# Patient Record
Sex: MALE | Race: WHITE | NOT HISPANIC OR LATINO | URBAN - METROPOLITAN AREA
[De-identification: names, ages, dates, MRNs, and addresses within clinical notes are randomized per-mention and may not be internally consistent; named-entity substitution may affect disease eponyms.]

---

## 2017-01-01 ENCOUNTER — EMERGENCY (EMERGENCY)
Facility: HOSPITAL | Age: 77
LOS: 1 days | Discharge: HOME | End: 2017-01-01
Attending: EMERGENCY MEDICINE | Admitting: EMERGENCY MEDICINE
Payer: MEDICARE

## 2017-01-01 VITALS
HEART RATE: 61 BPM | TEMPERATURE: 98 F | DIASTOLIC BLOOD PRESSURE: 76 MMHG | SYSTOLIC BLOOD PRESSURE: 149 MMHG | RESPIRATION RATE: 16 BRPM

## 2017-01-01 DIAGNOSIS — I10 ESSENTIAL (PRIMARY) HYPERTENSION: ICD-10-CM

## 2017-01-01 DIAGNOSIS — Z79.82 LONG TERM (CURRENT) USE OF ASPIRIN: ICD-10-CM

## 2017-01-01 DIAGNOSIS — R10.33 PERIUMBILICAL PAIN: ICD-10-CM

## 2017-01-01 DIAGNOSIS — Z87.891 PERSONAL HISTORY OF NICOTINE DEPENDENCE: ICD-10-CM

## 2017-01-01 DIAGNOSIS — E78.00 PURE HYPERCHOLESTEROLEMIA, UNSPECIFIED: ICD-10-CM

## 2017-01-01 DIAGNOSIS — Z98.890 OTHER SPECIFIED POSTPROCEDURAL STATES: ICD-10-CM

## 2017-01-01 LAB
ALBUMIN SERPL ELPH-MCNC: 3.9 G/DL — SIGNIFICANT CHANGE UP (ref 3.3–5)
ALP SERPL-CCNC: 60 U/L — SIGNIFICANT CHANGE UP (ref 40–120)
ALT FLD-CCNC: 70 U/L RC — HIGH (ref 10–45)
ANION GAP SERPL CALC-SCNC: 13 MMOL/L — SIGNIFICANT CHANGE UP (ref 5–17)
APPEARANCE UR: CLEAR — SIGNIFICANT CHANGE UP
APTT BLD: 31.2 SEC — SIGNIFICANT CHANGE UP (ref 27.5–37.4)
AST SERPL-CCNC: 92 U/L — HIGH (ref 10–40)
BASOPHILS # BLD AUTO: 0 K/UL — SIGNIFICANT CHANGE UP (ref 0–0.2)
BASOPHILS NFR BLD AUTO: 0.5 % — SIGNIFICANT CHANGE UP (ref 0–2)
BILIRUB SERPL-MCNC: 0.6 MG/DL — SIGNIFICANT CHANGE UP (ref 0.2–1.2)
BILIRUB UR-MCNC: NEGATIVE — SIGNIFICANT CHANGE UP
BUN SERPL-MCNC: 22 MG/DL — SIGNIFICANT CHANGE UP (ref 7–23)
CALCIUM SERPL-MCNC: 8.9 MG/DL — SIGNIFICANT CHANGE UP (ref 8.4–10.5)
CHLORIDE SERPL-SCNC: 105 MMOL/L — SIGNIFICANT CHANGE UP (ref 96–108)
CO2 SERPL-SCNC: 23 MMOL/L — SIGNIFICANT CHANGE UP (ref 22–31)
COLOR SPEC: YELLOW — SIGNIFICANT CHANGE UP
COMMENT - URINE: SIGNIFICANT CHANGE UP
CREAT SERPL-MCNC: 1.41 MG/DL — HIGH (ref 0.5–1.3)
DIFF PNL FLD: ABNORMAL
EOSINOPHIL # BLD AUTO: 0 K/UL — SIGNIFICANT CHANGE UP (ref 0–0.5)
EOSINOPHIL NFR BLD AUTO: 0.5 % — SIGNIFICANT CHANGE UP (ref 0–6)
EPI CELLS # UR: SIGNIFICANT CHANGE UP /HPF
GAS PNL BLDV: SIGNIFICANT CHANGE UP
GAS PNL BLDV: SIGNIFICANT CHANGE UP
GLUCOSE SERPL-MCNC: 117 MG/DL — HIGH (ref 70–99)
GLUCOSE UR QL: NEGATIVE — SIGNIFICANT CHANGE UP
HCT VFR BLD CALC: 43.3 % — SIGNIFICANT CHANGE UP (ref 39–50)
HGB BLD-MCNC: 15 G/DL — SIGNIFICANT CHANGE UP (ref 13–17)
INR BLD: 1.08 RATIO — SIGNIFICANT CHANGE UP (ref 0.88–1.16)
KETONES UR-MCNC: NEGATIVE — SIGNIFICANT CHANGE UP
LEUKOCYTE ESTERASE UR-ACNC: NEGATIVE — SIGNIFICANT CHANGE UP
LYMPHOCYTES # BLD AUTO: 1.5 K/UL — SIGNIFICANT CHANGE UP (ref 1–3.3)
LYMPHOCYTES # BLD AUTO: 23.4 % — SIGNIFICANT CHANGE UP (ref 13–44)
MCHC RBC-ENTMCNC: 33.2 PG — SIGNIFICANT CHANGE UP (ref 27–34)
MCHC RBC-ENTMCNC: 34.6 GM/DL — SIGNIFICANT CHANGE UP (ref 32–36)
MCV RBC AUTO: 96 FL — SIGNIFICANT CHANGE UP (ref 80–100)
MONOCYTES # BLD AUTO: 1.1 K/UL — HIGH (ref 0–0.9)
MONOCYTES NFR BLD AUTO: 16.6 % — HIGH (ref 2–14)
NEUTROPHILS # BLD AUTO: 3.8 K/UL — SIGNIFICANT CHANGE UP (ref 1.8–7.4)
NEUTROPHILS NFR BLD AUTO: 58.9 % — SIGNIFICANT CHANGE UP (ref 43–77)
NITRITE UR-MCNC: NEGATIVE — SIGNIFICANT CHANGE UP
PH UR: 6 — SIGNIFICANT CHANGE UP (ref 4.8–8)
PLATELET # BLD AUTO: 118 K/UL — LOW (ref 150–400)
POTASSIUM SERPL-MCNC: 4.4 MMOL/L — SIGNIFICANT CHANGE UP (ref 3.5–5.3)
POTASSIUM SERPL-SCNC: 4.4 MMOL/L — SIGNIFICANT CHANGE UP (ref 3.5–5.3)
PROT SERPL-MCNC: 6.7 G/DL — SIGNIFICANT CHANGE UP (ref 6–8.3)
PROT UR-MCNC: NEGATIVE — SIGNIFICANT CHANGE UP
PROTHROM AB SERPL-ACNC: 11.7 SEC — SIGNIFICANT CHANGE UP (ref 10–13.1)
RBC # BLD: 4.52 M/UL — SIGNIFICANT CHANGE UP (ref 4.2–5.8)
RBC # FLD: 11.2 % — SIGNIFICANT CHANGE UP (ref 10.3–14.5)
RBC CASTS # UR COMP ASSIST: SIGNIFICANT CHANGE UP /HPF (ref 0–2)
SODIUM SERPL-SCNC: 141 MMOL/L — SIGNIFICANT CHANGE UP (ref 135–145)
SP GR SPEC: 1.02 — SIGNIFICANT CHANGE UP (ref 1.01–1.02)
UROBILINOGEN FLD QL: NEGATIVE — SIGNIFICANT CHANGE UP
WBC # BLD: 6.4 K/UL — SIGNIFICANT CHANGE UP (ref 3.8–10.5)
WBC # FLD AUTO: 6.4 K/UL — SIGNIFICANT CHANGE UP (ref 3.8–10.5)
WBC UR QL: SIGNIFICANT CHANGE UP /HPF (ref 0–5)

## 2017-01-01 PROCEDURE — 99285 EMERGENCY DEPT VISIT HI MDM: CPT

## 2017-01-01 PROCEDURE — 74177 CT ABD & PELVIS W/CONTRAST: CPT | Mod: 26

## 2017-01-01 RX ORDER — SODIUM CHLORIDE 9 MG/ML
500 INJECTION INTRAMUSCULAR; INTRAVENOUS; SUBCUTANEOUS ONCE
Qty: 0 | Refills: 0 | Status: COMPLETED | OUTPATIENT
Start: 2017-01-01 | End: 2017-01-01

## 2017-01-01 RX ADMIN — SODIUM CHLORIDE 500 MILLILITER(S): 9 INJECTION INTRAMUSCULAR; INTRAVENOUS; SUBCUTANEOUS at 22:00

## 2017-01-01 RX ADMIN — SODIUM CHLORIDE 500 MILLILITER(S): 9 INJECTION INTRAMUSCULAR; INTRAVENOUS; SUBCUTANEOUS at 23:12

## 2017-01-01 NOTE — ED PROVIDER NOTE - PROGRESS NOTE DETAILS
Pt reassesed, remains well appearing, in NAD, abd soft nt/nd; repeat lactate negative; very low concern for mesenteric ischemia.  Given well appearing, will dc, to f/u with his pmd as outpatient.  -Brennan

## 2017-01-01 NOTE — ED ADULT NURSE NOTE - PMH
Aortic Stenosis    Atrial fibrillation    Enlarged Prostate    GERD (Gastroesophageal Reflux Disease)    HTN (hypertension)    Hypercholesterolemia

## 2017-01-01 NOTE — ED PROVIDER NOTE - MEDICAL DECISION MAKING DETAILS
76M, h/o afib, AS, HTN, GERD, AVR 2012, s/p RP mass removal (benign lipoma), s/p cholecystectomy, p/w abdominal pain; no fever vomiting or diarrhea; on exam mildly tender in periumbilical region; given surgical history, plan to obtain CT of the abdomen to r/o appendicitis, obstruction or mass.

## 2017-01-01 NOTE — ED ADULT NURSE NOTE - NS ED NURSE DC INFO COMPLEXITY
Patient asked questions/Simple: Patient demonstrates quick and easy understanding/Verbalized Understanding

## 2017-01-01 NOTE — ED PROVIDER NOTE - OBJECTIVE STATEMENT
76yM Albanian-speaking (translation provided by daughter) with multiple prior abdominal surgeries (resection of benign abdominal tumor, cholecystectomy, hernia repair), GERD, a fib, aortic stenosis presents with abdominal pain since this morning. Periumbilical. Initially improved after eating then pain returned. Denies fever, urinary symptoms, BRBPR, melena, nausea/vomiting, diarrhea.  Former smoker. Denies similar pain in the past.  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 Periumbilical. Initially improved after eating then pain returned. Denies fever, urinary symptoms, BRBPR, melena, nausea/vomiting, diarrhea.  Former smoker. Denies similar pain in the past.  PMD Ashley

## 2017-01-01 NOTE — ED ADULT NURSE NOTE - PSH
Aortic valve disease  AVR 7/2012  Retroperitoneal mass  s/p surgery- benign  S/P cardiac catheterization    S/P TURP  2005

## 2017-01-01 NOTE — ED ADULT NURSE NOTE - OBJECTIVE STATEMENT
Pt c/o diffuse abdominal pain that began this morning, resolved after eating breakfast, Pt c/o diffuse abdominal pain that began this morning, resolved after eating breakfast, returned about 2 hours ago, constant, unable to describe quality.  Pt ambulatory, does not appear to be in active distress, abd distended (pt states baseline), soft/nt, skin wdi, denies f/c, nvd, constipation, cp, sob, urinary symptoms, sick contacts.  PMH aortic valve replacement 2011 and multiple abdominal surgeries for "abdominal tumor" 2012.  Declines pain meds at this time.

## 2017-01-02 VITALS
HEART RATE: 57 BPM | RESPIRATION RATE: 16 BRPM | DIASTOLIC BLOOD PRESSURE: 72 MMHG | TEMPERATURE: 98 F | SYSTOLIC BLOOD PRESSURE: 141 MMHG | OXYGEN SATURATION: 97 %

## 2017-01-02 LAB — GAS PNL BLDV: SIGNIFICANT CHANGE UP

## 2017-01-02 PROCEDURE — 85014 HEMATOCRIT: CPT

## 2017-01-02 PROCEDURE — 85610 PROTHROMBIN TIME: CPT

## 2017-01-02 PROCEDURE — 80053 COMPREHEN METABOLIC PANEL: CPT

## 2017-01-02 PROCEDURE — 85730 THROMBOPLASTIN TIME PARTIAL: CPT

## 2017-01-02 PROCEDURE — 82803 BLOOD GASES ANY COMBINATION: CPT

## 2017-01-02 PROCEDURE — 82330 ASSAY OF CALCIUM: CPT

## 2017-01-02 PROCEDURE — 82947 ASSAY GLUCOSE BLOOD QUANT: CPT

## 2017-01-02 PROCEDURE — 84132 ASSAY OF SERUM POTASSIUM: CPT

## 2017-01-02 PROCEDURE — 96374 THER/PROPH/DIAG INJ IV PUSH: CPT | Mod: XU

## 2017-01-02 PROCEDURE — 83690 ASSAY OF LIPASE: CPT

## 2017-01-02 PROCEDURE — 82435 ASSAY OF BLOOD CHLORIDE: CPT

## 2017-01-02 PROCEDURE — 83605 ASSAY OF LACTIC ACID: CPT

## 2017-01-02 PROCEDURE — 99284 EMERGENCY DEPT VISIT MOD MDM: CPT | Mod: 25

## 2017-01-02 PROCEDURE — 85027 COMPLETE CBC AUTOMATED: CPT

## 2017-01-02 PROCEDURE — 81001 URINALYSIS AUTO W/SCOPE: CPT

## 2017-01-02 PROCEDURE — 74177 CT ABD & PELVIS W/CONTRAST: CPT

## 2017-01-02 PROCEDURE — 84295 ASSAY OF SERUM SODIUM: CPT

## 2017-01-02 RX ORDER — FAMOTIDINE 10 MG/ML
20 INJECTION INTRAVENOUS ONCE
Qty: 0 | Refills: 0 | Status: COMPLETED | OUTPATIENT
Start: 2017-01-02 | End: 2017-01-02

## 2017-01-02 RX ADMIN — FAMOTIDINE 20 MILLIGRAM(S): 10 INJECTION INTRAVENOUS at 01:11

## 2017-01-02 RX ADMIN — Medication 30 MILLILITER(S): at 01:11

## 2017-01-02 NOTE — ED ADULT NURSE REASSESSMENT NOTE - NS ED NURSE REASSESS COMMENT FT1
Given PO contrast to drink for CT scan, also given urine cup for UA
pts daughter translating at pts request(not wanting to use translation service)

## 2017-03-30 ENCOUNTER — APPOINTMENT (OUTPATIENT)
Dept: OPHTHALMOLOGY | Facility: CLINIC | Age: 77
End: 2017-03-30

## 2017-04-10 ENCOUNTER — LABORATORY RESULT (OUTPATIENT)
Age: 77
End: 2017-04-10

## 2017-04-10 ENCOUNTER — APPOINTMENT (OUTPATIENT)
Dept: INTERNAL MEDICINE | Facility: CLINIC | Age: 77
End: 2017-04-10

## 2017-04-10 VITALS
BODY MASS INDEX: 33.35 KG/M2 | RESPIRATION RATE: 18 BRPM | HEART RATE: 78 BPM | SYSTOLIC BLOOD PRESSURE: 116 MMHG | WEIGHT: 219.36 LBS | DIASTOLIC BLOOD PRESSURE: 70 MMHG

## 2017-04-10 VITALS — TEMPERATURE: 97.3 F

## 2017-04-10 LAB
BILIRUB UR QL STRIP: NEGATIVE
GLUCOSE UR-MCNC: NEGATIVE
HCG UR QL: 0.2 EU/DL
HGB UR QL STRIP.AUTO: NORMAL
KETONES UR-MCNC: NEGATIVE
LEUKOCYTE ESTERASE UR QL STRIP: NEGATIVE
NITRITE UR QL STRIP: NEGATIVE
PH UR STRIP: 6
PROT UR STRIP-MCNC: NEGATIVE
SP GR UR STRIP: 1.02

## 2017-04-10 RX ORDER — CYANOCOBALAMIN 1000 UG/ML
1000 INJECTION INTRAMUSCULAR; SUBCUTANEOUS
Qty: 0 | Refills: 0 | Status: COMPLETED | OUTPATIENT
Start: 2017-04-10

## 2017-04-10 RX ADMIN — CYANOCOBALAMIN 0 MCG/ML: 1000 INJECTION INTRAMUSCULAR; SUBCUTANEOUS at 00:00

## 2017-04-12 LAB
25(OH)D3 SERPL-MCNC: 38.4 NG/ML
ALBUMIN SERPL ELPH-MCNC: 4.2 G/DL
ALP BLD-CCNC: 46 U/L
ALT SERPL-CCNC: 22 U/L
ANION GAP SERPL CALC-SCNC: 18 MMOL/L
AST SERPL-CCNC: 35 U/L
BASOPHILS # BLD AUTO: 0 K/UL
BASOPHILS NFR BLD AUTO: 0 %
BILIRUB SERPL-MCNC: 0.6 MG/DL
BUN SERPL-MCNC: 18 MG/DL
CALCIUM SERPL-MCNC: 9.4 MG/DL
CHLORIDE SERPL-SCNC: 102 MMOL/L
CHOLEST SERPL-MCNC: 116 MG/DL
CHOLEST/HDLC SERPL: 2.3 RATIO
CO2 SERPL-SCNC: 23 MMOL/L
CREAT SERPL-MCNC: 1.2 MG/DL
EOSINOPHIL # BLD AUTO: 0.04 K/UL
EOSINOPHIL NFR BLD AUTO: 0.9 %
GLUCOSE SERPL-MCNC: 102 MG/DL
HCT VFR BLD CALC: 45.7 %
HDLC SERPL-MCNC: 50 MG/DL
HGB BLD-MCNC: 14.8 G/DL
LDLC SERPL CALC-MCNC: 54 MG/DL
LYMPHOCYTES # BLD AUTO: 1.09 K/UL
LYMPHOCYTES NFR BLD AUTO: 23.9 %
MAN DIFF?: NORMAL
MCHC RBC-ENTMCNC: 31.5 PG
MCHC RBC-ENTMCNC: 32.4 GM/DL
MCV RBC AUTO: 97.2 FL
MONOCYTES # BLD AUTO: 0.93 K/UL
MONOCYTES NFR BLD AUTO: 20.4 %
NEUTROPHILS # BLD AUTO: 2.29 K/UL
NEUTROPHILS NFR BLD AUTO: 50.4 %
PLATELET # BLD AUTO: 112 K/UL
POTASSIUM SERPL-SCNC: 4.7 MMOL/L
PROT SERPL-MCNC: 6.9 G/DL
RBC # BLD: 4.7 M/UL
RBC # FLD: 12.8 %
SODIUM SERPL-SCNC: 143 MMOL/L
TRIGL SERPL-MCNC: 58 MG/DL
VIT B12 SERPL-MCNC: 400 PG/ML
WBC # FLD AUTO: 4.55 K/UL

## 2017-04-24 ENCOUNTER — LABORATORY RESULT (OUTPATIENT)
Age: 77
End: 2017-04-24

## 2017-04-24 ENCOUNTER — OUTPATIENT (OUTPATIENT)
Dept: OUTPATIENT SERVICES | Facility: HOSPITAL | Age: 77
LOS: 1 days | End: 2017-04-24
Payer: MEDICARE

## 2017-04-24 ENCOUNTER — APPOINTMENT (OUTPATIENT)
Dept: UROLOGY | Facility: CLINIC | Age: 77
End: 2017-04-24

## 2017-04-24 PROCEDURE — 76775 US EXAM ABDO BACK WALL LIM: CPT

## 2017-04-24 PROCEDURE — 88112 CYTOPATH CELL ENHANCE TECH: CPT | Mod: 26

## 2017-05-02 DIAGNOSIS — R31.29 OTHER MICROSCOPIC HEMATURIA: ICD-10-CM

## 2017-05-02 DIAGNOSIS — N40.1 BENIGN PROSTATIC HYPERPLASIA WITH LOWER URINARY TRACT SYMPTOMS: ICD-10-CM

## 2017-05-02 DIAGNOSIS — N20.0 CALCULUS OF KIDNEY: ICD-10-CM

## 2017-05-02 DIAGNOSIS — M54.5 LOW BACK PAIN: ICD-10-CM

## 2017-05-02 LAB
ALBUMIN SERPL ELPH-MCNC: 4.3 G/DL
ALP BLD-CCNC: 41 U/L
ALT SERPL-CCNC: 20 U/L
ANION GAP SERPL CALC-SCNC: 11 MMOL/L
APPEARANCE: CLEAR
AST SERPL-CCNC: 21 U/L
BACTERIA UR CULT: NORMAL
BACTERIA: NEGATIVE
BASOPHILS # BLD AUTO: 0 K/UL
BASOPHILS NFR BLD AUTO: 0 %
BILIRUB SERPL-MCNC: 0.7 MG/DL
BILIRUBIN URINE: NEGATIVE
BLOOD URINE: NEGATIVE
BUN SERPL-MCNC: 21 MG/DL
CALCIUM SERPL-MCNC: 9.6 MG/DL
CHLORIDE SERPL-SCNC: 104 MMOL/L
CO2 SERPL-SCNC: 28 MMOL/L
COLOR: YELLOW
CORE LAB FLUID CYTOLOGY: NORMAL
CREAT SERPL-MCNC: 1.23 MG/DL
EOSINOPHIL # BLD AUTO: 0 K/UL
EOSINOPHIL NFR BLD AUTO: 0 %
GLUCOSE QUALITATIVE U: NORMAL MG/DL
GLUCOSE SERPL-MCNC: 90 MG/DL
HCT VFR BLD CALC: 46.1 %
HGB BLD-MCNC: 15 G/DL
KETONES URINE: NEGATIVE
LEUKOCYTE ESTERASE URINE: NEGATIVE
LYMPHOCYTES # BLD AUTO: 0.9 K/UL
LYMPHOCYTES NFR BLD AUTO: 18.2 %
MAN DIFF?: NORMAL
MCHC RBC-ENTMCNC: 30.7 PG
MCHC RBC-ENTMCNC: 32.5 GM/DL
MCV RBC AUTO: 94.3 FL
MICROSCOPIC-UA: NORMAL
MONOCYTES # BLD AUTO: 0.95 K/UL
MONOCYTES NFR BLD AUTO: 19.1 %
NEUTROPHILS # BLD AUTO: 2.65 K/UL
NEUTROPHILS NFR BLD AUTO: 53.6 %
NITRITE URINE: NEGATIVE
PH URINE: 7
PLATELET # BLD AUTO: 121 K/UL
POTASSIUM SERPL-SCNC: 5 MMOL/L
PROT SERPL-MCNC: 7.2 G/DL
PROTEIN URINE: NEGATIVE MG/DL
PSA SERPL-MCNC: 0.69 NG/ML
RBC # BLD: 4.89 M/UL
RBC # FLD: 12.6 %
RED BLOOD CELLS URINE: 0 /HPF
SODIUM SERPL-SCNC: 143 MMOL/L
SPECIFIC GRAVITY URINE: 1.01
SQUAMOUS EPITHELIAL CELLS: 0 /HPF
TESTOST SERPL-MCNC: 659.3 NG/DL
UROBILINOGEN URINE: NORMAL MG/DL
WBC # FLD AUTO: 4.95 K/UL
WHITE BLOOD CELLS URINE: 0 /HPF

## 2017-05-16 ENCOUNTER — APPOINTMENT (OUTPATIENT)
Dept: INTERNAL MEDICINE | Facility: CLINIC | Age: 77
End: 2017-05-16

## 2017-05-16 VITALS
DIASTOLIC BLOOD PRESSURE: 74 MMHG | RESPIRATION RATE: 16 BRPM | HEART RATE: 72 BPM | SYSTOLIC BLOOD PRESSURE: 126 MMHG | TEMPERATURE: 98 F

## 2017-05-16 VITALS — WEIGHT: 219.36 LBS | BODY MASS INDEX: 33.35 KG/M2

## 2017-09-08 ENCOUNTER — LABORATORY RESULT (OUTPATIENT)
Age: 77
End: 2017-09-08

## 2017-09-08 ENCOUNTER — RX RENEWAL (OUTPATIENT)
Age: 77
End: 2017-09-08

## 2017-09-08 ENCOUNTER — APPOINTMENT (OUTPATIENT)
Dept: INTERNAL MEDICINE | Facility: CLINIC | Age: 77
End: 2017-09-08
Payer: MEDICARE

## 2017-09-08 VITALS
BODY MASS INDEX: 33.15 KG/M2 | RESPIRATION RATE: 16 BRPM | SYSTOLIC BLOOD PRESSURE: 126 MMHG | WEIGHT: 218 LBS | TEMPERATURE: 96.2 F | HEART RATE: 70 BPM | DIASTOLIC BLOOD PRESSURE: 74 MMHG

## 2017-09-08 LAB
BILIRUB UR QL STRIP: NEGATIVE
GLUCOSE UR-MCNC: NEGATIVE
HCG UR QL: 0.2 EU/DL
HGB UR QL STRIP.AUTO: NORMAL
KETONES UR-MCNC: NEGATIVE
LEUKOCYTE ESTERASE UR QL STRIP: NEGATIVE
NITRITE UR QL STRIP: NEGATIVE
PH UR STRIP: 6
PROT UR STRIP-MCNC: NEGATIVE
SP GR UR STRIP: 1.01

## 2017-09-08 PROCEDURE — 36415 COLL VENOUS BLD VENIPUNCTURE: CPT

## 2017-09-08 PROCEDURE — 81003 URINALYSIS AUTO W/O SCOPE: CPT | Mod: QW

## 2017-09-08 PROCEDURE — G0008: CPT

## 2017-09-08 PROCEDURE — 90662 IIV NO PRSV INCREASED AG IM: CPT

## 2017-09-08 PROCEDURE — 99215 OFFICE O/P EST HI 40 MIN: CPT | Mod: 25

## 2017-09-09 LAB
25(OH)D3 SERPL-MCNC: 40.5 NG/ML
ALBUMIN SERPL ELPH-MCNC: 4.4 G/DL
ALP BLD-CCNC: 41 U/L
ALT SERPL-CCNC: 20 U/L
ANION GAP SERPL CALC-SCNC: 17 MMOL/L
AST SERPL-CCNC: 21 U/L
BASOPHILS # BLD AUTO: 0 K/UL
BASOPHILS NFR BLD AUTO: 0 %
BILIRUB SERPL-MCNC: 0.5 MG/DL
BUN SERPL-MCNC: 17 MG/DL
CALCIUM SERPL-MCNC: 9.7 MG/DL
CHLORIDE SERPL-SCNC: 102 MMOL/L
CHOLEST SERPL-MCNC: 133 MG/DL
CHOLEST/HDLC SERPL: 2.9 RATIO
CO2 SERPL-SCNC: 24 MMOL/L
CREAT SERPL-MCNC: 1.25 MG/DL
EOSINOPHIL # BLD AUTO: 0.02 K/UL
EOSINOPHIL NFR BLD AUTO: 0.4 %
GLUCOSE SERPL-MCNC: 104 MG/DL
HCT VFR BLD CALC: 47.8 %
HDLC SERPL-MCNC: 46 MG/DL
HGB BLD-MCNC: 15.5 G/DL
IMM GRANULOCYTES NFR BLD AUTO: 0.2 %
LDLC SERPL CALC-MCNC: 72 MG/DL
LYMPHOCYTES # BLD AUTO: 1.07 K/UL
LYMPHOCYTES NFR BLD AUTO: 23.4 %
MAN DIFF?: NORMAL
MCHC RBC-ENTMCNC: 31.6 PG
MCHC RBC-ENTMCNC: 32.4 GM/DL
MCV RBC AUTO: 97.6 FL
MONOCYTES # BLD AUTO: 1.15 K/UL
MONOCYTES NFR BLD AUTO: 25.1 %
NEUTROPHILS # BLD AUTO: 2.33 K/UL
NEUTROPHILS NFR BLD AUTO: 50.9 %
PLATELET # BLD AUTO: 111 K/UL
POTASSIUM SERPL-SCNC: 5.1 MMOL/L
PROT SERPL-MCNC: 7.6 G/DL
RBC # BLD: 4.9 M/UL
RBC # FLD: 13.2 %
SODIUM SERPL-SCNC: 143 MMOL/L
TRIGL SERPL-MCNC: 77 MG/DL
VIT B12 SERPL-MCNC: 280 PG/ML
WBC # FLD AUTO: 4.58 K/UL

## 2017-09-11 ENCOUNTER — APPOINTMENT (OUTPATIENT)
Dept: INTERNAL MEDICINE | Facility: CLINIC | Age: 77
End: 2017-09-11
Payer: MEDICARE

## 2017-09-11 PROCEDURE — 96372 THER/PROPH/DIAG INJ SC/IM: CPT

## 2017-09-11 RX ORDER — CYANOCOBALAMIN 1000 UG/ML
1000 INJECTION INTRAMUSCULAR; SUBCUTANEOUS
Qty: 0 | Refills: 0 | Status: COMPLETED | OUTPATIENT
Start: 2017-09-11

## 2017-09-11 RX ADMIN — CYANOCOBALAMIN 0 MCG/ML: 1000 INJECTION INTRAMUSCULAR; SUBCUTANEOUS at 00:00

## 2017-09-25 ENCOUNTER — APPOINTMENT (OUTPATIENT)
Dept: INTERNAL MEDICINE | Facility: CLINIC | Age: 77
End: 2017-09-25
Payer: MEDICARE

## 2017-09-25 ENCOUNTER — NON-APPOINTMENT (OUTPATIENT)
Age: 77
End: 2017-09-25

## 2017-09-25 ENCOUNTER — LABORATORY RESULT (OUTPATIENT)
Age: 77
End: 2017-09-25

## 2017-09-25 VITALS
WEIGHT: 217 LBS | BODY MASS INDEX: 32.99 KG/M2 | TEMPERATURE: 97.1 F | HEART RATE: 72 BPM | RESPIRATION RATE: 16 BRPM | DIASTOLIC BLOOD PRESSURE: 72 MMHG | SYSTOLIC BLOOD PRESSURE: 128 MMHG

## 2017-09-25 LAB
BILIRUB UR QL STRIP: NEGATIVE
GLUCOSE UR-MCNC: NEGATIVE
HCG UR QL: 0.2 EU/DL
HGB UR QL STRIP.AUTO: NORMAL
KETONES UR-MCNC: NEGATIVE
LEUKOCYTE ESTERASE UR QL STRIP: NEGATIVE
NITRITE UR QL STRIP: NEGATIVE
PH UR STRIP: 5.5
PROT UR STRIP-MCNC: NEGATIVE
SP GR UR STRIP: 1.01

## 2017-09-25 PROCEDURE — 93000 ELECTROCARDIOGRAM COMPLETE: CPT

## 2017-09-25 PROCEDURE — 99215 OFFICE O/P EST HI 40 MIN: CPT | Mod: 25

## 2017-09-25 PROCEDURE — 96372 THER/PROPH/DIAG INJ SC/IM: CPT

## 2017-09-25 PROCEDURE — 36415 COLL VENOUS BLD VENIPUNCTURE: CPT

## 2017-09-25 PROCEDURE — 81003 URINALYSIS AUTO W/O SCOPE: CPT | Mod: QW

## 2017-09-25 RX ORDER — CYANOCOBALAMIN 1000 UG/ML
1000 INJECTION INTRAMUSCULAR; SUBCUTANEOUS
Qty: 0 | Refills: 0 | Status: COMPLETED | OUTPATIENT
Start: 2017-09-25

## 2017-09-25 RX ADMIN — CYANOCOBALAMIN 0 MCG/ML: 1000 INJECTION INTRAMUSCULAR; SUBCUTANEOUS at 00:00

## 2017-09-25 RX ADMIN — CYANOCOBALAMIN 1 MCG/ML: 1000 INJECTION INTRAMUSCULAR; SUBCUTANEOUS at 00:00

## 2017-09-26 LAB
ALBUMIN SERPL ELPH-MCNC: 4.4 G/DL
ALP BLD-CCNC: 39 U/L
ALT SERPL-CCNC: 23 U/L
ANION GAP SERPL CALC-SCNC: 15 MMOL/L
APTT BLD: 32.3 SEC
AST SERPL-CCNC: 26 U/L
BASOPHILS # BLD AUTO: 0.09 K/UL
BASOPHILS NFR BLD AUTO: 1.8 %
BILIRUB SERPL-MCNC: 0.6 MG/DL
BUN SERPL-MCNC: 15 MG/DL
CALCIUM SERPL-MCNC: 9.4 MG/DL
CHLORIDE SERPL-SCNC: 102 MMOL/L
CO2 SERPL-SCNC: 25 MMOL/L
CREAT SERPL-MCNC: 1.34 MG/DL
EOSINOPHIL # BLD AUTO: 0 K/UL
EOSINOPHIL NFR BLD AUTO: 0 %
GLUCOSE SERPL-MCNC: 78 MG/DL
HCT VFR BLD CALC: 45.6 %
HGB BLD-MCNC: 14.4 G/DL
INR PPP: 1.03 RATIO
LYMPHOCYTES # BLD AUTO: 1.45 K/UL
LYMPHOCYTES NFR BLD AUTO: 30.4 %
MAN DIFF?: NORMAL
MCHC RBC-ENTMCNC: 31.6 GM/DL
MCHC RBC-ENTMCNC: 31.6 PG
MCV RBC AUTO: 100.2 FL
MONOCYTES # BLD AUTO: 0.86 K/UL
MONOCYTES NFR BLD AUTO: 17.9 %
NEUTROPHILS # BLD AUTO: 2.17 K/UL
NEUTROPHILS NFR BLD AUTO: 45.4 %
PLATELET # BLD AUTO: 107 K/UL
POTASSIUM SERPL-SCNC: 4 MMOL/L
PROT SERPL-MCNC: 7.1 G/DL
PT BLD: 11.7 SEC
RBC # BLD: 4.55 M/UL
RBC # FLD: 13.8 %
SODIUM SERPL-SCNC: 142 MMOL/L
WBC # FLD AUTO: 4.78 K/UL

## 2017-10-20 ENCOUNTER — APPOINTMENT (OUTPATIENT)
Dept: INTERNAL MEDICINE | Facility: CLINIC | Age: 77
End: 2017-10-20
Payer: MEDICARE

## 2017-10-20 PROCEDURE — 96372 THER/PROPH/DIAG INJ SC/IM: CPT

## 2017-10-20 RX ORDER — CYANOCOBALAMIN 1000 UG/ML
1000 INJECTION INTRAMUSCULAR; SUBCUTANEOUS
Qty: 0 | Refills: 0 | Status: COMPLETED | OUTPATIENT
Start: 2017-10-20

## 2017-10-20 RX ADMIN — CYANOCOBALAMIN 0 MCG/ML: 1000 INJECTION INTRAMUSCULAR; SUBCUTANEOUS at 00:00

## 2017-10-23 ENCOUNTER — APPOINTMENT (OUTPATIENT)
Dept: UROLOGY | Facility: CLINIC | Age: 77
End: 2017-10-23
Payer: MEDICARE

## 2017-10-23 PROCEDURE — 99214 OFFICE O/P EST MOD 30 MIN: CPT

## 2017-10-25 LAB
ANION GAP SERPL CALC-SCNC: 13 MMOL/L
APPEARANCE: CLEAR
BACTERIA UR CULT: NORMAL
BACTERIA: NEGATIVE
BILIRUBIN URINE: NEGATIVE
BLOOD URINE: ABNORMAL
BUN SERPL-MCNC: 15 MG/DL
CALCIUM SERPL-MCNC: 9.3 MG/DL
CHLORIDE SERPL-SCNC: 104 MMOL/L
CO2 SERPL-SCNC: 28 MMOL/L
COLOR: YELLOW
CORE LAB FLUID CYTOLOGY: NORMAL
CREAT SERPL-MCNC: 1.11 MG/DL
GLUCOSE QUALITATIVE U: NEGATIVE MG/DL
GLUCOSE SERPL-MCNC: 88 MG/DL
KETONES URINE: NEGATIVE
LEUKOCYTE ESTERASE URINE: NEGATIVE
MICROSCOPIC-UA: NORMAL
NITRITE URINE: NEGATIVE
PH URINE: 7
POTASSIUM SERPL-SCNC: 4.5 MMOL/L
PROTEIN URINE: NEGATIVE MG/DL
RED BLOOD CELLS URINE: 0 /HPF
SODIUM SERPL-SCNC: 145 MMOL/L
SPECIFIC GRAVITY URINE: 1.01
SQUAMOUS EPITHELIAL CELLS: 0 /HPF
UROBILINOGEN URINE: NEGATIVE MG/DL
WHITE BLOOD CELLS URINE: 0 /HPF

## 2017-11-02 ENCOUNTER — APPOINTMENT (OUTPATIENT)
Dept: INTERNAL MEDICINE | Facility: CLINIC | Age: 77
End: 2017-11-02
Payer: MEDICARE

## 2017-11-02 VITALS
HEART RATE: 63 BPM | BODY MASS INDEX: 32.99 KG/M2 | SYSTOLIC BLOOD PRESSURE: 130 MMHG | RESPIRATION RATE: 18 BRPM | DIASTOLIC BLOOD PRESSURE: 72 MMHG | WEIGHT: 217 LBS | TEMPERATURE: 98 F

## 2017-11-02 LAB
BILIRUB UR QL STRIP: NEGATIVE
GLUCOSE UR-MCNC: NEGATIVE
HCG UR QL: 0.2 EU/DL
HGB UR QL STRIP.AUTO: NORMAL
KETONES UR-MCNC: NEGATIVE
LEUKOCYTE ESTERASE UR QL STRIP: NEGATIVE
NITRITE UR QL STRIP: NEGATIVE
PH UR STRIP: 7
PROT UR STRIP-MCNC: NEGATIVE
SP GR UR STRIP: 1.01

## 2017-11-02 PROCEDURE — 99214 OFFICE O/P EST MOD 30 MIN: CPT | Mod: 25

## 2017-11-02 PROCEDURE — 81002 URINALYSIS NONAUTO W/O SCOPE: CPT

## 2017-11-02 PROCEDURE — 96372 THER/PROPH/DIAG INJ SC/IM: CPT

## 2017-11-02 PROCEDURE — 36415 COLL VENOUS BLD VENIPUNCTURE: CPT

## 2017-11-02 RX ORDER — CYANOCOBALAMIN 1000 UG/ML
1000 INJECTION INTRAMUSCULAR; SUBCUTANEOUS
Qty: 0 | Refills: 0 | Status: COMPLETED | OUTPATIENT
Start: 2017-11-02

## 2017-11-02 RX ADMIN — CYANOCOBALAMIN 0 MCG/ML: 1000 INJECTION INTRAMUSCULAR; SUBCUTANEOUS at 00:00

## 2017-11-03 LAB
ALBUMIN SERPL ELPH-MCNC: 4.4 G/DL
ALP BLD-CCNC: 47 U/L
ALT SERPL-CCNC: 25 U/L
ANION GAP SERPL CALC-SCNC: 13 MMOL/L
AST SERPL-CCNC: 23 U/L
BILIRUB SERPL-MCNC: 0.5 MG/DL
BUN SERPL-MCNC: 16 MG/DL
CALCIUM SERPL-MCNC: 9 MG/DL
CHLORIDE SERPL-SCNC: 102 MMOL/L
CO2 SERPL-SCNC: 28 MMOL/L
CREAT SERPL-MCNC: 1.22 MG/DL
GLUCOSE SERPL-MCNC: 70 MG/DL
POTASSIUM SERPL-SCNC: 4 MMOL/L
PROT SERPL-MCNC: 7.2 G/DL
SODIUM SERPL-SCNC: 143 MMOL/L

## 2017-12-06 ENCOUNTER — APPOINTMENT (OUTPATIENT)
Dept: INTERNAL MEDICINE | Facility: CLINIC | Age: 77
End: 2017-12-06
Payer: MEDICARE

## 2017-12-06 VITALS
DIASTOLIC BLOOD PRESSURE: 73 MMHG | SYSTOLIC BLOOD PRESSURE: 128 MMHG | TEMPERATURE: 96.5 F | RESPIRATION RATE: 16 BRPM | HEART RATE: 74 BPM

## 2017-12-06 VITALS — WEIGHT: 222 LBS | BODY MASS INDEX: 33.76 KG/M2

## 2017-12-06 PROCEDURE — 99214 OFFICE O/P EST MOD 30 MIN: CPT

## 2017-12-06 RX ORDER — CEFUROXIME AXETIL 500 MG/1
500 TABLET ORAL
Qty: 20 | Refills: 0 | Status: DISCONTINUED | COMMUNITY
Start: 2017-05-16 | End: 2017-12-06

## 2017-12-06 RX ORDER — DEXTROMETHORPHAN POLISTIREX 30 MG/5ML
30 SUSPENSION, EXTENDED RELEASE ORAL
Qty: 100 | Refills: 1 | Status: DISCONTINUED | COMMUNITY
Start: 2017-05-16 | End: 2017-12-06

## 2017-12-06 RX ORDER — DEXTROMETHORPHAN POLISTIREX 30 MG/5ML
30 SUSPENSION, EXTENDED RELEASE ORAL
Qty: 200 | Refills: 1 | Status: DISCONTINUED | COMMUNITY
Start: 2017-11-02 | End: 2017-12-06

## 2017-12-06 RX ORDER — SULFAMETHOXAZOLE AND TRIMETHOPRIM 800; 160 MG/1; MG/1
800-160 TABLET ORAL TWICE DAILY
Qty: 16 | Refills: 0 | Status: DISCONTINUED | COMMUNITY
Start: 2017-11-02 | End: 2017-12-06

## 2017-12-09 ENCOUNTER — MED ADMIN CHARGE (OUTPATIENT)
Age: 77
End: 2017-12-09

## 2017-12-11 ENCOUNTER — APPOINTMENT (OUTPATIENT)
Dept: INTERNAL MEDICINE | Facility: CLINIC | Age: 77
End: 2017-12-11
Payer: MEDICARE

## 2017-12-11 VITALS
SYSTOLIC BLOOD PRESSURE: 132 MMHG | RESPIRATION RATE: 16 BRPM | TEMPERATURE: 98.3 F | HEART RATE: 68 BPM | DIASTOLIC BLOOD PRESSURE: 75 MMHG

## 2017-12-11 VITALS — BODY MASS INDEX: 33.15 KG/M2 | WEIGHT: 218 LBS

## 2017-12-11 PROCEDURE — 36415 COLL VENOUS BLD VENIPUNCTURE: CPT

## 2017-12-11 PROCEDURE — 99215 OFFICE O/P EST HI 40 MIN: CPT | Mod: 25

## 2017-12-13 ENCOUNTER — RX RENEWAL (OUTPATIENT)
Age: 77
End: 2017-12-13

## 2017-12-13 LAB
25(OH)D3 SERPL-MCNC: 24.6 NG/ML
ALBUMIN SERPL ELPH-MCNC: 4.1 G/DL
ALP BLD-CCNC: 43 U/L
ALT SERPL-CCNC: 24 U/L
ANION GAP SERPL CALC-SCNC: 16 MMOL/L
AST SERPL-CCNC: 20 U/L
BILIRUB SERPL-MCNC: 0.2 MG/DL
BUN SERPL-MCNC: 21 MG/DL
CALCIUM SERPL-MCNC: 9 MG/DL
CHLORIDE SERPL-SCNC: 106 MMOL/L
CHOLEST SERPL-MCNC: 149 MG/DL
CHOLEST/HDLC SERPL: 3.2 RATIO
CO2 SERPL-SCNC: 23 MMOL/L
CREAT SERPL-MCNC: 1.33 MG/DL
GLUCOSE SERPL-MCNC: 95 MG/DL
HDLC SERPL-MCNC: 47 MG/DL
LDLC SERPL CALC-MCNC: 85 MG/DL
POTASSIUM SERPL-SCNC: 4.1 MMOL/L
PROT SERPL-MCNC: 7.2 G/DL
SODIUM SERPL-SCNC: 145 MMOL/L
TRIGL SERPL-MCNC: 86 MG/DL

## 2017-12-13 RX ORDER — AZITHROMYCIN 250 MG/1
250 TABLET, FILM COATED ORAL
Qty: 6 | Refills: 0 | Status: DISCONTINUED | COMMUNITY
Start: 2017-12-06 | End: 2017-12-13

## 2017-12-21 ENCOUNTER — RX RENEWAL (OUTPATIENT)
Age: 77
End: 2017-12-21

## 2018-01-11 ENCOUNTER — APPOINTMENT (OUTPATIENT)
Dept: UROLOGY | Facility: CLINIC | Age: 78
End: 2018-01-11
Payer: MEDICARE

## 2018-01-11 PROCEDURE — 99214 OFFICE O/P EST MOD 30 MIN: CPT

## 2018-01-12 LAB
APPEARANCE: CLEAR
BACTERIA UR CULT: NORMAL
BACTERIA: NEGATIVE
BILIRUBIN URINE: NEGATIVE
BLOOD URINE: ABNORMAL
COLOR: YELLOW
CORE LAB FLUID CYTOLOGY: NORMAL
GLUCOSE QUALITATIVE U: NEGATIVE MG/DL
KETONES URINE: NEGATIVE
LEUKOCYTE ESTERASE URINE: NEGATIVE
MICROSCOPIC-UA: NORMAL
NITRITE URINE: NEGATIVE
PH URINE: 6.5
PROTEIN URINE: NEGATIVE MG/DL
RED BLOOD CELLS URINE: 0 /HPF
SPECIFIC GRAVITY URINE: 1
SQUAMOUS EPITHELIAL CELLS: 0 /HPF
UROBILINOGEN URINE: NEGATIVE MG/DL
WHITE BLOOD CELLS URINE: 0 /HPF

## 2018-01-16 ENCOUNTER — FORM ENCOUNTER (OUTPATIENT)
Age: 78
End: 2018-01-16

## 2018-01-17 ENCOUNTER — OUTPATIENT (OUTPATIENT)
Dept: OUTPATIENT SERVICES | Facility: HOSPITAL | Age: 78
LOS: 1 days | End: 2018-01-17
Payer: MEDICARE

## 2018-01-17 ENCOUNTER — APPOINTMENT (OUTPATIENT)
Dept: CT IMAGING | Facility: IMAGING CENTER | Age: 78
End: 2018-01-17
Payer: MEDICARE

## 2018-01-17 ENCOUNTER — APPOINTMENT (OUTPATIENT)
Dept: UROLOGY | Facility: CLINIC | Age: 78
End: 2018-01-17
Payer: MEDICARE

## 2018-01-17 DIAGNOSIS — D49.0 NEOPLASM OF UNSPECIFIED BEHAVIOR OF DIGESTIVE SYSTEM: ICD-10-CM

## 2018-01-17 DIAGNOSIS — N20.0 CALCULUS OF KIDNEY: ICD-10-CM

## 2018-01-17 PROCEDURE — 74178 CT ABD&PLV WO CNTR FLWD CNTR: CPT | Mod: 26

## 2018-01-17 PROCEDURE — 99214 OFFICE O/P EST MOD 30 MIN: CPT

## 2018-01-17 PROCEDURE — 82565 ASSAY OF CREATININE: CPT

## 2018-01-17 PROCEDURE — 74178 CT ABD&PLV WO CNTR FLWD CNTR: CPT

## 2018-01-20 LAB
APPEARANCE: CLEAR
BACTERIA UR CULT: NORMAL
BACTERIA: NEGATIVE
BILIRUBIN URINE: NEGATIVE
BLOOD URINE: ABNORMAL
COLOR: YELLOW
CORE LAB FLUID CYTOLOGY: NORMAL
GLUCOSE QUALITATIVE U: NEGATIVE MG/DL
KETONES URINE: NEGATIVE
LEUKOCYTE ESTERASE URINE: NEGATIVE
MICROSCOPIC-UA: NORMAL
NITRITE URINE: NEGATIVE
PH URINE: 6.5
PROTEIN URINE: NEGATIVE MG/DL
RED BLOOD CELLS URINE: 1 /HPF
SPECIFIC GRAVITY URINE: 1.01
SQUAMOUS EPITHELIAL CELLS: 0 /HPF
UROBILINOGEN URINE: NEGATIVE MG/DL
WHITE BLOOD CELLS URINE: 0 /HPF

## 2018-03-21 ENCOUNTER — LABORATORY RESULT (OUTPATIENT)
Age: 78
End: 2018-03-21

## 2018-03-21 ENCOUNTER — APPOINTMENT (OUTPATIENT)
Dept: INTERNAL MEDICINE | Facility: CLINIC | Age: 78
End: 2018-03-21
Payer: MEDICARE

## 2018-03-21 VITALS
SYSTOLIC BLOOD PRESSURE: 130 MMHG | HEART RATE: 70 BPM | RESPIRATION RATE: 14 BRPM | DIASTOLIC BLOOD PRESSURE: 74 MMHG | TEMPERATURE: 98.3 F

## 2018-03-21 VITALS — WEIGHT: 221 LBS | BODY MASS INDEX: 33.6 KG/M2

## 2018-03-21 DIAGNOSIS — Z87.898 PERSONAL HISTORY OF OTHER SPECIFIED CONDITIONS: ICD-10-CM

## 2018-03-21 DIAGNOSIS — Z87.440 PERSONAL HISTORY OF URINARY (TRACT) INFECTIONS: ICD-10-CM

## 2018-03-21 DIAGNOSIS — Z87.448 PERSONAL HISTORY OF OTHER DISEASES OF URINARY SYSTEM: ICD-10-CM

## 2018-03-21 DIAGNOSIS — Z86.39 PERSONAL HISTORY OF OTHER ENDOCRINE, NUTRITIONAL AND METABOLIC DISEASE: ICD-10-CM

## 2018-03-21 DIAGNOSIS — J20.9 ACUTE BRONCHITIS, UNSPECIFIED: ICD-10-CM

## 2018-03-21 DIAGNOSIS — N28.1 CYST OF KIDNEY, ACQUIRED: ICD-10-CM

## 2018-03-21 DIAGNOSIS — H25.13 AGE-RELATED NUCLEAR CATARACT, BILATERAL: ICD-10-CM

## 2018-03-21 DIAGNOSIS — R31.29 OTHER MICROSCOPIC HEMATURIA: ICD-10-CM

## 2018-03-21 DIAGNOSIS — Z87.442 PERSONAL HISTORY OF URINARY CALCULI: ICD-10-CM

## 2018-03-21 DIAGNOSIS — Z87.2 PERSONAL HISTORY OF DISEASES OF THE SKIN AND SUBCUTANEOUS TISSUE: ICD-10-CM

## 2018-03-21 DIAGNOSIS — N52.9 MALE ERECTILE DYSFUNCTION, UNSPECIFIED: ICD-10-CM

## 2018-03-21 PROCEDURE — 36415 COLL VENOUS BLD VENIPUNCTURE: CPT

## 2018-03-21 PROCEDURE — 81002 URINALYSIS NONAUTO W/O SCOPE: CPT

## 2018-03-21 PROCEDURE — 96372 THER/PROPH/DIAG INJ SC/IM: CPT

## 2018-03-21 PROCEDURE — G0439: CPT

## 2018-03-21 PROCEDURE — 99215 OFFICE O/P EST HI 40 MIN: CPT | Mod: 25

## 2018-03-21 RX ORDER — CYANOCOBALAMIN 1000 UG/ML
1000 INJECTION INTRAMUSCULAR; SUBCUTANEOUS
Qty: 0 | Refills: 0 | Status: COMPLETED | OUTPATIENT
Start: 2018-03-21

## 2018-03-21 RX ADMIN — CYANOCOBALAMIN 0 MCG/ML: 1000 INJECTION INTRAMUSCULAR; SUBCUTANEOUS at 00:00

## 2018-03-22 LAB
25(OH)D3 SERPL-MCNC: 58.2 NG/ML
ALBUMIN SERPL ELPH-MCNC: 4 G/DL
ALP BLD-CCNC: 40 U/L
ALT SERPL-CCNC: 23 U/L
ANION GAP SERPL CALC-SCNC: 17 MMOL/L
AST SERPL-CCNC: 33 U/L
BASOPHILS # BLD AUTO: 0 K/UL
BASOPHILS NFR BLD AUTO: 0 %
BILIRUB SERPL-MCNC: 0.5 MG/DL
BUN SERPL-MCNC: 14 MG/DL
CALCIUM SERPL-MCNC: 9.3 MG/DL
CHLORIDE SERPL-SCNC: 104 MMOL/L
CHOLEST SERPL-MCNC: 117 MG/DL
CHOLEST/HDLC SERPL: 2.7 RATIO
CO2 SERPL-SCNC: 23 MMOL/L
CREAT SERPL-MCNC: 1.28 MG/DL
EOSINOPHIL # BLD AUTO: 0.01 K/UL
EOSINOPHIL NFR BLD AUTO: 0.2 %
GLUCOSE SERPL-MCNC: 97 MG/DL
HCT VFR BLD CALC: 44.7 %
HDLC SERPL-MCNC: 44 MG/DL
HGB BLD-MCNC: 15 G/DL
IMM GRANULOCYTES NFR BLD AUTO: 0.4 %
LDLC SERPL CALC-MCNC: 59 MG/DL
LYMPHOCYTES # BLD AUTO: 1.57 K/UL
LYMPHOCYTES NFR BLD AUTO: 32.2 %
MAN DIFF?: NORMAL
MCHC RBC-ENTMCNC: 31.9 PG
MCHC RBC-ENTMCNC: 33.6 GM/DL
MCV RBC AUTO: 95.1 FL
MONOCYTES # BLD AUTO: 1.23 K/UL
MONOCYTES NFR BLD AUTO: 25.3 %
NEUTROPHILS # BLD AUTO: 2.04 K/UL
NEUTROPHILS NFR BLD AUTO: 41.9 %
PLATELET # BLD AUTO: 97 K/UL
POTASSIUM SERPL-SCNC: 4.2 MMOL/L
PROT SERPL-MCNC: 6.9 G/DL
RBC # BLD: 4.7 M/UL
RBC # FLD: 12.8 %
SODIUM SERPL-SCNC: 144 MMOL/L
TRIGL SERPL-MCNC: 69 MG/DL
VIT B12 SERPL-MCNC: 338 PG/ML
WBC # FLD AUTO: 4.87 K/UL

## 2018-04-19 ENCOUNTER — APPOINTMENT (OUTPATIENT)
Dept: OTOLARYNGOLOGY | Facility: CLINIC | Age: 78
End: 2018-04-19
Payer: MEDICARE

## 2018-04-19 VITALS
BODY MASS INDEX: 33.49 KG/M2 | HEIGHT: 68 IN | HEART RATE: 59 BPM | SYSTOLIC BLOOD PRESSURE: 120 MMHG | DIASTOLIC BLOOD PRESSURE: 66 MMHG | WEIGHT: 221 LBS

## 2018-04-19 PROCEDURE — 99214 OFFICE O/P EST MOD 30 MIN: CPT | Mod: 25

## 2018-04-19 PROCEDURE — 92567 TYMPANOMETRY: CPT

## 2018-04-19 PROCEDURE — G0268 REMOVAL OF IMPACTED WAX MD: CPT

## 2018-04-19 PROCEDURE — 92557 COMPREHENSIVE HEARING TEST: CPT

## 2018-05-07 ENCOUNTER — APPOINTMENT (OUTPATIENT)
Dept: INTERNAL MEDICINE | Facility: CLINIC | Age: 78
End: 2018-05-07
Payer: MEDICARE

## 2018-05-07 VITALS
DIASTOLIC BLOOD PRESSURE: 74 MMHG | HEART RATE: 6 BPM | TEMPERATURE: 96.5 F | WEIGHT: 220 LBS | SYSTOLIC BLOOD PRESSURE: 132 MMHG | RESPIRATION RATE: 16 BRPM | BODY MASS INDEX: 33.45 KG/M2

## 2018-05-07 DIAGNOSIS — H92.02 OTALGIA, LEFT EAR: ICD-10-CM

## 2018-05-07 DIAGNOSIS — M54.5 LOW BACK PAIN: ICD-10-CM

## 2018-05-07 DIAGNOSIS — H61.23 IMPACTED CERUMEN, BILATERAL: ICD-10-CM

## 2018-05-07 DIAGNOSIS — M25.562 PAIN IN LEFT KNEE: ICD-10-CM

## 2018-05-07 DIAGNOSIS — Z87.448 PERSONAL HISTORY OF OTHER DISEASES OF URINARY SYSTEM: ICD-10-CM

## 2018-05-07 PROCEDURE — 99213 OFFICE O/P EST LOW 20 MIN: CPT

## 2018-05-22 ENCOUNTER — APPOINTMENT (OUTPATIENT)
Dept: INTERNAL MEDICINE | Facility: CLINIC | Age: 78
End: 2018-05-22
Payer: MEDICARE

## 2018-05-22 VITALS — BODY MASS INDEX: 32.69 KG/M2 | WEIGHT: 215 LBS

## 2018-05-22 VITALS
DIASTOLIC BLOOD PRESSURE: 69 MMHG | WEIGHT: 212 LBS | HEART RATE: 55 BPM | SYSTOLIC BLOOD PRESSURE: 119 MMHG | TEMPERATURE: 97.7 F | BODY MASS INDEX: 32.23 KG/M2 | RESPIRATION RATE: 18 BRPM

## 2018-05-22 PROCEDURE — 99214 OFFICE O/P EST MOD 30 MIN: CPT | Mod: 25

## 2018-05-22 PROCEDURE — 96372 THER/PROPH/DIAG INJ SC/IM: CPT

## 2018-05-22 RX ORDER — CYANOCOBALAMIN 1000 UG/ML
1000 INJECTION INTRAMUSCULAR; SUBCUTANEOUS
Qty: 0 | Refills: 0 | Status: COMPLETED | OUTPATIENT
Start: 2018-05-22

## 2018-05-22 RX ADMIN — CYANOCOBALAMIN 0 MCG/ML: 1000 INJECTION INTRAMUSCULAR; SUBCUTANEOUS at 00:00

## 2018-05-22 NOTE — ASSESSMENT
[FreeTextEntry1] : Diagnoses #1 low back pain most likely muscular spasm secondary to arthritis of lumbar spine. Patient to be on meloxicam 15 mg p.o. once a day for 7-10 days and baclofen 20 mg p.o. nightly. Orthopedic instructions given to the patient as well. If not better after 10 days patient to have lumbar spine x-ray and possibly physical therapy. Side effects of medications explained to patient who understood\par #2 history of low B12. 1000 mcg  was given IM\par Plan .patient advised to  return after one month

## 2018-05-22 NOTE — HISTORY OF PRESENT ILLNESS
[___ Weeks ago] : [unfilled] weeks ago [Episodic] : episodic [Moderate] : moderate [Activity] : with activity [Stable] : stable [de-identified] : low back [FreeTextEntry2] : none [FreeTextEntry4] : getting up [FreeTextEntry8] : Patient comes because of  pain which is sharp and happens  mostly when he gets up from sitting. The pain does not radiate to the right lower extremity. No rash or injuries to the area

## 2018-05-22 NOTE — PHYSICAL EXAM
[No Acute Distress] : no acute distress [Well Nourished] : well nourished [Well Developed] : well developed [Well-Appearing] : well-appearing [Normal Sclera/Conjunctiva] : normal sclera/conjunctiva [PERRL] : pupils equal round and reactive to light [EOMI] : extraocular movements intact [Normal Outer Ear/Nose] : the outer ears and nose were normal in appearance [Normal Oropharynx] : the oropharynx was normal [No JVD] : no jugular venous distention [Supple] : supple [No Lymphadenopathy] : no lymphadenopathy [Thyroid Normal, No Nodules] : the thyroid was normal and there were no nodules present [No Respiratory Distress] : no respiratory distress  [Clear to Auscultation] : lungs were clear to auscultation bilaterally [No Accessory Muscle Use] : no accessory muscle use [Normal Percussion] : the chest was normal to percussion [Normal Rate] : normal rate  [Regular Rhythm] : with a regular rhythm [Normal S1, S2] : normal S1 and S2 [No Murmur] : no murmur heard [No Carotid Bruits] : no carotid bruits [No Abdominal Bruit] : a ~M bruit was not heard ~T in the abdomen [No Varicosities] : no varicosities [Pedal Pulses Present] : the pedal pulses are present [No Edema] : there was no peripheral edema [No Extremity Clubbing/Cyanosis] : no extremity clubbing/cyanosis [No Palpable Aorta] : no palpable aorta [Normal Appearance] : normal in appearance [No Nipple Discharge] : no nipple discharge [No Axillary Lymphadenopathy] : no axillary lymphadenopathy [Soft] : abdomen soft [Non Tender] : non-tender [Non-distended] : non-distended [No Masses] : no abdominal mass palpated [No HSM] : no HSM [Normal Bowel Sounds] : normal bowel sounds [Normal Supraclavicular Nodes] : no supraclavicular lymphadenopathy [Normal Axillary Nodes] : no axillary lymphadenopathy [Normal Posterior Cervical Nodes] : no posterior cervical lymphadenopathy [Normal Femoral Nodes] : no femoral lymphadenopathy [Normal Anterior Cervical Nodes] : no anterior cervical lymphadenopathy [Normal Inguinal Nodes] : no inguinal lymphadenopathy [No CVA Tenderness] : no CVA  tenderness [Kyphosis] : no kyphosis [Scoliosis] : no scoliosis [No Joint Swelling] : no joint swelling [Grossly Normal Strength/Tone] : grossly normal strength/tone [No Rash] : no rash [Normal Gait] : normal gait [Coordination Grossly Intact] : coordination grossly intact [No Focal Deficits] : no focal deficits [Deep Tendon Reflexes (DTR)] : deep tendon reflexes were 2+ and symmetric [Speech Grossly Normal] : speech grossly normal [Memory Grossly Normal] : memory grossly normal [Normal Affect] : the affect was normal [Alert and Oriented x3] : oriented to person, place, and time [Normal Insight/Judgement] : insight and judgment were intact [de-identified] : tender spot in the r upper gluteal area. 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

## 2018-05-23 ENCOUNTER — APPOINTMENT (OUTPATIENT)
Dept: UROLOGY | Facility: CLINIC | Age: 78
End: 2018-05-23

## 2018-05-29 ENCOUNTER — APPOINTMENT (OUTPATIENT)
Dept: UROLOGY | Facility: CLINIC | Age: 78
End: 2018-05-29
Payer: MEDICARE

## 2018-05-29 LAB
APPEARANCE: CLEAR
BACTERIA: NEGATIVE
BILIRUBIN URINE: NEGATIVE
BLOOD URINE: ABNORMAL
COLOR: YELLOW
GLUCOSE QUALITATIVE U: NEGATIVE MG/DL
KETONES URINE: NEGATIVE
LEUKOCYTE ESTERASE URINE: NEGATIVE
MICROSCOPIC-UA: NORMAL
NITRITE URINE: NEGATIVE
PH URINE: 6.5
PROTEIN URINE: NEGATIVE MG/DL
PSA SERPL-MCNC: 0.71 NG/ML
RED BLOOD CELLS URINE: 0 /HPF
SPECIFIC GRAVITY URINE: 1
SQUAMOUS EPITHELIAL CELLS: 0 /HPF
TESTOST SERPL-MCNC: 531.7 NG/DL
UROBILINOGEN URINE: NEGATIVE MG/DL
WHITE BLOOD CELLS URINE: 0 /HPF

## 2018-05-29 PROCEDURE — 99214 OFFICE O/P EST MOD 30 MIN: CPT

## 2018-05-30 LAB — CORE LAB FLUID CYTOLOGY: NORMAL

## 2018-05-31 LAB — BACTERIA UR CULT: NORMAL

## 2018-06-07 ENCOUNTER — APPOINTMENT (OUTPATIENT)
Dept: INTERNAL MEDICINE | Facility: CLINIC | Age: 78
End: 2018-06-07
Payer: MEDICARE

## 2018-06-07 PROCEDURE — 36415 COLL VENOUS BLD VENIPUNCTURE: CPT

## 2018-06-08 LAB
25(OH)D3 SERPL-MCNC: 62.2 NG/ML
ALBUMIN SERPL ELPH-MCNC: 4.3 G/DL
ALP BLD-CCNC: 45 U/L
ALT SERPL-CCNC: 20 U/L
ANION GAP SERPL CALC-SCNC: 13 MMOL/L
AST SERPL-CCNC: 19 U/L
BILIRUB SERPL-MCNC: 0.8 MG/DL
BUN SERPL-MCNC: 19 MG/DL
CALCIUM SERPL-MCNC: 9.4 MG/DL
CHLORIDE SERPL-SCNC: 103 MMOL/L
CHOLEST SERPL-MCNC: 121 MG/DL
CHOLEST/HDLC SERPL: 2.2 RATIO
CO2 SERPL-SCNC: 25 MMOL/L
CREAT SERPL-MCNC: 1.26 MG/DL
GLUCOSE SERPL-MCNC: 93 MG/DL
HDLC SERPL-MCNC: 54 MG/DL
LDLC SERPL CALC-MCNC: 57 MG/DL
POTASSIUM SERPL-SCNC: 4.3 MMOL/L
PROT SERPL-MCNC: 7 G/DL
SODIUM SERPL-SCNC: 141 MMOL/L
TRIGL SERPL-MCNC: 50 MG/DL
VIT B12 SERPL-MCNC: 445 PG/ML

## 2018-10-11 ENCOUNTER — LABORATORY RESULT (OUTPATIENT)
Age: 78
End: 2018-10-11

## 2018-10-11 ENCOUNTER — MED ADMIN CHARGE (OUTPATIENT)
Age: 78
End: 2018-10-11

## 2018-10-11 ENCOUNTER — APPOINTMENT (OUTPATIENT)
Dept: INTERNAL MEDICINE | Facility: CLINIC | Age: 78
End: 2018-10-11
Payer: MEDICARE

## 2018-10-11 VITALS
BODY MASS INDEX: 33.15 KG/M2 | HEART RATE: 66 BPM | DIASTOLIC BLOOD PRESSURE: 74 MMHG | RESPIRATION RATE: 16 BRPM | WEIGHT: 218 LBS | TEMPERATURE: 97.3 F | SYSTOLIC BLOOD PRESSURE: 132 MMHG

## 2018-10-11 PROBLEM — H90.3 BILATERAL SENSORINEURAL HEARING LOSS: Status: RESOLVED | Noted: 2018-04-19 | Resolved: 2018-10-11

## 2018-10-11 PROBLEM — Z87.2 HISTORY OF CONTACT DERMATITIS: Status: RESOLVED | Noted: 2018-05-07 | Resolved: 2018-10-11

## 2018-10-11 LAB
BILIRUB UR QL STRIP: NEGATIVE
CLARITY UR: CLEAR
COLLECTION METHOD: NORMAL
GLUCOSE UR-MCNC: NEGATIVE
HCG UR QL: 0.2 EU/DL
HGB UR QL STRIP.AUTO: NORMAL
KETONES UR-MCNC: NEGATIVE
LEUKOCYTE ESTERASE UR QL STRIP: NEGATIVE
NITRITE UR QL STRIP: NEGATIVE
PH UR STRIP: 5.5
PROT UR STRIP-MCNC: NEGATIVE
SP GR UR STRIP: 1.01

## 2018-10-11 PROCEDURE — 99215 OFFICE O/P EST HI 40 MIN: CPT | Mod: 25

## 2018-10-11 PROCEDURE — 90662 IIV NO PRSV INCREASED AG IM: CPT

## 2018-10-11 PROCEDURE — 36415 COLL VENOUS BLD VENIPUNCTURE: CPT

## 2018-10-11 PROCEDURE — G0008: CPT

## 2018-10-11 PROCEDURE — 96372 THER/PROPH/DIAG INJ SC/IM: CPT

## 2018-10-11 RX ORDER — CYANOCOBALAMIN 1000 UG/ML
1000 INJECTION INTRAMUSCULAR; SUBCUTANEOUS
Qty: 0 | Refills: 0 | Status: COMPLETED | OUTPATIENT
Start: 2018-10-11

## 2018-10-11 RX ADMIN — CYANOCOBALAMIN 0 MCG/ML: 1000 INJECTION INTRAMUSCULAR; SUBCUTANEOUS at 00:00

## 2018-10-11 NOTE — HISTORY OF PRESENT ILLNESS
[FreeTextEntry1] : Patient comes for followup of his chronic medical problems like hypertension, hyperlipidemia, low vitamin D. and low B12. Patient has again symptoms of GE reflux. Not in any anterior chest pain, shortness of breath, paroxysmal nocturnal dyspnea or orthopnea

## 2018-10-11 NOTE — ASSESSMENT
[FreeTextEntry1] : #1 GE reflux. Patient to start esomeprazole 40 mg p.o. once a day and have gastroenterology consult for upper endoscopy.\par #2 hypertension, stable. Continue same treatment and low-salt diet\par #3 hyperlipidemia. Continue with same medication and low fat diet\par #4 rule out liver toxicity due to chronic medication use\par #5 history of low B12. 1000 MCG was given IM in his left arm\par #6 hypovitaminosis D., being supplemented\par #7 status post aortic valve replacement, stable. Patient is followed by the cardiologist\par #8 benign prostatic hypertrophy, not symptomatic.\par Plan. Patient advised to return after 3 month

## 2018-10-12 LAB
25(OH)D3 SERPL-MCNC: 52.6 NG/ML
ALBUMIN SERPL ELPH-MCNC: 4.1 G/DL
ALP BLD-CCNC: 42 U/L
ALT SERPL-CCNC: 24 U/L
ANION GAP SERPL CALC-SCNC: 12 MMOL/L
AST SERPL-CCNC: 22 U/L
BASOPHILS # BLD AUTO: 0 K/UL
BASOPHILS NFR BLD AUTO: 0 %
BILIRUB SERPL-MCNC: 0.6 MG/DL
BUN SERPL-MCNC: 16 MG/DL
CALCIUM SERPL-MCNC: 9.1 MG/DL
CHLORIDE SERPL-SCNC: 108 MMOL/L
CHOLEST SERPL-MCNC: 114 MG/DL
CHOLEST/HDLC SERPL: 2.5 RATIO
CO2 SERPL-SCNC: 23 MMOL/L
CREAT SERPL-MCNC: 1.11 MG/DL
EOSINOPHIL # BLD AUTO: 0.05 K/UL
EOSINOPHIL NFR BLD AUTO: 1 %
GLUCOSE SERPL-MCNC: 90 MG/DL
HCT VFR BLD CALC: 44.6 %
HDLC SERPL-MCNC: 46 MG/DL
HGB BLD-MCNC: 14.9 G/DL
LDLC SERPL CALC-MCNC: 56 MG/DL
LYMPHOCYTES # BLD AUTO: 1.6 K/UL
LYMPHOCYTES NFR BLD AUTO: 35 %
MAN DIFF?: NORMAL
MCHC RBC-ENTMCNC: 32.2 PG
MCHC RBC-ENTMCNC: 33.4 GM/DL
MCV RBC AUTO: 96.3 FL
MONOCYTES # BLD AUTO: 1 K/UL
MONOCYTES NFR BLD AUTO: 22 %
NEUTROPHILS # BLD AUTO: 1.92 K/UL
NEUTROPHILS NFR BLD AUTO: 42 %
PLATELET # BLD AUTO: 83 K/UL
POTASSIUM SERPL-SCNC: 4.4 MMOL/L
PROT SERPL-MCNC: 6.5 G/DL
RBC # BLD: 4.63 M/UL
RBC # FLD: 13.2 %
SODIUM SERPL-SCNC: 143 MMOL/L
TRIGL SERPL-MCNC: 59 MG/DL
VIT B12 SERPL-MCNC: 352 PG/ML
WBC # FLD AUTO: 4.56 K/UL

## 2018-10-18 ENCOUNTER — APPOINTMENT (OUTPATIENT)
Dept: OTOLARYNGOLOGY | Facility: CLINIC | Age: 78
End: 2018-10-18

## 2018-10-18 DIAGNOSIS — Z87.2 PERSONAL HISTORY OF DISEASES OF THE SKIN AND SUBCUTANEOUS TISSUE: ICD-10-CM

## 2018-10-18 DIAGNOSIS — H90.3 SENSORINEURAL HEARING LOSS, BILATERAL: ICD-10-CM

## 2018-11-01 ENCOUNTER — EMERGENCY (EMERGENCY)
Facility: HOSPITAL | Age: 78
LOS: 1 days | Discharge: HOME | End: 2018-11-01
Attending: EMERGENCY MEDICINE
Payer: MEDICARE

## 2018-11-01 VITALS
WEIGHT: 216.93 LBS | HEART RATE: 60 BPM | SYSTOLIC BLOOD PRESSURE: 155 MMHG | DIASTOLIC BLOOD PRESSURE: 85 MMHG | HEIGHT: 68 IN | OXYGEN SATURATION: 100 %

## 2018-11-01 LAB
ALBUMIN SERPL ELPH-MCNC: 4.1 G/DL — SIGNIFICANT CHANGE UP (ref 3.3–5)
ALP SERPL-CCNC: 43 U/L — SIGNIFICANT CHANGE UP (ref 40–120)
ALT FLD-CCNC: 26 U/L — SIGNIFICANT CHANGE UP (ref 10–45)
ANION GAP SERPL CALC-SCNC: 11 MMOL/L — SIGNIFICANT CHANGE UP (ref 5–17)
APPEARANCE UR: CLEAR — SIGNIFICANT CHANGE UP
AST SERPL-CCNC: 23 U/L — SIGNIFICANT CHANGE UP (ref 10–40)
BACTERIA # UR AUTO: NEGATIVE — SIGNIFICANT CHANGE UP
BASOPHILS # BLD AUTO: 0 K/UL — SIGNIFICANT CHANGE UP (ref 0–0.2)
BASOPHILS NFR BLD AUTO: 0.2 % — SIGNIFICANT CHANGE UP (ref 0–2)
BILIRUB SERPL-MCNC: 0.6 MG/DL — SIGNIFICANT CHANGE UP (ref 0.2–1.2)
BILIRUB UR-MCNC: NEGATIVE — SIGNIFICANT CHANGE UP
BUN SERPL-MCNC: 16 MG/DL — SIGNIFICANT CHANGE UP (ref 7–23)
CALCIUM SERPL-MCNC: 10.1 MG/DL — SIGNIFICANT CHANGE UP (ref 8.4–10.5)
CHLORIDE SERPL-SCNC: 99 MMOL/L — SIGNIFICANT CHANGE UP (ref 96–108)
CO2 SERPL-SCNC: 28 MMOL/L — SIGNIFICANT CHANGE UP (ref 22–31)
COLOR SPEC: COLORLESS — SIGNIFICANT CHANGE UP
CREAT SERPL-MCNC: 1.22 MG/DL — SIGNIFICANT CHANGE UP (ref 0.5–1.3)
DIFF PNL FLD: ABNORMAL
EOSINOPHIL # BLD AUTO: 0.1 K/UL — SIGNIFICANT CHANGE UP (ref 0–0.5)
EOSINOPHIL NFR BLD AUTO: 1.1 % — SIGNIFICANT CHANGE UP (ref 0–6)
EPI CELLS # UR: 0 /HPF — SIGNIFICANT CHANGE UP
GAS PNL BLDV: SIGNIFICANT CHANGE UP
GLUCOSE SERPL-MCNC: 89 MG/DL — SIGNIFICANT CHANGE UP (ref 70–99)
GLUCOSE UR QL: NEGATIVE — SIGNIFICANT CHANGE UP
HCT VFR BLD CALC: 45 % — SIGNIFICANT CHANGE UP (ref 39–50)
HGB BLD-MCNC: 15.3 G/DL — SIGNIFICANT CHANGE UP (ref 13–17)
HYALINE CASTS # UR AUTO: 0 /LPF — SIGNIFICANT CHANGE UP (ref 0–2)
KETONES UR-MCNC: NEGATIVE — SIGNIFICANT CHANGE UP
LEUKOCYTE ESTERASE UR-ACNC: NEGATIVE — SIGNIFICANT CHANGE UP
LYMPHOCYTES # BLD AUTO: 1.5 K/UL — SIGNIFICANT CHANGE UP (ref 1–3.3)
LYMPHOCYTES # BLD AUTO: 29.8 % — SIGNIFICANT CHANGE UP (ref 13–44)
MCHC RBC-ENTMCNC: 31.7 PG — SIGNIFICANT CHANGE UP (ref 27–34)
MCHC RBC-ENTMCNC: 33.9 GM/DL — SIGNIFICANT CHANGE UP (ref 32–36)
MCV RBC AUTO: 93.4 FL — SIGNIFICANT CHANGE UP (ref 80–100)
MONOCYTES # BLD AUTO: 1 K/UL — HIGH (ref 0–0.9)
MONOCYTES NFR BLD AUTO: 20.5 % — HIGH (ref 2–14)
NEUTROPHILS # BLD AUTO: 2.5 K/UL — SIGNIFICANT CHANGE UP (ref 1.8–7.4)
NEUTROPHILS NFR BLD AUTO: 48.4 % — SIGNIFICANT CHANGE UP (ref 43–77)
NITRITE UR-MCNC: NEGATIVE — SIGNIFICANT CHANGE UP
PH UR: 6.5 — SIGNIFICANT CHANGE UP (ref 5–8)
PLATELET # BLD AUTO: 99 K/UL — LOW (ref 150–400)
POTASSIUM SERPL-MCNC: 4 MMOL/L — SIGNIFICANT CHANGE UP (ref 3.5–5.3)
POTASSIUM SERPL-SCNC: 4 MMOL/L — SIGNIFICANT CHANGE UP (ref 3.5–5.3)
PROT SERPL-MCNC: 7.1 G/DL — SIGNIFICANT CHANGE UP (ref 6–8.3)
PROT UR-MCNC: NEGATIVE — SIGNIFICANT CHANGE UP
RBC # BLD: 4.82 M/UL — SIGNIFICANT CHANGE UP (ref 4.2–5.8)
RBC # FLD: 11.9 % — SIGNIFICANT CHANGE UP (ref 10.3–14.5)
RBC CASTS # UR COMP ASSIST: 0 /HPF — SIGNIFICANT CHANGE UP (ref 0–4)
SODIUM SERPL-SCNC: 138 MMOL/L — SIGNIFICANT CHANGE UP (ref 135–145)
SP GR SPEC: 1.01 — LOW (ref 1.01–1.02)
TROPONIN T, HIGH SENSITIVITY RESULT: 15 NG/L — SIGNIFICANT CHANGE UP (ref 0–51)
UROBILINOGEN FLD QL: NEGATIVE — SIGNIFICANT CHANGE UP
WBC # BLD: 5.1 K/UL — SIGNIFICANT CHANGE UP (ref 3.8–10.5)
WBC # FLD AUTO: 5.1 K/UL — SIGNIFICANT CHANGE UP (ref 3.8–10.5)
WBC UR QL: 0 /HPF — SIGNIFICANT CHANGE UP (ref 0–5)

## 2018-11-01 PROCEDURE — 71046 X-RAY EXAM CHEST 2 VIEWS: CPT | Mod: 26

## 2018-11-01 PROCEDURE — 70450 CT HEAD/BRAIN W/O DYE: CPT | Mod: 26

## 2018-11-01 PROCEDURE — 99285 EMERGENCY DEPT VISIT HI MDM: CPT

## 2018-11-01 PROCEDURE — 99223 1ST HOSP IP/OBS HIGH 75: CPT

## 2018-11-01 PROCEDURE — 99218: CPT

## 2018-11-01 PROCEDURE — 93010 ELECTROCARDIOGRAM REPORT: CPT

## 2018-11-01 RX ORDER — FINASTERIDE 5 MG/1
5 TABLET, FILM COATED ORAL DAILY
Qty: 0 | Refills: 0 | Status: DISCONTINUED | OUTPATIENT
Start: 2018-11-01 | End: 2018-11-05

## 2018-11-01 RX ORDER — ASPIRIN/CALCIUM CARB/MAGNESIUM 324 MG
81 TABLET ORAL DAILY
Qty: 0 | Refills: 0 | Status: DISCONTINUED | OUTPATIENT
Start: 2018-11-01 | End: 2018-11-05

## 2018-11-01 RX ORDER — ATORVASTATIN CALCIUM 80 MG/1
20 TABLET, FILM COATED ORAL AT BEDTIME
Qty: 0 | Refills: 0 | Status: DISCONTINUED | OUTPATIENT
Start: 2018-11-01 | End: 2018-11-05

## 2018-11-01 RX ORDER — SODIUM CHLORIDE 9 MG/ML
500 INJECTION INTRAMUSCULAR; INTRAVENOUS; SUBCUTANEOUS ONCE
Qty: 0 | Refills: 0 | Status: COMPLETED | OUTPATIENT
Start: 2018-11-01 | End: 2018-11-01

## 2018-11-01 RX ORDER — METOPROLOL TARTRATE 50 MG
50 TABLET ORAL
Qty: 0 | Refills: 0 | Status: DISCONTINUED | OUTPATIENT
Start: 2018-11-01 | End: 2018-11-05

## 2018-11-01 RX ADMIN — SODIUM CHLORIDE 500 MILLILITER(S): 9 INJECTION INTRAMUSCULAR; INTRAVENOUS; SUBCUTANEOUS at 14:36

## 2018-11-01 RX ADMIN — SODIUM CHLORIDE 500 MILLILITER(S): 9 INJECTION INTRAMUSCULAR; INTRAVENOUS; SUBCUTANEOUS at 13:46

## 2018-11-01 RX ADMIN — FINASTERIDE 5 MILLIGRAM(S): 5 TABLET, FILM COATED ORAL at 23:20

## 2018-11-01 RX ADMIN — ATORVASTATIN CALCIUM 20 MILLIGRAM(S): 80 TABLET, FILM COATED ORAL at 23:20

## 2018-11-01 NOTE — ED PROVIDER NOTE - MEDICAL DECISION MAKING DETAILS
yadira - pt with near syncope co 1 week of bl cp ho aortic stenosis and valve replacement -- acs r/o will need echo, tele, also r/o metabolic and infectious etiology

## 2018-11-01 NOTE — ED ADULT NURSE NOTE - NSIMPLEMENTINTERV_GEN_ALL_ED
Implemented All Fall Risk Interventions:  [place] to call system. Call bell, personal items and telephone within reach. Instruct patient to call for assistance. Room bathroom lighting operational. Non-slip footwear when patient is off stretcher. Physically safe environment: no spills, clutter or unnecessary equipment. Stretcher in lowest position, wheels locked, appropriate side rails in place. Provide visual cue, wrist band, yellow gown, etc. Monitor gait and stability. Monitor for mental status changes and reorient to person, place, and time. Review medications for side effects contributing to fall risk. Reinforce activity limits and safety measures with patient and family.

## 2018-11-01 NOTE — ED PROVIDER NOTE - PROGRESS NOTE DETAILS
pt w bl chest pain x week - that in setting of near syncope this am and ho aortic stenosis will recommend cdu for echo -- , pt 1st trop indeterminate may need nuc stress

## 2018-11-01 NOTE — ED CDU PROVIDER INITIAL DAY NOTE - MEDICAL DECISION MAKING DETAILS
yadira - pt with near syncope nonexertional bl chest pain ho aortic stenosis needs echo possible nuc stress

## 2018-11-01 NOTE — CONSULT NOTE ADULT - SUBJECTIVE AND OBJECTIVE BOX
Neurology Consult    Reason for consult: BLE weakness    HPI:  77 yo male with a pmh of aortic stenosis (s/p valve replacement ), HTN, HLD, GERD p/w BLE weakness. Started  0501-3084. As per patient, was feeling completely well when he suddenly felt weak in BLE. Has never had a prior event, reports no weakness in BUE or sensory changes anywhere else in his body. Over the course of the next few hours, patient BLE weakness improved. Also reports back pain over the past few months, denies shooting pain down legs, numbness, tingling, change in bowel/bladder habits.     REVIEW OF SYSTEMS:  As above    MEDICATIONS  aspirin enteric coated 81 milliGRAM(s) Oral daily  atorvastatin 20 milliGRAM(s) Oral at bedtime  finasteride 5 milliGRAM(s) Oral daily  metoprolol tartrate 50 milliGRAM(s) Oral two times a day      PMH: Atrial fibrillation  HTN (hypertension)  Enlarged Prostate  Hypercholesterolemia  GERD (Gastroesophageal Reflux Disease)  Aortic Stenosis       PSH: Aortic valve disease  S/P cardiac catheterization  Retroperitoneal mass  S/P TURP      FAMILY HISTORY:  No pertinent family history in first degree relatives    No history of dementia, strokes, or seizures     SOCIAL HISTORY:  No history of tobacco or alcohol use     Allergies    No Known Allergies    Intolerances    Vital Signs Last 24 Hrs  T(C): 36.6 (2018 17:35), Max: 36.6 (2018 17:35)  T(F): 97.8 (2018 17:35), Max: 97.8 (2018 17:35)  HR: 58 (2018 17:35) (55 - 69)  BP: 157/82 (2018 17:35) (155/55 - 157/82)  BP(mean): --  RR: 20 (2018 17:35) (18 - 20)  SpO2: 98% (2018 17:35) (95% - 100%)    Neurological Examination:    Mental Status: Patient is alert, awake, oriented X3. Patient is fluent, no dysarthria, no aphasia. Follows commands well and able to answer questions appropriately. Mood and affect normal.    Cranial Nerves: PERRL, EOMI, visual field intact, V1-V3 intact, no gross facial asymmetry, tongue/uvula midline    Motor Exam: No drift  Right upper extremity: 5/5  Left upper extremity: 5/5  Right lower extremity: 5/5  Left lower extremity: 5/5    Normal bulk/tone    Sensory: Intact to light touch bilaterally. No pain on straight leg raise. Pain on straightening out of bed.     Coordination: Finger to nose and HTS intact bilaterally     Gait: Normal      Reflexes: Bilateral 2+ Biceps, Brachial, Patellar, Ankle  Plantar: equivocal    GENERAL: No acute distress  HEENT:  Normocephalic, atraumatic  EXTREMITIES: No edema, clubbing, cyanosis  MUSCULOSKELETAL: Normal range of motion  SKIN: No rashes    LABS:  CBC Full  -  ( 2018 13:59 )  WBC Count : 5.1 K/uL  Hemoglobin : 15.3 g/dL  Hematocrit : 45.0 %  Platelet Count - Automated : 99 K/uL  Mean Cell Volume : 93.4 fl  Mean Cell Hemoglobin : 31.7 pg  Mean Cell Hemoglobin Concentration : 33.9 gm/dL  Auto Neutrophil # : 2.5 K/uL  Auto Lymphocyte # : 1.5 K/uL  Auto Monocyte # : 1.0 K/uL  Auto Eosinophil # : 0.1 K/uL  Auto Basophil # : 0.0 K/uL  Auto Neutrophil % : 48.4 %  Auto Lymphocyte % : 29.8 %  Auto Monocyte % : 20.5 %  Auto Eosinophil % : 1.1 %  Auto Basophil % : 0.2 %    Urinalysis Basic - ( 2018 15:16 )    Color: Colorless / Appearance: Clear / S.007 / pH: x  Gluc: x / Ketone: Negative  / Bili: Negative / Urobili: Negative   Blood: x / Protein: Negative / Nitrite: Negative   Leuk Esterase: Negative / RBC: 0 /hpf / WBC 0 /hpf   Sq Epi: x / Non Sq Epi: 0 /hpf / Bacteria: Negative          138  |  99  |  16  ----------------------------<  89  4.0   |  28  |  1.22    Ca    10.1      2018 13:59    TPro  7.1  /  Alb  4.1  /  TBili  0.6  /  DBili  x   /  AST  23  /  ALT  26  /  AlkPhos  43      LIVER FUNCTIONS - ( 2018 13:59 )  Alb: 4.1 g/dL / Pro: 7.1 g/dL / ALK PHOS: 43 U/L / ALT: 26 U/L / AST: 23 U/L / GGT: x           Hemoglobin A1C:           RADIOLOGY:  CT head:  MRI:

## 2018-11-01 NOTE — ED ADULT NURSE NOTE - CHPI ED NUR SYMPTOMS NEG
no back pain/no fever/no diaphoresis/no shortness of breath/no chills/no syncope/no vomiting/no nausea/no chest pain/no congestion

## 2018-11-01 NOTE — ED CDU PROVIDER INITIAL DAY NOTE - DETAILS
frequent reeval, vitals per routine, tele, CE x 2 with EKG, cardiac testing based on rogerio mora  d/w attending

## 2018-11-01 NOTE — CONSULT NOTE ADULT - ATTENDING COMMENTS
Patient seen and examined. He is full strength, and at baseline now. He describes about 30 minutes of leg weakness. not enough to cause fall. No sensory or cognitive disturbances. MRI T/L spine unremarkable. Impression: cannot rule out TIA, will get CTA of head and neck, specifically to assess for basilar or SHYLA issues. If normal, can be discharged home, follow up with Dr PATRICIA Rosales.

## 2018-11-01 NOTE — ED ADULT NURSE NOTE - ED STAT RN HANDOFF DETAILS 2
Patient   received  alert  and  oriented x3.  He  denies  chest pain  or  dizziness.  He  is c/o  weakness

## 2018-11-01 NOTE — ED PROVIDER NOTE - PHYSICAL EXAMINATION
yadira aaoxe nad ncat perrl eomi s1s2 pos murmur abd soft nt, ctabl stregth 5/5 uele bl cn2-12intact , nml gait no c/c/e

## 2018-11-01 NOTE — ED CDU PROVIDER INITIAL DAY NOTE - PROGRESS NOTE DETAILS
CDU NOTE YVONNE Gunderson: spoke to pt's PCP Dr. Elvis rehman and neuro to eval pt. CDU PROGRESS NOTE YVONNE CORRAL: Received pt from YVONNE Gunderson at 1900 sign-out. Pt resting in stretcher in NAD. Case/plan reviewed, informed neuro consult saw pt and recommended MRI T & L spine for evaluation of back/leg pain. Dr. Waters saw pt also, agrees with neuros recs for MRI, orders placed. VSS. Pt ate dinner. Ambulatory around unit with steady gait. neuroexam normal, no deficits. Pt without complaints now, states he gets side pain, back pain, and leg pain only with ambulating. Will continue to monitor overnight. nocturinist cardio consulted, will see pt tonight. will continue monitoring. CDU PROGRESS NOTE YVONNE CORRAL: Received pt from YVONNE Gunderson at 1900 sign-out. Pt resting in stretcher in NAD. Case/plan reviewed, informed neuro consult saw pt and recommended MRI T & L spine for evaluation of back/leg pain. Dr. Waters saw pt also, agrees with neuros recs for MRI, orders placed. VSS. Pt ate dinner. Ambulatory around unit with steady gait. CV: S1S2 RRR, Lungs: CTA b/l. neuro exam normal, no deficits. Pt without complaints now, states he gets side pain, back pain, and leg pain only with ambulating. Will continue to monitor overnight. nocturinist cardio consulted, will see pt tonight. will continue monitoring.

## 2018-11-01 NOTE — ED PROVIDER NOTE - OBJECTIVE STATEMENT
79 yo male with a pmh of aortic stenosis (s/p valve replacement 2015), HTN, HLD, GERD seen for a period of weakness/inability to ambulate this morning at approx 11:00 am. he notes he went to go from sitting to standing and was unable to get up or walk, stating his legs felt weak. He notes that he rested for a period of time and was able to get up and ambulate but that he still felt weak. He also notes a 2 weeks period of  He denies fevers, chills, dysuria, diarrhea, blood thinner use, melena, cough, congestion, abdominal pain. 77 yo male with a pmh of aortic stenosis (s/p valve replacement 2015), HTN, HLD, GERD seen for a period of weakness/inability to ambulate this morning at approx 11:00 am. he notes he went to go from sitting to standing and was unable to get up or walk, stating his legs felt weak. He notes that he rested for a period of time and was able to get up and ambulate but that he still felt weak. He also notes a 2 weeks period of chest pain of the bilateral superior ribs.   He denies fevers, chills, dysuria, diarrhea, blood thinner use, melena, cough, congestion, abdominal pain.

## 2018-11-01 NOTE — ED CDU PROVIDER INITIAL DAY NOTE - OBJECTIVE STATEMENT
77 yo male with a pmh of aortic stenosis (s/p valve replacement 2015), HTN, HLD, GERD seen for a period of weakness/inability to ambulate this morning at approx 11:00 am. he notes he went to go from sitting to standing and was unable to get up or walk, stating his legs felt weak. He notes that he rested for a period of time and was able to get up and ambulate but that he still felt weak. He also notes a 2 weeks period of side pain just below rib cage b/l which he does improve with pain medication prescribed by his PCP.   denies fevers, chills, dysuria, diarrhea, blood thinner use, melena, cough, congestion, abdominal pain, sob/dyspnea, syncope,

## 2018-11-02 VITALS
OXYGEN SATURATION: 98 % | RESPIRATION RATE: 16 BRPM | SYSTOLIC BLOOD PRESSURE: 135 MMHG | HEART RATE: 59 BPM | DIASTOLIC BLOOD PRESSURE: 87 MMHG

## 2018-11-02 LAB
CHOLEST SERPL-MCNC: 108 MG/DL — SIGNIFICANT CHANGE UP (ref 10–199)
CULTURE RESULTS: NO GROWTH — SIGNIFICANT CHANGE UP
HBA1C BLD-MCNC: 5.8 % — HIGH (ref 4–5.6)
HDLC SERPL-MCNC: 41 MG/DL — SIGNIFICANT CHANGE UP
LIPID PNL WITH DIRECT LDL SERPL: 57 MG/DL — SIGNIFICANT CHANGE UP
SPECIMEN SOURCE: SIGNIFICANT CHANGE UP
TOTAL CHOLESTEROL/HDL RATIO MEASUREMENT: 2.6 RATIO — LOW (ref 3.4–9.6)
TRIGL SERPL-MCNC: 50 MG/DL — SIGNIFICANT CHANGE UP (ref 10–149)

## 2018-11-02 PROCEDURE — 71046 X-RAY EXAM CHEST 2 VIEWS: CPT

## 2018-11-02 PROCEDURE — 82803 BLOOD GASES ANY COMBINATION: CPT

## 2018-11-02 PROCEDURE — 96360 HYDRATION IV INFUSION INIT: CPT | Mod: XU

## 2018-11-02 PROCEDURE — 84295 ASSAY OF SERUM SODIUM: CPT

## 2018-11-02 PROCEDURE — 70498 CT ANGIOGRAPHY NECK: CPT | Mod: 26

## 2018-11-02 PROCEDURE — 72146 MRI CHEST SPINE W/O DYE: CPT

## 2018-11-02 PROCEDURE — 72148 MRI LUMBAR SPINE W/O DYE: CPT

## 2018-11-02 PROCEDURE — 80053 COMPREHEN METABOLIC PANEL: CPT

## 2018-11-02 PROCEDURE — 93005 ELECTROCARDIOGRAM TRACING: CPT

## 2018-11-02 PROCEDURE — 82947 ASSAY GLUCOSE BLOOD QUANT: CPT

## 2018-11-02 PROCEDURE — 84132 ASSAY OF SERUM POTASSIUM: CPT

## 2018-11-02 PROCEDURE — 83605 ASSAY OF LACTIC ACID: CPT

## 2018-11-02 PROCEDURE — 82435 ASSAY OF BLOOD CHLORIDE: CPT

## 2018-11-02 PROCEDURE — 84484 ASSAY OF TROPONIN QUANT: CPT

## 2018-11-02 PROCEDURE — 87086 URINE CULTURE/COLONY COUNT: CPT

## 2018-11-02 PROCEDURE — 93306 TTE W/DOPPLER COMPLETE: CPT | Mod: 26

## 2018-11-02 PROCEDURE — 99217: CPT

## 2018-11-02 PROCEDURE — 70498 CT ANGIOGRAPHY NECK: CPT

## 2018-11-02 PROCEDURE — 72146 MRI CHEST SPINE W/O DYE: CPT | Mod: 26

## 2018-11-02 PROCEDURE — 72148 MRI LUMBAR SPINE W/O DYE: CPT | Mod: 26

## 2018-11-02 PROCEDURE — 99284 EMERGENCY DEPT VISIT MOD MDM: CPT | Mod: 25

## 2018-11-02 PROCEDURE — 70450 CT HEAD/BRAIN W/O DYE: CPT

## 2018-11-02 PROCEDURE — 81001 URINALYSIS AUTO W/SCOPE: CPT

## 2018-11-02 PROCEDURE — 82330 ASSAY OF CALCIUM: CPT

## 2018-11-02 PROCEDURE — 85014 HEMATOCRIT: CPT

## 2018-11-02 PROCEDURE — G0378: CPT

## 2018-11-02 PROCEDURE — 83036 HEMOGLOBIN GLYCOSYLATED A1C: CPT

## 2018-11-02 PROCEDURE — 80061 LIPID PANEL: CPT

## 2018-11-02 PROCEDURE — 70496 CT ANGIOGRAPHY HEAD: CPT

## 2018-11-02 PROCEDURE — 85027 COMPLETE CBC AUTOMATED: CPT

## 2018-11-02 PROCEDURE — 70496 CT ANGIOGRAPHY HEAD: CPT | Mod: 26

## 2018-11-02 PROCEDURE — 82962 GLUCOSE BLOOD TEST: CPT

## 2018-11-02 PROCEDURE — 93306 TTE W/DOPPLER COMPLETE: CPT

## 2018-11-02 RX ADMIN — Medication 50 MILLIGRAM(S): at 18:07

## 2018-11-02 RX ADMIN — Medication 81 MILLIGRAM(S): at 12:38

## 2018-11-02 RX ADMIN — Medication 50 MILLIGRAM(S): at 09:05

## 2018-11-02 RX ADMIN — Medication 2 MILLIGRAM(S): at 06:35

## 2018-11-02 RX ADMIN — FINASTERIDE 5 MILLIGRAM(S): 5 TABLET, FILM COATED ORAL at 12:38

## 2018-11-02 NOTE — ED ADULT NURSE REASSESSMENT NOTE - NS ED NURSE REASSESS COMMENT FT1
Amb to BR Denies any discomfort.
D/c Amb Accomp by family Denies any discomfort Resp even and nonlab Denies dizziness or lightheadedness To f/u with PMD
Received  awake alert and oriented x3 Resp even and nonlab Denies discomfort Pend MRI. Moving all four extremities Denies numbness or weakness to extremities
To echo
patient walked to the bathroom with primary RN stable gait.
received report from Elmo. patient found resting in bed comfortable in no acute distress.   wife at the bedside is okay to translate for her  who mostly speaks Chinese.   Patient understands he must give urine and not to get out of bed without assistance.
returned from MRI CM on Spouse at bedside
Pt received from KIM Pérez. Pt oriented to CDU & plan of care was discussed. Pt denies any weakness or difficulty ambulating. Pt states he never had any dizziness or presyncopal event. Pt endorses b/l rib pain, back pain and b/l lower extremity pain. Pt states when he is still pt doesn't feel the pain but when ambulating pt experiences the pain with the back pain being worse. Pt states back pain and rib pain began ~2months ago and the inability to move his legs began today which prompted him to come to the ER. Pt denies any symptoms at the moment. Safety & comfort measures maintained. Call bell in reach. Will continue to monitor.

## 2018-11-02 NOTE — ED CDU PROVIDER SUBSEQUENT DAY NOTE - ATTENDING CONTRIBUTION TO CARE
79 yo male with a pmh of aortic stenosis (s/p valve replacement 2015), HTN, HLD, GERD seen for a period of weakness/inability to ambulate this morning at approx 11:00 am. he notes he went to go from sitting to standing and was unable to get up or walk, stating his legs felt weak. He notes that he rested for a period of time and was able to get up and ambulate but that he still felt weak. He also notes a 2 weeks period of side pain just below rib cage b/l which he does improve with pain medication prescribed by his PCP.   denies fevers, chills, dysuria, diarrhea, blood thinner use, melena, cough, congestion, abdominal pain, sob/dyspnea, syncope,

## 2018-11-02 NOTE — ED CDU PROVIDER SUBSEQUENT DAY NOTE - HISTORY
No interval changes since initial CDU provider note. Pt feels well without complaint as he denies back, leg, rib pain unless he moves. pt went for MRI but unable to complete it 2/2 claustrophobia. NAD, VSS. occasional PVCs events on tele, in sinus peace. Neuro exam normal, no deficits. pt willing to try MRI in AM with premedication. nocturnist cardio c/s pending. will continue monitoring. Jo Landis

## 2018-11-02 NOTE — ED CDU PROVIDER DISPOSITION NOTE - ATTENDING CONTRIBUTION TO CARE
In ED, plt 99, trop 16, repeat trop 15, UA with trace blood, otherwise labs unremarkable, CXR negative. pt sent to CDU for near syncope work up.   While in CDU pt had neuro consult, recommended CTH which was negative, recommended MRI T & L spine which was negative for acute pathology. Neurology evaluated w/ attending and recommended CTA H/N and to eval for stroke which noted mild stenosis of internal carotids but no other acute pathology. Nocturnist cardio consulted for eval of possible atypical CP and agreed w/ echo which showed mild mitral regurg, mild aortic stenosis, mild pulm pressures, mild diastolic dysfunction, mild dilated LA, and hyperdynamic lv systolic function. Results reviewed with cardiologist Dr. Schroeder who advised patient could be discharged. Neuro cleared patient for discharge after results. Case discussed with ED attending who cleared patient for discharge home. Patient stable at discharge, given all follow up info including neuro, cards and PMD f/u, discussed all results with patient, and patient was discharged home. In ED, plt 99, trop 16, repeat trop 15, UA with trace blood, otherwise labs unremarkable, CXR negative. pt sent to CDU for near syncope work up.   While in CDU pt had neuro consult, recommended CTH which was negative, recommended MRI T & L spine which was negative for acute pathology. Neurology evaluated w/ attending and recommended CTA H/N and to eval for stroke which noted mild stenosis of internal carotids but no other acute pathology. Nocturnist cardio consulted for eval of possible atypical CP and agreed w/ echo which showed mild mitral regurg, mild aortic stenosis, mild pulm pressures, mild diastolic dysfunction, mild dilated LA, and hyperdynamic lv systolic function. Results reviewed with cardiologist Dr. Schroeder who advised patient could be discharged. Neuro cleared patient for discharge after results. Case discussed with ED attending who cleared patient for discharge home. Patient stable at discharge, given all follow up info including neuro, cards and PMD f/u, discussed all results with patient, and patient was discharged home.    I reviewed all lab and imaging results from this ED visit, and discussed ALL results with the patient, including all abnormal results and incidental findings. I recommended appropriate follow up for all incidental findings. The patient expressed understanding of all results discussed and follow up instructions given.  The patient was discharged from the ED in stable condition. All results of today's workup were discussed with the patient and all questions/concerns were addressed. All discharge instructions were thoroughly discussed with the patient, as well as important warning signs and new/ worsening symptoms which should necessitate patient's immediate return to the ED. The patient is agreeable with discharge and expresses full understanding of all instructions given.

## 2018-11-02 NOTE — ED CDU PROVIDER DISPOSITION NOTE - CLINICAL COURSE
77 yo male with a pmh of aortic stenosis (s/p valve replacement 2015), HTN, HLD, GERD c/o inability to ambulate this morning at approx 11:00 am. he notes he went to go from sitting to standing and was unable to get up or walk, stating his legs felt weak and painful. He notes that he rested for a period of time and was able to get up and ambulate afterwards but that he still felt weak. He also notes a 2 weeks of side pain around ribs b/l and LBP. pains are worse with movement, states he has no pain at rest. pains improve with pain medication prescribed by his PCP but notes the pains were worse today.  In ED, plt 99, trop 16, repeat trop 15, UA with trace blood, otherwise labs unremarkable, CXR negative. pt sent to CDU for near syncope work up.   While in CDU pt had neuro consult, recommended CTH which was negative, recommended MRI T & L spine which was ______________. nocturnist cardio consulted for eval of possible atypical CP. TTE ____________. 79 yo male with a pmh of aortic stenosis (s/p valve replacement 2015), HTN, HLD, GERD c/o inability to ambulate this morning at approx 11:00 am. he notes he went to go from sitting to standing and was unable to get up or walk, stating his legs felt weak and painful. He notes that he rested for a period of time and was able to get up and ambulate afterwards but that he still felt weak. He also notes a 2 weeks of side pain around ribs b/l and LBP. pains are worse with movement, states he has no pain at rest. pains improve with pain medication prescribed by his PCP but notes the pains were worse today.  In ED, plt 99, trop 16, repeat trop 15, UA with trace blood, otherwise labs unremarkable, CXR negative. pt sent to CDU for near syncope work up.   While in CDU pt had neuro consult, recommended CTH which was negative, recommended MRI T & L spine which was negative for acute pathology. Neurology evaluated w/ attending and recommended CTA H/N and to eval for stroke which noted mild stenosis of internal carotids but no other acute pathology. Nocturnist cardio consulted for eval of possible atypical CP and agreed w/ echo which showed mild mitral regurg, mild aortic stenosis, mild pulm pressures, mild diastolic dysfunction, mild dilated LA, and hyperdynamic lv systolic function. Results reviewed with cardiologist Dr. Schroeder who advised patient could be discharged. Neuro cleared patient for discharge after results. Case discussed with ED attending who cleared patient for discharge home. Patient stable at discharge, given all follow up info including neuro, cards and PMD f/u, discussed all results with patient, and patient was discharged home.

## 2018-11-02 NOTE — CONSULT NOTE ADULT - SUBJECTIVE AND OBJECTIVE BOX
Initial Cardiology Attending Consult    CHIEF COMPLAINT: sudden onset bl weakness and back pain    HISTORY OF PRESENT ILLNESS:  ARABELLA WEISS is a 78y Male patient PMH aortic stenosis (s/p valve replacement 2015), HTN, HLD, GERD, back pain p/w BLE weakness. Suddenly yesterday he felt his bl legs become weak and he sat down and came to te ed. The BL LE weakness improved, he denies cp, SOB, LE edema, syncope or presyncope palpitaitons, + bl side pain    Allergies    No Known Allergies    Intolerances    	    PAST MEDICAL & SURGICAL HISTORY:  Atrial fibrillation  HTN (hypertension)  Enlarged Prostate  Hypercholesterolemia  GERD (Gastroesophageal Reflux Disease)  Aortic Stenosis  Aortic valve disease: AVR 7/2012  S/P cardiac catheterization  Retroperitoneal mass: s/p surgery- benign  S/P TURP: 2005      FAMILY HISTORY:  No pertinent family history in first degree relatives      SOCIAL HISTORY:    [ ] Non-smoker  [ ] Smoker  [ ] Alcohol      REVIEW OF SYSTEMS:  CONSTITUTIONAL: No fever, weight loss, or fatigue  EYES: No eye pain, visual disturbances, or discharge  ENMT:  No difficulty hearing, tinnitus, vertigo; No sinus or throat pain  NECK: No pain or stiffness  RESPIRATORY: No cough, wheezing, chills or hemoptysis; No Shortness of Breath  CARDIOVASCULAR: No chest pain, palpitations, passing out, dizziness, or leg swelling  GASTROINTESTINAL: No abdominal or epigastric pain. No nausea, vomiting, or hematemesis; No diarrhea or constipation. No melena or hematochezia.  GENITOURINARY: No dysuria, frequency, hematuria, or incontinence  NEUROLOGICAL: No headaches, memory loss, loss of strength, numbness, or tremors  SKIN: No itching, burning, rashes, or lesions   LYMPH Nodes: No enlarged glands  ENDOCRINE: No heat or cold intolerance; No hair loss  MUSCULOSKELETAL: No joint pain or swelling; No muscle, + back pain, + le weaknessn  PSYCHIATRIC: No depression, anxiety, mood swings, or difficulty sleeping  HEME/LYMPH: No easy bruising, or bleeding gums  ALLERY AND IMMUNOLOGIC: No hives or eczema	    [ ] All others negative	  [ ] Unable to obtain    PHYSICAL EXAM:  I&O's Summary      Appearance: Normal	  HEENT:   Normal oral mucosa, PERRL, EOMI	  Lymphatic: No lymphadenopathy  Cardiovascular: Normal S1 S2, No JVD, No murmurs, No edema  Respiratory: Lungs clear to auscultation	  Psychiatry: A & O x 3, Mood & affect appropriate  Gastrointestinal:  Soft, Non-tender, + BS	  Skin: No rashes, No ecchymoses, No cyanosis	  Neurologic: Non-focal  Extremities: Normal range of motion, No clubbing, cyanosis or edema  Vascular: Peripheral pulses palpable 2+ bilaterally    MEDICATIONS:  Home Medications:  aspirin 81 mg oral tablet: 1 tab(s) orally once a day (01 Jan 2017 21:01)  finasteride 5 mg oral tablet: 1 tab(s) orally once a day (01 Jan 2017 21:01)  Lipitor 20 mg oral tablet: 1 tab(s) orally once a day (01 Jan 2017 21:01)  Lopressor 50 mg oral tablet:  orally once a day (01 Jan 2017 21:01)  pantoprazole 20 mg oral delayed release tablet: 1 tab(s) orally once a day (01 Jan 2017 21:01)    MEDICATIONS  (STANDING):  aspirin enteric coated 81 milliGRAM(s) Oral daily  atorvastatin 20 milliGRAM(s) Oral at bedtime  finasteride 5 milliGRAM(s) Oral daily  LORazepam     Tablet 2 milliGRAM(s) Oral once  metoprolol tartrate 50 milliGRAM(s) Oral two times a day    MEDICATIONS  (PRN):      LABS:	 	    CBC Full  -  ( 01 Nov 2018 13:59 )  WBC Count : 5.1 K/uL  Hemoglobin : 15.3 g/dL  Hematocrit : 45.0 %  Platelet Count - Automated : 99 K/uL  Mean Cell Volume : 93.4 fl  Mean Cell Hemoglobin : 31.7 pg  Mean Cell Hemoglobin Concentration : 33.9 gm/dL  Auto Neutrophil # : 2.5 K/uL  Auto Lymphocyte # : 1.5 K/uL  Auto Monocyte # : 1.0 K/uL  Auto Eosinophil # : 0.1 K/uL  Auto Basophil # : 0.0 K/uL  Auto Neutrophil % : 48.4 %  Auto Lymphocyte % : 29.8 %  Auto Monocyte % : 20.5 %  Auto Eosinophil % : 1.1 %  Auto Basophil % : 0.2 %    11-01    138  |  99  |  16  ----------------------------<  89  4.0   |  28  |  1.22    Ca    10.1      01 Nov 2018 13:59    TPro  7.1  /  Alb  4.1  /  TBili  0.6  /  DBili  x   /  AST  23  /  ALT  26  /  AlkPhos  43  11-01      proBNP:   Lipid Profile:   HgA1c:   TSH:   TROPONIN:neg x2        TELEMETRY: 	SR 60s with PVCs    ECG:  	SR  RADIOLOGY:  OTHER: 	    CARDIAC TESTING/STUDIES:    [ ] Echocardiogram:  [ ]  Catheterization:  [ ] Stress Test:  	  	  ASSESSMENT/PLAN: 	    RAABELLA WEISS is a 78y Male patient PMH aortic stenosis (s/p valve replacement 2015), HTN, HLD, GERD, back pain p/w BLE weakness. Suddenly yesterday he felt his bl legs become weak and he sat down and came to te ed. The BL LE weakness improved, he denies cp, SOB, LE edema, syncope or presyncope palpitaitons, + bl side pain  His story is not suggestive of syncope, presyncope or cardiac syncope  Ok to monitor on tele for 24-48 hrs  ECHO  -neurology consult appreciated      Neel Ross MD, PhD  Cardiology Attending  [facility]  [phone]    (Cardiology Nocturnist cell number available 7 pm - 7 am every night; available daytime week days for follow-up only; daytime weekends covered by general cardiology consult service)

## 2018-11-02 NOTE — ED CDU PROVIDER SUBSEQUENT DAY NOTE - PROGRESS NOTE DETAILS
CDU NOTE YVONNE CORRAL: Pt resting comfortably, feeling well without complaint. NAD, VSS. No events on telemetry. Neuro exam unchanged, no deficits. will continue monitoring. CDU NOTE YVONNE CORRAL: nocturnist cardio saw pt, unlikely symptoms are cardiac in nature, but recommend continuing with plan for TTE. will continue monitoring. Patient at off unit MRI testing. Will re-evaluate upon return. - Kt Mao PA-C Patient returned from MRI. Tolerated test w/o difficulty. Patient seen at bedside in NAD.  VSS.  Patient resting comfortably, endorsing mild low back pain but denies wanting analgesia at this time. Will follow MR results. TTE to be done.  - Kt Mao PA-C Neuro evaluated patient w/ attending at bedside. MR results non-actionable however recommending CTA H/N to r/o stroke and if negative no further neurological intervention needed. MD aware. - Kt Mao PA-C Patient returned from MRI. Tolerated test w/o difficulty. Patient seen at bedside in NAD.  VSS. No events on tele. Patient resting comfortably, endorsing mild low back pain but denies wanting analgesia at this time. Will follow MR results. TTE to be done.  - Kt Mao PA-C Neuro evaluated patient w/ attending at bedside. MR results non-actionable however recommending CTA H/N to r/o stroke and if negative no further neurological intervention needed. MD aware of this update. Patient seen at bedside at this time in NAD.  VSS.  Patient resting comfortably without complaints. No events on tele. He is agreeable with plan for CTA h/n. TTE Results pending. Will continue to monitor. -Kt Mao PA-C Patient seen at bedside in NAD.  VSS.  Patient resting comfortably without complaints. No events on tele. Pt appears well and denies complaints. No cp, sob, dizziness, LE weakness. Has been ambulating around unit w/o difficulty. CTA h/n negative for acute pathology. TTE results pending. Awaiting attending eval. Will dispo accordingly. - Kt Mao PA-C TTE resulted. Read and discussed the results with cardiology nocturnist Dr. Schroeder over the phone who stated patient could be discharged with routine cardio f/u in next 1-2 weeks. CTAs non-actionable. Patient was cleared by ED attending for discharge. - tK Mao PA-C TTE resulted. Read and discussed the results including findings c/w mild mitral regurg, mild aortic stenosis, mild dilated RA, hyperdynamic LV systolic function, stage 1 diastolic dysfunction, and mild pulm pressure (read full report details) with cardiology nocturnist Dr. Schroeder over the phone who stated patient could be discharged with routine cardio f/u in next 1-2 weeks. CTAs non-actionable. Patient was cleared by ED attending for discharge. Made patient aware of all lab and imaging results, made aware of elevated hgb a1c and need for PMD f/u and need four outpatient neuro and cardiology follow up as instructed. Patient and family acknowledged understanding of all results and follow up info. Strict return precautions discussed. Stable at d/c. ED attending aware. - Kt Mao PA-C Patient seen at bedside in NAD.  VSS.  Patient resting comfortably without complaints. No events on tele. Pt appears well and denies complaints. No cp, sob, dizziness, LE weakness. Has been ambulating around unit w/o difficulty. CTA h/n negative for acute pathology, noted mild internal carotid stenosis. TTE results pending. Awaiting attending eval. Will dispo accordingly. - Kt Mao PA-C

## 2018-11-02 NOTE — ED CDU PROVIDER DISPOSITION NOTE - PLAN OF CARE
1. Rest, ice. Stay hydrated.  Take Motrin 600mg every 8 hrs with food for pain. May also take Tylenol 650mg every 6-8 hrs as needed for pain.  2. Follow up with your PMD within 48-72 hrs.    3. Any worsening pain, swelling, weakness, numbness return to ED. 1. Rest, ice. Stay hydrated.  Take Motrin 600mg every 8 hrs with food for pain. May also take Tylenol 650mg every 6-8 hrs as needed for pain.  2. Follow up with your PMD within 1-2 days. Additionally neurology is recommending you follow up with neurologist Dr. Raúl Rosales [phone] within the next 1 week for further outpatient evaluation/management of your symptoms.   3. Any worsening pain, swelling, weakness, numbness return to ED. 1. Rest, ice. Stay hydrated.  Take Motrin 600mg every 8 hrs with food for pain. May also take Tylenol 650mg every 6-8 hrs as needed for pain.  2. Follow up with your PMD within 1-2 days. Additionally neurology is recommending you follow up with neurologist Dr. Raúl Rosales [phone] within the next 1 week for further outpatient evaluation/management of your symptoms. Additionally please follow up with our Cardiology clinic ([phone]) and see Dr. Schroeder or Dr. Ross within the next 1-2 weeks.  3. Continue all your current home medications as previously prescribed.   4. If you develop any new/worsening pain, swelling, weakness, numbness/tingling, bowel/bladder incontinence, dizziness or any other concerning symptoms please return to ED immediately.

## 2018-11-19 ENCOUNTER — APPOINTMENT (OUTPATIENT)
Dept: UROLOGY | Facility: CLINIC | Age: 78
End: 2018-11-19
Payer: MEDICARE

## 2018-11-19 ENCOUNTER — APPOINTMENT (OUTPATIENT)
Dept: INTERNAL MEDICINE | Facility: CLINIC | Age: 78
End: 2018-11-19

## 2018-11-19 ENCOUNTER — OUTPATIENT (OUTPATIENT)
Dept: OUTPATIENT SERVICES | Facility: HOSPITAL | Age: 78
LOS: 1 days | End: 2018-11-19
Payer: MEDICARE

## 2018-11-19 DIAGNOSIS — R35.0 FREQUENCY OF MICTURITION: ICD-10-CM

## 2018-11-19 PROCEDURE — 76775 US EXAM ABDO BACK WALL LIM: CPT

## 2018-11-19 PROCEDURE — 76775 US EXAM ABDO BACK WALL LIM: CPT | Mod: 26

## 2018-11-19 PROCEDURE — 99214 OFFICE O/P EST MOD 30 MIN: CPT | Mod: 25

## 2018-11-19 RX ORDER — HYDROCORTISONE 1 %
12 CREAM (GRAM) TOPICAL TWICE DAILY
Qty: 1 | Refills: 4 | Status: COMPLETED | COMMUNITY
Start: 2018-03-21 | End: 2018-11-19

## 2018-11-19 RX ORDER — MELOXICAM 15 MG/1
15 TABLET ORAL
Qty: 30 | Refills: 1 | Status: COMPLETED | COMMUNITY
Start: 2018-05-22 | End: 2018-11-19

## 2018-11-19 RX ORDER — ESOMEPRAZOLE MAGNESIUM 40 MG/1
40 CAPSULE, DELAYED RELEASE ORAL
Qty: 90 | Refills: 3 | Status: COMPLETED | COMMUNITY
Start: 2018-10-11 | End: 2018-11-19

## 2018-11-20 ENCOUNTER — APPOINTMENT (OUTPATIENT)
Dept: INTERNAL MEDICINE | Facility: CLINIC | Age: 78
End: 2018-11-20
Payer: MEDICARE

## 2018-11-20 VITALS
HEART RATE: 70 BPM | RESPIRATION RATE: 16 BRPM | DIASTOLIC BLOOD PRESSURE: 76 MMHG | TEMPERATURE: 98.5 F | OXYGEN SATURATION: 98 % | SYSTOLIC BLOOD PRESSURE: 130 MMHG

## 2018-11-20 PROCEDURE — 99214 OFFICE O/P EST MOD 30 MIN: CPT | Mod: 25

## 2018-11-20 PROCEDURE — 36415 COLL VENOUS BLD VENIPUNCTURE: CPT

## 2018-11-20 NOTE — HISTORY OF PRESENT ILLNESS
[FreeTextEntry1] : Patient presents for f/u of hospital discharge. [de-identified] : Patient presents for f/u of hospital discharge. Patient went to ED as he suddenly felt legs were heavy and patient dropped. Patient denies any preceding palpitations, chest tightness, shortness of breath, dizziness, lightheadedness. Patient denies any back pain, slurring of words or numbness. Patient states at ED he had CT angio done as well as MRI of spine. All results were negative. Patient also followed up with neurologist and thinks he had Brain MRI. Patient also has seen cardiologist since discharge.

## 2018-11-20 NOTE — ASSESSMENT
[FreeTextEntry1] : 1) Drop attack: Patient had drop attack in hospital, had normal CT angio and echo showed no gross abnormalities. Patient also followed with neurologist. Will await results of MRI to r/o TIA. Will check Vitamin B12 levels. Patient had no arrhythmias in the hospital and followed up with cardiologist. Syncopal episode in the differential\par 2) HTN: stable, continue current management\par 3) Hyperlipidemia: Check lipid panel\par 4) Aortic stenosis: Echocardiogram done in the hospital showed mild aortic stenosis. Patient had valvular repair. Patient denied any preceding symptoms before episode\par 5) Hypovitaminosis D: Check Vitamin D levels\par 6) Low Vitamin B12: Check Vitamin B12

## 2018-11-20 NOTE — PHYSICAL EXAM
[No Acute Distress] : no acute distress [Well Nourished] : well nourished [Well Developed] : well developed [Well-Appearing] : well-appearing [Normal Sclera/Conjunctiva] : normal sclera/conjunctiva [PERRL] : pupils equal round and reactive to light [EOMI] : extraocular movements intact [Normal Outer Ear/Nose] : the outer ears and nose were normal in appearance [Normal Oropharynx] : the oropharynx was normal [Normal TMs] : both tympanic membranes were normal [Normal Nasal Mucosa] : the nasal mucosa was normal [No JVD] : no jugular venous distention [Supple] : supple [No Lymphadenopathy] : no lymphadenopathy [Thyroid Normal, No Nodules] : the thyroid was normal and there were no nodules present [No Respiratory Distress] : no respiratory distress  [Clear to Auscultation] : lungs were clear to auscultation bilaterally [No Accessory Muscle Use] : no accessory muscle use [Normal Rate] : normal rate  [Regular Rhythm] : with a regular rhythm [Normal S1, S2] : normal S1 and S2 [No Murmur] : no murmur heard [No Carotid Bruits] : no carotid bruits [No Abdominal Bruit] : a ~M bruit was not heard ~T in the abdomen [No Varicosities] : no varicosities [Pedal Pulses Present] : the pedal pulses are present [No Edema] : there was no peripheral edema [No Extremity Clubbing/Cyanosis] : no extremity clubbing/cyanosis [No Palpable Aorta] : no palpable aorta [Soft] : abdomen soft [Non Tender] : non-tender [Non-distended] : non-distended [No Masses] : no abdominal mass palpated [No HSM] : no HSM [Normal Bowel Sounds] : normal bowel sounds [Normal Posterior Cervical Nodes] : no posterior cervical lymphadenopathy [Normal Anterior Cervical Nodes] : no anterior cervical lymphadenopathy [No CVA Tenderness] : no CVA  tenderness [No Spinal Tenderness] : no spinal tenderness [No Joint Swelling] : no joint swelling [Grossly Normal Strength/Tone] : grossly normal strength/tone [No Rash] : no rash [No Skin Lesions] : no skin lesions [Normal Gait] : normal gait [Coordination Grossly Intact] : coordination grossly intact [No Focal Deficits] : no focal deficits [de-identified] : Rombergs test  negative

## 2018-11-25 LAB
APPEARANCE: CLEAR
BACTERIA UR CULT: NORMAL
BACTERIA: NEGATIVE
BILIRUBIN URINE: NEGATIVE
BLOOD URINE: ABNORMAL
COLOR: YELLOW
CORE LAB FLUID CYTOLOGY: NORMAL
GLUCOSE QUALITATIVE U: NEGATIVE MG/DL
HYALINE CASTS: 0 /LPF
KETONES URINE: NEGATIVE
LEUKOCYTE ESTERASE URINE: NEGATIVE
MICROSCOPIC-UA: NORMAL
NITRITE URINE: NEGATIVE
PH URINE: 6.5
PROTEIN URINE: NEGATIVE MG/DL
RED BLOOD CELLS URINE: 0 /HPF
SPECIFIC GRAVITY URINE: 1.01
SQUAMOUS EPITHELIAL CELLS: 0 /HPF
UROBILINOGEN URINE: NEGATIVE MG/DL
WHITE BLOOD CELLS URINE: 0 /HPF

## 2018-11-26 ENCOUNTER — MESSAGE (OUTPATIENT)
Age: 78
End: 2018-11-26

## 2018-11-26 LAB
25(OH)D3 SERPL-MCNC: 39.8 NG/ML
ALBUMIN SERPL ELPH-MCNC: 4.5 G/DL
ALP BLD-CCNC: 46 U/L
ALT SERPL-CCNC: 23 U/L
ANION GAP SERPL CALC-SCNC: 15 MMOL/L
AST SERPL-CCNC: 20 U/L
BASOPHILS # BLD AUTO: 0 K/UL
BASOPHILS NFR BLD AUTO: 0 %
BILIRUB SERPL-MCNC: 0.4 MG/DL
BUN SERPL-MCNC: 15 MG/DL
CALCIUM SERPL-MCNC: 9.6 MG/DL
CHLORIDE SERPL-SCNC: 106 MMOL/L
CHOLEST SERPL-MCNC: 123 MG/DL
CHOLEST/HDLC SERPL: 2.6 RATIO
CO2 SERPL-SCNC: 25 MMOL/L
CREAT SERPL-MCNC: 1.14 MG/DL
EOSINOPHIL # BLD AUTO: 0.02 K/UL
EOSINOPHIL NFR BLD AUTO: 0.4 %
GLUCOSE SERPL-MCNC: 100 MG/DL
HBA1C MFR BLD HPLC: 5.8 %
HCT VFR BLD CALC: 47.9 %
HDLC SERPL-MCNC: 48 MG/DL
HGB BLD-MCNC: 15.3 G/DL
IMM GRANULOCYTES NFR BLD AUTO: 0.4 %
LDLC SERPL CALC-MCNC: 52 MG/DL
LYMPHOCYTES # BLD AUTO: 1.69 K/UL
LYMPHOCYTES NFR BLD AUTO: 31.2 %
MAN DIFF?: NORMAL
MCHC RBC-ENTMCNC: 31.2 PG
MCHC RBC-ENTMCNC: 31.9 GM/DL
MCV RBC AUTO: 97.8 FL
MONOCYTES # BLD AUTO: 1.28 K/UL
MONOCYTES NFR BLD AUTO: 23.7 %
NEUTROPHILS # BLD AUTO: 2.4 K/UL
NEUTROPHILS NFR BLD AUTO: 44.3 %
PLATELET # BLD AUTO: 106 K/UL
POTASSIUM SERPL-SCNC: 4.7 MMOL/L
PROT SERPL-MCNC: 7.3 G/DL
RBC # BLD: 4.9 M/UL
RBC # FLD: 12.8 %
SODIUM SERPL-SCNC: 146 MMOL/L
TRIGL SERPL-MCNC: 115 MG/DL
TSH SERPL-ACNC: 1.5 UIU/ML
VIT B12 SERPL-MCNC: 453 PG/ML
WBC # FLD AUTO: 5.41 K/UL

## 2018-12-03 DIAGNOSIS — N40.1 BENIGN PROSTATIC HYPERPLASIA WITH LOWER URINARY TRACT SYMPTOMS: ICD-10-CM

## 2018-12-03 DIAGNOSIS — N20.0 CALCULUS OF KIDNEY: ICD-10-CM

## 2018-12-03 DIAGNOSIS — D49.0 NEOPLASM OF UNSPECIFIED BEHAVIOR OF DIGESTIVE SYSTEM: ICD-10-CM

## 2018-12-05 ENCOUNTER — RX RENEWAL (OUTPATIENT)
Age: 78
End: 2018-12-05

## 2019-01-15 ENCOUNTER — APPOINTMENT (OUTPATIENT)
Dept: INTERNAL MEDICINE | Facility: CLINIC | Age: 79
End: 2019-01-15
Payer: MEDICARE

## 2019-01-15 VITALS
TEMPERATURE: 97.5 F | RESPIRATION RATE: 16 BRPM | SYSTOLIC BLOOD PRESSURE: 132 MMHG | BODY MASS INDEX: 33.3 KG/M2 | WEIGHT: 219 LBS | DIASTOLIC BLOOD PRESSURE: 74 MMHG | HEART RATE: 74 BPM

## 2019-01-15 PROCEDURE — 99214 OFFICE O/P EST MOD 30 MIN: CPT

## 2019-01-15 NOTE — PHYSICAL EXAM
[Sclera] : the sclera and conjunctiva were normal [PERRL With Normal Accommodation] : pupils were equal in size, round, and reactive to light [Extraocular Movements] : extraocular movements were intact [Outer Ear] : the ears and nose were normal in appearance [Neck Appearance] : the appearance of the neck was normal [Neck Cervical Mass (___cm)] : no neck mass was observed [Jugular Venous Distention Increased] : there was no jugular-venous distention [Thyroid Diffuse Enlargement] : the thyroid was not enlarged [Thyroid Nodule] : there were no palpable thyroid nodules [Respiration, Rhythm And Depth] : normal respiratory rhythm and effort [Chest Palpation] : palpation of the chest revealed no abnormalities [Lungs Percussion] : the lungs were normal to percussion [Apical Impulse] : the apical impulse was normal [Heart Rate And Rhythm] : heart rate was normal and rhythm regular [Heart Sounds] : normal S1 and S2 [Heart Sounds Gallop] : no gallops [Murmurs] : no murmurs [Heart Sounds Pericardial Friction Rub] : no pericardial rub [Bowel Sounds] : normal bowel sounds [Abdomen Soft] : soft [Abdomen Tenderness] : non-tender [Abdomen Mass (___ Cm)] : no abdominal mass palpated [Patient Refused] : rectal exam was refused by the patient [Cervical Lymph Nodes Enlarged Posterior Bilaterally] : posterior cervical [Cervical Lymph Nodes Enlarged Anterior Bilaterally] : anterior cervical [Supraclavicular Lymph Nodes Enlarged Bilaterally] : supraclavicular [Axillary Lymph Nodes Enlarged Bilaterally] : axillary [Femoral Lymph Nodes Enlarged Bilaterally] : femoral [Inguinal Lymph Nodes Enlarged Bilaterally] : inguinal [No CVA Tenderness] : no ~M costovertebral angle tenderness [No Spinal Tenderness] : no spinal tenderness [Abnormal Walk] : normal gait [Nail Clubbing] : no clubbing  or cyanosis of the fingernails [Musculoskeletal - Swelling] : no joint swelling seen [Motor Tone] : muscle strength and tone were normal [Skin Color & Pigmentation] : normal skin color and pigmentation [Skin Turgor] : normal skin turgor [] : no rash [Skin Lesions] : no skin lesions [Deep Tendon Reflexes (DTR)] : deep tendon reflexes were 2+ and symmetric [Sensation] : the sensory exam was normal to light touch and pinprick [Motor Exam] : the motor exam was normal [No Focal Deficits] : no focal deficits [Oriented To Time, Place, And Person] : oriented to person, place, and time [Impaired Insight] : insight and judgment were intact [Affect] : the affect was normal [Mood] : the mood was normal [No Acute Distress] : no acute distress [Well Nourished] : well nourished [Well Developed] : well developed [Well-Appearing] : well-appearing [Normal Sclera/Conjunctiva] : normal sclera/conjunctiva [PERRL] : pupils equal round and reactive to light [EOMI] : extraocular movements intact [Normal Outer Ear/Nose] : the outer ears and nose were normal in appearance [Normal TMs] : both tympanic membranes were normal [Normal Nasal Mucosa] : the nasal mucosa was normal [No JVD] : no jugular venous distention [Supple] : supple [No Lymphadenopathy] : no lymphadenopathy [Thyroid Normal, No Nodules] : the thyroid was normal and there were no nodules present [No Respiratory Distress] : no respiratory distress  [Clear to Auscultation] : lungs were clear to auscultation bilaterally [No Accessory Muscle Use] : no accessory muscle use [Normal Percussion] : the chest was normal to percussion [Normal Rate] : normal rate  [Regular Rhythm] : with a regular rhythm [Normal S1, S2] : normal S1 and S2 [No Murmur] : no murmur heard [No Carotid Bruits] : no carotid bruits [No Abdominal Bruit] : a ~M bruit was not heard ~T in the abdomen [No Varicosities] : no varicosities [Pedal Pulses Present] : the pedal pulses are present [No Edema] : there was no peripheral edema [No Extremity Clubbing/Cyanosis] : no extremity clubbing/cyanosis [No Palpable Aorta] : no palpable aorta [Soft] : abdomen soft [Non Tender] : non-tender [Non-distended] : non-distended [No Masses] : no abdominal mass palpated [No HSM] : no HSM [Normal Bowel Sounds] : normal bowel sounds [Normal Posterior Cervical Nodes] : no posterior cervical lymphadenopathy [Normal Anterior Cervical Nodes] : no anterior cervical lymphadenopathy [No Joint Swelling] : no joint swelling [No Rash] : no rash [Normal Gait] : normal gait [Coordination Grossly Intact] : coordination grossly intact [Speech Grossly Normal] : speech grossly normal [Normal Affect] : the affect was normal [Alert and Oriented x3] : oriented to person, place, and time [Normal Insight/Judgement] : insight and judgment were intact [FreeTextEntry1] : minimal wheezing bilateral

## 2019-01-15 NOTE — REVIEW OF SYSTEMS
[Sore Throat] : sore throat [Negative] : Heme/Lymph [Nasal Discharge] : nasal discharge [see HPI] : see HPI

## 2019-01-15 NOTE — HISTORY OF PRESENT ILLNESS
[FreeTextEntry1] : Patient comes because of a sore throat, congested nose, cough and yellow sputum for one week. Patient  has no chest pain, shortness of breath, fever, wheezing, paroxysmal nocturnal dyspnea ,orthopnea. Sometimes  he has .also  symptoms of reflux causing throat discomfort. the patient takes no medication for this

## 2019-01-15 NOTE — ASSESSMENT
[FreeTextEntry1] : Diagnoses #1 acute bronchitis and upper respiratory infection. Patient to be on Z-Carlos and mucinex one po q12h prn congestion\par #2 Gastro-esophageal reflux. The patient to start pantoprazole 40 mg p.o. once a day for 2 weeks and then one p.o. once a day p.r.n. Diet and sleeping instructions given to the patient\par #3 hypertension, stable.Same regimen and Idiet\par Plan .patient to return after 10 days if not better

## 2019-01-24 ENCOUNTER — APPOINTMENT (OUTPATIENT)
Dept: INTERNAL MEDICINE | Facility: CLINIC | Age: 79
End: 2019-01-24
Payer: MEDICARE

## 2019-01-24 ENCOUNTER — LABORATORY RESULT (OUTPATIENT)
Age: 79
End: 2019-01-24

## 2019-01-24 VITALS
DIASTOLIC BLOOD PRESSURE: 72 MMHG | TEMPERATURE: 98.4 F | HEART RATE: 72 BPM | SYSTOLIC BLOOD PRESSURE: 130 MMHG | WEIGHT: 217 LBS | RESPIRATION RATE: 14 BRPM | BODY MASS INDEX: 32.99 KG/M2

## 2019-01-24 PROCEDURE — G0439: CPT | Mod: 25

## 2019-01-24 PROCEDURE — 36415 COLL VENOUS BLD VENIPUNCTURE: CPT

## 2019-01-24 PROCEDURE — 99215 OFFICE O/P EST HI 40 MIN: CPT | Mod: 25

## 2019-01-24 PROCEDURE — 96372 THER/PROPH/DIAG INJ SC/IM: CPT

## 2019-01-24 RX ORDER — CYANOCOBALAMIN 1000 UG/ML
1000 INJECTION INTRAMUSCULAR; SUBCUTANEOUS
Qty: 0 | Refills: 0 | Status: COMPLETED | OUTPATIENT
Start: 2019-01-24

## 2019-01-24 RX ADMIN — CYANOCOBALAMIN 0 MCG/ML: 1000 INJECTION INTRAMUSCULAR; SUBCUTANEOUS at 00:00

## 2019-01-24 NOTE — ASSESSMENT
[FreeTextEntry1] : Diagnosis #1 history of aortic stenosis insufficiency, status post valve replacement. Patient is stable and she is followed by the cardiologist.\par #2 hypertension, stable. Continue with same medication and low salt diet\par #3 hyperlipidemia. Continue with same medication and low-fat diet\par #4 rule out liver  toxicity due to chronic medication use\par #5 hypovitaminosis D., being supplemented\par #6 GE reflux, stable. Patient advised to take pantoprazole 40 mg p.o. twice a day if he becomes more symptomatic. Diet instructions given to the patient as well\par #7 history of low B12. 1000 mcg  was given IM and level sent to the lab\par #8 low back pain to lower extremities. Patient was seen by the neurologist and had EMG and MRI of the spine. Due to  GE reflux patient to take only Tylenol 500 mg p.o. every 6 hours p.r.n. pain and  followup with  the neurologist.\par Plan . Patient advised to return after 3 months

## 2019-01-24 NOTE — HEALTH RISK ASSESSMENT
[Very Good] : ~his/her~ current health as very good [Patient reported colonoscopy was normal] : Patient reported colonoscopy was normal [None] : None [With Family] : lives with family [Retired] : retired [High School] : high school [] :  [Fully functional (bathing, dressing, toileting, transferring, walking, feeding)] : Fully functional (bathing, dressing, toileting, transferring, walking, feeding) [Fully functional (using the telephone, shopping, preparing meals, housekeeping, doing laundry, using] : Fully functional and needs no help or supervision to perform IADLs (using the telephone, shopping, preparing meals, housekeeping, doing laundry, using transportation, managing medications and managing finances) [Smoke Detector] : smoke detector [Carbon Monoxide Detector] : carbon monoxide detector [Safety elements used in home] : safety elements used in home [Seat Belt] :  uses seat belt [Sunscreen] : uses sunscreen [Discussed at today's visit] : Advance Directives Discussed at today's visit [] : No [Change in mental status noted] : No change in mental status noted [Sexually Active] : not sexually active [Reports changes in hearing] : Reports no changes in hearing [Reports changes in vision] : Reports no changes in vision [Reports changes in dental health] : Reports no changes in dental health [Travel to Developing Areas] : does not  travel to developing areas [TB Exposure] : is not being exposed to tuberculosis [Caregiver Concerns] : does not have caregiver concerns

## 2019-01-24 NOTE — HISTORY OF PRESENT ILLNESS
[FreeTextEntry1] : Patient comes for his Medicare annual wellness visit and followup of his chronic medical problems. Patient feels well without any chest pain, shortness of breath, paroxysmal nocturnal dyspnea or orthopnea. Only he feels some pain from his low back to the lower extremities for several months. Physical therapy did not help much. His gastrointestinal reflux symptoms remain stable

## 2019-01-24 NOTE — PHYSICAL EXAM
[No Acute Distress] : no acute distress [Well Nourished] : well nourished [Well Developed] : well developed [Well-Appearing] : well-appearing [Normal Sclera/Conjunctiva] : normal sclera/conjunctiva [PERRL] : pupils equal round and reactive to light [EOMI] : extraocular movements intact [Normal Outer Ear/Nose] : the outer ears and nose were normal in appearance [Normal Oropharynx] : the oropharynx was normal [Normal TMs] : both tympanic membranes were normal [Normal Nasal Mucosa] : the nasal mucosa was normal [No JVD] : no jugular venous distention [Supple] : supple [No Lymphadenopathy] : no lymphadenopathy [Thyroid Normal, No Nodules] : the thyroid was normal and there were no nodules present [No Respiratory Distress] : no respiratory distress  [Clear to Auscultation] : lungs were clear to auscultation bilaterally [No Accessory Muscle Use] : no accessory muscle use [Normal Percussion] : the chest was normal to percussion [Normal Rate] : normal rate  [Regular Rhythm] : with a regular rhythm [Normal S1, S2] : normal S1 and S2 [No Murmur] : no murmur heard [No Carotid Bruits] : no carotid bruits [No Abdominal Bruit] : a ~M bruit was not heard ~T in the abdomen [No Varicosities] : no varicosities [Pedal Pulses Present] : the pedal pulses are present [No Edema] : there was no peripheral edema [No Extremity Clubbing/Cyanosis] : no extremity clubbing/cyanosis [No Palpable Aorta] : no palpable aorta [Soft] : abdomen soft [Non Tender] : non-tender [Non-distended] : non-distended [No Masses] : no abdominal mass palpated [No HSM] : no HSM [Normal Bowel Sounds] : normal bowel sounds [No Hernias] : no hernias [Declined Rectal Exam] : declined rectal exam [Normal Supraclavicular Nodes] : no supraclavicular lymphadenopathy [Normal Axillary Nodes] : no axillary lymphadenopathy [Normal Posterior Cervical Nodes] : no posterior cervical lymphadenopathy [Normal Femoral Nodes] : no femoral lymphadenopathy [Normal Anterior Cervical Nodes] : no anterior cervical lymphadenopathy [Normal Inguinal Nodes] : no inguinal lymphadenopathy [No CVA Tenderness] : no CVA  tenderness [No Spinal Tenderness] : no spinal tenderness [Kyphosis] : no kyphosis [Scoliosis] : no scoliosis [No Joint Swelling] : no joint swelling [Grossly Normal Strength/Tone] : grossly normal strength/tone [No Rash] : no rash [No Skin Lesions] : no skin lesions [Normal Gait] : normal gait [Coordination Grossly Intact] : coordination grossly intact [No Focal Deficits] : no focal deficits [Deep Tendon Reflexes (DTR)] : deep tendon reflexes were 2+ and symmetric [Speech Grossly Normal] : speech grossly normal [Memory Grossly Normal] : memory grossly normal [Normal Affect] : the affect was normal [Alert and Oriented x3] : oriented to person, place, and time [Normal Mood] : the mood was normal [Normal Insight/Judgement] : insight and judgment were intact [de-identified] : refused

## 2019-01-25 LAB
25(OH)D3 SERPL-MCNC: 42.3 NG/ML
ALBUMIN SERPL ELPH-MCNC: 4.4 G/DL
ALP BLD-CCNC: 49 U/L
ALT SERPL-CCNC: 18 U/L
ANION GAP SERPL CALC-SCNC: 12 MMOL/L
APPEARANCE: CLEAR
AST SERPL-CCNC: 19 U/L
BACTERIA: NEGATIVE
BASOPHILS # BLD AUTO: 0 K/UL
BASOPHILS NFR BLD AUTO: 0 %
BILIRUB SERPL-MCNC: 0.4 MG/DL
BILIRUBIN URINE: NEGATIVE
BLOOD URINE: ABNORMAL
BUN SERPL-MCNC: 16 MG/DL
CALCIUM SERPL-MCNC: 9.5 MG/DL
CHLORIDE SERPL-SCNC: 105 MMOL/L
CHOLEST SERPL-MCNC: 140 MG/DL
CHOLEST/HDLC SERPL: 2.9 RATIO
CO2 SERPL-SCNC: 27 MMOL/L
COLOR: YELLOW
CREAT SERPL-MCNC: 1.2 MG/DL
EOSINOPHIL # BLD AUTO: 0.06 K/UL
EOSINOPHIL NFR BLD AUTO: 1 %
GLUCOSE QUALITATIVE U: NEGATIVE MG/DL
GLUCOSE SERPL-MCNC: 95 MG/DL
HCT VFR BLD CALC: 47.8 %
HDLC SERPL-MCNC: 48 MG/DL
HGB BLD-MCNC: 15.5 G/DL
KETONES URINE: NEGATIVE
LDLC SERPL CALC-MCNC: 73 MG/DL
LEUKOCYTE ESTERASE URINE: NEGATIVE
LYMPHOCYTES # BLD AUTO: 1.77 K/UL
LYMPHOCYTES NFR BLD AUTO: 30 %
MAN DIFF?: NORMAL
MCHC RBC-ENTMCNC: 31 PG
MCHC RBC-ENTMCNC: 32.4 GM/DL
MCV RBC AUTO: 95.6 FL
MICROSCOPIC-UA: NORMAL
MONOCYTES # BLD AUTO: 1.54 K/UL
MONOCYTES NFR BLD AUTO: 26 %
NEUTROPHILS # BLD AUTO: 2.54 K/UL
NEUTROPHILS NFR BLD AUTO: 43 %
NITRITE URINE: NEGATIVE
PH URINE: 7
PLATELET # BLD AUTO: 116 K/UL
POTASSIUM SERPL-SCNC: 3.9 MMOL/L
PROT SERPL-MCNC: 7.1 G/DL
PROTEIN URINE: NEGATIVE MG/DL
PSA SERPL-MCNC: 0.94 NG/ML
RBC # BLD: 5 M/UL
RBC # FLD: 13.3 %
RED BLOOD CELLS URINE: 2 /HPF
SODIUM SERPL-SCNC: 144 MMOL/L
SPECIFIC GRAVITY URINE: 1.01
SQUAMOUS EPITHELIAL CELLS: 0 /HPF
TRIGL SERPL-MCNC: 93 MG/DL
UROBILINOGEN URINE: NEGATIVE MG/DL
VIT B12 SERPL-MCNC: 365 PG/ML
WBC # FLD AUTO: 5.91 K/UL
WHITE BLOOD CELLS URINE: 0 /HPF

## 2019-03-19 ENCOUNTER — APPOINTMENT (OUTPATIENT)
Dept: INTERNAL MEDICINE | Facility: CLINIC | Age: 79
End: 2019-03-19
Payer: MEDICARE

## 2019-03-19 VITALS
SYSTOLIC BLOOD PRESSURE: 123 MMHG | WEIGHT: 218.24 LBS | DIASTOLIC BLOOD PRESSURE: 79 MMHG | HEIGHT: 68.19 IN | OXYGEN SATURATION: 98 % | HEART RATE: 58 BPM | TEMPERATURE: 97.4 F | BODY MASS INDEX: 33.07 KG/M2

## 2019-03-19 PROCEDURE — 99214 OFFICE O/P EST MOD 30 MIN: CPT

## 2019-03-19 NOTE — ASSESSMENT
[FreeTextEntry1] : 1) Back pain the patient had an MRI in November that showed mild disc herniation spinal stenosis. Likely an element of muscle spasm. Discussed stretching exercises use of a heating pad and NSAIDs as needed. Neurologic exam unremarkable. Will place orthopedic surgery\par 2) GERD: Patient has been following with gastroenterologist, advised to take PPI twice a day reviewed lifestyle modification with patient\par 3) mitral regurgitation: Stable patient follows with a cardiologist patient denies any symptoms clinically compensated\par 4) Hypertension: Stable continue current management advised low-salt diet\par 5) BPH: Patient denies any worsening symptoms follows with urologist.

## 2019-03-19 NOTE — HISTORY OF PRESENT ILLNESS
[de-identified] : Patient has had back pain for the past 3 months. Pain is in the left lower back and radiates down the leg. Pain radiates down the anterior leg. Pain is worse in the morning when patient gets up from bed. Patient states that walking improves pain. Patient denies any weakness numbness gait instability or urinary retention or incontinence. Patient denies any fevers or chills. Patient states he still has burning sensation 3 hours after going to bed. Patient will make a followup appointment with gastroenterologist. Patient denies any melena weight loss or abdominal pain. [FreeTextEntry1] : patient presents for concern of back pain and reflux

## 2019-03-19 NOTE — PHYSICAL EXAM
[No Acute Distress] : no acute distress [Well Nourished] : well nourished [Well Developed] : well developed [Well-Appearing] : well-appearing [Normal Sclera/Conjunctiva] : normal sclera/conjunctiva [EOMI] : extraocular movements intact [PERRL] : pupils equal round and reactive to light [Normal Oropharynx] : the oropharynx was normal [Normal Outer Ear/Nose] : the outer ears and nose were normal in appearance [Normal TMs] : both tympanic membranes were normal [Normal Nasal Mucosa] : the nasal mucosa was normal [Supple] : supple [No Lymphadenopathy] : no lymphadenopathy [No JVD] : no jugular venous distention [Thyroid Normal, No Nodules] : the thyroid was normal and there were no nodules present [No Respiratory Distress] : no respiratory distress  [No Accessory Muscle Use] : no accessory muscle use [Clear to Auscultation] : lungs were clear to auscultation bilaterally [Normal Rate] : normal rate  [Normal Percussion] : the chest was normal to percussion [Regular Rhythm] : with a regular rhythm [Normal S1, S2] : normal S1 and S2 [No Carotid Bruits] : no carotid bruits [No Murmur] : no murmur heard [No Abdominal Bruit] : a ~M bruit was not heard ~T in the abdomen [Pedal Pulses Present] : the pedal pulses are present [No Varicosities] : no varicosities [No Extremity Clubbing/Cyanosis] : no extremity clubbing/cyanosis [No Edema] : there was no peripheral edema [Non Tender] : non-tender [No Palpable Aorta] : no palpable aorta [Soft] : abdomen soft [Non-distended] : non-distended [No Masses] : no abdominal mass palpated [No HSM] : no HSM [Normal Bowel Sounds] : normal bowel sounds [Normal Axillary Nodes] : no axillary lymphadenopathy [Normal Supraclavicular Nodes] : no supraclavicular lymphadenopathy [Normal Inguinal Nodes] : no inguinal lymphadenopathy [Normal Anterior Cervical Nodes] : no anterior cervical lymphadenopathy [Normal Posterior Cervical Nodes] : no posterior cervical lymphadenopathy [Left Paraspinal ___ (level)] : ~Ulevel [unfilled] left paraspinal [No Visual Abnormalities] : no visible abnormalities [Muscle Spasms, Left] : left-sided muscle spasms [Restricted] : was restricted [Pain] : was painful [Intact] : all reflexes within normal limits bilaterally [No Joint Swelling] : no joint swelling [Grossly Normal Strength/Tone] : grossly normal strength/tone [No Skin Lesions] : no skin lesions [No Rash] : no rash [Normal Gait] : normal gait [Coordination Grossly Intact] : coordination grossly intact [No Focal Deficits] : no focal deficits [Normal Affect] : the affect was normal [Normal Insight/Judgement] : insight and judgment were intact [de-identified] : Left straight leg raise test positive

## 2019-03-19 NOTE — REVIEW OF SYSTEMS
[Heartburn] : heartburn [Back Pain] : back pain [Negative] : Heme/Lymph [Nausea] : no nausea [Abdominal Pain] : no abdominal pain [Constipation] : no constipation [Vomiting] : no vomiting [Diarrhea] : no diarrhea [Melena] : no melena

## 2019-04-03 ENCOUNTER — APPOINTMENT (OUTPATIENT)
Dept: ORTHOPEDIC SURGERY | Facility: CLINIC | Age: 79
End: 2019-04-03
Payer: MEDICARE

## 2019-04-03 VITALS
DIASTOLIC BLOOD PRESSURE: 68 MMHG | HEIGHT: 68 IN | BODY MASS INDEX: 33.34 KG/M2 | SYSTOLIC BLOOD PRESSURE: 150 MMHG | HEART RATE: 54 BPM | WEIGHT: 220 LBS

## 2019-04-03 PROCEDURE — 99203 OFFICE O/P NEW LOW 30 MIN: CPT

## 2019-04-03 NOTE — HISTORY OF PRESENT ILLNESS
[de-identified] : Mr. ARABELLA WEISS  is a 79 year old male who presents with a chronic one year history of back and left leg pain. His pain was so bad he went to the ER in November and was admitted.  At this time, his back feels better but he continues to have left leg pain.  Normal bowel and bladder control.   Denies any recent fevers, chills, sweats, weight loss, or infection.\par \par

## 2019-04-03 NOTE — DISCUSSION/SUMMARY
[de-identified] : We discussed further treatment options. He will begin with a course of physical therapy. Follow up afterwards or sooner with any changes or worsening of the symptoms.

## 2019-04-03 NOTE — PHYSICAL EXAM
[de-identified] : Examination of the lumbar spine reveals no midline tenderness palpation, step-offs, or skin lesions. Decreased range of motion with respect to flexion, extension, lateral bending, and rotation. No tenderness to palpation of the sciatic notch. No tenderness palpation of the bilateral greater trochanters. No pain with passive internal/external rotation of the hips. No instability of bilateral lower extremities.  Negative ANIYAH. Negative straight leg raise bilaterally. No bowstring. Negative femoral stretch. 5 out of 5 iliopsoas, hip abductors, hips adductors, quadriceps, hamstrings, gastrocsoleus, tibialis anterior, extensor hallucis longus, peroneals. Grossly intact sensation to light touch bilateral lower extremities. 1+ patellar and Achilles reflexes. Downgoing Babinski. No clonus. Intact proprioception. Palpable pulses. No skin lesion and no edema on the right and left lower extremities. [de-identified] : Review of his lumbar spine MRI reveals moderate to severe stenosis L3-4

## 2019-05-01 ENCOUNTER — APPOINTMENT (OUTPATIENT)
Dept: INTERNAL MEDICINE | Facility: CLINIC | Age: 79
End: 2019-05-01
Payer: MEDICARE

## 2019-05-01 ENCOUNTER — LABORATORY RESULT (OUTPATIENT)
Age: 79
End: 2019-05-01

## 2019-05-01 VITALS
TEMPERATURE: 97.7 F | SYSTOLIC BLOOD PRESSURE: 127 MMHG | HEIGHT: 68.9 IN | HEART RATE: 57 BPM | WEIGHT: 220.46 LBS | OXYGEN SATURATION: 97 % | DIASTOLIC BLOOD PRESSURE: 72 MMHG | BODY MASS INDEX: 32.65 KG/M2

## 2019-05-01 VITALS — RESPIRATION RATE: 14 BRPM

## 2019-05-01 PROCEDURE — 36415 COLL VENOUS BLD VENIPUNCTURE: CPT

## 2019-05-01 PROCEDURE — 99214 OFFICE O/P EST MOD 30 MIN: CPT | Mod: 25

## 2019-05-01 PROCEDURE — G0009: CPT

## 2019-05-01 PROCEDURE — 90732 PPSV23 VACC 2 YRS+ SUBQ/IM: CPT

## 2019-05-01 PROCEDURE — 81003 URINALYSIS AUTO W/O SCOPE: CPT | Mod: QW

## 2019-05-01 RX ORDER — MOXIFLOXACIN HYDROCHLORIDE 5 MG/ML
0.5 SOLUTION OPHTHALMIC
Qty: 3 | Refills: 0 | Status: DISCONTINUED | COMMUNITY
Start: 2017-10-17 | End: 2019-05-01

## 2019-05-01 RX ORDER — NEPAFENAC 3 MG/ML
0.3 SUSPENSION/ DROPS OPHTHALMIC
Qty: 1 | Refills: 0 | Status: DISCONTINUED | COMMUNITY
Start: 2017-10-17 | End: 2019-05-01

## 2019-05-01 RX ORDER — PANTOPRAZOLE 40 MG/1
40 TABLET, DELAYED RELEASE ORAL
Qty: 90 | Refills: 3 | Status: DISCONTINUED | COMMUNITY
Start: 2019-01-15 | End: 2019-05-01

## 2019-05-01 RX ORDER — BENZONATATE 200 MG/1
200 CAPSULE ORAL 3 TIMES DAILY
Qty: 30 | Refills: 1 | Status: DISCONTINUED | COMMUNITY
Start: 2019-01-15 | End: 2019-05-01

## 2019-05-01 RX ORDER — DIFLUPREDNATE 0.5 MG/ML
0.05 EMULSION OPHTHALMIC
Qty: 5 | Refills: 0 | Status: DISCONTINUED | COMMUNITY
Start: 2017-09-29 | End: 2019-05-01

## 2019-05-01 RX ORDER — AZITHROMYCIN 250 MG/1
250 TABLET, FILM COATED ORAL
Qty: 1 | Refills: 0 | Status: DISCONTINUED | COMMUNITY
Start: 2019-01-15 | End: 2019-05-01

## 2019-05-01 NOTE — PHYSICAL EXAM
[No Acute Distress] : no acute distress [Well Nourished] : well nourished [Well Developed] : well developed [Well-Appearing] : well-appearing [Normal Sclera/Conjunctiva] : normal sclera/conjunctiva [PERRL] : pupils equal round and reactive to light [EOMI] : extraocular movements intact [Normal Outer Ear/Nose] : the outer ears and nose were normal in appearance [Normal Oropharynx] : the oropharynx was normal [Normal TMs] : both tympanic membranes were normal [Normal Nasal Mucosa] : the nasal mucosa was normal [No JVD] : no jugular venous distention [Supple] : supple [No Lymphadenopathy] : no lymphadenopathy [Thyroid Normal, No Nodules] : the thyroid was normal and there were no nodules present [No Respiratory Distress] : no respiratory distress  [No Accessory Muscle Use] : no accessory muscle use [Clear to Auscultation] : lungs were clear to auscultation bilaterally [Normal Percussion] : the chest was normal to percussion [Normal Rate] : normal rate  [Regular Rhythm] : with a regular rhythm [No Murmur] : no murmur heard [Normal S1, S2] : normal S1 and S2 [No Carotid Bruits] : no carotid bruits [No Abdominal Bruit] : a ~M bruit was not heard ~T in the abdomen [No Varicosities] : no varicosities [Pedal Pulses Present] : the pedal pulses are present [No Edema] : there was no peripheral edema [No Palpable Aorta] : no palpable aorta [No Extremity Clubbing/Cyanosis] : no extremity clubbing/cyanosis [Soft] : abdomen soft [Non-distended] : non-distended [Non Tender] : non-tender [No Masses] : no abdominal mass palpated [No HSM] : no HSM [Normal Bowel Sounds] : normal bowel sounds [Normal Supraclavicular Nodes] : no supraclavicular lymphadenopathy [Normal Posterior Cervical Nodes] : no posterior cervical lymphadenopathy [Normal Axillary Nodes] : no axillary lymphadenopathy [Normal Anterior Cervical Nodes] : no anterior cervical lymphadenopathy [Normal Femoral Nodes] : no femoral lymphadenopathy [No CVA Tenderness] : no CVA  tenderness [Normal Inguinal Nodes] : no inguinal lymphadenopathy [Kyphosis] : no kyphosis [Scoliosis] : no scoliosis [No Spinal Tenderness] : no spinal tenderness [No Joint Swelling] : no joint swelling [Grossly Normal Strength/Tone] : grossly normal strength/tone [No Rash] : no rash [No Skin Lesions] : no skin lesions [Normal Gait] : normal gait [Coordination Grossly Intact] : coordination grossly intact [Deep Tendon Reflexes (DTR)] : deep tendon reflexes were 2+ and symmetric [No Focal Deficits] : no focal deficits [Speech Grossly Normal] : speech grossly normal [Alert and Oriented x3] : oriented to person, place, and time [Normal Affect] : the affect was normal [Normal Insight/Judgement] : insight and judgment were intact [Normal Mood] : the mood was normal

## 2019-05-01 NOTE — ASSESSMENT
[FreeTextEntry1] :  #1 hypertension, stable. Continue same treatment and low salt  diet.\par #2 GE reflux, stable. To be on esomeprazole 40 mg p.o. once a day  p.r.n.\par #3 hyperlipidemia. Continue same treatment and low fat diet.\par #4 monitor liver toxicity due to chronic medication use\par #5 Hx of low B12. Level sent to the lab\par #6 hypovitaminosis D., being supplemented\par #7 lumbar spine radiculopathy. Patient to be on meloxicam 15 mg p.o. once a day p.r.n. To protect her stomach to take esomeprazole 40 mg p.o. before food p.r.n.\par #8 psoriasis of skin. Continue with current clobetasole b.i.d. p.r.n.\par Plan. Patient is advised to return after 3 months

## 2019-05-01 NOTE — HISTORY OF PRESENT ILLNESS
[FreeTextEntry1] : Patient comes for followup of his chronic medical problems. Patient feels well without any chest pain, shortness of breath, paroxysmal nocturnal dyspnea orthopnea. Still  with low back and left lower extremity pain mostly after he lifts weights.

## 2019-05-02 LAB
25(OH)D3 SERPL-MCNC: 42.2 NG/ML
ALBUMIN SERPL ELPH-MCNC: 4.2 G/DL
ALP BLD-CCNC: 42 U/L
ALT SERPL-CCNC: 17 U/L
ANION GAP SERPL CALC-SCNC: 11 MMOL/L
AST SERPL-CCNC: 19 U/L
BASOPHILS # BLD AUTO: 0.01 K/UL
BASOPHILS NFR BLD AUTO: 0.2 %
BILIRUB SERPL-MCNC: 0.4 MG/DL
BUN SERPL-MCNC: 19 MG/DL
CALCIUM SERPL-MCNC: 9.4 MG/DL
CHLORIDE SERPL-SCNC: 106 MMOL/L
CHOLEST SERPL-MCNC: 129 MG/DL
CHOLEST/HDLC SERPL: 2.9 RATIO
CO2 SERPL-SCNC: 25 MMOL/L
CREAT SERPL-MCNC: 1.18 MG/DL
EOSINOPHIL # BLD AUTO: 0.02 K/UL
EOSINOPHIL NFR BLD AUTO: 0.3 %
GLUCOSE SERPL-MCNC: 112 MG/DL
HCT VFR BLD CALC: 48.1 %
HDLC SERPL-MCNC: 45 MG/DL
HGB BLD-MCNC: 15.6 G/DL
IMM GRANULOCYTES NFR BLD AUTO: 0.7 %
LDLC SERPL CALC-MCNC: 70 MG/DL
LYMPHOCYTES # BLD AUTO: 1.88 K/UL
LYMPHOCYTES NFR BLD AUTO: 31.7 %
MAN DIFF?: NORMAL
MCHC RBC-ENTMCNC: 31.5 PG
MCHC RBC-ENTMCNC: 32.4 GM/DL
MCV RBC AUTO: 97 FL
MONOCYTES # BLD AUTO: 1.3 K/UL
MONOCYTES NFR BLD AUTO: 21.9 %
NEUTROPHILS # BLD AUTO: 2.68 K/UL
NEUTROPHILS NFR BLD AUTO: 45.2 %
PLATELET # BLD AUTO: 99 K/UL
POTASSIUM SERPL-SCNC: 4.4 MMOL/L
PROT SERPL-MCNC: 7 G/DL
RBC # BLD: 4.96 M/UL
RBC # FLD: 12.7 %
SODIUM SERPL-SCNC: 142 MMOL/L
TRIGL SERPL-MCNC: 72 MG/DL
VIT B12 SERPL-MCNC: 367 PG/ML
WBC # FLD AUTO: 5.93 K/UL

## 2019-05-22 ENCOUNTER — APPOINTMENT (OUTPATIENT)
Dept: UROLOGY | Facility: CLINIC | Age: 79
End: 2019-05-22
Payer: MEDICARE

## 2019-05-22 PROCEDURE — 99214 OFFICE O/P EST MOD 30 MIN: CPT

## 2019-05-22 NOTE — ASSESSMENT
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 The patient took tamsulosin in the past, but stopped due to light headiness. His urination remained satisfactory after discontinuing the medication. The patient's last PSA 04/24/17 was 0.69. The patient was in the ER 01/01/17 for abdominal pain. No etiology for the pain was identified and has since resolved. CT of the abdomen and pelvis in the ER found bilateral renal cysts and a 5 mm non obstructing right renal calculus. \par 1/17/18: CT scan was reviewed and discussed  in office which found no hydronephrosis or recurrent tumors . There are 4 cysts on the  left kidney and 2 cysts on the right.  There is a Stone in the right kidney in the upper portion. He reported earlier he was experiencing some left flank pain. At night he wakes up once to urinate. He remained on finasteride 5 mg once daily.\par 5/29/18: The patient returned and reported he wakes up once during the night to urinate. Currently taking Finasteride. Complained of intermittent left lumbar pain that remained the same since last visit. Daughter noted he does not drink enough water. \par 11/19/18: The patient returned today for a renal US. Renal US findings showed again there is a 5.5 mm echogenic focus with distal shadowing in the upper pole of the right kidney with bilateral simple non vascular cysts. There is a new septated cyst in the mid outer pole of the left kidney. Both kidneys appear normal in size and echogenicity. No hydronephrosis, or solid masses visualized. PSA from 3/29/18 was 0.71 ng/mL. Notes he was recently hospitalized for LE weakness and pain. Will be evaluated by a cardiologist for possible loop recorder placement. \par \par 5/22/19: Male patient presents today for a follow up. Blood work was completed 5/1/19 as per Dr. Ramirez. PSA was measured in January 2019  0.94 and creatinine as of May 2019 was 1.18 mg/dL. regarding urination he denies dysuria, gross hematuria, urgency or incontinence. He will wake up 2x a night to urinate. He has a Hx of high blood pressure and hyperlipidemia that are both well controlled on medication. \par \par Digital rectal exam found no suspicious rectal masses. Tight anal tone. The prostate is non tender with normal texture, discrete borders and no nodules. It is a 25 gram transurethral resection size. \par \par The patient produced a urine sample which will be sent for urinalysis, urine cytology, and urine culture.\par \par The patient will return in 1 year for a follow up.

## 2019-05-22 NOTE — REVIEW OF SYSTEMS
[Easy Bleeding] : a tendency for easy bleeding [Easy Bruising] : a tendency for easy bruising [Feeling Poorly] : not feeling poorly [Chest Pain] : no chest pain [Constipation] : no constipation [Confused] : no confusion [Convulsions] : no convulsions [Seizures] : no seizures

## 2019-05-22 NOTE — HISTORY OF PRESENT ILLNESS
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 The patient took tamsulosin in the past, but stopped due to light headiness. His urination remained satisfactory after discontinuing the medication. The patient's last PSA 04/24/17 was 0.69. The patient was in the ER 01/01/17 for abdominal pain. No etiology for the pain was identified and has since resolved. CT of the abdomen and pelvis in the ER found bilateral renal cysts and a 5 mm non obstructing right renal calculus. \par 1/17/18: CT scan was reviewed and discussed  in office which found no hydronephrosis or recurrent tumors . There are 4 cysts on the  left kidney and 2 cysts on the right.  There is a Stone in the right kidney in the upper portion. He reported earlier he was experiencing some left flank pain. At night he wakes up once to urinate. He remained on finasteride 5 mg once daily.\par 5/29/18: The patient returned and reported he wakes up once during the night to urinate. Currently taking Finasteride. Complained of intermittent left lumbar pain that remained the same since last visit. Daughter noted he does not drink enough water. \par 11/19/18: The patient returned today for a renal US. Renal US findings showed again there is a 5.5 mm echogenic focus with distal shadowing in the upper pole of the right kidney with bilateral simple non vascular cysts. There is a new septated cyst in the mid outer pole of the left kidney. Both kidneys appear normal in size and echogenicity. No hydronephrosis, or solid masses visualized. PSA from 3/29/18 was 0.71 ng/mL. Notes he was recently hospitalized for LE weakness and pain. Will be evaluated by a cardiologist for possible loop recorder placement. \par \par 5/22/19: Male patient presents today for a follow up. Blood work was completed 5/1/19 as per Dr. Ramirez. PSA was measured in January 2019  0.94 and creatinine as of May 2019 was 1.18 mg/dL. regarding urination he denies dysuria, gross hematuria, urgency or incontinence. He will wake up 2x a night to urinate. He has a Hx of high blood pressure and hyperlipidemia that are both well controlled on medication.

## 2019-05-22 NOTE — PHYSICAL EXAM
[General Appearance - Well Developed] : well developed [General Appearance - Well Nourished] : well nourished [Normal Appearance] : normal appearance [Well Groomed] : well groomed [General Appearance - In No Acute Distress] : no acute distress [Abdomen Soft] : soft [Abdomen Tenderness] : non-tender [Costovertebral Angle Tenderness] : no ~M costovertebral angle tenderness [Skin Color & Pigmentation] : normal skin color and pigmentation [Heart Rate And Rhythm] : Heart rate and rhythm were normal [Edema] : no peripheral edema [] : no respiratory distress [Respiration, Rhythm And Depth] : normal respiratory rhythm and effort [Exaggerated Use Of Accessory Muscles For Inspiration] : no accessory muscle use [Oriented To Time, Place, And Person] : oriented to person, place, and time [Affect] : the affect was normal [Mood] : the mood was normal [Not Anxious] : not anxious [Normal Station and Gait] : the gait and station were normal for the patient's age [No Focal Deficits] : no focal deficits [No Palpable Adenopathy] : no palpable adenopathy [Cervical Lymph Nodes Enlarged Posterior Bilaterally] : posterior cervical [Cervical Lymph Nodes Enlarged Anterior Bilaterally] : anterior cervical [Supraclavicular Lymph Nodes Enlarged Bilaterally] : supraclavicular [FreeTextEntry1] : No submandibular gland tenderness.\par

## 2019-05-22 NOTE — ADDENDUM
[FreeTextEntry1] : This note was authored by Andrés Cbarera working as a medical scribe for Dr. Oscar Mace. The note was reviewed, edited, and revised by Dr. Oscar Mace who is in agreement with the exam findings, and treatment plan. (5/22/19)

## 2019-05-24 ENCOUNTER — RX RENEWAL (OUTPATIENT)
Age: 79
End: 2019-05-24

## 2019-05-26 LAB
APPEARANCE: CLEAR
BACTERIA UR CULT: NORMAL
BACTERIA: NEGATIVE
BILIRUBIN URINE: NEGATIVE
BLOOD URINE: NORMAL
COLOR: NORMAL
GLUCOSE QUALITATIVE U: NEGATIVE
HYALINE CASTS: 1 /LPF
KETONES URINE: NEGATIVE
LEUKOCYTE ESTERASE URINE: NEGATIVE
MICROSCOPIC-UA: NORMAL
NITRITE URINE: NEGATIVE
PH URINE: 6
PROTEIN URINE: NEGATIVE
RED BLOOD CELLS URINE: 0 /HPF
SPECIFIC GRAVITY URINE: 1.01
SQUAMOUS EPITHELIAL CELLS: 0 /HPF
URINE CYTOLOGY: NORMAL
UROBILINOGEN URINE: NORMAL
WHITE BLOOD CELLS URINE: 0 /HPF

## 2019-09-11 ENCOUNTER — APPOINTMENT (OUTPATIENT)
Dept: INTERNAL MEDICINE | Facility: CLINIC | Age: 79
End: 2019-09-11
Payer: MEDICARE

## 2019-09-11 VITALS — RESPIRATION RATE: 16 BRPM

## 2019-09-11 VITALS
TEMPERATURE: 98.3 F | BODY MASS INDEX: 32.65 KG/M2 | WEIGHT: 220.46 LBS | SYSTOLIC BLOOD PRESSURE: 125 MMHG | OXYGEN SATURATION: 96 % | DIASTOLIC BLOOD PRESSURE: 70 MMHG | HEIGHT: 68.9 IN | HEART RATE: 60 BPM

## 2019-09-11 LAB
BILIRUB UR QL STRIP: NEGATIVE
CLARITY UR: CLEAR
COLLECTION METHOD: NORMAL
GLUCOSE UR-MCNC: NEGATIVE
HCG UR QL: 0.2 EU/DL
HGB UR QL STRIP.AUTO: ABNORMAL
KETONES UR-MCNC: NEGATIVE
LEUKOCYTE ESTERASE UR QL STRIP: NEGATIVE
NITRITE UR QL STRIP: NEGATIVE
PH UR STRIP: 7
PROT UR STRIP-MCNC: NEGATIVE
SP GR UR STRIP: 1.01

## 2019-09-11 PROCEDURE — 99214 OFFICE O/P EST MOD 30 MIN: CPT | Mod: 25

## 2019-09-11 PROCEDURE — 81003 URINALYSIS AUTO W/O SCOPE: CPT | Mod: QW

## 2019-09-11 PROCEDURE — 36415 COLL VENOUS BLD VENIPUNCTURE: CPT

## 2019-09-11 RX ORDER — ALBUTEROL SULFATE 90 UG/1
108 (90 BASE) AEROSOL, METERED RESPIRATORY (INHALATION)
Qty: 1 | Refills: 5 | Status: ACTIVE | COMMUNITY
Start: 2019-09-11 | End: 1900-01-01

## 2019-09-11 NOTE — PHYSICAL EXAM
[No Acute Distress] : no acute distress [Well Developed] : well developed [Well Nourished] : well nourished [PERRL] : pupils equal round and reactive to light [Normal Sclera/Conjunctiva] : normal sclera/conjunctiva [Well-Appearing] : well-appearing [EOMI] : extraocular movements intact [Normal Oropharynx] : the oropharynx was normal [Normal Outer Ear/Nose] : the outer ears and nose were normal in appearance [Normal TMs] : both tympanic membranes were normal [Normal Nasal Mucosa] : the nasal mucosa was normal [No JVD] : no jugular venous distention [Supple] : supple [No Lymphadenopathy] : no lymphadenopathy [Thyroid Normal, No Nodules] : the thyroid was normal and there were no nodules present [No Respiratory Distress] : no respiratory distress  [Clear to Auscultation] : lungs were clear to auscultation bilaterally [Normal Percussion] : the chest was normal to percussion [No Accessory Muscle Use] : no accessory muscle use [Normal Rate] : normal rate  [Regular Rhythm] : with a regular rhythm [No Murmur] : no murmur heard [Normal S1, S2] : normal S1 and S2 [No Carotid Bruits] : no carotid bruits [No Abdominal Bruit] : a ~M bruit was not heard ~T in the abdomen [No Varicosities] : no varicosities [No Edema] : there was no peripheral edema [Pedal Pulses Present] : the pedal pulses are present [No Palpable Aorta] : no palpable aorta [No Extremity Clubbing/Cyanosis] : no extremity clubbing/cyanosis [Normal Appearance] : normal in appearance [Non Tender] : non-tender [Soft] : abdomen soft [No Masses] : no abdominal mass palpated [Non-distended] : non-distended [No HSM] : no HSM [Normal Bowel Sounds] : normal bowel sounds [Normal Axillary Nodes] : no axillary lymphadenopathy [Normal Supraclavicular Nodes] : no supraclavicular lymphadenopathy [Normal Femoral Nodes] : no femoral lymphadenopathy [Normal Posterior Cervical Nodes] : no posterior cervical lymphadenopathy [Normal Anterior Cervical Nodes] : no anterior cervical lymphadenopathy [Normal Inguinal Nodes] : no inguinal lymphadenopathy [No CVA Tenderness] : no CVA  tenderness [No Spinal Tenderness] : no spinal tenderness [Kyphosis] : no kyphosis [Scoliosis] : no scoliosis [No Joint Swelling] : no joint swelling [No Rash] : no rash [Grossly Normal Strength/Tone] : grossly normal strength/tone [No Skin Lesions] : no skin lesions [Normal Gait] : normal gait [Coordination Grossly Intact] : coordination grossly intact [No Focal Deficits] : no focal deficits [Speech Grossly Normal] : speech grossly normal [Deep Tendon Reflexes (DTR)] : deep tendon reflexes were 2+ and symmetric [Normal Affect] : the affect was normal [Memory Grossly Normal] : memory grossly normal [Normal Mood] : the mood was normal [Alert and Oriented x3] : oriented to person, place, and time [Normal Insight/Judgement] : insight and judgment were intact

## 2019-09-11 NOTE — HISTORY OF PRESENT ILLNESS
[FreeTextEntry1] : Patient comes for followup of his chronic medical problems. Patient feels well without any chest pain, shortness of breath, paroxysmal nocturnal dyspnea orthopnea. Only with slight wheezing at night and  cough in the morning.Still  with low back and left leg -knee pain mostly after walking.

## 2019-09-11 NOTE — REVIEW OF SYSTEMS
[Wheezing] : wheezing [Joint Pain] : joint pain [Cough] : no cough [Back Pain] : back pain [Negative] : Psychiatric

## 2019-09-11 NOTE — ASSESSMENT
[FreeTextEntry1] :  #1 hypertension, stable. Continue same treatment and low salt  diet.\par #2 GE reflux, stable. Since with wheezing to be on Ventolin  at night and esomeprazole 40 mg p.o. once a day  qhs\par #3 hyperlipidemia. Continue same treatment and low fat diet.\par #4 monitor liver toxicity due to chronic medication use\par #5 Hx of low B12. Level sent to the lab\par #6 hypovitaminosis D., being supplemented\par #7 lumbar spine radiculopathy. stable\par #8 Left knee pain. Patient to have orthopedic consult\par Plan. Patient is advised to return after 3 months

## 2019-09-12 LAB
25(OH)D3 SERPL-MCNC: 44.2 NG/ML
ALBUMIN SERPL ELPH-MCNC: 4.4 G/DL
ALP BLD-CCNC: 47 U/L
ALT SERPL-CCNC: 15 U/L
ANION GAP SERPL CALC-SCNC: 12 MMOL/L
APPEARANCE: CLEAR
AST SERPL-CCNC: 17 U/L
BACTERIA: NEGATIVE
BASOPHILS # BLD AUTO: 0.02 K/UL
BASOPHILS NFR BLD AUTO: 0.2 %
BILIRUB SERPL-MCNC: 0.9 MG/DL
BILIRUBIN URINE: NEGATIVE
BLOOD URINE: ABNORMAL
BUN SERPL-MCNC: 16 MG/DL
CALCIUM SERPL-MCNC: 9.9 MG/DL
CHLORIDE SERPL-SCNC: 103 MMOL/L
CHOLEST SERPL-MCNC: 144 MG/DL
CHOLEST/HDLC SERPL: 3 RATIO
CO2 SERPL-SCNC: 27 MMOL/L
COLOR: NORMAL
CREAT SERPL-MCNC: 1.29 MG/DL
CREAT SPEC-SCNC: 72 MG/DL
EOSINOPHIL # BLD AUTO: 0.01 K/UL
EOSINOPHIL NFR BLD AUTO: 0.1 %
ESTIMATED AVERAGE GLUCOSE: 123 MG/DL
GLUCOSE QUALITATIVE U: NEGATIVE
GLUCOSE SERPL-MCNC: 103 MG/DL
HBA1C MFR BLD HPLC: 5.9 %
HCT VFR BLD CALC: 51 %
HDLC SERPL-MCNC: 48 MG/DL
HGB BLD-MCNC: 16.5 G/DL
HYALINE CASTS: 0 /LPF
IMM GRANULOCYTES NFR BLD AUTO: 0.4 %
KETONES URINE: NEGATIVE
LDLC SERPL CALC-MCNC: 77 MG/DL
LEUKOCYTE ESTERASE URINE: NEGATIVE
LYMPHOCYTES # BLD AUTO: 1.62 K/UL
LYMPHOCYTES NFR BLD AUTO: 17.5 %
MAN DIFF?: NORMAL
MCHC RBC-ENTMCNC: 31.8 PG
MCHC RBC-ENTMCNC: 32.4 GM/DL
MCV RBC AUTO: 98.3 FL
MICROALBUMIN 24H UR DL<=1MG/L-MCNC: 1.8 MG/DL
MICROALBUMIN/CREAT 24H UR-RTO: 25 MG/G
MICROSCOPIC-UA: NORMAL
MONOCYTES # BLD AUTO: 2.2 K/UL
MONOCYTES NFR BLD AUTO: 23.8 %
NEUTROPHILS # BLD AUTO: 5.35 K/UL
NEUTROPHILS NFR BLD AUTO: 58 %
NITRITE URINE: NEGATIVE
PH URINE: 7
PLATELET # BLD AUTO: 105 K/UL
POTASSIUM SERPL-SCNC: 4.6 MMOL/L
PROT SERPL-MCNC: 7.1 G/DL
PROTEIN URINE: NEGATIVE
RBC # BLD: 5.19 M/UL
RBC # FLD: 12.6 %
RED BLOOD CELLS URINE: 4 /HPF
SODIUM SERPL-SCNC: 142 MMOL/L
SPECIFIC GRAVITY URINE: 1.01
SQUAMOUS EPITHELIAL CELLS: 1 /HPF
TRIGL SERPL-MCNC: 96 MG/DL
UROBILINOGEN URINE: NORMAL
VIT B12 SERPL-MCNC: 319 PG/ML
WBC # FLD AUTO: 9.24 K/UL
WHITE BLOOD CELLS URINE: 0 /HPF

## 2019-10-02 ENCOUNTER — APPOINTMENT (OUTPATIENT)
Dept: INTERNAL MEDICINE | Facility: CLINIC | Age: 79
End: 2019-10-02
Payer: MEDICARE

## 2019-10-02 PROCEDURE — G0008: CPT

## 2019-10-02 PROCEDURE — 90682 RIV4 VACC RECOMBINANT DNA IM: CPT

## 2019-11-21 ENCOUNTER — APPOINTMENT (OUTPATIENT)
Dept: UROLOGY | Facility: CLINIC | Age: 79
End: 2019-11-21
Payer: MEDICARE

## 2019-11-21 ENCOUNTER — OUTPATIENT (OUTPATIENT)
Dept: OUTPATIENT SERVICES | Facility: HOSPITAL | Age: 79
LOS: 1 days | End: 2019-11-21
Payer: MEDICARE

## 2019-11-21 PROCEDURE — 76775 US EXAM ABDO BACK WALL LIM: CPT

## 2019-11-21 PROCEDURE — 99214 OFFICE O/P EST MOD 30 MIN: CPT | Mod: 25

## 2019-11-21 PROCEDURE — 76775 US EXAM ABDO BACK WALL LIM: CPT | Mod: 26

## 2019-11-21 NOTE — ASSESSMENT
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 The patient took tamsulosin in the past, but stopped due to light headiness. His urination remained satisfactory after discontinuing the medication. The patient's last PSA 04/24/17 was 0.69. The patient was in the ER 01/01/17 for abdominal pain. No etiology for the pain was identified and has since resolved. CT of the abdomen and pelvis in the ER found bilateral renal cysts and a 5 mm non obstructing right renal calculus. \par 1/17/18: CT scan was reviewed and discussed  in office which found no hydronephrosis or recurrent tumors . There are 4 cysts on the  left kidney and 2 cysts on the right.  There is a Stone in the right kidney in the upper portion. He reported earlier he was experiencing some left flank pain. At night he wakes up once to urinate. He remained on finasteride 5 mg once daily.\par 5/29/18: The patient returned and reported he wakes up once during the night to urinate. Currently taking Finasteride. Complained of intermittent left lumbar pain that remained the same since last visit. Daughter noted he does not drink enough water. \par 11/19/18: The patient returned today for a renal US. Renal US findings showed again there is a 5.5 mm echogenic focus with distal shadowing in the upper pole of the right kidney with bilateral simple non vascular cysts. There is a new septated cyst in the mid outer pole of the left kidney. Both kidneys appear normal in size and echogenicity. No hydronephrosis, or solid masses visualized. PSA from 3/29/18 was 0.71 ng/mL. Notes he was recently hospitalized for LE weakness and pain. Will be evaluated by a cardiologist for possible loop recorder placement. \par 5/22/19: Male patient presents today for a follow up. Blood work was completed 5/1/19 as per Dr. Ramirez. PSA was measured in January 2019  0.94 and creatinine as of May 2019 was 1.18 mg/dL. regarding urination he denies dysuria, gross hematuria, urgency or incontinence. He will wake up 2x a night to urinate. He has a Hx of high blood pressure and hyperlipidemia that are both well controlled on medication.\par \par 11/21/2019: Patient presents today for a follow up. Overall, he is doing well. He states his urination is good. Wakes up 1-2 times during the night to urinate. Patient denies dysuria, gross hematuria, urgency, or incontinence. Today, his only complaint is of some back pain. A urinalysis from  9/11/2019 was small positive for hematuria. His creatinine from 9/11/2019 was 1.29 mg/dL. He has not had a PSA measured since his last done January 2019 of 0.94 ng/mL. Small cyst/possible kidney stone in kidney found in the prior ultrasound of last year dated 11/19/2018. \par \par Digital rectal exam found no suspicious rectal masses. Anal tone is normal. The prostate is non tender, with normal texture, discrete borders, and no nodules. It is a 30 gram transurethral resection size. No gross blood on the examining finger. A little external hemorrhoid at 1 o'clock position was found though not inflamed. \par \par The patient produced a urine sample which will be sent for urinalysis, urine cytology, and urine culture.\par \par Due the presence of a small cyst/possible kidney stone found in the ultrasound report from last year dated 11/19/2018, a renal ultrasound has been ordered today and will be obtained by the office.\par Finding: Again there is a 5.5 mm echogenic focus with distal shadowing in the upper pole of the right kidney with bilateral simple non vascular cysts. There is a new septated cyst in the mid outer pole of the left kidney. Both kidneys appear normal in size and echogenicity. No hydronephrosis, or solid masses visualized. 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

## 2019-11-21 NOTE — HISTORY OF PRESENT ILLNESS
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 The patient took tamsulosin in the past, but stopped due to light headiness. His urination remained satisfactory after discontinuing the medication. The patient's last PSA 04/24/17 was 0.69. The patient was in the ER 01/01/17 for abdominal pain. No etiology for the pain was identified and has since resolved. CT of the abdomen and pelvis in the ER found bilateral renal cysts and a 5 mm non obstructing right renal calculus. \par 1/17/18: CT scan was reviewed and discussed  in office which found no hydronephrosis or recurrent tumors . There are 4 cysts on the  left kidney and 2 cysts on the right.  There is a Stone in the right kidney in the upper portion. He reported earlier he was experiencing some left flank pain. At night he wakes up once to urinate. He remained on finasteride 5 mg once daily.\par 5/29/18: The patient returned and reported he wakes up once during the night to urinate. Currently taking Finasteride. Complained of intermittent left lumbar pain that remained the same since last visit. Daughter noted he does not drink enough water. \par 11/19/18: The patient returned today for a renal US. Renal US findings showed again there is a 5.5 mm echogenic focus with distal shadowing in the upper pole of the right kidney with bilateral simple non vascular cysts. There is a new septated cyst in the mid outer pole of the left kidney. Both kidneys appear normal in size and echogenicity. No hydronephrosis, or solid masses visualized. PSA from 3/29/18 was 0.71 ng/mL. Notes he was recently hospitalized for LE weakness and pain. Will be evaluated by a cardiologist for possible loop recorder placement. \par 5/22/19: Male patient presents today for a follow up. Blood work was completed 5/1/19 as per Dr. Ramirez. PSA was measured in January 2019  0.94 and creatinine as of May 2019 was 1.18 mg/dL. regarding urination he denies dysuria, gross hematuria, urgency or incontinence. He will wake up 2x a night to urinate. He has a Hx of high blood pressure and hyperlipidemia that are both well controlled on medication.\par \par 11/21/2019: Patient presents today for a follow up. Overall, he is doing well. He states his urination is good. Wakes up 1-2 times during the night to urinate. Patient denies dysuria, gross hematuria, urgency, or incontinence. Today, his only complaint is of some back pain. A urinalysis from  9/11/2019 was small positive for hematuria. His creatinine from 9/11/2019 was 1.29 mg/dL. He has not had a PSA measured since his last done January 2019 of 0.94 ng/mL. Small cyst/possible kidney stone in kidney found in the prior ultrasound of last year dated 11/19/2018.

## 2019-11-21 NOTE — ADDENDUM
[FreeTextEntry1] : This note was authored by Hilaria Stack, working as scribe for Dr. Oscar Mace.\par \par I, Dr. Oscar Mace, have have reviewed the content of this note and confirm it is true and accurate. I personally performed the history and physical examination and made all the decisions. \par \par 11/21/2019

## 2019-11-21 NOTE — PHYSICAL EXAM
[General Appearance - Well Developed] : well developed [General Appearance - Well Nourished] : well nourished [Normal Appearance] : normal appearance [Well Groomed] : well groomed [General Appearance - In No Acute Distress] : no acute distress [Abdomen Soft] : soft [Abdomen Tenderness] : non-tender [Costovertebral Angle Tenderness] : no ~M costovertebral angle tenderness [Skin Color & Pigmentation] : normal skin color and pigmentation [Heart Rate And Rhythm] : Heart rate and rhythm were normal [Edema] : no peripheral edema [] : no respiratory distress [Respiration, Rhythm And Depth] : normal respiratory rhythm and effort [Exaggerated Use Of Accessory Muscles For Inspiration] : no accessory muscle use [Oriented To Time, Place, And Person] : oriented to person, place, and time [Affect] : the affect was normal [Mood] : the mood was normal [Not Anxious] : not anxious [Normal Station and Gait] : the gait and station were normal for the patient's age [No Focal Deficits] : no focal deficits [No Palpable Adenopathy] : no palpable adenopathy [Cervical Lymph Nodes Enlarged Posterior Bilaterally] : posterior cervical [Cervical Lymph Nodes Enlarged Anterior Bilaterally] : anterior cervical [Supraclavicular Lymph Nodes Enlarged Bilaterally] : supraclavicular [Prostate Size ___ gm] : prostate size [unfilled] gm [FreeTextEntry1] : No submandibular gland tenderness.\par

## 2019-11-23 LAB
APPEARANCE: CLEAR
BACTERIA: NEGATIVE
BILIRUBIN URINE: NEGATIVE
BLOOD URINE: NORMAL
COLOR: NORMAL
GLUCOSE QUALITATIVE U: NEGATIVE
HYALINE CASTS: 0 /LPF
KETONES URINE: NEGATIVE
LEUKOCYTE ESTERASE URINE: NEGATIVE
MICROSCOPIC-UA: NORMAL
NITRITE URINE: NEGATIVE
PH URINE: 6
PROTEIN URINE: NEGATIVE
RED BLOOD CELLS URINE: 0 /HPF
SPECIFIC GRAVITY URINE: 1.01
SQUAMOUS EPITHELIAL CELLS: 0 /HPF
URINE CYTOLOGY: NORMAL
UROBILINOGEN URINE: NORMAL
WHITE BLOOD CELLS URINE: 0 /HPF

## 2019-11-27 DIAGNOSIS — N40.1 BENIGN PROSTATIC HYPERPLASIA WITH LOWER URINARY TRACT SYMPTOMS: ICD-10-CM

## 2019-11-27 DIAGNOSIS — N20.0 CALCULUS OF KIDNEY: ICD-10-CM

## 2019-11-27 DIAGNOSIS — R31.29 OTHER MICROSCOPIC HEMATURIA: ICD-10-CM

## 2019-11-27 DIAGNOSIS — D49.0 NEOPLASM OF UNSPECIFIED BEHAVIOR OF DIGESTIVE SYSTEM: ICD-10-CM

## 2019-12-27 ENCOUNTER — RX RENEWAL (OUTPATIENT)
Age: 79
End: 2019-12-27

## 2020-01-31 ENCOUNTER — APPOINTMENT (OUTPATIENT)
Dept: INTERNAL MEDICINE | Facility: CLINIC | Age: 80
End: 2020-01-31
Payer: MEDICARE

## 2020-01-31 ENCOUNTER — LABORATORY RESULT (OUTPATIENT)
Age: 80
End: 2020-01-31

## 2020-01-31 VITALS
SYSTOLIC BLOOD PRESSURE: 134 MMHG | WEIGHT: 221.87 LBS | HEIGHT: 68.9 IN | DIASTOLIC BLOOD PRESSURE: 72 MMHG | OXYGEN SATURATION: 97 % | TEMPERATURE: 98.2 F | HEART RATE: 58 BPM | BODY MASS INDEX: 32.86 KG/M2

## 2020-01-31 VITALS — RESPIRATION RATE: 16 BRPM

## 2020-01-31 DIAGNOSIS — M48.07 SPINAL STENOSIS, LUMBOSACRAL REGION: ICD-10-CM

## 2020-01-31 DIAGNOSIS — R39.9 UNSPECIFIED SYMPTOMS AND SIGNS INVOLVING THE GENITOURINARY SYSTEM: ICD-10-CM

## 2020-01-31 DIAGNOSIS — G89.29 LOW BACK PAIN: ICD-10-CM

## 2020-01-31 DIAGNOSIS — R55 SYNCOPE AND COLLAPSE: ICD-10-CM

## 2020-01-31 DIAGNOSIS — R06.2 WHEEZING: ICD-10-CM

## 2020-01-31 DIAGNOSIS — M54.5 LOW BACK PAIN: ICD-10-CM

## 2020-01-31 DIAGNOSIS — J01.00 ACUTE MAXILLARY SINUSITIS, UNSPECIFIED: ICD-10-CM

## 2020-01-31 PROCEDURE — G0444 DEPRESSION SCREEN ANNUAL: CPT

## 2020-01-31 PROCEDURE — G0439: CPT | Mod: 25

## 2020-01-31 PROCEDURE — 36415 COLL VENOUS BLD VENIPUNCTURE: CPT

## 2020-01-31 PROCEDURE — 99214 OFFICE O/P EST MOD 30 MIN: CPT | Mod: 25

## 2020-01-31 RX ORDER — BACLOFEN 20 MG/1
20 TABLET ORAL
Qty: 30 | Refills: 0 | Status: DISCONTINUED | COMMUNITY
Start: 2018-05-22 | End: 2020-01-31

## 2020-01-31 RX ORDER — MELOXICAM 15 MG/1
15 TABLET ORAL
Qty: 30 | Refills: 3 | Status: DISCONTINUED | COMMUNITY
Start: 2019-05-01 | End: 2020-01-31

## 2020-01-31 RX ORDER — OFLOXACIN OTIC 3 MG/ML
0.3 SOLUTION AURICULAR (OTIC) TWICE DAILY
Qty: 1 | Refills: 0 | Status: DISCONTINUED | COMMUNITY
Start: 2018-04-19 | End: 2020-01-31

## 2020-01-31 NOTE — HEALTH RISK ASSESSMENT
[Very Good] : ~his/her~  mood as very good [Yes] : Yes [No falls in past year] : Patient reported no falls in the past year [Patient reported colonoscopy was normal] : Patient reported colonoscopy was normal [None] : None [With Family] : lives with family [Retired] : retired [High School] : high school [] :  [Fully functional (bathing, dressing, toileting, transferring, walking, feeding)] : Fully functional (bathing, dressing, toileting, transferring, walking, feeding) [Fully functional (using the telephone, shopping, preparing meals, housekeeping, doing laundry, using] : Fully functional and needs no help or supervision to perform IADLs (using the telephone, shopping, preparing meals, housekeeping, doing laundry, using transportation, managing medications and managing finances) [Smoke Detector] : smoke detector [Carbon Monoxide Detector] : carbon monoxide detector [Safety elements used in home] : safety elements used in home [Seat Belt] :  uses seat belt [Sunscreen] : uses sunscreen [Discussed at today's visit] : Advance Directives Discussed at today's visit [FreeTextEntry1] : health-family [de-identified] : socialy [Change in mental status noted] : No change in mental status noted [Sexually Active] : not sexually active [Reports changes in hearing] : Reports no changes in hearing [Reports changes in vision] : Reports no changes in vision [Reports changes in dental health] : Reports no changes in dental health [Travel to Developing Areas] : does not  travel to developing areas [TB Exposure] : is not being exposed to tuberculosis [Caregiver Concerns] : does not have caregiver concerns [AdvancecareDate] : 1-31-20

## 2020-01-31 NOTE — HISTORY OF PRESENT ILLNESS
[FreeTextEntry1] : Patient comes for his Medicare annual wellness visit and followup of his chronic medical problems. Patient feels well without any chest pain, shortness of breath, paroxysmal nocturnal dyspnea or orthopnea. . His gastrointestinal reflux symptoms remain stable.Sometimes he has low back pain radiating to the left flank area. Recent renal sonogram by the urologist was normal. Patient has also congested nose and slight  dry cough for almost 2 weeks. The last few days he  becomes better.

## 2020-01-31 NOTE — ASSESSMENT
[FreeTextEntry1] : Diagnosis #1 history of aortic stenosis , status post valve replacement. Patient is stable and he is followed by the cardiologist.\par #2 hypertension, stable. Continue with same medication and low salt diet\par #3 hyperlipidemia. Continue with same medication and low-fat diet\par #4 monitor liver  toxicity due to chronic medication use\par #5 hypovitaminosis D., being supplemented\par #6 GE reflux, stable. \par #7 history of low B12. 1000 mcg  was given IM and level sent to the lab\par #8 low back pain to l flank . To continue with Tylenol 500 mg p.o. every 6 hours p.r.n.\par #9 upper respiratory infection, resolving. Patient to take Mucinex one p.o. every 12 hours p.r.n.\par #10 bilateral ear cerumen. Patient to put ear drops for a week and then clean ears after one week \par Plan . Patient advised to return after 3 months

## 2020-01-31 NOTE — REVIEW OF SYSTEMS
[Negative] : Heme/Lymph [Postnasal Drip] : postnasal drip [Cough] : cough [FreeTextEntry7] : g-e reflux prn [de-identified] : pain legs

## 2020-01-31 NOTE — PHYSICAL EXAM
[No Acute Distress] : no acute distress [Well Nourished] : well nourished [Well Developed] : well developed [Well-Appearing] : well-appearing [Normal Voice/Communication] : normal voice/communication [Normal Sclera/Conjunctiva] : normal sclera/conjunctiva [PERRL] : pupils equal round and reactive to light [EOMI] : extraocular movements intact [Normal Outer Ear/Nose] : the outer ears and nose were normal in appearance [Normal Oropharynx] : the oropharynx was normal [Normal TMs] : both tympanic membranes were normal [No JVD] : no jugular venous distention [Supple] : supple [No Lymphadenopathy] : no lymphadenopathy [Thyroid Normal, No Nodules] : the thyroid was normal and there were no nodules present [No Respiratory Distress] : no respiratory distress  [Clear to Auscultation] : lungs were clear to auscultation bilaterally [No Accessory Muscle Use] : no accessory muscle use [Normal Percussion] : the chest was normal to percussion [Normal Rate] : normal rate  [Regular Rhythm] : with a regular rhythm [Normal S1, S2] : normal S1 and S2 [No Murmur] : no murmur heard [No Carotid Bruits] : no carotid bruits [No Abdominal Bruit] : a ~M bruit was not heard ~T in the abdomen [No Varicosities] : no varicosities [Pedal Pulses Present] : the pedal pulses are present [No Edema] : there was no peripheral edema [No Extremity Clubbing/Cyanosis] : no extremity clubbing/cyanosis [No Palpable Aorta] : no palpable aorta [No Axillary Lymphadenopathy] : no axillary lymphadenopathy [Soft] : abdomen soft [Non Tender] : non-tender [Non-distended] : non-distended [No Masses] : no abdominal mass palpated [No HSM] : no HSM [Normal Bowel Sounds] : normal bowel sounds [No Hernias] : no hernias [Declined Rectal Exam] : declined rectal exam [Normal Supraclavicular Nodes] : no supraclavicular lymphadenopathy [Normal Axillary Nodes] : no axillary lymphadenopathy [Normal Posterior Cervical Nodes] : no posterior cervical lymphadenopathy [Normal Femoral Nodes] : no femoral lymphadenopathy [Normal Anterior Cervical Nodes] : no anterior cervical lymphadenopathy [Normal Inguinal Nodes] : no inguinal lymphadenopathy [No CVA Tenderness] : no CVA  tenderness [No Spinal Tenderness] : no spinal tenderness [No Joint Swelling] : no joint swelling [Grossly Normal Strength/Tone] : grossly normal strength/tone [No Rash] : no rash [No Skin Lesions] : no skin lesions [Normal Gait] : normal gait [Coordination Grossly Intact] : coordination grossly intact [No Focal Deficits] : no focal deficits [Deep Tendon Reflexes (DTR)] : deep tendon reflexes were 2+ and symmetric [Speech Grossly Normal] : speech grossly normal [Memory Grossly Normal] : memory grossly normal [Normal Affect] : the affect was normal [Alert and Oriented x3] : oriented to person, place, and time [Normal Mood] : the mood was normal [Normal Insight/Judgement] : insight and judgment were intact [Comprehensive Foot Exam Normal] : Right and left foot were examined and both feet are normal. No ulcers in either foot. Toes are normal and with full ROM.  Normal tactile sensation with monofilament testing throughout both feet [Kyphosis] : no kyphosis [Scoliosis] : no scoliosis [de-identified] : Congested nose And ear cerumen bilaterally [de-identified] : refused

## 2020-02-03 LAB
25(OH)D3 SERPL-MCNC: 40 NG/ML
ALBUMIN SERPL ELPH-MCNC: 3.9 G/DL
ALP BLD-CCNC: 42 U/L
ALT SERPL-CCNC: 15 U/L
ANION GAP SERPL CALC-SCNC: 10 MMOL/L
APPEARANCE: CLEAR
AST SERPL-CCNC: 17 U/L
BACTERIA: NEGATIVE
BASOPHILS # BLD AUTO: 0.02 K/UL
BASOPHILS NFR BLD AUTO: 0.4 %
BILIRUB SERPL-MCNC: 0.5 MG/DL
BILIRUBIN URINE: NEGATIVE
BLOOD URINE: ABNORMAL
BUN SERPL-MCNC: 14 MG/DL
CALCIUM SERPL-MCNC: 9.1 MG/DL
CHLORIDE SERPL-SCNC: 107 MMOL/L
CHOLEST SERPL-MCNC: 122 MG/DL
CHOLEST/HDLC SERPL: 2.6 RATIO
CO2 SERPL-SCNC: 25 MMOL/L
COLOR: YELLOW
CREAT SERPL-MCNC: 1.17 MG/DL
CREAT SPEC-SCNC: 118 MG/DL
EOSINOPHIL # BLD AUTO: 0.02 K/UL
EOSINOPHIL NFR BLD AUTO: 0.4 %
ESTIMATED AVERAGE GLUCOSE: 120 MG/DL
GLUCOSE QUALITATIVE U: NEGATIVE
GLUCOSE SERPL-MCNC: 95 MG/DL
HBA1C MFR BLD HPLC: 5.8 %
HCT VFR BLD CALC: 45.9 %
HDLC SERPL-MCNC: 47 MG/DL
HGB BLD-MCNC: 15.1 G/DL
HYALINE CASTS: 0 /LPF
IMM GRANULOCYTES NFR BLD AUTO: 0.5 %
KETONES URINE: NEGATIVE
LDLC SERPL CALC-MCNC: 60 MG/DL
LEUKOCYTE ESTERASE URINE: NEGATIVE
LYMPHOCYTES # BLD AUTO: 1.42 K/UL
LYMPHOCYTES NFR BLD AUTO: 25.9 %
MAN DIFF?: NORMAL
MCHC RBC-ENTMCNC: 31.5 PG
MCHC RBC-ENTMCNC: 32.9 GM/DL
MCV RBC AUTO: 95.6 FL
MICROALBUMIN 24H UR DL<=1MG/L-MCNC: 7.6 MG/DL
MICROALBUMIN/CREAT 24H UR-RTO: 64 MG/G
MICROSCOPIC-UA: NORMAL
MONOCYTES # BLD AUTO: 1.25 K/UL
MONOCYTES NFR BLD AUTO: 22.8 %
NEUTROPHILS # BLD AUTO: 2.75 K/UL
NEUTROPHILS NFR BLD AUTO: 50 %
NITRITE URINE: NEGATIVE
PH URINE: 6
PLATELET # BLD AUTO: 96 K/UL
POTASSIUM SERPL-SCNC: 4 MMOL/L
PROT SERPL-MCNC: 6.5 G/DL
PROTEIN URINE: NORMAL
RBC # BLD: 4.8 M/UL
RBC # FLD: 12.1 %
RED BLOOD CELLS URINE: 5 /HPF
SODIUM SERPL-SCNC: 142 MMOL/L
SPECIFIC GRAVITY URINE: 1.02
SQUAMOUS EPITHELIAL CELLS: 0 /HPF
TRIGL SERPL-MCNC: 77 MG/DL
UROBILINOGEN URINE: NORMAL
VIT B12 SERPL-MCNC: 287 PG/ML
WBC # FLD AUTO: 5.49 K/UL
WHITE BLOOD CELLS URINE: 0 /HPF

## 2020-02-07 LAB — HEMOCCULT STL QL IA: NEGATIVE

## 2020-03-30 NOTE — ED CDU PROVIDER INITIAL DAY NOTE - CARDIAC, MLM
DATE OF CONSULTATION:  03/30/2020

 

Cardiac Consultation 

 

REASON FOR CONSULTATION:  Hypertension and arrhythmias.

 

HISTORY OF PRESENT ILLNESS:  This is a 59-year-old gentleman, poorly compliant,

diabetic, hypertensive, who is known with longstanding history of GERD and diabetic

gastroparesis, hyperlipidemia, and cholecystectomy. 

 

The patient came to this institution with abdominal pain, nausea, vomiting,

constipation, unable to keep any food.  The patient in fact admitted on the 28th.

Yesterday, it was noted his blood pressure to be quite elevated despite being on

amlodipine and clonidine.  Cardiac consultation is obtained to help managing his blood

pressure and his arrhythmias. 

 

I visited the patient.  He denied having any angina.  He is relatively active.  His main

problem is bloating and indigestion.  He denies having any exertional shortness of

breath or chest pain.  The patient is a little bit sedated following EGD. 

 

REVIEW OF SYSTEMS:

GENERAL:  No fever.  No chills. 

HEENT:  No congestion. 

PULMONARY:  No cough.  No hemoptysis. 

CARDIAC:  No exertional angina.  Shortness of breath on exertion, class II.  No

orthopnea.  No paroxysmal nocturnal dyspnea.  No syncope or presyncope.  No palpitation. 

GI:  Bloating and indigestion. 

:  Increased frequency of urination. 

MUSCULOSKELETAL:  Nonspecific aches. 

GENERAL:  No fever.  No chills. 

ENDOCRINE:  The patient is diabetic, but he does not take any medication.

 

SOCIAL HISTORY:  He is .  He is nonsmoker.  His social alcohol drinker.

 

FAMILY HISTORY:  Mother with CVA.  Diabetes mellitus in the family.

 

HOME MEDICATIONS:  Zestril and losartan.  He does not take any diabetic medication.

 

ALLERGIES:  PSEUDOEPHEDRINE.

 

CURRENT MEDICATIONS:  Following admission, the patient is on amlodipine 10 mg a day,

lisinopril 20 mg a day, Zocor 20 mg a day, Protonix, Reglan, and clonidine p.r.n., which

we started him on them. 

 

PHYSICAL EXAMINATION:

VITAL:  Height of 5 feet 7 inches, weight of 176 pounds, blood pressure 120/60, heart

rate of 80, respiratory rate of 18, and temperature of 99.5 Fahrenheit. 

HEENT:  Pupils are equal and reactive. 

NECK:  No elevation of jugular venous pulsation. 

CHEST:  Clear to auscultation and percussion. 

HEART:  PMI in 5th left intercostal space.  Normal first and second heart sounds. 

ABDOMEN:  Soft with good bowel sounds. 

EXTREMITIES:  No cyanosis, no clubbing, no edema.  No signs of DVT. 

NEUROLOGIC:  The patient is a little bit sleepy still from his recent EGD.

LABORATORY DATA:  White blood cell count of 6.7, hemoglobin 13.5, hematocrit 39%, and

platelet count of 198,000.  BUN 14, creatinine of 0.7, glucose of 131.  Hemoglobin A1c

at 8.2.  TSH of 0.4, triglycerides of 155, cholesterol of 205, HDL of 52, LDL of 122.

Urinalysis positive for protein and glucose.  Chest x-ray by report, no pulmonary edema.

 Other abnormal finding, bilirubin is elevated at 3. 

 

IMPRESSION AND PLAN:  

1. Hypertension.

2. Diabetes mellitus end-organ damage.

3. Severe diabetic gastroparesis.

4. Gastrointestinal symptoms.

5. Past cholecystectomy.

6. Elevated bilirubin.

7. Very high probability of coronary artery disease. 

From a cardiac point of view, the plan now will be to treat him medically and to control

his blood pressure, we will continue amlodipine, we will continue lisinopril, we will

continue statin.  Clonidine to be given p.r.n.  The patient's blood pressure seems to be

better with decreasing the abdominal pain.  He already tolerated EGD. 

 

The patient definitely needs to have stress test, timing of that depending on his

status.  Regarding his elevated bilirubin, as per GI, this could be Gilbert's disease.

We will follow the patient's progression with you and would like to thank you for your

kind referral. 

 

 

 

 

______________________________

MD JEREMY Garibay/MALAIKA

D:  03/30/2020 11:52:32

T:  03/30/2020 12:45:14

Job #:  [id number] Normal rate, regular rhythm.  Heart sounds S1, S2.  No murmurs, rubs or gallops.

## 2020-05-20 ENCOUNTER — RESULT CHARGE (OUTPATIENT)
Age: 80
End: 2020-05-20

## 2020-05-21 ENCOUNTER — APPOINTMENT (OUTPATIENT)
Dept: INTERNAL MEDICINE | Facility: CLINIC | Age: 80
End: 2020-05-21
Payer: MEDICARE

## 2020-05-21 VITALS — SYSTOLIC BLOOD PRESSURE: 146 MMHG | DIASTOLIC BLOOD PRESSURE: 72 MMHG | RESPIRATION RATE: 14 BRPM

## 2020-05-21 VITALS
DIASTOLIC BLOOD PRESSURE: 75 MMHG | HEIGHT: 68.5 IN | HEART RATE: 52 BPM | SYSTOLIC BLOOD PRESSURE: 132 MMHG | BODY MASS INDEX: 33.57 KG/M2 | WEIGHT: 224.07 LBS | OXYGEN SATURATION: 97 % | TEMPERATURE: 98.1 F

## 2020-05-21 PROCEDURE — 99214 OFFICE O/P EST MOD 30 MIN: CPT | Mod: 25

## 2020-05-21 PROCEDURE — 36415 COLL VENOUS BLD VENIPUNCTURE: CPT

## 2020-05-21 RX ORDER — AMMONIUM LACTATE 12 %
12 CREAM (GRAM) TOPICAL
Qty: 1 | Refills: 3 | Status: ACTIVE | COMMUNITY
Start: 2020-05-21 | End: 1900-01-01

## 2020-05-21 RX ORDER — CLOBETASOL PROPIONATE 0.5 MG/G
0.05 OINTMENT TOPICAL
Qty: 1 | Refills: 2 | Status: DISCONTINUED | COMMUNITY
Start: 2018-05-07 | End: 2020-05-21

## 2020-05-21 NOTE — HISTORY OF PRESENT ILLNESS
[FreeTextEntry1] : Patient comes for followup of his chronic medical problems. Patient feels well without any chest pain, shortness of breath, paroxysmal nocturnal dyspnea orthopnea. Only with his chronic low back and knee pains p.r.n. mostly when he walks

## 2020-05-21 NOTE — PHYSICAL EXAM
[No Acute Distress] : no acute distress [Well-Appearing] : well-appearing [Well Nourished] : well nourished [Well Developed] : well developed [PERRL] : pupils equal round and reactive to light [Normal Voice/Communication] : normal voice/communication [Normal Sclera/Conjunctiva] : normal sclera/conjunctiva [Normal Oropharynx] : the oropharynx was normal [Normal Outer Ear/Nose] : the outer ears and nose were normal in appearance [EOMI] : extraocular movements intact [Normal TMs] : both tympanic membranes were normal [Normal Nasal Mucosa] : the nasal mucosa was normal [No JVD] : no jugular venous distention [Supple] : supple [No Lymphadenopathy] : no lymphadenopathy [Thyroid Normal, No Nodules] : the thyroid was normal and there were no nodules present [No Respiratory Distress] : no respiratory distress  [Clear to Auscultation] : lungs were clear to auscultation bilaterally [Normal Percussion] : the chest was normal to percussion [Normal Rate] : normal rate  [No Accessory Muscle Use] : no accessory muscle use [Normal S1, S2] : normal S1 and S2 [Regular Rhythm] : with a regular rhythm [No Abdominal Bruit] : a ~M bruit was not heard ~T in the abdomen [No Carotid Bruits] : no carotid bruits [No Murmur] : no murmur heard [No Varicosities] : no varicosities [Pedal Pulses Present] : the pedal pulses are present [No Edema] : there was no peripheral edema [No Palpable Aorta] : no palpable aorta [No Extremity Clubbing/Cyanosis] : no extremity clubbing/cyanosis [No Axillary Lymphadenopathy] : no axillary lymphadenopathy [Soft] : abdomen soft [Non Tender] : non-tender [No Masses] : no abdominal mass palpated [Non-distended] : non-distended [No HSM] : no HSM [Normal Bowel Sounds] : normal bowel sounds [Normal Supraclavicular Nodes] : no supraclavicular lymphadenopathy [Normal Axillary Nodes] : no axillary lymphadenopathy [Normal Posterior Cervical Nodes] : no posterior cervical lymphadenopathy [Normal Femoral Nodes] : no femoral lymphadenopathy [Normal Inguinal Nodes] : no inguinal lymphadenopathy [Normal Anterior Cervical Nodes] : no anterior cervical lymphadenopathy [No CVA Tenderness] : no CVA  tenderness [No Spinal Tenderness] : no spinal tenderness [No Joint Swelling] : no joint swelling [Grossly Normal Strength/Tone] : grossly normal strength/tone [No Skin Lesions] : no skin lesions [Normal Gait] : normal gait [No Focal Deficits] : no focal deficits [Coordination Grossly Intact] : coordination grossly intact [Deep Tendon Reflexes (DTR)] : deep tendon reflexes were 2+ and symmetric [Speech Grossly Normal] : speech grossly normal [Normal Affect] : the affect was normal [Normal Mood] : the mood was normal [Comprehensive Foot Exam Normal] : Right and left foot were examined and both feet are normal. No ulcers in either foot. Toes are normal and with full ROM.  Normal tactile sensation with monofilament testing throughout both feet [Normal Insight/Judgement] : insight and judgment were intact [Kyphosis] : no kyphosis [Scoliosis] : no scoliosis [Alert and Oriented x3] : oriented to person, place, and time [de-identified] : psoriasis elbows ,dry skin hands

## 2020-05-21 NOTE — ASSESSMENT
[FreeTextEntry1] :  #1 hypertension, Slightly elevated. Continue same treatment and strict low-salt diet. Try also to walk  half an hour daily. Recheck after one month\par #2 GE reflux, stable.same treatment\par #3 hyperlipidemia. Continue same treatment and low fat diet.\par #4 monitor liver toxicity due to chronic medication use\par #5 Hx of low B12. Level sent to the lab\par #6 hypovitaminosis D., being supplemented\par #7 lumbar spine radiculopathy. stable.To be on meloxicam 15 mg p.o. once a day p.r.n. Side effects of medication explained to patient who understood\par Plan. Patient is advised to return after 3 months

## 2020-05-22 ENCOUNTER — MED ADMIN CHARGE (OUTPATIENT)
Age: 80
End: 2020-05-22

## 2020-05-22 LAB
25(OH)D3 SERPL-MCNC: 37.4 NG/ML
ALBUMIN SERPL ELPH-MCNC: 4.4 G/DL
ALP BLD-CCNC: 45 U/L
ALT SERPL-CCNC: 18 U/L
ANION GAP SERPL CALC-SCNC: 10 MMOL/L
APPEARANCE: CLEAR
AST SERPL-CCNC: 23 U/L
BACTERIA: NEGATIVE
BASOPHILS # BLD AUTO: 0.01 K/UL
BASOPHILS NFR BLD AUTO: 0.2 %
BILIRUB SERPL-MCNC: 0.5 MG/DL
BILIRUBIN URINE: NEGATIVE
BLOOD URINE: ABNORMAL
BUN SERPL-MCNC: 15 MG/DL
CALCIUM SERPL-MCNC: 9.4 MG/DL
CHLORIDE SERPL-SCNC: 107 MMOL/L
CHOLEST SERPL-MCNC: 114 MG/DL
CHOLEST/HDLC SERPL: 2.3 RATIO
CO2 SERPL-SCNC: 25 MMOL/L
COLOR: YELLOW
CREAT SERPL-MCNC: 1.27 MG/DL
CREAT SPEC-SCNC: 138 MG/DL
EOSINOPHIL # BLD AUTO: 0.01 K/UL
EOSINOPHIL NFR BLD AUTO: 0.2 %
ESTIMATED AVERAGE GLUCOSE: 117 MG/DL
GLUCOSE QUALITATIVE U: NEGATIVE
GLUCOSE SERPL-MCNC: 94 MG/DL
HBA1C MFR BLD HPLC: 5.7 %
HCT VFR BLD CALC: 47.8 %
HDLC SERPL-MCNC: 50 MG/DL
HGB BLD-MCNC: 15.5 G/DL
HYALINE CASTS: 0 /LPF
IMM GRANULOCYTES NFR BLD AUTO: 0.6 %
KETONES URINE: NEGATIVE
LDLC SERPL CALC-MCNC: 53 MG/DL
LEUKOCYTE ESTERASE URINE: NEGATIVE
LYMPHOCYTES # BLD AUTO: 1.66 K/UL
LYMPHOCYTES NFR BLD AUTO: 31 %
MAN DIFF?: NORMAL
MCHC RBC-ENTMCNC: 31.8 PG
MCHC RBC-ENTMCNC: 32.4 GM/DL
MCV RBC AUTO: 98 FL
MICROALBUMIN 24H UR DL<=1MG/L-MCNC: 5.8 MG/DL
MICROALBUMIN/CREAT 24H UR-RTO: 42 MG/G
MICROSCOPIC-UA: NORMAL
MONOCYTES # BLD AUTO: 1.34 K/UL
MONOCYTES NFR BLD AUTO: 25 %
NEUTROPHILS # BLD AUTO: 2.3 K/UL
NEUTROPHILS NFR BLD AUTO: 43 %
NITRITE URINE: NEGATIVE
PH URINE: 6
PLATELET # BLD AUTO: 93 K/UL
POTASSIUM SERPL-SCNC: 4.2 MMOL/L
PROT SERPL-MCNC: 6.9 G/DL
PROTEIN URINE: NORMAL
RBC # BLD: 4.88 M/UL
RBC # FLD: 12.3 %
RED BLOOD CELLS URINE: 3 /HPF
SARS-COV-2 IGG SERPL IA-ACNC: <0.1 INDEX
SARS-COV-2 IGG SERPL QL IA: NEGATIVE
SODIUM SERPL-SCNC: 143 MMOL/L
SPECIFIC GRAVITY URINE: 1.02
SQUAMOUS EPITHELIAL CELLS: 1 /HPF
TRIGL SERPL-MCNC: 55 MG/DL
UROBILINOGEN URINE: NORMAL
VIT B12 SERPL-MCNC: 608 PG/ML
WBC # FLD AUTO: 5.35 K/UL
WHITE BLOOD CELLS URINE: 0 /HPF

## 2020-05-22 RX ORDER — CYANOCOBALAMIN 1000 UG/ML
1000 INJECTION INTRAMUSCULAR; SUBCUTANEOUS
Qty: 0 | Refills: 0 | Status: COMPLETED | OUTPATIENT
Start: 2020-05-21

## 2020-06-18 ENCOUNTER — APPOINTMENT (OUTPATIENT)
Dept: INTERNAL MEDICINE | Facility: CLINIC | Age: 80
End: 2020-06-18

## 2020-06-19 ENCOUNTER — APPOINTMENT (OUTPATIENT)
Dept: INTERNAL MEDICINE | Facility: CLINIC | Age: 80
End: 2020-06-19

## 2020-08-05 ENCOUNTER — APPOINTMENT (OUTPATIENT)
Dept: INTERNAL MEDICINE | Facility: CLINIC | Age: 80
End: 2020-08-05
Payer: MEDICARE

## 2020-08-05 VITALS
OXYGEN SATURATION: 96 % | SYSTOLIC BLOOD PRESSURE: 128 MMHG | HEART RATE: 54 BPM | TEMPERATURE: 96.6 F | HEIGHT: 68.11 IN | WEIGHT: 218.23 LBS | BODY MASS INDEX: 33.07 KG/M2 | DIASTOLIC BLOOD PRESSURE: 65 MMHG

## 2020-08-05 VITALS — RESPIRATION RATE: 16 BRPM

## 2020-08-05 DIAGNOSIS — Z87.39 PERSONAL HISTORY OF OTHER DISEASES OF THE MUSCULOSKELETAL SYSTEM AND CONNECTIVE TISSUE: ICD-10-CM

## 2020-08-05 DIAGNOSIS — L85.3 XEROSIS CUTIS: ICD-10-CM

## 2020-08-05 PROCEDURE — 99214 OFFICE O/P EST MOD 30 MIN: CPT | Mod: 25

## 2020-08-05 PROCEDURE — 36415 COLL VENOUS BLD VENIPUNCTURE: CPT

## 2020-08-05 PROCEDURE — 81003 URINALYSIS AUTO W/O SCOPE: CPT | Mod: QW

## 2020-08-05 RX ORDER — DICLOFENAC SODIUM 10 MG/G
1 GEL TOPICAL
Qty: 1 | Refills: 2 | Status: ACTIVE | COMMUNITY
Start: 2020-08-05 | End: 1900-01-01

## 2020-08-05 NOTE — PHYSICAL EXAM
[No Acute Distress] : no acute distress [Well Nourished] : well nourished [Well Developed] : well developed [Well-Appearing] : well-appearing [Normal Voice/Communication] : normal voice/communication [Normal Sclera/Conjunctiva] : normal sclera/conjunctiva [PERRL] : pupils equal round and reactive to light [Normal Oropharynx] : the oropharynx was normal [Normal Outer Ear/Nose] : the outer ears and nose were normal in appearance [EOMI] : extraocular movements intact [Normal Nasal Mucosa] : the nasal mucosa was normal [Normal TMs] : both tympanic membranes were normal [No JVD] : no jugular venous distention [Supple] : supple [No Lymphadenopathy] : no lymphadenopathy [No Respiratory Distress] : no respiratory distress  [Clear to Auscultation] : lungs were clear to auscultation bilaterally [Thyroid Normal, No Nodules] : the thyroid was normal and there were no nodules present [No Accessory Muscle Use] : no accessory muscle use [Normal Percussion] : the chest was normal to percussion [Normal S1, S2] : normal S1 and S2 [Regular Rhythm] : with a regular rhythm [Normal Rate] : normal rate  [No Carotid Bruits] : no carotid bruits [No Abdominal Bruit] : a ~M bruit was not heard ~T in the abdomen [No Murmur] : no murmur heard [No Varicosities] : no varicosities [Pedal Pulses Present] : the pedal pulses are present [No Edema] : there was no peripheral edema [No Palpable Aorta] : no palpable aorta [No Extremity Clubbing/Cyanosis] : no extremity clubbing/cyanosis [Soft] : abdomen soft [No Axillary Lymphadenopathy] : no axillary lymphadenopathy [Non-distended] : non-distended [Non Tender] : non-tender [No Masses] : no abdominal mass palpated [No HSM] : no HSM [Normal Supraclavicular Nodes] : no supraclavicular lymphadenopathy [Normal Bowel Sounds] : normal bowel sounds [Normal Femoral Nodes] : no femoral lymphadenopathy [Normal Posterior Cervical Nodes] : no posterior cervical lymphadenopathy [Normal Axillary Nodes] : no axillary lymphadenopathy [No CVA Tenderness] : no CVA  tenderness [Normal Inguinal Nodes] : no inguinal lymphadenopathy [Normal Anterior Cervical Nodes] : no anterior cervical lymphadenopathy [No Spinal Tenderness] : no spinal tenderness [Scoliosis] : no scoliosis [Kyphosis] : no kyphosis [No Joint Swelling] : no joint swelling [Grossly Normal Strength/Tone] : grossly normal strength/tone [No Skin Lesions] : no skin lesions [Normal Gait] : normal gait [Coordination Grossly Intact] : coordination grossly intact [No Focal Deficits] : no focal deficits [Deep Tendon Reflexes (DTR)] : deep tendon reflexes were 2+ and symmetric [Speech Grossly Normal] : speech grossly normal [Normal Affect] : the affect was normal [Alert and Oriented x3] : oriented to person, place, and time [Normal Mood] : the mood was normal [Normal Insight/Judgement] : insight and judgment were intact [Comprehensive Foot Exam Normal] : Right and left foot were examined and both feet are normal. No ulcers in either foot. Toes are normal and with full ROM.  Normal tactile sensation with monofilament testing throughout both feet [de-identified] : psoriasis elbows ,A spot of folliculitis in the right lower gluteal area

## 2020-08-05 NOTE — ASSESSMENT
[FreeTextEntry1] :  #1 hypertension, stable. Continue same treatment and strict low-salt diet. \par #2 GE reflux, stable.same treatment\par #3 hyperlipidemia.Same treatment and low fat diet.\par #4 monitor liver toxicity due to chronic medication use\par #5 Hx of low B12. Level sent to the lab\par #6 hypovitaminosis D., being supplemented\par #7 lumbar spine radiculopathy. stable.Continue on meloxicam 15 mg p.o. once a day p.r.n. and Voltaren gel 1.5% ,3 times a day p.r.n. Side effects of medications explained to patient who understood\par #8 folliculitis of the right gluteal area. Patient to apply mupirocin 2% ,3 times a day until cleared. If not better after one week patient to come back to be reevaluated\par Plan. Patient is advised to return after 3 months

## 2020-08-05 NOTE — HISTORY OF PRESENT ILLNESS
[FreeTextEntry1] : Patient comes for followup of his chronic medical problems. Patient feels well without any chest pain, shortness of breath, paroxysmal nocturnal dyspnea orthopnea. Only with his chronic low back and knee pains p.r.n. mostly when he walks.He has also a small reddish tender nodule in the right lower gluteal area for a few days

## 2020-08-05 NOTE — REVIEW OF SYSTEMS
[Back Pain] : back pain [Skin Rash] : skin rash [Negative] : Heme/Lymph [FreeTextEntry9] : chronic prn

## 2020-08-06 LAB
25(OH)D3 SERPL-MCNC: 33.2 NG/ML
ALBUMIN SERPL ELPH-MCNC: 4.5 G/DL
ALP BLD-CCNC: 43 U/L
ALT SERPL-CCNC: 19 U/L
ANION GAP SERPL CALC-SCNC: 13 MMOL/L
APPEARANCE: CLEAR
AST SERPL-CCNC: 19 U/L
BACTERIA: NEGATIVE
BASOPHILS # BLD AUTO: 0.01 K/UL
BASOPHILS NFR BLD AUTO: 0.2 %
BILIRUB SERPL-MCNC: 0.6 MG/DL
BILIRUBIN URINE: NEGATIVE
BLOOD URINE: NORMAL
BUN SERPL-MCNC: 11 MG/DL
CALCIUM SERPL-MCNC: 9.5 MG/DL
CHLORIDE SERPL-SCNC: 105 MMOL/L
CHOLEST SERPL-MCNC: 118 MG/DL
CHOLEST/HDLC SERPL: 2.3 RATIO
CO2 SERPL-SCNC: 24 MMOL/L
COLOR: NORMAL
CREAT SERPL-MCNC: 1.2 MG/DL
CREAT SPEC-SCNC: 82 MG/DL
EOSINOPHIL # BLD AUTO: 0.02 K/UL
EOSINOPHIL NFR BLD AUTO: 0.4 %
ESTIMATED AVERAGE GLUCOSE: 120 MG/DL
GLUCOSE QUALITATIVE U: NEGATIVE
GLUCOSE SERPL-MCNC: 100 MG/DL
HBA1C MFR BLD HPLC: 5.8 %
HCT VFR BLD CALC: 46.8 %
HDLC SERPL-MCNC: 51 MG/DL
HGB BLD-MCNC: 15.3 G/DL
HYALINE CASTS: 0 /LPF
IMM GRANULOCYTES NFR BLD AUTO: 0.4 %
KETONES URINE: NEGATIVE
LDLC SERPL CALC-MCNC: 53 MG/DL
LEUKOCYTE ESTERASE URINE: NEGATIVE
LYMPHOCYTES # BLD AUTO: 1.64 K/UL
LYMPHOCYTES NFR BLD AUTO: 30.7 %
MAN DIFF?: NORMAL
MCHC RBC-ENTMCNC: 31.1 PG
MCHC RBC-ENTMCNC: 32.7 GM/DL
MCV RBC AUTO: 95.1 FL
MICROALBUMIN 24H UR DL<=1MG/L-MCNC: 1.5 MG/DL
MICROALBUMIN/CREAT 24H UR-RTO: 18 MG/G
MICROSCOPIC-UA: NORMAL
MONOCYTES # BLD AUTO: 1.27 K/UL
MONOCYTES NFR BLD AUTO: 23.8 %
NEUTROPHILS # BLD AUTO: 2.38 K/UL
NEUTROPHILS NFR BLD AUTO: 44.5 %
NITRITE URINE: NEGATIVE
PH URINE: 7
PLATELET # BLD AUTO: 82 K/UL
POTASSIUM SERPL-SCNC: 4 MMOL/L
PROT SERPL-MCNC: 6.8 G/DL
PROTEIN URINE: NEGATIVE
RBC # BLD: 4.92 M/UL
RBC # FLD: 11.9 %
RED BLOOD CELLS URINE: 1 /HPF
SODIUM SERPL-SCNC: 142 MMOL/L
SPECIFIC GRAVITY URINE: 1.01
SQUAMOUS EPITHELIAL CELLS: 0 /HPF
TRIGL SERPL-MCNC: 69 MG/DL
UROBILINOGEN URINE: NORMAL
VIT B12 SERPL-MCNC: 752 PG/ML
WBC # FLD AUTO: 5.34 K/UL
WHITE BLOOD CELLS URINE: 0 /HPF

## 2020-09-18 ENCOUNTER — APPOINTMENT (OUTPATIENT)
Dept: INTERNAL MEDICINE | Facility: CLINIC | Age: 80
End: 2020-09-18

## 2020-09-23 ENCOUNTER — APPOINTMENT (OUTPATIENT)
Dept: INTERNAL MEDICINE | Facility: CLINIC | Age: 80
End: 2020-09-23
Payer: MEDICARE

## 2020-09-23 ENCOUNTER — LABORATORY RESULT (OUTPATIENT)
Age: 80
End: 2020-09-23

## 2020-09-23 VITALS
RESPIRATION RATE: 14 BRPM | WEIGHT: 217 LBS | BODY MASS INDEX: 32.89 KG/M2 | SYSTOLIC BLOOD PRESSURE: 132 MMHG | DIASTOLIC BLOOD PRESSURE: 73 MMHG | HEART RATE: 60 BPM

## 2020-09-23 PROCEDURE — G0008: CPT

## 2020-09-23 PROCEDURE — 36415 COLL VENOUS BLD VENIPUNCTURE: CPT

## 2020-09-23 PROCEDURE — 90662 IIV NO PRSV INCREASED AG IM: CPT

## 2020-09-23 PROCEDURE — 82270 OCCULT BLOOD FECES: CPT

## 2020-09-23 PROCEDURE — 99214 OFFICE O/P EST MOD 30 MIN: CPT | Mod: 25

## 2020-09-23 RX ORDER — AMOXICILLIN 500 MG/1
500 CAPSULE ORAL
Qty: 30 | Refills: 0 | Status: DISCONTINUED | COMMUNITY
Start: 2020-08-31

## 2020-09-23 RX ORDER — FLUOROMETHOLONE 1 MG/ML
0.1 SOLUTION/ DROPS OPHTHALMIC
Qty: 5 | Refills: 0 | Status: DISCONTINUED | COMMUNITY
Start: 2020-04-28

## 2020-09-23 RX ORDER — MECOBALAMIN 1000 MCG
1000 TABLET,DISINTEGRATING SUBLINGUAL
Qty: 90 | Refills: 0 | Status: DISCONTINUED | COMMUNITY
Start: 2020-02-03

## 2020-09-23 NOTE — PHYSICAL EXAM
[No Acute Distress] : no acute distress [Well Nourished] : well nourished [Well Developed] : well developed [Well-Appearing] : well-appearing [Normal Voice/Communication] : normal voice/communication [Normal Sclera/Conjunctiva] : normal sclera/conjunctiva [PERRL] : pupils equal round and reactive to light [EOMI] : extraocular movements intact [Normal Outer Ear/Nose] : the outer ears and nose were normal in appearance [Normal Oropharynx] : the oropharynx was normal [No JVD] : no jugular venous distention [No Lymphadenopathy] : no lymphadenopathy [Supple] : supple [Thyroid Normal, No Nodules] : the thyroid was normal and there were no nodules present [No Respiratory Distress] : no respiratory distress  [No Accessory Muscle Use] : no accessory muscle use [Clear to Auscultation] : lungs were clear to auscultation bilaterally [Normal Percussion] : the chest was normal to percussion [Normal Rate] : normal rate  [Regular Rhythm] : with a regular rhythm [Normal S1, S2] : normal S1 and S2 [No Murmur] : no murmur heard [No Carotid Bruits] : no carotid bruits [No Abdominal Bruit] : a ~M bruit was not heard ~T in the abdomen [No Varicosities] : no varicosities [Pedal Pulses Present] : the pedal pulses are present [No Edema] : there was no peripheral edema [No Palpable Aorta] : no palpable aorta [No Extremity Clubbing/Cyanosis] : no extremity clubbing/cyanosis [No Axillary Lymphadenopathy] : no axillary lymphadenopathy [Soft] : abdomen soft [Non Tender] : non-tender [Non-distended] : non-distended [No Masses] : no abdominal mass palpated [No HSM] : no HSM [Normal Bowel Sounds] : normal bowel sounds [Normal Sphincter Tone] : normal sphincter tone [No Mass] : no mass [Stool Occult Blood] : stool negative for occult blood [Normal Supraclavicular Nodes] : no supraclavicular lymphadenopathy [Normal Axillary Nodes] : no axillary lymphadenopathy [Normal Posterior Cervical Nodes] : no posterior cervical lymphadenopathy [Normal Anterior Cervical Nodes] : no anterior cervical lymphadenopathy [Normal Inguinal Nodes] : no inguinal lymphadenopathy [Normal Femoral Nodes] : no femoral lymphadenopathy [No CVA Tenderness] : no CVA  tenderness [No Spinal Tenderness] : no spinal tenderness [No Joint Swelling] : no joint swelling [Grossly Normal Strength/Tone] : grossly normal strength/tone [No Rash] : no rash [Coordination Grossly Intact] : coordination grossly intact [No Focal Deficits] : no focal deficits [Normal Gait] : normal gait [Deep Tendon Reflexes (DTR)] : deep tendon reflexes were 2+ and symmetric [Speech Grossly Normal] : speech grossly normal [Memory Grossly Normal] : memory grossly normal [Normal Affect] : the affect was normal [Alert and Oriented x3] : oriented to person, place, and time [Normal Mood] : the mood was normal [Normal Insight/Judgement] : insight and judgment were intact

## 2020-09-23 NOTE — ASSESSMENT
[FreeTextEntry1] : Diagnoses #1 lower GI bleeding episodes of unknown etiology, possible internal hemorrhoids. Patient to have gastroenterology consult.\par #2 history of aortic stenosis and mitral regurgitation. Patient is followed by the cardiologist.\par #3 hypertension, stable.\par Plan .the patient is to return after 6 week

## 2020-09-23 NOTE — HISTORY OF PRESENT ILLNESS
[FreeTextEntry8] : Patient comes to the office because he sees occasionally  blood on his stool and after wiping himself. It is only small amount of blood. Patient is also constipated. She has no abdominal pain, nausea, vomiting, diarrhea, chest pain, shortness of breath, paroxysmal nocturnal dyspnea or orthopnea

## 2020-09-24 LAB
APPEARANCE: CLEAR
APPEARANCE: CLEAR
BACTERIA: NEGATIVE
BILIRUBIN URINE: NEGATIVE
BILIRUBIN URINE: NEGATIVE
BLOOD URINE: NEGATIVE
BLOOD URINE: NEGATIVE
COLOR: NORMAL
COLOR: NORMAL
GLUCOSE QUALITATIVE U: NEGATIVE
GLUCOSE QUALITATIVE U: NEGATIVE
HYALINE CASTS: 0 /LPF
KETONES URINE: NEGATIVE
KETONES URINE: NEGATIVE
LEUKOCYTE ESTERASE URINE: NEGATIVE
LEUKOCYTE ESTERASE URINE: NEGATIVE
MICROSCOPIC-UA: NORMAL
NITRITE URINE: NEGATIVE
NITRITE URINE: NEGATIVE
PH URINE: 6.5
PH URINE: 6.5
PROTEIN URINE: NEGATIVE
PROTEIN URINE: NEGATIVE
RED BLOOD CELLS URINE: 2 /HPF
SPECIFIC GRAVITY URINE: 1.01
SPECIFIC GRAVITY URINE: 1.01
SQUAMOUS EPITHELIAL CELLS: 0 /HPF
UROBILINOGEN URINE: NORMAL
UROBILINOGEN URINE: NORMAL
WHITE BLOOD CELLS URINE: 0 /HPF

## 2020-09-25 LAB
ALBUMIN SERPL ELPH-MCNC: 4.4 G/DL
ALP BLD-CCNC: 48 U/L
ALT SERPL-CCNC: 14 U/L
ANION GAP SERPL CALC-SCNC: 10 MMOL/L
AST SERPL-CCNC: 23 U/L
BASOPHILS # BLD AUTO: 0.09 K/UL
BASOPHILS NFR BLD AUTO: 1.7 %
BILIRUB SERPL-MCNC: 0.5 MG/DL
BUN SERPL-MCNC: 12 MG/DL
CALCIUM SERPL-MCNC: 9 MG/DL
CHLORIDE SERPL-SCNC: 106 MMOL/L
CO2 SERPL-SCNC: 25 MMOL/L
CREAT SERPL-MCNC: 1.2 MG/DL
EOSINOPHIL # BLD AUTO: 0 K/UL
EOSINOPHIL NFR BLD AUTO: 0 %
FERRITIN SERPL-MCNC: 98 NG/ML
GLUCOSE SERPL-MCNC: 93 MG/DL
HCT VFR BLD CALC: 49 %
HEMOCCULT STL QL IA: POSITIVE
HGB BLD-MCNC: 15.6 G/DL
IRON SATN MFR SERPL: 41 %
IRON SERPL-MCNC: 112 UG/DL
LYMPHOCYTES # BLD AUTO: 2.6 K/UL
LYMPHOCYTES NFR BLD AUTO: 48.7 %
MAN DIFF?: NORMAL
MCHC RBC-ENTMCNC: 31.3 PG
MCHC RBC-ENTMCNC: 31.8 GM/DL
MCV RBC AUTO: 98.4 FL
MONOCYTES # BLD AUTO: 1.16 K/UL
MONOCYTES NFR BLD AUTO: 21.8 %
NEUTROPHILS # BLD AUTO: 1.48 K/UL
NEUTROPHILS NFR BLD AUTO: 27.8 %
PLATELET # BLD AUTO: 81 K/UL
POTASSIUM SERPL-SCNC: 4.4 MMOL/L
PROT SERPL-MCNC: 6.8 G/DL
RBC # BLD: 4.98 M/UL
RBC # FLD: 12.3 %
SODIUM SERPL-SCNC: 142 MMOL/L
TIBC SERPL-MCNC: 276 UG/DL
TRANSFERRIN SERPL-MCNC: 229 MG/DL
UIBC SERPL-MCNC: 164 UG/DL
WBC # FLD AUTO: 5.33 K/UL

## 2020-11-04 ENCOUNTER — APPOINTMENT (OUTPATIENT)
Dept: INTERNAL MEDICINE | Facility: CLINIC | Age: 80
End: 2020-11-04

## 2020-11-23 ENCOUNTER — APPOINTMENT (OUTPATIENT)
Dept: UROLOGY | Facility: CLINIC | Age: 80
End: 2020-11-23
Payer: MEDICARE

## 2020-11-23 VITALS
HEART RATE: 56 BPM | WEIGHT: 217 LBS | RESPIRATION RATE: 16 BRPM | DIASTOLIC BLOOD PRESSURE: 81 MMHG | BODY MASS INDEX: 32.89 KG/M2 | TEMPERATURE: 97 F | SYSTOLIC BLOOD PRESSURE: 150 MMHG | HEIGHT: 68.11 IN

## 2020-11-23 LAB
ALP BLD-CCNC: 49 U/L
ANION GAP SERPL CALC-SCNC: 10 MMOL/L
APPEARANCE: CLEAR
BACTERIA: NEGATIVE
BILIRUBIN URINE: NEGATIVE
BLOOD URINE: NEGATIVE
BUN SERPL-MCNC: 17 MG/DL
CALCIUM SERPL-MCNC: 9.4 MG/DL
CHLORIDE SERPL-SCNC: 104 MMOL/L
CO2 SERPL-SCNC: 26 MMOL/L
COLOR: COLORLESS
CREAT SERPL-MCNC: 1.29 MG/DL
GLUCOSE QUALITATIVE U: NEGATIVE
GLUCOSE SERPL-MCNC: 82 MG/DL
HYALINE CASTS: 0 /LPF
KETONES URINE: NEGATIVE
LEUKOCYTE ESTERASE URINE: NEGATIVE
MICROSCOPIC-UA: NORMAL
NITRITE URINE: NEGATIVE
PH URINE: 6.5
POTASSIUM SERPL-SCNC: 4.5 MMOL/L
PROTEIN URINE: NEGATIVE
PSA SERPL-MCNC: 0.69 NG/ML
RED BLOOD CELLS URINE: 0 /HPF
SODIUM SERPL-SCNC: 141 MMOL/L
SPECIFIC GRAVITY URINE: 1.01
SQUAMOUS EPITHELIAL CELLS: 0 /HPF
TESTOST SERPL-MCNC: 631 NG/DL
UROBILINOGEN URINE: NORMAL
WHITE BLOOD CELLS URINE: 0 /HPF

## 2020-11-23 PROCEDURE — 99214 OFFICE O/P EST MOD 30 MIN: CPT

## 2020-11-23 PROCEDURE — 88112 CYTOPATH CELL ENHANCE TECH: CPT | Mod: 26

## 2020-11-23 NOTE — ADDENDUM
[FreeTextEntry1] : I, Genie Manein, acted solely as a scribe for Dr. Oscar Mace on this date 11/23/2020.\par \par All medical record entries made by the Scribe were at my, Dr. Oscar Mace, direction and personally dictated by me on 11/23/2020. I have reviewed the chart and agree that the record accurately reflects my personal performance of the history, physical exam, assessment and plan.  I have also personally directed, reviewed and agreed with the chart.

## 2020-11-23 NOTE — PHYSICAL EXAM
[General Appearance - Well Developed] : well developed [Normal Appearance] : normal appearance [General Appearance - In No Acute Distress] : no acute distress [Abdomen Soft] : soft [Abdomen Tenderness] : non-tender [Skin Color & Pigmentation] : normal skin color and pigmentation [Edema] : no peripheral edema [] : no respiratory distress [Respiration, Rhythm And Depth] : normal respiratory rhythm and effort [Exaggerated Use Of Accessory Muscles For Inspiration] : no accessory muscle use [Oriented To Time, Place, And Person] : oriented to person, place, and time [Affect] : the affect was normal [Mood] : the mood was normal [Not Anxious] : not anxious [Normal Station and Gait] : the gait and station were normal for the patient's age [No Focal Deficits] : no focal deficits [FreeTextEntry1] : Knee chest position was used for digital rectal exam. Old hemorrhoid at 9 o'clock on the left.  Anal tone is normal. The prostate is non tender, with normal texture, discrete borders, and no nodules. It is a 30 gram transurethral resection size. No gross blood on examining fingers.

## 2020-11-23 NOTE — ASSESSMENT
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 The patient took tamsulosin in the past, but stopped due to light headiness. His urination remained satisfactory after discontinuing the medication. The patient's last PSA 04/24/17 was 0.69. The patient was in the ER 01/01/17 for abdominal pain. No etiology for the pain was identified and has since resolved. CT of the abdomen and pelvis in the ER found bilateral renal cysts and a 5 mm non obstructing right renal calculus. \par 1/17/18: CT scan was reviewed and discussed  in office which found no hydronephrosis or recurrent tumors . There are 4 cysts on the  left kidney and 2 cysts on the right.  There is a Stone in the right kidney in the upper portion. He reported earlier he was experiencing some left flank pain. At night he wakes up once to urinate. He remained on finasteride 5 mg once daily.\par 5/29/18: The patient returned and reported he wakes up once during the night to urinate. Currently taking Finasteride. Complained of intermittent left lumbar pain that remained the same since last visit. Daughter noted he does not drink enough water. \par 11/19/18: The patient returned today for a renal US. Renal US findings showed again there is a 5.5 mm echogenic focus with distal shadowing in the upper pole of the right kidney with bilateral simple non vascular cysts. There is a new septated cyst in the mid outer pole of the left kidney. Both kidneys appear normal in size and echogenicity. No hydronephrosis, or solid masses visualized. PSA from 3/29/18 was 0.71 ng/mL. Notes he was recently hospitalized for LE weakness and pain. Will be evaluated by a cardiologist for possible loop recorder placement. \par 5/22/19: Male patient presents today for a follow up. Blood work was completed 5/1/19 as per Dr. Ramirez. PSA was measured in January 2019  0.94 and creatinine as of May 2019 was 1.18 mg/dL. regarding urination he denies dysuria, gross hematuria, urgency or incontinence. He will wake up 2x a night to urinate. He has a Hx of high blood pressure and hyperlipidemia that are both well controlled on medication.\par \par 11/21/2019: Patient presents today for a follow up. Overall, he is doing well. He states his urination is good. Wakes up 1-2 times during the night to urinate. Patient denies dysuria, gross hematuria, urgency, or incontinence. Today, his only complaint is of some back pain. A urinalysis from  9/11/2019 was small positive for hematuria. His creatinine from 9/11/2019 was 1.29 mg/dL. He has not had a PSA measured since his last done January 2019 of 0.94 ng/mL. Small cyst/possible kidney stone in kidney found in the prior ultrasound of last year dated 11/19/2018. Digital rectal exam found no suspicious rectal masses. Anal tone is normal. The prostate is non tender, with normal texture, discrete borders, and no nodules. It is a 30 gram transurethral resection size. No gross blood on the examining finger. A little external hemorrhoid at 1 o'clock position was found though not inflamed. Due the presence of a small cyst/possible kidney stone found in the ultrasound report from last year dated 11/19/2018, a renal ultrasound has been ordered today and will be obtained by the office. Finding: Again there is a 5.5 mm echogenic focus with distal shadowing in the upper pole of the right kidney with bilateral simple non vascular cysts. There is a new septated cyst in the mid outer pole of the left kidney. Both kidneys appear normal in size and echogenicity. No hydronephrosis, or solid masses visualized. In my opinion, it looks more like a Jeremie's Plaque than a kidney stone.\par \par 11/23/2020: Patient presents today for follow up. Takes Finasteride 5mg once daily. Urination is good. Wakes 1-2x at night to urinate. Denies urinary urgency, pushing or straining, dysuria, gross hematuria. No constipation or chest pain. Had blood work by PCP on 9/23/20 showing creatinine of 1.20 and HbA1c of 5.8. Offers no new complaints today. Has appointment with PCP Dr. Ramirez tomorrow. \par \par Knee chest position was used for digital rectal exam. Old hemorrhoid at 9 o'clock on the left.  Anal tone is normal. The prostate is non tender, with normal texture, discrete borders, and no nodules. It is a 30 gram transurethral resection size. No gross blood on examining fingers.\par \par I have asked the patient to discuss with Dr. Ramirez tomorrow about the firmness on the left side of the anus.\par \par Blood work today included BMP, alkaline phosphatase, PSA, and testosterone. \par The patient produced a urine sample which will be sent for urinalysis, urine cytology, and urine culture. \par \par

## 2020-11-23 NOTE — HISTORY OF PRESENT ILLNESS
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 The patient took tamsulosin in the past, but stopped due to light headiness. His urination remained satisfactory after discontinuing the medication. The patient's last PSA 04/24/17 was 0.69. The patient was in the ER 01/01/17 for abdominal pain. No etiology for the pain was identified and has since resolved. CT of the abdomen and pelvis in the ER found bilateral renal cysts and a 5 mm non obstructing right renal calculus. \par 1/17/18: CT scan was reviewed and discussed  in office which found no hydronephrosis or recurrent tumors . There are 4 cysts on the  left kidney and 2 cysts on the right.  There is a Stone in the right kidney in the upper portion. He reported earlier he was experiencing some left flank pain. At night he wakes up once to urinate. He remained on finasteride 5 mg once daily.\par 5/29/18: The patient returned and reported he wakes up once during the night to urinate. Currently taking Finasteride. Complained of intermittent left lumbar pain that remained the same since last visit. Daughter noted he does not drink enough water. \par 11/19/18: The patient returned today for a renal US. Renal US findings showed again there is a 5.5 mm echogenic focus with distal shadowing in the upper pole of the right kidney with bilateral simple non vascular cysts. There is a new septated cyst in the mid outer pole of the left kidney. Both kidneys appear normal in size and echogenicity. No hydronephrosis, or solid masses visualized. PSA from 3/29/18 was 0.71 ng/mL. Notes he was recently hospitalized for LE weakness and pain. Will be evaluated by a cardiologist for possible loop recorder placement. \par 5/22/19: Male patient presents today for a follow up. Blood work was completed 5/1/19 as per Dr. Ramirez. PSA was measured in January 2019  0.94 and creatinine as of May 2019 was 1.18 mg/dL. regarding urination he denies dysuria, gross hematuria, urgency or incontinence. He will wake up 2x a night to urinate. He has a Hx of high blood pressure and hyperlipidemia that are both well controlled on medication.\par \par 11/21/2019: Patient presents today for a follow up. Overall, he is doing well. He states his urination is good. Wakes up 1-2 times during the night to urinate. Patient denies dysuria, gross hematuria, urgency, or incontinence. Today, his only complaint is of some back pain. A urinalysis from  9/11/2019 was small positive for hematuria. His creatinine from 9/11/2019 was 1.29 mg/dL. He has not had a PSA measured since his last done January 2019 of 0.94 ng/mL. Small cyst/possible kidney stone in kidney found in the prior ultrasound of last year dated 11/19/2018. Digital rectal exam found no suspicious rectal masses. Anal tone is normal. The prostate is non tender, with normal texture, discrete borders, and no nodules. It is a 30 gram transurethral resection size. No gross blood on the examining finger. A little external hemorrhoid at 1 o'clock position was found though not inflamed. Due the presence of a small cyst/possible kidney stone found in the ultrasound report from last year dated 11/19/2018, a renal ultrasound has been ordered today and will be obtained by the office. Finding: Again there is a 5.5 mm echogenic focus with distal shadowing in the upper pole of the right kidney with bilateral simple non vascular cysts. There is a new septated cyst in the mid outer pole of the left kidney. Both kidneys appear normal in size and echogenicity. No hydronephrosis, or solid masses visualized. In my opinion, it looks more like a Jeremie's Plaque than a kidney stone.\par \par 11/23/2020: Patient presents today for follow up. Takes Finasteride 5mg once daily. Urination is good. Wakes 1-2x at night to urinate. Denies urinary urgency, pushing or straining, dysuria, gross hematuria. No constipation or chest pain. Had blood work by PCP on 9/23/20 showing creatinine of 1.20 and HbA1c of 5.8. Offers no new complaints today. Has appointment with PCP Dr. Ramirez tomorrow.

## 2020-11-24 LAB
BACTERIA UR CULT: NORMAL
URINE CYTOLOGY: NORMAL

## 2020-11-25 ENCOUNTER — LABORATORY RESULT (OUTPATIENT)
Age: 80
End: 2020-11-25

## 2020-11-25 ENCOUNTER — APPOINTMENT (OUTPATIENT)
Dept: INTERNAL MEDICINE | Facility: CLINIC | Age: 80
End: 2020-11-25
Payer: MEDICARE

## 2020-11-25 VITALS
BODY MASS INDEX: 32.43 KG/M2 | DIASTOLIC BLOOD PRESSURE: 64 MMHG | SYSTOLIC BLOOD PRESSURE: 135 MMHG | WEIGHT: 214 LBS | HEART RATE: 58 BPM | RESPIRATION RATE: 14 BRPM

## 2020-11-25 DIAGNOSIS — Z87.2 PERSONAL HISTORY OF DISEASES OF THE SKIN AND SUBCUTANEOUS TISSUE: ICD-10-CM

## 2020-11-25 DIAGNOSIS — Z23 ENCOUNTER FOR IMMUNIZATION: ICD-10-CM

## 2020-11-25 DIAGNOSIS — Z87.19 PERSONAL HISTORY OF OTHER DISEASES OF THE DIGESTIVE SYSTEM: ICD-10-CM

## 2020-11-25 DIAGNOSIS — D49.0 NEOPLASM OF UNSPECIFIED BEHAVIOR OF DIGESTIVE SYSTEM: ICD-10-CM

## 2020-11-25 PROCEDURE — G0447 BEHAVIOR COUNSEL OBESITY 15M: CPT

## 2020-11-25 PROCEDURE — 36415 COLL VENOUS BLD VENIPUNCTURE: CPT

## 2020-11-25 PROCEDURE — 99214 OFFICE O/P EST MOD 30 MIN: CPT | Mod: 25

## 2020-11-25 PROCEDURE — 82270 OCCULT BLOOD FECES: CPT

## 2020-11-25 RX ORDER — HYDROCORTISONE 25 MG/G
2.5 CREAM TOPICAL
Qty: 30 | Refills: 0 | Status: ACTIVE | COMMUNITY
Start: 2020-11-07

## 2020-11-25 NOTE — HISTORY OF PRESENT ILLNESS
[FreeTextEntry1] : Patient comes for followup of his chronic medical problems. Patient feels well without any chest pain, shortness of breath, paroxysmal nocturnal dyspnea orthopnea. Only with his chronic low back pain to  lower extremities which recently is more persistent. His lower GI bleeding resolved. Patient seen by the gastroenterologist and he had upper and lower endoscopies.

## 2020-11-25 NOTE — ASSESSMENT
[FreeTextEntry1] :  #1 hypertension, stable.Same treatment and strict low-salt diet. \par #2 GE reflux, stable.same treatment\par #3 hyperlipidemia.Same treatment and low fat diet.\par #4 monitor liver toxicity due to chronic medication use\par #5 Hx of low B12. Level sent to the lab\par #6 hypovitaminosis D., being supplemented\par #7 lumbar spine radiculopathy. stable.Continue with tylenol 500 mg po q6h  day p.r.n. and Voltaren gel 1.5% ,3 times a day p.r.n. Side effects of medications explained to patient who understood.Have also MRI of the lumbar spine since his symptoms are more persistent and severe recently\par #8 History of thrombocytopenia. Patient is followed by the hematologist.\par #9 benign prostatic hypertrophy, stable\par #10 prediabetes. Patient advised again to stick to a diabetic diet and try to lose some weight.\par #11 obesity. Patient again advised to try to lose weight by cutting back on his calories and increasing his activity. Importance emphasized to him. At approximately 15 minutes spent\par Plan. Patient is advised to return after 3 months

## 2020-11-25 NOTE — PHYSICAL EXAM
[No Acute Distress] : no acute distress [Well Nourished] : well nourished [Well Developed] : well developed [Well-Appearing] : well-appearing [Normal Voice/Communication] : normal voice/communication [Normal Sclera/Conjunctiva] : normal sclera/conjunctiva [EOMI] : extraocular movements intact [Normal Outer Ear/Nose] : the outer ears and nose were normal in appearance [Normal TMs] : both tympanic membranes were normal [No JVD] : no jugular venous distention [Supple] : supple [No Lymphadenopathy] : no lymphadenopathy [Thyroid Normal, No Nodules] : the thyroid was normal and there were no nodules present [No Respiratory Distress] : no respiratory distress  [Clear to Auscultation] : lungs were clear to auscultation bilaterally [No Accessory Muscle Use] : no accessory muscle use [Normal Percussion] : the chest was normal to percussion [Normal Rate] : normal rate  [Regular Rhythm] : with a regular rhythm [Normal S1, S2] : normal S1 and S2 [No Murmur] : no murmur heard [No Carotid Bruits] : no carotid bruits [No Abdominal Bruit] : a ~M bruit was not heard ~T in the abdomen [No Varicosities] : no varicosities [Pedal Pulses Present] : the pedal pulses are present [No Edema] : there was no peripheral edema [No Extremity Clubbing/Cyanosis] : no extremity clubbing/cyanosis [No Palpable Aorta] : no palpable aorta [No Axillary Lymphadenopathy] : no axillary lymphadenopathy [Soft] : abdomen soft [Non Tender] : non-tender [Non-distended] : non-distended [No Masses] : no abdominal mass palpated [No HSM] : no HSM [Normal Bowel Sounds] : normal bowel sounds [Normal Sphincter Tone] : normal sphincter tone [No Mass] : no mass [Normal Supraclavicular Nodes] : no supraclavicular lymphadenopathy [Normal Axillary Nodes] : no axillary lymphadenopathy [Normal Posterior Cervical Nodes] : no posterior cervical lymphadenopathy [Normal Femoral Nodes] : no femoral lymphadenopathy [Normal Anterior Cervical Nodes] : no anterior cervical lymphadenopathy [Normal Inguinal Nodes] : no inguinal lymphadenopathy [No CVA Tenderness] : no CVA  tenderness [No Spinal Tenderness] : no spinal tenderness [No Joint Swelling] : no joint swelling [Grossly Normal Strength/Tone] : grossly normal strength/tone [No Skin Lesions] : no skin lesions [Normal Gait] : normal gait [Coordination Grossly Intact] : coordination grossly intact [No Focal Deficits] : no focal deficits [Deep Tendon Reflexes (DTR)] : deep tendon reflexes were 2+ and symmetric [Speech Grossly Normal] : speech grossly normal [Normal Affect] : the affect was normal [Alert and Oriented x3] : oriented to person, place, and time [Normal Mood] : the mood was normal [Normal Insight/Judgement] : insight and judgment were intact [Comprehensive Foot Exam Normal] : Right and left foot were examined and both feet are normal. No ulcers in either foot. Toes are normal and with full ROM.  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

## 2020-11-30 LAB
25(OH)D3 SERPL-MCNC: 28.6 NG/ML
ALBUMIN SERPL ELPH-MCNC: 4.5 G/DL
ALP BLD-CCNC: 51 U/L
ALT SERPL-CCNC: 16 U/L
ANION GAP SERPL CALC-SCNC: 8 MMOL/L
APPEARANCE: CLEAR
AST SERPL-CCNC: 17 U/L
BACTERIA: NEGATIVE
BASOPHILS # BLD AUTO: 0.01 K/UL
BASOPHILS NFR BLD AUTO: 0.2 %
BILIRUB SERPL-MCNC: 0.6 MG/DL
BILIRUBIN URINE: NEGATIVE
BLOOD URINE: NORMAL
BUN SERPL-MCNC: 14 MG/DL
CALCIUM SERPL-MCNC: 9.1 MG/DL
CHLORIDE SERPL-SCNC: 104 MMOL/L
CHOLEST SERPL-MCNC: 115 MG/DL
CO2 SERPL-SCNC: 28 MMOL/L
COLOR: NORMAL
CREAT SERPL-MCNC: 1.18 MG/DL
CREAT SPEC-SCNC: 45 MG/DL
EOSINOPHIL # BLD AUTO: 0.01 K/UL
EOSINOPHIL NFR BLD AUTO: 0.2 %
ESTIMATED AVERAGE GLUCOSE: 114 MG/DL
GLUCOSE QUALITATIVE U: NEGATIVE
GLUCOSE SERPL-MCNC: 92 MG/DL
HBA1C MFR BLD HPLC: 5.6 %
HCT VFR BLD CALC: 46.7 %
HDLC SERPL-MCNC: 51 MG/DL
HGB BLD-MCNC: 15.2 G/DL
HYALINE CASTS: 0 /LPF
IMM GRANULOCYTES NFR BLD AUTO: 0.6 %
KETONES URINE: NEGATIVE
LDLC SERPL CALC-MCNC: 54 MG/DL
LEUKOCYTE ESTERASE URINE: NEGATIVE
LYMPHOCYTES # BLD AUTO: 1.48 K/UL
LYMPHOCYTES NFR BLD AUTO: 28.1 %
MAN DIFF?: NORMAL
MCHC RBC-ENTMCNC: 31.1 PG
MCHC RBC-ENTMCNC: 32.5 GM/DL
MCV RBC AUTO: 95.7 FL
MICROALBUMIN 24H UR DL<=1MG/L-MCNC: <1.2 MG/DL
MICROALBUMIN/CREAT 24H UR-RTO: NORMAL MG/G
MICROSCOPIC-UA: NORMAL
MONOCYTES # BLD AUTO: 1.35 K/UL
MONOCYTES NFR BLD AUTO: 25.7 %
NEUTROPHILS # BLD AUTO: 2.38 K/UL
NEUTROPHILS NFR BLD AUTO: 45.2 %
NITRITE URINE: NEGATIVE
NONHDLC SERPL-MCNC: 64 MG/DL
PH URINE: 6.5
PLATELET # BLD AUTO: 89 K/UL
POTASSIUM SERPL-SCNC: 4.3 MMOL/L
PROT SERPL-MCNC: 7.1 G/DL
PROTEIN URINE: NEGATIVE
RBC # BLD: 4.88 M/UL
RBC # FLD: 12.4 %
RED BLOOD CELLS URINE: 4 /HPF
SODIUM SERPL-SCNC: 140 MMOL/L
SPECIFIC GRAVITY URINE: 1.01
SQUAMOUS EPITHELIAL CELLS: 0 /HPF
TRIGL SERPL-MCNC: 53 MG/DL
UROBILINOGEN URINE: NORMAL
VIT B12 SERPL-MCNC: 870 PG/ML
WBC # FLD AUTO: 5.26 K/UL
WHITE BLOOD CELLS URINE: 0 /HPF

## 2021-02-16 ENCOUNTER — RX RENEWAL (OUTPATIENT)
Age: 81
End: 2021-02-16

## 2021-03-17 ENCOUNTER — LABORATORY RESULT (OUTPATIENT)
Age: 81
End: 2021-03-17

## 2021-03-17 ENCOUNTER — NON-APPOINTMENT (OUTPATIENT)
Age: 81
End: 2021-03-17

## 2021-03-17 ENCOUNTER — APPOINTMENT (OUTPATIENT)
Dept: INTERNAL MEDICINE | Facility: CLINIC | Age: 81
End: 2021-03-17
Payer: MEDICARE

## 2021-03-17 VITALS
DIASTOLIC BLOOD PRESSURE: 77 MMHG | TEMPERATURE: 97.8 F | HEART RATE: 46 BPM | SYSTOLIC BLOOD PRESSURE: 158 MMHG | BODY MASS INDEX: 33.39 KG/M2 | WEIGHT: 217.79 LBS | HEIGHT: 67.91 IN | OXYGEN SATURATION: 98 %

## 2021-03-17 VITALS — SYSTOLIC BLOOD PRESSURE: 150 MMHG | DIASTOLIC BLOOD PRESSURE: 76 MMHG

## 2021-03-17 VITALS — RESPIRATION RATE: 16 BRPM

## 2021-03-17 PROCEDURE — G0447 BEHAVIOR COUNSEL OBESITY 15M: CPT

## 2021-03-17 PROCEDURE — 99215 OFFICE O/P EST HI 40 MIN: CPT | Mod: 25

## 2021-03-17 PROCEDURE — 36415 COLL VENOUS BLD VENIPUNCTURE: CPT

## 2021-03-17 PROCEDURE — 93000 ELECTROCARDIOGRAM COMPLETE: CPT

## 2021-03-17 PROCEDURE — 99072 ADDL SUPL MATRL&STAF TM PHE: CPT

## 2021-03-17 PROCEDURE — G0439: CPT

## 2021-03-17 RX ORDER — ESOMEPRAZOLE MAGNESIUM 40 MG/1
40 CAPSULE, DELAYED RELEASE ORAL
Qty: 90 | Refills: 3 | Status: DISCONTINUED | COMMUNITY
Start: 2019-05-01 | End: 2021-03-17

## 2021-03-17 NOTE — PHYSICAL EXAM
[No Acute Distress] : no acute distress [Well Nourished] : well nourished [Well Developed] : well developed [Well-Appearing] : well-appearing [Normal Voice/Communication] : normal voice/communication [Normal Sclera/Conjunctiva] : normal sclera/conjunctiva [PERRL] : pupils equal round and reactive to light [EOMI] : extraocular movements intact [Normal Outer Ear/Nose] : the outer ears and nose were normal in appearance [Normal Oropharynx] : the oropharynx was normal [Normal TMs] : both tympanic membranes were normal [Normal Nasal Mucosa] : the nasal mucosa was normal [No JVD] : no jugular venous distention [Supple] : supple [No Lymphadenopathy] : no lymphadenopathy [Thyroid Normal, No Nodules] : the thyroid was normal and there were no nodules present [No Respiratory Distress] : no respiratory distress  [Clear to Auscultation] : lungs were clear to auscultation bilaterally [No Accessory Muscle Use] : no accessory muscle use [Normal Percussion] : the chest was normal to percussion [Normal Rate] : normal rate  [Regular Rhythm] : with a regular rhythm [Normal S1, S2] : normal S1 and S2 [No Murmur] : no murmur heard [No Carotid Bruits] : no carotid bruits [No Abdominal Bruit] : a ~M bruit was not heard ~T in the abdomen [No Varicosities] : no varicosities [Pedal Pulses Present] : the pedal pulses are present [No Edema] : there was no peripheral edema [No Extremity Clubbing/Cyanosis] : no extremity clubbing/cyanosis [No Palpable Aorta] : no palpable aorta [No Axillary Lymphadenopathy] : no axillary lymphadenopathy [Soft] : abdomen soft [Non Tender] : non-tender [Non-distended] : non-distended [No Masses] : no abdominal mass palpated [No HSM] : no HSM [Normal Bowel Sounds] : normal bowel sounds [No Hernias] : no hernias [Declined Rectal Exam] : declined rectal exam [Normal Supraclavicular Nodes] : no supraclavicular lymphadenopathy [Normal Axillary Nodes] : no axillary lymphadenopathy [Normal Posterior Cervical Nodes] : no posterior cervical lymphadenopathy [Normal Femoral Nodes] : no femoral lymphadenopathy [Normal Anterior Cervical Nodes] : no anterior cervical lymphadenopathy [Normal Inguinal Nodes] : no inguinal lymphadenopathy [No CVA Tenderness] : no CVA  tenderness [No Spinal Tenderness] : no spinal tenderness [No Joint Swelling] : no joint swelling [Grossly Normal Strength/Tone] : grossly normal strength/tone [No Rash] : no rash [No Skin Lesions] : no skin lesions [Normal Gait] : normal gait [Coordination Grossly Intact] : coordination grossly intact [No Focal Deficits] : no focal deficits [Deep Tendon Reflexes (DTR)] : deep tendon reflexes were 2+ and symmetric [Speech Grossly Normal] : speech grossly normal [Memory Grossly Normal] : memory grossly normal [Normal Affect] : the affect was normal [Alert and Oriented x3] : oriented to person, place, and time [Normal Mood] : the mood was normal [Normal Insight/Judgement] : insight and judgment were intact [Comprehensive Foot Exam Normal] : Right and left foot were examined and both feet are normal. No ulcers in either foot. Toes are normal and with full ROM.  Normal tactile sensation with monofilament testing throughout both feet [Kyphosis] : no kyphosis [Scoliosis] : no scoliosis [de-identified] : refused

## 2021-03-17 NOTE — HEALTH RISK ASSESSMENT
[Very Good] : ~his/her~  mood as very good [Yes] : Yes [No falls in past year] : Patient reported no falls in the past year [Patient reported colonoscopy was normal] : Patient reported colonoscopy was normal [None] : None [With Family] : lives with family [Retired] : retired [High School] : high school [] :  [Fully functional (bathing, dressing, toileting, transferring, walking, feeding)] : Fully functional (bathing, dressing, toileting, transferring, walking, feeding) [Fully functional (using the telephone, shopping, preparing meals, housekeeping, doing laundry, using] : Fully functional and needs no help or supervision to perform IADLs (using the telephone, shopping, preparing meals, housekeeping, doing laundry, using transportation, managing medications and managing finances) [Smoke Detector] : smoke detector [Carbon Monoxide Detector] : carbon monoxide detector [Safety elements used in home] : safety elements used in home [Seat Belt] :  uses seat belt [Sunscreen] : uses sunscreen [Discussed at today's visit] : Advance Directives Discussed at today's visit [FreeTextEntry1] : health-family [de-identified] : socialy [Change in mental status noted] : No change in mental status noted [Sexually Active] : not sexually active [Reports changes in hearing] : Reports no changes in hearing [Reports changes in vision] : Reports no changes in vision [Reports changes in dental health] : Reports no changes in dental health [Travel to Developing Areas] : does not  travel to developing areas [TB Exposure] : is not being exposed to tuberculosis [Caregiver Concerns] : does not have caregiver concerns [AdvancecareDate] : 1-31-20

## 2021-03-17 NOTE — ASSESSMENT
[FreeTextEntry1] : Diagnosis #1 history of aortic stenosis , status post valve replacement and MR. Patient is stable and he advised be  followed by the cardiologist.\par #2 hypertension, slightly up. Patient missed his medication this morning.Continue with same medication and strict  low salt diet.cmp to lab\par #3 hyperlipidemia. Continue with same medication and low-fat diet.lipids to lab\par #4 monitor liver  toxicity due to chronic medication use.cmp to lab\par #5 hypovitaminosis D., being supplemented.level to lab\par #6 GE reflux, stable. cbc tolab.Patient is on pantoprazole 40 mg p.o. b.i.d. Since with occasional symptoms patient advised to take Maalox 2 pills p.o. nightly before sleep or after each meal daily p.o.prn\par #7 history of low B12.  level sent to the lab\par #8 low back pain to l flank . To continue with Tylenol 500 mg p.o. every 6 hours p.r.n.\par #9  History of thrombocytosis. CBC sent to lab\par #10 obesity. Patient again advised to try to lose weight by cutting back on his calories and increasing his activity. The importance emphasized to him. Approximately 15 minutes spent\par #11 history of hypovitaminosis D., being supplemented. Level  sent to the lab\par Plan . Patient advised to return after 1  month for HTN f/u.Approximately 45 minutes spent totally

## 2021-03-17 NOTE — HISTORY OF PRESENT ILLNESS
[FreeTextEntry1] : Patient comes for his Medicare annual wellness visit and followup of his chronic medical problems. Patient feels well without any chest pain, shortness of breath, paroxysmal nocturnal dyspnea or orthopnea. . His gastrointestinal reflux symptoms remain stable.Sometimes he has low back pain . Most recent blood tests, consults, medication list, allergies reviewed

## 2021-03-18 LAB
25(OH)D3 SERPL-MCNC: 47.2 NG/ML
ALBUMIN SERPL ELPH-MCNC: 4.2 G/DL
ALP BLD-CCNC: 51 U/L
ALT SERPL-CCNC: 17 U/L
ANION GAP SERPL CALC-SCNC: 9 MMOL/L
APPEARANCE: CLEAR
AST SERPL-CCNC: 22 U/L
BACTERIA: NEGATIVE
BASOPHILS # BLD AUTO: 0.02 K/UL
BASOPHILS NFR BLD AUTO: 0.4 %
BILIRUB SERPL-MCNC: 0.4 MG/DL
BILIRUBIN URINE: NEGATIVE
BLOOD URINE: NEGATIVE
BUN SERPL-MCNC: 12 MG/DL
CALCIUM SERPL-MCNC: 8.9 MG/DL
CHLORIDE SERPL-SCNC: 106 MMOL/L
CHOLEST SERPL-MCNC: 95 MG/DL
CO2 SERPL-SCNC: 29 MMOL/L
COLOR: NORMAL
CREAT SERPL-MCNC: 1.27 MG/DL
CREAT SPEC-SCNC: 47 MG/DL
EOSINOPHIL # BLD AUTO: 0.04 K/UL
EOSINOPHIL NFR BLD AUTO: 0.8 %
ESTIMATED AVERAGE GLUCOSE: 123 MG/DL
GLUCOSE QUALITATIVE U: NEGATIVE
GLUCOSE SERPL-MCNC: 93 MG/DL
HBA1C MFR BLD HPLC: 5.9 %
HCT VFR BLD CALC: 47.2 %
HDLC SERPL-MCNC: 45 MG/DL
HGB BLD-MCNC: 14.7 G/DL
HYALINE CASTS: 0 /LPF
IMM GRANULOCYTES NFR BLD AUTO: 0.2 %
KETONES URINE: NEGATIVE
LDLC SERPL CALC-MCNC: 42 MG/DL
LEUKOCYTE ESTERASE URINE: NEGATIVE
LYMPHOCYTES # BLD AUTO: 1.75 K/UL
LYMPHOCYTES NFR BLD AUTO: 33.1 %
MAN DIFF?: NORMAL
MCHC RBC-ENTMCNC: 30.9 PG
MCHC RBC-ENTMCNC: 31.1 GM/DL
MCV RBC AUTO: 99.2 FL
MICROALBUMIN 24H UR DL<=1MG/L-MCNC: 1.2 MG/DL
MICROALBUMIN/CREAT 24H UR-RTO: NORMAL MG/G
MICROSCOPIC-UA: NORMAL
MONOCYTES # BLD AUTO: 1.21 K/UL
MONOCYTES NFR BLD AUTO: 22.9 %
NEUTROPHILS # BLD AUTO: 2.26 K/UL
NEUTROPHILS NFR BLD AUTO: 42.6 %
NITRITE URINE: NEGATIVE
NONHDLC SERPL-MCNC: 49 MG/DL
PH URINE: 6.5
PLATELET # BLD AUTO: 80 K/UL
POTASSIUM SERPL-SCNC: 4.2 MMOL/L
PROT SERPL-MCNC: 6.7 G/DL
PROTEIN URINE: NEGATIVE
RBC # BLD: 4.76 M/UL
RBC # FLD: 12.9 %
RED BLOOD CELLS URINE: 0 /HPF
SODIUM SERPL-SCNC: 144 MMOL/L
SPECIFIC GRAVITY URINE: 1.01
SQUAMOUS EPITHELIAL CELLS: 0 /HPF
TRIGL SERPL-MCNC: 38 MG/DL
UROBILINOGEN URINE: NORMAL
VIT B12 SERPL-MCNC: 789 PG/ML
WBC # FLD AUTO: 5.29 K/UL
WHITE BLOOD CELLS URINE: 0 /HPF

## 2021-03-31 ENCOUNTER — APPOINTMENT (OUTPATIENT)
Dept: GASTROENTEROLOGY | Facility: CLINIC | Age: 81
End: 2021-03-31

## 2021-04-16 ENCOUNTER — APPOINTMENT (OUTPATIENT)
Dept: INTERNAL MEDICINE | Facility: CLINIC | Age: 81
End: 2021-04-16
Payer: MEDICARE

## 2021-04-16 PROCEDURE — 99212 OFFICE O/P EST SF 10 MIN: CPT

## 2021-04-16 PROCEDURE — 99072 ADDL SUPL MATRL&STAF TM PHE: CPT

## 2021-04-20 NOTE — ED PROVIDER NOTE - CROS ED GU ALL NEG
Detail Level: Detailed Patient Specific Counseling (Will Not Stick From Patient To Patient): Noted in HPI - - -

## 2021-05-17 ENCOUNTER — APPOINTMENT (OUTPATIENT)
Dept: INTERNAL MEDICINE | Facility: CLINIC | Age: 81
End: 2021-05-17
Payer: MEDICARE

## 2021-05-17 VITALS — HEART RATE: 50 BPM | DIASTOLIC BLOOD PRESSURE: 67 MMHG | RESPIRATION RATE: 14 BRPM | SYSTOLIC BLOOD PRESSURE: 134 MMHG

## 2021-05-17 PROCEDURE — 99072 ADDL SUPL MATRL&STAF TM PHE: CPT

## 2021-05-17 PROCEDURE — 99213 OFFICE O/P EST LOW 20 MIN: CPT

## 2021-05-17 NOTE — ASSESSMENT
[FreeTextEntry1] : Diagnosis #1 history of aortic stenosis , status post valve replacement and MR. Patient is stable and he advised be  followed by the cardiologist.\par #2 hypertension, normal now .Continue current treatment and strict low-salt diet\par Plan. The patient is to return after 3 weeks

## 2021-05-17 NOTE — HISTORY OF PRESENT ILLNESS
[FreeTextEntry1] : Patient comes for f/u of his hypertension Patient feels well without any chest pain, shortness of breath, paroxysmal nocturnal dyspnea or orthopnea.

## 2021-05-17 NOTE — PHYSICAL EXAM
[No Acute Distress] : no acute distress [Well Nourished] : well nourished [Well Developed] : well developed [Well-Appearing] : well-appearing [Normal Voice/Communication] : normal voice/communication [Normal Oropharynx] : the oropharynx was normal [No JVD] : no jugular venous distention [Supple] : supple [No Lymphadenopathy] : no lymphadenopathy [Thyroid Normal, No Nodules] : the thyroid was normal and there were no nodules present [No Respiratory Distress] : no respiratory distress  [Clear to Auscultation] : lungs were clear to auscultation bilaterally [No Accessory Muscle Use] : no accessory muscle use [Normal Percussion] : the chest was normal to percussion [Normal Rate] : normal rate  [Regular Rhythm] : with a regular rhythm [Normal S1, S2] : normal S1 and S2 [No Murmur] : no murmur heard [No Edema] : there was no peripheral edema [No Axillary Lymphadenopathy] : no axillary lymphadenopathy [Soft] : abdomen soft [Non Tender] : non-tender [Non-distended] : non-distended [No Masses] : no abdominal mass palpated [No HSM] : no HSM [Normal Bowel Sounds] : normal bowel sounds [No Hernias] : no hernias [Coordination Grossly Intact] : coordination grossly intact [Speech Grossly Normal] : speech grossly normal [Memory Grossly Normal] : memory grossly normal [Normal Affect] : the affect was normal [Alert and Oriented x3] : oriented to person, place, and time [Normal Mood] : the mood was normal [Normal Insight/Judgement] : insight and judgment were intact [Normal Gait] : normal gait

## 2021-05-24 ENCOUNTER — APPOINTMENT (OUTPATIENT)
Dept: UROLOGY | Facility: CLINIC | Age: 81
End: 2021-05-24
Payer: MEDICARE

## 2021-05-24 VITALS
HEIGHT: 70 IN | WEIGHT: 214 LBS | TEMPERATURE: 97 F | DIASTOLIC BLOOD PRESSURE: 56 MMHG | BODY MASS INDEX: 30.64 KG/M2 | SYSTOLIC BLOOD PRESSURE: 124 MMHG | HEART RATE: 58 BPM | RESPIRATION RATE: 15 BRPM

## 2021-05-24 DIAGNOSIS — Z86.018 PERSONAL HISTORY OF OTHER BENIGN NEOPLASM: ICD-10-CM

## 2021-05-24 PROCEDURE — 99214 OFFICE O/P EST MOD 30 MIN: CPT

## 2021-05-24 PROCEDURE — 99072 ADDL SUPL MATRL&STAF TM PHE: CPT

## 2021-05-24 PROCEDURE — 88112 CYTOPATH CELL ENHANCE TECH: CPT | Mod: 26

## 2021-05-24 NOTE — PHYSICAL EXAM
[General Appearance - Well Developed] : well developed [Normal Appearance] : normal appearance [General Appearance - In No Acute Distress] : no acute distress [Abdomen Soft] : soft [Skin Color & Pigmentation] : normal skin color and pigmentation [Abdomen Tenderness] : non-tender [Edema] : no peripheral edema [] : no respiratory distress [Respiration, Rhythm And Depth] : normal respiratory rhythm and effort [Exaggerated Use Of Accessory Muscles For Inspiration] : no accessory muscle use [Oriented To Time, Place, And Person] : oriented to person, place, and time [Affect] : the affect was normal [Mood] : the mood was normal [Not Anxious] : not anxious [Normal Station and Gait] : the gait and station were normal for the patient's age [No Focal Deficits] : no focal deficits

## 2021-05-24 NOTE — ASSESSMENT
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 The patient took tamsulosin in the past, but stopped due to light headiness. His urination remained satisfactory after discontinuing the medication. The patient's last PSA 04/24/17 was 0.69. The patient was in the ER 01/01/17 for abdominal pain. No etiology for the pain was identified and has since resolved. CT of the abdomen and pelvis in the ER found bilateral renal cysts and a 5 mm non obstructing right renal calculus. \par 1/17/18: CT scan was reviewed and discussed  in office which found no hydronephrosis or recurrent tumors . There are 4 cysts on the  left kidney and 2 cysts on the right.  There is a Stone in the right kidney in the upper portion. He reported earlier he was experiencing some left flank pain. At night he wakes up once to urinate. He remained on finasteride 5 mg once daily.\par 5/29/18: The patient returned and reported he wakes up once during the night to urinate. Currently taking Finasteride. Complained of intermittent left lumbar pain that remained the same since last visit. Daughter noted he does not drink enough water. \par 11/19/18: The patient returned today for a renal US. Renal US findings showed again there is a 5.5 mm echogenic focus with distal shadowing in the upper pole of the right kidney with bilateral simple non vascular cysts. There is a new septated cyst in the mid outer pole of the left kidney. Both kidneys appear normal in size and echogenicity. No hydronephrosis, or solid masses visualized. PSA from 3/29/18 was 0.71 ng/mL. Notes he was recently hospitalized for LE weakness and pain. Will be evaluated by a cardiologist for possible loop recorder placement. \par 5/22/19: Male patient presents today for a follow up. Blood work was completed 5/1/19 as per Dr. Ramirez. PSA was measured in January 2019  0.94 and creatinine as of May 2019 was 1.18 mg/dL. regarding urination he denies dysuria, gross hematuria, urgency or incontinence. He will wake up 2x a night to urinate. He has a Hx of high blood pressure and hyperlipidemia that are both well controlled on medication.\par \par 11/21/2019: Patient presents today for a follow up. Overall, he is doing well. He states his urination is good. Wakes up 1-2 times during the night to urinate. Patient denies dysuria, gross hematuria, urgency, or incontinence. Today, his only complaint is of some back pain. A urinalysis from  9/11/2019 was small positive for hematuria. His creatinine from 9/11/2019 was 1.29 mg/dL. He has not had a PSA measured since his last done January 2019 of 0.94 ng/mL. Small cyst/possible kidney stone in kidney found in the prior ultrasound of last year dated 11/19/2018. Digital rectal exam found no suspicious rectal masses. Anal tone is normal. The prostate is non tender, with normal texture, discrete borders, and no nodules. It is a 30 gram transurethral resection size. No gross blood on the examining finger. A little external hemorrhoid at 1 o'clock position was found though not inflamed. Due the presence of a small cyst/possible kidney stone found in the ultrasound report from last year dated 11/19/2018, a renal ultrasound has been ordered today and will be obtained by the office. Finding: Again there is a 5.5 mm echogenic focus with distal shadowing in the upper pole of the right kidney with bilateral simple non vascular cysts. There is a new septated cyst in the mid outer pole of the left kidney. Both kidneys appear normal in size and echogenicity. No hydronephrosis, or solid masses visualized. In my opinion, it looks more like a Jeremie's Plaque than a kidney stone.\par \par 11/23/2020: Patient presents today for follow up. Takes Finasteride 5mg once daily. Urination is good. Wakes 1-2x at night to urinate. Denies urinary urgency, pushing or straining, dysuria, gross hematuria. No constipation or chest pain. Had blood work by PCP on 9/23/20 showing creatinine of 1.20 and HbA1c of 5.8. Offers no new complaints today. Has appointment with PCP Dr. Ramirez tomorrow. \par \par 05/24/2021: Patient presents today for follow up. Wakes 1-2x at night to urinate. Denies dysuria, gross hematuria, urinary urgency, pushing or straining. Continues to take Finasteride. Has pain in lower back radiating down to leg and follows Dr. Ramirez for this. Hx of kidney stones. Traveling to [facility] for two months next month. \par \par Renewed Finasteride. \par \par Patient will schedule for a renal US for hx of kidney stones\par \par Blood work today included BMP, alkaline phosphatase, PSA, and testosterone. \par The patient produced a urine sample which will be sent for urinalysis, urine cytology, and urine culture. \par \par Patient will schedule a telehealth visit to review renal US results. \par \par Preparation, in-person office visit, and coordination of care took: 30 minutes

## 2021-05-24 NOTE — ADDENDUM
[FreeTextEntry1] : I, Genie Manein, acted solely as a scribe for Dr. Oscar Mace on this date 05/24/2021.\par \par All medical record entries made by the Scribe were at my, Dr. Oscar Mace, direction and personally dictated by me on 05/24/2021. I have reviewed the chart and agree that the record accurately reflects my personal performance of the history, physical exam, assessment and plan.  I have also personally directed, reviewed and agreed with the chart.

## 2021-05-24 NOTE — HISTORY OF PRESENT ILLNESS
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 The patient took tamsulosin in the past, but stopped due to light headiness. His urination remained satisfactory after discontinuing the medication. The patient's last PSA 04/24/17 was 0.69. The patient was in the ER 01/01/17 for abdominal pain. No etiology for the pain was identified and has since resolved. CT of the abdomen and pelvis in the ER found bilateral renal cysts and a 5 mm non obstructing right renal calculus. \par 1/17/18: CT scan was reviewed and discussed  in office which found no hydronephrosis or recurrent tumors . There are 4 cysts on the  left kidney and 2 cysts on the right.  There is a Stone in the right kidney in the upper portion. He reported earlier he was experiencing some left flank pain. At night he wakes up once to urinate. He remained on finasteride 5 mg once daily.\par 5/29/18: The patient returned and reported he wakes up once during the night to urinate. Currently taking Finasteride. Complained of intermittent left lumbar pain that remained the same since last visit. Daughter noted he does not drink enough water. \par 11/19/18: The patient returned today for a renal US. Renal US findings showed again there is a 5.5 mm echogenic focus with distal shadowing in the upper pole of the right kidney with bilateral simple non vascular cysts. There is a new septated cyst in the mid outer pole of the left kidney. Both kidneys appear normal in size and echogenicity. No hydronephrosis, or solid masses visualized. PSA from 3/29/18 was 0.71 ng/mL. Notes he was recently hospitalized for LE weakness and pain. Will be evaluated by a cardiologist for possible loop recorder placement. \par 5/22/19: Male patient presents today for a follow up. Blood work was completed 5/1/19 as per Dr. Ramirez. PSA was measured in January 2019  0.94 and creatinine as of May 2019 was 1.18 mg/dL. regarding urination he denies dysuria, gross hematuria, urgency or incontinence. He will wake up 2x a night to urinate. He has a Hx of high blood pressure and hyperlipidemia that are both well controlled on medication.\par \par 11/21/2019: Patient presents today for a follow up. Overall, he is doing well. He states his urination is good. Wakes up 1-2 times during the night to urinate. Patient denies dysuria, gross hematuria, urgency, or incontinence. Today, his only complaint is of some back pain. A urinalysis from  9/11/2019 was small positive for hematuria. His creatinine from 9/11/2019 was 1.29 mg/dL. He has not had a PSA measured since his last done January 2019 of 0.94 ng/mL. Small cyst/possible kidney stone in kidney found in the prior ultrasound of last year dated 11/19/2018. Digital rectal exam found no suspicious rectal masses. Anal tone is normal. The prostate is non tender, with normal texture, discrete borders, and no nodules. It is a 30 gram transurethral resection size. No gross blood on the examining finger. A little external hemorrhoid at 1 o'clock position was found though not inflamed. Due the presence of a small cyst/possible kidney stone found in the ultrasound report from last year dated 11/19/2018, a renal ultrasound has been ordered today and will be obtained by the office. Finding: Again there is a 5.5 mm echogenic focus with distal shadowing in the upper pole of the right kidney with bilateral simple non vascular cysts. There is a new septated cyst in the mid outer pole of the left kidney. Both kidneys appear normal in size and echogenicity. No hydronephrosis, or solid masses visualized. In my opinion, it looks more like a Jeremie's Plaque than a kidney stone.\par \par 11/23/2020: Patient presents today for follow up. Takes Finasteride 5mg once daily. Urination is good. Wakes 1-2x at night to urinate. Denies urinary urgency, pushing or straining, dysuria, gross hematuria. No constipation or chest pain. Had blood work by PCP on 9/23/20 showing creatinine of 1.20 and HbA1c of 5.8. Offers no new complaints today. Has appointment with PCP Dr. Ramirez tomorrow. \par \par 05/24/2021: Patient presents today for follow up. Wakes 1-2x at night to urinate. Denies dysuria, gross hematuria, urinary urgency, pushing or straining. Continues to take Finasteride. Has pain in lower back radiating down to leg and follows Dr. Ramirez for this. Hx of kidney stones. Traveling to [facility] for two months next month. \par

## 2021-05-24 NOTE — REASON FOR VISIT
Per fax there was no changes to dosage, sig or quantity.  The medication(s) are set up as pending and waiting for your approval.  Preferred pharmacy was set up and verified.   [Follow-up Visit ___] : a follow-up visit  for [unfilled] [Spouse] : spouse

## 2021-05-25 LAB
ALP BLD-CCNC: 55 U/L
ANION GAP SERPL CALC-SCNC: 11 MMOL/L
APPEARANCE: CLEAR
BACTERIA: NEGATIVE
BILIRUBIN URINE: NEGATIVE
BLOOD URINE: NORMAL
BUN SERPL-MCNC: 14 MG/DL
CALCIUM SERPL-MCNC: 9.5 MG/DL
CHLORIDE SERPL-SCNC: 104 MMOL/L
CO2 SERPL-SCNC: 25 MMOL/L
COLOR: COLORLESS
CREAT SERPL-MCNC: 1.39 MG/DL
ESTIMATED AVERAGE GLUCOSE: 120 MG/DL
GLUCOSE QUALITATIVE U: NEGATIVE
GLUCOSE SERPL-MCNC: 105 MG/DL
HBA1C MFR BLD HPLC: 5.8 %
HYALINE CASTS: 0 /LPF
KETONES URINE: NEGATIVE
LEUKOCYTE ESTERASE URINE: NEGATIVE
MICROSCOPIC-UA: NORMAL
NITRITE URINE: NEGATIVE
PH URINE: 6.5
POTASSIUM SERPL-SCNC: 4.1 MMOL/L
PROTEIN URINE: NEGATIVE
PSA SERPL-MCNC: 0.6 NG/ML
RED BLOOD CELLS URINE: 0 /HPF
SODIUM SERPL-SCNC: 140 MMOL/L
SPECIFIC GRAVITY URINE: 1
SQUAMOUS EPITHELIAL CELLS: 0 /HPF
UROBILINOGEN URINE: NORMAL
WHITE BLOOD CELLS URINE: 0 /HPF

## 2021-05-26 LAB
BACTERIA UR CULT: NORMAL
URINE CYTOLOGY: NORMAL

## 2021-05-29 LAB
TESTOST BND SERPL-MCNC: 4 PG/ML
TESTOST SERPL-MCNC: 529.1 NG/DL

## 2021-06-09 ENCOUNTER — APPOINTMENT (OUTPATIENT)
Dept: INTERNAL MEDICINE | Facility: CLINIC | Age: 81
End: 2021-06-09
Payer: MEDICARE

## 2021-06-09 ENCOUNTER — LABORATORY RESULT (OUTPATIENT)
Age: 81
End: 2021-06-09

## 2021-06-09 VITALS
SYSTOLIC BLOOD PRESSURE: 132 MMHG | DIASTOLIC BLOOD PRESSURE: 73 MMHG | HEART RATE: 52 BPM | OXYGEN SATURATION: 96 % | TEMPERATURE: 96.8 F

## 2021-06-09 VITALS — RESPIRATION RATE: 14 BRPM

## 2021-06-09 PROCEDURE — 36415 COLL VENOUS BLD VENIPUNCTURE: CPT

## 2021-06-09 PROCEDURE — 99214 OFFICE O/P EST MOD 30 MIN: CPT | Mod: 25

## 2021-06-09 PROCEDURE — 99072 ADDL SUPL MATRL&STAF TM PHE: CPT

## 2021-06-09 NOTE — HISTORY OF PRESENT ILLNESS
[FreeTextEntry1] : Patient comes for followup of his chronic medical problems. Patient feels well without any chest pain, shortness of breath, paroxysmal nocturnal dyspnea or orthopnea. . His gastrointestinal reflux symptoms remain stable.Sometimes he has low back pain . Most recent blood tests, consults, medication list, allergies reviewed

## 2021-06-09 NOTE — REVIEW OF SYSTEMS
[Negative] : Heme/Lymph [Back Pain] : back pain [FreeTextEntry7] : g-e reflux prn [de-identified] : pain legs

## 2021-06-09 NOTE — ASSESSMENT
[FreeTextEntry1] : Diagnosis #1 history of aortic stenosis , status post valve replacement and MR. Patient is stable and he advised be  followed by the cardiologist.\par #2 hypertension,stable.cmp to lab\par #3 hyperlipidemia. Continue with same medication and low-fat diet.lipids to lab\par #4 monitor liver  toxicity due to chronic medication use.cmp to lab\par #5 hypovitaminosis D., being supplemented.level to lab\par #6 GE reflux, stable. cbc tolab.Patient is on pantoprazole 40 mg p.o. b.i.d. \par #7 history of low B12.  level sent to the lab\par #8 low back pain to l flank . To continue with Tylenol 500 mg p.o. every 6 hours p.r.n.f/u by pain \par #9  History of thrombocytosis. CBC sent to lab\par #10  history of hypovitaminosis D., being supplemented. Level  sent to the lab\par Plan . Patient advised to return after 3 months

## 2021-06-09 NOTE — PHYSICAL EXAM
[No Acute Distress] : no acute distress [Well Nourished] : well nourished [Well Developed] : well developed [Well-Appearing] : well-appearing [Normal Voice/Communication] : normal voice/communication [Normal Sclera/Conjunctiva] : normal sclera/conjunctiva [PERRL] : pupils equal round and reactive to light [EOMI] : extraocular movements intact [Normal Outer Ear/Nose] : the outer ears and nose were normal in appearance [Normal Oropharynx] : the oropharynx was normal [Normal TMs] : both tympanic membranes were normal [Normal Nasal Mucosa] : the nasal mucosa was normal [No JVD] : no jugular venous distention [Supple] : supple [No Lymphadenopathy] : no lymphadenopathy [Thyroid Normal, No Nodules] : the thyroid was normal and there were no nodules present [No Respiratory Distress] : no respiratory distress  [Clear to Auscultation] : lungs were clear to auscultation bilaterally [No Accessory Muscle Use] : no accessory muscle use [Normal Percussion] : the chest was normal to percussion [Normal Rate] : normal rate  [Regular Rhythm] : with a regular rhythm [Normal S1, S2] : normal S1 and S2 [No Murmur] : no murmur heard [No Carotid Bruits] : no carotid bruits [No Abdominal Bruit] : a ~M bruit was not heard ~T in the abdomen [No Varicosities] : no varicosities [Pedal Pulses Present] : the pedal pulses are present [No Edema] : there was no peripheral edema [No Extremity Clubbing/Cyanosis] : no extremity clubbing/cyanosis [No Palpable Aorta] : no palpable aorta [No Axillary Lymphadenopathy] : no axillary lymphadenopathy [Soft] : abdomen soft [Non Tender] : non-tender [Non-distended] : non-distended [No Masses] : no abdominal mass palpated [No HSM] : no HSM [Normal Bowel Sounds] : normal bowel sounds [No Hernias] : no hernias [Declined Rectal Exam] : declined rectal exam [Normal Supraclavicular Nodes] : no supraclavicular lymphadenopathy [Normal Axillary Nodes] : no axillary lymphadenopathy [Normal Posterior Cervical Nodes] : no posterior cervical lymphadenopathy [Normal Femoral Nodes] : no femoral lymphadenopathy [Normal Anterior Cervical Nodes] : no anterior cervical lymphadenopathy [Normal Inguinal Nodes] : no inguinal lymphadenopathy [No CVA Tenderness] : no CVA  tenderness [No Spinal Tenderness] : no spinal tenderness [No Joint Swelling] : no joint swelling [Grossly Normal Strength/Tone] : grossly normal strength/tone [No Rash] : no rash [No Skin Lesions] : no skin lesions [Normal Gait] : normal gait [Coordination Grossly Intact] : coordination grossly intact [No Focal Deficits] : no focal deficits [Deep Tendon Reflexes (DTR)] : deep tendon reflexes were 2+ and symmetric [Speech Grossly Normal] : speech grossly normal [Memory Grossly Normal] : memory grossly normal [Normal Affect] : the affect was normal [Alert and Oriented x3] : oriented to person, place, and time [Normal Mood] : the mood was normal [Normal Insight/Judgement] : insight and judgment were intact [Comprehensive Foot Exam Normal] : Right and left foot were examined and both feet are normal. No ulcers in either foot. Toes are normal and with full ROM.  Normal tactile sensation with monofilament testing throughout both feet [Kyphosis] : no kyphosis [Scoliosis] : no scoliosis [de-identified] : refused

## 2021-06-10 LAB
25(OH)D3 SERPL-MCNC: 57.1 NG/ML
ALBUMIN SERPL ELPH-MCNC: 4.4 G/DL
ALP BLD-CCNC: 52 U/L
ALT SERPL-CCNC: 20 U/L
ANION GAP SERPL CALC-SCNC: 13 MMOL/L
APPEARANCE: CLEAR
AST SERPL-CCNC: 23 U/L
BACTERIA: NEGATIVE
BASOPHILS # BLD AUTO: 0 K/UL
BASOPHILS NFR BLD AUTO: 0 %
BILIRUB SERPL-MCNC: 0.4 MG/DL
BILIRUBIN URINE: NEGATIVE
BLOOD URINE: NORMAL
BUN SERPL-MCNC: 14 MG/DL
CALCIUM SERPL-MCNC: 9.4 MG/DL
CHLORIDE SERPL-SCNC: 105 MMOL/L
CHOLEST SERPL-MCNC: 115 MG/DL
CO2 SERPL-SCNC: 24 MMOL/L
COLOR: NORMAL
COVID-19 SPIKE DOMAIN ANTIBODY INTERPRETATION: POSITIVE
CREAT SERPL-MCNC: 1.35 MG/DL
CREAT SPEC-SCNC: 93 MG/DL
EOSINOPHIL # BLD AUTO: 0.02 K/UL
EOSINOPHIL NFR BLD AUTO: 0.4 %
ESTIMATED AVERAGE GLUCOSE: 123 MG/DL
GLUCOSE QUALITATIVE U: NEGATIVE
GLUCOSE SERPL-MCNC: 96 MG/DL
HBA1C MFR BLD HPLC: 5.9 %
HCT VFR BLD CALC: 44.4 %
HDLC SERPL-MCNC: 50 MG/DL
HGB BLD-MCNC: 14.3 G/DL
HYALINE CASTS: 1 /LPF
IMM GRANULOCYTES NFR BLD AUTO: 0.4 %
KETONES URINE: NEGATIVE
LDLC SERPL CALC-MCNC: 55 MG/DL
LEUKOCYTE ESTERASE URINE: NEGATIVE
LYMPHOCYTES # BLD AUTO: 1.57 K/UL
LYMPHOCYTES NFR BLD AUTO: 29.8 %
MAN DIFF?: NORMAL
MCHC RBC-ENTMCNC: 30.6 PG
MCHC RBC-ENTMCNC: 32.2 GM/DL
MCV RBC AUTO: 94.9 FL
MICROALBUMIN 24H UR DL<=1MG/L-MCNC: 6.6 MG/DL
MICROALBUMIN/CREAT 24H UR-RTO: 70 MG/G
MICROSCOPIC-UA: NORMAL
MONOCYTES # BLD AUTO: 1.23 K/UL
MONOCYTES NFR BLD AUTO: 23.3 %
NEUTROPHILS # BLD AUTO: 2.43 K/UL
NEUTROPHILS NFR BLD AUTO: 46.1 %
NITRITE URINE: NEGATIVE
NONHDLC SERPL-MCNC: 65 MG/DL
PH URINE: 6.5
PLATELET # BLD AUTO: 91 K/UL
POTASSIUM SERPL-SCNC: 4.2 MMOL/L
PROT SERPL-MCNC: 7.1 G/DL
PROTEIN URINE: NORMAL
RBC # BLD: 4.68 M/UL
RBC # FLD: 12.2 %
RED BLOOD CELLS URINE: 5 /HPF
SARS-COV-2 AB SERPL IA-ACNC: >250 U/ML
SODIUM SERPL-SCNC: 142 MMOL/L
SPECIFIC GRAVITY URINE: 1.01
SQUAMOUS EPITHELIAL CELLS: 0 /HPF
TRIGL SERPL-MCNC: 49 MG/DL
UROBILINOGEN URINE: NORMAL
WBC # FLD AUTO: 5.27 K/UL
WHITE BLOOD CELLS URINE: 0 /HPF

## 2021-08-25 NOTE — ED PROVIDER NOTE - ENMT NEGATIVE STATEMENT, MLM
Calls to states he has had diarrhea after eating - onset 2-3 weeks.  No abdominal pain. No bloody or black stools.  No Nausea/vomiting.  No fever/chills.  States she does not feel good today.  Taking fluids well.   Ears: no ear pain and no hearing problems.Nose: no nasal congestion and no nasal drainage.Mouth/Throat: no dysphagia, no hoarseness and no throat pain.Neck: no lumps, no pain, no stiffness and no swollen glands.

## 2021-09-23 ENCOUNTER — LABORATORY RESULT (OUTPATIENT)
Age: 81
End: 2021-09-23

## 2021-09-23 ENCOUNTER — APPOINTMENT (OUTPATIENT)
Dept: INTERNAL MEDICINE | Facility: CLINIC | Age: 81
End: 2021-09-23
Payer: MEDICARE

## 2021-09-23 VITALS
TEMPERATURE: 96.7 F | HEART RATE: 55 BPM | OXYGEN SATURATION: 98 % | HEIGHT: 68.82 IN | BODY MASS INDEX: 32.05 KG/M2 | WEIGHT: 216.36 LBS | DIASTOLIC BLOOD PRESSURE: 69 MMHG | SYSTOLIC BLOOD PRESSURE: 124 MMHG

## 2021-09-23 PROCEDURE — 90662 IIV NO PRSV INCREASED AG IM: CPT

## 2021-09-23 PROCEDURE — G0008: CPT

## 2021-09-23 PROCEDURE — G0447 BEHAVIOR COUNSEL OBESITY 15M: CPT

## 2021-09-23 PROCEDURE — 99204 OFFICE O/P NEW MOD 45 MIN: CPT | Mod: 25

## 2021-09-23 PROCEDURE — 99214 OFFICE O/P EST MOD 30 MIN: CPT

## 2021-09-23 PROCEDURE — 36415 COLL VENOUS BLD VENIPUNCTURE: CPT

## 2021-09-23 NOTE — REVIEW OF SYSTEMS
[Joint Pain] : joint pain [Back Pain] : back pain [Negative] : Heme/Lymph [FreeTextEntry7] : g-e reflux prn [FreeTextEntry9] :  l shoulder

## 2021-09-23 NOTE — ASSESSMENT
[FreeTextEntry1] : Diagnosis #1 history of aortic stenosis , status post valve replacement and MR. Patient is stable and   followed by the cardiologist.\par #2 hypertension,stable.cmp to lab\par #3 hyperlipidemia. Continue with same medication and low-fat diet.lipids to lab\par #4 monitor liver  toxicity due to chronic medication use.cmp to lab\par #5 hypovitaminosis D., being supplemented.level to lab\par #6 GE reflux, stable. cbc tolab. \par #7 history of low B12.  level sent to the lab\par #8 low back pain to legs .To have spine orthopedic consult.\par #9  History of thrombocytosis. CBC sent to lab\par #10  history of hypovitaminosis D., being supplemented. Level  sent to the lab\par #11 prediabetes. Diet and weight loss advised the patient.\par # 12 left shoulder pain. Patient to have another orthopedic consult for the left shoulder.\par #13  obesity. Patient again advised to try to lose weight by  cutting back on his calories and increasing his activity. Importance emphasized to him. Approximately 15 minutes spent\par Plan . Patient advised to return after 3 months.Totally approximately 60 min spent

## 2021-09-23 NOTE — HISTORY OF PRESENT ILLNESS
[FreeTextEntry1] : Patient comes for followup of his chronic medical problems. Patient feels well without any chest pain, shortness of breath, paroxysmal nocturnal dyspnea or orthopnea. Only with his chronic low back pain to his  lower extremities left more than right. Patient has also chronic left shoulder pain after an injury he had many years ago which recently  becomes worse.. His gastrointestinal reflux symptoms remain stable.. Most recent blood tests, consults, medication list,images  allergies reviewed

## 2021-09-23 NOTE — PHYSICAL EXAM
[No Acute Distress] : no acute distress [Well Nourished] : well nourished [Well Developed] : well developed [Normal Voice/Communication] : normal voice/communication [Well-Appearing] : well-appearing [Normal Sclera/Conjunctiva] : normal sclera/conjunctiva [EOMI] : extraocular movements intact [Normal Outer Ear/Nose] : the outer ears and nose were normal in appearance [Normal TMs] : both tympanic membranes were normal [No JVD] : no jugular venous distention [Supple] : supple [No Lymphadenopathy] : no lymphadenopathy [No Respiratory Distress] : no respiratory distress  [Thyroid Normal, No Nodules] : the thyroid was normal and there were no nodules present [Clear to Auscultation] : lungs were clear to auscultation bilaterally [No Accessory Muscle Use] : no accessory muscle use [Normal Percussion] : the chest was normal to percussion [Normal Rate] : normal rate  [Regular Rhythm] : with a regular rhythm [Normal S1, S2] : normal S1 and S2 [No Murmur] : no murmur heard [No Carotid Bruits] : no carotid bruits [No Abdominal Bruit] : a ~M bruit was not heard ~T in the abdomen [No Varicosities] : no varicosities [Pedal Pulses Present] : the pedal pulses are present [No Edema] : there was no peripheral edema [No Extremity Clubbing/Cyanosis] : no extremity clubbing/cyanosis [No Palpable Aorta] : no palpable aorta [No Axillary Lymphadenopathy] : no axillary lymphadenopathy [Soft] : abdomen soft [Non Tender] : non-tender [No Masses] : no abdominal mass palpated [Non-distended] : non-distended [No HSM] : no HSM [Normal Bowel Sounds] : normal bowel sounds [No Hernias] : no hernias [Normal Supraclavicular Nodes] : no supraclavicular lymphadenopathy [Normal Axillary Nodes] : no axillary lymphadenopathy [Normal Posterior Cervical Nodes] : no posterior cervical lymphadenopathy [Normal Femoral Nodes] : no femoral lymphadenopathy [Normal Anterior Cervical Nodes] : no anterior cervical lymphadenopathy [Normal Inguinal Nodes] : no inguinal lymphadenopathy [No CVA Tenderness] : no CVA  tenderness [No Spinal Tenderness] : no spinal tenderness [Kyphosis] : no kyphosis [Scoliosis] : no scoliosis [Grossly Normal Strength/Tone] : grossly normal strength/tone [No Rash] : no rash [No Skin Lesions] : no skin lesions [Normal Gait] : normal gait [Coordination Grossly Intact] : coordination grossly intact [No Focal Deficits] : no focal deficits [Deep Tendon Reflexes (DTR)] : deep tendon reflexes were 2+ and symmetric [Speech Grossly Normal] : speech grossly normal [Memory Grossly Normal] : memory grossly normal [Normal Affect] : the affect was normal [Alert and Oriented x3] : oriented to person, place, and time [Normal Insight/Judgement] : insight and judgment were intact [Normal Mood] : the mood was normal [Comprehensive Foot Exam Normal] : Right and left foot were examined and both feet are normal. No ulcers in either foot. Toes are normal and with full ROM.  Normal tactile sensation with monofilament testing throughout both feet [de-identified] : Tenderness and stiffness of the left shoulder being unable to rotate the left shoulder

## 2021-09-24 LAB
25(OH)D3 SERPL-MCNC: 42.6 NG/ML
ALBUMIN SERPL ELPH-MCNC: 4.6 G/DL
ALP BLD-CCNC: 44 U/L
ALT SERPL-CCNC: 24 U/L
ANION GAP SERPL CALC-SCNC: 8 MMOL/L
APPEARANCE: CLEAR
AST SERPL-CCNC: 25 U/L
BACTERIA: NEGATIVE
BASOPHILS # BLD AUTO: 0 K/UL
BASOPHILS NFR BLD AUTO: 0 %
BILIRUB SERPL-MCNC: 0.8 MG/DL
BILIRUBIN URINE: NEGATIVE
BLOOD URINE: NORMAL
BUN SERPL-MCNC: 16 MG/DL
CALCIUM SERPL-MCNC: 9.8 MG/DL
CHLORIDE SERPL-SCNC: 106 MMOL/L
CHOLEST SERPL-MCNC: 121 MG/DL
CO2 SERPL-SCNC: 28 MMOL/L
COLOR: YELLOW
COVID-19 SPIKE DOMAIN ANTIBODY INTERPRETATION: POSITIVE
CREAT SERPL-MCNC: 1.24 MG/DL
CREAT SPEC-SCNC: 105 MG/DL
EOSINOPHIL # BLD AUTO: 0.12 K/UL
EOSINOPHIL NFR BLD AUTO: 2 %
ESTIMATED AVERAGE GLUCOSE: 123 MG/DL
GLUCOSE QUALITATIVE U: NEGATIVE
GLUCOSE SERPL-MCNC: 101 MG/DL
HBA1C MFR BLD HPLC: 5.9 %
HCT VFR BLD CALC: 47 %
HDLC SERPL-MCNC: 45 MG/DL
HGB BLD-MCNC: 15.7 G/DL
HYALINE CASTS: 0 /LPF
KETONES URINE: NEGATIVE
LDLC SERPL CALC-MCNC: 62 MG/DL
LEUKOCYTE ESTERASE URINE: NEGATIVE
LYMPHOCYTES # BLD AUTO: 1.38 K/UL
LYMPHOCYTES NFR BLD AUTO: 23 %
MAN DIFF?: NORMAL
MCHC RBC-ENTMCNC: 31.8 PG
MCHC RBC-ENTMCNC: 33.4 GM/DL
MCV RBC AUTO: 95.1 FL
MICROALBUMIN 24H UR DL<=1MG/L-MCNC: 1.7 MG/DL
MICROALBUMIN/CREAT 24H UR-RTO: 16 MG/G
MICROSCOPIC-UA: NORMAL
MONOCYTES # BLD AUTO: 1.5 K/UL
MONOCYTES NFR BLD AUTO: 25 %
NEUTROPHILS # BLD AUTO: 2.88 K/UL
NEUTROPHILS NFR BLD AUTO: 48 %
NITRITE URINE: NEGATIVE
NONHDLC SERPL-MCNC: 76 MG/DL
PH URINE: 6.5
PLATELET # BLD AUTO: 96 K/UL
POTASSIUM SERPL-SCNC: 4.7 MMOL/L
PROT SERPL-MCNC: 7.1 G/DL
PROTEIN URINE: NEGATIVE
RBC # BLD: 4.94 M/UL
RBC # FLD: 11.9 %
RED BLOOD CELLS URINE: 2 /HPF
SARS-COV-2 AB SERPL IA-ACNC: 101 U/ML
SODIUM SERPL-SCNC: 143 MMOL/L
SPECIFIC GRAVITY URINE: 1.02
SQUAMOUS EPITHELIAL CELLS: 0 /HPF
TRIGL SERPL-MCNC: 71 MG/DL
UROBILINOGEN URINE: NORMAL
VIT B12 SERPL-MCNC: 1215 PG/ML
WBC # FLD AUTO: 6 K/UL
WHITE BLOOD CELLS URINE: 0 /HPF

## 2021-10-11 ENCOUNTER — RX RENEWAL (OUTPATIENT)
Age: 81
End: 2021-10-11

## 2021-10-28 ENCOUNTER — APPOINTMENT (OUTPATIENT)
Dept: UROLOGY | Facility: CLINIC | Age: 81
End: 2021-10-28
Payer: MEDICARE

## 2021-10-28 PROCEDURE — 99443: CPT

## 2021-10-31 NOTE — REVIEW OF SYSTEMS
[Easy Bleeding] : a tendency for easy bleeding [Easy Bruising] : a tendency for easy bruising [Nocturia] : nocturia [Lower Back Pain] : lower back pain [Mid Back Pain] : mid back pain [Memory Lapses Or Loss] : memory loss [Feeling Poorly] : not feeling poorly [Feeling Tired] : not feeling tired [Chest Pain] : no chest pain [Constipation] : no constipation [Dysuria] : no dysuria [Confused] : no confusion [Convulsions] : no convulsions [Seizures] : no seizures

## 2021-10-31 NOTE — HISTORY OF PRESENT ILLNESS
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 The patient took tamsulosin in the past, but stopped due to light headiness. His urination remained satisfactory after discontinuing the medication. The patient's last PSA 04/24/17 was 0.69. The patient was in the ER 01/01/17 for abdominal pain. No etiology for the pain was identified and has since resolved. CT of the abdomen and pelvis in the ER found bilateral renal cysts and a 5 mm non obstructing right renal calculus. \par 1/17/18: CT scan was reviewed and discussed  in office which found no hydronephrosis or recurrent tumors . There are 4 cysts on the  left kidney and 2 cysts on the right.  There is a Stone in the right kidney in the upper portion. He reported earlier he was experiencing some left flank pain. At night he wakes up once to urinate. He remained on finasteride 5 mg once daily.\par 5/29/18: The patient returned and reported he wakes up once during the night to urinate. Currently taking Finasteride. Complained of intermittent left lumbar pain that remained the same since last visit. Daughter noted he does not drink enough water. \par 11/19/18: The patient returned today for a renal US. Renal US findings showed again there is a 5.5 mm echogenic focus with distal shadowing in the upper pole of the right kidney with bilateral simple non vascular cysts. There is a new septated cyst in the mid outer pole of the left kidney. Both kidneys appear normal in size and echogenicity. No hydronephrosis, or solid masses visualized. PSA from 3/29/18 was 0.71 ng/mL. Notes he was recently hospitalized for LE weakness and pain. Will be evaluated by a cardiologist for possible loop recorder placement. \par 5/22/19: Male patient presents today for a follow up. Blood work was completed 5/1/19 as per Dr. Ramirez. PSA was measured in January 2019  0.94 and creatinine as of May 2019 was 1.18 mg/dL. regarding urination he denies dysuria, gross hematuria, urgency or incontinence. He will wake up 2x a night to urinate. He has a Hx of high blood pressure and hyperlipidemia that are both well controlled on medication.\par \par 11/21/2019: Patient presents today for a follow up. Overall, he is doing well. He states his urination is good. Wakes up 1-2 times during the night to urinate. Patient denies dysuria, gross hematuria, urgency, or incontinence. Today, his only complaint is of some back pain. A urinalysis from  9/11/2019 was small positive for hematuria. His creatinine from 9/11/2019 was 1.29 mg/dL. He has not had a PSA measured since his last done January 2019 of 0.94 ng/mL. Small cyst/possible kidney stone in kidney found in the prior ultrasound of last year dated 11/19/2018. Digital rectal exam found no suspicious rectal masses. Anal tone is normal. The prostate is non tender, with normal texture, discrete borders, and no nodules. It is a 30 gram transurethral resection size. No gross blood on the examining finger. A little external hemorrhoid at 1 o'clock position was found though not inflamed. Due the presence of a small cyst/possible kidney stone found in the ultrasound report from last year dated 11/19/2018, a renal ultrasound has been ordered today and will be obtained by the office. Finding: Again there is a 5.5 mm echogenic focus with distal shadowing in the upper pole of the right kidney with bilateral simple non vascular cysts. There is a new septated cyst in the mid outer pole of the left kidney. Both kidneys appear normal in size and echogenicity. No hydronephrosis, or solid masses visualized. In my opinion, it looks more like a Jeremie's Plaque than a kidney stone.\par \par 11/23/2020: Patient presents today for follow up. Takes Finasteride 5mg once daily. Urination is good. Wakes 1-2x at night to urinate. Denies urinary urgency, pushing or straining, dysuria, gross hematuria. No constipation or chest pain. Had blood work by PCP on 9/23/20 showing creatinine of 1.20 and HbA1c of 5.8. Offers no new complaints today. Has appointment with PCP Dr. Ramirez tomorrow. \par \par 05/24/2021: Patient presents today for follow up. Wakes 1-2x at night to urinate. Denies dysuria, gross hematuria, urinary urgency, pushing or straining. Continues to take Finasteride. Has pain in lower back radiating down to leg and follows Dr. Ramirez for this. Hx of kidney stones. Traveling to [facility] for two months next month. \par \par 10/28/2021: Patient presents today for a follow up. He is accompanied by his son, Venu, who is translating for him. Pt reports nocturia 2-3x a night which is not bothersome. Denies urinary urgency, pushing, straining, gross hematuria, and burning. Has some back pain that is helped by injections. Pt is no longer sexually active according to his son who is translating. Pt obtained a renal US about 4 months ago on 5/27/2021 at [facility] which demonstrated no evidence of hydronephrosis. Bilateral renal cysts measuring up to 5.4 cm in the left kidney, previously measuring 4.9 cm. Enlarged prostate. PVR was 82.9. Pt's son believes his memory slightly diminishing but nothing concerning. Pt last saw  on 9/23/2021. Blood work of that visit revealed a creatinine of 1.24, eGFR was 54, PSA was 0.6 and platelets were slightly low. He denies having any problems clotting if he is bleeding.

## 2021-10-31 NOTE — ASSESSMENT
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 The patient took tamsulosin in the past, but stopped due to light headiness. His urination remained satisfactory after discontinuing the medication. The patient's last PSA 04/24/17 was 0.69. The patient was in the ER 01/01/17 for abdominal pain. No etiology for the pain was identified and has since resolved. CT of the abdomen and pelvis in the ER found bilateral renal cysts and a 5 mm non obstructing right renal calculus. \par 1/17/18: CT scan was reviewed and discussed  in office which found no hydronephrosis or recurrent tumors . There are 4 cysts on the  left kidney and 2 cysts on the right.  There is a Stone in the right kidney in the upper portion. He reported earlier he was experiencing some left flank pain. At night he wakes up once to urinate. He remained on finasteride 5 mg once daily.\par 5/29/18: The patient returned and reported he wakes up once during the night to urinate. Currently taking Finasteride. Complained of intermittent left lumbar pain that remained the same since last visit. Daughter noted he does not drink enough water. \par 11/19/18: The patient returned today for a renal US. Renal US findings showed again there is a 5.5 mm echogenic focus with distal shadowing in the upper pole of the right kidney with bilateral simple non vascular cysts. There is a new septated cyst in the mid outer pole of the left kidney. Both kidneys appear normal in size and echogenicity. No hydronephrosis, or solid masses visualized. PSA from 3/29/18 was 0.71 ng/mL. Notes he was recently hospitalized for LE weakness and pain. Will be evaluated by a cardiologist for possible loop recorder placement. \par 5/22/19: Male patient presents today for a follow up. Blood work was completed 5/1/19 as per Dr. Ramirez. PSA was measured in January 2019  0.94 and creatinine as of May 2019 was 1.18 mg/dL. regarding urination he denies dysuria, gross hematuria, urgency or incontinence. He will wake up 2x a night to urinate. He has a Hx of high blood pressure and hyperlipidemia that are both well controlled on medication.\par \par 11/21/2019: Patient presents today for a follow up. Overall, he is doing well. He states his urination is good. Wakes up 1-2 times during the night to urinate. Patient denies dysuria, gross hematuria, urgency, or incontinence. Today, his only complaint is of some back pain. A urinalysis from  9/11/2019 was small positive for hematuria. His creatinine from 9/11/2019 was 1.29 mg/dL. He has not had a PSA measured since his last done January 2019 of 0.94 ng/mL. Small cyst/possible kidney stone in kidney found in the prior ultrasound of last year dated 11/19/2018. Digital rectal exam found no suspicious rectal masses. Anal tone is normal. The prostate is non tender, with normal texture, discrete borders, and no nodules. It is a 30 gram transurethral resection size. No gross blood on the examining finger. A little external hemorrhoid at 1 o'clock position was found though not inflamed. Due the presence of a small cyst/possible kidney stone found in the ultrasound report from last year dated 11/19/2018, a renal ultrasound has been ordered today and will be obtained by the office. Finding: Again there is a 5.5 mm echogenic focus with distal shadowing in the upper pole of the right kidney with bilateral simple non vascular cysts. There is a new septated cyst in the mid outer pole of the left kidney. Both kidneys appear normal in size and echogenicity. No hydronephrosis, or solid masses visualized. In my opinion, it looks more like a Jeremie's Plaque than a kidney stone.\par \par 11/23/2020: Patient presents today for follow up. Takes Finasteride 5mg once daily. Urination is good. Wakes 1-2x at night to urinate. Denies urinary urgency, pushing or straining, dysuria, gross hematuria. No constipation or chest pain. Had blood work by PCP on 9/23/20 showing creatinine of 1.20 and HbA1c of 5.8. Offers no new complaints today. Has appointment with PCP Dr. Ramirez tomorrow. \par \par 05/24/2021: Patient presents today for follow up. Wakes 1-2x at night to urinate. Denies dysuria, gross hematuria, urinary urgency, pushing or straining. Continues to take Finasteride. Has pain in lower back radiating down to leg and follows Dr. Ramirez for this. Hx of kidney stones. Traveling to [facility] for two months next month. \par \par Renewed Finasteride. \par Patient will schedule for a renal US for hx of kidney stones\par Blood work today included BMP, alkaline phosphatase, PSA, and testosterone. \par The patient produced a urine sample which will be sent for urinalysis, urine cytology, and urine culture. \par Patient will schedule a telehealth visit to review renal US results. \par \par 10/28/2021: Patient presents today for a follow up. He is accompanied by his son, Venu, who is translating for him. Pt reports nocturia 2-3x a night which is not bothersome. Denies urinary urgency, pushing, straining, gross hematuria, and burning. Has some back pain that is helped by injections. Pt is no longer sexually active according to his son who is translating. Pt obtained a renal US about 4 months ago on 5/27/2021 at [facility] which demonstrated no evidence of hydronephrosis. Bilateral renal cysts measuring up to 5.4 cm in the left kidney, previously measuring 4.9 cm. Enlarged prostate. PVR was 82.9. Pt's son believes his memory slightly diminishing but nothing concerning. Pt last saw  on 9/23/2021. Blood work of that visit revealed a creatinine of 1.24, eGFR was 54, PSA was 0.6 and platelets were slightly low. He denies having any problems clotting if he is bleeding. \par \par We reviewed the results of his US from May 2021 as well as blood chemistries from 9/23/2021 in detail. The patient is doing well overall. Based on his blood chemistries and what he has told me about his urination, he does not warrant a RTEMAINE at this time. \par \par Patient will RTO in 6 months for reassessment. \par \par Preparation, in person office visit, and coordination of care 30 minutes.

## 2021-12-29 ENCOUNTER — LABORATORY RESULT (OUTPATIENT)
Age: 81
End: 2021-12-29

## 2021-12-29 ENCOUNTER — APPOINTMENT (OUTPATIENT)
Dept: INTERNAL MEDICINE | Facility: CLINIC | Age: 81
End: 2021-12-29
Payer: MEDICARE

## 2021-12-29 VITALS — DIASTOLIC BLOOD PRESSURE: 70 MMHG | RESPIRATION RATE: 16 BRPM | SYSTOLIC BLOOD PRESSURE: 130 MMHG

## 2021-12-29 VITALS
HEIGHT: 68.11 IN | OXYGEN SATURATION: 96 % | DIASTOLIC BLOOD PRESSURE: 67 MMHG | WEIGHT: 219.33 LBS | SYSTOLIC BLOOD PRESSURE: 119 MMHG | HEART RATE: 62 BPM | BODY MASS INDEX: 33.24 KG/M2

## 2021-12-29 PROCEDURE — 36415 COLL VENOUS BLD VENIPUNCTURE: CPT

## 2021-12-29 PROCEDURE — 99215 OFFICE O/P EST HI 40 MIN: CPT | Mod: 25

## 2021-12-29 NOTE — HISTORY OF PRESENT ILLNESS
[FreeTextEntry1] : Patient comes for followup of his chronic medical problems. Patient feels well without any chest pain, shortness of breath, paroxysmal nocturnal dyspnea or orthopnea. Only with his chronic low back pain prn. His gastrointestinal reflux symptoms remain stable.. Most recent blood tests, consults, medication list,images  allergies reviewed

## 2021-12-29 NOTE — PHYSICAL EXAM
[No Acute Distress] : no acute distress [Well Nourished] : well nourished [Well Developed] : well developed [Well-Appearing] : well-appearing [Normal Voice/Communication] : normal voice/communication [Normal Sclera/Conjunctiva] : normal sclera/conjunctiva [EOMI] : extraocular movements intact [Normal Outer Ear/Nose] : the outer ears and nose were normal in appearance [Normal TMs] : both tympanic membranes were normal [No JVD] : no jugular venous distention [Supple] : supple [No Lymphadenopathy] : no lymphadenopathy [Thyroid Normal, No Nodules] : the thyroid was normal and there were no nodules present [No Respiratory Distress] : no respiratory distress  [Clear to Auscultation] : lungs were clear to auscultation bilaterally [No Accessory Muscle Use] : no accessory muscle use [Normal Percussion] : the chest was normal to percussion [Normal Rate] : normal rate  [Regular Rhythm] : with a regular rhythm [Normal S1, S2] : normal S1 and S2 [No Murmur] : no murmur heard [No Carotid Bruits] : no carotid bruits [No Abdominal Bruit] : a ~M bruit was not heard ~T in the abdomen [No Varicosities] : no varicosities [Pedal Pulses Present] : the pedal pulses are present [No Edema] : there was no peripheral edema [No Extremity Clubbing/Cyanosis] : no extremity clubbing/cyanosis [No Palpable Aorta] : no palpable aorta [No Axillary Lymphadenopathy] : no axillary lymphadenopathy [Soft] : abdomen soft [Non Tender] : non-tender [Non-distended] : non-distended [No Masses] : no abdominal mass palpated [No HSM] : no HSM [Normal Bowel Sounds] : normal bowel sounds [No Hernias] : no hernias [Normal Supraclavicular Nodes] : no supraclavicular lymphadenopathy [Normal Axillary Nodes] : no axillary lymphadenopathy [Normal Posterior Cervical Nodes] : no posterior cervical lymphadenopathy [Normal Femoral Nodes] : no femoral lymphadenopathy [Normal Anterior Cervical Nodes] : no anterior cervical lymphadenopathy [Normal Inguinal Nodes] : no inguinal lymphadenopathy [No CVA Tenderness] : no CVA  tenderness [No Spinal Tenderness] : no spinal tenderness [Grossly Normal Strength/Tone] : grossly normal strength/tone [No Rash] : no rash [No Skin Lesions] : no skin lesions [Normal Gait] : normal gait [Coordination Grossly Intact] : coordination grossly intact [No Focal Deficits] : no focal deficits [Deep Tendon Reflexes (DTR)] : deep tendon reflexes were 2+ and symmetric [Speech Grossly Normal] : speech grossly normal [Memory Grossly Normal] : memory grossly normal [Normal Affect] : the affect was normal [Alert and Oriented x3] : oriented to person, place, and time [Normal Mood] : the mood was normal [Normal Insight/Judgement] : insight and judgment were intact [Comprehensive Foot Exam Normal] : Right and left foot were examined and both feet are normal. No ulcers in either foot. Toes are normal and with full ROM.  Normal tactile sensation with monofilament testing throughout both feet [Kyphosis] : no kyphosis [Scoliosis] : no scoliosis [de-identified] : Tenderness and stiffness of the left shoulder

## 2021-12-29 NOTE — ASSESSMENT
[FreeTextEntry1] : Diagnosis #1 history of aortic stenosis , status post valve replacement and MR. Patient is stable\par #2 hypertension,stable.cmp to lab\par #3 hyperlipidemia. Continue with same medication and low-fat diet.lipids to lab\par #4 monitor liver  toxicity due to chronic medication use.cmp to lab\par #5 hypovitaminosis D., being supplemented.level to lab\par #6 GE reflux, stable. cbc tolab. \par #7 history of low B12.  level sent to the lab\par #8 low back pain to legs .stable \par #9  History of thrombocytosis. CBC sent to lab\par #10  history of hypovitaminosis D., being supplemented. Level  sent to the lab\par #11 prediabetes. Diet and weight loss advised the patient.\par # #12 psoriasis. Continue with the current halobetasol  treatment\par Plan . Patient advised to return after 3 months.Totally approximately 40 minutes spent

## 2021-12-29 NOTE — REVIEW OF SYSTEMS
[Back Pain] : back pain [Negative] : Heme/Lymph [FreeTextEntry7] : g-e reflux prn [FreeTextEntry9] :  l shoulder

## 2021-12-30 LAB
25(OH)D3 SERPL-MCNC: 35.4 NG/ML
ALBUMIN SERPL ELPH-MCNC: 4.5 G/DL
ALP BLD-CCNC: 47 U/L
ALT SERPL-CCNC: 23 U/L
ANION GAP SERPL CALC-SCNC: 13 MMOL/L
APPEARANCE: CLEAR
AST SERPL-CCNC: 21 U/L
BACTERIA: NEGATIVE
BASOPHILS # BLD AUTO: 0.02 K/UL
BASOPHILS NFR BLD AUTO: 0.3 %
BILIRUB SERPL-MCNC: 0.7 MG/DL
BILIRUBIN URINE: NEGATIVE
BLOOD URINE: ABNORMAL
BUN SERPL-MCNC: 21 MG/DL
CALCIUM SERPL-MCNC: 9.2 MG/DL
CHLORIDE SERPL-SCNC: 105 MMOL/L
CHOLEST SERPL-MCNC: 137 MG/DL
CO2 SERPL-SCNC: 24 MMOL/L
COLOR: NORMAL
COVID-19 SPIKE DOMAIN ANTIBODY INTERPRETATION: POSITIVE
CREAT SERPL-MCNC: 1.43 MG/DL
CREAT SPEC-SCNC: 59 MG/DL
EOSINOPHIL # BLD AUTO: 0.01 K/UL
EOSINOPHIL NFR BLD AUTO: 0.2 %
ESTIMATED AVERAGE GLUCOSE: 120 MG/DL
GLUCOSE QUALITATIVE U: NEGATIVE
GLUCOSE SERPL-MCNC: 85 MG/DL
HBA1C MFR BLD HPLC: 5.8 %
HCT VFR BLD CALC: 46.3 %
HDLC SERPL-MCNC: 49 MG/DL
HGB BLD-MCNC: 15.5 G/DL
HYALINE CASTS: 0 /LPF
IMM GRANULOCYTES NFR BLD AUTO: 0.5 %
KETONES URINE: NEGATIVE
LDLC SERPL CALC-MCNC: 73 MG/DL
LEUKOCYTE ESTERASE URINE: NEGATIVE
LYMPHOCYTES # BLD AUTO: 2.11 K/UL
LYMPHOCYTES NFR BLD AUTO: 33.7 %
MAN DIFF?: NORMAL
MCHC RBC-ENTMCNC: 31.1 PG
MCHC RBC-ENTMCNC: 33.5 GM/DL
MCV RBC AUTO: 92.8 FL
MICROALBUMIN 24H UR DL<=1MG/L-MCNC: 1.2 MG/DL
MICROALBUMIN/CREAT 24H UR-RTO: NORMAL MG/G
MICROSCOPIC-UA: NORMAL
MONOCYTES # BLD AUTO: 1.53 K/UL
MONOCYTES NFR BLD AUTO: 24.4 %
NEUTROPHILS # BLD AUTO: 2.56 K/UL
NEUTROPHILS NFR BLD AUTO: 40.9 %
NITRITE URINE: NEGATIVE
NONHDLC SERPL-MCNC: 88 MG/DL
PH URINE: 6.5
PLATELET # BLD AUTO: 86 K/UL
POTASSIUM SERPL-SCNC: 4 MMOL/L
PROT SERPL-MCNC: 7 G/DL
PROTEIN URINE: NEGATIVE
RBC # BLD: 4.99 M/UL
RBC # FLD: 12.2 %
RED BLOOD CELLS URINE: 1 /HPF
SARS-COV-2 AB SERPL IA-ACNC: >250 U/ML
SODIUM SERPL-SCNC: 142 MMOL/L
SPECIFIC GRAVITY URINE: 1.01
SQUAMOUS EPITHELIAL CELLS: 0 /HPF
TRIGL SERPL-MCNC: 76 MG/DL
UROBILINOGEN URINE: NORMAL
VIT B12 SERPL-MCNC: 1060 PG/ML
WBC # FLD AUTO: 6.26 K/UL
WHITE BLOOD CELLS URINE: 0 /HPF

## 2022-01-10 ENCOUNTER — APPOINTMENT (OUTPATIENT)
Dept: CARDIOLOGY | Facility: CLINIC | Age: 82
End: 2022-01-10
Payer: MEDICARE

## 2022-01-10 ENCOUNTER — NON-APPOINTMENT (OUTPATIENT)
Age: 82
End: 2022-01-10

## 2022-01-10 VITALS
OXYGEN SATURATION: 97 % | TEMPERATURE: 96.9 F | HEIGHT: 68.11 IN | DIASTOLIC BLOOD PRESSURE: 77 MMHG | RESPIRATION RATE: 14 BRPM | SYSTOLIC BLOOD PRESSURE: 154 MMHG | WEIGHT: 225 LBS | HEART RATE: 57 BPM | BODY MASS INDEX: 34.1 KG/M2

## 2022-01-10 VITALS — SYSTOLIC BLOOD PRESSURE: 134 MMHG | DIASTOLIC BLOOD PRESSURE: 70 MMHG

## 2022-01-10 PROCEDURE — 93000 ELECTROCARDIOGRAM COMPLETE: CPT

## 2022-01-10 PROCEDURE — 99204 OFFICE O/P NEW MOD 45 MIN: CPT | Mod: 25

## 2022-01-17 NOTE — PHYSICAL EXAM
[Well Developed] : well developed [Well Nourished] : well nourished [No Acute Distress] : no acute distress [Normal Venous Pressure] : normal venous pressure [Bradycardia] : bradycardic [Rhythm Regular] : regular [Normal S1] : normal S1 [Normal S2] : normal S2 [II] : a grade 2 [No Pitting Edema] : no pitting edema present [Bruit] : no bruit heard [Clear Lung Fields] : clear lung fields [Good Air Entry] : good air entry [No Respiratory Distress] : no respiratory distress  [Soft] : abdomen soft [Non Tender] : non-tender [Normal Bowel Sounds] : normal bowel sounds [Normal Gait] : normal gait [No Edema] : no edema [No Cyanosis] : no cyanosis [No Rash] : no rash [Moves all extremities] : moves all extremities [No Focal Deficits] : no focal deficits [Normal Speech] : normal speech [Alert and Oriented] : alert and oriented [Normal memory] : normal memory [de-identified] : Extraocular muscles intact. Anicteric sclerae. [de-identified] : He was wearing a face mask during the examination. [de-identified] : No visible skin ulcers.

## 2022-01-17 NOTE — DISCUSSION/SUMMARY
[FreeTextEntry1] : IMPRESSION: Mr. WEISS is a 81 year old man with a history of severe aortic stenosis secondary to a bicuspid aortic valve status post bioprosthetic aortic valve replacement 7/2012, HTN, hyperlipidemia, former tobacco use, chronic renal insufficiency, and GERD who presents today for evaluation of his aortic valve disorder and chest discomfort. \par \par PLAN:\par 1. I have asked him to schedule an echocardiogram at the time of his next visit to follow up the gradients across his aortic valve. He requires antibiotic prophylaxis status post aortic valve replacement. \par 2. His chest discomfort is atypical. He had a nuclear stress test less than a year ago that did not reveal any ischemia. If he continues to experience symptoms, we may consider a repeat nuclear stress test versus a left heart cath, however, holding off on a cath given his renal insufficiency. He will continue on ASA 81 mg daily. 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 His blood pressure is OK, thus he will continue on Amlodipine 5 mg daily and Metoprolol 50 mg daily. \par 4. His most recent LDL was good, thus he will continue on Atorvastatin 20 mg daily.\par 5. I have suggested that he see Pulmonary given his cough. \par 6. He will follow up with me in 4 months or sooner should he experience any new or worsening symptoms in the interim.

## 2022-01-17 NOTE — HISTORY OF PRESENT ILLNESS
[FreeTextEntry1] : Patient is an 81 year old man with a history of severe aortic stenosis secondary to a bicuspid aortic valve status post bioprosthetic aortic valve replacement 7/2012, HTN, hyperlipidemia, former tobacco use, chronic renal insufficiency, and GERD who presents today for evaluation of his aortic valve disorder and chest discomfort. He mentions experiencing chest discomfort that lasts for up to 30 minutes and has been occurring for the past several months at rest. He has also been experiencing a cough. He otherwise denies any exertional chest pain, dyspnea at rest, palpitations, headaches, and dizziness.

## 2022-02-01 ENCOUNTER — APPOINTMENT (OUTPATIENT)
Dept: CARDIOLOGY | Facility: CLINIC | Age: 82
End: 2022-02-01

## 2022-02-07 ENCOUNTER — APPOINTMENT (OUTPATIENT)
Dept: CARDIOLOGY | Facility: CLINIC | Age: 82
End: 2022-02-07
Payer: MEDICARE

## 2022-02-07 PROCEDURE — 93306 TTE W/DOPPLER COMPLETE: CPT

## 2022-02-09 ENCOUNTER — RX RENEWAL (OUTPATIENT)
Age: 82
End: 2022-02-09

## 2022-04-20 ENCOUNTER — APPOINTMENT (OUTPATIENT)
Dept: INTERNAL MEDICINE | Facility: CLINIC | Age: 82
End: 2022-04-20

## 2022-04-28 ENCOUNTER — APPOINTMENT (OUTPATIENT)
Dept: UROLOGY | Facility: CLINIC | Age: 82
End: 2022-04-28
Payer: MEDICARE

## 2022-04-28 VITALS
RESPIRATION RATE: 14 BRPM | TEMPERATURE: 97.3 F | HEART RATE: 57 BPM | SYSTOLIC BLOOD PRESSURE: 130 MMHG | OXYGEN SATURATION: 97 % | WEIGHT: 223 LBS | BODY MASS INDEX: 39.51 KG/M2 | HEIGHT: 63 IN | DIASTOLIC BLOOD PRESSURE: 75 MMHG

## 2022-04-28 PROCEDURE — 99214 OFFICE O/P EST MOD 30 MIN: CPT

## 2022-05-01 ENCOUNTER — NON-APPOINTMENT (OUTPATIENT)
Age: 82
End: 2022-05-01

## 2022-05-01 LAB
ANION GAP SERPL CALC-SCNC: 11 MMOL/L
APPEARANCE: CLEAR
BACTERIA: NEGATIVE
BILIRUBIN URINE: NEGATIVE
BLOOD URINE: NORMAL
BUN SERPL-MCNC: 20 MG/DL
CALCIUM SERPL-MCNC: 9.4 MG/DL
CHLORIDE SERPL-SCNC: 106 MMOL/L
CO2 SERPL-SCNC: 25 MMOL/L
COLOR: NORMAL
CREAT SERPL-MCNC: 1.24 MG/DL
EGFR: 58 ML/MIN/1.73M2
ESTIMATED AVERAGE GLUCOSE: 126 MG/DL
GLUCOSE QUALITATIVE U: NEGATIVE
GLUCOSE SERPL-MCNC: 118 MG/DL
HBA1C MFR BLD HPLC: 6 %
HYALINE CASTS: 0 /LPF
KETONES URINE: NEGATIVE
LEUKOCYTE ESTERASE URINE: NEGATIVE
MICROSCOPIC-UA: NORMAL
NITRITE URINE: NEGATIVE
PH URINE: 6
POTASSIUM SERPL-SCNC: 4.6 MMOL/L
PROTEIN URINE: NEGATIVE
PSA SERPL-MCNC: 0.61 NG/ML
RED BLOOD CELLS URINE: 0 /HPF
SODIUM SERPL-SCNC: 142 MMOL/L
SPECIFIC GRAVITY URINE: 1.01
SQUAMOUS EPITHELIAL CELLS: 0 /HPF
UROBILINOGEN URINE: NORMAL
WHITE BLOOD CELLS URINE: 0 /HPF

## 2022-05-01 NOTE — ADDENDUM
[FreeTextEntry1] : I, Ruby Saleh, acted solely as a scribe for Dr. Oscar Mace on this date 04/28/2022.\par \par All medical record entries made by the Scribe were at my, Dr. Oscar Mace, direction and personally dictated by me on 04/28/2022. I have reviewed the chart and agree that the record accurately reflects my personal performance of the history, physical exam, assessment and plan. I have also personally directed, reviewed and agreed with the chart.

## 2022-05-01 NOTE — ASSESSMENT
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 The patient took tamsulosin in the past, but stopped due to light headiness. His urination remained satisfactory after discontinuing the medication. The patient's last PSA 04/24/17 was 0.69. The patient was in the ER 01/01/17 for abdominal pain. No etiology for the pain was identified and has since resolved. CT of the abdomen and pelvis in the ER found bilateral renal cysts and a 5 mm non obstructing right renal calculus. \par 1/17/18: CT scan was reviewed and discussed  in office which found no hydronephrosis or recurrent tumors . There are 4 cysts on the  left kidney and 2 cysts on the right.  There is a Stone in the right kidney in the upper portion. He reported earlier he was experiencing some left flank pain. At night he wakes up once to urinate. He remained on finasteride 5 mg once daily.\par 5/29/18: The patient returned and reported he wakes up once during the night to urinate. Currently taking Finasteride. Complained of intermittent left lumbar pain that remained the same since last visit. Daughter noted he does not drink enough water. \par 11/19/18: The patient returned today for a renal US. Renal US findings showed again there is a 5.5 mm echogenic focus with distal shadowing in the upper pole of the right kidney with bilateral simple non vascular cysts. There is a new septated cyst in the mid outer pole of the left kidney. Both kidneys appear normal in size and echogenicity. No hydronephrosis, or solid masses visualized. PSA from 3/29/18 was 0.71 ng/mL. Notes he was recently hospitalized for LE weakness and pain. Will be evaluated by a cardiologist for possible loop recorder placement. \par 5/22/19: Male patient presents today for a follow up. Blood work was completed 5/1/19 as per Dr. Ramirez. PSA was measured in January 2019  0.94 and creatinine as of May 2019 was 1.18 mg/dL. regarding urination he denies dysuria, gross hematuria, urgency or incontinence. He will wake up 2x a night to urinate. He has a Hx of high blood pressure and hyperlipidemia that are both well controlled on medication.\par \par 11/21/2019: Patient presents today for a follow up. Overall, he is doing well. He states his urination is good. Wakes up 1-2 times during the night to urinate. Patient denies dysuria, gross hematuria, urgency, or incontinence. Today, his only complaint is of some back pain. A urinalysis from  9/11/2019 was small positive for hematuria. His creatinine from 9/11/2019 was 1.29 mg/dL. He has not had a PSA measured since his last done January 2019 of 0.94 ng/mL. Small cyst/possible kidney stone in kidney found in the prior ultrasound of last year dated 11/19/2018. Digital rectal exam found no suspicious rectal masses. Anal tone is normal. The prostate is non tender, with normal texture, discrete borders, and no nodules. It is a 30 gram transurethral resection size. No gross blood on the examining finger. A little external hemorrhoid at 1 o'clock position was found though not inflamed. Due the presence of a small cyst/possible kidney stone found in the ultrasound report from last year dated 11/19/2018, a renal ultrasound has been ordered today and will be obtained by the office. Finding: Again there is a 5.5 mm echogenic focus with distal shadowing in the upper pole of the right kidney with bilateral simple non vascular cysts. There is a new septated cyst in the mid outer pole of the left kidney. Both kidneys appear normal in size and echogenicity. No hydronephrosis, or solid masses visualized. In my opinion, it looks more like a Jeremie's Plaque than a kidney stone.\par \par 11/23/2020: Patient presents today for follow up. Takes Finasteride 5mg once daily. Urination is good. Wakes 1-2x at night to urinate. Denies urinary urgency, pushing or straining, dysuria, gross hematuria. No constipation or chest pain. Had blood work by PCP on 9/23/20 showing creatinine of 1.20 and HbA1c of 5.8. Offers no new complaints today. Has appointment with PCP Dr. Ramirez tomorrow. \par \par 05/24/2021: Patient presents today for follow up. Wakes 1-2x at night to urinate. Denies dysuria, gross hematuria, urinary urgency, pushing or straining. Continues to take Finasteride. Has pain in lower back radiating down to leg and follows Dr. Ramirez for this. Hx of kidney stones. Traveling to [facility] for two months next month. \par \par Renewed Finasteride. \par Patient will schedule for a renal US for hx of kidney stones\par Blood work today included BMP, alkaline phosphatase, PSA, and testosterone. \par The patient produced a urine sample which will be sent for urinalysis, urine cytology, and urine culture. \par Patient will schedule a telehealth visit to review renal US results. \par \par 10/28/2021: Patient presents today for a follow up. He is accompanied by his son, Venu, who is translating for him. Pt reports nocturia 2-3x a night which is not bothersome. Denies urinary urgency, pushing, straining, gross hematuria, and burning. Has some back pain that is helped by injections. Pt is no longer sexually active according to his son who is translating. Pt obtained a renal US about 4 months ago on 5/27/2021 at [facility] which demonstrated no evidence of hydronephrosis. Bilateral renal cysts measuring up to 5.4 cm in the left kidney, previously measuring 4.9 cm. Enlarged prostate. PVR was 82.9. Pt's son believes his memory slightly diminishing but nothing concerning. Pt last saw  on 9/23/2021. Blood work of that visit revealed a creatinine of 1.24, eGFR was 54, PSA was 0.6 and platelets were slightly low. He denies having any problems clotting if he is bleeding. \par \par 04/28/2022: Patient presents today for a follow up visit. He was accompanied by his wife who helped to translate for him. Hx of kidney stones. Denies nocturia, urgency, gross hematuria, urge incontinence, or pushing/straining. He urinates a few times during the day. His last renal US was in 5/2021 and he did not have any kidney stones at that time. His US showed bilateral renal cysts measuring up to 5.4 cm in the left kidney, 4.9 cm previously. Pt currently takes Finasteride 5 mg, one tablet daily. \par \par Blood work today included BMP, A1C, androstenedione, CBC, alkaline phosphatase, PSA, and testosterone.\par The patient produced a urine sample which will be sent for urinalysis, urine cytology, and urine culture. \par \par I recommend the patient increase his fluid intake to prevent the formation of kidney stones. \par \par RTO in 6 months. \par \par Preparation, in person office visit, and coordination of care: 30 minutes.

## 2022-05-01 NOTE — HISTORY OF PRESENT ILLNESS
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 The patient took tamsulosin in the past, but stopped due to light headiness. His urination remained satisfactory after discontinuing the medication. The patient's last PSA 04/24/17 was 0.69. The patient was in the ER 01/01/17 for abdominal pain. No etiology for the pain was identified and has since resolved. CT of the abdomen and pelvis in the ER found bilateral renal cysts and a 5 mm non obstructing right renal calculus. \par 1/17/18: CT scan was reviewed and discussed  in office which found no hydronephrosis or recurrent tumors . There are 4 cysts on the  left kidney and 2 cysts on the right.  There is a Stone in the right kidney in the upper portion. He reported earlier he was experiencing some left flank pain. At night he wakes up once to urinate. He remained on finasteride 5 mg once daily.\par 5/29/18: The patient returned and reported he wakes up once during the night to urinate. Currently taking Finasteride. Complained of intermittent left lumbar pain that remained the same since last visit. Daughter noted he does not drink enough water. \par 11/19/18: The patient returned today for a renal US. Renal US findings showed again there is a 5.5 mm echogenic focus with distal shadowing in the upper pole of the right kidney with bilateral simple non vascular cysts. There is a new septated cyst in the mid outer pole of the left kidney. Both kidneys appear normal in size and echogenicity. No hydronephrosis, or solid masses visualized. PSA from 3/29/18 was 0.71 ng/mL. Notes he was recently hospitalized for LE weakness and pain. Will be evaluated by a cardiologist for possible loop recorder placement. \par 5/22/19: Male patient presents today for a follow up. Blood work was completed 5/1/19 as per Dr. Ramirez. PSA was measured in January 2019  0.94 and creatinine as of May 2019 was 1.18 mg/dL. regarding urination he denies dysuria, gross hematuria, urgency or incontinence. He will wake up 2x a night to urinate. He has a Hx of high blood pressure and hyperlipidemia that are both well controlled on medication.\par \par 11/21/2019: Patient presents today for a follow up. Overall, he is doing well. He states his urination is good. Wakes up 1-2 times during the night to urinate. Patient denies dysuria, gross hematuria, urgency, or incontinence. Today, his only complaint is of some back pain. A urinalysis from  9/11/2019 was small positive for hematuria. His creatinine from 9/11/2019 was 1.29 mg/dL. He has not had a PSA measured since his last done January 2019 of 0.94 ng/mL. Small cyst/possible kidney stone in kidney found in the prior ultrasound of last year dated 11/19/2018. Digital rectal exam found no suspicious rectal masses. Anal tone is normal. The prostate is non tender, with normal texture, discrete borders, and no nodules. It is a 30 gram transurethral resection size. No gross blood on the examining finger. A little external hemorrhoid at 1 o'clock position was found though not inflamed. Due the presence of a small cyst/possible kidney stone found in the ultrasound report from last year dated 11/19/2018, a renal ultrasound has been ordered today and will be obtained by the office. Finding: Again there is a 5.5 mm echogenic focus with distal shadowing in the upper pole of the right kidney with bilateral simple non vascular cysts. There is a new septated cyst in the mid outer pole of the left kidney. Both kidneys appear normal in size and echogenicity. No hydronephrosis, or solid masses visualized. In my opinion, it looks more like a Jeremie's Plaque than a kidney stone.\par \par 11/23/2020: Patient presents today for follow up. Takes Finasteride 5mg once daily. Urination is good. Wakes 1-2x at night to urinate. Denies urinary urgency, pushing or straining, dysuria, gross hematuria. No constipation or chest pain. Had blood work by PCP on 9/23/20 showing creatinine of 1.20 and HbA1c of 5.8. Offers no new complaints today. Has appointment with PCP Dr. Ramirez tomorrow. \par \par 05/24/2021: Patient presents today for follow up. Wakes 1-2x at night to urinate. Denies dysuria, gross hematuria, urinary urgency, pushing or straining. Continues to take Finasteride. Has pain in lower back radiating down to leg and follows Dr. Ramirez for this. Hx of kidney stones. Traveling to [facility] for two months next month. \par \par 10/28/2021: Patient presents today for a follow up. He is accompanied by his son, Venu, who is translating for him. Pt reports nocturia 2-3x a night which is not bothersome. Denies urinary urgency, pushing, straining, gross hematuria, and burning. Has some back pain that is helped by injections. Pt is no longer sexually active according to his son who is translating. Pt obtained a renal US about 4 months ago on 5/27/2021 at [facility] which demonstrated no evidence of hydronephrosis. Bilateral renal cysts measuring up to 5.4 cm in the left kidney, previously measuring 4.9 cm. Enlarged prostate. PVR was 82.9. Pt's son believes his memory slightly diminishing but nothing concerning. Pt last saw  on 9/23/2021. Blood work of that visit revealed a creatinine of 1.24, eGFR was 54, PSA was 0.6 and platelets were slightly low. He denies having any problems clotting if he is bleeding. \par \par 04/28/2022: Patient presents today for a follow up visit. He was accompanied by his wife who helped to translate for him. Hx of kidney stones. Denies nocturia, urgency, gross hematuria, urge incontinence, or pushing/straining. He urinates a few times during the day. His last renal US was in 5/2021 and he did not have any kidney stones at that time. His US showed bilateral renal cysts measuring up to 5.4 cm in the left kidney, 4.9 cm previously. Pt currently takes Finasteride 5 mg, one tablet daily.

## 2022-05-02 LAB — ANDROST SERPL-MCNC: 42 NG/DL

## 2022-05-05 ENCOUNTER — LABORATORY RESULT (OUTPATIENT)
Age: 82
End: 2022-05-05

## 2022-05-05 ENCOUNTER — APPOINTMENT (OUTPATIENT)
Dept: INTERNAL MEDICINE | Facility: CLINIC | Age: 82
End: 2022-05-05
Payer: MEDICARE

## 2022-05-05 VITALS
HEART RATE: 60 BPM | DIASTOLIC BLOOD PRESSURE: 73 MMHG | SYSTOLIC BLOOD PRESSURE: 132 MMHG | RESPIRATION RATE: 14 BRPM | BODY MASS INDEX: 38.26 KG/M2 | WEIGHT: 216 LBS

## 2022-05-05 VITALS — TEMPERATURE: 96.6 F | HEART RATE: 57 BPM | OXYGEN SATURATION: 97 %

## 2022-05-05 PROCEDURE — G0439: CPT

## 2022-05-05 PROCEDURE — 99215 OFFICE O/P EST HI 40 MIN: CPT | Mod: 25

## 2022-05-05 PROCEDURE — 36415 COLL VENOUS BLD VENIPUNCTURE: CPT

## 2022-05-05 NOTE — HISTORY OF PRESENT ILLNESS
[FreeTextEntry1] : Patient comes for his Medicare annual wellness visit and followup of his chronic medical problems. Patient feels well without any chest pain, shortness of breath, paroxysmal nocturnal dyspnea or orthopnea. . His gastrointestinal reflux symptoms remain stable.Sometimes he has his known  low back and knee  pain . Most recent blood tests, consults, medication list, allergies reviewed

## 2022-05-05 NOTE — HEALTH RISK ASSESSMENT
[FreeTextEntry1] : health-family [de-identified] : socialy [Change in mental status noted] : No change in mental status noted [Sexually Active] : not sexually active [Reports changes in hearing] : Reports no changes in hearing [Reports changes in vision] : Reports no changes in vision [Reports changes in dental health] : Reports no changes in dental health [Travel to Developing Areas] : does not  travel to developing areas [TB Exposure] : is not being exposed to tuberculosis [Caregiver Concerns] : does not have caregiver concerns

## 2022-05-05 NOTE — PHYSICAL EXAM
[Kyphosis] : no kyphosis [Scoliosis] : no scoliosis [de-identified] : refused [FreeTextEntry1] : Refused rectal exam

## 2022-05-05 NOTE — ASSESSMENT
[FreeTextEntry1] : Diagnosis #1 history of aortic stenosis , status post valve replacement and MR. Patient is stable and   followed by the cardiologist.\par #2 hypertension, stable.Continue with same medication and strict  low salt diet.cmp to lab\par #3 hyperlipidemia. Continue with same medication and low-fat diet.lipids to lab\par #4 monitor liver  toxicity due to chronic medication use.cmp to lab\par #5 hypovitaminosis D., being supplemented.level to lab\par #6 GE reflux, stable. cbc  to lab.\par #7 history of low B12.  level sent to the lab\par #8 low back . To continue with Tylenol 500 mg p.o. every 6 hours p.r.n. or meloxicam 15 mg p.o. once a day as needed\par #9  History of thrombocytosis. CBC sent to lab\par #10 obesity. Patient again advised to try to lose weight by cutting back on his calories and increasing his activity. The importance emphasized to him. Approximately 15 minutes spent\par #11 history of hypovitaminosis D., being supplemented. Level  sent to the lab\par 12.  Chronic renal insufficiency, CMP and CBC to lab\par 13.  History of psoriasis, stable.  Continue same treatment.\par 14.  Benign prostatic hypertrophy, stable.  Followed by the urologist\par Plan . Patient advised to return after 3 months for HTN f/u.Approximately 50 minutes spent totally

## 2022-05-06 LAB
25(OH)D3 SERPL-MCNC: 39.7 NG/ML
ALBUMIN SERPL ELPH-MCNC: 4.6 G/DL
ALP BLD-CCNC: 53 U/L
ALT SERPL-CCNC: 19 U/L
ANION GAP SERPL CALC-SCNC: 11 MMOL/L
APPEARANCE: CLEAR
AST SERPL-CCNC: 26 U/L
BACTERIA: NEGATIVE
BASOPHILS # BLD AUTO: 0.05 K/UL
BASOPHILS NFR BLD AUTO: 0.9 %
BILIRUB SERPL-MCNC: 0.7 MG/DL
BILIRUBIN URINE: NEGATIVE
BLOOD URINE: NORMAL
BUN SERPL-MCNC: 15 MG/DL
CALCIUM SERPL-MCNC: 9.7 MG/DL
CHLORIDE SERPL-SCNC: 107 MMOL/L
CHOLEST SERPL-MCNC: 128 MG/DL
CO2 SERPL-SCNC: 25 MMOL/L
COLOR: NORMAL
CREAT SERPL-MCNC: 1.23 MG/DL
CREAT SPEC-SCNC: 39 MG/DL
EGFR: 59 ML/MIN/1.73M2
EOSINOPHIL # BLD AUTO: 0.05 K/UL
EOSINOPHIL NFR BLD AUTO: 0.9 %
ESTIMATED AVERAGE GLUCOSE: 123 MG/DL
GLUCOSE QUALITATIVE U: NEGATIVE
GLUCOSE SERPL-MCNC: 101 MG/DL
HBA1C MFR BLD HPLC: 5.9 %
HCT VFR BLD CALC: 47.1 %
HDLC SERPL-MCNC: 45 MG/DL
HGB BLD-MCNC: 15.2 G/DL
HYALINE CASTS: 0 /LPF
KETONES URINE: NEGATIVE
LDLC SERPL CALC-MCNC: 70 MG/DL
LEUKOCYTE ESTERASE URINE: NEGATIVE
LYMPHOCYTES # BLD AUTO: 1.6 K/UL
LYMPHOCYTES NFR BLD AUTO: 28 %
MAN DIFF?: NORMAL
MCHC RBC-ENTMCNC: 31.3 PG
MCHC RBC-ENTMCNC: 32.3 GM/DL
MCV RBC AUTO: 97.1 FL
MICROALBUMIN 24H UR DL<=1MG/L-MCNC: <1.2 MG/DL
MICROALBUMIN/CREAT 24H UR-RTO: NORMAL MG/G
MICROSCOPIC-UA: NORMAL
MONOCYTES # BLD AUTO: 1.16 K/UL
MONOCYTES NFR BLD AUTO: 20.2 %
NEUTROPHILS # BLD AUTO: 2.81 K/UL
NEUTROPHILS NFR BLD AUTO: 49.1 %
NITRITE URINE: NEGATIVE
NONHDLC SERPL-MCNC: 83 MG/DL
PH URINE: 6
PLATELET # BLD AUTO: 92 K/UL
POTASSIUM SERPL-SCNC: 4.3 MMOL/L
PROT SERPL-MCNC: 7 G/DL
PROTEIN URINE: NEGATIVE
RBC # BLD: 4.85 M/UL
RBC # FLD: 12.4 %
RED BLOOD CELLS URINE: 1 /HPF
SODIUM SERPL-SCNC: 143 MMOL/L
SPECIFIC GRAVITY URINE: 1.01
SQUAMOUS EPITHELIAL CELLS: 0 /HPF
TRIGL SERPL-MCNC: 66 MG/DL
UROBILINOGEN URINE: NORMAL
VIT B12 SERPL-MCNC: 751 PG/ML
WBC # FLD AUTO: 5.73 K/UL
WHITE BLOOD CELLS URINE: 0 /HPF

## 2022-05-08 LAB
BACTERIA UR CULT: ABNORMAL
URINE CYTOLOGY: NORMAL

## 2022-05-16 ENCOUNTER — NON-APPOINTMENT (OUTPATIENT)
Age: 82
End: 2022-05-16

## 2022-05-16 ENCOUNTER — APPOINTMENT (OUTPATIENT)
Dept: CARDIOLOGY | Facility: CLINIC | Age: 82
End: 2022-05-16
Payer: MEDICARE

## 2022-05-16 VITALS
HEIGHT: 63 IN | RESPIRATION RATE: 12 BRPM | HEART RATE: 59 BPM | TEMPERATURE: 97.3 F | BODY MASS INDEX: 39.34 KG/M2 | WEIGHT: 222 LBS | SYSTOLIC BLOOD PRESSURE: 104 MMHG | DIASTOLIC BLOOD PRESSURE: 62 MMHG | OXYGEN SATURATION: 95 %

## 2022-05-16 PROCEDURE — 93000 ELECTROCARDIOGRAM COMPLETE: CPT

## 2022-05-16 PROCEDURE — 99214 OFFICE O/P EST MOD 30 MIN: CPT | Mod: 25

## 2022-05-20 NOTE — CARDIOLOGY SUMMARY
[de-identified] : 5/16/2022: Sinus Bradycardia at 57 beats per minute, left anterior fascicular block, and old inferior infarct [de-identified] : Pharmacologic Nuclear Stress Test performed on 5/11/2021:\par No ECG evidence of ischemia. Defects of the basal inferior and basal inferolateral walls that are fixed, consistent with soft tissue attenuation artifact. Normal LV function. No evidence of ischemia.  [de-identified] : 2/7/2022: Mild to moderate mitral regurgitation. Bioprosthetic aortic valve replacement. Peak transaortic valve gradient of 40 mmHg and mean gradient of 21 mmHg, which is probably normal in the setting of a prosthetic valve. Moderately dilated left atrium. Mild right atrial enlargement. Mild left ventricular enlargement. Moderate diastolic dysfunction. Endocardium not well visualized; grossly preserved left ventricular ejection fraction with hypokinesis of the basal inferior wall. Right ventricular enlargement with normal right ventricular systolic function. Mild to moderate tricuspid regurgitation. Mild pulmonary HTN with PASP= 41 mmHg.\par \par 5/11/2021: Normal left ventricular ejection fraction with an EF= 69%. Bioprosthetic aortic valve replacement. Peak transaortic valve gradient of 27 mmHg and mean gradient of 14 mmHg, which is probably normal in the setting of a prosthetic valve. Mild to moderate mitral regurgitation. Mild tricuspid regurgitation. Mild pulmonary HTN. PASP= 47 mmHg. [de-identified] : Cardiac catheterization performed on 6/22/2012: EF= 70%. Minor luminal irregularities of the LAD, LCx, and RCA. Mild pulmonary HTN. Severe aortic stenosis.  [de-identified] : 7/2/2012: Bioprosthetic aortic valve replacement.

## 2022-05-20 NOTE — PHYSICAL EXAM
[Well Developed] : well developed [Well Nourished] : well nourished [No Acute Distress] : no acute distress [Normal Venous Pressure] : normal venous pressure [Bradycardia] : bradycardic [Rhythm Regular] : regular [Normal S1] : normal S1 [Normal S2] : normal S2 [II] : a grade 2 [No Pitting Edema] : no pitting edema present [Left Carotid Bruit] : left carotid bruit heard [Clear Lung Fields] : clear lung fields [Good Air Entry] : good air entry [No Respiratory Distress] : no respiratory distress  [Soft] : abdomen soft [Non Tender] : non-tender [Normal Bowel Sounds] : normal bowel sounds [Normal Gait] : normal gait [No Edema] : no edema [No Cyanosis] : no cyanosis [No Rash] : no rash [Moves all extremities] : moves all extremities [No Focal Deficits] : no focal deficits [Normal Speech] : normal speech [Alert and Oriented] : alert and oriented [Normal memory] : normal memory [Bruit] : no bruit heard [de-identified] : Extraocular muscles intact. Anicteric sclerae. [de-identified] : He was wearing a face mask during the examination. [de-identified] : No visible skin ulcers.

## 2022-05-20 NOTE — DISCUSSION/SUMMARY
[FreeTextEntry1] : IMPRESSION: Mr. WEISS is a 82 year old man with a history of severe aortic stenosis secondary to a bicuspid aortic valve status post bioprosthetic aortic valve replacement 7/2012, HTN, hyperlipidemia, former tobacco use, chronic renal insufficiency, and GERD who presents today for follow up of his aortic valve disorder. \par \par PLAN:\par 1. He had a limited echocardiogram performed today that revealed very slightly higher AV gradients. He will have a repeat echocardiogram in 6 months to follow up his aortic valve gradients. He understands that he requires antibiotic prophylaxis status post aortic valve replacement. \par 2. He has not experienced any recurrent chest discomfort since his last visit with me. He had a nuclear stress test about a year ago that did not reveal any ischemia. He will continue on ASA 81 mg daily. 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 His blood pressure was repeated at the time of the physical examination and improved. He will continue on Amlodipine 5 mg daily and Metoprolol 50 mg daily. \par 4. His most recent LDL was good, thus he will continue on Atorvastatin 20 mg daily.\par 5. He will follow up with me in 6 months or sooner should he experience any symptoms in the interim.

## 2022-05-20 NOTE — HISTORY OF PRESENT ILLNESS
[FreeTextEntry1] : Patient is an 82 year old man with a history of severe aortic stenosis secondary to a bicuspid aortic valve status post bioprosthetic aortic valve replacement 7/2012, HTN, hyperlipidemia, former tobacco use, chronic renal insufficiency, and GERD who presents today for follow up of aortic valve disorder. He denies any exertional chest pain, dyspnea at rest, palpitations, headaches, and dizziness.

## 2022-06-07 ENCOUNTER — RX RENEWAL (OUTPATIENT)
Age: 82
End: 2022-06-07

## 2022-10-07 ENCOUNTER — APPOINTMENT (OUTPATIENT)
Dept: INTERNAL MEDICINE | Facility: CLINIC | Age: 82
End: 2022-10-07

## 2022-10-07 VITALS — RESPIRATION RATE: 14 BRPM

## 2022-10-07 VITALS
HEART RATE: 48 BPM | WEIGHT: 227.07 LBS | SYSTOLIC BLOOD PRESSURE: 122 MMHG | BODY MASS INDEX: 40.23 KG/M2 | HEIGHT: 63 IN | DIASTOLIC BLOOD PRESSURE: 73 MMHG | OXYGEN SATURATION: 97 %

## 2022-10-07 DIAGNOSIS — Z86.79 PERSONAL HISTORY OF OTHER DISEASES OF THE CIRCULATORY SYSTEM: ICD-10-CM

## 2022-10-07 PROCEDURE — 36415 COLL VENOUS BLD VENIPUNCTURE: CPT

## 2022-10-07 PROCEDURE — 90662 IIV NO PRSV INCREASED AG IM: CPT

## 2022-10-07 PROCEDURE — G0008: CPT

## 2022-10-07 PROCEDURE — 99215 OFFICE O/P EST HI 40 MIN: CPT | Mod: 25

## 2022-10-07 RX ORDER — HALOBETASOL PROPIONATE 0.5 MG/G
0.05 OINTMENT TOPICAL TWICE DAILY
Qty: 1 | Refills: 3 | Status: ACTIVE | COMMUNITY
Start: 2020-05-21 | End: 1900-01-01

## 2022-10-07 NOTE — HISTORY OF PRESENT ILLNESS
[FreeTextEntry1] : Patient comes for  followup of his chronic medical problems. Patient feels well without any chest pain, shortness of breath, paroxysmal nocturnal dyspnea or orthopnea. . His gastrointestinal reflux symptoms remain stable with only occasional episodes of GE reflux.Sometimes he has his known  low back and knee  pain .  Few weeks ago he had slight swelling of his feet which much improved most recent blood tests, consults, medication list, allergies reviewed

## 2022-10-07 NOTE — ASSESSMENT
[FreeTextEntry1] : Diagnosis #1 history of aortic stenosis , status post valve replacement and MR. Patient is stable and   followed by the cardiologist.\par #2 hypertension, stable.Same medication and strict  low salt diet.cmp to lab\par #3 hyperlipidemia. Continue with same medication and low-fat diet.lipids to lab\par #4 monitor liver  toxicity due to chronic medication use.cmp to lab\par #5 hypovitaminosis D., being supplemented.level to lab\par #6 GE reflux, stable. cbc  to lab.  Diet and sleeping instructions given to patient\par #7 history of low B12.  level sent to the lab\par #8 low back . To continue with Tylenol 500 mg p.o. every 6 hours p.r.n. or meloxicam 15 mg p.o. once a day as needed\par #9  History of thrombocytosis. CBC sent to lab\par #10  history of hypovitaminosis D., being supplemented. Level  sent to the lab\par 11  Chronic renal insufficiency, CMP and CBC to lab\par 12.  History of psoriasis, stable.  Continue same treatment.  Halobetasol refilled\par 13.  Benign prostatic hypertrophy, stable.  Followed by the urologist\par 14.  Edema of feet secondary to amlodipine.  Patient reassured And\par  Legs elevated\par Plan . Patient advised to return after 3 .Approximately 50 minutes spent totally

## 2022-10-10 LAB
25(OH)D3 SERPL-MCNC: 37 NG/ML
ALBUMIN SERPL ELPH-MCNC: 4.4 G/DL
ALP BLD-CCNC: 49 U/L
ALT SERPL-CCNC: 21 U/L
ANION GAP SERPL CALC-SCNC: 11 MMOL/L
APPEARANCE: CLEAR
AST SERPL-CCNC: 20 U/L
BACTERIA: NEGATIVE
BASOPHILS # BLD AUTO: 0.02 K/UL
BASOPHILS NFR BLD AUTO: 0.3 %
BILIRUB SERPL-MCNC: 0.6 MG/DL
BILIRUBIN URINE: NEGATIVE
BLOOD URINE: ABNORMAL
BUN SERPL-MCNC: 15 MG/DL
CALCIUM SERPL-MCNC: 9.2 MG/DL
CHLORIDE SERPL-SCNC: 105 MMOL/L
CHOLEST SERPL-MCNC: 113 MG/DL
CO2 SERPL-SCNC: 26 MMOL/L
COLOR: NORMAL
CREAT SERPL-MCNC: 1.28 MG/DL
CREAT SPEC-SCNC: 97 MG/DL
EGFR: 56 ML/MIN/1.73M2
EOSINOPHIL # BLD AUTO: 0.01 K/UL
EOSINOPHIL NFR BLD AUTO: 0.2 %
ESTIMATED AVERAGE GLUCOSE: 128 MG/DL
GLUCOSE QUALITATIVE U: NEGATIVE
GLUCOSE SERPL-MCNC: 86 MG/DL
HBA1C MFR BLD HPLC: 6.1 %
HCT VFR BLD CALC: 44.6 %
HDLC SERPL-MCNC: 44 MG/DL
HGB BLD-MCNC: 15 G/DL
HYALINE CASTS: 0 /LPF
IMM GRANULOCYTES NFR BLD AUTO: 0.5 %
KETONES URINE: NEGATIVE
LDLC SERPL CALC-MCNC: 55 MG/DL
LEUKOCYTE ESTERASE URINE: NEGATIVE
LYMPHOCYTES # BLD AUTO: 1.66 K/UL
LYMPHOCYTES NFR BLD AUTO: 28.8 %
MAN DIFF?: NORMAL
MCHC RBC-ENTMCNC: 31.5 PG
MCHC RBC-ENTMCNC: 33.6 GM/DL
MCV RBC AUTO: 93.7 FL
MICROALBUMIN 24H UR DL<=1MG/L-MCNC: 1.3 MG/DL
MICROALBUMIN/CREAT 24H UR-RTO: 13 MG/G
MICROSCOPIC-UA: NORMAL
MONOCYTES # BLD AUTO: 1.41 K/UL
MONOCYTES NFR BLD AUTO: 24.5 %
NEUTROPHILS # BLD AUTO: 2.63 K/UL
NEUTROPHILS NFR BLD AUTO: 45.7 %
NITRITE URINE: NEGATIVE
NONHDLC SERPL-MCNC: 69 MG/DL
PH URINE: 6.5
PLATELET # BLD AUTO: 87 K/UL
POTASSIUM SERPL-SCNC: 4.6 MMOL/L
PROT SERPL-MCNC: 6.8 G/DL
PROTEIN URINE: NEGATIVE
RBC # BLD: 4.76 M/UL
RBC # FLD: 12.4 %
RED BLOOD CELLS URINE: 4 /HPF
SODIUM SERPL-SCNC: 141 MMOL/L
SPECIFIC GRAVITY URINE: 1.01
SQUAMOUS EPITHELIAL CELLS: 0 /HPF
TRIGL SERPL-MCNC: 69 MG/DL
UROBILINOGEN URINE: NORMAL
VIT B12 SERPL-MCNC: 802 PG/ML
WBC # FLD AUTO: 5.76 K/UL
WHITE BLOOD CELLS URINE: 0 /HPF

## 2022-11-08 ENCOUNTER — APPOINTMENT (OUTPATIENT)
Dept: UROLOGY | Facility: CLINIC | Age: 82
End: 2022-11-08

## 2022-11-08 PROCEDURE — 99213 OFFICE O/P EST LOW 20 MIN: CPT | Mod: 95

## 2022-11-12 NOTE — ADDENDUM
[FreeTextEntry1] : This note was authored by [Javan Holland] working as a scribe for Dr. Oscar Mace. I, Dr. Oscar Mace have reviewed the content of this note and confirm it is true and accurate. I personally performed the history and physical examination and made all the decisions. 11/08/2022

## 2022-11-12 NOTE — HISTORY OF PRESENT ILLNESS
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 The patient took tamsulosin in the past, but stopped due to light headiness. His urination remained satisfactory after discontinuing the medication. The patient's last PSA 04/24/17 was 0.69. The patient was in the ER 01/01/17 for abdominal pain. No etiology for the pain was identified and has since resolved. CT of the abdomen and pelvis in the ER found bilateral renal cysts and a 5 mm non obstructing right renal calculus. \par 1/17/18: CT scan was reviewed and discussed  in office which found no hydronephrosis or recurrent tumors . There are 4 cysts on the  left kidney and 2 cysts on the right.  There is a Stone in the right kidney in the upper portion. He reported earlier he was experiencing some left flank pain. At night he wakes up once to urinate. He remained on finasteride 5 mg once daily.\par 5/29/18: The patient returned and reported he wakes up once during the night to urinate. Currently taking Finasteride. Complained of intermittent left lumbar pain that remained the same since last visit. Daughter noted he does not drink enough water. \par 11/19/18: The patient returned today for a renal US. Renal US findings showed again there is a 5.5 mm echogenic focus with distal shadowing in the upper pole of the right kidney with bilateral simple non vascular cysts. There is a new septated cyst in the mid outer pole of the left kidney. Both kidneys appear normal in size and echogenicity. No hydronephrosis, or solid masses visualized. PSA from 3/29/18 was 0.71 ng/mL. Notes he was recently hospitalized for LE weakness and pain. Will be evaluated by a cardiologist for possible loop recorder placement. \par 5/22/19: Male patient presents today for a follow up. Blood work was completed 5/1/19 as per Dr. Ramirez. PSA was measured in January 2019  0.94 and creatinine as of May 2019 was 1.18 mg/dL. regarding urination he denies dysuria, gross hematuria, urgency or incontinence. He will wake up 2x a night to urinate. He has a Hx of high blood pressure and hyperlipidemia that are both well controlled on medication.\par \par 11/21/2019: Patient presents today for a follow up. Overall, he is doing well. He states his urination is good. Wakes up 1-2 times during the night to urinate. Patient denies dysuria, gross hematuria, urgency, or incontinence. Today, his only complaint is of some back pain. A urinalysis from  9/11/2019 was small positive for hematuria. His creatinine from 9/11/2019 was 1.29 mg/dL. He has not had a PSA measured since his last done January 2019 of 0.94 ng/mL. Small cyst/possible kidney stone in kidney found in the prior ultrasound of last year dated 11/19/2018. Digital rectal exam found no suspicious rectal masses. Anal tone is normal. The prostate is non tender, with normal texture, discrete borders, and no nodules. It is a 30 gram transurethral resection size. No gross blood on the examining finger. A little external hemorrhoid at 1 o'clock position was found though not inflamed. Due the presence of a small cyst/possible kidney stone found in the ultrasound report from last year dated 11/19/2018, a renal ultrasound has been ordered today and will be obtained by the office. Finding: Again there is a 5.5 mm echogenic focus with distal shadowing in the upper pole of the right kidney with bilateral simple non vascular cysts. There is a new septated cyst in the mid outer pole of the left kidney. Both kidneys appear normal in size and echogenicity. No hydronephrosis, or solid masses visualized. In my opinion, it looks more like a Jeremie's Plaque than a kidney stone.\par \par 11/23/2020: Patient presents today for follow up. Takes Finasteride 5mg once daily. Urination is good. Wakes 1-2x at night to urinate. Denies urinary urgency, pushing or straining, dysuria, gross hematuria. No constipation or chest pain. Had blood work by PCP on 9/23/20 showing creatinine of 1.20 and HbA1c of 5.8. Offers no new complaints today. Has appointment with PCP Dr. Ramirez tomorrow. \par \par 05/24/2021: Patient presents today for follow up. Wakes 1-2x at night to urinate. Denies dysuria, gross hematuria, urinary urgency, pushing or straining. Continues to take Finasteride. Has pain in lower back radiating down to leg and follows Dr. Ramirez for this. Hx of kidney stones. Traveling to [facility] for two months next month. \par \par 10/28/2021: Patient presents today for a follow up. He is accompanied by his son, Venu, who is translating for him. Pt reports nocturia 2-3x a night which is not bothersome. Denies urinary urgency, pushing, straining, gross hematuria, and burning. Has some back pain that is helped by injections. Pt is no longer sexually active according to his son who is translating. Pt obtained a renal US about 4 months ago on 5/27/2021 at [facility] which demonstrated no evidence of hydronephrosis. Bilateral renal cysts measuring up to 5.4 cm in the left kidney, previously measuring 4.9 cm. Enlarged prostate. PVR was 82.9. Pt's son believes his memory slightly diminishing but nothing concerning. Pt last saw  on 9/23/2021. Blood work of that visit revealed a creatinine of 1.24, eGFR was 54, PSA was 0.6 and platelets were slightly low. He denies having any problems clotting if he is bleeding. \par \par 04/28/2022: Patient presents today for a follow up visit. He was accompanied by his wife who helped to translate for him. Hx of kidney stones. Denies nocturia, urgency, gross hematuria, urge incontinence, or pushing/straining. He urinates a few times during the day. His last renal US was in 5/2021 and he did not have any kidney stones at that time. His US showed bilateral renal cysts measuring up to 5.4 cm in the left kidney, 4.9 cm previously. Pt currently takes Finasteride 5 mg, one tablet daily. \par \par 11/08/2022: The patient gave permission for an audio and visual telehealth conference. We utilized [employer] teams telemetry doc platform. The patient was located in his home in [place], [place]. I was located in my office in [place], [place]. I spoke with his son. We reviewed his recent labs, which indicate a Creatinine of 1.28 on October 7, 2022. A PSA was done on April 28 of this year, which was 0.61 ng/mL. This is very low for his age. His HbA1c is 6.0%. The son denies gross hematuria, dysuria, and straining to urinate. He is continuing on the finasteride 5mg with no gynecomastia or mastodynia. \par \par He is no longer sexually active and is not concerned with his erectile function. \par \par We agreed that he would follow up in the springtime for a repeat PSA. Additionally, I have renewed his finasteride.\par \par At his next appointment, I would like to examine him and offer a digital rectal exam.

## 2022-11-12 NOTE — ASSESSMENT
I notified the pt    [FreeTextEntry1] : Mr Magallon is a 81 year old man presented for follow up of benign prostatic hypertrophy with bladder outlet obstruction, kidney stones, and microscopic hematuria. The patient took tamsulosin in the past, but stopped due to light headiness. His urination remained satisfactory after discontinuing the medication. The patient's last PSA 04/24/17 was 0.69. The patient was in the ER 01/01/17 for abdominal pain. No etiology for the pain was identified and has since resolved. CT of the abdomen and pelvis in the ER found bilateral renal cysts and a 5 mm non obstructing right renal calculus. \par 1/17/18: CT scan was reviewed and discussed  in office which found no hydronephrosis or recurrent tumors . There are 4 cysts on the  left kidney and 2 cysts on the right.  There is a Stone in the right kidney in the upper portion. He reported earlier he was experiencing some left flank pain. At night he wakes up once to urinate. He remained on finasteride 5 mg once daily.\par 5/29/18: The patient returned and reported he wakes up once during the night to urinate. Currently taking Finasteride. Complained of intermittent left lumbar pain that remained the same since last visit. Daughter noted he does not drink enough water. \par 11/19/18: The patient returned today for a renal US. Renal US findings showed again there is a 5.5 mm echogenic focus with distal shadowing in the upper pole of the right kidney with bilateral simple non vascular cysts. There is a new septated cyst in the mid outer pole of the left kidney. Both kidneys appear normal in size and echogenicity. No hydronephrosis, or solid masses visualized. PSA from 3/29/18 was 0.71 ng/mL. Notes he was recently hospitalized for LE weakness and pain. Will be evaluated by a cardiologist for possible loop recorder placement. \par 5/22/19: Male patient presents today for a follow up. Blood work was completed 5/1/19 as per Dr. Ramirez. PSA was measured in January 2019  0.94 and creatinine as of May 2019 was 1.18 mg/dL. regarding urination he denies dysuria, gross hematuria, urgency or incontinence. He will wake up 2x a night to urinate. He has a Hx of high blood pressure and hyperlipidemia that are both well controlled on medication.\par \par 11/21/2019: Patient presents today for a follow up. Overall, he is doing well. He states his urination is good. Wakes up 1-2 times during the night to urinate. Patient denies dysuria, gross hematuria, urgency, or incontinence. Today, his only complaint is of some back pain. A urinalysis from  9/11/2019 was small positive for hematuria. His creatinine from 9/11/2019 was 1.29 mg/dL. He has not had a PSA measured since his last done January 2019 of 0.94 ng/mL. Small cyst/possible kidney stone in kidney found in the prior ultrasound of last year dated 11/19/2018. Digital rectal exam found no suspicious rectal masses. Anal tone is normal. The prostate is non tender, with normal texture, discrete borders, and no nodules. It is a 30 gram transurethral resection size. No gross blood on the examining finger. A little external hemorrhoid at 1 o'clock position was found though not inflamed. Due the presence of a small cyst/possible kidney stone found in the ultrasound report from last year dated 11/19/2018, a renal ultrasound has been ordered today and will be obtained by the office. Finding: Again there is a 5.5 mm echogenic focus with distal shadowing in the upper pole of the right kidney with bilateral simple non vascular cysts. There is a new septated cyst in the mid outer pole of the left kidney. Both kidneys appear normal in size and echogenicity. No hydronephrosis, or solid masses visualized. In my opinion, it looks more like a Jeremie's Plaque than a kidney stone.\par \par 11/23/2020: Patient presents today for follow up. Takes Finasteride 5mg once daily. Urination is good. Wakes 1-2x at night to urinate. Denies urinary urgency, pushing or straining, dysuria, gross hematuria. No constipation or chest pain. Had blood work by PCP on 9/23/20 showing creatinine of 1.20 and HbA1c of 5.8. Offers no new complaints today. Has appointment with PCP Dr. Ramirez tomorrow. \par \par 05/24/2021: Patient presents today for follow up. Wakes 1-2x at night to urinate. Denies dysuria, gross hematuria, urinary urgency, pushing or straining. Continues to take Finasteride. Has pain in lower back radiating down to leg and follows Dr. Ramirez for this. Hx of kidney stones. Traveling to [facility] for two months next month. \par \par Renewed Finasteride. \par Patient will schedule for a renal US for hx of kidney stones\par Blood work today included BMP, alkaline phosphatase, PSA, and testosterone. \par The patient produced a urine sample which will be sent for urinalysis, urine cytology, and urine culture. \par Patient will schedule a telehealth visit to review renal US results. \par \par 10/28/2021: Patient presents today for a follow up. He is accompanied by his son, Venu, who is translating for him. Pt reports nocturia 2-3x a night which is not bothersome. Denies urinary urgency, pushing, straining, gross hematuria, and burning. Has some back pain that is helped by injections. Pt is no longer sexually active according to his son who is translating. Pt obtained a renal US about 4 months ago on 5/27/2021 at [facility] which demonstrated no evidence of hydronephrosis. Bilateral renal cysts measuring up to 5.4 cm in the left kidney, previously measuring 4.9 cm. Enlarged prostate. PVR was 82.9. Pt's son believes his memory slightly diminishing but nothing concerning. Pt last saw  on 9/23/2021. Blood work of that visit revealed a creatinine of 1.24, eGFR was 54, PSA was 0.6 and platelets were slightly low. He denies having any problems clotting if he is bleeding. \par \par 04/28/2022: Patient presents today for a follow up visit. He was accompanied by his wife who helped to translate for him. Hx of kidney stones. Denies nocturia, urgency, gross hematuria, urge incontinence, or pushing/straining. He urinates a few times during the day. His last renal US was in 5/2021 and he did not have any kidney stones at that time. His US showed bilateral renal cysts measuring up to 5.4 cm in the left kidney, 4.9 cm previously. Pt currently takes Finasteride 5 mg, one tablet daily. \par \par Blood work today included BMP, A1C, androstenedione, CBC, alkaline phosphatase, PSA, and testosterone.\par The patient produced a urine sample which will be sent for urinalysis, urine cytology, and urine culture. \par \par I recommend the patient increase his fluid intake to prevent the formation of kidney stones. \par \par RTO in 6 months. \par \par Preparation, in person office visit, and coordination of care: 30 minutes. \par \par 11/08/2022: The patient gave permission for an audio and visual telehealth conference. We utilized [employer] teams telemetry doc platform. The patient was located in his home in [place], [place]. I was located in my office in [place], [place]. I spoke with his son. We reviewed his recent labs, which indicate a Creatinine of 1.28 on October 7, 2022. A PSA was done on April 28 of this year, which was 0.61 ng/mL. This is very low for his age. His HbA1c is 6.0%. The son denies gross hematuria, dysuria, and straining to urinate. He is continuing on the finasteride 5mg with no gynecomastia or mastodynia. He denies constipation. \par \par He is no longer sexually active and is not concerned with his erectile function. \par \par We agreed that he would follow up in the springtime for a repeat PSA. Additionally, I have renewed his finasteride.\par \par At his next appointment, I would like to examine him and offer a digital rectal exam.  \par \par Preparation, audio and visual telehealth visit and coordination of care took 22 minutes.

## 2022-11-16 ENCOUNTER — APPOINTMENT (OUTPATIENT)
Dept: CARDIOLOGY | Facility: CLINIC | Age: 82
End: 2022-11-16

## 2022-11-16 VITALS
RESPIRATION RATE: 14 BRPM | WEIGHT: 227 LBS | TEMPERATURE: 97.6 F | BODY MASS INDEX: 40.21 KG/M2 | SYSTOLIC BLOOD PRESSURE: 148 MMHG | HEART RATE: 60 BPM | DIASTOLIC BLOOD PRESSURE: 78 MMHG | OXYGEN SATURATION: 97 %

## 2022-11-16 VITALS — DIASTOLIC BLOOD PRESSURE: 70 MMHG | SYSTOLIC BLOOD PRESSURE: 126 MMHG

## 2022-11-16 PROCEDURE — 99214 OFFICE O/P EST MOD 30 MIN: CPT | Mod: 25

## 2022-11-16 PROCEDURE — 93000 ELECTROCARDIOGRAM COMPLETE: CPT

## 2022-11-22 ENCOUNTER — APPOINTMENT (OUTPATIENT)
Dept: ORTHOPEDIC SURGERY | Facility: CLINIC | Age: 82
End: 2022-11-22

## 2022-11-22 VITALS
WEIGHT: 227 LBS | HEART RATE: 60 BPM | DIASTOLIC BLOOD PRESSURE: 70 MMHG | BODY MASS INDEX: 40.22 KG/M2 | SYSTOLIC BLOOD PRESSURE: 126 MMHG | TEMPERATURE: 97 F | HEIGHT: 63 IN | OXYGEN SATURATION: 97 %

## 2022-11-22 PROCEDURE — 72100 X-RAY EXAM L-S SPINE 2/3 VWS: CPT

## 2022-11-22 PROCEDURE — 99204 OFFICE O/P NEW MOD 45 MIN: CPT

## 2022-12-09 ENCOUNTER — APPOINTMENT (OUTPATIENT)
Dept: ORTHOPEDIC SURGERY | Facility: CLINIC | Age: 82
End: 2022-12-09
Payer: MEDICARE

## 2022-12-09 VITALS
SYSTOLIC BLOOD PRESSURE: 155 MMHG | WEIGHT: 217 LBS | HEIGHT: 63 IN | DIASTOLIC BLOOD PRESSURE: 85 MMHG | HEART RATE: 48 BPM | BODY MASS INDEX: 38.45 KG/M2

## 2022-12-09 PROCEDURE — 99203 OFFICE O/P NEW LOW 30 MIN: CPT

## 2022-12-09 PROCEDURE — 99213 OFFICE O/P EST LOW 20 MIN: CPT

## 2022-12-09 PROCEDURE — 73564 X-RAY EXAM KNEE 4 OR MORE: CPT | Mod: 50

## 2022-12-09 PROCEDURE — 72170 X-RAY EXAM OF PELVIS: CPT

## 2022-12-11 NOTE — PHYSICAL EXAM
[de-identified] : Constitutional: 82 year old male, alert and oriented, cooperative, in no acute distress.\par \par HEENT \par NC/AT.  Appearance: symmetric\par \par Neck/Back\par Straight without deformity or instability.  Good ROM.\par \par Chest/Respiratory \par Respiratory effort: no intercostal retractions or use of accessory muscles. Nonlabored Breathing\par \par Skin \par On inspection, warm and dry without rashes or lesions.\par \par Mental Status: \par Judgment, insight: intact\par Orientation: oriented to time, place, and person\par \par Neurological:\par Sensory and Motor are grossly intact throughout\par \par Left Knee\par \par Inspection:\par     Skin intact, no rashes or lesions\par     No Effusion\par     Non-tender to palpation over tibial tubercle, patella, medial and lateral joint line, and pes insertion.\par     Mild tenderness over medial femoral condyle\par \par Range of Motion:\par 	Extension - 0 degrees\par 	Flexion - 120 degrees\par 	Extensor lag: None\par \par Stability:\par      Demonstrates no Varus or Valgus instability\par      Negative Anterior or Posterior drawer.\par      Negative Lachman's\par \par Patella: stable, tracks well. \par \par Right Knee\par \par Inspection:\par     Skin intact, no rashes or lesions\par     No Effusion\par     Non-tender to palpation over tibial tubercle, patella, medial and lateral joint line, and pes insertion.\par \par Range of Motion:\par 	Extension - 0 degrees\par 	Flexion - 110 degrees\par 	Extensor lag: None\par \par Stability:\par      Demonstrates no Varus or Valgus instability\par      Negative Anterior or Posterior drawer.\par      Negative Lachman's\par \par Patella: stable, tracks well. \par \par Neurologic Exam\par     Motor intact including 5/5 Extensor Hallucis Longus, 5/5 Flexor Hallucis Longus, 5/5 Tibialis Anterior and 5/5 Gastrocnemius\par     Sensation Intact to Light Touch including Saphenous, Sural, Superficial Peroneal, Deep Peroneal, Tibial nerve distributions\par \par Vascular Exam\par     Foot is warm and well perfused with 2+ Dorsalis Pedis Pulse \par \par No pain with range of motion of the bilateral hips. No lumbar paraspinal muscle tenderness.  [de-identified] : XRay:  XRays of the Pelvis (1 View) taken in the office today and discussed with the patient. XRays demonstrate no obvious fracture or dislocation. There is joint space narrowing in the bilateral hips. (my personal interpretation).\par \par XRay: XRays of the Right Knee (4 Views) taken in the office today and reviewed with the patient. XRays demonstrate tricompartmental joint space narrowing, worst in the medial compartment, with subchondral sclerosis, overlying osteophytes, all consistent with severe osteoarthritis, KL Grade: 3. (my personal interpretation).\par \par XRay: XRays of the Left Knee (4 Views) taken in the office today and reviewed with the patient. XRays demonstrate tricompartmental joint space narrowing, worst in the medial compartment, with subchondral sclerosis, overlying osteophytes, all consistent with severe osteoarthritis, KL Grade: 3. (my personal interpretation).

## 2022-12-11 NOTE — HISTORY OF PRESENT ILLNESS
[de-identified] : Translated by family/daughter\par \par Mr. Magallon is a 82-year-old male presents to the office for evaluation of his bilateral knee pain.  Patient has been longstanding experiencing bilateral knee pain/locking (left greater than right).  His symptoms have been worsening for about 1 year, but have been present for several years.  His pain and locking is worse with stairs and only occurs occasionally.  It is not as significant when walking on flat ground.  He occasionally has pain at night or in the morning for about 1 hour and then improves after ambulation.  Patient is able to walk about 1-3 blocks before the pain occurs and then he has to sit down and can walk again after a period of rest.  He has not tried physical therapy.  Patient has tried meloxicam for his lumbar spine, without significant relief for his back or knee pain.  He has a history of knee injections several years ago.  Patient has a history of lumbar back pain that is managed by Dr. Rooney.  No recent trauma.  No fevers or chills.

## 2022-12-11 NOTE — REVIEW OF SYSTEMS
[Joint Pain] : joint pain [Negative] : Neurological [Joint Stiffness] : no joint stiffness [Joint Swelling] : no joint swelling [Fever] : no fever [Chills] : no chills [FreeTextEntry5] : History of HLD, HTN [FreeTextEntry7] : History of GERD [FreeTextEntry8] : History of CKD [de-identified] : No fevers/chills. History of Psoriasis [de-identified] : History of Diabetes

## 2022-12-11 NOTE — DISCUSSION/SUMMARY
[de-identified] : Mr. Magallon is a 82-year-old male presents to the office for evaluation of his bilateral knee pain.  Patient has been experiencing longstanding bilateral (left greater than right) knee pain that has been worsening over the last year.  Patient reports knee pain and locking that is worse with stairs.  He is able to walk on flat ground without significant pain.  X-rays showed significant bilateral knee osteoarthritis.  Examination showed good knee range of motion. Discussed with the patient the examination and imaging findings.  Discussed with the patient the operative and nonoperative management of knee osteoarthritis, including total knee arthroplasty.  Patient would like to continue nonoperative management at this time.  Discussed the nonoperative management of knee osteoarthritis, including physical therapy, anti-inflammatories, and injections.  Patient was given referral for physical therapy.  Patient has a prescription for meloxicam as needed.  He will continue to take meloxicam as needed.  Patient considered bilateral knee corticosteroid injections.  Discussed the risks and benefits of knee corticosteroid injections.  He would like to defer injections at this time and will consider injections in the future.  Patient will follow-up in 6 weeks for reevaluation and management.  Patient understanding and in agreement the plan.  All questions answered \par \par Plan:\par - Physical Therapy for bilateral knee pain\par - Continue meloxicam as needed\par - Consider corticosteroid injections if pain not improving\par - Follow up in 6 weeks for reevaluation and management\par

## 2022-12-18 VITALS — HEIGHT: 68 IN

## 2022-12-18 NOTE — CARDIOLOGY SUMMARY
[de-identified] : 11/16/2022: Sinus Rhythm at 60 beats per minute, left axis deviation, poor R wave progression, and an old inferior infarct [de-identified] : Pharmacologic Nuclear Stress Test performed on 5/11/2021:\par No ECG evidence of ischemia. Defects of the basal inferior and basal inferolateral walls that are fixed, consistent with soft tissue attenuation artifact. Normal LV function. No evidence of ischemia.  [de-identified] : 2/7/2022: Mild to moderate mitral regurgitation. Bioprosthetic aortic valve replacement. Peak transaortic valve gradient of 40 mmHg and mean gradient of 21 mmHg, which is probably normal in the setting of a prosthetic valve. Moderately dilated left atrium. Mild right atrial enlargement. Mild left ventricular enlargement. Moderate diastolic dysfunction. Endocardium not well visualized; grossly preserved left ventricular ejection fraction with hypokinesis of the basal inferior wall. Right ventricular enlargement with normal right ventricular systolic function. Mild to moderate tricuspid regurgitation. Mild pulmonary HTN with PASP= 41 mmHg.\par \par 5/11/2021: Normal left ventricular ejection fraction with an EF= 69%. Bioprosthetic aortic valve replacement. Peak transaortic valve gradient of 27 mmHg and mean gradient of 14 mmHg, which is probably normal in the setting of a prosthetic valve. Mild to moderate mitral regurgitation. Mild tricuspid regurgitation. Mild pulmonary HTN. PASP= 47 mmHg. [de-identified] : Cardiac catheterization performed on 6/22/2012: EF= 70%. Minor luminal irregularities of the LAD, LCx, and RCA. Mild pulmonary HTN. Severe aortic stenosis.  [de-identified] : 7/2/2012: Bioprosthetic aortic valve replacement.

## 2022-12-18 NOTE — HISTORY OF PRESENT ILLNESS
[FreeTextEntry1] : Patient is an 82 year old man with a history of severe aortic stenosis secondary to a bicuspid aortic valve status post bioprosthetic aortic valve replacement 7/2012, HTN, hyperlipidemia, former tobacco use, chronic renal insufficiency, and GERD who presents today for follow up of aortic valve disorder. He mentions experiencing dyspnea with exertion that is unchanged. He otherwise denies any exertional chest pain, dyspnea at rest, palpitations, headaches, and dizziness.

## 2022-12-18 NOTE — PHYSICAL EXAM
[Well Developed] : well developed [Well Nourished] : well nourished [No Acute Distress] : no acute distress [Normal Venous Pressure] : normal venous pressure [Bradycardia] : bradycardic [Rhythm Regular] : regular [Normal S1] : normal S1 [Normal S2] : normal S2 [II] : a grade 2 [No Pitting Edema] : no pitting edema present [Left Carotid Bruit] : left carotid bruit heard [Clear Lung Fields] : clear lung fields [Good Air Entry] : good air entry [No Respiratory Distress] : no respiratory distress  [Soft] : abdomen soft [Non Tender] : non-tender [Normal Bowel Sounds] : normal bowel sounds [Normal Gait] : normal gait [No Edema] : no edema [No Cyanosis] : no cyanosis [No Rash] : no rash [Moves all extremities] : moves all extremities [No Focal Deficits] : no focal deficits [Normal Speech] : normal speech [Alert and Oriented] : alert and oriented [Normal memory] : normal memory [Normal Rate] : normal [Bruit] : no bruit heard [de-identified] : Extraocular muscles intact. Anicteric sclerae. [de-identified] : He was wearing a face mask during the examination. [de-identified] : No visible skin ulcers.

## 2022-12-18 NOTE — REVIEW OF SYSTEMS
Phone: Corinne           Fax: [phone]                           Outpatient Physical Therapy                                                                            Daily Note    Patient: Hiwot Renee : 1971  CSN #: [id number]   Referring Physician: Jesse Schaumann, MD    Date: 2022       Treatment Diagnosis: BLE/BUE/abdominal lymphedema    Onset Date: 03/15/22  Total # of Visits Approved: 18 Per Physician Order  Total # of Visits to Date: 14  No Show: 0  Canceled Appointment: 2      Pre-Treatment Pain:  4-5/10  Subjective: Pt states she is feeling ok today, has noticed some little changes in her swelling. Exercises:  Exercise 1: HEP Pt educated on keeping her BLE/BUE compressed at 30mmHg and elevated throughout the day, performing exercises daily and reducing sugar and sodium in her diet    Manual:  Soft Tissue Mobilizaton: MLD to B LE/UE         Assessment  Assessment: MLD to B LE/UE followed by pumps at 60 mmHg for 60 min to B LE and L UE. Kinesio taping to R LE this visit. Activity Tolerance  Activity Tolerance: Patient tolerated treatment well    Patient Education  Patient Education: Kinesio taping to LE  Pt verbalized/demonstrated good understanding:     [x] Yes         [] No, pt required further clarification.        Post Treatment Pain:  4-5/10      Plan  Plan Frequency: 3x/wk  Plan weeks: 4-6 weks       Goals  (Total # of Visits to Date: 15)      Short Term Goals  Time Frame for Short term goals: 3 weeks  Short term goal 1: Pt willbe educated on her POC and HEP-met  Short term goal 2: Pt will initiate pump protocol to BLE/BUE-met    Long Term Goals  Time Frame for Long term goals : 6 weeks  Long term goal 1: Pt will be safe and independent with her lymphedema management-progressing  Long term goal 2: Pt will demonstrate a 2-3cm decrease in girth measurements in BUE/BLE in order to improve quality of life-progressing  Long term goal 3: Pt will be fitted for an at home pump-progressing  Long term goal 4: Pt will report 50% improvement in symptoms-progressing    Minutes Tracking:  Time In: 1225  Time Out: [id number]  Minutes: [address] [place], [place]     Date: 5/2/2022 [Dyspnea on exertion] : dyspnea during exertion [Negative] : Heme/Lymph

## 2022-12-18 NOTE — DISCUSSION/SUMMARY
[EKG obtained to assist in diagnosis and management of assessed problem(s)] : EKG obtained to assist in diagnosis and management of assessed problem(s) [FreeTextEntry1] : IMPRESSION: Mr. WEISS is a 82 year old man with a history of severe aortic stenosis secondary to a bicuspid aortic valve status post bioprosthetic aortic valve replacement 7/2012, HTN, hyperlipidemia, former tobacco use, chronic renal insufficiency, and GERD who presents today for follow up of his aortic valve disorder. \par \par PLAN:\par 1. He had a limited echocardiogram performed 6 months ago that revealed very slightly higher AV gradients. He will have a repeat echocardiogram at the time of his next visit to follow up his aortic valve gradients. He understands that he requires antibiotic prophylaxis status post aortic valve replacement. \par 2. He has not experienced any recurrent chest discomfort since his last visit with me. He had a nuclear stress test about a year and a half ago that did not reveal any ischemia. He will continue on ASA 81 mg daily. 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 His dyspnea with exertion is unchanged, thus will hold off on a stress test until after he has his echocardiogram. \par 4. His blood pressure was good when it was repeated at the time of the physical examination. He will continue on Amlodipine 5 mg daily and Metoprolol 50 mg daily. \par 5. His most recent LDL was very good, thus he will continue on Atorvastatin 20 mg daily.\par 6. He will follow up with me in 4 months or sooner should he experience any new or worsening symptoms in the interim.

## 2023-01-13 ENCOUNTER — APPOINTMENT (OUTPATIENT)
Dept: ORTHOPEDIC SURGERY | Facility: CLINIC | Age: 83
End: 2023-01-13
Payer: MEDICARE

## 2023-01-13 VITALS
HEIGHT: 68 IN | WEIGHT: 217 LBS | SYSTOLIC BLOOD PRESSURE: 124 MMHG | TEMPERATURE: 97.2 F | HEART RATE: 56 BPM | OXYGEN SATURATION: 95 % | DIASTOLIC BLOOD PRESSURE: 70 MMHG | BODY MASS INDEX: 32.89 KG/M2

## 2023-01-13 PROCEDURE — 99214 OFFICE O/P EST MOD 30 MIN: CPT | Mod: 25

## 2023-01-13 PROCEDURE — 20610 DRAIN/INJ JOINT/BURSA W/O US: CPT

## 2023-01-15 NOTE — PHYSICAL EXAM
[de-identified] : Constitutional: 82 year old male, alert and oriented, cooperative, in no acute distress.\par \par HEENT \par NC/AT. Appearance: symmetric\par \par Neck/Back\par Straight without deformity or instability. Good ROM.\par \par Chest/Respiratory \par Respiratory effort: no intercostal retractions or use of accessory muscles. Nonlabored Breathing\par \par Skin \par On inspection, warm and dry without rashes or lesions.\par \par Mental Status: \par Judgment, insight: intact\par Orientation: oriented to time, place, and person\par \par Neurological:\par Sensory and Motor are grossly intact throughout\par \par Left Knee\par \par Inspection:\par  Skin intact, no rashes or lesions\par  No Effusion\par  Non-tender to palpation over tibial tubercle, patella, medial and lateral joint line, and pes insertion.\par  Mild tenderness over medial femoral condyle and posterior medial/lateral knee\par \par Range of Motion:\par 	Extension - 0 degrees\par 	Flexion - 120 degrees\par 	Extensor lag: None\par \par Stability:\par  Demonstrates no Varus or Valgus instability\par  Negative Anterior or Posterior drawer.\par  Negative Lachman's\par \par Patella: stable, tracks well. \par \par Right Knee\par \par Inspection:\par  Skin intact, no rashes or lesions\par  No Effusion\par  Non-tender to palpation over tibial tubercle, patella, medial and lateral joint line, and pes insertion.\par  Mild tenderness over the medial/lateral joint line and posterior medial/lateral knee.\par \par Range of Motion:\par 	Extension - 0 degrees\par 	Flexion - 120 degrees\par 	Extensor lag: None\par \par Stability:\par  Demonstrates no Varus or Valgus instability\par  Negative Anterior or Posterior drawer.\par  Negative Lachman's\par \par Patella: stable, tracks well. \par \par Neurologic Exam\par  Motor intact including 5/5 Extensor Hallucis Longus, 5/5 Flexor Hallucis Longus, 5/5 Tibialis Anterior and 5/5 Gastrocnemius\par  Sensation Intact to Light Touch including Saphenous, Sural, Superficial Peroneal, Deep Peroneal, Tibial nerve distributions\par \par Vascular Exam\par  Foot is warm and well perfused with 2+ Dorsalis Pedis Pulse \par \par No pain with range of motion of the bilateral hips. No lumbar paraspinal muscle tenderness.  [de-identified] : XRay: XRays of the Pelvis (1 View) taken on 12/9/22. XRays demonstrate no obvious fracture or dislocation. There is joint space narrowing in the bilateral hips. (my personal interpretation).\par \par XRay: XRays of the Right Knee (4 Views) taken on 12/9/22.. XRays demonstrate tricompartmental joint space narrowing, worst in the medial compartment, with subchondral sclerosis, overlying osteophytes, all consistent with severe osteoarthritis, KL Grade: 3. (my personal interpretation).\par \par XRay: XRays of the Left Knee (4 Views) taken on 12/9/22. XRays demonstrate tricompartmental joint space narrowing, worst in the medial compartment, with subchondral sclerosis, overlying osteophytes, all consistent with severe osteoarthritis, KL Grade: 3. (my personal interpretation).

## 2023-01-15 NOTE — HISTORY OF PRESENT ILLNESS
[de-identified] : Pashto  ID: [id number]\par \par Mr. Magallon is a 82-year-old male who presents to the office for follow-up of his bilateral knee pain.  Patient has a longstanding history of bilateral knee pain and was previously seen in the office on 12/9/2022.  He states that the pain has been worsening for about 1 year, but has been present for several years.  He reports pain and locking that is worse with stairs.  Pain is located over the medial/lateral and anterior knee.  Patient walked a lot over the last few days and had worsening pain overnight.  Pain is worse with stairs, when he can feel the knee lock and he has to hold onto the stairs or he feels he may fall.  Patient walks 1-2 blocks before he gets pain.  States that the pain is manageable over the course of the day.  Patient did not do physical therapy and is not currently taking meloxicam.  No fevers or chills.  No numbness or tingling.

## 2023-01-15 NOTE — REVIEW OF SYSTEMS
[Joint Pain] : joint pain [Chills] : chills [Negative] : Neurological [Joint Stiffness] : no joint stiffness [Joint Swelling] : no joint swelling [Fever] : no fever [FreeTextEntry7] : History of GERD [FreeTextEntry5] : History of Aortic Valve Replacement, HLD, HTN [FreeTextEntry8] : History of CKD [de-identified] : History of Psoriasis [de-identified] : Last A1C: 6.1

## 2023-01-15 NOTE — DISCUSSION/SUMMARY
[de-identified] : Mr. Magallon is a 82-year-old male who presented to the office for follow-up of his bilateral knee pain.  Patient has been experiencing bilateral knee pain for several years, but has been worse over the last year.  Pain has been worsening over the last few days, following increased activity.  X-rays showed significant bilateral knee osteoarthritis.  Examination showed some tenderness over knees, but otherwise good range of motion. Discussed with the patient the examination and imaging findings.  Discussed with the patient the operative and nonoperative management of knee osteoarthritis, including total knee arthroplasty.  Patient would like to continue nonoperative management at this time.  Discussed the nonoperative management of knee osteoarthritis, including physical therapy, anti-inflammatories, and injections.  Patient was recommended to start physical therapy and has a prior referral.  He will take anti-inflammatories as needed and has a prior prescription for meloxicam.  Patient would like bilateral knee corticosteroid injections today.  Discussed the risks of corticosteroid injections.  Bilateral knee corticosteroid injections were given using aseptic technique.  Patient tolerated the procedure well.  Patient will follow-up in 3 months for reevaluation and management.  Patient understanding and in agreement the plan.  All questions answered \par \par \par Plan:\par - Bilateral knee corticosteroid injections given\par - Physical Therapy for bilateral knee pain\par - Meloxicam as needed for bilateral knee pain\par - Follow up in 3 months for reevaluation management\par \par Procedure Note: Bilateral knee corticosteroid injections\par \par Bilateral knee corticosteroid injections were given today. Risk and benefits of the injection were discussed, including but not limited to infection, fat atrophy, skin discoloration, failure to achieve desired results and elevated blood sugars. \par The knee was prepped in the usual sterile fashion. The injection was given with 1 cc (40 mg) Methylprednisolone and 2 cc 0.25% Bupivacaine and the patient tolerated it well. The contralateral knee was prepped in the usual sterile fashion. The injection was given with 1 cc (40 mg) Methylprednisolone and 2 cc 0.25% Bupivacaine and the patient tolerated it well.\par \par Risk of cortisone injections are minimal but present. There is the rare risk of joint infection, skin and soft tissue thinning or whitening of the skin surrounding the injection site, thinning of nearby bone, or the risk of elevated blood glucose in diabetics. Diabetics receiving steroid injection should follow their blood sugars very closely for several days after receiving the injections.  In the event of uncontrolled elevated blood glucose, they should seek medical attention.\par \par There's some concern that repeated use of cortisone shots may cause deterioration of the cartilage within a joint. For this reason, doctors typically limit the number of cortisone shots in a joint. The limit varies depending on the joint and the reason for treatment.\par \par Cortisone shots usually include a corticosteroid medication and a local anesthetic. The local anesthetic helps to numb the joint at the time of the injection and last for several hours. The cortisone acts to relieve pain and inflammation but may take several days to begin to work. Some people do experience a cortisone flare after the injection and may have increased pain for 2 to 3 days after the injection, and then pain can begin to improve.\par \par Patient was instructed to call or return for any signs or symptoms of infection after an injection including increased pain, swelling, redness or fevers. \par

## 2023-01-20 ENCOUNTER — APPOINTMENT (OUTPATIENT)
Dept: INTERNAL MEDICINE | Facility: CLINIC | Age: 83
End: 2023-01-20
Payer: MEDICARE

## 2023-01-20 VITALS
HEIGHT: 68.11 IN | HEART RATE: 49 BPM | DIASTOLIC BLOOD PRESSURE: 79 MMHG | BODY MASS INDEX: 33.8 KG/M2 | OXYGEN SATURATION: 98 % | TEMPERATURE: 97.4 F | SYSTOLIC BLOOD PRESSURE: 155 MMHG | WEIGHT: 223 LBS

## 2023-01-20 VITALS — BODY MASS INDEX: 33.8 KG/M2 | WEIGHT: 223 LBS

## 2023-01-20 VITALS — BODY MASS INDEX: 33.8 KG/M2 | RESPIRATION RATE: 16 BRPM | WEIGHT: 223 LBS

## 2023-01-20 DIAGNOSIS — M25.512 PAIN IN LEFT SHOULDER: ICD-10-CM

## 2023-01-20 DIAGNOSIS — G89.29 PAIN IN LEFT SHOULDER: ICD-10-CM

## 2023-01-20 DIAGNOSIS — L40.9 PSORIASIS, UNSPECIFIED: ICD-10-CM

## 2023-01-20 PROCEDURE — 36415 COLL VENOUS BLD VENIPUNCTURE: CPT

## 2023-01-20 PROCEDURE — 99215 OFFICE O/P EST HI 40 MIN: CPT | Mod: 25

## 2023-01-20 PROCEDURE — G0447 BEHAVIOR COUNSEL OBESITY 15M: CPT

## 2023-01-20 RX ORDER — FAMOTIDINE 40 MG/1
40 TABLET, FILM COATED ORAL
Qty: 30 | Refills: 0 | Status: DISCONTINUED | COMMUNITY
Start: 2020-11-10 | End: 2023-01-20

## 2023-01-20 RX ORDER — PANTOPRAZOLE 40 MG/1
40 TABLET, DELAYED RELEASE ORAL
Qty: 90 | Refills: 3 | Status: ACTIVE | COMMUNITY
Start: 2021-03-17 | End: 1900-01-01

## 2023-01-20 RX ORDER — DICLOFENAC SODIUM 1% 10 MG/G
1 GEL TOPICAL
Qty: 100 | Refills: 1 | Status: DISCONTINUED | COMMUNITY
Start: 2021-10-11 | End: 2023-01-20

## 2023-01-20 RX ORDER — MUPIROCIN 20 MG/G
2 OINTMENT TOPICAL 3 TIMES DAILY
Qty: 3 | Refills: 1 | Status: DISCONTINUED | COMMUNITY
Start: 2020-08-05 | End: 2023-01-20

## 2023-01-20 NOTE — ASSESSMENT
[FreeTextEntry1] : Diagnosis #1 history of aortic stenosis , status post valve replacement and MR. Patient is stable and   followed by the cardiologist.\par #2 hypertension.  Blood pressure is elevated but patient missed his medications this morning.Same medication and strict  low salt diet.cmp to lab.  Recheck blood pressure after 2 weeks\par #3 hyperlipidemia. Continue with same medication and low-fat diet.lipids to lab\par #4 monitor liver  toxicity due to chronic medication use.cmp to lab\par #5 hypovitaminosis D., being supplemented.level to lab\par #6 GE reflux recurred.  Restart pantoprazole. cbc  to lab.  Diet and sleeping instructions given to patient\par #7 history of low B12.  level sent to the lab\par #8 low back . To continue with Tylenol 500 mg p.o. every 6 hours p.r.n. \par #9  History of thrombocytosis. CBC sent to lab\par #10  history of hypovitaminosis D., being supplemented. Level  sent to the lab\par 11  Chronic renal insufficiency, CMP and CBC to lab\par 12.  History of psoriasis.  To see his dermatologist Dr. Cr.\par 13.  Benign prostatic hypertrophy, stable.  Followed by the urologist\par 14.  Left rib cage pain chronic.  Take Tylenol as above\par 15.  Obesity.  I again advised to try to lose weight by cutting back on his calories and increasing his activity.  Importance emphasized to him.  Approximately 15 minutes spent\par Plan . Patient advised to return after 3 .Approximately 55 minutes spent totally

## 2023-01-20 NOTE — HISTORY OF PRESENT ILLNESS
[FreeTextEntry1] : Patient comes for  followup of his chronic medical problems. Patient feels well without any chest pain, shortness of breath, paroxysmal nocturnal dyspnea or orthopnea. . His gastrointestinal reflux symptoms recurred after he stopped pantoprazole..Sometimes he has his known  low back and knee  pain .  Patient's knee pain is better after injection she had recently.  For 4 months now she has occasional left lower lateral rib cage pain mostly with movements.  This started after some injury he had in the area in [place].  Most recent blood tests, consults, medication list, allergies reviewed

## 2023-01-23 LAB
25(OH)D3 SERPL-MCNC: 28.5 NG/ML
ALBUMIN SERPL ELPH-MCNC: 4.4 G/DL
ALP BLD-CCNC: 51 U/L
ALT SERPL-CCNC: 23 U/L
ANION GAP SERPL CALC-SCNC: 10 MMOL/L
APPEARANCE: CLEAR
AST SERPL-CCNC: 18 U/L
BACTERIA: NEGATIVE
BASOPHILS # BLD AUTO: 0.01 K/UL
BASOPHILS NFR BLD AUTO: 0.1 %
BILIRUB SERPL-MCNC: 0.7 MG/DL
BILIRUBIN URINE: NEGATIVE
BLOOD URINE: NEGATIVE
BUN SERPL-MCNC: 13 MG/DL
CALCIUM SERPL-MCNC: 9.5 MG/DL
CHLORIDE SERPL-SCNC: 105 MMOL/L
CHOLEST SERPL-MCNC: 118 MG/DL
CO2 SERPL-SCNC: 28 MMOL/L
COLOR: COLORLESS
CREAT SERPL-MCNC: 1.25 MG/DL
CREAT SPEC-SCNC: 28 MG/DL
EGFR: 57 ML/MIN/1.73M2
EOSINOPHIL # BLD AUTO: 0.02 K/UL
EOSINOPHIL NFR BLD AUTO: 0.3 %
ESTIMATED AVERAGE GLUCOSE: 126 MG/DL
GLUCOSE QUALITATIVE U: NEGATIVE
GLUCOSE SERPL-MCNC: 92 MG/DL
HBA1C MFR BLD HPLC: 6 %
HCT VFR BLD CALC: 47.1 %
HDLC SERPL-MCNC: 48 MG/DL
HGB BLD-MCNC: 15.3 G/DL
HYALINE CASTS: 0 /LPF
IMM GRANULOCYTES NFR BLD AUTO: 0.7 %
KETONES URINE: NEGATIVE
LDLC SERPL CALC-MCNC: 56 MG/DL
LEUKOCYTE ESTERASE URINE: NEGATIVE
LYMPHOCYTES # BLD AUTO: 1.96 K/UL
LYMPHOCYTES NFR BLD AUTO: 29.1 %
MAN DIFF?: NORMAL
MCHC RBC-ENTMCNC: 30.9 PG
MCHC RBC-ENTMCNC: 32.5 GM/DL
MCV RBC AUTO: 95.2 FL
MICROALBUMIN 24H UR DL<=1MG/L-MCNC: <1.2 MG/DL
MICROALBUMIN/CREAT 24H UR-RTO: NORMAL MG/G
MICROSCOPIC-UA: NORMAL
MONOCYTES # BLD AUTO: 1.67 K/UL
MONOCYTES NFR BLD AUTO: 24.8 %
NEUTROPHILS # BLD AUTO: 3.03 K/UL
NEUTROPHILS NFR BLD AUTO: 45 %
NITRITE URINE: NEGATIVE
NONHDLC SERPL-MCNC: 69 MG/DL
PH URINE: 7
PLATELET # BLD AUTO: 98 K/UL
POTASSIUM SERPL-SCNC: 4 MMOL/L
PROT SERPL-MCNC: 6.8 G/DL
PROTEIN URINE: NEGATIVE
RBC # BLD: 4.95 M/UL
RBC # FLD: 12.3 %
RED BLOOD CELLS URINE: 1 /HPF
SODIUM SERPL-SCNC: 142 MMOL/L
SPECIFIC GRAVITY URINE: 1.01
SQUAMOUS EPITHELIAL CELLS: 0 /HPF
TRIGL SERPL-MCNC: 66 MG/DL
UROBILINOGEN URINE: NORMAL
VIT B12 SERPL-MCNC: 1077 PG/ML
WBC # FLD AUTO: 6.74 K/UL
WHITE BLOOD CELLS URINE: 0 /HPF

## 2023-02-06 ENCOUNTER — APPOINTMENT (OUTPATIENT)
Dept: INTERNAL MEDICINE | Facility: CLINIC | Age: 83
End: 2023-02-06

## 2023-03-20 ENCOUNTER — APPOINTMENT (OUTPATIENT)
Dept: CARDIOLOGY | Facility: CLINIC | Age: 83
End: 2023-03-20
Payer: MEDICARE

## 2023-03-20 ENCOUNTER — NON-APPOINTMENT (OUTPATIENT)
Age: 83
End: 2023-03-20

## 2023-03-20 VITALS
BODY MASS INDEX: 34.1 KG/M2 | HEART RATE: 55 BPM | HEIGHT: 68.11 IN | DIASTOLIC BLOOD PRESSURE: 72 MMHG | SYSTOLIC BLOOD PRESSURE: 135 MMHG | WEIGHT: 225 LBS | OXYGEN SATURATION: 98 % | TEMPERATURE: 97.1 F | RESPIRATION RATE: 12 BRPM

## 2023-03-20 PROCEDURE — 93306 TTE W/DOPPLER COMPLETE: CPT

## 2023-03-20 PROCEDURE — 93000 ELECTROCARDIOGRAM COMPLETE: CPT

## 2023-03-20 PROCEDURE — 99214 OFFICE O/P EST MOD 30 MIN: CPT | Mod: 25

## 2023-03-30 ENCOUNTER — APPOINTMENT (OUTPATIENT)
Dept: CARDIOLOGY | Facility: CLINIC | Age: 83
End: 2023-03-30
Payer: MEDICARE

## 2023-03-30 PROCEDURE — 93880 EXTRACRANIAL BILAT STUDY: CPT

## 2023-04-05 ENCOUNTER — OUTPATIENT (OUTPATIENT)
Dept: OUTPATIENT SERVICES | Facility: HOSPITAL | Age: 83
LOS: 1 days | End: 2023-04-05
Payer: MEDICARE

## 2023-04-05 ENCOUNTER — APPOINTMENT (OUTPATIENT)
Dept: CV DIAGNOSITCS | Facility: HOSPITAL | Age: 83
End: 2023-04-05

## 2023-04-05 VITALS
SYSTOLIC BLOOD PRESSURE: 122 MMHG | OXYGEN SATURATION: 95 % | DIASTOLIC BLOOD PRESSURE: 70 MMHG | RESPIRATION RATE: 15 BRPM | HEART RATE: 52 BPM

## 2023-04-05 VITALS
HEART RATE: 52 BPM | TEMPERATURE: 97 F | RESPIRATION RATE: 16 BRPM | SYSTOLIC BLOOD PRESSURE: 133 MMHG | DIASTOLIC BLOOD PRESSURE: 69 MMHG | OXYGEN SATURATION: 100 %

## 2023-04-05 DIAGNOSIS — I35.9 NONRHEUMATIC AORTIC VALVE DISORDER, UNSPECIFIED: ICD-10-CM

## 2023-04-05 PROCEDURE — 93306 TTE W/DOPPLER COMPLETE: CPT | Mod: 26

## 2023-04-05 PROCEDURE — 93312 ECHO TRANSESOPHAGEAL: CPT | Mod: 26

## 2023-04-05 PROCEDURE — 93306 TTE W/DOPPLER COMPLETE: CPT

## 2023-04-05 PROCEDURE — 93312 ECHO TRANSESOPHAGEAL: CPT

## 2023-04-21 ENCOUNTER — APPOINTMENT (OUTPATIENT)
Dept: ORTHOPEDIC SURGERY | Facility: CLINIC | Age: 83
End: 2023-04-21
Payer: MEDICARE

## 2023-04-21 VITALS — DIASTOLIC BLOOD PRESSURE: 67 MMHG | SYSTOLIC BLOOD PRESSURE: 110 MMHG | HEART RATE: 52 BPM

## 2023-04-21 PROCEDURE — 99213 OFFICE O/P EST LOW 20 MIN: CPT

## 2023-04-21 PROCEDURE — 73564 X-RAY EXAM KNEE 4 OR MORE: CPT | Mod: 50

## 2023-04-22 NOTE — ED CDU PROVIDER INITIAL DAY NOTE - CPE EDP RESP NORM
[FreeTextEntry1] : This is a 61 woman with a history of Polycythemia Vera, MAK 2 + since 2012. 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  She is now under my super vision for the P. Vera. \par Jakafi was initially started at 15mg TIW.  Dose was decreased to 10mg TIW due to anemia though that was likely form the lack of danazol. 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 Would continue this.  \par Since then patient continued to have some abdominal complaints.   though HG did increase., Danazol was increased to 600mg and Jakafi increased back up to 15mg3 times a week last October.  Patient is periodically anemic every 3 months or so requiring transfusions.  Confirmed complaisance with the Danazol 600mg daily this time.  WIll increase Jakafi to 20mg 3 times a week with dialysis.  Explained that it is very hard to tell the anemia that comes from the myelofibrosis apart for  the anemia cause d by the Jakafi.  The increase to Jakafi 20mg may help with her abdominal pain from hepatosplenomegaly, and may actually help improve the anemia.  \par \par Continue INDER support with dialysis.  normal...

## 2023-04-24 NOTE — HISTORY OF PRESENT ILLNESS
[de-identified] : Interpreted by Patient's Son\par \par 4/21/2023\par \par Mr. Magallon presents to the office for follow-up of his bilateral knee pain.  Patient continues to have bilateral knee pain.  He states that the injections did not help significantly and only helped for about 1 week.  Pain is worse when walking and with stairs.  He does not have significant pain at rest.  He is not taking any medications at this time.  Patient states that his pain is a 5/10 at this time.  He does not feel that he needs meloxicam at this time.  He did not do his physical therapy.  Patient states that he can walk about 0.5 blocks before pain limits him.\par \par 1/13/2023\par Mr. Magallon is a 82-year-old male who presents to the office for follow-up of his bilateral knee pain.  Patient has a longstanding history of bilateral knee pain and was previously seen in the office on 12/9/2022.  He states that the pain has been worsening for about 1 year, but has been present for several years.  He reports pain and locking that is worse with stairs.  Pain is located over the medial/lateral and anterior knee.  Patient walked a lot over the last few days and had worsening pain overnight.  Pain is worse with stairs, when he can feel the knee lock and he has to hold onto the stairs or he feels he may fall.  Patient walks 1-2 blocks before he gets pain.  States that the pain is manageable over the course of the day.  Patient did not do physical therapy and is not currently taking meloxicam.  No fevers or chills.  No numbness or tingling.\par \par \par History: Aortic Valve Replacement, HLD, HTN, GERD, CKD, Psoriasis, A1C: 6.1

## 2023-04-24 NOTE — PHYSICAL EXAM
[de-identified] : Constitutional: 83 year old male, alert and oriented, cooperative, in no acute distress.\par \par HEENT \par NC/AT. Appearance: symmetric\par \par Neck/Back\par Straight without deformity or instability. Good ROM.\par \par Chest/Respiratory \par Respiratory effort: no intercostal retractions or use of accessory muscles. Nonlabored Breathing\par \par Skin \par On inspection, warm and dry without rashes or lesions.\par \par Mental Status: \par Judgment, insight: intact\par Orientation: oriented to time, place, and person\par \par Neurological:\par Sensory and Motor are grossly intact throughout\par \par Left Knee\par \par Inspection:\par  Skin intact, no rashes or lesions\par  No Effusion\par  Non-tender to palpation over tibial tubercle, patella, medial and lateral joint line, and pes insertion.\par \par Range of Motion:\par 	Extension - 0 degrees\par 	Flexion - 120 degrees\par 	Extensor lag: None\par \par Stability:\par  Demonstrates no Varus or Valgus instability\par  Negative Anterior or Posterior drawer.\par  Negative Lachman's\par \par Patella: stable, tracks well. \par \par Right Knee\par \par Inspection:\par  Skin intact, no rashes or lesions\par  No Effusion\par  Non-tender to palpation over tibial tubercle, patella, medial and lateral joint line, and pes insertion.\par \par Range of Motion:\par 	Extension - 0 degrees\par 	Flexion - 120 degrees\par 	Extensor lag: None\par \par Stability:\par  Demonstrates no Varus or Valgus instability\par  Negative Anterior or Posterior drawer.\par  Negative Lachman's\par \par Patella: stable, tracks well. \par \par Neurologic Exam\par  Motor intact including 5/5 Extensor Hallucis Longus, 5/5 Flexor Hallucis Longus, 5/5 Tibialis Anterior and 5/5 Gastrocnemius\par  Sensation Intact to Light Touch including Saphenous, Sural, Superficial Peroneal, Deep Peroneal, Tibial nerve distributions\par \par Vascular Exam\par  Foot is warm and well perfused with 2+ Dorsalis Pedis Pulse \par \par No pain with range of motion of the bilateral hips. No lumbar paraspinal muscle tenderness.  [de-identified] : XRay: XRays of the Pelvis (1 View) taken on 12/9/22. XRays demonstrate no obvious fracture or dislocation. There is joint space narrowing in the bilateral hips. (my personal interpretation).\par \par XRay: XRays of the Right Knee (4 Views) taken in the office today and reviewed with the patient. XRays demonstrate tricompartmental joint space narrowing, worse in the medial compartment, with subchondral sclerosis, overlying osteophytes, all consistent with severe osteoarthritis, KL Grade: 3. (my personal interpretation) \par \par XRay: XRays of the Left Knee (4 Views) taken in the office today and reviewed with the patient. XRays demonstrate tricompartmental joint space narrowing, worse in the medial compartment, with subchondral sclerosis, overlying osteophytes, all consistent with severe osteoarthritis, KL Grade: 3. (my personal interpretation)

## 2023-04-24 NOTE — DISCUSSION/SUMMARY
[de-identified] : Mr. Magallon is a 83-year-old male who presented to the office for follow-up of his bilateral knee pain.  Patient continues to have bilateral knee pain.  He previously underwent knee injections, which did not provide significant relief.  X-rays showed significant bilateral knee osteoarthritis.  Examination showed good knee range of motion. Discussed with the patient the examination and imaging findings.  Discussed with the patient the operative and nonoperative management of knee osteoarthritis, including total knee arthroplasty.  Patient would like to continue nonoperative management at this time.  Discussed the nonoperative management of knee osteoarthritis, including physical therapy, anti-inflammatories, and injections.  Patient was given referral for physical therapy.  Patient will take over-the-counter anti-inflammatories or meloxicam as needed.  Patient will follow-up in 2 to 3 months for reevaluation and management.  Patient is going to [facility] for a few months.  He may follow-up for an injection prior to going away.  Patient understanding and in agreement the plan.  All questions answered\par \par Plan:\par -Physical Therapy\par -Over-the-counter anti-inflammatories or meloxicam as needed\par -Follow up in 2 to 3 months for reevaluation and management

## 2023-05-10 ENCOUNTER — APPOINTMENT (OUTPATIENT)
Dept: INTERNAL MEDICINE | Facility: CLINIC | Age: 83
End: 2023-05-10
Payer: MEDICARE

## 2023-05-10 VITALS
OXYGEN SATURATION: 97 % | TEMPERATURE: 97.1 F | BODY MASS INDEX: 33.34 KG/M2 | HEIGHT: 68.11 IN | WEIGHT: 220 LBS | DIASTOLIC BLOOD PRESSURE: 67 MMHG | SYSTOLIC BLOOD PRESSURE: 99 MMHG | HEART RATE: 147 BPM

## 2023-05-10 VITALS — DIASTOLIC BLOOD PRESSURE: 65 MMHG | SYSTOLIC BLOOD PRESSURE: 120 MMHG | HEART RATE: 94 BPM

## 2023-05-10 VITALS — RESPIRATION RATE: 14 BRPM

## 2023-05-10 DIAGNOSIS — I34.0 NONRHEUMATIC MITRAL (VALVE) INSUFFICIENCY: ICD-10-CM

## 2023-05-10 DIAGNOSIS — Z20.822 CONTACT WITH AND (SUSPECTED) EXPOSURE TO COVID-19: ICD-10-CM

## 2023-05-10 DIAGNOSIS — M17.0 BILATERAL PRIMARY OSTEOARTHRITIS OF KNEE: ICD-10-CM

## 2023-05-10 DIAGNOSIS — M51.36 OTHER INTERVERTEBRAL DISC DEGENERATION, LUMBAR REGION: ICD-10-CM

## 2023-05-10 DIAGNOSIS — Z23 ENCOUNTER FOR IMMUNIZATION: ICD-10-CM

## 2023-05-10 PROCEDURE — 99215 OFFICE O/P EST HI 40 MIN: CPT | Mod: 25

## 2023-05-10 PROCEDURE — 36415 COLL VENOUS BLD VENIPUNCTURE: CPT

## 2023-05-10 PROCEDURE — G0439: CPT

## 2023-05-10 NOTE — ASSESSMENT
[FreeTextEntry1] : Diagnosis #1 history of aortic stenosis , status post valve replacement and MR. Patient is stable and   followed by the cardiologist.\par #2 hypertension, stable.Same medication and strict  low salt diet.cmp to lab\par #3 hyperlipidemia. Continue with same medication and low-fat diet.lipids to lab\par #4 monitor liver  toxicity due to chronic medication use.cmp to lab\par #5 hypovitaminosis D., being supplemented.level to lab\par #6 GE reflux, stable. cbc  to lab.\par #7 history of low B12.  level sent to the lab\par #8 low back . To continue with Tylenol 500 mg p.o. every 6 hours p.r.n. or meloxicam 15 mg p.o. once a day as needed\par #9  History of thrombocytosis. CBC sent to lab\par #10  Benign prostatic hypertrophy, stable.  Patient followed up by a urologist\par #11 history of hypovitaminosis D., being supplemented. Level  sent to the lab\par 12.  Chronic renal insufficiency, CMP and CBC to lab\par 13.  History of psoriasis, stable.  Continue same treatment.\par 14.  History of nephrolithiasis, stable.  Stable.  Followed by the urologist\par Plan . Patient advised to return after 3 months .Approximately 50 minutes spent totally

## 2023-05-10 NOTE — HISTORY OF PRESENT ILLNESS
[FreeTextEntry1] : Patient comes for his Medicare annual wellness visit and followup of his chronic medical problems. Patient feels well without any chest pain, shortness of breath, paroxysmal nocturnal dyspnea or orthopnea. . His gastrointestinal reflux symptoms remain stable.Many times he has his known  low back and knee  pain mostly when he walks. Most recent blood tests, consults, medication list, allergies reviewed

## 2023-05-10 NOTE — PHYSICAL EXAM
[Kyphosis] : no kyphosis [Scoliosis] : no scoliosis [de-identified] : refused [FreeTextEntry1] : Refused rectal exam

## 2023-05-10 NOTE — HEALTH RISK ASSESSMENT
[Fair] : ~his/her~ current health as fair  [FreeTextEntry1] : health-family [de-identified] : socialy [Change in mental status noted] : No change in mental status noted [Sexually Active] : not sexually active [Reports changes in hearing] : Reports no changes in hearing [Reports changes in vision] : Reports no changes in vision [Reports changes in dental health] : Reports no changes in dental health [Travel to Developing Areas] : does not  travel to developing areas [TB Exposure] : is not being exposed to tuberculosis [Caregiver Concerns] : does not have caregiver concerns [Never] : Never

## 2023-05-11 ENCOUNTER — NON-APPOINTMENT (OUTPATIENT)
Age: 83
End: 2023-05-11

## 2023-05-11 ENCOUNTER — APPOINTMENT (OUTPATIENT)
Dept: CARDIOLOGY | Facility: CLINIC | Age: 83
End: 2023-05-11
Payer: MEDICARE

## 2023-05-11 VITALS
DIASTOLIC BLOOD PRESSURE: 78 MMHG | HEIGHT: 68 IN | HEART RATE: 76 BPM | RESPIRATION RATE: 12 BRPM | SYSTOLIC BLOOD PRESSURE: 120 MMHG | WEIGHT: 225 LBS | BODY MASS INDEX: 34.1 KG/M2 | OXYGEN SATURATION: 96 %

## 2023-05-11 LAB
25(OH)D3 SERPL-MCNC: 50.1 NG/ML
ALBUMIN SERPL ELPH-MCNC: 4.3 G/DL
ALP BLD-CCNC: 56 U/L
ALT SERPL-CCNC: 17 U/L
ANION GAP SERPL CALC-SCNC: 11 MMOL/L
APPEARANCE: CLEAR
AST SERPL-CCNC: 20 U/L
BACTERIA: NEGATIVE /HPF
BASOPHILS # BLD AUTO: 0.01 K/UL
BASOPHILS NFR BLD AUTO: 0.2 %
BILIRUB SERPL-MCNC: 0.7 MG/DL
BILIRUBIN URINE: NEGATIVE
BLOOD URINE: ABNORMAL
BUN SERPL-MCNC: 18 MG/DL
CALCIUM SERPL-MCNC: 9 MG/DL
CAST: 0 /LPF
CHLORIDE SERPL-SCNC: 107 MMOL/L
CHOLEST SERPL-MCNC: 117 MG/DL
CO2 SERPL-SCNC: 24 MMOL/L
COLOR: YELLOW
CREAT SERPL-MCNC: 1.33 MG/DL
CREAT SPEC-SCNC: 133 MG/DL
EGFR: 53 ML/MIN/1.73M2
EOSINOPHIL # BLD AUTO: 0.01 K/UL
EOSINOPHIL NFR BLD AUTO: 0.2 %
EPITHELIAL CELLS: 0 /HPF
ESTIMATED AVERAGE GLUCOSE: 123 MG/DL
GLUCOSE QUALITATIVE U: NEGATIVE MG/DL
GLUCOSE SERPL-MCNC: 98 MG/DL
HBA1C MFR BLD HPLC: 5.9 %
HCT VFR BLD CALC: 46.9 %
HDLC SERPL-MCNC: 41 MG/DL
HGB BLD-MCNC: 15.7 G/DL
IMM GRANULOCYTES NFR BLD AUTO: 0.6 %
KETONES URINE: NEGATIVE MG/DL
LDLC SERPL CALC-MCNC: 61 MG/DL
LEUKOCYTE ESTERASE URINE: NEGATIVE
LYMPHOCYTES # BLD AUTO: 2.09 K/UL
LYMPHOCYTES NFR BLD AUTO: 32.4 %
MAN DIFF?: NORMAL
MCHC RBC-ENTMCNC: 32.2 PG
MCHC RBC-ENTMCNC: 33.5 GM/DL
MCV RBC AUTO: 96.1 FL
MICROALBUMIN 24H UR DL<=1MG/L-MCNC: 3.1 MG/DL
MICROALBUMIN/CREAT 24H UR-RTO: 23 MG/G
MICROSCOPIC-UA: NORMAL
MONOCYTES # BLD AUTO: 1.5 K/UL
MONOCYTES NFR BLD AUTO: 23.3 %
NEUTROPHILS # BLD AUTO: 2.8 K/UL
NEUTROPHILS NFR BLD AUTO: 43.3 %
NITRITE URINE: NEGATIVE
NONHDLC SERPL-MCNC: 76 MG/DL
PH URINE: 5.5
PLATELET # BLD AUTO: 95 K/UL
POTASSIUM SERPL-SCNC: 4.4 MMOL/L
PROT SERPL-MCNC: 6.9 G/DL
PROTEIN URINE: NORMAL MG/DL
RBC # BLD: 4.88 M/UL
RBC # FLD: 12.4 %
RED BLOOD CELLS URINE: 2 /HPF
REVIEW: NORMAL
SODIUM SERPL-SCNC: 143 MMOL/L
SPECIFIC GRAVITY URINE: 1.02
TRIGL SERPL-MCNC: 70 MG/DL
UROBILINOGEN URINE: 0.2 MG/DL
VIT B12 SERPL-MCNC: 819 PG/ML
WBC # FLD AUTO: 6.45 K/UL
WHITE BLOOD CELLS URINE: 0 /HPF

## 2023-05-11 PROCEDURE — 93000 ELECTROCARDIOGRAM COMPLETE: CPT

## 2023-05-11 PROCEDURE — 99215 OFFICE O/P EST HI 40 MIN: CPT | Mod: 25

## 2023-05-18 ENCOUNTER — NON-APPOINTMENT (OUTPATIENT)
Age: 83
End: 2023-05-18

## 2023-05-18 ENCOUNTER — APPOINTMENT (OUTPATIENT)
Dept: CARDIOTHORACIC SURGERY | Facility: CLINIC | Age: 83
End: 2023-05-18
Payer: MEDICARE

## 2023-05-18 ENCOUNTER — APPOINTMENT (OUTPATIENT)
Dept: CARDIOLOGY | Facility: CLINIC | Age: 83
End: 2023-05-18
Payer: MEDICARE

## 2023-05-18 VITALS
HEART RATE: 100 BPM | BODY MASS INDEX: 34.1 KG/M2 | TEMPERATURE: 98 F | HEIGHT: 68 IN | DIASTOLIC BLOOD PRESSURE: 86 MMHG | WEIGHT: 225 LBS | SYSTOLIC BLOOD PRESSURE: 144 MMHG | RESPIRATION RATE: 16 BRPM | OXYGEN SATURATION: 98 %

## 2023-05-18 VITALS
SYSTOLIC BLOOD PRESSURE: 122 MMHG | BODY MASS INDEX: 34.21 KG/M2 | TEMPERATURE: 97.2 F | WEIGHT: 225 LBS | RESPIRATION RATE: 14 BRPM | OXYGEN SATURATION: 96 % | DIASTOLIC BLOOD PRESSURE: 83 MMHG | HEART RATE: 58 BPM

## 2023-05-18 DIAGNOSIS — R09.89 OTHER SPECIFIED SYMPTOMS AND SIGNS INVOLVING THE CIRCULATORY AND RESPIRATORY SYSTEMS: ICD-10-CM

## 2023-05-18 PROCEDURE — 99214 OFFICE O/P EST MOD 30 MIN: CPT

## 2023-05-18 PROCEDURE — 99214 OFFICE O/P EST MOD 30 MIN: CPT | Mod: 25

## 2023-05-18 PROCEDURE — 93000 ELECTROCARDIOGRAM COMPLETE: CPT

## 2023-05-18 RX ORDER — COVID-19 ANTIGEN TEST
KIT MISCELLANEOUS
Qty: 8 | Refills: 0 | Status: COMPLETED | COMMUNITY
Start: 2022-11-16 | End: 2023-05-18

## 2023-05-18 NOTE — HISTORY OF PRESENT ILLNESS
[FreeTextEntry1] : Patient is an 83 year old man with a history of severe aortic stenosis secondary to a bicuspid aortic valve status post bioprosthetic aortic valve replacement 7/2012, HTN, hyperlipidemia, former tobacco use, chronic renal insufficiency, thrombocytopenia, and GERD who presents today for follow up of aortic valve disorder. He mentions experiencing dyspnea with exertion that is unchanged. He states that he is able to performed all of his activities of daily living without any limitations. He otherwise denies any exertional chest pain, dyspnea at rest, palpitations, headaches, and dizziness.

## 2023-05-18 NOTE — PHYSICAL EXAM
[Well Developed] : well developed [Well Nourished] : well nourished [No Acute Distress] : no acute distress [Normal Venous Pressure] : normal venous pressure [Normal Rate] : normal [Normal S1] : normal S1 [Normal S2] : normal S2 [II] : a grade 2 [No Pitting Edema] : no pitting edema present [Left Carotid Bruit] : left carotid bruit heard [Clear Lung Fields] : clear lung fields [Good Air Entry] : good air entry [No Respiratory Distress] : no respiratory distress  [Soft] : abdomen soft [Non Tender] : non-tender [Normal Bowel Sounds] : normal bowel sounds [Normal Gait] : normal gait [No Edema] : no edema [No Cyanosis] : no cyanosis [No Rash] : no rash [Moves all extremities] : moves all extremities [No Focal Deficits] : no focal deficits [Normal Speech] : normal speech [Alert and Oriented] : alert and oriented [Normal memory] : normal memory [Irregularly Irregular] : irregularly irregular [Bruit] : no bruit heard [de-identified] : Extraocular muscles intact. Anicteric sclerae. [de-identified] : Normal oral mucosa.  [de-identified] : No visible skin ulcers.

## 2023-05-18 NOTE — DISCUSSION/SUMMARY
[EKG obtained to assist in diagnosis and management of assessed problem(s)] : EKG obtained to assist in diagnosis and management of assessed problem(s) [FreeTextEntry1] : IMPRESSION: Mr. WEISS is a 83 year old man with a history of severe aortic stenosis secondary to a bicuspid aortic valve status post bioprosthetic aortic valve replacement 7/2012, HTN, hyperlipidemia, former tobacco use, chronic renal insufficiency, and GERD who presents today for follow up of atrial fibrillation. \par \par PLAN:\par 1. He had a DARA last month that revealed the possibility of severe patient prosthesis mismatch. He is scheduled to see Dr. Garcia later today. He understands that he requires antibiotic prophylaxis status post aortic valve replacement. \par 2. He has not experienced any recurrent chest discomfort since his last visit with me. He had a nuclear stress test about 2 years ago that did not reveal any ischemia.  \par 3. His blood pressure is good today. He will continue on Amlodipine 5 mg daily and Metoprolol 50 mg daily. \par 4. His most recent LDL was very good, thus he will continue on Atorvastatin 20 mg daily.\par 5. He remains in atrial fibrillation both on exam and on his ECG.  He will continue on metoprolol for rate control.  He will continue on Eliquis 2.5 mg twice daily for systemic anticoagulation.  We did discuss the possibility of a cardioversion to restore sinus rhythm, however, he does not want that procedure at this time.  We also discussed the possibility of increasing the dose of his Eliquis if his creatinine is normal and his platelets are stable, however, given the significant bruising that he had on his hand after he cut himself he does not want to increase the dose at this time.\par 6. He will follow-up with me in 2 weeks or sooner should he experience any symptoms in the interim.\par 7.  I will be checking a CBC and CMP to evaluate his platelets and creatinine on Eliquis.\par

## 2023-05-18 NOTE — CARDIOLOGY SUMMARY
[de-identified] : 5/18/2023: Atrial fibrillation at 99 beats per minute with left anterior fascicular block, old inferior infarct, and poor R wave progression.  [de-identified] : Pharmacologic Nuclear Stress Test performed on 5/11/2021:\par No ECG evidence of ischemia. Defects of the basal inferior and basal inferolateral walls that are fixed, consistent with soft tissue attenuation artifact. Normal LV function. No evidence of ischemia.  [de-identified] : Transesophageal echocardiogram performed on 4/5/2023: The left ventricular systolic function is normal with an ejection fraction of 66 %. Normal right ventricular cavity size and probably normal systolic function. Mild to moderate mitral regurgitation.  There is no evidence of left atrial or left atrial appendage thrombus. Left atrial enlargement.  A bioprosthetic valve replacement present in the aortic position. This EOAi may be consistent with severe patient prosthesis mismatch. The aortic valve prosthesis EOAi is 0.6 cm2/m2, with a mean gradient of 24 mmHg and peak velocity of 3.4 m/s obtained by TTE imaging. The aortic valve appears trileaflet with normal systolic excursion. There is minimal thickening of the aortic valve leaflets. Unable to obtain gastric views. A limited ADRA examination due to increased secretions leading to respiratory compromise (with desaturation into the 80s). \par \par \par \par 3/20/2023: Endocardium not well visualized; grossly preserved left ventricular ejection fraction with hypokinesis of the basal inferior wall. EF is approximately 65%. Concentric left ventricular remodeling. Moderate left ventricular diastolic dysfunction. Right ventricular enlargement with normal right ventricular systolic function. The left atrium is moderately dilated in size. The right atrium is mildly dilated in size. Mitral annular calcification with thickened mitral valve leaflets. Moderate mitral regurgitation.  A bioprosthetic valve replacement present in the aortic position. Peak transaortic valve gradient of 53 mmHg and mean gradient of 28 mmHg. Mild valvular regurgitation. Moderate tricuspid regurgitation. Estimated pulmonary artery systolic pressure is 52 mmHg, consistent with moderate pulmonary hypertension. Mild-to-moderate pulmonic regurgitation. \par \par \par 2/7/2022: Mild to moderate mitral regurgitation. Bioprosthetic aortic valve replacement. Peak transaortic valve gradient of 40 mmHg and mean gradient of 21 mmHg, which is probably normal in the setting of a prosthetic valve. Moderately dilated left atrium. Mild right atrial enlargement. Mild left ventricular enlargement. Moderate diastolic dysfunction. Endocardium not well visualized; grossly preserved left ventricular ejection fraction with hypokinesis of the basal inferior wall. Right ventricular enlargement with normal right ventricular systolic function. Mild to moderate tricuspid regurgitation. Mild pulmonary HTN with PASP= 41 mmHg.\par \par 5/11/2021: Normal left ventricular ejection fraction with an EF= 69%. Bioprosthetic aortic valve replacement. Peak transaortic valve gradient of 27 mmHg and mean gradient of 14 mmHg, which is probably normal in the setting of a prosthetic valve. Mild to moderate mitral regurgitation. Mild tricuspid regurgitation. Mild pulmonary HTN. PASP= 47 mmHg. [de-identified] : Cardiac catheterization performed on 6/22/2012: EF= 70%. Minor luminal irregularities of the LAD, LCx, and RCA. Mild pulmonary HTN. Severe aortic stenosis.  [de-identified] : 7/2/2012: Bioprosthetic aortic valve replacement.

## 2023-05-18 NOTE — CARDIOLOGY SUMMARY
[de-identified] : 5/11/2023: Atrial fibrillation at 99 beats per minute with left axis deviation and nonspecific ST and T wave abnormalities [de-identified] : Pharmacologic Nuclear Stress Test performed on 5/11/2021:\par No ECG evidence of ischemia. Defects of the basal inferior and basal inferolateral walls that are fixed, consistent with soft tissue attenuation artifact. Normal LV function. No evidence of ischemia.  [de-identified] : Transesophageal echocardiogram performed on 4/5/2023: The left ventricular systolic function is normal with an ejection fraction of 66 %. Normal right ventricular cavity size and probably normal systolic function. Mild to moderate mitral regurgitation.  There is no evidence of left atrial or left atrial appendage thrombus. Left atrial enlargement.  A bioprosthetic valve replacement present in the aortic position. This EOAi may be consistent with severe patient prosthesis mismatch. The aortic valve prosthesis EOAi is 0.6 cm2/m2, with a mean gradient of 24 mmHg and peak velocity of 3.4 m/s obtained by TTE imaging. The aortic valve appears trileaflet with normal systolic excursion. There is minimal thickening of the aortic valve leaflets. Unable to obtain gastric views. A limited DARA examination due to increased secretions leading to respiratory compromise (with desaturation into the 80s). \par \par \par \par 3/20/2023: Endocardium not well visualized; grossly preserved left ventricular ejection fraction with hypokinesis of the basal inferior wall. EF is approximately 65%. Concentric left ventricular remodeling. Moderate left ventricular diastolic dysfunction. Right ventricular enlargement with normal right ventricular systolic function. The left atrium is moderately dilated in size. The right atrium is mildly dilated in size. Mitral annular calcification with thickened mitral valve leaflets. Moderate mitral regurgitation.  A bioprosthetic valve replacement present in the aortic position. Peak transaortic valve gradient of 53 mmHg and mean gradient of 28 mmHg. Mild valvular regurgitation. Moderate tricuspid regurgitation. Estimated pulmonary artery systolic pressure is 52 mmHg, consistent with moderate pulmonary hypertension. Mild-to-moderate pulmonic regurgitation. \par \par \par 2/7/2022: Mild to moderate mitral regurgitation. Bioprosthetic aortic valve replacement. Peak transaortic valve gradient of 40 mmHg and mean gradient of 21 mmHg, which is probably normal in the setting of a prosthetic valve. Moderately dilated left atrium. Mild right atrial enlargement. Mild left ventricular enlargement. Moderate diastolic dysfunction. Endocardium not well visualized; grossly preserved left ventricular ejection fraction with hypokinesis of the basal inferior wall. Right ventricular enlargement with normal right ventricular systolic function. Mild to moderate tricuspid regurgitation. Mild pulmonary HTN with PASP= 41 mmHg.\par \par 5/11/2021: Normal left ventricular ejection fraction with an EF= 69%. Bioprosthetic aortic valve replacement. Peak transaortic valve gradient of 27 mmHg and mean gradient of 14 mmHg, which is probably normal in the setting of a prosthetic valve. Mild to moderate mitral regurgitation. Mild tricuspid regurgitation. Mild pulmonary HTN. PASP= 47 mmHg. [de-identified] : Cardiac catheterization performed on 6/22/2012: EF= 70%. Minor luminal irregularities of the LAD, LCx, and RCA. Mild pulmonary HTN. Severe aortic stenosis.  [de-identified] : 7/2/2012: Bioprosthetic aortic valve replacement.

## 2023-05-18 NOTE — DISCUSSION/SUMMARY
[EKG obtained to assist in diagnosis and management of assessed problem(s)] : EKG obtained to assist in diagnosis and management of assessed problem(s) [FreeTextEntry1] : IMPRESSION: Mr. WEISS is a 83 year old man with a history of severe aortic stenosis secondary to a bicuspid aortic valve status post bioprosthetic aortic valve replacement 7/2012, HTN, hyperlipidemia, former tobacco use, chronic renal insufficiency, and GERD who presents today for follow up of his aortic valve disorder. \par \par PLAN:\par 1. He had a DARA last month that revealed the possibility of severe patient prosthesis mismatch. I have referred him to CT surgery for further evaluation of this valvular abnormality. He understands that he requires antibiotic prophylaxis status post aortic valve replacement. \par 2. He has not experienced any recurrent chest discomfort since his last visit with me. He had a nuclear stress test about 2 years ago that did not reveal any ischemia. He will continue on ASA 81 mg daily. \par 3. His dyspnea with exertion is unchanged, thus will hold off on a stress test at this time.  \par 4. His blood pressure is good today. He will continue on Amlodipine 5 mg daily and Metoprolol 50 mg daily. \par 5. His most recent LDL was very good, thus he will continue on Atorvastatin 20 mg daily.\par 6. Upon auscultation today, his heart rhythm seemed to be irregular. An ECG was performed that revealed that the patient was in new onset atrial fibrillation. I had a discussion with the patient and his wife and subsequently discussed with you about systemic anticoagulation. I have started him on Eliquis 2.5 mg twice daily. I have chosen the lower dose given his history of thrombocytopenia and chronic renal insufficient in a patient greater than 80 years of age. The patient and his wife will monitor for any any signs of bleeding or easy bruising. He will stop aspirin after tomorrow's dose. \par 7. He will follow up with me in one week to evaluate for persistence of his atrial fibrillation as well as for a CBC and CMP.\par 8. I spent approximately 55 minutes with the patient and his wife, which included the time spent on documentation as well as speaking with his daughter on the phone.

## 2023-05-18 NOTE — PHYSICAL EXAM
[Well Developed] : well developed [Well Nourished] : well nourished [No Acute Distress] : no acute distress [Normal Venous Pressure] : normal venous pressure [Normal Rate] : normal [Irregularly Irregular] : irregularly irregular [Normal S1] : normal S1 [Normal S2] : normal S2 [II] : a grade 2 [No Pitting Edema] : no pitting edema present [Left Carotid Bruit] : left carotid bruit heard [Clear Lung Fields] : clear lung fields [Good Air Entry] : good air entry [No Respiratory Distress] : no respiratory distress  [Soft] : abdomen soft [Non Tender] : non-tender [Normal Bowel Sounds] : normal bowel sounds [Normal Gait] : normal gait [No Edema] : no edema [No Cyanosis] : no cyanosis [No Rash] : no rash [Moves all extremities] : moves all extremities [No Focal Deficits] : no focal deficits [Normal Speech] : normal speech [Alert and Oriented] : alert and oriented [Normal memory] : normal memory [Bruit] : no bruit heard [de-identified] : Extraocular muscles intact. Anicteric sclerae. [de-identified] : Normal oral mucosa.  [de-identified] : No visible skin ulcers.

## 2023-05-18 NOTE — HISTORY OF PRESENT ILLNESS
[FreeTextEntry1] : Patient is an 83 year old man with a history of severe aortic stenosis secondary to a bicuspid aortic valve status post bioprosthetic aortic valve replacement 7/2012, HTN, hyperlipidemia, former tobacco use, chronic renal insufficiency, thrombocytopenia, and GERD who presents today for follow up of atrial fibrillation. He states that he has been feeling well. He has tolerated the addition of Eliquis, with his only complaint being that when he cut himself it took longer to resolve. He states that he is able to performed all of his activities of daily living without any limitations. He otherwise denies any exertional chest pain, dyspnea at rest, palpitations, headaches, and dizziness.

## 2023-05-19 LAB
ALBUMIN SERPL ELPH-MCNC: 4.2 G/DL
ALP BLD-CCNC: 49 U/L
ALT SERPL-CCNC: 22 U/L
ANION GAP SERPL CALC-SCNC: 13 MMOL/L
AST SERPL-CCNC: 22 U/L
BILIRUB SERPL-MCNC: 0.5 MG/DL
BUN SERPL-MCNC: 17 MG/DL
CALCIUM SERPL-MCNC: 9.2 MG/DL
CHLORIDE SERPL-SCNC: 106 MMOL/L
CO2 SERPL-SCNC: 21 MMOL/L
CREAT SERPL-MCNC: 1.33 MG/DL
EGFR: 53 ML/MIN/1.73M2
GLUCOSE SERPL-MCNC: 80 MG/DL
HCT VFR BLD CALC: 43.7 %
HGB BLD-MCNC: 14.3 G/DL
MCHC RBC-ENTMCNC: 30.9 PG
MCHC RBC-ENTMCNC: 32.7 GM/DL
MCV RBC AUTO: 94.4 FL
PLATELET # BLD AUTO: 98 K/UL
POTASSIUM SERPL-SCNC: 4.2 MMOL/L
PROT SERPL-MCNC: 6.9 G/DL
RBC # BLD: 4.63 M/UL
RBC # FLD: 12.6 %
SODIUM SERPL-SCNC: 139 MMOL/L
WBC # FLD AUTO: 5.83 K/UL

## 2023-05-23 NOTE — REVIEW OF SYSTEMS
[Feeling Tired] : feeling tired [Eye Pain] : no eye pain [Nosebleeds] : no nosebleeds [Chest Pain] : no chest pain [Palpitations] : no palpitations [SOB on Exertion] : no shortness of breath during exertion [Constipation] : no constipation [Skin Lesions] : no skin lesions [Dizziness] : no dizziness [Muscle Weakness] : no muscle weakness

## 2023-05-23 NOTE — END OF VISIT
[FreeTextEntry3] : I, Dr. Casey Garcia, personally performed the evaluation and management (E/M) services for this new patient.  That E/M includes conducting the initial examination, assessing all conditions, and establishing the plan of care.  Today, Natasha Cohen NP was here to observe my evaluation and management services for this patient to be followed going forward.\par

## 2023-05-23 NOTE — ASSESSMENT
[FreeTextEntry1] : Patient is an 83 year old man with a history of severe aortic stenosis secondary to a bicuspid aortic valve status post bioprosthetic aortic valve replacement 7/2012, HTN, hyperlipidemia, former tobacco use, chronic renal insufficiency, and GERD who presents today for follow up of aortic valve disorder. He follows with Dr. Delgado for his cardiology care and was last seen on 5/11/2023.\par \par As per office note patient was experiencing dyspnea with exertion (NYHA II). He otherwise denies any exertional chest pain, dyspnea at rest, palpitations, headaches, and dizziness. \par \par I have reviewed the patient's medical records, diagnostic images during the time of this office consultation and have made the following recommendation. Review of the imaging shows his aortic pathology has remained stable and does not require surgical intervention.\par \par Plan:\par 1) Increase Toprol to 100 mg daily \par 2) Eliquis 2.5 mg BID \par 3) Re evaluate HR before he leaves to [place]\par 4) DAAR reviewed Pseudo AS in setting of Diastolic Dysfunction\par 5) TTE at same time of clinical visit\par

## 2023-05-23 NOTE — HISTORY OF PRESENT ILLNESS
[FreeTextEntry1] : Patient is an 83 year old man with a history of severe aortic stenosis secondary to a bicuspid aortic valve status post bioprosthetic aortic valve replacement 7/2012, HTN, hyperlipidemia, former tobacco use, chronic renal insufficiency, and GERD who presents today for follow up of aortic valve disorder. He follows with Dr. Delgado for his cardiology care and was last seen on 5/11/2023.\par \par As per office note patient was experiencing dyspnea with exertion (NYHA II). He otherwise denies any exertional chest pain, dyspnea at rest, palpitations, headaches, and dizziness. \par

## 2023-05-23 NOTE — DATA REVIEWED
[FreeTextEntry1] : Echo: 2/7/2022: Mild to moderate mitral regurgitation. Bioprosthetic aortic valve replacement. Peak transaortic valve gradient of 40 mmHg and mean gradient of 21 mmHg, which is probably normal in the setting of a prosthetic valve. Moderately dilated left atrium. Mild right atrial enlargement. Mild left ventricular enlargement. Moderate diastolic dysfunction. Endocardium not well visualized; grossly preserved left ventricular ejection fraction with hypokinesis of the basal inferior wall. Right ventricular enlargement with normal right ventricular systolic function. Mild to moderate tricuspid regurgitation. Mild pulmonary HTN with PASP= 41 mmHg.

## 2023-05-23 NOTE — PHYSICAL EXAM
[General Appearance - Alert] : alert [Hearing Threshold Finger Rub Not [place]] : hearing was normal [] : no respiratory distress [Respiration, Rhythm And Depth] : normal respiratory rhythm and effort [Irregularly Irregular] : irregularly irregular [I] : a grade 1 [Surgical / Traumatic Scar Sternum] : a scar [Breast Appearance] : normal in appearance [Scar] : a scar was noted [RLQ] : in the right lower quadrant [Suprapubic] : in the suprapubic area [LLQ] : in the left lower quadrant [Mid-line] : in the mid-line [Cervical Lymph Nodes Enlarged Posterior Bilaterally] : posterior cervical [No CVA Tenderness] : no ~M costovertebral angle tenderness [Skin Color & Pigmentation] : normal skin color and pigmentation [No Focal Deficits] : no focal deficits [Oriented To Time, Place, And Person] : oriented to person, place, and time [Abnormal Walk] : normal gait [Right Carotid Bruit] : no bruit heard over the right carotid [Left Carotid Bruit] : no bruit heard over the left carotid [FreeTextEntry1] : deferred

## 2023-05-24 ENCOUNTER — APPOINTMENT (OUTPATIENT)
Dept: CARDIOTHORACIC SURGERY | Facility: CLINIC | Age: 83
End: 2023-05-24
Payer: MEDICARE

## 2023-05-24 ENCOUNTER — APPOINTMENT (OUTPATIENT)
Dept: INTERNAL MEDICINE | Facility: CLINIC | Age: 83
End: 2023-05-24
Payer: MEDICARE

## 2023-05-24 ENCOUNTER — NON-APPOINTMENT (OUTPATIENT)
Age: 83
End: 2023-05-24

## 2023-05-24 VITALS
TEMPERATURE: 98 F | OXYGEN SATURATION: 95 % | BODY MASS INDEX: 33.65 KG/M2 | HEIGHT: 68 IN | WEIGHT: 222 LBS | HEART RATE: 89 BPM | RESPIRATION RATE: 15 BRPM

## 2023-05-24 VITALS
HEIGHT: 68 IN | HEART RATE: 88 BPM | OXYGEN SATURATION: 98 % | WEIGHT: 222 LBS | TEMPERATURE: 98 F | BODY MASS INDEX: 33.65 KG/M2 | SYSTOLIC BLOOD PRESSURE: 116 MMHG | RESPIRATION RATE: 14 BRPM | DIASTOLIC BLOOD PRESSURE: 78 MMHG

## 2023-05-24 VITALS
OXYGEN SATURATION: 98 % | BODY MASS INDEX: 33.65 KG/M2 | TEMPERATURE: 96.7 F | WEIGHT: 222 LBS | HEIGHT: 68 IN | HEART RATE: 71 BPM

## 2023-05-24 VITALS — SYSTOLIC BLOOD PRESSURE: 126 MMHG | DIASTOLIC BLOOD PRESSURE: 65 MMHG | HEART RATE: 96 BPM

## 2023-05-24 VITALS — SYSTOLIC BLOOD PRESSURE: 116 MMHG | DIASTOLIC BLOOD PRESSURE: 78 MMHG

## 2023-05-24 PROCEDURE — 99214 OFFICE O/P EST MOD 30 MIN: CPT

## 2023-05-24 RX ORDER — VENLAFAXINE 37.5 MG/1
37.5 TABLET, EXTENDED RELEASE ORAL
Qty: 90 | Refills: 3 | Status: ACTIVE | COMMUNITY
Start: 2023-05-24 | End: 1900-01-01

## 2023-05-24 NOTE — HISTORY OF PRESENT ILLNESS
[FreeTextEntry1] : Patient is an 83 year old man with a history of severe aortic stenosis secondary to a bicuspid aortic valve status post bioprosthetic aortic valve replacement 7/2012, HTN, hyperlipidemia, former tobacco use, chronic renal insufficiency, and GERD who presents today for follow up of aortic valve disorder. He follows with Dr. Delgado for his cardiology care and was last seen on 5/11/2023.\par \par As per office note patient was experiencing dyspnea with exertion (NYHA II). He otherwise denies any exertional chest pain, dyspnea at rest, palpitations, headaches, and dizziness. He was seen in our office on May 18, 2023 for office consult.  Echo reviewed showed diastolic dysfunction and beta blockers were increased. Patient returns for follow up visit today.

## 2023-05-24 NOTE — ASSESSMENT
[FreeTextEntry1] : Diagnosis #1 new onset atrial fibrillation with heart rate 96/min.  I spoke to patient that the cardiologist may increase further metoprolol ER  to 200 mg p.o. once a day for better rate control.\par 2.  Hypertension, stable.  Continue with current treatment and low-salt diet.\par 3.  Anxiety.  Start venlafaxine ER 37.5 mg p.o. once a day.  I spoke to patient that it may take few weeks to start working.\par Plan.  Continue being followed by the cardiologist and return to office after 3 months

## 2023-05-24 NOTE — REVIEW OF SYSTEMS
[Eye Pain] : no eye pain [Nosebleeds] : no nosebleeds [Chest Pain] : no chest pain [Palpitations] : no palpitations [SOB on Exertion] : no shortness of breath during exertion [Constipation] : no constipation [Skin Lesions] : no skin lesions [Dizziness] : no dizziness [Muscle Weakness] : no muscle weakness

## 2023-05-24 NOTE — ASSESSMENT
[FreeTextEntry1] : Patient is an 83 year old man with a history of severe aortic stenosis secondary to a bicuspid aortic valve status post bioprosthetic aortic valve replacement 7/2012, HTN, hyperlipidemia, former tobacco use, chronic renal insufficiency, and GERD who presents today for follow up of aortic valve disorder. He follows with Dr. Delgado for his cardiology care and was last seen on 5/11/2023.\par \par As per office note patient was experiencing dyspnea with exertion (NYHA II). He otherwise denies any exertional chest pain, dyspnea at rest, palpitations, headaches, and dizziness. He was seen in our office on May 18, 2023 for office consult.  Echo reviewed showed diastolic dysfunction and beta blockers were increased. Patient returns for follow up visit today. \par \par Plan:\par 1) Increase Toprol 100mg BID \par 2) Follow up with Dr. Delgado \par 3) Patient will be going to [facility] in June \par 4) Will follow up with Dr. Delgado on plan

## 2023-05-24 NOTE — PHYSICAL EXAM
[No Acute Distress] : no acute distress [Well Developed] : well developed [Well-Appearing] : well-appearing [Normal Voice/Communication] : normal voice/communication [Normal S1, S2] : normal S1 and S2 [Pedal Pulses Present] : the pedal pulses are present [No Edema] : there was no peripheral edema [No Extremity Clubbing/Cyanosis] : no extremity clubbing/cyanosis [Soft] : abdomen soft [Non Tender] : non-tender [Non-distended] : non-distended [No Masses] : no abdominal mass palpated [Normal Bowel Sounds] : normal bowel sounds [Normal] : affect was normal and insight and judgment were intact [de-identified] : Irregularly irregular rhythm.  2 out of 6 systolic murmur at the apex

## 2023-05-24 NOTE — HISTORY OF PRESENT ILLNESS
[de-identified] : Patient comes for follow-up of his new onset atrial fibrillation.  He is concerned that he takes too much metoprolol ER which is totally 150 mg p.o. once a day.  He feels well without any chest pain, shortness of breath, paroxysmal nocturnal dyspnea, orthopnea or bleeding episodes.  According to his wife he is very irritable and stressed.  Not depressed

## 2023-05-24 NOTE — PHYSICAL EXAM
[Sclera] : the sclera and conjunctiva were normal [Jugular Venous Distention Increased] : there was no jugular-venous distention [Respiration, Rhythm And Depth] : normal respiratory rhythm and effort [Auscultation Breath Sounds / Voice Sounds] : lungs were clear to auscultation bilaterally [Irregularly Irregular] : irregularly irregular [Examination Of The Chest] : the chest was normal in appearance [Right Carotid Bruit] : no bruit heard over the right carotid [Left Carotid Bruit] : no bruit heard over the left carotid [Bowel Sounds] : normal bowel sounds [Abdomen Soft] : soft [No CVA Tenderness] : no ~M costovertebral angle tenderness [Involuntary Movements] : no involuntary movements were seen [Skin Color & Pigmentation] : normal skin color and pigmentation [Oriented To Time, Place, And Person] : oriented to person, place, and time

## 2023-06-01 ENCOUNTER — LABORATORY RESULT (OUTPATIENT)
Age: 83
End: 2023-06-01

## 2023-06-01 ENCOUNTER — NON-APPOINTMENT (OUTPATIENT)
Age: 83
End: 2023-06-01

## 2023-06-01 ENCOUNTER — APPOINTMENT (OUTPATIENT)
Dept: UROLOGY | Facility: CLINIC | Age: 83
End: 2023-06-01
Payer: MEDICARE

## 2023-06-01 ENCOUNTER — APPOINTMENT (OUTPATIENT)
Dept: CARDIOLOGY | Facility: CLINIC | Age: 83
End: 2023-06-01
Payer: MEDICARE

## 2023-06-01 VITALS
BODY MASS INDEX: 34.21 KG/M2 | HEART RATE: 98 BPM | HEIGHT: 68 IN | DIASTOLIC BLOOD PRESSURE: 86 MMHG | OXYGEN SATURATION: 96 % | TEMPERATURE: 98.1 F | SYSTOLIC BLOOD PRESSURE: 129 MMHG

## 2023-06-01 VITALS
BODY MASS INDEX: 34.1 KG/M2 | OXYGEN SATURATION: 97 % | WEIGHT: 225 LBS | HEIGHT: 68 IN | DIASTOLIC BLOOD PRESSURE: 75 MMHG | RESPIRATION RATE: 12 BRPM | SYSTOLIC BLOOD PRESSURE: 123 MMHG | HEART RATE: 69 BPM

## 2023-06-01 PROCEDURE — 93000 ELECTROCARDIOGRAM COMPLETE: CPT

## 2023-06-01 PROCEDURE — 99214 OFFICE O/P EST MOD 30 MIN: CPT | Mod: 25

## 2023-06-01 PROCEDURE — 99214 OFFICE O/P EST MOD 30 MIN: CPT

## 2023-06-01 RX ORDER — AMLODIPINE BESYLATE 5 MG/1
5 TABLET ORAL
Qty: 90 | Refills: 1 | Status: ACTIVE | COMMUNITY
Start: 2021-04-16 | End: 1900-01-01

## 2023-06-01 NOTE — CARDIOLOGY SUMMARY
[de-identified] : 6/1/2023: Atrial flutter at 70 bpm with a left anterior fascicular block and poor R wave progression\par  [de-identified] : Pharmacologic Nuclear Stress Test performed on 5/11/2021:\par No ECG evidence of ischemia. Defects of the basal inferior and basal inferolateral walls that are fixed, consistent with soft tissue attenuation artifact. Normal LV function. No evidence of ischemia.  [de-identified] : Transesophageal echocardiogram performed on 4/5/2023: The left ventricular systolic function is normal with an ejection fraction of 66 %. Normal right ventricular cavity size and probably normal systolic function. Mild to moderate mitral regurgitation.  There is no evidence of left atrial or left atrial appendage thrombus. Left atrial enlargement.  A bioprosthetic valve replacement present in the aortic position. This EOAi may be consistent with severe patient prosthesis mismatch. The aortic valve prosthesis EOAi is 0.6 cm2/m2, with a mean gradient of 24 mmHg and peak velocity of 3.4 m/s obtained by TTE imaging. The aortic valve appears trileaflet with normal systolic excursion. There is minimal thickening of the aortic valve leaflets. Unable to obtain gastric views. A limited DARA examination due to increased secretions leading to respiratory compromise (with desaturation into the 80s). \par \par \par \par 3/20/2023: Endocardium not well visualized; grossly preserved left ventricular ejection fraction with hypokinesis of the basal inferior wall. EF is approximately 65%. Concentric left ventricular remodeling. Moderate left ventricular diastolic dysfunction. Right ventricular enlargement with normal right ventricular systolic function. The left atrium is moderately dilated in size. The right atrium is mildly dilated in size. Mitral annular calcification with thickened mitral valve leaflets. Moderate mitral regurgitation.  A bioprosthetic valve replacement present in the aortic position. Peak transaortic valve gradient of 53 mmHg and mean gradient of 28 mmHg. Mild valvular regurgitation. Moderate tricuspid regurgitation. Estimated pulmonary artery systolic pressure is 52 mmHg, consistent with moderate pulmonary hypertension. Mild-to-moderate pulmonic regurgitation. \par \par \par 2/7/2022: Mild to moderate mitral regurgitation. Bioprosthetic aortic valve replacement. Peak transaortic valve gradient of 40 mmHg and mean gradient of 21 mmHg, which is probably normal in the setting of a prosthetic valve. Moderately dilated left atrium. Mild right atrial enlargement. Mild left ventricular enlargement. Moderate diastolic dysfunction. Endocardium not well visualized; grossly preserved left ventricular ejection fraction with hypokinesis of the basal inferior wall. Right ventricular enlargement with normal right ventricular systolic function. Mild to moderate tricuspid regurgitation. Mild pulmonary HTN with PASP= 41 mmHg.\par \par 5/11/2021: Normal left ventricular ejection fraction with an EF= 69%. Bioprosthetic aortic valve replacement. Peak transaortic valve gradient of 27 mmHg and mean gradient of 14 mmHg, which is probably normal in the setting of a prosthetic valve. Mild to moderate mitral regurgitation. Mild tricuspid regurgitation. Mild pulmonary HTN. PASP= 47 mmHg. [de-identified] : Cardiac catheterization performed on 6/22/2012: EF= 70%. Minor luminal irregularities of the LAD, LCx, and RCA. Mild pulmonary HTN. Severe aortic stenosis.  [de-identified] : 7/2/2012: Bioprosthetic aortic valve replacement.

## 2023-06-01 NOTE — PHYSICAL EXAM
[Well Developed] : well developed [Well Nourished] : well nourished [No Acute Distress] : no acute distress [Normal Venous Pressure] : normal venous pressure [Normal Rate] : normal [Irregularly Irregular] : irregularly irregular [Normal S1] : normal S1 [Normal S2] : normal S2 [II] : a grade 2 [No Pitting Edema] : no pitting edema present [Left Carotid Bruit] : left carotid bruit heard [Clear Lung Fields] : clear lung fields [Good Air Entry] : good air entry [No Respiratory Distress] : no respiratory distress  [Soft] : abdomen soft [Non Tender] : non-tender [Normal Bowel Sounds] : normal bowel sounds [Normal Gait] : normal gait [No Edema] : no edema [No Cyanosis] : no cyanosis [No Rash] : no rash [Moves all extremities] : moves all extremities [No Focal Deficits] : no focal deficits [Normal Speech] : normal speech [Alert and Oriented] : alert and oriented [Normal memory] : normal memory [Bruit] : no bruit heard [de-identified] : Extraocular muscles intact. Anicteric sclerae. [de-identified] : Normal oral mucosa.  [de-identified] : No visible skin ulcers.

## 2023-06-01 NOTE — DISCUSSION/SUMMARY
[FreeTextEntry1] : \par IMPRESSION: Mr. WEISS is a 83 year old man with a history of severe aortic stenosis secondary to a bicuspid aortic valve status post bioprosthetic aortic valve replacement 7/2012, HTN, hyperlipidemia, former tobacco use, chronic renal insufficiency, and GERD who presents today for follow up of atrial fibrillation. \par \par PLAN:\par 1. 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 He will continue to follow with CTS Dr. Garcia. He understands that he requires antibiotic prophylaxis status post aortic valve replacement. \par 2. He has not experienced any recurrent chest discomfort since his last visit with me. He had a nuclear stress test about 2 years ago that did not reveal any ischemia.  \par 3. He has been feeling very fatigued on the higher dose of Metoprolol. He will decrease Metoprolol to 150 mg daily. His blood pressure is good today. He will continue on Amlodipine 5 mg daily and Metoprolol.\par 4. His most recent LDL was very good, thus he will continue on Atorvastatin 20 mg daily.\par 5. He remains in atrial fibrillation both on exam and on his ECG.  He will continue on metoprolol for rate control.  He will continue on Eliquis 2.5 mg twice daily for systemic anticoagulation.  We did discuss the possibility of a cardioversion to restore sinus rhythm, however, he does not want that procedure at this time.  We also discussed the possibility of increasing the dose of his Eliquis if his creatinine is normal and his platelets are stable, however, given the significant bruising that he had on his hand after he cut himself he does not want to increase the dose at this time.\par 6. He will follow-up with me in 3 months, or sooner should he experience any symptoms in the interim.\par 7.  I will be checking a CBC and CMP to evaluate his platelets and creatinine on Eliquis.

## 2023-06-01 NOTE — HISTORY OF PRESENT ILLNESS
[FreeTextEntry1] : \par Patient is an 83 year old man with a history of severe aortic stenosis secondary to a bicuspid aortic valve status post bioprosthetic aortic valve replacement 7/2012, HTN, hyperlipidemia, former tobacco use, chronic renal insufficiency, thrombocytopenia, and GERD who presents today for follow up of atrial fibrillation. He is taking Metoprolol 100 mg BID, which he states makes him feel sleepy. He otherwise has been feeling well, he denies any chest pain, shortness of breath, palpitations, headaches or dizziness. At home his BP is in the 100s.

## 2023-06-02 NOTE — PHYSICAL EXAM
[General Appearance - Well Developed] : well developed [General Appearance - Well Nourished] : well nourished [Normal Appearance] : normal appearance [Well Groomed] : well groomed [General Appearance - In No Acute Distress] : no acute distress [Abdomen Soft] : soft [Abdomen Tenderness] : non-tender [Costovertebral Angle Tenderness] : no ~M costovertebral angle tenderness [Skin Color & Pigmentation] : normal skin color and pigmentation [Edema] : no peripheral edema [] : no respiratory distress [Respiration, Rhythm And Depth] : normal respiratory rhythm and effort [Exaggerated Use Of Accessory Muscles For Inspiration] : no accessory muscle use [Oriented To Time, Place, And Person] : oriented to person, place, and time [Affect] : the affect was normal [Mood] : the mood was normal [Not Anxious] : not anxious [Normal Station and Gait] : the gait and station were normal for the patient's age [No Focal Deficits] : no focal deficits [FreeTextEntry1] : The knee-chest position was utilized for the TREMAINE. Digital rectal exam found no suspicious rectal masses. Normal seminal vesicles. Anal tone is normal. The prostate is non tender, with normal texture, and no nodules. Borders are slightly indistinct. It is a 50 gram transurethral resection size. No rectal mucosal lesions. No gross blood on the examining finger.

## 2023-06-02 NOTE — ADDENDUM
[FreeTextEntry1] : This note was authored by Sylvia Cota working as a scribe for Dr.Gary Mace. I, Dr. Oscar Mace have reviewed the content of this note and confirm it is true and accurate. I personally performed the history and physical examination and made all the decisions 06/01/2023

## 2023-06-02 NOTE — REVIEW OF SYSTEMS
[Nocturia] : nocturia [Lower Back Pain] : lower back pain [Mid Back Pain] : mid back pain [Memory Lapses Or Loss] : memory loss [Easy Bleeding] : a tendency for easy bleeding [Easy Bruising] : a tendency for easy bruising [Feeling Poorly] : not feeling poorly [Feeling Tired] : not feeling tired [Chest Pain] : no chest pain [Constipation] : no constipation [Confused] : no confusion [Dysuria] : no dysuria [Convulsions] : no convulsions [Seizures] : no seizures

## 2023-06-02 NOTE — HISTORY OF PRESENT ILLNESS
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 The patient took tamsulosin in the past, but stopped due to light headiness. His urination remained satisfactory after discontinuing the medication. The patient's last PSA 04/24/17 was 0.69. The patient was in the ER 01/01/17 for abdominal pain. No etiology for the pain was identified and has since resolved. CT of the abdomen and pelvis in the ER found bilateral renal cysts and a 5 mm non obstructing right renal calculus. \par 1/17/18: CT scan was reviewed and discussed  in office which found no hydronephrosis or recurrent tumors . There are 4 cysts on the  left kidney and 2 cysts on the right.  There is a Stone in the right kidney in the upper portion. He reported earlier he was experiencing some left flank pain. At night he wakes up once to urinate. He remained on finasteride 5 mg once daily.\par 5/29/18: The patient returned and reported he wakes up once during the night to urinate. Currently taking Finasteride. Complained of intermittent left lumbar pain that remained the same since last visit. Daughter noted he does not drink enough water. \par 11/19/18: The patient returned today for a renal US. Renal US findings showed again there is a 5.5 mm echogenic focus with distal shadowing in the upper pole of the right kidney with bilateral simple non vascular cysts. There is a new septated cyst in the mid outer pole of the left kidney. Both kidneys appear normal in size and echogenicity. No hydronephrosis, or solid masses visualized. PSA from 3/29/18 was 0.71 ng/mL. Notes he was recently hospitalized for LE weakness and pain. Will be evaluated by a cardiologist for possible loop recorder placement. \par 5/22/19: Male patient presents today for a follow up. Blood work was completed 5/1/19 as per Dr. Ramirez. PSA was measured in January 2019  0.94 and creatinine as of May 2019 was 1.18 mg/dL. regarding urination he denies dysuria, gross hematuria, urgency or incontinence. He will wake up 2x a night to urinate. He has a Hx of high blood pressure and hyperlipidemia that are both well controlled on medication.\par \par 11/21/2019: Patient presents today for a follow up. Overall, he is doing well. He states his urination is good. Wakes up 1-2 times during the night to urinate. Patient denies dysuria, gross hematuria, urgency, or incontinence. Today, his only complaint is of some back pain. A urinalysis from  9/11/2019 was small positive for hematuria. His creatinine from 9/11/2019 was 1.29 mg/dL. He has not had a PSA measured since his last done January 2019 of 0.94 ng/mL. Small cyst/possible kidney stone in kidney found in the prior ultrasound of last year dated 11/19/2018. Digital rectal exam found no suspicious rectal masses. Anal tone is normal. The prostate is non tender, with normal texture, discrete borders, and no nodules. It is a 30 gram transurethral resection size. No gross blood on the examining finger. A little external hemorrhoid at 1 o'clock position was found though not inflamed. Due the presence of a small cyst/possible kidney stone found in the ultrasound report from last year dated 11/19/2018, a renal ultrasound has been ordered today and will be obtained by the office. Finding: Again there is a 5.5 mm echogenic focus with distal shadowing in the upper pole of the right kidney with bilateral simple non vascular cysts. There is a new septated cyst in the mid outer pole of the left kidney. Both kidneys appear normal in size and echogenicity. No hydronephrosis, or solid masses visualized. In my opinion, it looks more like a Jeremie's Plaque than a kidney stone.\par \par 11/23/2020: Patient presents today for follow up. Takes Finasteride 5mg once daily. Urination is good. Wakes 1-2x at night to urinate. Denies urinary urgency, pushing or straining, dysuria, gross hematuria. No constipation or chest pain. Had blood work by PCP on 9/23/20 showing creatinine of 1.20 and HbA1c of 5.8. Offers no new complaints today. Has appointment with PCP Dr. Ramirez tomorrow. \par \par 05/24/2021: Patient presents today for follow up. Wakes 1-2x at night to urinate. Denies dysuria, gross hematuria, urinary urgency, pushing or straining. Continues to take Finasteride. Has pain in lower back radiating down to leg and follows Dr. Ramirez for this. Hx of kidney stones. Traveling to [facility] for two months next month. \par \par 10/28/2021: Patient presents today for a follow up. He is accompanied by his son, Venu, who is translating for him. Pt reports nocturia 2-3x a night which is not bothersome. Denies urinary urgency, pushing, straining, gross hematuria, and burning. Has some back pain that is helped by injections. Pt is no longer sexually active according to his son who is translating. Pt obtained a renal US about 4 months ago on 5/27/2021 at [facility] which demonstrated no evidence of hydronephrosis. Bilateral renal cysts measuring up to 5.4 cm in the left kidney, previously measuring 4.9 cm. Enlarged prostate. PVR was 82.9. Pt's son believes his memory slightly diminishing but nothing concerning. Pt last saw  on 9/23/2021. Blood work of that visit revealed a creatinine of 1.24, eGFR was 54, PSA was 0.6 and platelets were slightly low. He denies having any problems clotting if he is bleeding. \par \par 04/28/2022: Patient presents today for a follow up visit. He was accompanied by his wife who helped to translate for him. Hx of kidney stones. Denies nocturia, urgency, gross hematuria, urge incontinence, or pushing/straining. He urinates a few times during the day. His last renal US was in 5/2021 and he did not have any kidney stones at that time. His US showed bilateral renal cysts measuring up to 5.4 cm in the left kidney, 4.9 cm previously. Pt currently takes Finasteride 5 mg, one tablet daily. \par \par 11/08/2022: The patient gave permission for an audio and visual telehealth conference. We utilized [employer] teams telemetry doc platform. The patient was located in his home in [place], [place]. I was located in my office in [place], [place]. I spoke with his son. We reviewed his recent labs, which indicate a Creatinine of 1.28 on October 7, 2022. A PSA was done on April 28 of this year, which was 0.61 ng/mL. This is very low for his age. His HbA1c is 6.0%. The son denies gross hematuria, dysuria, and straining to urinate. He is continuing on the finasteride 5mg with no gynecomastia or mastodynia. \par \par 06/01/2023: Mr. ARABELLA WEISS presents today for a follow up. He is accompanied by his wife. A Ivorian  was utilized during today's visit. He reports that his urination is fairly good. Nocturia x2-3. During the daytime, he voids about every 2-3 hours. His wife reports he does not drink much water. When asked he reports he drinks 5-6 glasses of liquids a day with one of them being water. Drinks a half cup of coffee as well as a lot of tea. The pt is no longer sexually active. Denies any pain from kidney stones. Continues to take finasteride 5 mg once daily. Denies pushing and straining. Continues to see Dr.Antonios Ramirez.

## 2023-06-02 NOTE — REASON FOR VISIT
[Spouse] : spouse [[employer] ] : provided by [employer]   [Interpreters_IDNumber] : [id number] [Interpreters_FullName] : Benita  [[email or web address]] : Romanian

## 2023-06-02 NOTE — ASSESSMENT
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 The patient took tamsulosin in the past, but stopped due to light headiness. His urination remained satisfactory after discontinuing the medication. The patient's last PSA 04/24/17 was 0.69. The patient was in the ER 01/01/17 for abdominal pain. No etiology for the pain was identified and has since resolved. CT of the abdomen and pelvis in the ER found bilateral renal cysts and a 5 mm non obstructing right renal calculus. \par 1/17/18: CT scan was reviewed and discussed  in office which found no hydronephrosis or recurrent tumors . There are 4 cysts on the  left kidney and 2 cysts on the right.  There is a Stone in the right kidney in the upper portion. He reported earlier he was experiencing some left flank pain. At night he wakes up once to urinate. He remained on finasteride 5 mg once daily.\par 5/29/18: The patient returned and reported he wakes up once during the night to urinate. Currently taking Finasteride. Complained of intermittent left lumbar pain that remained the same since last visit. Daughter noted he does not drink enough water. \par 11/19/18: The patient returned today for a renal US. Renal US findings showed again there is a 5.5 mm echogenic focus with distal shadowing in the upper pole of the right kidney with bilateral simple non vascular cysts. There is a new septated cyst in the mid outer pole of the left kidney. Both kidneys appear normal in size and echogenicity. No hydronephrosis, or solid masses visualized. PSA from 3/29/18 was 0.71 ng/mL. Notes he was recently hospitalized for LE weakness and pain. Will be evaluated by a cardiologist for possible loop recorder placement. \par 5/22/19: Male patient presents today for a follow up. Blood work was completed 5/1/19 as per Dr. Ramirez. PSA was measured in January 2019  0.94 and creatinine as of May 2019 was 1.18 mg/dL. regarding urination he denies dysuria, gross hematuria, urgency or incontinence. He will wake up 2x a night to urinate. He has a Hx of high blood pressure and hyperlipidemia that are both well controlled on medication.\par \par 11/21/2019: Patient presents today for a follow up. Overall, he is doing well. He states his urination is good. Wakes up 1-2 times during the night to urinate. Patient denies dysuria, gross hematuria, urgency, or incontinence. Today, his only complaint is of some back pain. A urinalysis from  9/11/2019 was small positive for hematuria. His creatinine from 9/11/2019 was 1.29 mg/dL. He has not had a PSA measured since his last done January 2019 of 0.94 ng/mL. Small cyst/possible kidney stone in kidney found in the prior ultrasound of last year dated 11/19/2018. Digital rectal exam found no suspicious rectal masses. Anal tone is normal. The prostate is non tender, with normal texture, discrete borders, and no nodules. It is a 30 gram transurethral resection size. No gross blood on the examining finger. A little external hemorrhoid at 1 o'clock position was found though not inflamed. Due the presence of a small cyst/possible kidney stone found in the ultrasound report from last year dated 11/19/2018, a renal ultrasound has been ordered today and will be obtained by the office. Finding: Again there is a 5.5 mm echogenic focus with distal shadowing in the upper pole of the right kidney with bilateral simple non vascular cysts. There is a new septated cyst in the mid outer pole of the left kidney. Both kidneys appear normal in size and echogenicity. No hydronephrosis, or solid masses visualized. In my opinion, it looks more like a Jeremie's Plaque than a kidney stone.\par \par 11/23/2020: Patient presents today for follow up. Takes Finasteride 5mg once daily. Urination is good. Wakes 1-2x at night to urinate. Denies urinary urgency, pushing or straining, dysuria, gross hematuria. No constipation or chest pain. Had blood work by PCP on 9/23/20 showing creatinine of 1.20 and HbA1c of 5.8. Offers no new complaints today. Has appointment with PCP Dr. Ramirez tomorrow. \par \par 05/24/2021: Patient presents today for follow up. Wakes 1-2x at night to urinate. Denies dysuria, gross hematuria, urinary urgency, pushing or straining. Continues to take Finasteride. Has pain in lower back radiating down to leg and follows Dr. Ramirez for this. Hx of kidney stones. Traveling to [facility] for two months next month. \par \par Renewed Finasteride. \par Patient will schedule for a renal US for hx of kidney stones\par Blood work today included BMP, alkaline phosphatase, PSA, and testosterone. \par The patient produced a urine sample which will be sent for urinalysis, urine cytology, and urine culture. \par Patient will schedule a telehealth visit to review renal US results. \par \par 10/28/2021: Patient presents today for a follow up. He is accompanied by his son, Venu, who is translating for him. Pt reports nocturia 2-3x a night which is not bothersome. Denies urinary urgency, pushing, straining, gross hematuria, and burning. Has some back pain that is helped by injections. Pt is no longer sexually active according to his son who is translating. Pt obtained a renal US about 4 months ago on 5/27/2021 at [facility] which demonstrated no evidence of hydronephrosis. Bilateral renal cysts measuring up to 5.4 cm in the left kidney, previously measuring 4.9 cm. Enlarged prostate. PVR was 82.9. Pt's son believes his memory slightly diminishing but nothing concerning. Pt last saw  on 9/23/2021. Blood work of that visit revealed a creatinine of 1.24, eGFR was 54, PSA was 0.6 and platelets were slightly low. He denies having any problems clotting if he is bleeding. \par \par 04/28/2022: Patient presents today for a follow up visit. He was accompanied by his wife who helped to translate for him. Hx of kidney stones. Denies nocturia, urgency, gross hematuria, urge incontinence, or pushing/straining. He urinates a few times during the day. His last renal US was in 5/2021 and he did not have any kidney stones at that time. His US showed bilateral renal cysts measuring up to 5.4 cm in the left kidney, 4.9 cm previously. Pt currently takes Finasteride 5 mg, one tablet daily. \par Blood work today included BMP, A1C, androstenedione, CBC, alkaline phosphatase, PSA, and testosterone.\par The patient produced a urine sample which will be sent for urinalysis, urine cytology, and urine culture. \par I recommend the patient increase his fluid intake to prevent the formation of kidney stones. \par RTO in 6 months. \par \par 11/08/2022: The patient gave permission for an audio and visual telehealth conference. We utilized [employer] teams telemetry doc platform. The patient was located in his home in [place], [place]. I was located in my office in [place], [place]. I spoke with his son. We reviewed his recent labs, which indicate a Creatinine of 1.28 on October 7, 2022. A PSA was done on April 28 of this year, which was 0.61 ng/mL. This is very low for his age. His HbA1c is 6.0%. The son denies gross hematuria, dysuria, and straining to urinate. He is continuing on the finasteride 5mg with no gynecomastia or mastodynia. He denies constipation. \par \par He is no longer sexually active and is not concerned with his erectile function. \par We agreed that he would follow up in the springtime for a repeat PSA. Additionally, I have renewed his finasteride.\par At his next appointment, I would like to examine him and offer a digital rectal exam.  \par \par 06/01/2023: Mr. ARABELLA WEISS presents today for a follow up. He is accompanied by his wife. A Macedonian  was utilized during today's visit. He reports that his urination is fairly good. Nocturia x2-3. During the daytime, he voids about every 2-3 hours. His wife reports he does not drink much water. When asked he reports he drinks 5-6 glasses of liquids a day with one of them being water. Drinks a half cup of coffee as well as a lot of tea. The pt is no longer sexually active. Denies any pain from kidney stones. Continues to take finasteride 5 mg once daily. Denies pushing and straining. Continues to see Dr.Antonios Ramirez. \par \par The knee-chest position was utilized for the TREMAINE. Digital rectal exam found no suspicious rectal masses. Normal seminal vesicles. Anal tone is normal. The prostate is non tender, with normal texture, and no nodules. Borders are slightly indistinct. It is a 50 gram transurethral resection size. No rectal mucosal lesions. No gross blood on the examining finger. \par \par The patient produced a urine sample which will be sent for urinalysis, urine cytology, and urine culture. \par \par Blood work today included calcium, CBC with diff, CMP, serum osmolality, PSA, renal panel, testosterone, and uric acid. \par \par Renewed finasteride 5 mg once daily. Additionally, I offered to prescribe him an additional medication to reduce his nocturia further, however he declined. \par \par Pt was strongly advised to increase his water intake. \par  \par Patient will RTO in 3 months for renal US. \par \par Preparation, in person office visit, and coordination of care: 40 minutes.

## 2023-06-03 LAB
24R-OH-CALCIDIOL SERPL-MCNC: 62 PG/ML
25(OH)D3 SERPL-MCNC: 52.4 NG/ML
ALBUMIN SERPL ELPH-MCNC: 4.2 G/DL
ANION GAP SERPL CALC-SCNC: 11 MMOL/L
APPEARANCE: CLEAR
BACTERIA UR CULT: NORMAL
BACTERIA: NEGATIVE /HPF
BILIRUBIN URINE: NEGATIVE
BLOOD URINE: NEGATIVE
BUN SERPL-MCNC: 15 MG/DL
CALCIUM SERPL-MCNC: 9.5 MG/DL
CAST: 0 /LPF
CHLORIDE SERPL-SCNC: 105 MMOL/L
CO2 SERPL-SCNC: 26 MMOL/L
COLOR: YELLOW
CREAT SERPL-MCNC: 1.48 MG/DL
EGFR: 47 ML/MIN/1.73M2
EPITHELIAL CELLS: 0 /HPF
GLUCOSE QUALITATIVE U: NEGATIVE MG/DL
GLUCOSE SERPL-MCNC: 98 MG/DL
KETONES URINE: NEGATIVE MG/DL
LEUKOCYTE ESTERASE URINE: NEGATIVE
MAGNESIUM SERPL-MCNC: 2.2 MG/DL
MICROSCOPIC-UA: NORMAL
NITRITE URINE: NEGATIVE
OSMOLALITY SERPL: 294 MOSMOL/KG
OSMOLALITY UR: 407 MOSM/KG
PH URINE: 6
PHOSPHATE SERPL-MCNC: 3.9 MG/DL
POTASSIUM SERPL-SCNC: 4.5 MMOL/L
PROTEIN URINE: NEGATIVE MG/DL
PSA SERPL-MCNC: 0.57 NG/ML
RED BLOOD CELLS URINE: 0 /HPF
SODIUM SERPL-SCNC: 143 MMOL/L
SPECIFIC GRAVITY URINE: 1.01
TESTOST SERPL-MCNC: 453 NG/DL
URATE SERPL-MCNC: 5.2 MG/DL
URINE CYTOLOGY: NORMAL
UROBILINOGEN URINE: 0.2 MG/DL
WHITE BLOOD CELLS URINE: 0 /HPF

## 2023-06-04 LAB — CA-I SERPL-SCNC: 5 MG/DL

## 2023-06-05 ENCOUNTER — APPOINTMENT (OUTPATIENT)
Dept: INTERNAL MEDICINE | Facility: CLINIC | Age: 83
End: 2023-06-05
Payer: MEDICARE

## 2023-06-05 VITALS
HEART RATE: 85 BPM | TEMPERATURE: 98 F | OXYGEN SATURATION: 97 % | WEIGHT: 220 LBS | DIASTOLIC BLOOD PRESSURE: 70 MMHG | BODY MASS INDEX: 33.34 KG/M2 | SYSTOLIC BLOOD PRESSURE: 106 MMHG | HEIGHT: 68 IN

## 2023-06-05 VITALS — SYSTOLIC BLOOD PRESSURE: 128 MMHG | RESPIRATION RATE: 14 BRPM | DIASTOLIC BLOOD PRESSURE: 69 MMHG

## 2023-06-05 PROCEDURE — 99213 OFFICE O/P EST LOW 20 MIN: CPT

## 2023-06-05 NOTE — HISTORY OF PRESENT ILLNESS
[FreeTextEntry1] : Patient diagnosed with pneumonia approximately 4 days ago and he comes for follow-up.  Patient had cough with thick sputum and minimal wheezing.  He went to an urgent care where chest x-ray showed pneumonia.  He was given Vibramycin 100 mg p.o. twice a day and now he feels much better.He feels well without any chest pain, shortness of breath, fever, paroxysmal nocturnal dyspnea, orthopnea, wheezing

## 2023-06-05 NOTE — ASSESSMENT
[FreeTextEntry1] : Diagnosis #1 recent community-acquired Pneumonia.  Patient is doing well.  To continue with doxycycline 100 mg p.o. twice a day for another 4 days.  Side effects explained\par 2.  History of rapid atrial fibrillation, stable.  Heart rate is controlled well.\par 3.  History of hypertension, stable.  Current treatment\par Plan.  Return after 3 months

## 2023-06-07 ENCOUNTER — APPOINTMENT (OUTPATIENT)
Dept: UROLOGY | Facility: CLINIC | Age: 83
End: 2023-06-07
Payer: MEDICARE

## 2023-06-07 PROCEDURE — 76775 US EXAM ABDO BACK WALL LIM: CPT

## 2023-06-08 ENCOUNTER — APPOINTMENT (OUTPATIENT)
Dept: UROLOGY | Facility: CLINIC | Age: 83
End: 2023-06-08
Payer: MEDICARE

## 2023-06-08 PROCEDURE — 99442: CPT

## 2023-06-11 RX ORDER — TAMSULOSIN HYDROCHLORIDE 0.4 MG/1
0.4 CAPSULE ORAL
Qty: 90 | Refills: 3 | Status: ACTIVE | COMMUNITY
Start: 2023-06-08 | End: 1900-01-01

## 2023-06-11 NOTE — HISTORY OF PRESENT ILLNESS
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 The patient took tamsulosin in the past, but stopped due to light headiness. His urination remained satisfactory after discontinuing the medication. The patient's last PSA 04/24/17 was 0.69. The patient was in the ER 01/01/17 for abdominal pain. No etiology for the pain was identified and has since resolved. CT of the abdomen and pelvis in the ER found bilateral renal cysts and a 5 mm non obstructing right renal calculus. \par 1/17/18: CT scan was reviewed and discussed  in office which found no hydronephrosis or recurrent tumors . There are 4 cysts on the  left kidney and 2 cysts on the right.  There is a Stone in the right kidney in the upper portion. He reported earlier he was experiencing some left flank pain. At night he wakes up once to urinate. He remained on finasteride 5 mg once daily.\par 5/29/18: The patient returned and reported he wakes up once during the night to urinate. Currently taking Finasteride. Complained of intermittent left lumbar pain that remained the same since last visit. Daughter noted he does not drink enough water. \par 11/19/18: The patient returned today for a renal US. Renal US findings showed again there is a 5.5 mm echogenic focus with distal shadowing in the upper pole of the right kidney with bilateral simple non vascular cysts. There is a new septated cyst in the mid outer pole of the left kidney. Both kidneys appear normal in size and echogenicity. No hydronephrosis, or solid masses visualized. PSA from 3/29/18 was 0.71 ng/mL. Notes he was recently hospitalized for LE weakness and pain. Will be evaluated by a cardiologist for possible loop recorder placement. \par 5/22/19: Male patient presents today for a follow up. Blood work was completed 5/1/19 as per Dr. Ramirez. PSA was measured in January 2019  0.94 and creatinine as of May 2019 was 1.18 mg/dL. regarding urination he denies dysuria, gross hematuria, urgency or incontinence. He will wake up 2x a night to urinate. He has a Hx of high blood pressure and hyperlipidemia that are both well controlled on medication.\par \par 11/21/2019: Patient presents today for a follow up. Overall, he is doing well. He states his urination is good. Wakes up 1-2 times during the night to urinate. Patient denies dysuria, gross hematuria, urgency, or incontinence. Today, his only complaint is of some back pain. A urinalysis from  9/11/2019 was small positive for hematuria. His creatinine from 9/11/2019 was 1.29 mg/dL. He has not had a PSA measured since his last done January 2019 of 0.94 ng/mL. Small cyst/possible kidney stone in kidney found in the prior ultrasound of last year dated 11/19/2018. Digital rectal exam found no suspicious rectal masses. Anal tone is normal. The prostate is non tender, with normal texture, discrete borders, and no nodules. It is a 30 gram transurethral resection size. No gross blood on the examining finger. A little external hemorrhoid at 1 o'clock position was found though not inflamed. Due the presence of a small cyst/possible kidney stone found in the ultrasound report from last year dated 11/19/2018, a renal ultrasound has been ordered today and will be obtained by the office. Finding: Again there is a 5.5 mm echogenic focus with distal shadowing in the upper pole of the right kidney with bilateral simple non vascular cysts. There is a new septated cyst in the mid outer pole of the left kidney. Both kidneys appear normal in size and echogenicity. No hydronephrosis, or solid masses visualized. In my opinion, it looks more like a Jeremie's Plaque than a kidney stone.\par \par 11/23/2020: Patient presents today for follow up. Takes Finasteride 5mg once daily. Urination is good. Wakes 1-2x at night to urinate. Denies urinary urgency, pushing or straining, dysuria, gross hematuria. No constipation or chest pain. Had blood work by PCP on 9/23/20 showing creatinine of 1.20 and HbA1c of 5.8. Offers no new complaints today. Has appointment with PCP Dr. Ramirez tomorrow. \par \par 05/24/2021: Patient presents today for follow up. Wakes 1-2x at night to urinate. Denies dysuria, gross hematuria, urinary urgency, pushing or straining. Continues to take Finasteride. Has pain in lower back radiating down to leg and follows Dr. Ramirez for this. Hx of kidney stones. Traveling to [facility] for two months next month. \par \par 10/28/2021: Patient presents today for a follow up. He is accompanied by his son, Venu, who is translating for him. Pt reports nocturia 2-3x a night which is not bothersome. Denies urinary urgency, pushing, straining, gross hematuria, and burning. Has some back pain that is helped by injections. Pt is no longer sexually active according to his son who is translating. Pt obtained a renal US about 4 months ago on 5/27/2021 at [facility] which demonstrated no evidence of hydronephrosis. Bilateral renal cysts measuring up to 5.4 cm in the left kidney, previously measuring 4.9 cm. Enlarged prostate. PVR was 82.9. Pt's son believes his memory slightly diminishing but nothing concerning. Pt last saw  on 9/23/2021. Blood work of that visit revealed a creatinine of 1.24, eGFR was 54, PSA was 0.6 and platelets were slightly low. He denies having any problems clotting if he is bleeding. \par \par 04/28/2022: Patient presents today for a follow up visit. He was accompanied by his wife who helped to translate for him. Hx of kidney stones. Denies nocturia, urgency, gross hematuria, urge incontinence, or pushing/straining. He urinates a few times during the day. His last renal US was in 5/2021 and he did not have any kidney stones at that time. His US showed bilateral renal cysts measuring up to 5.4 cm in the left kidney, 4.9 cm previously. Pt currently takes Finasteride 5 mg, one tablet daily. \par \par 11/08/2022: The patient gave permission for an audio and visual telehealth conference. We utilized [employer] teams telemetry doc platform. The patient was located in his home in [place], [place]. I was located in my office in [place], [place]. I spoke with his son. We reviewed his recent labs, which indicate a Creatinine of 1.28 on October 7, 2022. A PSA was done on April 28 of this year, which was 0.61 ng/mL. This is very low for his age. His HbA1c is 6.0%. The son denies gross hematuria, dysuria, and straining to urinate. He is continuing on the finasteride 5mg with no gynecomastia or mastodynia. \par \par 06/01/2023: Mr. ARABELLA WEISS presents today for a follow up. He is accompanied by his wife. A Turkmen  was utilized during today's visit. He reports that his urination is fairly good. Nocturia x2-3. During the daytime, he voids about every 2-3 hours. His wife reports he does not drink much water. When asked he reports he drinks 5-6 glasses of liquids a day with one of them being water. Drinks a half cup of coffee as well as a lot of tea. The pt is no longer sexually active. Denies any pain from kidney stones. Continues to take finasteride 5 mg once daily. Denies pushing and straining. Continues to see Dr.Antonios Ramirez. \par \par 06/08/2023: Mr. ARABELLA WEISS presents today for a follow up audio only telephone visit for which he gave permission for. The pt was located at home,  [address] [place] [address], and I was located in my office in [place], [place]. He is accompanied by his son. The pt had a renal US yesterday which demonstrated there are two simple cysts in the lower pole of the right kidney measuring 4.87 cm x 3.44 cm x 3.26 cm and 3.72 cm x 3.48 cm x 3.58 cm. Both kidneys are normal in size and echogenicity without stones, hydronephrosis or solid masses visualized. The pt had lab work done on 6/1/2023. PSA was very good at 0.57. Testosterone was normal at 453. Renal panel  revealed elevated creatinine of 1.48, which is why the renal US was ordered. Pt is traveling to [facility] next week for 3 months. He had to delay his trip due to getting covid and developing pneumonia, however his son states he is doing much better. The pt continues to take finasteride 5 mg once daily but requests a medication to further improve urination like we discussed at the last visit.

## 2023-06-11 NOTE — REVIEW OF SYSTEMS
[Nocturia] : nocturia [Lower Back Pain] : lower back pain [Mid Back Pain] : mid back pain [Memory Lapses Or Loss] : memory loss [Easy Bleeding] : a tendency for easy bleeding [Easy Bruising] : a tendency for easy bruising [Feeling Poorly] : not feeling poorly [Feeling Tired] : not feeling tired [Chest Pain] : no chest pain [Constipation] : no constipation [Dysuria] : no dysuria [Confused] : no confusion [Convulsions] : no convulsions [Seizures] : no seizures

## 2023-06-11 NOTE — ADDENDUM
[FreeTextEntry1] : This note was authored by Sylvia Cota working as a scribe for Dr.Gary Mace. I, Dr. Oscar Mace have reviewed the content of this note and confirm it is true and accurate. I personally performed the history and physical examination and made all the decisions 06/08/2023

## 2023-06-12 ENCOUNTER — APPOINTMENT (OUTPATIENT)
Dept: UROLOGY | Facility: CLINIC | Age: 83
End: 2023-06-12

## 2023-06-19 NOTE — ASSESSMENT
Pt is aware that she should try and avoid getting steri-strips wet and do a sponge bath around the area. 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 The patient took tamsulosin in the past, but stopped due to light headiness. His urination remained satisfactory after discontinuing the medication. The patient's last PSA 04/24/17 was 0.69. The patient was in the ER 01/01/17 for abdominal pain. No etiology for the pain was identified and has since resolved. CT of the abdomen and pelvis in the ER found bilateral renal cysts and a 5 mm non obstructing right renal calculus. \par 1/17/18: CT scan was reviewed and discussed  in office which found no hydronephrosis or recurrent tumors . There are 4 cysts on the  left kidney and 2 cysts on the right.  There is a Stone in the right kidney in the upper portion. He reported earlier he was experiencing some left flank pain. At night he wakes up once to urinate. He remained on finasteride 5 mg once daily.\par 5/29/18: The patient returned and reported he wakes up once during the night to urinate. Currently taking Finasteride. Complained of intermittent left lumbar pain that remained the same since last visit. Daughter noted he does not drink enough water. \par 11/19/18: The patient returned today for a renal US. Renal US findings showed again there is a 5.5 mm echogenic focus with distal shadowing in the upper pole of the right kidney with bilateral simple non vascular cysts. There is a new septated cyst in the mid outer pole of the left kidney. Both kidneys appear normal in size and echogenicity. No hydronephrosis, or solid masses visualized. PSA from 3/29/18 was 0.71 ng/mL. Notes he was recently hospitalized for LE weakness and pain. Will be evaluated by a cardiologist for possible loop recorder placement. \par 5/22/19: Male patient presents today for a follow up. Blood work was completed 5/1/19 as per Dr. Ramirez. PSA was measured in January 2019  0.94 and creatinine as of May 2019 was 1.18 mg/dL. regarding urination he denies dysuria, gross hematuria, urgency or incontinence. He will wake up 2x a night to urinate. He has a Hx of high blood pressure and hyperlipidemia that are both well controlled on medication.\par \par 11/21/2019: Patient presents today for a follow up. Overall, he is doing well. He states his urination is good. Wakes up 1-2 times during the night to urinate. Patient denies dysuria, gross hematuria, urgency, or incontinence. Today, his only complaint is of some back pain. A urinalysis from  9/11/2019 was small positive for hematuria. His creatinine from 9/11/2019 was 1.29 mg/dL. He has not had a PSA measured since his last done January 2019 of 0.94 ng/mL. Small cyst/possible kidney stone in kidney found in the prior ultrasound of last year dated 11/19/2018. Digital rectal exam found no suspicious rectal masses. Anal tone is normal. The prostate is non tender, with normal texture, discrete borders, and no nodules. It is a 30 gram transurethral resection size. No gross blood on the examining finger. A little external hemorrhoid at 1 o'clock position was found though not inflamed. Due the presence of a small cyst/possible kidney stone found in the ultrasound report from last year dated 11/19/2018, a renal ultrasound has been ordered today and will be obtained by the office. Finding: Again there is a 5.5 mm echogenic focus with distal shadowing in the upper pole of the right kidney with bilateral simple non vascular cysts. There is a new septated cyst in the mid outer pole of the left kidney. Both kidneys appear normal in size and echogenicity. No hydronephrosis, or solid masses visualized. In my opinion, it looks more like a Jeremie's Plaque than a kidney stone.\par \par 11/23/2020: Patient presents today for follow up. Takes Finasteride 5mg once daily. Urination is good. Wakes 1-2x at night to urinate. Denies urinary urgency, pushing or straining, dysuria, gross hematuria. No constipation or chest pain. Had blood work by PCP on 9/23/20 showing creatinine of 1.20 and HbA1c of 5.8. Offers no new complaints today. Has appointment with PCP Dr. Ramirez tomorrow. \par \par 05/24/2021: Patient presents today for follow up. Wakes 1-2x at night to urinate. Denies dysuria, gross hematuria, urinary urgency, pushing or straining. Continues to take Finasteride. Has pain in lower back radiating down to leg and follows Dr. Ramirez for this. Hx of kidney stones. Traveling to [facility] for two months next month. \par \par Renewed Finasteride. \par Patient will schedule for a renal US for hx of kidney stones\par Blood work today included BMP, alkaline phosphatase, PSA, and testosterone. \par The patient produced a urine sample which will be sent for urinalysis, urine cytology, and urine culture. \par Patient will schedule a telehealth visit to review renal US results. \par \par 10/28/2021: Patient presents today for a follow up. He is accompanied by his son, Venu, who is translating for him. Pt reports nocturia 2-3x a night which is not bothersome. Denies urinary urgency, pushing, straining, gross hematuria, and burning. Has some back pain that is helped by injections. Pt is no longer sexually active according to his son who is translating. Pt obtained a renal US about 4 months ago on 5/27/2021 at [facility] which demonstrated no evidence of hydronephrosis. Bilateral renal cysts measuring up to 5.4 cm in the left kidney, previously measuring 4.9 cm. Enlarged prostate. PVR was 82.9. Pt's son believes his memory slightly diminishing but nothing concerning. Pt last saw  on 9/23/2021. Blood work of that visit revealed a creatinine of 1.24, eGFR was 54, PSA was 0.6 and platelets were slightly low. He denies having any problems clotting if he is bleeding. \par \par 04/28/2022: Patient presents today for a follow up visit. He was accompanied by his wife who helped to translate for him. Hx of kidney stones. Denies nocturia, urgency, gross hematuria, urge incontinence, or pushing/straining. He urinates a few times during the day. His last renal US was in 5/2021 and he did not have any kidney stones at that time. His US showed bilateral renal cysts measuring up to 5.4 cm in the left kidney, 4.9 cm previously. Pt currently takes Finasteride 5 mg, one tablet daily. \par Blood work today included BMP, A1C, androstenedione, CBC, alkaline phosphatase, PSA, and testosterone.\par The patient produced a urine sample which will be sent for urinalysis, urine cytology, and urine culture. \par I recommend the patient increase his fluid intake to prevent the formation of kidney stones. \par RTO in 6 months. \par \par 11/08/2022: The patient gave permission for an audio and visual telehealth conference. We utilized [employer] teams telemetry doc platform. The patient was located in his home in [place], [place]. I was located in my office in [place], [place]. I spoke with his son. We reviewed his recent labs, which indicate a Creatinine of 1.28 on October 7, 2022. A PSA was done on April 28 of this year, which was 0.61 ng/mL. This is very low for his age. His HbA1c is 6.0%. The son denies gross hematuria, dysuria, and straining to urinate. He is continuing on the finasteride 5mg with no gynecomastia or mastodynia. He denies constipation. \par \par He is no longer sexually active and is not concerned with his erectile function. \par We agreed that he would follow up in the springtime for a repeat PSA. Additionally, I have renewed his finasteride.\par At his next appointment, I would like to examine him and offer a digital rectal exam.  \par \par 06/01/2023: Mr. ARABELLA WEISS presents today for a follow up. He is accompanied by his wife. A Turkish  was utilized during today's visit. He reports that his urination is fairly good. Nocturia x2-3. During the daytime, he voids about every 2-3 hours. His wife reports he does not drink much water. When asked he reports he drinks 5-6 glasses of liquids a day with one of them being water. Drinks a half cup of coffee as well as a lot of tea. The pt is no longer sexually active. Denies any pain from kidney stones. Continues to take finasteride 5 mg once daily. Denies pushing and straining. Continues to see Dr.Antonios Ramirez. \par \par The knee-chest position was utilized for the TREMAINE. Digital rectal exam found no suspicious rectal masses. Normal seminal vesicles. Anal tone is normal. The prostate is non tender, with normal texture, and no nodules. Borders are slightly indistinct. It is a 50 gram transurethral resection size. No rectal mucosal lesions. No gross blood on the examining finger. \par The patient produced a urine sample which will be sent for urinalysis, urine cytology, and urine culture. \par Blood work today included calcium, CBC with diff, CMP, serum osmolality, PSA, renal panel, testosterone, and uric acid. \par Renewed finasteride 5 mg once daily. Additionally, I offered to prescribe him an additional medication to reduce his nocturia further, however he declined. \par Pt was strongly advised to increase his water intake. \par Patient will RTO in 3 months for renal US. \par \par 06/08/2023: Mr. ARABELLA WEISS presents today for a follow up audio only telephone visit for which he gave permission for. The pt was located at home,  [address] [place] [address], and I was located in my office in [place], [place]. He is accompanied by his son. The pt had a renal US yesterday which demonstrated there are two simple cysts in the lower pole of the right kidney measuring 4.87 cm x 3.44 cm x 3.26 cm and 3.72 cm x 3.48 cm x 3.58 cm. Both kidneys are normal in size and echogenicity without stones, hydronephrosis or solid masses visualized. The pt had lab work done on 6/1/2023. PSA was very good at 0.57. Testosterone was normal at 453. Renal panel  revealed elevated creatinine of 1.48, which is why the renal US was ordered. Pt is traveling to [facility] next week for 3 months. He had to delay his trip due to getting covid and developing pneumonia, however his son states he is doing much better. The pt continues to take finasteride 5 mg once daily but requests a medication to further improve urination like we discussed at the last visit. \par \par Clinical findings and US results were reviewed at length with the patient's son. I personally reviewed the imaging myself. \par  \par Reviewed and discussed laboratory work of 6/1/2023 which he obtained prior to today's visit as requested. \par \par I prescribed the patient Tamsulosin 0.4 mg once daily after a meal. I advised the patient the side of effects of nasal congestion and light-headedness. I advised him to use saline nasal spray if he gets congestion but to avoid decongestion medication as this will worsen his urination. I also informed the patient that if he is sexually active, there will be a decrease in semen volume while taking this medication but this is not harmful. \par Patient will have a telehealth visit in 3-4 weeks for reassessment on tamsulosin. I informed the pt's son that since the pt will be away I can call his son to hear about how his father is doing on it. \par \par 06/12/2023: Mr. WEISS presents today for a follow up audio only telehealth visit. \par We reached out to the patient at 10:02 AM,  but he was not available at the time. I suggested through voicemail his may be a previously scheduled appointment as we have already discussed US results. However, I have spoken to his son and he confirmed me the appt was to be canceled. In addition, for future appointments of this pt we will also speak with the son as he will report as his historian.

## 2023-06-19 NOTE — ADDENDUM
[FreeTextEntry1] : This note was authored by Odilia Bay working as a scribe for Dr.Gary Mace. I, Dr. Oscar Mace have reviewed the content of this note and confirm it is true and accurate. I personally performed the history and physical examination and made all the decisions 06/12/2023\par

## 2023-06-19 NOTE — HISTORY OF PRESENT ILLNESS
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 The patient took tamsulosin in the past, but stopped due to light headiness. His urination remained satisfactory after discontinuing the medication. The patient's last PSA 04/24/17 was 0.69. The patient was in the ER 01/01/17 for abdominal pain. No etiology for the pain was identified and has since resolved. CT of the abdomen and pelvis in the ER found bilateral renal cysts and a 5 mm non obstructing right renal calculus. \par 1/17/18: CT scan was reviewed and discussed  in office which found no hydronephrosis or recurrent tumors . There are 4 cysts on the  left kidney and 2 cysts on the right.  There is a Stone in the right kidney in the upper portion. He reported earlier he was experiencing some left flank pain. At night he wakes up once to urinate. He remained on finasteride 5 mg once daily.\par 5/29/18: The patient returned and reported he wakes up once during the night to urinate. Currently taking Finasteride. Complained of intermittent left lumbar pain that remained the same since last visit. Daughter noted he does not drink enough water. \par 11/19/18: The patient returned today for a renal US. Renal US findings showed again there is a 5.5 mm echogenic focus with distal shadowing in the upper pole of the right kidney with bilateral simple non vascular cysts. There is a new septated cyst in the mid outer pole of the left kidney. Both kidneys appear normal in size and echogenicity. No hydronephrosis, or solid masses visualized. PSA from 3/29/18 was 0.71 ng/mL. Notes he was recently hospitalized for LE weakness and pain. Will be evaluated by a cardiologist for possible loop recorder placement. \par 5/22/19: Male patient presents today for a follow up. Blood work was completed 5/1/19 as per Dr. Ramirez. PSA was measured in January 2019  0.94 and creatinine as of May 2019 was 1.18 mg/dL. regarding urination he denies dysuria, gross hematuria, urgency or incontinence. He will wake up 2x a night to urinate. He has a Hx of high blood pressure and hyperlipidemia that are both well controlled on medication.\par \par 11/21/2019: Patient presents today for a follow up. Overall, he is doing well. He states his urination is good. Wakes up 1-2 times during the night to urinate. Patient denies dysuria, gross hematuria, urgency, or incontinence. Today, his only complaint is of some back pain. A urinalysis from  9/11/2019 was small positive for hematuria. His creatinine from 9/11/2019 was 1.29 mg/dL. He has not had a PSA measured since his last done January 2019 of 0.94 ng/mL. Small cyst/possible kidney stone in kidney found in the prior ultrasound of last year dated 11/19/2018. Digital rectal exam found no suspicious rectal masses. Anal tone is normal. The prostate is non tender, with normal texture, discrete borders, and no nodules. It is a 30 gram transurethral resection size. No gross blood on the examining finger. A little external hemorrhoid at 1 o'clock position was found though not inflamed. Due the presence of a small cyst/possible kidney stone found in the ultrasound report from last year dated 11/19/2018, a renal ultrasound has been ordered today and will be obtained by the office. Finding: Again there is a 5.5 mm echogenic focus with distal shadowing in the upper pole of the right kidney with bilateral simple non vascular cysts. There is a new septated cyst in the mid outer pole of the left kidney. Both kidneys appear normal in size and echogenicity. No hydronephrosis, or solid masses visualized. In my opinion, it looks more like a Jeremie's Plaque than a kidney stone.\par \par 11/23/2020: Patient presents today for follow up. Takes Finasteride 5mg once daily. Urination is good. Wakes 1-2x at night to urinate. Denies urinary urgency, pushing or straining, dysuria, gross hematuria. No constipation or chest pain. Had blood work by PCP on 9/23/20 showing creatinine of 1.20 and HbA1c of 5.8. Offers no new complaints today. Has appointment with PCP Dr. Ramirez tomorrow. \par \par 05/24/2021: Patient presents today for follow up. Wakes 1-2x at night to urinate. Denies dysuria, gross hematuria, urinary urgency, pushing or straining. Continues to take Finasteride. Has pain in lower back radiating down to leg and follows Dr. Ramirez for this. Hx of kidney stones. Traveling to [facility] for two months next month. \par \par 10/28/2021: Patient presents today for a follow up. He is accompanied by his son, Venu, who is translating for him. Pt reports nocturia 2-3x a night which is not bothersome. Denies urinary urgency, pushing, straining, gross hematuria, and burning. Has some back pain that is helped by injections. Pt is no longer sexually active according to his son who is translating. Pt obtained a renal US about 4 months ago on 5/27/2021 at [facility] which demonstrated no evidence of hydronephrosis. Bilateral renal cysts measuring up to 5.4 cm in the left kidney, previously measuring 4.9 cm. Enlarged prostate. PVR was 82.9. Pt's son believes his memory slightly diminishing but nothing concerning. Pt last saw  on 9/23/2021. Blood work of that visit revealed a creatinine of 1.24, eGFR was 54, PSA was 0.6 and platelets were slightly low. He denies having any problems clotting if he is bleeding. \par \par 04/28/2022: Patient presents today for a follow up visit. He was accompanied by his wife who helped to translate for him. Hx of kidney stones. Denies nocturia, urgency, gross hematuria, urge incontinence, or pushing/straining. He urinates a few times during the day. His last renal US was in 5/2021 and he did not have any kidney stones at that time. His US showed bilateral renal cysts measuring up to 5.4 cm in the left kidney, 4.9 cm previously. Pt currently takes Finasteride 5 mg, one tablet daily. \par \par 11/08/2022: The patient gave permission for an audio and visual telehealth conference. We utilized [employer] teams telemetry doc platform. The patient was located in his home in [place], [place]. I was located in my office in [place], [place]. I spoke with his son. We reviewed his recent labs, which indicate a Creatinine of 1.28 on October 7, 2022. A PSA was done on April 28 of this year, which was 0.61 ng/mL. This is very low for his age. His HbA1c is 6.0%. The son denies gross hematuria, dysuria, and straining to urinate. He is continuing on the finasteride 5mg with no gynecomastia or mastodynia. \par \par 06/01/2023: Mr. ARABELLA WEISS presents today for a follow up. He is accompanied by his wife. A Northern Irish  was utilized during today's visit. He reports that his urination is fairly good. Nocturia x2-3. During the daytime, he voids about every 2-3 hours. His wife reports he does not drink much water. When asked he reports he drinks 5-6 glasses of liquids a day with one of them being water. Drinks a half cup of coffee as well as a lot of tea. The pt is no longer sexually active. Denies any pain from kidney stones. Continues to take finasteride 5 mg once daily. Denies pushing and straining. Continues to see Dr.Antonios Ramirez. \par \par 06/08/2023: Mr. ARABELLA WEISS presents today for a follow up audio only telephone visit for which he gave permission for. The pt was located at home,  [address] [place] [address], and I was located in my office in [place], [place]. He is accompanied by his son. The pt had a renal US yesterday which demonstrated there are two simple cysts in the lower pole of the right kidney measuring 4.87 cm x 3.44 cm x 3.26 cm and 3.72 cm x 3.48 cm x 3.58 cm. Both kidneys are normal in size and echogenicity without stones, hydronephrosis or solid masses visualized. The pt had lab work done on 6/1/2023. PSA was very good at 0.57. Testosterone was normal at 453. Renal panel  revealed elevated creatinine of 1.48, which is why the renal US was ordered. Pt is traveling to [place] next week for 3 months. He had to delay his trip due to getting covid and developing pneumonia, however his son states he is doing much better. The pt continues to take finasteride 5 mg once daily but requests a medication to further improve urination like we discussed at the last visit. \par \par 06/12/2023: Mr. WEISS presents today for a follow up audio only telehealth visit. \par We reached out to the patient at 10:02 AM,  but he was not available at the time. I suggested through voicemail his may be a previously scheduled appointment as we have already discussed US results. However, I have spoken to his son and he confirmed me the appt was to be canceled. In addition, for future appointments of this pt we will also speak with the son as he will report as his historian.

## 2023-06-21 NOTE — ASSESSMENT
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 The patient took tamsulosin in the past, but stopped due to light headiness. His urination remained satisfactory after discontinuing the medication. The patient's last PSA 04/24/17 was 0.69. The patient was in the ER 01/01/17 for abdominal pain. No etiology for the pain was identified and has since resolved. CT of the abdomen and pelvis in the ER found bilateral renal cysts and a 5 mm non obstructing right renal calculus. \par 1/17/18: CT scan was reviewed and discussed  in office which found no hydronephrosis or recurrent tumors . There are 4 cysts on the  left kidney and 2 cysts on the right.  There is a Stone in the right kidney in the upper portion. He reported earlier he was experiencing some left flank pain. At night he wakes up once to urinate. He remained on finasteride 5 mg once daily.\par 5/29/18: The patient returned and reported he wakes up once during the night to urinate. Currently taking Finasteride. Complained of intermittent left lumbar pain that remained the same since last visit. Daughter noted he does not drink enough water. \par 11/19/18: The patient returned today for a renal US. Renal US findings showed again there is a 5.5 mm echogenic focus with distal shadowing in the upper pole of the right kidney with bilateral simple non vascular cysts. There is a new septated cyst in the mid outer pole of the left kidney. Both kidneys appear normal in size and echogenicity. No hydronephrosis, or solid masses visualized. PSA from 3/29/18 was 0.71 ng/mL. Notes he was recently hospitalized for LE weakness and pain. Will be evaluated by a cardiologist for possible loop recorder placement. \par 5/22/19: Male patient presents today for a follow up. Blood work was completed 5/1/19 as per Dr. Ramirez. PSA was measured in January 2019  0.94 and creatinine as of May 2019 was 1.18 mg/dL. regarding urination he denies dysuria, gross hematuria, urgency or incontinence. He will wake up 2x a night to urinate. He has a Hx of high blood pressure and hyperlipidemia that are both well controlled on medication.\par \par 11/21/2019: Patient presents today for a follow up. Overall, he is doing well. He states his urination is good. Wakes up 1-2 times during the night to urinate. Patient denies dysuria, gross hematuria, urgency, or incontinence. Today, his only complaint is of some back pain. A urinalysis from  9/11/2019 was small positive for hematuria. His creatinine from 9/11/2019 was 1.29 mg/dL. He has not had a PSA measured since his last done January 2019 of 0.94 ng/mL. Small cyst/possible kidney stone in kidney found in the prior ultrasound of last year dated 11/19/2018. Digital rectal exam found no suspicious rectal masses. Anal tone is normal. The prostate is non tender, with normal texture, discrete borders, and no nodules. It is a 30 gram transurethral resection size. No gross blood on the examining finger. A little external hemorrhoid at 1 o'clock position was found though not inflamed. Due the presence of a small cyst/possible kidney stone found in the ultrasound report from last year dated 11/19/2018, a renal ultrasound has been ordered today and will be obtained by the office. Finding: Again there is a 5.5 mm echogenic focus with distal shadowing in the upper pole of the right kidney with bilateral simple non vascular cysts. There is a new septated cyst in the mid outer pole of the left kidney. Both kidneys appear normal in size and echogenicity. No hydronephrosis, or solid masses visualized. In my opinion, it looks more like a Jeremie's Plaque than a kidney stone.\par \par 11/23/2020: Patient presents today for follow up. Takes Finasteride 5mg once daily. Urination is good. Wakes 1-2x at night to urinate. Denies urinary urgency, pushing or straining, dysuria, gross hematuria. No constipation or chest pain. Had blood work by PCP on 9/23/20 showing creatinine of 1.20 and HbA1c of 5.8. Offers no new complaints today. Has appointment with PCP Dr. Ramirez tomorrow. \par \par 05/24/2021: Patient presents today for follow up. Wakes 1-2x at night to urinate. Denies dysuria, gross hematuria, urinary urgency, pushing or straining. Continues to take Finasteride. Has pain in lower back radiating down to leg and follows Dr. Ramirez for this. Hx of kidney stones. Traveling to [facility] for two months next month. \par \par Renewed Finasteride. \par Patient will schedule for a renal US for hx of kidney stones\par Blood work today included BMP, alkaline phosphatase, PSA, and testosterone. \par The patient produced a urine sample which will be sent for urinalysis, urine cytology, and urine culture. \par Patient will schedule a telehealth visit to review renal US results. \par \par 10/28/2021: Patient presents today for a follow up. He is accompanied by his son, Venu, who is translating for him. Pt reports nocturia 2-3x a night which is not bothersome. Denies urinary urgency, pushing, straining, gross hematuria, and burning. Has some back pain that is helped by injections. Pt is no longer sexually active according to his son who is translating. Pt obtained a renal US about 4 months ago on 5/27/2021 at [facility] which demonstrated no evidence of hydronephrosis. Bilateral renal cysts measuring up to 5.4 cm in the left kidney, previously measuring 4.9 cm. Enlarged prostate. PVR was 82.9. Pt's son believes his memory slightly diminishing but nothing concerning. Pt last saw  on 9/23/2021. Blood work of that visit revealed a creatinine of 1.24, eGFR was 54, PSA was 0.6 and platelets were slightly low. He denies having any problems clotting if he is bleeding. \par \par 04/28/2022: Patient presents today for a follow up visit. He was accompanied by his wife who helped to translate for him. Hx of kidney stones. Denies nocturia, urgency, gross hematuria, urge incontinence, or pushing/straining. He urinates a few times during the day. His last renal US was in 5/2021 and he did not have any kidney stones at that time. His US showed bilateral renal cysts measuring up to 5.4 cm in the left kidney, 4.9 cm previously. Pt currently takes Finasteride 5 mg, one tablet daily. \par Blood work today included BMP, A1C, androstenedione, CBC, alkaline phosphatase, PSA, and testosterone.\par The patient produced a urine sample which will be sent for urinalysis, urine cytology, and urine culture. \par I recommend the patient increase his fluid intake to prevent the formation of kidney stones. \par RTO in 6 months. \par \par 11/08/2022: The patient gave permission for an audio and visual telehealth conference. We utilized [employer] teams telemetry doc platform. The patient was located in his home in [place], [place]. I was located in my office in [place], [place]. I spoke with his son. We reviewed his recent labs, which indicate a Creatinine of 1.28 on October 7, 2022. A PSA was done on April 28 of this year, which was 0.61 ng/mL. This is very low for his age. His HbA1c is 6.0%. The son denies gross hematuria, dysuria, and straining to urinate. He is continuing on the finasteride 5mg with no gynecomastia or mastodynia. He denies constipation. \par \par He is no longer sexually active and is not concerned with his erectile function. \par We agreed that he would follow up in the springtime for a repeat PSA. Additionally, I have renewed his finasteride.\par At his next appointment, I would like to examine him and offer a digital rectal exam.  \par \par 06/01/2023: Mr. ARABELLA WEISS presents today for a follow up. He is accompanied by his wife. A Arabic  was utilized during today's visit. He reports that his urination is fairly good. Nocturia x2-3. During the daytime, he voids about every 2-3 hours. His wife reports he does not drink much water. When asked he reports he drinks 5-6 glasses of liquids a day with one of them being water. Drinks a half cup of coffee as well as a lot of tea. The pt is no longer sexually active. Denies any pain from kidney stones. Continues to take finasteride 5 mg once daily. Denies pushing and straining. Continues to see Dr.Antonios Ramirez. \par \par The knee-chest position was utilized for the TREMAINE. Digital rectal exam found no suspicious rectal masses. Normal seminal vesicles. Anal tone is normal. The prostate is non tender, with normal texture, and no nodules. Borders are slightly indistinct. It is a 50 gram transurethral resection size. No rectal mucosal lesions. No gross blood on the examining finger. \par The patient produced a urine sample which will be sent for urinalysis, urine cytology, and urine culture. \par Blood work today included calcium, CBC with diff, CMP, serum osmolality, PSA, renal panel, testosterone, and uric acid. \par Renewed finasteride 5 mg once daily. Additionally, I offered to prescribe him an additional medication to reduce his nocturia further, however he declined. \par Pt was strongly advised to increase his water intake. \par Patient will RTO in 3 months for renal US. \par \par 06/08/2023: Mr. ARABELLA WEISS presents today for a follow up audio only telephone visit for which he gave permission for. The pt was located at home,  [address] [place] [address], and I was located in my office in [place]. He is accompanied by his son. The pt had a renal US yesterday which demonstrated there are two simple cysts in the lower pole of the right kidney measuring 4.87 cm x 3.44 cm x 3.26 cm and 3.72 cm x 3.48 cm x 3.58 cm. Both kidneys are normal in size and echogenicity without stones, hydronephrosis or solid masses visualized. The pt had lab work done on 6/1/2023. PSA was very good at 0.57. Testosterone was normal at 453. Renal panel  revealed elevated creatinine of 1.48, which is why the renal US was ordered. Pt is traveling to [facility] next week for 3 months. He had to delay his trip due to getting covid and developing pneumonia, however his son states he is doing much better. The pt continues to take finasteride 5 mg once daily but requests a medication to further improve urination like we discussed at the last visit. \par \par Clinical findings and US results were reviewed at length with the patient's son. I personally reviewed the imaging myself. \par  \par Reviewed and discussed laboratory work of 6/1/2023 which he obtained prior to today's visit as requested. \par \par I prescribed the patient Tamsulosin 0.4 mg once daily after a meal. I advised the patient the side of effects of nasal congestion and light-headedness. I advised him to use saline nasal spray if he gets congestion but to avoid decongestion medication as this will worsen his urination. I also informed the patient that if he is sexually active, there will be a decrease in semen volume while taking this medication but this is not harmful. \par  \par Patient will have a telehealth visit in 3-4 weeks for reassessment on tamsulosin. I informed the pt's son that since the pt will be away I can call his son to hear about how his father is doing on it. \par \par Duration of telephone visit: 15 mins\par \par 06/12/2023: Mr. WEISS presents today for a follow up audio only telehealth visit for which they gave permission for. The patient was located at home  [address] [place] [address] and I was located in my office in [place], [place].\par

## 2023-06-21 NOTE — HISTORY OF PRESENT ILLNESS
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 The patient took tamsulosin in the past, but stopped due to light headiness. His urination remained satisfactory after discontinuing the medication. The patient's last PSA 04/24/17 was 0.69. The patient was in the ER 01/01/17 for abdominal pain. No etiology for the pain was identified and has since resolved. CT of the abdomen and pelvis in the ER found bilateral renal cysts and a 5 mm non obstructing right renal calculus. \par 1/17/18: CT scan was reviewed and discussed  in office which found no hydronephrosis or recurrent tumors . There are 4 cysts on the  left kidney and 2 cysts on the right.  There is a Stone in the right kidney in the upper portion. He reported earlier he was experiencing some left flank pain. At night he wakes up once to urinate. He remained on finasteride 5 mg once daily.\par 5/29/18: The patient returned and reported he wakes up once during the night to urinate. Currently taking Finasteride. Complained of intermittent left lumbar pain that remained the same since last visit. Daughter noted he does not drink enough water. \par 11/19/18: The patient returned today for a renal US. Renal US findings showed again there is a 5.5 mm echogenic focus with distal shadowing in the upper pole of the right kidney with bilateral simple non vascular cysts. There is a new septated cyst in the mid outer pole of the left kidney. Both kidneys appear normal in size and echogenicity. No hydronephrosis, or solid masses visualized. PSA from 3/29/18 was 0.71 ng/mL. Notes he was recently hospitalized for LE weakness and pain. Will be evaluated by a cardiologist for possible loop recorder placement. \par 5/22/19: Male patient presents today for a follow up. Blood work was completed 5/1/19 as per Dr. Ramirez. PSA was measured in January 2019  0.94 and creatinine as of May 2019 was 1.18 mg/dL. regarding urination he denies dysuria, gross hematuria, urgency or incontinence. He will wake up 2x a night to urinate. He has a Hx of high blood pressure and hyperlipidemia that are both well controlled on medication.\par \par 11/21/2019: Patient presents today for a follow up. Overall, he is doing well. He states his urination is good. Wakes up 1-2 times during the night to urinate. Patient denies dysuria, gross hematuria, urgency, or incontinence. Today, his only complaint is of some back pain. A urinalysis from  9/11/2019 was small positive for hematuria. His creatinine from 9/11/2019 was 1.29 mg/dL. He has not had a PSA measured since his last done January 2019 of 0.94 ng/mL. Small cyst/possible kidney stone in kidney found in the prior ultrasound of last year dated 11/19/2018. Digital rectal exam found no suspicious rectal masses. Anal tone is normal. The prostate is non tender, with normal texture, discrete borders, and no nodules. It is a 30 gram transurethral resection size. No gross blood on the examining finger. A little external hemorrhoid at 1 o'clock position was found though not inflamed. Due the presence of a small cyst/possible kidney stone found in the ultrasound report from last year dated 11/19/2018, a renal ultrasound has been ordered today and will be obtained by the office. Finding: Again there is a 5.5 mm echogenic focus with distal shadowing in the upper pole of the right kidney with bilateral simple non vascular cysts. There is a new septated cyst in the mid outer pole of the left kidney. Both kidneys appear normal in size and echogenicity. No hydronephrosis, or solid masses visualized. In my opinion, it looks more like a Jeremie's Plaque than a kidney stone.\par \par 11/23/2020: Patient presents today for follow up. Takes Finasteride 5mg once daily. Urination is good. Wakes 1-2x at night to urinate. Denies urinary urgency, pushing or straining, dysuria, gross hematuria. No constipation or chest pain. Had blood work by PCP on 9/23/20 showing creatinine of 1.20 and HbA1c of 5.8. Offers no new complaints today. Has appointment with PCP Dr. Ramirez tomorrow. \par \par 05/24/2021: Patient presents today for follow up. Wakes 1-2x at night to urinate. Denies dysuria, gross hematuria, urinary urgency, pushing or straining. Continues to take Finasteride. Has pain in lower back radiating down to leg and follows Dr. Ramirez for this. Hx of kidney stones. Traveling to [facility] for two months next month. \par \par 10/28/2021: Patient presents today for a follow up. He is accompanied by his son, Venu, who is translating for him. Pt reports nocturia 2-3x a night which is not bothersome. Denies urinary urgency, pushing, straining, gross hematuria, and burning. Has some back pain that is helped by injections. Pt is no longer sexually active according to his son who is translating. Pt obtained a renal US about 4 months ago on 5/27/2021 at [facility] which demonstrated no evidence of hydronephrosis. Bilateral renal cysts measuring up to 5.4 cm in the left kidney, previously measuring 4.9 cm. Enlarged prostate. PVR was 82.9. Pt's son believes his memory slightly diminishing but nothing concerning. Pt last saw  on 9/23/2021. Blood work of that visit revealed a creatinine of 1.24, eGFR was 54, PSA was 0.6 and platelets were slightly low. He denies having any problems clotting if he is bleeding. \par \par 04/28/2022: Patient presents today for a follow up visit. He was accompanied by his wife who helped to translate for him. Hx of kidney stones. Denies nocturia, urgency, gross hematuria, urge incontinence, or pushing/straining. He urinates a few times during the day. His last renal US was in 5/2021 and he did not have any kidney stones at that time. His US showed bilateral renal cysts measuring up to 5.4 cm in the left kidney, 4.9 cm previously. Pt currently takes Finasteride 5 mg, one tablet daily. \par \par 11/08/2022: The patient gave permission for an audio and visual telehealth conference. We utilized [employer] teams telemetry doc platform. The patient was located in his home in [place], [place]. I was located in my office in [place], [place]. I spoke with his son. We reviewed his recent labs, which indicate a Creatinine of 1.28 on October 7, 2022. A PSA was done on April 28 of this year, which was 0.61 ng/mL. This is very low for his age. His HbA1c is 6.0%. The son denies gross hematuria, dysuria, and straining to urinate. He is continuing on the finasteride 5mg with no gynecomastia or mastodynia. \par \par 06/01/2023: Mr. ARABELLA WEISS presents today for a follow up. He is accompanied by his wife. A Botswanan  was utilized during today's visit. He reports that his urination is fairly good. Nocturia x2-3. During the daytime, he voids about every 2-3 hours. His wife reports he does not drink much water. When asked he reports he drinks 5-6 glasses of liquids a day with one of them being water. Drinks a half cup of coffee as well as a lot of tea. The pt is no longer sexually active. Denies any pain from kidney stones. Continues to take finasteride 5 mg once daily. Denies pushing and straining. Continues to see Dr.Antonios Ramirez. \par \par 06/08/2023: Mr. ARABELLA WEISS presents today for a follow up audio only telephone visit for which he gave permission for. The pt was located at home,  [address] [place] [address], and I was located in my office in [place], [place]. He is accompanied by his son. The pt had a renal US yesterday which demonstrated there are two simple cysts in the lower pole of the right kidney measuring 4.87 cm x 3.44 cm x 3.26 cm and 3.72 cm x 3.48 cm x 3.58 cm. Both kidneys are normal in size and echogenicity without stones, hydronephrosis or solid masses visualized. The pt had lab work done on 6/1/2023. PSA was very good at 0.57. Testosterone was normal at 453. Renal panel  revealed elevated creatinine of 1.48, which is why the renal US was ordered. Pt is traveling to [facility] next week for 3 months. He had to delay his trip due to getting covid and developing pneumonia, however his son states he is doing much better. The pt continues to take finasteride 5 mg once daily but requests a medication to further improve urination like we discussed at the last visit.

## 2023-07-17 ENCOUNTER — APPOINTMENT (OUTPATIENT)
Dept: UROLOGY | Facility: CLINIC | Age: 83
End: 2023-07-17
Payer: MEDICARE

## 2023-07-17 PROCEDURE — 99443: CPT

## 2023-07-18 NOTE — REVIEW OF SYSTEMS
Report given to floor RN, Emily, ext [phone].   [Negative] : Heme/Lymph [FreeTextEntry7] : g-e reflux prn [de-identified] : pain legs

## 2023-07-21 NOTE — REVIEW OF SYSTEMS
[Nocturia] : nocturia [Lower Back Pain] : lower back pain [Mid Back Pain] : mid back pain [Memory Lapses Or Loss] : memory loss [Easy Bleeding] : a tendency for easy bleeding [Easy Bruising] : a tendency for easy bruising [Feeling Poorly] : not feeling poorly [Chest Pain] : no chest pain [Feeling Tired] : not feeling tired [Constipation] : no constipation [Dysuria] : no dysuria [Confused] : no confusion [Convulsions] : no convulsions [Seizures] : no seizures

## 2023-07-21 NOTE — ADDENDUM
[FreeTextEntry1] : This note was authored by Sylvia Cota working as a scribe for Dr.Gary Mace. I, Dr. Oscar Mace have reviewed the content of this note and confirm it is true and accurate. I personally performed the history and physical examination and made all the decisions 07/17/2023

## 2023-07-21 NOTE — ASSESSMENT
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 The patient took tamsulosin in the past, but stopped due to light headiness. His urination remained satisfactory after discontinuing the medication. The patient's last PSA 04/24/17 was 0.69. The patient was in the ER 01/01/17 for abdominal pain. No etiology for the pain was identified and has since resolved. CT of the abdomen and pelvis in the ER found bilateral renal cysts and a 5 mm non obstructing right renal calculus. \par 1/17/18: CT scan was reviewed and discussed  in office which found no hydronephrosis or recurrent tumors . There are 4 cysts on the  left kidney and 2 cysts on the right.  There is a Stone in the right kidney in the upper portion. He reported earlier he was experiencing some left flank pain. At night he wakes up once to urinate. He remained on finasteride 5 mg once daily.\par 5/29/18: The patient returned and reported he wakes up once during the night to urinate. Currently taking Finasteride. Complained of intermittent left lumbar pain that remained the same since last visit. Daughter noted he does not drink enough water. \par 11/19/18: The patient returned today for a renal US. Renal US findings showed again there is a 5.5 mm echogenic focus with distal shadowing in the upper pole of the right kidney with bilateral simple non vascular cysts. There is a new septated cyst in the mid outer pole of the left kidney. Both kidneys appear normal in size and echogenicity. No hydronephrosis, or solid masses visualized. PSA from 3/29/18 was 0.71 ng/mL. Notes he was recently hospitalized for LE weakness and pain. Will be evaluated by a cardiologist for possible loop recorder placement. \par 5/22/19: Male patient presents today for a follow up. Blood work was completed 5/1/19 as per Dr. Ramirez. PSA was measured in January 2019  0.94 and creatinine as of May 2019 was 1.18 mg/dL. regarding urination he denies dysuria, gross hematuria, urgency or incontinence. He will wake up 2x a night to urinate. He has a Hx of high blood pressure and hyperlipidemia that are both well controlled on medication.\par \par 11/21/2019: Patient presents today for a follow up. Overall, he is doing well. He states his urination is good. Wakes up 1-2 times during the night to urinate. Patient denies dysuria, gross hematuria, urgency, or incontinence. Today, his only complaint is of some back pain. A urinalysis from  9/11/2019 was small positive for hematuria. His creatinine from 9/11/2019 was 1.29 mg/dL. He has not had a PSA measured since his last done January 2019 of 0.94 ng/mL. Small cyst/possible kidney stone in kidney found in the prior ultrasound of last year dated 11/19/2018. Digital rectal exam found no suspicious rectal masses. Anal tone is normal. The prostate is non tender, with normal texture, discrete borders, and no nodules. It is a 30 gram transurethral resection size. No gross blood on the examining finger. A little external hemorrhoid at 1 o'clock position was found though not inflamed. Due the presence of a small cyst/possible kidney stone found in the ultrasound report from last year dated 11/19/2018, a renal ultrasound has been ordered today and will be obtained by the office. Finding: Again there is a 5.5 mm echogenic focus with distal shadowing in the upper pole of the right kidney with bilateral simple non vascular cysts. There is a new septated cyst in the mid outer pole of the left kidney. Both kidneys appear normal in size and echogenicity. No hydronephrosis, or solid masses visualized. In my opinion, it looks more like a Jeremie's Plaque than a kidney stone.\par \par 11/23/2020: Patient presents today for follow up. Takes Finasteride 5mg once daily. Urination is good. Wakes 1-2x at night to urinate. Denies urinary urgency, pushing or straining, dysuria, gross hematuria. No constipation or chest pain. Had blood work by PCP on 9/23/20 showing creatinine of 1.20 and HbA1c of 5.8. Offers no new complaints today. Has appointment with PCP Dr. Ramirez tomorrow. \par \par 05/24/2021: Patient presents today for follow up. Wakes 1-2x at night to urinate. Denies dysuria, gross hematuria, urinary urgency, pushing or straining. Continues to take Finasteride. Has pain in lower back radiating down to leg and follows Dr. Ramirez for this. Hx of kidney stones. Traveling to [facility] for two months next month. \par \par Renewed Finasteride. \par Patient will schedule for a renal US for hx of kidney stones\par Blood work today included BMP, alkaline phosphatase, PSA, and testosterone. \par The patient produced a urine sample which will be sent for urinalysis, urine cytology, and urine culture. \par Patient will schedule a telehealth visit to review renal US results. \par \par 10/28/2021: Patient presents today for a follow up. He is accompanied by his son, Venu, who is translating for him. Pt reports nocturia 2-3x a night which is not bothersome. Denies urinary urgency, pushing, straining, gross hematuria, and burning. Has some back pain that is helped by injections. Pt is no longer sexually active according to his son who is translating. Pt obtained a renal US about 4 months ago on 5/27/2021 at [facility] which demonstrated no evidence of hydronephrosis. Bilateral renal cysts measuring up to 5.4 cm in the left kidney, previously measuring 4.9 cm. Enlarged prostate. PVR was 82.9. Pt's son believes his memory slightly diminishing but nothing concerning. Pt last saw  on 9/23/2021. Blood work of that visit revealed a creatinine of 1.24, eGFR was 54, PSA was 0.6 and platelets were slightly low. He denies having any problems clotting if he is bleeding. \par \par 04/28/2022: Patient presents today for a follow up visit. He was accompanied by his wife who helped to translate for him. Hx of kidney stones. Denies nocturia, urgency, gross hematuria, urge incontinence, or pushing/straining. He urinates a few times during the day. His last renal US was in 5/2021 and he did not have any kidney stones at that time. His US showed bilateral renal cysts measuring up to 5.4 cm in the left kidney, 4.9 cm previously. Pt currently takes Finasteride 5 mg, one tablet daily. \par Blood work today included BMP, A1C, androstenedione, CBC, alkaline phosphatase, PSA, and testosterone.\par The patient produced a urine sample which will be sent for urinalysis, urine cytology, and urine culture. \par I recommend the patient increase his fluid intake to prevent the formation of kidney stones. \par RTO in 6 months. \par \par 11/08/2022: The patient gave permission for an audio and visual telehealth conference. We utilized [employer] teams telemetry doc platform. The patient was located in his home in [place], [place]. I was located in my office in [place], [place]. I spoke with his son. We reviewed his recent labs, which indicate a Creatinine of 1.28 on October 7, 2022. A PSA was done on April 28 of this year, which was 0.61 ng/mL. This is very low for his age. His HbA1c is 6.0%. The son denies gross hematuria, dysuria, and straining to urinate. He is continuing on the finasteride 5mg with no gynecomastia or mastodynia. He denies constipation. \par \par He is no longer sexually active and is not concerned with his erectile function. \par We agreed that he would follow up in the springtime for a repeat PSA. Additionally, I have renewed his finasteride.\par At his next appointment, I would like to examine him and offer a digital rectal exam.  \par \par 06/01/2023: Mr. ARABELLA WEISS presents today for a follow up. He is accompanied by his wife. A Turkmen  was utilized during today's visit. He reports that his urination is fairly good. Nocturia x2-3. During the daytime, he voids about every 2-3 hours. His wife reports he does not drink much water. When asked he reports he drinks 5-6 glasses of liquids a day with one of them being water. Drinks a half cup of coffee as well as a lot of tea. The pt is no longer sexually active. Denies any pain from kidney stones. Continues to take finasteride 5 mg once daily. Denies pushing and straining. Continues to see Dr.Antonios Ramirez. \par \par The knee-chest position was utilized for the TREMAINE. Digital rectal exam found no suspicious rectal masses. Normal seminal vesicles. Anal tone is normal. The prostate is non tender, with normal texture, and no nodules. Borders are slightly indistinct. It is a 50 gram transurethral resection size. No rectal mucosal lesions. No gross blood on the examining finger. \par The patient produced a urine sample which will be sent for urinalysis, urine cytology, and urine culture. \par Blood work today included calcium, CBC with diff, CMP, serum osmolality, PSA, renal panel, testosterone, and uric acid. \par Renewed finasteride 5 mg once daily. Additionally, I offered to prescribe him an additional medication to reduce his nocturia further, however he declined. \par Pt was strongly advised to increase his water intake. \par Patient will RTO in 3 months for renal US. \par \par 06/08/2023: Mr. ARABELLA WEISS presents today for a follow up audio only telephone visit for which he gave permission for. The pt was located at home,  [address] [place] [address], and I was located in my office in [place], [place]. He is accompanied by his son. The pt had a renal US yesterday which demonstrated there are two simple cysts in the lower pole of the right kidney measuring 4.87 cm x 3.44 cm x 3.26 cm and 3.72 cm x 3.48 cm x 3.58 cm. Both kidneys are normal in size and echogenicity without stones, hydronephrosis or solid masses visualized. The pt had lab work done on 6/1/2023. PSA was very good at 0.57. Testosterone was normal at 453. Renal panel  revealed elevated creatinine of 1.48, which is why the renal US was ordered. Pt is traveling to [facility] next week for 3 months. He had to delay his trip due to getting covid and developing pneumonia, however his son states he is doing much better. The pt continues to take finasteride 5 mg once daily but requests a medication to further improve urination like we discussed at the last visit. \par \par Clinical findings and US results were reviewed at length with the patient's son. I personally reviewed the imaging myself. \par  \par Reviewed and discussed laboratory work of 6/1/2023 which he obtained prior to today's visit as requested. \par \par I prescribed the patient Tamsulosin 0.4 mg once daily after a meal. I advised the patient the side of effects of nasal congestion and light-headedness. I advised him to use saline nasal spray if he gets congestion but to avoid decongestion medication as this will worsen his urination. I also informed the patient that if he is sexually active, there will be a decrease in semen volume while taking this medication but this is not harmful. \par Patient will have a telehealth visit in 3-4 weeks for reassessment on tamsulosin. I informed the pt's son that since the pt will be away I can call his son to hear about how his father is doing on it. \par \par 06/12/2023: Mr. WEISS presents today for a follow up audio only telehealth visit. \par We reached out to the patient at 10:02 AM,  but he was not available at the time. I suggested through voicemail his may be a previously scheduled appointment as we have already discussed US results. However, I have spoken to his son and he confirmed me the appt was to be canceled. In addition, for future appointments of this pt we will also speak with the son as he will report as his historian. \par \par 07/17/2023: Mr. ARABELLA WEISS presents today for an audio visual telehealth visit for which he gave permission for, however the pt was unable to be reached by audio visual visit and a regular telephone visit was conducted.The patient was contacted at [phone] and I was located at my office in [place], [place]. Visit was conducted with the patient's son at his request. He has been taking tamsulosin 0.4 mg once daily w/o problems. His urination has improved. His son reports the patient is happy with his urination. He is in [place] currently. \par \par Pt will continue tamsulosin 0.4 mg once daily. \par \par Patient to RTO at his scheduled appt time in November, sooner if clinically indicated. \par \par Duration of telephone visit: 12 minutes.

## 2023-07-21 NOTE — HISTORY OF PRESENT ILLNESS
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 The patient took tamsulosin in the past, but stopped due to light headiness. His urination remained satisfactory after discontinuing the medication. The patient's last PSA 04/24/17 was 0.69. The patient was in the ER 01/01/17 for abdominal pain. No etiology for the pain was identified and has since resolved. CT of the abdomen and pelvis in the ER found bilateral renal cysts and a 5 mm non obstructing right renal calculus. \par 1/17/18: CT scan was reviewed and discussed  in office which found no hydronephrosis or recurrent tumors . There are 4 cysts on the  left kidney and 2 cysts on the right.  There is a Stone in the right kidney in the upper portion. He reported earlier he was experiencing some left flank pain. At night he wakes up once to urinate. He remained on finasteride 5 mg once daily.\par 5/29/18: The patient returned and reported he wakes up once during the night to urinate. Currently taking Finasteride. Complained of intermittent left lumbar pain that remained the same since last visit. Daughter noted he does not drink enough water. \par 11/19/18: The patient returned today for a renal US. Renal US findings showed again there is a 5.5 mm echogenic focus with distal shadowing in the upper pole of the right kidney with bilateral simple non vascular cysts. There is a new septated cyst in the mid outer pole of the left kidney. Both kidneys appear normal in size and echogenicity. No hydronephrosis, or solid masses visualized. PSA from 3/29/18 was 0.71 ng/mL. Notes he was recently hospitalized for LE weakness and pain. Will be evaluated by a cardiologist for possible loop recorder placement. \par 5/22/19: Male patient presents today for a follow up. Blood work was completed 5/1/19 as per Dr. Ramirez. PSA was measured in January 2019  0.94 and creatinine as of May 2019 was 1.18 mg/dL. regarding urination he denies dysuria, gross hematuria, urgency or incontinence. He will wake up 2x a night to urinate. He has a Hx of high blood pressure and hyperlipidemia that are both well controlled on medication.\par \par 11/21/2019: Patient presents today for a follow up. Overall, he is doing well. He states his urination is good. Wakes up 1-2 times during the night to urinate. Patient denies dysuria, gross hematuria, urgency, or incontinence. Today, his only complaint is of some back pain. A urinalysis from  9/11/2019 was small positive for hematuria. His creatinine from 9/11/2019 was 1.29 mg/dL. He has not had a PSA measured since his last done January 2019 of 0.94 ng/mL. Small cyst/possible kidney stone in kidney found in the prior ultrasound of last year dated 11/19/2018. Digital rectal exam found no suspicious rectal masses. Anal tone is normal. The prostate is non tender, with normal texture, discrete borders, and no nodules. It is a 30 gram transurethral resection size. No gross blood on the examining finger. A little external hemorrhoid at 1 o'clock position was found though not inflamed. Due the presence of a small cyst/possible kidney stone found in the ultrasound report from last year dated 11/19/2018, a renal ultrasound has been ordered today and will be obtained by the office. Finding: Again there is a 5.5 mm echogenic focus with distal shadowing in the upper pole of the right kidney with bilateral simple non vascular cysts. There is a new septated cyst in the mid outer pole of the left kidney. Both kidneys appear normal in size and echogenicity. No hydronephrosis, or solid masses visualized. In my opinion, it looks more like a Jeremie's Plaque than a kidney stone.\par \par 11/23/2020: Patient presents today for follow up. Takes Finasteride 5mg once daily. Urination is good. Wakes 1-2x at night to urinate. Denies urinary urgency, pushing or straining, dysuria, gross hematuria. No constipation or chest pain. Had blood work by PCP on 9/23/20 showing creatinine of 1.20 and HbA1c of 5.8. Offers no new complaints today. Has appointment with PCP Dr. Ramirez tomorrow. \par \par 05/24/2021: Patient presents today for follow up. Wakes 1-2x at night to urinate. Denies dysuria, gross hematuria, urinary urgency, pushing or straining. Continues to take Finasteride. Has pain in lower back radiating down to leg and follows Dr. Ramirez for this. Hx of kidney stones. Traveling to [facility] for two months next month. \par \par 10/28/2021: Patient presents today for a follow up. He is accompanied by his son, Venu, who is translating for him. Pt reports nocturia 2-3x a night which is not bothersome. Denies urinary urgency, pushing, straining, gross hematuria, and burning. Has some back pain that is helped by injections. Pt is no longer sexually active according to his son who is translating. Pt obtained a renal US about 4 months ago on 5/27/2021 at [facility] which demonstrated no evidence of hydronephrosis. Bilateral renal cysts measuring up to 5.4 cm in the left kidney, previously measuring 4.9 cm. Enlarged prostate. PVR was 82.9. Pt's son believes his memory slightly diminishing but nothing concerning. Pt last saw  on 9/23/2021. Blood work of that visit revealed a creatinine of 1.24, eGFR was 54, PSA was 0.6 and platelets were slightly low. He denies having any problems clotting if he is bleeding. \par \par 04/28/2022: Patient presents today for a follow up visit. He was accompanied by his wife who helped to translate for him. Hx of kidney stones. Denies nocturia, urgency, gross hematuria, urge incontinence, or pushing/straining. He urinates a few times during the day. His last renal US was in 5/2021 and he did not have any kidney stones at that time. His US showed bilateral renal cysts measuring up to 5.4 cm in the left kidney, 4.9 cm previously. Pt currently takes Finasteride 5 mg, one tablet daily. \par \par 11/08/2022: The patient gave permission for an audio and visual telehealth conference. We utilized [employer] teams telemetry doc platform. The patient was located in his home in [place], [place]. I was located in my office in [place], [place]. I spoke with his son. We reviewed his recent labs, which indicate a Creatinine of 1.28 on October 7, 2022. A PSA was done on April 28 of this year, which was 0.61 ng/mL. This is very low for his age. His HbA1c is 6.0%. The son denies gross hematuria, dysuria, and straining to urinate. He is continuing on the finasteride 5mg with no gynecomastia or mastodynia. \par \par 06/01/2023: Mr. ARABELLA WEISS presents today for a follow up. He is accompanied by his wife. A Swedish  was utilized during today's visit. He reports that his urination is fairly good. Nocturia x2-3. During the daytime, he voids about every 2-3 hours. His wife reports he does not drink much water. When asked he reports he drinks 5-6 glasses of liquids a day with one of them being water. Drinks a half cup of coffee as well as a lot of tea. The pt is no longer sexually active. Denies any pain from kidney stones. Continues to take finasteride 5 mg once daily. Denies pushing and straining. Continues to see Dr.Antonios Ramirez. \par \par 06/08/2023: Mr. ARABELLA WEISS presents today for a follow up audio only telephone visit for which he gave permission for. The pt was located at home,  [address] [place] [address], and I was located in my office in [place], [place]. He is accompanied by his son. The pt had a renal US yesterday which demonstrated there are two simple cysts in the lower pole of the right kidney measuring 4.87 cm x 3.44 cm x 3.26 cm and 3.72 cm x 3.48 cm x 3.58 cm. Both kidneys are normal in size and echogenicity without stones, hydronephrosis or solid masses visualized. The pt had lab work done on 6/1/2023. PSA was very good at 0.57. Testosterone was normal at 453. Renal panel  revealed elevated creatinine of 1.48, which is why the renal US was ordered. Pt is traveling to [place] next week for 3 months. He had to delay his trip due to getting covid and developing pneumonia, however his son states he is doing much better. The pt continues to take finasteride 5 mg once daily but requests a medication to further improve urination like we discussed at the last visit. \par \par 06/12/2023: Mr. WEISS presents today for a follow up audio only telehealth visit. \par We reached out to the patient at 10:02 AM,  but he was not available at the time. I suggested through voicemail his may be a previously scheduled appointment as we have already discussed US results. However, I have spoken to his son and he confirmed me the appt was to be canceled. In addition, for future appointments of this pt we will also speak with the son as he will report as his historian. \par \par 07/17/2023: Mr. ARABELLA WEISS presents today for an audio visual telehealth visit for which he gave permission for, however the pt was unable to be reached by audio visual visit and a regular telephone visit was conducted.The patient was contacted at [phone] and I was located at my office in [place], [place]. Visit was conducted with the patient's son at his request. He has been taking tamsulosin 0.4 mg once daily w/o problems. His urination has improved. His son reports the patient is happy with his urination. He is in [place] currently.

## 2023-09-05 NOTE — CARDIOLOGY SUMMARY
[de-identified] : 1/10/2022: Sinus Bradycardia at 56 beats per minute, left axis deviation, old inferior infarct, and poor R wave progression [de-identified] : Pharmacologic Nuclear Stress Test performed on 5/11/2021:\par No ECG evidence of ischemia. Defects of the basal inferior and basal inferolateral walls that are fixed, consistent with soft tissue attenuation artifact. Normal LV function. No evidence of ischemia.  [de-identified] : 5/11/2021: Normal left ventricular ejection fraction with an EF= 69%. Bioprosthetic aortic valve replacement. Peak transaortic valve gradient of 27 mmHg and mean gradient of 14 mmHg, which is probably normal in the setting of a prosthetic valve. Mild to moderate mitral regurgitation. Mild tricuspid regurgitation. Mild pulmonary HTN. PASP= 47 mmHg. [de-identified] : Cardiac catheterization performed on 6/22/2012: EF= 70%. Minor luminal irregularities of the LAD, LCx, and RCA. Mild pulmonary HTN. Severe aortic stenosis.  [de-identified] : 7/2/2012: Bioprosthetic aortic valve replacement.  Name band;

## 2023-09-08 LAB
ALBUMIN SERPL ELPH-MCNC: 4.4 G/DL
ALP BLD-CCNC: 53 U/L
ALT SERPL-CCNC: 27 U/L
ANION GAP SERPL CALC-SCNC: 13 MMOL/L
AST SERPL-CCNC: 20 U/L
BILIRUB SERPL-MCNC: 0.8 MG/DL
BUN SERPL-MCNC: 14 MG/DL
CALCIUM SERPL-MCNC: 9.2 MG/DL
CHLORIDE SERPL-SCNC: 104 MMOL/L
CO2 SERPL-SCNC: 24 MMOL/L
CREAT SERPL-MCNC: 1.37 MG/DL
EGFR: 51 ML/MIN/1.73M2
GLUCOSE SERPL-MCNC: 88 MG/DL
POTASSIUM SERPL-SCNC: 4.2 MMOL/L
PROT SERPL-MCNC: 6.7 G/DL
SODIUM SERPL-SCNC: 141 MMOL/L

## 2023-09-08 NOTE — PHYSICAL EXAM
[Well Developed] : well developed [Well Nourished] : well nourished [No Acute Distress] : no acute distress [Normal Venous Pressure] : normal venous pressure [Normal Rate] : normal [Rhythm Regular] : regular [Normal S1] : normal S1 [Normal S2] : normal S2 [II] : a grade 2 [No Pitting Edema] : no pitting edema present [Left Carotid Bruit] : left carotid bruit heard [Clear Lung Fields] : clear lung fields [Good Air Entry] : good air entry [No Respiratory Distress] : no respiratory distress  [Soft] : abdomen soft [Non Tender] : non-tender [Normal Bowel Sounds] : normal bowel sounds [Normal Gait] : normal gait [No Edema] : no edema [No Cyanosis] : no cyanosis [No Rash] : no rash [Moves all extremities] : moves all extremities [No Focal Deficits] : no focal deficits [Normal Speech] : normal speech [Alert and Oriented] : alert and oriented [Normal memory] : normal memory [Bradycardia] : bradycardic [Bruit] : no bruit heard [de-identified] : Extraocular muscles intact. Anicteric sclerae. [de-identified] : He was wearing a face mask during the examination. [de-identified] : No visible skin ulcers.

## 2023-09-08 NOTE — CARDIOLOGY SUMMARY
[de-identified] : 3/20/2023: Sinus Bradycardia at 56 beats per minute with left anterior fascicular block [de-identified] : 3/20/2023: Endocardium not well visualized; grossly preserved left ventricular ejection fraction with hypokinesis of the basal inferior wall. EF is approximately 65%. Concentric left ventricular remodeling. Moderate (Grade 2) left ventricular diastolic dysfunction. Right ventricular enlargement with normal right ventricular systolic function. The left atrium is moderately dilated in size. The right atrium is mildly dilated in size. Mitral annular calcification with thickened mitral valve leaflets. Moderate mitral regurgitation. A bioprosthetic valve replacement present in the aortic position. Peak transaortic valve gradient of 53 mmHg and mean gradient of 28 mmHg. Mild valvular regurgitation. Moderate tricuspid regurgitation. PASP= 52 mmHg, consistent with moderate pulmonary hypertension. Mild-to-moderate pulmonic regurgitation. Trace pericardial effusion.  2/7/2022: Mild to moderate mitral regurgitation. Bioprosthetic aortic valve replacement. Peak transaortic valve gradient of 40 mmHg and mean gradient of 21 mmHg, which is probably normal in the setting of a prosthetic valve. Moderately dilated left atrium. Mild right atrial enlargement. Mild left ventricular enlargement. Moderate diastolic dysfunction. Endocardium not well visualized; grossly preserved left ventricular ejection fraction with hypokinesis of the basal inferior wall. Right ventricular enlargement with normal right ventricular systolic function. Mild to moderate tricuspid regurgitation. Mild pulmonary HTN with PASP= 41 mmHg.  5/11/2021: Normal left ventricular ejection fraction with an EF= 69%. Bioprosthetic aortic valve replacement. Peak transaortic valve gradient of 27 mmHg and mean gradient of 14 mmHg, which is probably normal in the setting of a prosthetic valve. Mild to moderate mitral regurgitation. Mild tricuspid regurgitation. Mild pulmonary HTN. PASP= 47 mmHg. [de-identified] : Pharmacologic Nuclear Stress Test performed on 5/11/2021:\par No ECG evidence of ischemia. Defects of the basal inferior and basal inferolateral walls that are fixed, consistent with soft tissue attenuation artifact. Normal LV function. No evidence of ischemia.  [de-identified] : Cardiac catheterization performed on 6/22/2012: EF= 70%. Minor luminal irregularities of the LAD, LCx, and RCA. Mild pulmonary HTN. Severe aortic stenosis.  [de-identified] : 7/2/2012: Bioprosthetic aortic valve replacement.

## 2023-09-08 NOTE — HISTORY OF PRESENT ILLNESS
[FreeTextEntry1] : Patient is an 83 year old man with a history of severe aortic stenosis secondary to a bicuspid aortic valve status post bioprosthetic aortic valve replacement 7/2012, HTN, hyperlipidemia, former tobacco use, chronic renal insufficiency, and GERD who presents today for follow up of aortic valve disorder. He mentions experiencing dyspnea with exertion that is unchanged. He otherwise denies any exertional chest pain, dyspnea at rest, palpitations, headaches, and dizziness.

## 2023-09-08 NOTE — DISCUSSION/SUMMARY
[FreeTextEntry1] : IMPRESSION: Mr. WEISS is a 83 year old man with a history of severe aortic stenosis secondary to a bicuspid aortic valve status post bioprosthetic aortic valve replacement 7/2012, HTN, hyperlipidemia, former tobacco use, chronic renal insufficiency, and GERD who presents today for follow up of his aortic valve disorder.   PLAN: 1. He had an echocardiogram performed today that revealed elevated gradients across the aortic valve prosthesis. Given these findings, I am sending him for a DARA for further evaluation of his aortic valve. He understands that he requires antibiotic prophylaxis status post aortic valve replacement.  2. He has not experienced any recurrent chest discomfort since his last visit with me. He had a nuclear stress test a little less than 2 years ago that did not reveal any ischemia. He will continue on ASA 81 mg daily. He was in sinus bradycardia on his ECG that was performed today. 3. His dyspnea with exertion is unchanged, thus will hold off on a stress test until we further evaluate his aortic valve.   4. His blood pressure is OK. He will continue on Amlodipine 5 mg daily and Metoprolol 50 mg daily.  5. His most recent LDL was very good, thus he will continue on Atorvastatin 20 mg daily. 6. He will follow up with me in 3 months or sooner should he experience any new or worsening symptoms in the interim or should there be any concerning findings on his DARA. [EKG obtained to assist in diagnosis and management of assessed problem(s)] : EKG obtained to assist in diagnosis and management of assessed problem(s)

## 2023-09-12 ENCOUNTER — APPOINTMENT (OUTPATIENT)
Dept: CARDIOLOGY | Facility: CLINIC | Age: 83
End: 2023-09-12
Payer: MEDICARE

## 2023-09-12 PROCEDURE — 93306 TTE W/DOPPLER COMPLETE: CPT

## 2023-09-13 ENCOUNTER — APPOINTMENT (OUTPATIENT)
Dept: UROLOGY | Facility: CLINIC | Age: 83
End: 2023-09-13

## 2023-09-20 ENCOUNTER — APPOINTMENT (OUTPATIENT)
Dept: INTERNAL MEDICINE | Facility: CLINIC | Age: 83
End: 2023-09-20
Payer: MEDICARE

## 2023-09-20 ENCOUNTER — LABORATORY RESULT (OUTPATIENT)
Age: 83
End: 2023-09-20

## 2023-09-20 VITALS
WEIGHT: 13.75 LBS | SYSTOLIC BLOOD PRESSURE: 138 MMHG | BODY MASS INDEX: 2.09 KG/M2 | HEART RATE: 96 BPM | DIASTOLIC BLOOD PRESSURE: 80 MMHG | RESPIRATION RATE: 16 BRPM

## 2023-09-20 PROCEDURE — 36415 COLL VENOUS BLD VENIPUNCTURE: CPT

## 2023-09-20 PROCEDURE — 99214 OFFICE O/P EST MOD 30 MIN: CPT | Mod: 25

## 2023-09-20 RX ORDER — METOPROLOL SUCCINATE 50 MG/1
50 TABLET, EXTENDED RELEASE ORAL
Qty: 90 | Refills: 1 | Status: DISCONTINUED | COMMUNITY
Start: 2023-06-01 | End: 2023-09-20

## 2023-09-20 RX ORDER — METOPROLOL SUCCINATE 100 MG/1
100 TABLET, EXTENDED RELEASE ORAL
Qty: 90 | Refills: 3 | Status: DISCONTINUED | COMMUNITY
Start: 2023-05-24 | End: 2023-09-20

## 2023-09-21 LAB
25(OH)D3 SERPL-MCNC: 47.4 NG/ML
ALBUMIN SERPL ELPH-MCNC: 4.7 G/DL
ALP BLD-CCNC: 56 U/L
ALT SERPL-CCNC: 21 U/L
ANION GAP SERPL CALC-SCNC: 18 MMOL/L
APPEARANCE: CLEAR
AST SERPL-CCNC: 20 U/L
BACTERIA: NEGATIVE /HPF
BASOPHILS # BLD AUTO: 0.03 K/UL
BASOPHILS NFR BLD AUTO: 0.5 %
BILIRUB SERPL-MCNC: 0.6 MG/DL
BILIRUBIN URINE: NEGATIVE
BLOOD URINE: ABNORMAL
BUN SERPL-MCNC: 19 MG/DL
CALCIUM SERPL-MCNC: 9.5 MG/DL
CAST: 0 /LPF
CHLORIDE SERPL-SCNC: 105 MMOL/L
CHOLEST SERPL-MCNC: 128 MG/DL
CO2 SERPL-SCNC: 20 MMOL/L
COLOR: YELLOW
CREAT SERPL-MCNC: 1.34 MG/DL
CREAT SPEC-SCNC: 129 MG/DL
EGFR: 53 ML/MIN/1.73M2
EOSINOPHIL # BLD AUTO: 0.01 K/UL
EOSINOPHIL NFR BLD AUTO: 0.2 %
EPITHELIAL CELLS: 0 /HPF
ESTIMATED AVERAGE GLUCOSE: 131 MG/DL
GLUCOSE QUALITATIVE U: NEGATIVE MG/DL
GLUCOSE SERPL-MCNC: 99 MG/DL
HBA1C MFR BLD HPLC: 6.2 %
HCT VFR BLD CALC: 48.2 %
HDLC SERPL-MCNC: 46 MG/DL
HGB BLD-MCNC: 16.5 G/DL
IMM GRANULOCYTES NFR BLD AUTO: 0.8 %
KETONES URINE: NEGATIVE MG/DL
LDLC SERPL CALC-MCNC: 68 MG/DL
LEUKOCYTE ESTERASE URINE: NEGATIVE
LYMPHOCYTES # BLD AUTO: 2.03 K/UL
LYMPHOCYTES NFR BLD AUTO: 31.6 %
MAN DIFF?: NORMAL
MCHC RBC-ENTMCNC: 32.4 PG
MCHC RBC-ENTMCNC: 34.2 GM/DL
MCV RBC AUTO: 94.7 FL
MICROALBUMIN 24H UR DL<=1MG/L-MCNC: 3.7 MG/DL
MICROALBUMIN/CREAT 24H UR-RTO: 28 MG/G
MICROSCOPIC-UA: NORMAL
MONOCYTES # BLD AUTO: 1.55 K/UL
MONOCYTES NFR BLD AUTO: 24.1 %
NEUTROPHILS # BLD AUTO: 2.76 K/UL
NEUTROPHILS NFR BLD AUTO: 42.8 %
NITRITE URINE: NEGATIVE
NONHDLC SERPL-MCNC: 82 MG/DL
PH URINE: 6
PLATELET # BLD AUTO: 86 K/UL
POTASSIUM SERPL-SCNC: 4.1 MMOL/L
PROT SERPL-MCNC: 7.3 G/DL
PROTEIN URINE: NORMAL MG/DL
RBC # BLD: 5.09 M/UL
RBC # FLD: 12.6 %
RED BLOOD CELLS URINE: 3 /HPF
SODIUM SERPL-SCNC: 143 MMOL/L
SPECIFIC GRAVITY URINE: 1.02
TRIGL SERPL-MCNC: 66 MG/DL
UROBILINOGEN URINE: 1 MG/DL
VIT B12 SERPL-MCNC: 850 PG/ML
WBC # FLD AUTO: 6.43 K/UL
WHITE BLOOD CELLS URINE: 0 /HPF

## 2023-09-28 ENCOUNTER — APPOINTMENT (OUTPATIENT)
Dept: ORTHOPEDIC SURGERY | Facility: CLINIC | Age: 83
End: 2023-09-28
Payer: MEDICARE

## 2023-09-28 VITALS — BODY MASS INDEX: 35.47 KG/M2 | HEIGHT: 67 IN | WEIGHT: 226 LBS

## 2023-09-28 PROCEDURE — 99215 OFFICE O/P EST HI 40 MIN: CPT

## 2023-09-28 PROCEDURE — 73564 X-RAY EXAM KNEE 4 OR MORE: CPT | Mod: 50

## 2023-10-24 ENCOUNTER — APPOINTMENT (OUTPATIENT)
Dept: CT IMAGING | Facility: IMAGING CENTER | Age: 83
End: 2023-10-24
Payer: MEDICARE

## 2023-10-24 ENCOUNTER — OUTPATIENT (OUTPATIENT)
Dept: OUTPATIENT SERVICES | Facility: HOSPITAL | Age: 83
LOS: 1 days | End: 2023-10-24
Payer: MEDICARE

## 2023-10-24 DIAGNOSIS — M17.0 BILATERAL PRIMARY OSTEOARTHRITIS OF KNEE: ICD-10-CM

## 2023-10-24 PROCEDURE — 73700 CT LOWER EXTREMITY W/O DYE: CPT

## 2023-10-24 PROCEDURE — 73700 CT LOWER EXTREMITY W/O DYE: CPT | Mod: 26,LT

## 2023-10-27 ENCOUNTER — NON-APPOINTMENT (OUTPATIENT)
Age: 83
End: 2023-10-27

## 2023-11-02 ENCOUNTER — APPOINTMENT (OUTPATIENT)
Dept: CARDIOLOGY | Facility: CLINIC | Age: 83
End: 2023-11-02
Payer: MEDICARE

## 2023-11-02 ENCOUNTER — NON-APPOINTMENT (OUTPATIENT)
Age: 83
End: 2023-11-02

## 2023-11-02 VITALS
BODY MASS INDEX: 35.47 KG/M2 | DIASTOLIC BLOOD PRESSURE: 74 MMHG | RESPIRATION RATE: 15 BRPM | WEIGHT: 226 LBS | OXYGEN SATURATION: 97 % | SYSTOLIC BLOOD PRESSURE: 123 MMHG | HEIGHT: 67 IN | HEART RATE: 74 BPM

## 2023-11-02 PROCEDURE — 99214 OFFICE O/P EST MOD 30 MIN: CPT | Mod: 25

## 2023-11-02 PROCEDURE — 93000 ELECTROCARDIOGRAM COMPLETE: CPT

## 2023-11-06 NOTE — ED PROVIDER NOTE - ATTENDING CONTRIBUTION TO CARE
Patient requests all Lab, Cardiology, and Radiology Results on their Discharge Instructions I have examined and evaluated this patient with the above resident or PA, and agree with the documented clinical history, exam and plan.   Briefly: 76M, h/o afib, AS, HTN, GERD, AVR 2012, s/p RP mass removal (benign lipoma), s/p cholecystectomy, p/w abdominal pain; no fever vomiting or diarrhea; on exam mildly tender in periumbilical region; given surgical history, plan to obtain CT of the abdomen to r/o appendicitis, obstruction or mass.  Disposition pending results of CT.

## 2023-11-09 ENCOUNTER — OUTPATIENT (OUTPATIENT)
Dept: OUTPATIENT SERVICES | Facility: HOSPITAL | Age: 83
LOS: 1 days | End: 2023-11-09
Payer: MEDICARE

## 2023-11-09 VITALS
OXYGEN SATURATION: 98 % | TEMPERATURE: 98 F | DIASTOLIC BLOOD PRESSURE: 80 MMHG | HEART RATE: 88 BPM | HEIGHT: 68 IN | RESPIRATION RATE: 16 BRPM | SYSTOLIC BLOOD PRESSURE: 142 MMHG | WEIGHT: 223.11 LBS

## 2023-11-09 DIAGNOSIS — M19.90 UNSPECIFIED OSTEOARTHRITIS, UNSPECIFIED SITE: ICD-10-CM

## 2023-11-09 DIAGNOSIS — Z90.49 ACQUIRED ABSENCE OF OTHER SPECIFIED PARTS OF DIGESTIVE TRACT: Chronic | ICD-10-CM

## 2023-11-09 DIAGNOSIS — Z86.2 PERSONAL HISTORY OF DISEASES OF THE BLOOD AND BLOOD-FORMING ORGANS AND CERTAIN DISORDERS INVOLVING THE IMMUNE MECHANISM: ICD-10-CM

## 2023-11-09 DIAGNOSIS — M17.12 UNILATERAL PRIMARY OSTEOARTHRITIS, LEFT KNEE: ICD-10-CM

## 2023-11-09 DIAGNOSIS — I25.10 ATHEROSCLEROTIC HEART DISEASE OF NATIVE CORONARY ARTERY WITHOUT ANGINA PECTORIS: ICD-10-CM

## 2023-11-09 DIAGNOSIS — G47.33 OBSTRUCTIVE SLEEP APNEA (ADULT) (PEDIATRIC): ICD-10-CM

## 2023-11-09 DIAGNOSIS — Z01.818 ENCOUNTER FOR OTHER PREPROCEDURAL EXAMINATION: ICD-10-CM

## 2023-11-09 DIAGNOSIS — Z29.9 ENCOUNTER FOR PROPHYLACTIC MEASURES, UNSPECIFIED: ICD-10-CM

## 2023-11-09 LAB
A1C WITH ESTIMATED AVERAGE GLUCOSE RESULT: 6.3 % — HIGH (ref 4–5.6)
A1C WITH ESTIMATED AVERAGE GLUCOSE RESULT: 6.3 % — HIGH (ref 4–5.6)
ANION GAP SERPL CALC-SCNC: 12 MMOL/L — SIGNIFICANT CHANGE UP (ref 5–17)
ANION GAP SERPL CALC-SCNC: 12 MMOL/L — SIGNIFICANT CHANGE UP (ref 5–17)
BUN SERPL-MCNC: 17 MG/DL — SIGNIFICANT CHANGE UP (ref 7–23)
BUN SERPL-MCNC: 17 MG/DL — SIGNIFICANT CHANGE UP (ref 7–23)
CALCIUM SERPL-MCNC: 9.5 MG/DL — SIGNIFICANT CHANGE UP (ref 8.4–10.5)
CALCIUM SERPL-MCNC: 9.5 MG/DL — SIGNIFICANT CHANGE UP (ref 8.4–10.5)
CHLORIDE SERPL-SCNC: 104 MMOL/L — SIGNIFICANT CHANGE UP (ref 96–108)
CHLORIDE SERPL-SCNC: 104 MMOL/L — SIGNIFICANT CHANGE UP (ref 96–108)
CO2 SERPL-SCNC: 23 MMOL/L — SIGNIFICANT CHANGE UP (ref 22–31)
CO2 SERPL-SCNC: 23 MMOL/L — SIGNIFICANT CHANGE UP (ref 22–31)
CREAT SERPL-MCNC: 1.24 MG/DL — SIGNIFICANT CHANGE UP (ref 0.5–1.3)
CREAT SERPL-MCNC: 1.24 MG/DL — SIGNIFICANT CHANGE UP (ref 0.5–1.3)
EGFR: 58 ML/MIN/1.73M2 — LOW
EGFR: 58 ML/MIN/1.73M2 — LOW
ESTIMATED AVERAGE GLUCOSE: 134 MG/DL — HIGH (ref 68–114)
ESTIMATED AVERAGE GLUCOSE: 134 MG/DL — HIGH (ref 68–114)
GLUCOSE SERPL-MCNC: 179 MG/DL — HIGH (ref 70–99)
GLUCOSE SERPL-MCNC: 179 MG/DL — HIGH (ref 70–99)
HCT VFR BLD CALC: 46.8 % — SIGNIFICANT CHANGE UP (ref 39–50)
HCT VFR BLD CALC: 46.8 % — SIGNIFICANT CHANGE UP (ref 39–50)
HGB BLD-MCNC: 15.7 G/DL — SIGNIFICANT CHANGE UP (ref 13–17)
HGB BLD-MCNC: 15.7 G/DL — SIGNIFICANT CHANGE UP (ref 13–17)
MCHC RBC-ENTMCNC: 31.7 PG — SIGNIFICANT CHANGE UP (ref 27–34)
MCHC RBC-ENTMCNC: 31.7 PG — SIGNIFICANT CHANGE UP (ref 27–34)
MCHC RBC-ENTMCNC: 33.5 GM/DL — SIGNIFICANT CHANGE UP (ref 32–36)
MCHC RBC-ENTMCNC: 33.5 GM/DL — SIGNIFICANT CHANGE UP (ref 32–36)
MCV RBC AUTO: 94.4 FL — SIGNIFICANT CHANGE UP (ref 80–100)
MCV RBC AUTO: 94.4 FL — SIGNIFICANT CHANGE UP (ref 80–100)
MRSA PCR RESULT.: SIGNIFICANT CHANGE UP
MRSA PCR RESULT.: SIGNIFICANT CHANGE UP
NRBC # BLD: 0 /100 WBCS — SIGNIFICANT CHANGE UP (ref 0–0)
NRBC # BLD: 0 /100 WBCS — SIGNIFICANT CHANGE UP (ref 0–0)
PLATELET # BLD AUTO: 80 K/UL — LOW (ref 150–400)
PLATELET # BLD AUTO: 80 K/UL — LOW (ref 150–400)
POTASSIUM SERPL-MCNC: 4.4 MMOL/L — SIGNIFICANT CHANGE UP (ref 3.5–5.3)
POTASSIUM SERPL-MCNC: 4.4 MMOL/L — SIGNIFICANT CHANGE UP (ref 3.5–5.3)
POTASSIUM SERPL-SCNC: 4.4 MMOL/L — SIGNIFICANT CHANGE UP (ref 3.5–5.3)
POTASSIUM SERPL-SCNC: 4.4 MMOL/L — SIGNIFICANT CHANGE UP (ref 3.5–5.3)
RBC # BLD: 4.96 M/UL — SIGNIFICANT CHANGE UP (ref 4.2–5.8)
RBC # BLD: 4.96 M/UL — SIGNIFICANT CHANGE UP (ref 4.2–5.8)
RBC # FLD: 12.3 % — SIGNIFICANT CHANGE UP (ref 10.3–14.5)
RBC # FLD: 12.3 % — SIGNIFICANT CHANGE UP (ref 10.3–14.5)
S AUREUS DNA NOSE QL NAA+PROBE: SIGNIFICANT CHANGE UP
S AUREUS DNA NOSE QL NAA+PROBE: SIGNIFICANT CHANGE UP
SODIUM SERPL-SCNC: 139 MMOL/L — SIGNIFICANT CHANGE UP (ref 135–145)
SODIUM SERPL-SCNC: 139 MMOL/L — SIGNIFICANT CHANGE UP (ref 135–145)
WBC # BLD: 5.09 K/UL — SIGNIFICANT CHANGE UP (ref 3.8–10.5)
WBC # BLD: 5.09 K/UL — SIGNIFICANT CHANGE UP (ref 3.8–10.5)
WBC # FLD AUTO: 5.09 K/UL — SIGNIFICANT CHANGE UP (ref 3.8–10.5)
WBC # FLD AUTO: 5.09 K/UL — SIGNIFICANT CHANGE UP (ref 3.8–10.5)

## 2023-11-09 PROCEDURE — 86901 BLOOD TYPING SEROLOGIC RH(D): CPT

## 2023-11-09 PROCEDURE — G0463: CPT

## 2023-11-09 PROCEDURE — 86850 RBC ANTIBODY SCREEN: CPT

## 2023-11-09 PROCEDURE — 86900 BLOOD TYPING SEROLOGIC ABO: CPT

## 2023-11-09 RX ORDER — PANTOPRAZOLE SODIUM 20 MG/1
1 TABLET, DELAYED RELEASE ORAL
Qty: 0 | Refills: 0 | DISCHARGE

## 2023-11-09 RX ORDER — LIDOCAINE HCL 20 MG/ML
0.2 VIAL (ML) INJECTION ONCE
Refills: 0 | Status: DISCONTINUED | OUTPATIENT
Start: 2023-11-29 | End: 2023-11-29

## 2023-11-09 RX ORDER — METOPROLOL TARTRATE 50 MG
0 TABLET ORAL
Qty: 0 | Refills: 0 | DISCHARGE

## 2023-11-09 RX ORDER — TRAMADOL HYDROCHLORIDE 50 MG/1
50 TABLET ORAL ONCE
Refills: 0 | Status: DISCONTINUED | OUTPATIENT
Start: 2023-11-29 | End: 2023-11-29

## 2023-11-09 RX ORDER — SODIUM CHLORIDE 9 MG/ML
3 INJECTION INTRAMUSCULAR; INTRAVENOUS; SUBCUTANEOUS EVERY 8 HOURS
Refills: 0 | Status: DISCONTINUED | OUTPATIENT
Start: 2023-11-29 | End: 2023-11-29

## 2023-11-09 RX ORDER — ASPIRIN/CALCIUM CARB/MAGNESIUM 324 MG
1 TABLET ORAL
Qty: 0 | Refills: 0 | DISCHARGE

## 2023-11-09 RX ORDER — CHLORHEXIDINE GLUCONATE 213 G/1000ML
1 SOLUTION TOPICAL ONCE
Refills: 0 | Status: COMPLETED | OUTPATIENT
Start: 2023-11-29 | End: 2023-11-29

## 2023-11-09 RX ORDER — PANTOPRAZOLE SODIUM 20 MG/1
40 TABLET, DELAYED RELEASE ORAL ONCE
Refills: 0 | Status: COMPLETED | OUTPATIENT
Start: 2023-11-29 | End: 2023-11-29

## 2023-11-09 RX ORDER — CEFAZOLIN SODIUM 1 G
2000 VIAL (EA) INJECTION ONCE
Refills: 0 | Status: COMPLETED | OUTPATIENT
Start: 2023-11-29 | End: 2023-11-29

## 2023-11-09 NOTE — H&P PST ADULT - MUSCULOSKELETAL
nida knee/normal/decreased ROM due to pain/normal gait/strength 5/5 bilateral upper extremities/strength 5/5 bilateral lower extremities details…

## 2023-11-09 NOTE — H&P PST ADULT - PROBLEM SELECTOR PLAN 2
Eliquis hold 3 day prior to surgery   continue on antihypertensive medications   Cardiac eval pending

## 2023-11-09 NOTE — H&P PST ADULT - ASSESSMENT
DASI: able to go up one flight of stairs or walk 1-2 blocks with out difficulty  Loose teeth: michelle     CAPRINI VTE 2.0 SCORE [CLOT updated 2019]    AGE RELATED RISK FACTORS                                                       MOBILITY RELATED FACTORS  [ ] Age 41-60 years                                            (1 Point)                    [ ] Bed rest                                                        (1 Point)  [ ] Age: 61-74 years                                           (2 Points)                  [ ] Plaster cast                                                   (2 Points)  [ x] Age= 75 years                                              (3 Points)                    [ ] Bed bound for more than 72 hours                 (2 Points)    DISEASE RELATED RISK FACTORS                                               GENDER SPECIFIC FACTORS  [ ] Edema in the lower extremities                       (1 Point)              [ ] Pregnancy                                                     (1 Point)  [ ] Varicose veins                                               (1 Point)                     [ ] Post-partum < 6 weeks                                   (1 Point)             [x ] BMI > 25 Kg/m2                                            (1 Point)                     [ ] Hormonal therapy  or oral contraception          (1 Point)                 [ ] Sepsis (in the previous month)                        (1 Point)               [ ] History of pregnancy complications                 (1 point)  [ ] Pneumonia or serious lung disease                                               [ ] Unexplained or recurrent                     (1 Point)           (in the previous month)                               (1 Point)  [ ] Abnormal pulmonary function test                     (1 Point)                 SURGERY RELATED RISK FACTORS  [ ] Acute myocardial infarction                              (1 Point)               [ ]  Section                                             (1 Point)  [ ] Congestive heart failure (in the previous month)  (1 Point)      [ ] Minor surgery                                                  (1 Point)   [ ] Inflammatory bowel disease                             (1 Point)               [ ] Arthroscopic surgery                                        (2 Points)  [ ] Central venous access                                      (2 Points)                [ ] General surgery lasting more than 45 minutes (2 points)  [ ] Malignancy- Present or previous                   (2 Points)                [x ] Elective arthroplasty                                         (5 points)    [ ] Stroke (in the previous month)                          (5 Points)                                                                                                                                                           HEMATOLOGY RELATED FACTORS                                                 TRAUMA RELATED RISK FACTORS  [ ] Prior episodes of VTE                                     (3 Points)                [ ] Fracture of the hip, pelvis, or leg                       (5 Points)  [ ] Positive family history for VTE                         (3 Points)             [ ] Acute spinal cord injury (in the previous month)  (5 Points)  [ ] Prothrombin 97510 A                                     (3 Points)               [ ] Paralysis  (less than 1 month)                             (5 Points)  [ ] Factor V Leiden                                             (3 Points)                  [ ] Multiple Trauma within 1 month                        (5 Points)  [ ] Lupus anticoagulants                                     (3 Points)                                                           [ ] Anticardiolipin antibodies                               (3 Points)                                                       [ ] High homocysteine in the blood                      (3 Points)                                             [ ] Other congenital or acquired thrombophilia      (3 Points)                                                [ ] Heparin induced thrombocytopenia                  (3 Points)                                     Total Score [     8     ]

## 2023-11-09 NOTE — H&P PST ADULT - PROBLEM SELECTOR PLAN 1
Left Total Knee Arthroplasty with Arnol Robot on 11/29/23.  Medical eval pending  Pre-op education provided - all questions answered. Pt verbalized understanding

## 2023-11-09 NOTE — H&P PST ADULT - NSICDXPASTSURGICALHX_GEN_ALL_CORE_FT
PAST SURGICAL HISTORY:  Aortic valve disease AVR 7/2012    Retroperitoneal mass s/p surgery- benign    S/P cardiac catheterization     S/P cholecystectomy     S/P TURP 2005

## 2023-11-09 NOTE — H&P PST ADULT - NSICDXPASTMEDICALHX_GEN_ALL_CORE_FT
PAST MEDICAL HISTORY:  2019 novel coronavirus disease (COVID-19)     Aortic Stenosis     Atrial fibrillation     Enlarged Prostate     GERD (Gastroesophageal Reflux Disease)     H/O thrombocytopenia     HTN (hypertension)     Hypercholesterolemia     Primary osteoarthritis of both knees

## 2023-11-09 NOTE — H&P PST ADULT - HISTORY OF PRESENT ILLNESS
84 y/o M with h/o Severe aortic stenosis secondary to a bicuspid aortic valve status post bioprosthetic aortic valve replacement 7/2012, A-fib on Eliquis, HTN, HLD, Former tobacco use, Chronic renal insufficiency, Thrombocytopenia, GERD and OA c/o left knee pain for "few years" s/p PT and injections (last 5/2023) with minimal help. Imaging studied done and found severe OA. Today he presents to PST for scheduled Left Total Knee Arthroplasty with Arnol Robot on 11/29/23. Denies any palpitations, SOB, N/V, fever or chills.

## 2023-11-13 ENCOUNTER — APPOINTMENT (OUTPATIENT)
Dept: INTERNAL MEDICINE | Facility: CLINIC | Age: 83
End: 2023-11-13
Payer: MEDICARE

## 2023-11-13 VITALS
HEART RATE: 78 BPM | WEIGHT: 228 LBS | SYSTOLIC BLOOD PRESSURE: 135 MMHG | DIASTOLIC BLOOD PRESSURE: 76 MMHG | RESPIRATION RATE: 16 BRPM | BODY MASS INDEX: 35.71 KG/M2

## 2023-11-13 DIAGNOSIS — Z01.818 ENCOUNTER FOR OTHER PREPROCEDURAL EXAMINATION: ICD-10-CM

## 2023-11-13 PROBLEM — Z86.2 PERSONAL HISTORY OF DISEASES OF THE BLOOD AND BLOOD-FORMING ORGANS AND CERTAIN DISORDERS INVOLVING THE IMMUNE MECHANISM: Chronic | Status: ACTIVE | Noted: 2023-11-09

## 2023-11-13 PROBLEM — M17.0 BILATERAL PRIMARY OSTEOARTHRITIS OF KNEE: Chronic | Status: ACTIVE | Noted: 2023-11-09

## 2023-11-13 PROBLEM — U07.1 COVID-19: Chronic | Status: ACTIVE | Noted: 2023-11-09

## 2023-11-13 PROCEDURE — 99214 OFFICE O/P EST MOD 30 MIN: CPT

## 2023-11-14 LAB
APPEARANCE: CLEAR
BACTERIA: NEGATIVE /HPF
BILIRUBIN URINE: NEGATIVE
BLOOD URINE: NEGATIVE
CAST: 0 /LPF
COLOR: YELLOW
EPITHELIAL CELLS: 0 /HPF
GLUCOSE QUALITATIVE U: NEGATIVE MG/DL
KETONES URINE: NEGATIVE MG/DL
LEUKOCYTE ESTERASE URINE: NEGATIVE
MICROSCOPIC-UA: NORMAL
NITRITE URINE: NEGATIVE
PH URINE: 6.5
PROTEIN URINE: NEGATIVE MG/DL
RED BLOOD CELLS URINE: 0 /HPF
SPECIFIC GRAVITY URINE: 1.01
UROBILINOGEN URINE: 0.2 MG/DL
WHITE BLOOD CELLS URINE: 0 /HPF

## 2023-11-15 LAB — BACTERIA UR CULT: NORMAL

## 2023-11-16 ENCOUNTER — NON-APPOINTMENT (OUTPATIENT)
Age: 83
End: 2023-11-16

## 2023-11-17 ENCOUNTER — APPOINTMENT (OUTPATIENT)
Dept: CV DIAGNOSTICS | Facility: HOSPITAL | Age: 83
End: 2023-11-17

## 2023-11-17 ENCOUNTER — OUTPATIENT (OUTPATIENT)
Dept: OUTPATIENT SERVICES | Facility: HOSPITAL | Age: 83
LOS: 1 days | End: 2023-11-17
Payer: MEDICARE

## 2023-11-17 DIAGNOSIS — Z90.49 ACQUIRED ABSENCE OF OTHER SPECIFIED PARTS OF DIGESTIVE TRACT: Chronic | ICD-10-CM

## 2023-11-17 DIAGNOSIS — I25.10 ATHEROSCLEROTIC HEART DISEASE OF NATIVE CORONARY ARTERY WITHOUT ANGINA PECTORIS: ICD-10-CM

## 2023-11-17 PROCEDURE — 78452 HT MUSCLE IMAGE SPECT MULT: CPT | Mod: 26,MH

## 2023-11-17 PROCEDURE — 93018 CV STRESS TEST I&R ONLY: CPT | Mod: MH

## 2023-11-17 PROCEDURE — A9500: CPT

## 2023-11-17 PROCEDURE — 93016 CV STRESS TEST SUPVJ ONLY: CPT | Mod: MH

## 2023-11-17 PROCEDURE — 93017 CV STRESS TEST TRACING ONLY: CPT

## 2023-11-17 PROCEDURE — 78452 HT MUSCLE IMAGE SPECT MULT: CPT | Mod: MH

## 2023-11-22 ENCOUNTER — NON-APPOINTMENT (OUTPATIENT)
Age: 83
End: 2023-11-22

## 2023-11-22 ENCOUNTER — APPOINTMENT (OUTPATIENT)
Dept: CARDIOLOGY | Facility: CLINIC | Age: 83
End: 2023-11-22

## 2023-11-22 ENCOUNTER — APPOINTMENT (OUTPATIENT)
Dept: CARDIOLOGY | Facility: CLINIC | Age: 83
End: 2023-11-22
Payer: MEDICARE

## 2023-11-22 VITALS
HEIGHT: 67 IN | BODY MASS INDEX: 35.79 KG/M2 | DIASTOLIC BLOOD PRESSURE: 76 MMHG | HEART RATE: 94 BPM | WEIGHT: 228 LBS | SYSTOLIC BLOOD PRESSURE: 98 MMHG | OXYGEN SATURATION: 98 % | RESPIRATION RATE: 12 BRPM

## 2023-11-22 PROCEDURE — 93000 ELECTROCARDIOGRAM COMPLETE: CPT

## 2023-11-22 PROCEDURE — 99214 OFFICE O/P EST MOD 30 MIN: CPT | Mod: 25

## 2023-11-22 RX ORDER — VANCOMYCIN HCL 1 G
1500 VIAL (EA) INTRAVENOUS ONCE
Refills: 0 | Status: COMPLETED | OUTPATIENT
Start: 2023-11-29 | End: 2023-11-29

## 2023-11-28 ENCOUNTER — TRANSCRIPTION ENCOUNTER (OUTPATIENT)
Age: 83
End: 2023-11-28

## 2023-11-29 ENCOUNTER — TRANSCRIPTION ENCOUNTER (OUTPATIENT)
Age: 83
End: 2023-11-29

## 2023-11-29 ENCOUNTER — OUTPATIENT (OUTPATIENT)
Dept: INPATIENT UNIT | Facility: HOSPITAL | Age: 83
LOS: 1 days | End: 2023-11-29
Payer: MEDICARE

## 2023-11-29 ENCOUNTER — APPOINTMENT (OUTPATIENT)
Dept: ORTHOPEDIC SURGERY | Facility: HOSPITAL | Age: 83
End: 2023-11-29

## 2023-11-29 VITALS
TEMPERATURE: 98 F | SYSTOLIC BLOOD PRESSURE: 159 MMHG | HEART RATE: 94 BPM | RESPIRATION RATE: 18 BRPM | OXYGEN SATURATION: 99 % | WEIGHT: 223.11 LBS | HEIGHT: 67.99 IN | DIASTOLIC BLOOD PRESSURE: 96 MMHG

## 2023-11-29 DIAGNOSIS — M17.12 UNILATERAL PRIMARY OSTEOARTHRITIS, LEFT KNEE: ICD-10-CM

## 2023-11-29 DIAGNOSIS — E78.5 HYPERLIPIDEMIA, UNSPECIFIED: ICD-10-CM

## 2023-11-29 DIAGNOSIS — N40.0 BENIGN PROSTATIC HYPERPLASIA WITHOUT LOWER URINARY TRACT SYMPTOMS: ICD-10-CM

## 2023-11-29 DIAGNOSIS — D69.6 THROMBOCYTOPENIA, UNSPECIFIED: ICD-10-CM

## 2023-11-29 DIAGNOSIS — I48.20 CHRONIC ATRIAL FIBRILLATION, UNSPECIFIED: ICD-10-CM

## 2023-11-29 DIAGNOSIS — Z90.49 ACQUIRED ABSENCE OF OTHER SPECIFIED PARTS OF DIGESTIVE TRACT: Chronic | ICD-10-CM

## 2023-11-29 DIAGNOSIS — M19.90 UNSPECIFIED OSTEOARTHRITIS, UNSPECIFIED SITE: ICD-10-CM

## 2023-11-29 PROCEDURE — 73560 X-RAY EXAM OF KNEE 1 OR 2: CPT | Mod: 26,LT

## 2023-11-29 DEVICE — BASEPLATE TIB TRIATHLON TRITAN SZ 6: Type: IMPLANTABLE DEVICE | Status: FUNCTIONAL

## 2023-11-29 DEVICE — FEMORAL CRUCIATE RETAINING SZ 6 LT: Type: IMPLANTABLE DEVICE | Status: FUNCTIONAL

## 2023-11-29 DEVICE — MAKO BONE PIN 4MM X 140MM: Type: IMPLANTABLE DEVICE | Status: FUNCTIONAL

## 2023-11-29 DEVICE — INSERT TIB BEARING CS X3 SZ 6 9MM: Type: IMPLANTABLE DEVICE | Status: FUNCTIONAL

## 2023-11-29 DEVICE — IMP PATELLA SYMMETRIC 31X9MM: Type: IMPLANTABLE DEVICE | Status: FUNCTIONAL

## 2023-11-29 RX ORDER — FINASTERIDE 5 MG/1
1 TABLET, FILM COATED ORAL
Refills: 0 | DISCHARGE

## 2023-11-29 RX ORDER — TRAMADOL HYDROCHLORIDE 50 MG/1
25 TABLET ORAL EVERY 6 HOURS
Refills: 0 | Status: DISCONTINUED | OUTPATIENT
Start: 2023-11-29 | End: 2023-11-29

## 2023-11-29 RX ORDER — ONDANSETRON 8 MG/1
4 TABLET, FILM COATED ORAL ONCE
Refills: 0 | Status: DISCONTINUED | OUTPATIENT
Start: 2023-11-29 | End: 2023-11-30

## 2023-11-29 RX ORDER — CEFAZOLIN SODIUM 1 G
2000 VIAL (EA) INJECTION EVERY 8 HOURS
Refills: 0 | Status: COMPLETED | OUTPATIENT
Start: 2023-11-29 | End: 2023-11-30

## 2023-11-29 RX ORDER — PANTOPRAZOLE SODIUM 20 MG/1
40 TABLET, DELAYED RELEASE ORAL
Refills: 0 | Status: DISCONTINUED | OUTPATIENT
Start: 2023-11-29 | End: 2023-12-13

## 2023-11-29 RX ORDER — BISOPROLOL FUMARATE 10 MG/1
10 TABLET, FILM COATED ORAL DAILY
Refills: 0 | Status: DISCONTINUED | OUTPATIENT
Start: 2023-11-29 | End: 2023-11-29

## 2023-11-29 RX ORDER — ACETAMINOPHEN 500 MG
1000 TABLET ORAL ONCE
Refills: 0 | Status: COMPLETED | OUTPATIENT
Start: 2023-11-30 | End: 2023-11-30

## 2023-11-29 RX ORDER — SODIUM CHLORIDE 9 MG/ML
500 INJECTION INTRAMUSCULAR; INTRAVENOUS; SUBCUTANEOUS ONCE
Refills: 0 | Status: COMPLETED | OUTPATIENT
Start: 2023-11-29 | End: 2023-11-30

## 2023-11-29 RX ORDER — SODIUM CHLORIDE 9 MG/ML
500 INJECTION INTRAMUSCULAR; INTRAVENOUS; SUBCUTANEOUS ONCE
Refills: 0 | Status: COMPLETED | OUTPATIENT
Start: 2023-11-29 | End: 2023-11-29

## 2023-11-29 RX ORDER — ACETAMINOPHEN 500 MG
1000 TABLET ORAL ONCE
Refills: 0 | Status: COMPLETED | OUTPATIENT
Start: 2023-11-29 | End: 2023-11-29

## 2023-11-29 RX ORDER — TRAMADOL HYDROCHLORIDE 50 MG/1
0.5 TABLET ORAL
Qty: 0 | Refills: 0 | DISCHARGE
Start: 2023-11-29

## 2023-11-29 RX ORDER — ACETAMINOPHEN 500 MG
2 TABLET ORAL
Refills: 0 | DISCHARGE

## 2023-11-29 RX ORDER — ONDANSETRON 8 MG/1
4 TABLET, FILM COATED ORAL EVERY 6 HOURS
Refills: 0 | Status: DISCONTINUED | OUTPATIENT
Start: 2023-11-29 | End: 2023-12-13

## 2023-11-29 RX ORDER — METOPROLOL TARTRATE 50 MG
5 TABLET ORAL ONCE
Refills: 0 | Status: COMPLETED | OUTPATIENT
Start: 2023-11-29 | End: 2023-11-29

## 2023-11-29 RX ORDER — FINASTERIDE 5 MG/1
5 TABLET, FILM COATED ORAL DAILY
Refills: 0 | Status: DISCONTINUED | OUTPATIENT
Start: 2023-11-29 | End: 2023-11-29

## 2023-11-29 RX ORDER — DILTIAZEM HCL 120 MG
10 CAPSULE, EXT RELEASE 24 HR ORAL ONCE
Refills: 0 | Status: COMPLETED | OUTPATIENT
Start: 2023-11-29 | End: 2023-11-29

## 2023-11-29 RX ORDER — FINASTERIDE 5 MG/1
5 TABLET, FILM COATED ORAL DAILY
Refills: 0 | Status: DISCONTINUED | OUTPATIENT
Start: 2023-11-29 | End: 2023-12-13

## 2023-11-29 RX ORDER — ATORVASTATIN CALCIUM 80 MG/1
20 TABLET, FILM COATED ORAL AT BEDTIME
Refills: 0 | Status: DISCONTINUED | OUTPATIENT
Start: 2023-11-29 | End: 2023-12-13

## 2023-11-29 RX ORDER — BISOPROLOL FUMARATE 10 MG/1
10 TABLET, FILM COATED ORAL DAILY
Refills: 0 | Status: DISCONTINUED | OUTPATIENT
Start: 2023-11-29 | End: 2023-12-13

## 2023-11-29 RX ORDER — PANTOPRAZOLE SODIUM 20 MG/1
1 TABLET, DELAYED RELEASE ORAL
Qty: 0 | Refills: 0 | DISCHARGE
Start: 2023-11-29

## 2023-11-29 RX ORDER — APIXABAN 2.5 MG/1
2.5 TABLET, FILM COATED ORAL
Refills: 0 | Status: DISCONTINUED | OUTPATIENT
Start: 2023-11-30 | End: 2023-12-13

## 2023-11-29 RX ORDER — ATORVASTATIN CALCIUM 80 MG/1
20 TABLET, FILM COATED ORAL AT BEDTIME
Refills: 0 | Status: DISCONTINUED | OUTPATIENT
Start: 2023-11-29 | End: 2023-11-29

## 2023-11-29 RX ORDER — SENNA PLUS 8.6 MG/1
2 TABLET ORAL AT BEDTIME
Refills: 0 | Status: DISCONTINUED | OUTPATIENT
Start: 2023-11-29 | End: 2023-12-13

## 2023-11-29 RX ORDER — POLYETHYLENE GLYCOL 3350 17 G/17G
17 POWDER, FOR SOLUTION ORAL AT BEDTIME
Refills: 0 | Status: DISCONTINUED | OUTPATIENT
Start: 2023-11-29 | End: 2023-12-13

## 2023-11-29 RX ORDER — OXYCODONE HYDROCHLORIDE 5 MG/1
5 TABLET ORAL EVERY 4 HOURS
Refills: 0 | Status: DISCONTINUED | OUTPATIENT
Start: 2023-11-29 | End: 2023-11-29

## 2023-11-29 RX ORDER — ACETAMINOPHEN 500 MG
3 TABLET ORAL
Qty: 0 | Refills: 0 | DISCHARGE
Start: 2023-11-29

## 2023-11-29 RX ORDER — OXYCODONE HYDROCHLORIDE 5 MG/1
1 TABLET ORAL
Qty: 0 | Refills: 0 | DISCHARGE
Start: 2023-11-29

## 2023-11-29 RX ORDER — DEXAMETHASONE 0.5 MG/5ML
8 ELIXIR ORAL ONCE
Refills: 0 | Status: COMPLETED | OUTPATIENT
Start: 2023-11-30 | End: 2023-11-30

## 2023-11-29 RX ORDER — OXYCODONE HYDROCHLORIDE 5 MG/1
2.5 TABLET ORAL EVERY 4 HOURS
Refills: 0 | Status: DISCONTINUED | OUTPATIENT
Start: 2023-11-29 | End: 2023-11-29

## 2023-11-29 RX ORDER — FENTANYL CITRATE 50 UG/ML
25 INJECTION INTRAVENOUS
Refills: 0 | Status: DISCONTINUED | OUTPATIENT
Start: 2023-11-29 | End: 2023-11-30

## 2023-11-29 RX ORDER — ACETAMINOPHEN 500 MG
1000 TABLET ORAL ONCE
Refills: 0 | Status: DISCONTINUED | OUTPATIENT
Start: 2023-11-30 | End: 2023-12-13

## 2023-11-29 RX ORDER — APIXABAN 2.5 MG/1
1 TABLET, FILM COATED ORAL
Refills: 0 | DISCHARGE

## 2023-11-29 RX ORDER — ATORVASTATIN CALCIUM 80 MG/1
1 TABLET, FILM COATED ORAL
Qty: 0 | Refills: 0 | DISCHARGE

## 2023-11-29 RX ORDER — MAGNESIUM HYDROXIDE 400 MG/1
30 TABLET, CHEWABLE ORAL DAILY
Refills: 0 | Status: DISCONTINUED | OUTPATIENT
Start: 2023-11-29 | End: 2023-12-13

## 2023-11-29 RX ORDER — ACETAMINOPHEN 500 MG
975 TABLET ORAL EVERY 8 HOURS
Refills: 0 | Status: DISCONTINUED | OUTPATIENT
Start: 2023-11-30 | End: 2023-12-13

## 2023-11-29 RX ORDER — BISOPROLOL FUMARATE 10 MG/1
1 TABLET, FILM COATED ORAL
Refills: 0 | DISCHARGE

## 2023-11-29 RX ORDER — POLYETHYLENE GLYCOL 3350 17 G/17G
17 POWDER, FOR SOLUTION ORAL
Qty: 0 | Refills: 0 | DISCHARGE
Start: 2023-11-29

## 2023-11-29 RX ORDER — SENNA PLUS 8.6 MG/1
2 TABLET ORAL
Qty: 0 | Refills: 0 | DISCHARGE
Start: 2023-11-29

## 2023-11-29 RX ADMIN — Medication 400 MILLIGRAM(S): at 20:12

## 2023-11-29 RX ADMIN — Medication 1000 MILLIGRAM(S): at 20:42

## 2023-11-29 RX ADMIN — SODIUM CHLORIDE 500 MILLILITER(S): 9 INJECTION INTRAMUSCULAR; INTRAVENOUS; SUBCUTANEOUS at 15:17

## 2023-11-29 RX ADMIN — PANTOPRAZOLE SODIUM 40 MILLIGRAM(S): 20 TABLET, DELAYED RELEASE ORAL at 11:34

## 2023-11-29 RX ADMIN — ATORVASTATIN CALCIUM 20 MILLIGRAM(S): 80 TABLET, FILM COATED ORAL at 21:29

## 2023-11-29 RX ADMIN — BISOPROLOL FUMARATE 10 MILLIGRAM(S): 10 TABLET, FILM COATED ORAL at 17:06

## 2023-11-29 RX ADMIN — Medication 5 MILLIGRAM(S): at 15:05

## 2023-11-29 RX ADMIN — SENNA PLUS 2 TABLET(S): 8.6 TABLET ORAL at 21:29

## 2023-11-29 RX ADMIN — Medication 100 MILLIGRAM(S): at 20:11

## 2023-11-29 RX ADMIN — TRAMADOL HYDROCHLORIDE 50 MILLIGRAM(S): 50 TABLET ORAL at 11:33

## 2023-11-29 RX ADMIN — Medication 10 MILLIGRAM(S): at 16:30

## 2023-11-29 RX ADMIN — Medication 300 MILLIGRAM(S): at 10:26

## 2023-11-29 RX ADMIN — Medication 5 MILLIGRAM(S): at 15:25

## 2023-11-29 RX ADMIN — SODIUM CHLORIDE 500 MILLILITER(S): 9 INJECTION INTRAMUSCULAR; INTRAVENOUS; SUBCUTANEOUS at 18:57

## 2023-11-29 RX ADMIN — CHLORHEXIDINE GLUCONATE 1 APPLICATION(S): 213 SOLUTION TOPICAL at 10:26

## 2023-11-29 RX ADMIN — FINASTERIDE 5 MILLIGRAM(S): 5 TABLET, FILM COATED ORAL at 21:59

## 2023-11-29 NOTE — DISCHARGE NOTE PROVIDER - CARE PROVIDERS DIRECT ADDRESSES
[email or web address]

## 2023-11-29 NOTE — DISCHARGE NOTE PROVIDER - NSDCMRMEDTOKEN_GEN_ALL_CORE_FT
acetaminophen 325 mg oral tablet: 3 tab(s) orally every 8 hours  bisoprolol 10 mg oral tablet: 1 tab(s) orally once a day  Eliquis 2.5 mg oral tablet: 1 tab(s) orally 2 times a day  finasteride 5 mg oral tablet: 1 tab(s) orally once a day (at bedtime)  Lipitor 20 mg oral tablet: 1 tab(s) orally once a day  oxyCODONE 5 mg oral tablet: 1 tab(s) orally every 4 hours As needed Severe Pain (7 - 10)  pantoprazole 40 mg oral delayed release tablet: 1 tab(s) orally once a day (before a meal)  polyethylene glycol 3350 oral powder for reconstitution: 17 gram(s) orally once a day (at bedtime)  senna leaf extract oral tablet: 2 tab(s) orally once a day (at bedtime)  traMADol 50 mg oral tablet: 0.5 tab(s) orally every 6 hours As needed Mild Pain (1 - 3)   acetaminophen 325 mg oral tablet: 3 tab(s) orally every 8 hours x 1 week standing, then as needed for mild pain  bisoprolol 10 mg oral tablet: 1 tab(s) orally once a day  Eliquis 2.5 mg oral tablet: 1 tab(s) orally 2 times a day  finasteride 5 mg oral tablet: 1 tab(s) orally once a day (at bedtime)  Lipitor 20 mg oral tablet: 1 tab(s) orally once a day  Narcan 4 mg/0.1 mL nasal spray: 4 milligram(s) intranasally every 2 to 3 minutes alternating nostrils as needed for overdose  oxyCODONE 5 mg oral tablet: 0.5 tab(s) orally every 4 hours as needed for Severe Pain (7 - 10) MDD: 006  oxyCODONE 5 mg oral tablet: 1 tab(s) orally every 4 hours As needed Severe Pain (7 - 10)  pantoprazole 40 mg oral delayed release tablet: 1 tab(s) orally once a day (before a meal)  polyethylene glycol 3350 oral powder for reconstitution: 17 gram(s) orally once a day (at bedtime)  senna leaf extract oral tablet: 2 tab(s) orally once a day (at bedtime) as needed for  constipation  traMADol 50 mg oral tablet: 1 tab(s) orally every 6 hours as needed for  moderate pain MDD: 004  traMADol 50 mg oral tablet: 0.5 tab(s) orally every 6 hours As needed Mild Pain (1 - 3)

## 2023-11-29 NOTE — DISCHARGE NOTE PROVIDER - NSDCHHNEEDSERVICEOTHER_GEN_ALL_CORE_FT
Bandage Instructions:  Prevena wound vac placed to incision. Prevena wound vac to be removed on POD #6(12/5/23) by home care nursing, and replaced with aquacel dressing (supplied to the patient at discharge).   Aquacel dressing to remain intact, and will be removed by Dr. Arambula or his team at the 1st post operative appointment.

## 2023-11-29 NOTE — CONSULT NOTE ADULT - SUBJECTIVE AND OBJECTIVE BOX
[facility] Division of Hospital Medicine  Yoan Holloway DO  Reachable on [employer] Teams    Patient is a 83y old  Male who presents with a chief complaint of Left knee pain   Goal able to ambulate without pain for 1-2 blocks (09 Nov 2023 08:53)    HPI: 83 year old male with PMH severe AS 2/2 bicuspid aortic valve s/p bioprosthetic aortic valve replacement, chronic atrial fibrillation, HTN, HLD, BPH and chronic thrombocytopenia who presented for L TKA. Medicine consulted for comanagement.    REVIEW OF SYSTEMS:    CONSTITUTIONAL: No weakness, fevers or chills  EYES/ENT: No visual changes;  No vertigo or throat pain   NECK: No pain or stiffness  RESPIRATORY: No cough, wheezing, hemoptysis; No shortness of breath  CARDIOVASCULAR: No chest pain or palpitations  GASTROINTESTINAL: No abdominal or epigastric pain. No nausea, vomiting, or hematemesis; No diarrhea or constipation. No melena or hematochezia.  GENITOURINARY: No dysuria, frequency or hematuria  NEUROLOGICAL: No numbness or weakness  SKIN: No itching, burning, rashes, or lesions  MSK: No joint pain, no back pain  HEME: No easy bleeding, no easy bruising  All other review of systems is negative unless indicated above.    Allergies: Denies    Home Medications: Finasteride 5mg daily, Bisoprolol 10mg daily, Lipitor 20mg daily, Eliquis 2.5mg BID    Surgical History: TURP, cholecystectomy, aortic valve repair, surgery for benign retroperitoneal mass    Social History: Denies recent drug or alcohol use, quit smoking 30 years ago, smoked 1PPD for 30 years    Family History Patient states noncontributaroy    MEDICATIONS  (STANDING):  acetaminophen   IVPB .. 1000 milliGRAM(s) IV Intermittent once  atorvastatin 20 milliGRAM(s) Oral at bedtime  bisoprolol   Tablet 10 milliGRAM(s) Oral daily  ceFAZolin   IVPB 2000 milliGRAM(s) IV Intermittent every 8 hours  finasteride 5 milliGRAM(s) Oral daily  metoprolol tartrate Injectable 5 milliGRAM(s) IV Push once  pantoprazole    Tablet 40 milliGRAM(s) Oral before breakfast  polyethylene glycol 3350 17 Gram(s) Oral at bedtime  senna 2 Tablet(s) Oral at bedtime  sodium chloride 0.9% Bolus 500 milliLiter(s) IV Bolus once  sodium chloride 0.9% Bolus 500 milliLiter(s) IV Bolus once    MEDICATIONS  (PRN):  magnesium hydroxide Suspension 30 milliLiter(s) Oral daily PRN Constipation  ondansetron Injectable 4 milliGRAM(s) IV Push every 6 hours PRN Nausea and/or Vomiting  oxyCODONE    IR 5 milliGRAM(s) Oral every 4 hours PRN Severe Pain (7 - 10)  oxyCODONE    IR 2.5 milliGRAM(s) Oral every 4 hours PRN Moderate Pain (4 - 6)  traMADol 25 milliGRAM(s) Oral every 6 hours PRN Mild Pain (1 - 3)      CAPILLARY BLOOD GLUCOSE      POCT Blood Glucose.: 89 mg/dL (29 Nov 2023 09:12)    I&O's Summary      PHYSICAL EXAM:  Vital Signs Last 24 Hrs  T(C): 36.4 (29 Nov 2023 11:45), Max: 36.4 (29 Nov 2023 09:20)  T(F): 97.5 (29 Nov 2023 09:20), Max: 97.5 (29 Nov 2023 09:20)  HR: 94 (29 Nov 2023 11:45) (94 - 94)  BP: 159/96 (29 Nov 2023 11:45) (159/96 - 159/96)  BP(mean): --  RR: 18 (29 Nov 2023 11:45) (18 - 18)  SpO2: 99% (29 Nov 2023 11:45) (99% - 99%)    Parameters below as of 29 Nov 2023 09:20  Patient On (Oxygen Delivery Method): room air    CONSTITUTIONAL: NAD, well-developed, well-groomed  RESPIRATORY: Normal respiratory effort; lungs are clear to auscultation bilaterally  CARDIOVASCULAR: Irregular rhythm., tachycardic, normal S1 and S2, no murmur/rub/gallop  ABDOMEN: Nontender to palpation, soft, nondistended  PSYCH: A+O to person, place, and time; affect appropriate

## 2023-11-29 NOTE — DISCHARGE NOTE PROVIDER - HOSPITAL COURSE
History of Present Illness:  82 y/o M with h/o Severe aortic stenosis secondary to a bicuspid aortic valve status post bioprosthetic aortic valve replacement 7/2012, A-fib on Eliquis, HTN, HLD, Former tobacco use, Chronic renal insufficiency, Thrombocytopenia, GERD and OA c/o left knee pain for "few years" s/p PT and injections (last 5/2023) with minimal help. Imaging studied done and found severe OA. Today he presents to PST for scheduled Left Total Knee Arthroplasty with Arnol Robot on 11/29/23. Denies any palpitations, SOB, N/V, fever or chills.     Goals of Care: "able to ambulate without pain for 1-2 blocks"    PAST MEDICAL HISTORY:  2019 novel coronavirus disease (COVID-19)   Aortic Stenosis   Atrial fibrillation   Enlarged Prostate   GERD (Gastroesophageal Reflux Disease)   H/O thrombocytopenia   HTN (hypertension)   Hypercholesterolemia   Primary osteoarthritis of both knees.     PAST SURGICAL HISTORY:  Aortic valve disease AVR 7/2012  Retroperitoneal mass s/p surgery- benign  S/P cardiac catheterization   S/P cholecystectomy   S/P TURP 2005.    Hospital Course:  After admission on 11/29/23 and receiving pre-operative parenteral prophylactic antibiotics, the patient underwent an uncomplicated Left total knee replacement with ARNOL robotic assist with Dr. Arambula Patient tolerated the procedure well and was transferred to the recovery room in stable condition, with a stable neuro / vascular exam of the operated extremity.    Patient was placed on Eliquis 2.5 mg BID (home dosing) for DVT ppx, and was placed on Protonix for GI protection.   Patient was made weight bearing as tolerated with the operative leg.     Bandage Instructions:  Prevena wound vac placed to incision. Prevena wound vac to be removed on POD #6(12/5/23) by home care nursing, and replaced with aquacel dressing (supplied to the patient at discharge).   Aquacel dressing to remain intact, and will be removed by Dr. Arambula or his team at the 1st post operative appointment.     Hospitalist team consulted for comanagement of comorbidities. Recommendations followed.     Patient evaluated by PT/OT and recommended for disposition for XXX    Discharge and Orthopedic Care instructions were delineated in the Discharge Plan and reviewed with the patient. All medications were delineated in the medication reconciliation tool and key points were reviewed with the patient. They were deemed stable from an Orthopedic & medical standpoint for discharge *** History of Present Illness:  84 y/o M with h/o Severe aortic stenosis secondary to a bicuspid aortic valve status post bioprosthetic aortic valve replacement 7/2012, A-fib on Eliquis, HTN, HLD, Former tobacco use, Chronic renal insufficiency, Thrombocytopenia, GERD and OA c/o left knee pain for "few years" s/p PT and injections (last 5/2023) with minimal help. Imaging studied done and found severe OA. Today he presents to PST for scheduled Left Total Knee Arthroplasty with Arnol Robot on 11/29/23. Denies any palpitations, SOB, N/V, fever or chills.     Goals of Care: "able to ambulate without pain for 1-2 blocks"    PAST MEDICAL HISTORY:  2019 novel coronavirus disease (COVID-19)   Aortic Stenosis   Atrial fibrillation   Enlarged Prostate   GERD (Gastroesophageal Reflux Disease)   H/O thrombocytopenia   HTN (hypertension)   Hypercholesterolemia   Primary osteoarthritis of both knees.     PAST SURGICAL HISTORY:  Aortic valve disease AVR 7/2012  Retroperitoneal mass s/p surgery- benign  S/P cardiac catheterization   S/P cholecystectomy   S/P TURP 2005.    Hospital Course:  After admission on 11/29/23 and receiving pre-operative parenteral prophylactic antibiotics, the patient underwent an uncomplicated Left total knee replacement with ARNOL robotic assist with Dr. Arambula Patient tolerated the procedure well and was transferred to the recovery room in stable condition, with a stable neuro / vascular exam of the operated extremity.    Patient was placed on Eliquis 2.5 mg BID (home dosing) for DVT ppx, and was placed on Protonix for GI protection.   Patient was made weight bearing as tolerated with the operative leg.     Bandage Instructions:  Prevena wound vac placed to incision. Prevena wound vac to be removed on POD #6(12/5/23) by home care nursing, and replaced with aquacel dressing (supplied to the patient at discharge).   Aquacel dressing to remain intact, and will be removed by Dr. Arambula or his team at the 1st post operative appointment.     Hospitalist team consulted for comanagement of comorbidities. Recommendations followed.     Patient evaluated by PT/OT and recommended for disposition for home with home pt    Discharge and Orthopedic Care instructions were delineated in the Discharge Plan and reviewed with the patient. All medications were delineated in the medication reconciliation tool and key points were reviewed with the patient. They were deemed stable from an Orthopedic & medical standpoint for discharge.

## 2023-11-29 NOTE — DISCHARGE NOTE PROVIDER - NSDCFUADDAPPT_GEN_ALL_CORE_FT
Please follow up with your primary care physician/cardiologist regarding your hospitalization within one month of your discharge.

## 2023-11-29 NOTE — PRE-OP CHECKLIST - RESPIRATORY RATE (BREATHS/MIN)
18 Complex Repair And W Plasty Text: The defect edges were debeveled with a #15 scalpel blade.  The primary defect was closed partially with a complex linear closure.  Given the location of the remaining defect, shape of the defect and the proximity to free margins a W plasty was deemed most appropriate for complete closure of the defect.  Using a sterile surgical marker, an appropriate advancement flap was drawn incorporating the defect and placing the expected incisions within the relaxed skin tension lines where possible.    The area thus outlined was incised deep to adipose tissue with a #15 scalpel blade.  The skin margins were undermined to an appropriate distance in all directions utilizing iris scissors.

## 2023-11-29 NOTE — DISCHARGE NOTE PROVIDER - NSDCFUSCHEDAPPT_GEN_ALL_CORE_FT
[facility] Physician [facility]  ORTHOSURG [address] [facility]  Scheduled Appointment: 12/15/2023    Ismael Ramirez  [facility]  INTMED [id number]  Scheduled Appointment: 01/10/2024

## 2023-11-29 NOTE — PHYSICAL THERAPY INITIAL EVALUATION ADULT - ADDITIONAL COMMENTS
Pt and pts daughter reporting that he lives in a pvt house +3-4 KENYA +unilateral handrail and no steps to negotiate once inside. Pt states that he does not own any DME and that he was independent with all ADLs and mobility prior to admission.

## 2023-11-29 NOTE — PATIENT PROFILE ADULT - DATE/TIME OF ACCEPTANCE
Called echo to inform that d/c is pending echo per Dr Contreras. Pt placed on top of list.   29-Nov-2023 09:30

## 2023-11-29 NOTE — PHYSICAL THERAPY INITIAL EVALUATION ADULT - PERTINENT HX OF CURRENT PROBLEM, REHAB EVAL
82 y/o M with h/o Severe aortic stenosis secondary to a bicuspid aortic valve status post bioprosthetic aortic valve replacement 7/2012, A-fib on Eliquis, HTN, HLD, Former tobacco use, Chronic renal insufficiency, Thrombocytopenia, GERD and OA c/o left knee pain for "few years" s/p PT and injections (last 5/2023) with minimal help. Imaging studied done and found severe OA. Today he presents to PST for scheduled Left Total Knee Arthroplasty with Arnol Robot on 11/29/23. Denies any palpitations, SOB, N/V, fever or chills.

## 2023-11-29 NOTE — DISCHARGE NOTE PROVIDER - NSDCFUADDINST_GEN_ALL_CORE_FT
Please follow up with Dr. Arambula at your pre-scheduled post-operative follow up appointment. (Call to confirm)  Continue to ambulate as tolerated to the operative leg.   Bandage Instructions:  Prevena wound vac placed to incision. Prevena wound vac to be removed on POD #6(12/5/23) by home care nursing, and replaced with aquacel dressing (supplied to the patient at discharge).   Aquacel dressing to remain intact, and will be removed by Dr. Arambula or his team at the 1st post operative appointment.     Pain medications:   Standing:         -Acetaminophen 500mg - 2 tabs every 8 hours  As needed:        -Tramadol 50mg - 1/2 tab every 6 hours - Take only if needed for MODERATE pain       -oxycodone 5mg - 1/2 to 1 tab every 4-6 hours - Take only if needed for SEVERE or BREAKTHROUGH pain    Other Medications: (Standing)  -Eliquis 2.5mg twice daily  - to prevent blood clots - continued on home medication  -Protonix 40mg - 1 tab every 24 hours - to prevent stomach irritation/ulcers  -Senna 8.6mg - 2 pills every 24 hours - stool softener  -colace 100mg - 1 pill every 8 hours - stool softener.     Recommended to follow up with your primary care provider within 1-2 months of hospital discharge to discuss your recent surgery and any change to your medications.  Please follow up with Dr. Arambula at your pre-scheduled post-operative follow up appointment. (Call to confirm)  Continue to ambulate as tolerated to the operative leg.   Bandage Instructions:  Prevena wound vac placed to incision. Prevena wound vac to be removed on POD #6(12/5/23) by home care nursing, and replaced with aquacel dressing (supplied to the patient at discharge).   Aquacel dressing to remain intact, and will be removed by Dr. Arambula or his team at the 1st post operative appointment.     Pain medications:   Standing:         -Acetaminophen 500mg - 2 tabs every 8 hours  As needed:        -Tramadol 50mg - 1 tab every 6 hours - Take only if needed for MODERATE pain       -oxycodone 5mg - 1/2 to 1 tab every 4-6 hours - Take only if needed for SEVERE or BREAKTHROUGH pain    Other Medications: (Standing)  -Eliquis 2.5mg twice daily  - to prevent blood clots - continued on home medication  -Protonix 40mg - 1 tab every 24 hours - to prevent stomach irritation/ulcers  -Senna 8.6mg - 2 pills every 24 hours - stool softener  -colace 100mg - 1 pill every 8 hours - stool softener.     Recommended to follow up with your primary care provider within 1-2 months of hospital discharge to discuss your recent surgery and any change to your medications.

## 2023-11-29 NOTE — PHYSICAL THERAPY INITIAL EVALUATION ADULT - NSPTDMEREC_GEN_A_CORE
Patient will require a rolling walker due to their diagnosis of decreased strength, endurance and balance to help complete MRADL's (mobility related aides for daily living)./rolling walker

## 2023-11-29 NOTE — DISCHARGE NOTE PROVIDER - NSDCCPCAREPLAN_GEN_ALL_CORE_FT
PRINCIPAL DISCHARGE DIAGNOSIS  Diagnosis: OA (osteoarthritis)  Assessment and Plan of Treatment:

## 2023-11-29 NOTE — PHYSICAL THERAPY INITIAL EVALUATION ADULT - PLANNED THERAPY INTERVENTIONS, PT EVAL
stair training: GOAL: Pt will negotiate up/down 4 steps with 1 handrail ascending independently in 2 weeks./balance training/bed mobility training/gait training/strengthening/transfer training

## 2023-11-29 NOTE — CONSULT NOTE ADULT - PROBLEM SELECTOR RECOMMENDATION 5
-Patient with platelets of 80 on preop labs, per documentation chronic problem, no active bleeding, continue to monitor and follow up with outpatient doctors

## 2023-11-29 NOTE — DISCHARGE NOTE PROVIDER - CARE PROVIDER_API CALL
Javan Arambula  Orthopaedic Surgery  [address], [address]  [place] [address]  Phone: [phone]  Fax: [phone]  Established Patient  Scheduled Appointment: 12/15/2023

## 2023-11-29 NOTE — PRE-ANESTHESIA EVALUATION ADULT - NSANTHPMHFT_GEN_ALL_CORE
84 y/o PMH severe aortic stenosis (2/2 bicuspid aortic valve, s/p bioprosthetic aortic valve replacement 2012), atrial fibrillation (on Eliquis), HTN, HLD, chronic renal insufficiency, thrombocytopenia, GERD, osteoarthritis to undergo left total knee arthroplasty with Arnol robot. 82 y/o M PMH severe aortic stenosis (2/2 bicuspid aortic valve, s/p bioprosthetic aortic valve replacement 2012), atrial fibrillation (on Eliquis), HTN, HLD, chronic renal insufficiency, thrombocytopenia, GERD, osteoarthritis to undergo left total knee arthroplasty with Ranol robot.

## 2023-11-29 NOTE — CONSULT NOTE ADULT - ASSESSMENT
83 year old male with PMH severe AS 2/2 bicuspid aortic valve s/p bioprosthetic aortic valve replacement, chronic atrial fibrillation, HTN, HLD, BPH and chronic thrombocytopenia who presented for L TKA.

## 2023-11-30 ENCOUNTER — TRANSCRIPTION ENCOUNTER (OUTPATIENT)
Age: 83
End: 2023-11-30

## 2023-11-30 VITALS
TEMPERATURE: 98 F | SYSTOLIC BLOOD PRESSURE: 126 MMHG | HEART RATE: 102 BPM | DIASTOLIC BLOOD PRESSURE: 78 MMHG | RESPIRATION RATE: 20 BRPM | OXYGEN SATURATION: 100 %

## 2023-11-30 LAB
ANION GAP SERPL CALC-SCNC: 11 MMOL/L — SIGNIFICANT CHANGE UP (ref 5–17)
ANION GAP SERPL CALC-SCNC: 11 MMOL/L — SIGNIFICANT CHANGE UP (ref 5–17)
BUN SERPL-MCNC: 23 MG/DL — SIGNIFICANT CHANGE UP (ref 7–23)
BUN SERPL-MCNC: 23 MG/DL — SIGNIFICANT CHANGE UP (ref 7–23)
CALCIUM SERPL-MCNC: 8.7 MG/DL — SIGNIFICANT CHANGE UP (ref 8.4–10.5)
CALCIUM SERPL-MCNC: 8.7 MG/DL — SIGNIFICANT CHANGE UP (ref 8.4–10.5)
CHLORIDE SERPL-SCNC: 102 MMOL/L — SIGNIFICANT CHANGE UP (ref 96–108)
CHLORIDE SERPL-SCNC: 102 MMOL/L — SIGNIFICANT CHANGE UP (ref 96–108)
CO2 SERPL-SCNC: 24 MMOL/L — SIGNIFICANT CHANGE UP (ref 22–31)
CO2 SERPL-SCNC: 24 MMOL/L — SIGNIFICANT CHANGE UP (ref 22–31)
CREAT SERPL-MCNC: 1.4 MG/DL — HIGH (ref 0.5–1.3)
CREAT SERPL-MCNC: 1.4 MG/DL — HIGH (ref 0.5–1.3)
EGFR: 50 ML/MIN/1.73M2 — LOW
EGFR: 50 ML/MIN/1.73M2 — LOW
GLUCOSE SERPL-MCNC: 155 MG/DL — HIGH (ref 70–99)
GLUCOSE SERPL-MCNC: 155 MG/DL — HIGH (ref 70–99)
HCT VFR BLD CALC: 42.1 % — SIGNIFICANT CHANGE UP (ref 39–50)
HCT VFR BLD CALC: 42.1 % — SIGNIFICANT CHANGE UP (ref 39–50)
HGB BLD-MCNC: 14.3 G/DL — SIGNIFICANT CHANGE UP (ref 13–17)
HGB BLD-MCNC: 14.3 G/DL — SIGNIFICANT CHANGE UP (ref 13–17)
MCHC RBC-ENTMCNC: 31.8 PG — SIGNIFICANT CHANGE UP (ref 27–34)
MCHC RBC-ENTMCNC: 31.8 PG — SIGNIFICANT CHANGE UP (ref 27–34)
MCHC RBC-ENTMCNC: 34 GM/DL — SIGNIFICANT CHANGE UP (ref 32–36)
MCHC RBC-ENTMCNC: 34 GM/DL — SIGNIFICANT CHANGE UP (ref 32–36)
MCV RBC AUTO: 93.8 FL — SIGNIFICANT CHANGE UP (ref 80–100)
MCV RBC AUTO: 93.8 FL — SIGNIFICANT CHANGE UP (ref 80–100)
NRBC # BLD: 0 /100 WBCS — SIGNIFICANT CHANGE UP (ref 0–0)
NRBC # BLD: 0 /100 WBCS — SIGNIFICANT CHANGE UP (ref 0–0)
PLATELET # BLD AUTO: 81 K/UL — LOW (ref 150–400)
PLATELET # BLD AUTO: 81 K/UL — LOW (ref 150–400)
POTASSIUM SERPL-MCNC: 5 MMOL/L — SIGNIFICANT CHANGE UP (ref 3.5–5.3)
POTASSIUM SERPL-MCNC: 5 MMOL/L — SIGNIFICANT CHANGE UP (ref 3.5–5.3)
POTASSIUM SERPL-SCNC: 5 MMOL/L — SIGNIFICANT CHANGE UP (ref 3.5–5.3)
POTASSIUM SERPL-SCNC: 5 MMOL/L — SIGNIFICANT CHANGE UP (ref 3.5–5.3)
RBC # BLD: 4.49 M/UL — SIGNIFICANT CHANGE UP (ref 4.2–5.8)
RBC # BLD: 4.49 M/UL — SIGNIFICANT CHANGE UP (ref 4.2–5.8)
RBC # FLD: 12.2 % — SIGNIFICANT CHANGE UP (ref 10.3–14.5)
RBC # FLD: 12.2 % — SIGNIFICANT CHANGE UP (ref 10.3–14.5)
SODIUM SERPL-SCNC: 137 MMOL/L — SIGNIFICANT CHANGE UP (ref 135–145)
SODIUM SERPL-SCNC: 137 MMOL/L — SIGNIFICANT CHANGE UP (ref 135–145)
WBC # BLD: 12.84 K/UL — HIGH (ref 3.8–10.5)
WBC # BLD: 12.84 K/UL — HIGH (ref 3.8–10.5)
WBC # FLD AUTO: 12.84 K/UL — HIGH (ref 3.8–10.5)
WBC # FLD AUTO: 12.84 K/UL — HIGH (ref 3.8–10.5)

## 2023-11-30 PROCEDURE — C9399: CPT

## 2023-11-30 PROCEDURE — C1776: CPT

## 2023-11-30 PROCEDURE — 97110 THERAPEUTIC EXERCISES: CPT

## 2023-11-30 PROCEDURE — 73560 X-RAY EXAM OF KNEE 1 OR 2: CPT

## 2023-11-30 PROCEDURE — 93005 ELECTROCARDIOGRAM TRACING: CPT

## 2023-11-30 PROCEDURE — S2900: CPT

## 2023-11-30 PROCEDURE — C1713: CPT

## 2023-11-30 PROCEDURE — 97165 OT EVAL LOW COMPLEX 30 MIN: CPT

## 2023-11-30 PROCEDURE — 27447 TOTAL KNEE ARTHROPLASTY: CPT | Mod: LT

## 2023-11-30 PROCEDURE — 97116 GAIT TRAINING THERAPY: CPT

## 2023-11-30 PROCEDURE — 82962 GLUCOSE BLOOD TEST: CPT

## 2023-11-30 PROCEDURE — 97161 PT EVAL LOW COMPLEX 20 MIN: CPT

## 2023-11-30 PROCEDURE — 0055T BONE SRGRY CMPTR CT/MRI IMAG: CPT | Mod: LT

## 2023-11-30 RX ORDER — TRAMADOL HYDROCHLORIDE 50 MG/1
1 TABLET ORAL
Qty: 28 | Refills: 0
Start: 2023-11-30 | End: 2023-12-06

## 2023-11-30 RX ORDER — ACETAMINOPHEN 500 MG
3 TABLET ORAL
Qty: 126 | Refills: 0
Start: 2023-11-30 | End: 2023-12-13

## 2023-11-30 RX ORDER — NALOXONE HYDROCHLORIDE 4 MG/.1ML
4 SPRAY NASAL
Qty: 1 | Refills: 0
Start: 2023-11-30

## 2023-11-30 RX ORDER — PANTOPRAZOLE SODIUM 20 MG/1
1 TABLET, DELAYED RELEASE ORAL
Qty: 30 | Refills: 0
Start: 2023-11-30 | End: 2023-12-29

## 2023-11-30 RX ORDER — SENNA PLUS 8.6 MG/1
2 TABLET ORAL
Qty: 14 | Refills: 0
Start: 2023-11-30 | End: 2023-12-06

## 2023-11-30 RX ORDER — OXYCODONE HYDROCHLORIDE 5 MG/1
0.5 TABLET ORAL
Qty: 21 | Refills: 0
Start: 2023-11-30 | End: 2023-12-06

## 2023-11-30 RX ADMIN — Medication 100 MILLIGRAM(S): at 03:01

## 2023-11-30 RX ADMIN — APIXABAN 2.5 MILLIGRAM(S): 2.5 TABLET, FILM COATED ORAL at 06:00

## 2023-11-30 RX ADMIN — Medication 8 MILLIGRAM(S): at 06:00

## 2023-11-30 RX ADMIN — SODIUM CHLORIDE 500 MILLILITER(S): 9 INJECTION INTRAMUSCULAR; INTRAVENOUS; SUBCUTANEOUS at 06:00

## 2023-11-30 RX ADMIN — BISOPROLOL FUMARATE 10 MILLIGRAM(S): 10 TABLET, FILM COATED ORAL at 08:29

## 2023-11-30 RX ADMIN — PANTOPRAZOLE SODIUM 40 MILLIGRAM(S): 20 TABLET, DELAYED RELEASE ORAL at 06:02

## 2023-11-30 RX ADMIN — Medication 400 MILLIGRAM(S): at 03:30

## 2023-11-30 RX ADMIN — Medication 1000 MILLIGRAM(S): at 03:30

## 2023-11-30 NOTE — PROGRESS NOTE ADULT - ASSESSMENT
ASSESSMENT & PLAN  83yMale s/p L TKA on 11/29.  -WBAT LLE  -prevena  -pain control  -ice/cold compress  -incentive spirometry  -DVT ppx: eliquis 2.5 BID  -PT/OT  -f/u AM labs  -dispo planning: home    For all questions related to patient care, please reach out via the on-call pager.*     Che Driver PGY1  Orthopedic Surgery  [facility]: [id number]  [facility]: [id number]  [facility]: [id number] 
83 year old male with PMH severe AS 2/2 bicuspid aortic valve s/p bioprosthetic aortic valve replacement, chronic atrial fibrillation, HTN, HLD, BPH and chronic thrombocytopenia who presented for L TKA.

## 2023-11-30 NOTE — PROGRESS NOTE ADULT - SUBJECTIVE AND OBJECTIVE BOX
SUBJECTIVE  No acute events overnight. Pain controlled. Denies fevers/chills, chest pain, or dyspnea. Intermittently tachycardic to 110s overnight.    OBJECTIVE  Vital Signs Last 24 Hrs  T(C): 36.4 (30 Nov 2023 00:00), Max: 36.4 (29 Nov 2023 09:20)  T(F): 97.5 (30 Nov 2023 00:00), Max: 97.5 (29 Nov 2023 09:20)  HR: 103 (30 Nov 2023 06:00) (72 - 127)  BP: 115/53 (30 Nov 2023 06:00) (93/62 - 159/96)  BP(mean): 76 (30 Nov 2023 06:00) (71 - 104)  RR: 16 (30 Nov 2023 06:00) (14 - 18)  SpO2: 95% (30 Nov 2023 06:00) (94% - 100%)    Parameters below as of 30 Nov 2023 06:00  Patient On (Oxygen Delivery Method): room air        PHYSICAL EXAM  Gen: Lying in bed, NAD  Resp: No increased WOB  LLE:  Prevena in place to suction  Dressing c/d/i, compartments soft, no calf TTP b/l  Motor: TA/EHL/GS/FHL intact  Sensory: DP/SP/Tib/Sonido/Saph SILT  +DP pulse, WWP    LABS                        14.3   12.84 )-----------( 81       ( 30 Nov 2023 06:15 )             42.1               
[facility] Division of Hospital Medicine  Yoan Holloway DO  Reachable on [employer] Teams    Patient is a 83y old  Male who presents with a chief complaint of L TKA (30 Nov 2023 06:47)    SUBJECTIVE / OVERNIGHT EVENTS: Patient s/p L TKA, no acute events overnight. Patient seen and examined at bedside this morning, feels well, doesn't have any knee pain at this time.    REVIEW OF SYSTEMS:    CONSTITUTIONAL: No weakness, fevers or chills  EYES/ENT: No visual changes;  No vertigo or throat pain   NECK: No pain or stiffness  RESPIRATORY: No cough, wheezing, hemoptysis; No shortness of breath  CARDIOVASCULAR: No chest pain or palpitations  GASTROINTESTINAL: No abdominal or epigastric pain. No nausea, vomiting, or hematemesis; No diarrhea or constipation. No melena or hematochezia.  GENITOURINARY: No dysuria, frequency or hematuria  NEUROLOGICAL: No numbness or weakness  SKIN: No itching, burning, rashes, or lesions  MSK: No joint pain, no back pain  HEME: No easy bleeding, no easy bruising  All other review of systems is negative unless indicated above.    MEDICATIONS  (STANDING):  acetaminophen     Tablet .. 975 milliGRAM(s) Oral every 8 hours  acetaminophen   IVPB .. 1000 milliGRAM(s) IV Intermittent once  apixaban 2.5 milliGRAM(s) Oral two times a day  atorvastatin 20 milliGRAM(s) Oral at bedtime  bisoprolol   Tablet 10 milliGRAM(s) Oral daily  finasteride 5 milliGRAM(s) Oral daily  pantoprazole    Tablet 40 milliGRAM(s) Oral before breakfast  polyethylene glycol 3350 17 Gram(s) Oral at bedtime  senna 2 Tablet(s) Oral at bedtime    MEDICATIONS  (PRN):  fentaNYL    Injectable 25 MICROGram(s) IV Push every 5 minutes PRN Moderate Pain (4 - 6)  magnesium hydroxide Suspension 30 milliLiter(s) Oral daily PRN Constipation  ondansetron Injectable 4 milliGRAM(s) IV Push once PRN Nausea and/or Vomiting  ondansetron Injectable 4 milliGRAM(s) IV Push every 6 hours PRN Nausea and/or Vomiting  oxyCODONE    IR 5 milliGRAM(s) Oral every 4 hours PRN Severe Pain (7 - 10)  oxyCODONE    IR 2.5 milliGRAM(s) Oral every 4 hours PRN Moderate Pain (4 - 6)  traMADol 25 milliGRAM(s) Oral every 6 hours PRN Mild Pain (1 - 3)      CAPILLARY BLOOD GLUCOSE        I&O's Summary    29 Nov 2023 07:01  -  30 Nov 2023 07:00  --------------------------------------------------------  IN: 2440 mL / OUT: 675 mL / NET: 1765 mL    30 Nov 2023 07:01  -  30 Nov 2023 10:52  --------------------------------------------------------  IN: 440 mL / OUT: 0 mL / NET: 440 mL        PHYSICAL EXAM:  Vital Signs Last 24 Hrs  T(C): 36.6 (30 Nov 2023 08:00), Max: 36.6 (30 Nov 2023 08:00)  T(F): 97.9 (30 Nov 2023 08:00), Max: 97.9 (30 Nov 2023 08:00)  HR: 110 (30 Nov 2023 10:00) (72 - 127)  BP: 124/73 (30 Nov 2023 09:17) (93/62 - 159/96)  BP(mean): 95 (30 Nov 2023 09:00) (71 - 104)  RR: 18 (30 Nov 2023 10:00) (14 - 18)  SpO2: 100% (30 Nov 2023 10:00) (88% - 100%)    Parameters below as of 30 Nov 2023 10:00  Patient On (Oxygen Delivery Method): room air    CONSTITUTIONAL: NAD, well-developed, well-groomed  RESPIRATORY: Normal respiratory effort; lungs are clear to auscultation bilaterally  CARDIOVASCULAR: Regular rate but irregular rhythm, normal S1 and S2, no murmur/rub/gallop  ABDOMEN: Nontender to palpation, soft, nondsitended  MUSCULOSKELETAL: L knee with wound vac in place  PSYCH: A+O to person, place, and time; affect appropriate    LABS:                        14.3   12.84 )-----------( 81       ( 30 Nov 2023 06:15 )             42.1     11-30    137  |  102  |  23  ----------------------------<  155<H>  5.0   |  24  |  1.40<H>    Ca    8.7      30 Nov 2023 06:15            Urinalysis Basic - ( 30 Nov 2023 06:15 )    Color: x / Appearance: x / SG: x / pH: x  Gluc: 155 mg/dL / Ketone: x  / Bili: x / Urobili: x   Blood: x / Protein: x / Nitrite: x   Leuk Esterase: x / RBC: x / WBC x   Sq Epi: x / Non Sq Epi: x / Bacteria: x

## 2023-11-30 NOTE — CHART NOTE - NSCHARTNOTEFT_GEN_A_CORE
Post-Operative Check    Pt evaluated in RR resting without complaints. Pt states pain is well tolerated.  Pt was tachycardic (110-127bpm) prior to encounter, was given Lopressor IVP x2, Cardizem IVP, and home med Bisoprolol and HR improved to 70s.  Pt denies Chest Pain, SOB, N/V.  Pt primary language is Syriac, family at bedside insisted on translating during entire encounter.     Vitals:  T(C): 36 (11-29-23 @ 14:50), Max: 36.4 (11-29-23 @ 09:20)  HR: 72 (11-29-23 @ 17:00) (72 - 127)  BP: 115/72 (11-29-23 @ 17:00) (110/68 - 159/96)  RR: 16 (11-29-23 @ 17:00) (16 - 18)  SpO2: 97% (11-29-23 @ 17:00) (94% - 99%)    Exam:  Alert and Oriented, No Acute Distress. VSS.   Laterality: LLE     Prevena vac is clean, dry, intact.      (+) PF/DF/EHL/FHL 5/5     Sensation intact to light touch      2+ DP pulse  Calves soft, non-tender bilaterally    Xray: < from: Xray Knee 1 or 2 Views, Left (11.29.23 @ 14:21) >  IMPRESSION:     Patient is status post noncemented total left knee arthroplasty.      Hardware is intact.      Alignment at the knee is anatomic.      There are postsurgical changes involving the surrounding soft tissues.      An overlying wound VAC is present.      Vascular calcification is present.    A/P: 83y Male s/p Left Total Knee Arthroplasty. VSS. NAD  -PT/OT: WBAT/OOB  -IS bedside  -Ice/elevation   -DVT PPx: Eliquis 2.5mg BID, SCD, Early OOB and Amb  -GI PPx: Protonix 40mg   -Pain Control  -Continue post-op abx x 24hrs  -f/u AM labs  -Appreciate medicine co-management  -Dispo planning: PACU to Floor, pending PT/OT eval.     John Baptiste PA-C  Orthopedic Surgery Team  Team Pager #[phone]
Visited patient at bedside. Discussed discharge instructions with patient and family.   Patient provided an aquacel dressing to go home with for prevena dressing change on 12/5/23 with home care.   Awaiting medical clearance for discharge.   All questions answered and patient and family expressed understanding of orthopedic instructions.     Carito Mobley PA-C  Orthopedic Surgery  Team Pager: [phone]

## 2023-11-30 NOTE — DISCHARGE NOTE NURSING/CASE MANAGEMENT/SOCIAL WORK - NSDCPEFALRISK_GEN_ALL_CORE
For information on Fall & Injury Prevention, visit: [email or web address] OR  [email or web address] OR  https://www.cdc.gov/steadi/patient.html

## 2023-11-30 NOTE — DISCHARGE NOTE NURSING/CASE MANAGEMENT/SOCIAL WORK - PATIENT PORTAL LINK FT
You can access the FollowMyHealth Patient Portal offered by [facility] by registering at the following website: [email or web address]. By joining [employer]’s FollowMyHealth portal, you will also be able to view your health information using other applications (apps) compatible with our system.

## 2023-12-01 ENCOUNTER — EMERGENCY (EMERGENCY)
Facility: HOSPITAL | Age: 83
LOS: 1 days | Discharge: HOME | End: 2023-12-01
Attending: STUDENT IN AN ORGANIZED HEALTH CARE EDUCATION/TRAINING PROGRAM
Payer: MEDICARE

## 2023-12-01 VITALS — WEIGHT: 220.02 LBS | HEIGHT: 68 IN

## 2023-12-01 VITALS
OXYGEN SATURATION: 96 % | SYSTOLIC BLOOD PRESSURE: 100 MMHG | RESPIRATION RATE: 18 BRPM | TEMPERATURE: 98 F | DIASTOLIC BLOOD PRESSURE: 65 MMHG | HEART RATE: 100 BPM

## 2023-12-01 DIAGNOSIS — Z90.49 ACQUIRED ABSENCE OF OTHER SPECIFIED PARTS OF DIGESTIVE TRACT: Chronic | ICD-10-CM

## 2023-12-01 LAB
ALBUMIN SERPL ELPH-MCNC: 3.8 G/DL — SIGNIFICANT CHANGE UP (ref 3.3–5)
ALBUMIN SERPL ELPH-MCNC: 3.8 G/DL — SIGNIFICANT CHANGE UP (ref 3.3–5)
ALP SERPL-CCNC: 46 U/L — SIGNIFICANT CHANGE UP (ref 40–120)
ALP SERPL-CCNC: 46 U/L — SIGNIFICANT CHANGE UP (ref 40–120)
ALT FLD-CCNC: 19 U/L — SIGNIFICANT CHANGE UP (ref 10–45)
ALT FLD-CCNC: 19 U/L — SIGNIFICANT CHANGE UP (ref 10–45)
ANION GAP SERPL CALC-SCNC: 8 MMOL/L — SIGNIFICANT CHANGE UP (ref 5–17)
ANION GAP SERPL CALC-SCNC: 8 MMOL/L — SIGNIFICANT CHANGE UP (ref 5–17)
APTT BLD: 29.2 SEC — SIGNIFICANT CHANGE UP (ref 24.5–35.6)
APTT BLD: 29.2 SEC — SIGNIFICANT CHANGE UP (ref 24.5–35.6)
AST SERPL-CCNC: 35 U/L — SIGNIFICANT CHANGE UP (ref 10–40)
AST SERPL-CCNC: 35 U/L — SIGNIFICANT CHANGE UP (ref 10–40)
BILIRUB SERPL-MCNC: 0.7 MG/DL — SIGNIFICANT CHANGE UP (ref 0.2–1.2)
BILIRUB SERPL-MCNC: 0.7 MG/DL — SIGNIFICANT CHANGE UP (ref 0.2–1.2)
BUN SERPL-MCNC: 23 MG/DL — SIGNIFICANT CHANGE UP (ref 7–23)
BUN SERPL-MCNC: 23 MG/DL — SIGNIFICANT CHANGE UP (ref 7–23)
CALCIUM SERPL-MCNC: 8.7 MG/DL — SIGNIFICANT CHANGE UP (ref 8.4–10.5)
CALCIUM SERPL-MCNC: 8.7 MG/DL — SIGNIFICANT CHANGE UP (ref 8.4–10.5)
CHLORIDE SERPL-SCNC: 103 MMOL/L — SIGNIFICANT CHANGE UP (ref 96–108)
CHLORIDE SERPL-SCNC: 103 MMOL/L — SIGNIFICANT CHANGE UP (ref 96–108)
CO2 SERPL-SCNC: 26 MMOL/L — SIGNIFICANT CHANGE UP (ref 22–31)
CO2 SERPL-SCNC: 26 MMOL/L — SIGNIFICANT CHANGE UP (ref 22–31)
CREAT SERPL-MCNC: 1.39 MG/DL — HIGH (ref 0.5–1.3)
CREAT SERPL-MCNC: 1.39 MG/DL — HIGH (ref 0.5–1.3)
EGFR: 50 ML/MIN/1.73M2 — LOW
EGFR: 50 ML/MIN/1.73M2 — LOW
GLUCOSE SERPL-MCNC: 92 MG/DL — SIGNIFICANT CHANGE UP (ref 70–99)
GLUCOSE SERPL-MCNC: 92 MG/DL — SIGNIFICANT CHANGE UP (ref 70–99)
HCT VFR BLD CALC: 38.6 % — LOW (ref 39–50)
HCT VFR BLD CALC: 38.6 % — LOW (ref 39–50)
HGB BLD-MCNC: 12.5 G/DL — LOW (ref 13–17)
HGB BLD-MCNC: 12.5 G/DL — LOW (ref 13–17)
INR BLD: 1.31 RATIO — HIGH (ref 0.85–1.18)
INR BLD: 1.31 RATIO — HIGH (ref 0.85–1.18)
MCHC RBC-ENTMCNC: 31.2 PG — SIGNIFICANT CHANGE UP (ref 27–34)
MCHC RBC-ENTMCNC: 31.2 PG — SIGNIFICANT CHANGE UP (ref 27–34)
MCHC RBC-ENTMCNC: 32.4 GM/DL — SIGNIFICANT CHANGE UP (ref 32–36)
MCHC RBC-ENTMCNC: 32.4 GM/DL — SIGNIFICANT CHANGE UP (ref 32–36)
MCV RBC AUTO: 96.3 FL — SIGNIFICANT CHANGE UP (ref 80–100)
MCV RBC AUTO: 96.3 FL — SIGNIFICANT CHANGE UP (ref 80–100)
NRBC # BLD: 0 /100 WBCS — SIGNIFICANT CHANGE UP (ref 0–0)
NRBC # BLD: 0 /100 WBCS — SIGNIFICANT CHANGE UP (ref 0–0)
PLATELET # BLD AUTO: 62 K/UL — LOW (ref 150–400)
PLATELET # BLD AUTO: 62 K/UL — LOW (ref 150–400)
POTASSIUM SERPL-MCNC: 4.4 MMOL/L — SIGNIFICANT CHANGE UP (ref 3.5–5.3)
POTASSIUM SERPL-MCNC: 4.4 MMOL/L — SIGNIFICANT CHANGE UP (ref 3.5–5.3)
POTASSIUM SERPL-SCNC: 4.4 MMOL/L — SIGNIFICANT CHANGE UP (ref 3.5–5.3)
POTASSIUM SERPL-SCNC: 4.4 MMOL/L — SIGNIFICANT CHANGE UP (ref 3.5–5.3)
PROT SERPL-MCNC: 6.1 G/DL — SIGNIFICANT CHANGE UP (ref 6–8.3)
PROT SERPL-MCNC: 6.1 G/DL — SIGNIFICANT CHANGE UP (ref 6–8.3)
PROTHROM AB SERPL-ACNC: 14.3 SEC — HIGH (ref 9.5–13)
PROTHROM AB SERPL-ACNC: 14.3 SEC — HIGH (ref 9.5–13)
RBC # BLD: 4.01 M/UL — LOW (ref 4.2–5.8)
RBC # BLD: 4.01 M/UL — LOW (ref 4.2–5.8)
RBC # FLD: 12.7 % — SIGNIFICANT CHANGE UP (ref 10.3–14.5)
RBC # FLD: 12.7 % — SIGNIFICANT CHANGE UP (ref 10.3–14.5)
SODIUM SERPL-SCNC: 137 MMOL/L — SIGNIFICANT CHANGE UP (ref 135–145)
SODIUM SERPL-SCNC: 137 MMOL/L — SIGNIFICANT CHANGE UP (ref 135–145)
WBC # BLD: 18.21 K/UL — HIGH (ref 3.8–10.5)
WBC # BLD: 18.21 K/UL — HIGH (ref 3.8–10.5)
WBC # FLD AUTO: 18.21 K/UL — HIGH (ref 3.8–10.5)
WBC # FLD AUTO: 18.21 K/UL — HIGH (ref 3.8–10.5)

## 2023-12-01 PROCEDURE — 85610 PROTHROMBIN TIME: CPT

## 2023-12-01 PROCEDURE — 36415 COLL VENOUS BLD VENIPUNCTURE: CPT

## 2023-12-01 PROCEDURE — 85027 COMPLETE CBC AUTOMATED: CPT

## 2023-12-01 PROCEDURE — 99284 EMERGENCY DEPT VISIT MOD MDM: CPT

## 2023-12-01 PROCEDURE — 85730 THROMBOPLASTIN TIME PARTIAL: CPT

## 2023-12-01 PROCEDURE — 99285 EMERGENCY DEPT VISIT HI MDM: CPT

## 2023-12-01 PROCEDURE — 80053 COMPREHEN METABOLIC PANEL: CPT

## 2023-12-01 NOTE — ED ADULT NURSE NOTE - NSFALLHARMRISKINTERV_ED_ALL_ED

## 2023-12-01 NOTE — ED PROVIDER NOTE - CLINICAL SUMMARY MEDICAL DECISION MAKING FREE TEXT BOX
84-year-old male with past medical history of severe aortic stenosis secondary to bicuspid aortic valve status post bioprosthetic aortic valve replacement in 2012, A-fib on Eliquis, hypertension, hyperlipidemia, chronic renal insufficiency, thrombocytopenia, GERD, OA, presenting with concern for bleeding status post left total knee arthroplasty on Wednesday.  Will obtain vitals.  Will screen for anemia with CBC.  Ortho consulted to evaluate.

## 2023-12-01 NOTE — ED ADULT NURSE NOTE - OBJECTIVE STATEMENT
83 year old male BIBA from home fro bleeding at surgical site Pt has a wound vac Pt wife states there was small amount of blood at the site but this am around 11 it became saturated pt is heather on his blood thinners Eliquis Not sure how th wound vac works or if it is a vac .  Pt called his surgeon ans the office told him to come I to be seen Pt has no new pain ans is able to ambulate getting PT at home IVL placed and bloods sent as ordered Praveena

## 2023-12-01 NOTE — ED PROVIDER NOTE - PROGRESS NOTE DETAILS
MD Brina (PGY-2) ortho consulted. To see patient. MD Brina (PGY-2) patient assessed by Ortho.  Will replace wound VAC.  Patient to be discharged as per Ortho recs.

## 2023-12-01 NOTE — ED PROVIDER NOTE - PHYSICAL EXAMINATION
Const: not in acute distress  Eyes: no conjunctival injection  HEENT: Head NCAT, Moist MM. No conjunctival pallor  Neck: Trachea midline.   CVS: +S1/S2, Peripheral pulses 2+ and equal in all extremities.  RESP: Unlabored respiratory effort. Clear to auscultation bilaterally.  GI: Nontender/Nondistended, No CVA tenderness b/l.   MSK: Normocephalic/Atraumatic, No Lower Extremities edema b/l.   Skin: L knee with incisional wound vac in place, bloody output in tubing  Neuro: Motor & Sensation grossly intact.  Psych: Awake, Alert, & Cooperative

## 2023-12-01 NOTE — ED PROVIDER NOTE - NSFOLLOWUPINSTRUCTIONS_ED_ALL_ED_FT
You were seen in the Emergency Department for concern for bleeding from recent surgery by orthopedics.  You were evaluated the orthopedic team, who replaced her wound VAC.  Please follow-up with your doctor Danoff in 1 to 2 weeks.    1) Advance activity as tolerated.   2) Continue all previously prescribed medications as directed.    3) Follow-up with your doctor Danoff in 1 to 2 weeks.- take copies of your results.    4) Return to the Emergency Department for worsening or persistent symptoms, and/or ANY NEW OR CONCERNING SYMPTOMS.

## 2023-12-01 NOTE — ED PROVIDER NOTE - PATIENT PORTAL LINK FT
You can access the FollowMyHealth Patient Portal offered by [facility] by registering at the following website: [email or web address]. By joining [employer]’s FollowMyHealth portal, you will also be able to view your health information using other applications (apps) compatible with our system. You can access the FollowMyHealth Patient Portal offered by [facility] by registering at the following website: [email or web address]. By joining [employer]’s FollowMyHealth portal, you will also be able to view your health information using other applications (apps) compatible with our system. You can access the FollowMyHealth Patient Portal offered by [facility] by registering at the following website: [email or web address]. By joining [employer]’s FollowMyHealth portal, you will also be able to view your health information using other applications (apps) compatible with our system.

## 2023-12-01 NOTE — ED PROVIDER NOTE - OBJECTIVE STATEMENT
84-year-old male with past medical history of severe aortic stenosis secondary to bicuspid aortic valve status post bioprosthetic aortic valve replacement in 2012, A-fib on Eliquis, hypertension, hyperlipidemia, chronic renal insufficiency, thrombocytopenia, GERD, OA, presenting with concern for bleeding status post left total knee arthroplasty on Wednesday.  Patient states that he went home after the procedure.  Patient states that last night, patient noticed increased blood on the gauze on the wound side.  Patient has a wound VAC in place.  Denies fever, chills, chest pain, nausea vomiting, lightheadedness, dizziness, dyspnea.

## 2023-12-01 NOTE — CONSULT NOTE ADULT - SUBJECTIVE AND OBJECTIVE BOX
This is a 82y/o Male s/p L TKA on 11/30  Patient presents with saturation of prevena incisional vac dressing overlying left knee incision.  Pain is controlled. Pt feeling well. No nausea or vomiting. Patient has continued ambulation at home with walker.  Patient on Eliquis 2.5 BID for afib (and postop dvt ppx)    LABS:                        12.5   18.21 )-----------( 62       ( 01 Dec 2023 13:27 )             38.6     12-01    137  |  103  |  23  ----------------------------<  92  4.4   |  26  |  1.39<H>    Ca    8.7      01 Dec 2023 13:27    TPro  6.1  /  Alb  3.8  /  TBili  0.7  /  DBili  x   /  AST  35  /  ALT  19  /  AlkPhos  46  12-01    PT/INR - ( 01 Dec 2023 13:27 )   PT: 14.3 sec;   INR: 1.31 ratio         PTT - ( 01 Dec 2023 13:27 )  PTT:29.2 sec  Urinalysis Basic - ( 01 Dec 2023 13:27 )    Color: x / Appearance: x / SG: x / pH: x  Gluc: 92 mg/dL / Ketone: x  / Bili: x / Urobili: x   Blood: x / Protein: x / Nitrite: x   Leuk Esterase: x / RBC: x / WBC x   Sq Epi: x / Non Sq Epi: x / Bacteria: x        VITAL SIGNS:  T(C): 36.6 (12-01-23 @ 15:22), Max: 36.7 (12-01-23 @ 13:06)  HR: 100 (12-01-23 @ 15:22) (71 - 100)  BP: 100/65 (12-01-23 @ 15:22) (100/65 - 124/110)  RR: 18 (12-01-23 @ 15:22) (17 - 18)  SpO2: 96% (12-01-23 @ 15:22) (96% - 100%)      Exam:  NAD AAOx3.  Dressing clean, dry and intact.  SCDs in place.  Calves are soft and nontender.  Moving all toes and ankle, +EHL/FHL/TA/GS.  SILT throughout.  DP pulses +    A/P:  L Dressing saturation sp L TKA 11/30  -Prevena ivac dressing changed in sterile manner  -BJ dressing for compression placed form foot to proximal thigh  -Analgesia  -RLE  -Ice application  -DVT PE prophylaxis with eliquis 2.5bid home dose for afib  -Incentive spirometry  -OOB PT WBAT  -No acute orthopaedic surgical intervention indicated at this time. This patient is orthopaedically stable for discharge.   -Patient to follow up with Dr. Arambula as an outpatient for further evaluation and management.

## 2023-12-01 NOTE — ED PROVIDER NOTE - ATTENDING CONTRIBUTION TO CARE
History and physical as documented above.  No active bleeding, overall benign MSK exam. Will check labs, ortho consult, symptom management, disposition pending work-up and response to treatment.

## 2023-12-04 ENCOUNTER — NON-APPOINTMENT (OUTPATIENT)
Age: 83
End: 2023-12-04

## 2023-12-05 ENCOUNTER — NON-APPOINTMENT (OUTPATIENT)
Age: 83
End: 2023-12-05

## 2023-12-10 ENCOUNTER — NON-APPOINTMENT (OUTPATIENT)
Age: 83
End: 2023-12-10

## 2023-12-15 ENCOUNTER — APPOINTMENT (OUTPATIENT)
Dept: ORTHOPEDIC SURGERY | Facility: CLINIC | Age: 83
End: 2023-12-15
Payer: MEDICARE

## 2023-12-15 VITALS — WEIGHT: 228 LBS | HEIGHT: 67 IN | BODY MASS INDEX: 35.79 KG/M2

## 2023-12-15 PROCEDURE — 99024 POSTOP FOLLOW-UP VISIT: CPT

## 2023-12-15 PROCEDURE — 73564 X-RAY EXAM KNEE 4 OR MORE: CPT | Mod: 26,LT

## 2023-12-15 NOTE — HISTORY OF PRESENT ILLNESS
[de-identified] : Status-post left total knee  arthroplasty here for initial postoperative evaluation. Excellent progress is noted in terms of pain and restoration of function. Pain is well controlled with oral medications. There has been no change in medical health since discharge. The patient does require assistive devices.

## 2023-12-15 NOTE — PHYSICAL EXAM
[de-identified] : Well developed, well nourished in no apparent distress, awake, alert and orientated to person, place and time with appropriate mood and affect Respirations are even and unlabored. Gait evaluation does not reveal a limp. There is no inguinal adenopathy. The affected limb is well-perfused with palpable pedal pulse, without skin lesions, shows a grossly normal motor and sensory examination. Incision is CDI. Knee motion is 5-90 [de-identified] : AP, lateral, sunrise knee and tunnel  x-rays of the left knee were ordered and obtained in the office and demonstrate satisfactory position and alignment of the components are present. No signs of loosening are seen.

## 2024-01-10 ENCOUNTER — LABORATORY RESULT (OUTPATIENT)
Age: 84
End: 2024-01-10

## 2024-01-10 ENCOUNTER — APPOINTMENT (OUTPATIENT)
Dept: INTERNAL MEDICINE | Facility: CLINIC | Age: 84
End: 2024-01-10
Payer: MEDICARE

## 2024-01-10 ENCOUNTER — RX RENEWAL (OUTPATIENT)
Age: 84
End: 2024-01-10

## 2024-01-10 ENCOUNTER — NON-APPOINTMENT (OUTPATIENT)
Age: 84
End: 2024-01-10

## 2024-01-10 VITALS
HEIGHT: 67 IN | BODY MASS INDEX: 34.53 KG/M2 | TEMPERATURE: 97.3 F | HEART RATE: 137 BPM | OXYGEN SATURATION: 96 % | WEIGHT: 220 LBS

## 2024-01-10 VITALS
DIASTOLIC BLOOD PRESSURE: 75 MMHG | RESPIRATION RATE: 14 BRPM | WEIGHT: 226 LBS | SYSTOLIC BLOOD PRESSURE: 127 MMHG | HEART RATE: 104 BPM | BODY MASS INDEX: 35.4 KG/M2

## 2024-01-10 DIAGNOSIS — M79.89 OTHER SPECIFIED SOFT TISSUE DISORDERS: ICD-10-CM

## 2024-01-10 PROCEDURE — 36415 COLL VENOUS BLD VENIPUNCTURE: CPT

## 2024-01-10 PROCEDURE — 99214 OFFICE O/P EST MOD 30 MIN: CPT | Mod: 25

## 2024-01-10 RX ORDER — ERGOCALCIFEROL 1.25 MG/1
1.25 MG CAPSULE, LIQUID FILLED ORAL
Qty: 12 | Refills: 1 | Status: ACTIVE | COMMUNITY
Start: 2017-12-13 | End: 1900-01-01

## 2024-01-10 RX ORDER — DICLOFENAC SODIUM 1% 10 MG/G
1 GEL TOPICAL
Qty: 2 | Refills: 2 | Status: ACTIVE | COMMUNITY
Start: 2024-01-10 | End: 1900-01-01

## 2024-01-10 NOTE — PHYSICAL EXAM
[Normal Appearance] : normal in appearance [Kyphosis] : no kyphosis [Scoliosis] : no scoliosis [de-identified] : Irregularly irregular treatment [de-identified] : refused [de-identified] : I [de-identified] : Tender left calf but without redness

## 2024-01-10 NOTE — ASSESSMENT
[FreeTextEntry1] : gnosis #1atrial fibrillation.  Slightly fast.  Continue current treatment.  Also see the cardiologist as soon as possible.  If with any chest pain, shortness of breath diaphoresis to call or come back stat  #2 hypertension, stable.Same medication and strict low salt diet.cmp to lab  #3 hyperlipidemia. Continue with same medication and low-fat diet.lipids to lab  #4 monitor liver toxicity due to chronic medication use.cmp to lab  #5 hypovitaminosis D., being supplemented.level to lab  #6 GE reflux, stable. cbc to lab.  #7 history of low B12. level sent to the lab  #8 low back. To continue with Tylenol 500 mg p.o. every 6 hours p.r.n.   #9 History of thrombocytosis. CBC sent to lab  #10 Benign prostatic hypertrophy, stable. Patient followed up by his urologist  #11 history of hypovitaminosis D., being supplemented. Level sent to the lab  12. Chronic renal insufficiency, CMP and CBC to lab  13. History of psoriasis, stable. Continue same treatment.  Followed up by his dermatologist  14. History of nephrolithiasis, stable. Stable. Followed by the urologist  15. Diastolic cardiac dysfunction. Stable.  Followed by the cardiologist  16. Right knee osteoarthritis. Advised to take only Tylenol 500 mg p.o. every 6 hours and apply diclofenac sodium gel 3-4 times a day and the same for the left knee pain 17.  Left calf pain pain and slight swelling, rule out DVT. Have Doppler   18 insomnia.  Advised to take melatonin 6 or 10 mg p.o. every night  Plan. Patient advised to return after 3 months.  Totally  65 minutes spent

## 2024-01-10 NOTE — HISTORY OF PRESENT ILLNESS
[FreeTextEntry1] : Patient comes for  followup of his chronic medical problems. Patient feels well without any chest pain, shortness of breath, paroxysmal nocturnal dyspnea or orthopnea. . His gastrointestinal reflux symptoms remain stable.Many times he has his known  low back   pain mostly when he walks.  His left knee pain  improved after the arthroplasty but still he has some pain and stiffness. He has also pain in the left calf and slight swelling without redness.  Most recent blood tests, consults, medication list, allergies reviewed.

## 2024-01-11 ENCOUNTER — RX RENEWAL (OUTPATIENT)
Age: 84
End: 2024-01-11

## 2024-01-11 ENCOUNTER — NON-APPOINTMENT (OUTPATIENT)
Age: 84
End: 2024-01-11

## 2024-01-11 ENCOUNTER — APPOINTMENT (OUTPATIENT)
Dept: ORTHOPEDIC SURGERY | Facility: CLINIC | Age: 84
End: 2024-01-11
Payer: MEDICARE

## 2024-01-11 VITALS — WEIGHT: 220 LBS | HEIGHT: 67 IN | BODY MASS INDEX: 34.53 KG/M2

## 2024-01-11 LAB
25(OH)D3 SERPL-MCNC: 48.2 NG/ML
ALBUMIN SERPL ELPH-MCNC: 4 G/DL
ALP BLD-CCNC: 86 U/L
ALT SERPL-CCNC: 16 U/L
ANION GAP SERPL CALC-SCNC: 13 MMOL/L
APPEARANCE: CLEAR
AST SERPL-CCNC: 19 U/L
BACTERIA: NEGATIVE /HPF
BASOPHILS # BLD AUTO: 0.05 K/UL
BASOPHILS NFR BLD AUTO: 0.9 %
BILIRUB SERPL-MCNC: 0.7 MG/DL
BILIRUBIN URINE: NEGATIVE
BLOOD URINE: ABNORMAL
BUN SERPL-MCNC: 13 MG/DL
CALCIUM SERPL-MCNC: 9.1 MG/DL
CAST: 1 /LPF
CHLORIDE SERPL-SCNC: 103 MMOL/L
CHOLEST SERPL-MCNC: 93 MG/DL
CO2 SERPL-SCNC: 24 MMOL/L
COLOR: YELLOW
CREAT SERPL-MCNC: 1.16 MG/DL
CREAT SPEC-SCNC: 106 MG/DL
EGFR: 62 ML/MIN/1.73M2
EOSINOPHIL # BLD AUTO: 0.26 K/UL
EOSINOPHIL NFR BLD AUTO: 4.4 %
EPITHELIAL CELLS: 0 /HPF
ESTIMATED AVERAGE GLUCOSE: 126 MG/DL
GLUCOSE QUALITATIVE U: NEGATIVE MG/DL
GLUCOSE SERPL-MCNC: 94 MG/DL
HBA1C MFR BLD HPLC: 6 %
HCT VFR BLD CALC: 39.6 %
HDLC SERPL-MCNC: 36 MG/DL
HGB BLD-MCNC: 13.1 G/DL
KETONES URINE: NEGATIVE MG/DL
LDLC SERPL CALC-MCNC: 43 MG/DL
LEUKOCYTE ESTERASE URINE: NEGATIVE
LYMPHOCYTES # BLD AUTO: 0.97 K/UL
LYMPHOCYTES NFR BLD AUTO: 16.6 %
MAN DIFF?: NORMAL
MCHC RBC-ENTMCNC: 32 PG
MCHC RBC-ENTMCNC: 33.1 GM/DL
MCV RBC AUTO: 96.8 FL
MICROALBUMIN 24H UR DL<=1MG/L-MCNC: 2 MG/DL
MICROALBUMIN/CREAT 24H UR-RTO: 19 MG/G
MICROSCOPIC-UA: NORMAL
MONOCYTES # BLD AUTO: 1.39 K/UL
MONOCYTES NFR BLD AUTO: 23.7 %
NEUTROPHILS # BLD AUTO: 2.67 K/UL
NEUTROPHILS NFR BLD AUTO: 45.6 %
NITRITE URINE: NEGATIVE
NONHDLC SERPL-MCNC: 57 MG/DL
PH URINE: 6
PLATELET # BLD AUTO: 114 K/UL
POTASSIUM SERPL-SCNC: 3.9 MMOL/L
PROT SERPL-MCNC: 6.6 G/DL
PROTEIN URINE: NEGATIVE MG/DL
RBC # BLD: 4.09 M/UL
RBC # FLD: 12.9 %
RED BLOOD CELLS URINE: 3 /HPF
SODIUM SERPL-SCNC: 139 MMOL/L
SPECIFIC GRAVITY URINE: 1.01
TRIGL SERPL-MCNC: 57 MG/DL
UROBILINOGEN URINE: 0.2 MG/DL
VIT B12 SERPL-MCNC: 490 PG/ML
WBC # FLD AUTO: 5.86 K/UL
WHITE BLOOD CELLS URINE: 0 /HPF

## 2024-01-11 PROCEDURE — 99024 POSTOP FOLLOW-UP VISIT: CPT

## 2024-01-11 RX ORDER — TRAMADOL HYDROCHLORIDE 50 MG/1
50 TABLET, COATED ORAL
Qty: 28 | Refills: 0 | Status: ACTIVE | COMMUNITY
Start: 2024-01-11 | End: 1900-01-01

## 2024-01-11 NOTE — DISCUSSION/SUMMARY
[de-identified] : The patient is doing well after joint replacement surgery. WBAT. Follow up in 6 weeks for another exam. He has a doppler today and will let me know the results but this will be sent to his PCP.

## 2024-01-11 NOTE — PHYSICAL EXAM
[de-identified] : Well developed, well nourished in no apparent distress, awake, alert and orientated to person, place and time with appropriate mood and affect Respirations are even and unlabored. Gait evaluation does not reveal a limp. There is no inguinal adenopathy. The affected limb is well-perfused with palpable pedal pulse, without skin lesions, shows a grossly normal motor and sensory examination. Incision is CDI. Knee motion is 0-115 1+ pitting edema

## 2024-01-11 NOTE — HISTORY OF PRESENT ILLNESS
[de-identified] : Status-post left total knee  arthroplasty here for routine postoperative evaluation. Excellent progress is noted in terms of pain and restoration of function. Still complaining of pain but cannot take nsaids bc of kidneys. Has leeg swelling and was rec for doppler by his PCP and has an appointment for this today at 530pm today. The patient does require assistive devices.

## 2024-01-15 ENCOUNTER — NON-APPOINTMENT (OUTPATIENT)
Age: 84
End: 2024-01-15

## 2024-01-15 ASSESSMENT — KOOS JR
BENDING TO THE FLOOR TO PICK UP OBJECT: SEVERE
TWISING OR PIVOTING ON KNEE: MODERATE
HOW SEVERE IS YOUR KNEE STIFFNESS AFTER FIRST WAKING IN MORNING: SEVERE
STANDING UPRIGHT: MODERATE
GOING UP OR DOWN STAIRS: MODERATE
KOOS JR RAW SCORE: 16
RISING FROM SITTING: MODERATE
STRAIGHTENING KNEE FULLY: MODERATE

## 2024-01-19 ENCOUNTER — NON-APPOINTMENT (OUTPATIENT)
Age: 84
End: 2024-01-19

## 2024-01-19 ENCOUNTER — APPOINTMENT (OUTPATIENT)
Dept: CARDIOLOGY | Facility: CLINIC | Age: 84
End: 2024-01-19
Payer: MEDICARE

## 2024-01-19 VITALS
OXYGEN SATURATION: 95 % | BODY MASS INDEX: 35.31 KG/M2 | SYSTOLIC BLOOD PRESSURE: 100 MMHG | HEART RATE: 106 BPM | DIASTOLIC BLOOD PRESSURE: 69 MMHG | HEIGHT: 67 IN | WEIGHT: 225 LBS | RESPIRATION RATE: 16 BRPM

## 2024-01-19 VITALS — SYSTOLIC BLOOD PRESSURE: 108 MMHG | DIASTOLIC BLOOD PRESSURE: 64 MMHG

## 2024-01-19 PROCEDURE — 93000 ELECTROCARDIOGRAM COMPLETE: CPT

## 2024-01-19 PROCEDURE — 99214 OFFICE O/P EST MOD 30 MIN: CPT | Mod: 25

## 2024-01-25 ENCOUNTER — APPOINTMENT (OUTPATIENT)
Dept: CT IMAGING | Facility: CLINIC | Age: 84
End: 2024-01-25

## 2024-01-25 ENCOUNTER — APPOINTMENT (OUTPATIENT)
Dept: CARDIOLOGY | Facility: CLINIC | Age: 84
End: 2024-01-25
Payer: MEDICARE

## 2024-01-25 ENCOUNTER — NON-APPOINTMENT (OUTPATIENT)
Age: 84
End: 2024-01-25

## 2024-01-25 VITALS
HEIGHT: 67 IN | DIASTOLIC BLOOD PRESSURE: 87 MMHG | OXYGEN SATURATION: 96 % | SYSTOLIC BLOOD PRESSURE: 129 MMHG | RESPIRATION RATE: 16 BRPM | WEIGHT: 225 LBS | BODY MASS INDEX: 35.31 KG/M2 | HEART RATE: 133 BPM

## 2024-01-25 PROCEDURE — 93000 ELECTROCARDIOGRAM COMPLETE: CPT

## 2024-01-25 PROCEDURE — 99214 OFFICE O/P EST MOD 30 MIN: CPT | Mod: 25

## 2024-01-26 ENCOUNTER — NON-APPOINTMENT (OUTPATIENT)
Age: 84
End: 2024-01-26

## 2024-02-01 ENCOUNTER — APPOINTMENT (OUTPATIENT)
Dept: CARDIOLOGY | Facility: CLINIC | Age: 84
End: 2024-02-01
Payer: MEDICARE

## 2024-02-01 ENCOUNTER — NON-APPOINTMENT (OUTPATIENT)
Age: 84
End: 2024-02-01

## 2024-02-01 VITALS
OXYGEN SATURATION: 98 % | WEIGHT: 225 LBS | BODY MASS INDEX: 35.31 KG/M2 | SYSTOLIC BLOOD PRESSURE: 106 MMHG | HEART RATE: 86 BPM | HEIGHT: 67 IN | RESPIRATION RATE: 14 BRPM | DIASTOLIC BLOOD PRESSURE: 75 MMHG

## 2024-02-01 PROCEDURE — 99214 OFFICE O/P EST MOD 30 MIN: CPT | Mod: 25

## 2024-02-01 PROCEDURE — 93000 ELECTROCARDIOGRAM COMPLETE: CPT

## 2024-02-12 ENCOUNTER — APPOINTMENT (OUTPATIENT)
Dept: UROLOGY | Facility: CLINIC | Age: 84
End: 2024-02-12
Payer: MEDICARE

## 2024-02-12 DIAGNOSIS — N13.8 BENIGN PROSTATIC HYPERPLASIA WITH LOWER URINARY TRACT SYMPMS: ICD-10-CM

## 2024-02-12 DIAGNOSIS — R31.29 OTHER MICROSCOPIC HEMATURIA: ICD-10-CM

## 2024-02-12 DIAGNOSIS — N40.1 BENIGN PROSTATIC HYPERPLASIA WITH LOWER URINARY TRACT SYMPMS: ICD-10-CM

## 2024-02-12 PROCEDURE — 99442: CPT

## 2024-02-12 NOTE — ASSESSMENT
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 The patient took tamsulosin in the past, but stopped due to light headiness. His urination remained satisfactory after discontinuing the medication. The patient's last PSA 04/24/17 was 0.69. The patient was in the ER 01/01/17 for abdominal pain. No etiology for the pain was identified and has since resolved. CT of the abdomen and pelvis in the ER found bilateral renal cysts and a 5 mm non obstructing right renal calculus.  1/17/18: CT scan was reviewed and discussed  in office which found no hydronephrosis or recurrent tumors . There are 4 cysts on the  left kidney and 2 cysts on the right.  There is a Stone in the right kidney in the upper portion. He reported earlier he was experiencing some left flank pain. At night he wakes up once to urinate. He remained on finasteride 5 mg once daily. 5/29/18: The patient returned and reported he wakes up once during the night to urinate. Currently taking Finasteride. Complained of intermittent left lumbar pain that remained the same since last visit. Daughter noted he does not drink enough water.  11/19/18: The patient returned today for a renal US. Renal US findings showed again there is a 5.5 mm echogenic focus with distal shadowing in the upper pole of the right kidney with bilateral simple non vascular cysts. There is a new septated cyst in the mid outer pole of the left kidney. Both kidneys appear normal in size and echogenicity. No hydronephrosis, or solid masses visualized. PSA from 3/29/18 was 0.71 ng/mL. Notes he was recently hospitalized for LE weakness and pain. Will be evaluated by a cardiologist for possible loop recorder placement.  5/22/19: Male patient presents today for a follow up. Blood work was completed 5/1/19 as per Dr. Ramirez. PSA was measured in January 2019  0.94 and creatinine as of May 2019 was 1.18 mg/dL. regarding urination he denies dysuria, gross hematuria, urgency or incontinence. He will wake up 2x a night to urinate. He has a Hx of high blood pressure and hyperlipidemia that are both well controlled on medication.  11/21/2019: Patient presents today for a follow up. Overall, he is doing well. He states his urination is good. Wakes up 1-2 times during the night to urinate. Patient denies dysuria, gross hematuria, urgency, or incontinence. Today, his only complaint is of some back pain. A urinalysis from  9/11/2019 was small positive for hematuria. His creatinine from 9/11/2019 was 1.29 mg/dL. He has not had a PSA measured since his last done January 2019 of 0.94 ng/mL. Small cyst/possible kidney stone in kidney found in the prior ultrasound of last year dated 11/19/2018. Digital rectal exam found no suspicious rectal masses. Anal tone is normal. The prostate is non tender, with normal texture, discrete borders, and no nodules. It is a 30 gram transurethral resection size. No gross blood on the examining finger. A little external hemorrhoid at 1 o'clock position was found though not inflamed. Due the presence of a small cyst/possible kidney stone found in the ultrasound report from last year dated 11/19/2018, a renal ultrasound has been ordered today and will be obtained by the office. Finding: Again there is a 5.5 mm echogenic focus with distal shadowing in the upper pole of the right kidney with bilateral simple non vascular cysts. There is a new septated cyst in the mid outer pole of the left kidney. Both kidneys appear normal in size and echogenicity. No hydronephrosis, or solid masses visualized. In my opinion, it looks more like a Jeremie's Plaque than a kidney stone.  11/23/2020: Patient presents today for follow up. Takes Finasteride 5mg once daily. Urination is good. Wakes 1-2x at night to urinate. Denies urinary urgency, pushing or straining, dysuria, gross hematuria. No constipation or chest pain. Had blood work by PCP on 9/23/20 showing creatinine of 1.20 and HbA1c of 5.8. Offers no new complaints today. Has appointment with PCP Dr. Ramirez tomorrow.   05/24/2021: Patient presents today for follow up. Wakes 1-2x at night to urinate. Denies dysuria, gross hematuria, urinary urgency, pushing or straining. Continues to take Finasteride. Has pain in lower back radiating down to leg and follows Dr. Ramirez for this. Hx of kidney stones. Traveling to [facility] for two months next month.   Renewed Finasteride.  Patient will schedule for a renal US for hx of kidney stones Blood work today included BMP, alkaline phosphatase, PSA, and testosterone.  The patient produced a urine sample which will be sent for urinalysis, urine cytology, and urine culture.  Patient will schedule a telehealth visit to review renal US results.   10/28/2021: Patient presents today for a follow up. He is accompanied by his son, Venu, who is translating for him. Pt reports nocturia 2-3x a night which is not bothersome. Denies urinary urgency, pushing, straining, gross hematuria, and burning. Has some back pain that is helped by injections. Pt is no longer sexually active according to his son who is translating. Pt obtained a renal US about 4 months ago on 5/27/2021 at [facility] which demonstrated no evidence of hydronephrosis. Bilateral renal cysts measuring up to 5.4 cm in the left kidney, previously measuring 4.9 cm. Enlarged prostate. PVR was 82.9. Pt's son believes his memory slightly diminishing but nothing concerning. Pt last saw  on 9/23/2021. Blood work of that visit revealed a creatinine of 1.24, eGFR was 54, PSA was 0.6 and platelets were slightly low. He denies having any problems clotting if he is bleeding.   04/28/2022: Patient presents today for a follow up visit. He was accompanied by his wife who helped to translate for him. Hx of kidney stones. Denies nocturia, urgency, gross hematuria, urge incontinence, or pushing/straining. He urinates a few times during the day. His last renal US was in 5/2021 and he did not have any kidney stones at that time. His US showed bilateral renal cysts measuring up to 5.4 cm in the left kidney, 4.9 cm previously. Pt currently takes Finasteride 5 mg, one tablet daily.  Blood work today included BMP, A1C, androstenedione, CBC, alkaline phosphatase, PSA, and testosterone. The patient produced a urine sample which will be sent for urinalysis, urine cytology, and urine culture.  I recommend the patient increase his fluid intake to prevent the formation of kidney stones.  RTO in 6 months.   11/08/2022: The patient gave permission for an audio and visual telehealth conference. We utilized [employer] telemetry Restlet platform. The patient was located in his home in [place], [place]. I was located in my office in [place], [place]. I spoke with his son. We reviewed his recent labs, which indicate a Creatinine of 1.28 on October 7, 2022. A PSA was done on April 28 of this year, which was 0.61 ng/mL. This is very low for his age. His HbA1c is 6.0%. The son denies gross hematuria, dysuria, and straining to urinate. He is continuing on the finasteride 5mg with no gynecomastia or mastodynia. He denies constipation.   He is no longer sexually active and is not concerned with his erectile function.  We agreed that he would follow up in the springtime for a repeat PSA. Additionally, I have renewed his finasteride. At his next appointment, I would like to examine him and offer a digital rectal exam.    06/01/2023: Mr. ARABELLA WEISS presents today for a follow up. He is accompanied by his wife. A Frisian  was utilized during today's visit. He reports that his urination is fairly good. Nocturia x2-3. During the daytime, he voids about every 2-3 hours. His wife reports he does not drink much water. When asked he reports he drinks 5-6 glasses of liquids a day with one of them being water. Drinks a half cup of coffee as well as a lot of tea. The pt is no longer sexually active. Denies any pain from kidney stones. Continues to take finasteride 5 mg once daily. Denies pushing and straining. Continues to see Dr.Antonios Ramirez.   The knee-chest position was utilized for the TREMAINE. Digital rectal exam found no suspicious rectal masses. Normal seminal vesicles. Anal tone is normal. The prostate is non tender, with normal texture, and no nodules. Borders are slightly indistinct. It is a 50 gram transurethral resection size. No rectal mucosal lesions. No gross blood on the examining finger.  The patient produced a urine sample which will be sent for urinalysis, urine cytology, and urine culture.  Blood work today included calcium, CBC with diff, CMP, serum osmolality, PSA, renal panel, testosterone, and uric acid.  Renewed finasteride 5 mg once daily. Additionally, I offered to prescribe him an additional medication to reduce his nocturia further, however he declined.  Pt was strongly advised to increase his water intake.  Patient will RTO in 3 months for renal US.   06/08/2023: Mr. ARABELLA WEISS presents today for a follow up audio only telephone visit for which he gave permission for. The pt was located at home,  [address] [place] [address], and I was located in my office in [place], [place]. He is accompanied by his son. The pt had a renal US yesterday which demonstrated there are two simple cysts in the lower pole of the right kidney measuring 4.87 cm x 3.44 cm x 3.26 cm and 3.72 cm x 3.48 cm x 3.58 cm. Both kidneys are normal in size and echogenicity without stones, hydronephrosis or solid masses visualized. The pt had lab work done on 6/1/2023. PSA was very good at 0.57. Testosterone was normal at 453. Renal panel  revealed elevated creatinine of 1.48, which is why the renal US was ordered. Pt is traveling to [facility] next week for 3 months. He had to delay his trip due to getting covid and developing pneumonia, however his son states he is doing much better. The pt continues to take finasteride 5 mg once daily but requests a medication to further improve urination like we discussed at the last visit.   Clinical findings and US results were reviewed at length with the patient's son. I personally reviewed the imaging myself.    Reviewed and discussed laboratory work of 6/1/2023 which he obtained prior to today's visit as requested.   I prescribed the patient Tamsulosin 0.4 mg once daily after a meal. I advised the patient the side of effects of nasal congestion and light-headedness. I advised him to use saline nasal spray if he gets congestion but to avoid decongestion medication as this will worsen his urination. I also informed the patient that if he is sexually active, there will be a decrease in semen volume while taking this medication but this is not harmful.  Patient will have a telehealth visit in 3-4 weeks for reassessment on tamsulosin. I informed the pt's son that since the pt will be away I can call his son to hear about how his father is doing on it.   06/12/2023: Mr. WEISS presents today for a follow up audio only telehealth visit.  We reached out to the patient at 10:02 AM,  but he was not available at the time. I suggested through voicemail his may be a previously scheduled appointment as we have already discussed US results. However, I have spoken to his son and he confirmed me the appt was to be canceled. In addition, for future appointments of this pt we will also speak with the son as he will report as his historian.   07/17/2023: Mr. ARABELLA WEISS presents today for an audio visual telehealth visit for which he gave permission for, however the pt was unable to be reached by audio visual visit and a regular telephone visit was conducted.The patient was contacted at [phone] and I was located at my office in [place], [place]. Visit was conducted with the patient's son at his request. He has been taking tamsulosin 0.4 mg once daily w/o problems. His urination has improved. His son reports the patient is happy with his urination. He is in [place] currently.   Pt will continue tamsulosin 0.4 mg once daily.  Patient to RTO at his scheduled appt time in November, sooner if clinically indicated.   02/12/2024 Mr. ARABELLA WEISS presents today for a follow up audio-only telehealth visit for which they permitted for. The patient was located at home  [address] [place] [address] and I was located in my office in [place], [place]. Back in January 2024, UA revealed small blood. Creatinine is normal at 1.16. Diabates is under control.  CT Pelvis showed No evidence of left inguinal lymphadenopathy as questioned clinically. Moderately enlarged prostate gland. Associated mild compression mural thickening of the urinary bladder which may be related to a component of chronic retention. Bilateral renal cysts. 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 Patient's son report's patient pushes and strains to void.   Clinical findings and CT Pelvis results were reviewed at length with the patient. I personally reviewed the imaging myself.  Advised to increase fluid intake.   The patient will return to office in 1-2 months.   Duration of call 15 Minutes.

## 2024-02-12 NOTE — HISTORY OF PRESENT ILLNESS
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 The patient took tamsulosin in the past, but stopped due to light headiness. His urination remained satisfactory after discontinuing the medication. The patient's last PSA 04/24/17 was 0.69. The patient was in the ER 01/01/17 for abdominal pain. No etiology for the pain was identified and has since resolved. CT of the abdomen and pelvis in the ER found bilateral renal cysts and a 5 mm non obstructing right renal calculus.  1/17/18: CT scan was reviewed and discussed  in office which found no hydronephrosis or recurrent tumors . There are 4 cysts on the  left kidney and 2 cysts on the right.  There is a Stone in the right kidney in the upper portion. He reported earlier he was experiencing some left flank pain. At night he wakes up once to urinate. He remained on finasteride 5 mg once daily. 5/29/18: The patient returned and reported he wakes up once during the night to urinate. Currently taking Finasteride. Complained of intermittent left lumbar pain that remained the same since last visit. Daughter noted he does not drink enough water.  11/19/18: The patient returned today for a renal US. Renal US findings showed again there is a 5.5 mm echogenic focus with distal shadowing in the upper pole of the right kidney with bilateral simple non vascular cysts. There is a new septated cyst in the mid outer pole of the left kidney. Both kidneys appear normal in size and echogenicity. No hydronephrosis, or solid masses visualized. PSA from 3/29/18 was 0.71 ng/mL. Notes he was recently hospitalized for LE weakness and pain. Will be evaluated by a cardiologist for possible loop recorder placement.  5/22/19: Male patient presents today for a follow up. Blood work was completed 5/1/19 as per Dr. Ramirez. PSA was measured in January 2019  0.94 and creatinine as of May 2019 was 1.18 mg/dL. regarding urination he denies dysuria, gross hematuria, urgency or incontinence. He will wake up 2x a night to urinate. He has a Hx of high blood pressure and hyperlipidemia that are both well controlled on medication.  11/21/2019: Patient presents today for a follow up. Overall, he is doing well. He states his urination is good. Wakes up 1-2 times during the night to urinate. Patient denies dysuria, gross hematuria, urgency, or incontinence. Today, his only complaint is of some back pain. A urinalysis from  9/11/2019 was small positive for hematuria. His creatinine from 9/11/2019 was 1.29 mg/dL. He has not had a PSA measured since his last done January 2019 of 0.94 ng/mL. Small cyst/possible kidney stone in kidney found in the prior ultrasound of last year dated 11/19/2018. Digital rectal exam found no suspicious rectal masses. Anal tone is normal. The prostate is non tender, with normal texture, discrete borders, and no nodules. It is a 30 gram transurethral resection size. No gross blood on the examining finger. A little external hemorrhoid at 1 o'clock position was found though not inflamed. Due the presence of a small cyst/possible kidney stone found in the ultrasound report from last year dated 11/19/2018, a renal ultrasound has been ordered today and will be obtained by the office. Finding: Again there is a 5.5 mm echogenic focus with distal shadowing in the upper pole of the right kidney with bilateral simple non vascular cysts. There is a new septated cyst in the mid outer pole of the left kidney. Both kidneys appear normal in size and echogenicity. No hydronephrosis, or solid masses visualized. In my opinion, it looks more like a Jeremie's Plaque than a kidney stone.  11/23/2020: Patient presents today for follow up. Takes Finasteride 5mg once daily. Urination is good. Wakes 1-2x at night to urinate. Denies urinary urgency, pushing or straining, dysuria, gross hematuria. No constipation or chest pain. Had blood work by PCP on 9/23/20 showing creatinine of 1.20 and HbA1c of 5.8. Offers no new complaints today. Has appointment with PCP Dr. Ramirez tomorrow.   05/24/2021: Patient presents today for follow up. Wakes 1-2x at night to urinate. Denies dysuria, gross hematuria, urinary urgency, pushing or straining. Continues to take Finasteride. Has pain in lower back radiating down to leg and follows Dr. Ramirez for this. Hx of kidney stones. Traveling to [facility] for two months next month.   10/28/2021: Patient presents today for a follow up. He is accompanied by his son, Venu, who is translating for him. Pt reports nocturia 2-3x a night which is not bothersome. Denies urinary urgency, pushing, straining, gross hematuria, and burning. Has some back pain that is helped by injections. Pt is no longer sexually active according to his son who is translating. Pt obtained a renal US about 4 months ago on 5/27/2021 at [facility] which demonstrated no evidence of hydronephrosis. Bilateral renal cysts measuring up to 5.4 cm in the left kidney, previously measuring 4.9 cm. Enlarged prostate. PVR was 82.9. Pt's son believes his memory slightly diminishing but nothing concerning. Pt last saw  on 9/23/2021. Blood work of that visit revealed a creatinine of 1.24, eGFR was 54, PSA was 0.6 and platelets were slightly low. He denies having any problems clotting if he is bleeding.   04/28/2022: Patient presents today for a follow up visit. He was accompanied by his wife who helped to translate for him. Hx of kidney stones. Denies nocturia, urgency, gross hematuria, urge incontinence, or pushing/straining. He urinates a few times during the day. His last renal US was in 5/2021 and he did not have any kidney stones at that time. His US showed bilateral renal cysts measuring up to 5.4 cm in the left kidney, 4.9 cm previously. Pt currently takes Finasteride 5 mg, one tablet daily.   11/08/2022: The patient gave permission for an audio and visual telehealth conference. We utilized [employer] teams telemetry doc platform. The patient was located in his home in [place], [place]. I was located in my office in [place], [place]. I spoke with his son. We reviewed his recent labs, which indicate a Creatinine of 1.28 on October 7, 2022. A PSA was done on April 28 of this year, which was 0.61 ng/mL. This is very low for his age. His HbA1c is 6.0%. The son denies gross hematuria, dysuria, and straining to urinate. He is continuing on the finasteride 5mg with no gynecomastia or mastodynia.   06/01/2023: Mr. ARABELLA WEISS presents today for a follow up. He is accompanied by his wife. A [place]  was utilized during today's visit. He reports that his urination is fairly good. Nocturia x2-3. During the daytime, he voids about every 2-3 hours. His wife reports he does not drink much water. When asked he reports he drinks 5-6 glasses of liquids a day with one of them being water. Drinks a half cup of coffee as well as a lot of tea. The pt is no longer sexually active. Denies any pain from kidney stones. Continues to take finasteride 5 mg once daily. Denies pushing and straining. Continues to see Dr.Antonios Ramirez.   06/08/2023: Mr. ARABELLA WEISS presents today for a follow up audio only telephone visit for which he gave permission for. The pt was located at home,  [address] [place] [address], and I was located in my office in [place], [place]. He is accompanied by his son. The pt had a renal US yesterday which demonstrated there are two simple cysts in the lower pole of the right kidney measuring 4.87 cm x 3.44 cm x 3.26 cm and 3.72 cm x 3.48 cm x 3.58 cm. Both kidneys are normal in size and echogenicity without stones, hydronephrosis or solid masses visualized. The pt had lab work done on 6/1/2023. PSA was very good at 0.57. Testosterone was normal at 453. Renal panel  revealed elevated creatinine of 1.48, which is why the renal US was ordered. Pt is traveling to [facility] next week for 3 months. He had to delay his trip due to getting covid and developing pneumonia, however his son states he is doing much better. The pt continues to take finasteride 5 mg once daily but requests a medication to further improve urination like we discussed at the last visit.   06/12/2023: Mr. WEISS presents today for a follow up audio only telehealth visit.  We reached out to the patient at 10:02 AM,  but he was not available at the time. I suggested through voicemail his may be a previously scheduled appointment as we have already discussed US results. However, I have spoken to his son and he confirmed me the appt was to be canceled. In addition, for future appointments of this pt we will also speak with the son as he will report as his historian.   07/17/2023: Mr. ARABELLA WEISS presents today for an audio visual telehealth visit for which he gave permission for, however the pt was unable to be reached by audio visual visit and a regular telephone visit was conducted.The patient was contacted at [phone] and I was located at my office in [place], [place]. Visit was conducted with the patient's son at his request. He has been taking tamsulosin 0.4 mg once daily w/o problems. His urination has improved. His son reports the patient is happy with his urination. He is in [place] currently.   02/12/2024 Mr. ARABELLA WEISS presents today for a follow up audio-only telehealth visit for which they permitted for. The patient was located at home  [address] [address] and I was located in my office in [place], [place]. Back in January 2024, UA revealed small blood. Creatinine is normal at 1.16. Diabates is under control.  CT Pelvis showed No evidence of left inguinal lymphadenopathy as questioned clinically. Moderately enlarged prostate gland. Associated mild compression mural thickening of the urinary bladder which may be related to a component of chronic retention. Bilateral renal cysts. 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 Patient's son report's patient pushes and strains to void.

## 2024-02-12 NOTE — ADDENDUM
[FreeTextEntry1] : This note was authored by Odilia Bay working as a scribe for Dr. Oscar Mace. I, Dr. Oscar Mace have reviewed the content of this note and confirm it is true and accurate. I personally performed the history and physical examination and made all the decisions 02/12/2024.

## 2024-02-22 ENCOUNTER — APPOINTMENT (OUTPATIENT)
Dept: ORTHOPEDIC SURGERY | Facility: CLINIC | Age: 84
End: 2024-02-22
Payer: MEDICARE

## 2024-02-22 VITALS — HEIGHT: 67 IN | BODY MASS INDEX: 35.31 KG/M2 | WEIGHT: 225 LBS

## 2024-02-22 PROCEDURE — 99024 POSTOP FOLLOW-UP VISIT: CPT

## 2024-02-22 NOTE — PHYSICAL EXAM
[de-identified] : Well developed, well nourished in no apparent distress, awake, alert and orientated to person, place and time with appropriate mood and affect Respirations are even and unlabored. Gait evaluation does not reveal a limp. There is no inguinal adenopathy. The affected limb is well-perfused with palpable pedal pulse, without skin lesions, shows a grossly normal motor and sensory examination. Incision is CDI. Knee motion is 0-120

## 2024-02-22 NOTE — HISTORY OF PRESENT ILLNESS
[de-identified] : Status-post left total knee  arthroplasty here for routine postoperative evaluation. Excellent progress is noted in terms of pain and restoration of function. Pain improving. The patient no longer requires assistive devices.

## 2024-02-22 NOTE — DISCUSSION/SUMMARY
[de-identified] : Patient is recovering s/p L TKA even though it has been a little slow. Still some pain. Continue WBAT. Follow up in 2 months for another exam.

## 2024-03-11 ENCOUNTER — APPOINTMENT (OUTPATIENT)
Dept: INTERNAL MEDICINE | Facility: CLINIC | Age: 84
End: 2024-03-11

## 2024-03-12 NOTE — ED CDU PROVIDER SUBSEQUENT DAY NOTE - EYES, MLM
Call to patient, updated with recommendations as below, patient verbalized understanding.  Reviewed plan from last visit with Dr Garcia in Feb 2023.  Patient will followup next week as planned to discuss.  Informed can schedule sooner this week if available as Dr Castellanos has appointments available.     Clear bilaterally, pupils equal, round and reactive to light.

## 2024-03-13 NOTE — CARDIOLOGY SUMMARY
[de-identified] : Pharmacologic Nuclear Stress Test performed on 11/17/2023: No ischemic ST segment changes. Rare PVCs. Small, mild defect of the basal inferolateral wall that is reversible, consistent with ischemia. LVEF= 65%, revealing normal left ventricular systolic function.  [de-identified] : 11/22/2023: Atrial fibrillation with a PVC at 94 beats per minute with left anterior fascicular block, old anterior infarct, and nonspecific T-abnormality.  [de-identified] : Cardiac catheterization performed on 6/22/2012: EF= 70%. Minor luminal irregularities of the LAD, LCx, and RCA. Mild pulmonary HTN. Severe aortic stenosis.  [de-identified] : 9/12/2023: Technically difficult image quality. Endocardium not well visualized; grossly preserved left ventricular ejection fraction with hypokinesis of the basal inferior wall. There is paradoxical septal motion, consistent with prior cardiac surgery. EF is approximately 55-60%. Concentric left ventricular remodeling. Right ventricular cavity is moderately enlarged in size and reduced systolic function. The left atrium is severely dilated in size. The right atrium is moderately dilated in size. Mitral annular calcification with thickened mitral valve leaflets and mild prolapse of the posterior mitral valve leaflet. Mild to moderate eccentric, posteriorly-directed mitral regurgitation.  A bioprosthetic valve replacement present in the aortic position. Trace intravalvular regurgitation. The peak transaortic gradient is 29.7 mmHg and mean transaortic gradient of 16.9 mmHg. The aortic valve area is estimated at 1.07 sqcm, by the continuity equation. Moderate-severe tricuspid regurgitation.  Estimated pulmonary artery systolic pressure is 55 mmHg, consistent with moderate pulmonary hypertension. Trace pericardial effusion.    Transesophageal echocardiogram performed on 4/5/2023: The left ventricular systolic function is normal with an ejection fraction of 66 %. Normal right ventricular cavity size and probably normal systolic function. Mild to moderate mitral regurgitation.  There is no evidence of left atrial or left atrial appendage thrombus. Left atrial enlargement.  A bioprosthetic valve replacement present in the aortic position. This EOAi may be consistent with severe patient prosthesis mismatch. The aortic valve prosthesis EOAi is 0.6 cm2/m2, with a mean gradient of 24 mmHg and peak velocity of 3.4 m/s obtained by TTE imaging. The aortic valve appears trileaflet with normal systolic excursion. There is minimal thickening of the aortic valve leaflets. Unable to obtain gastric views. A limited DARA examination due to increased secretions leading to respiratory compromise (with desaturation into the 80s).     3/20/2023: Endocardium not well visualized; grossly preserved left ventricular ejection fraction with hypokinesis of the basal inferior wall. EF is approximately 65%. Concentric left ventricular remodeling. Moderate left ventricular diastolic dysfunction. Right ventricular enlargement with normal right ventricular systolic function. The left atrium is moderately dilated in size. The right atrium is mildly dilated in size. Mitral annular calcification with thickened mitral valve leaflets. Moderate mitral regurgitation.  A bioprosthetic valve replacement present in the aortic position. Peak transaortic valve gradient of 53 mmHg and mean gradient of 28 mmHg. Mild valvular regurgitation. Moderate tricuspid regurgitation. Estimated pulmonary artery systolic pressure is 52 mmHg, consistent with moderate pulmonary hypertension. Mild-to-moderate pulmonic regurgitation.    2/7/2022: Mild to moderate mitral regurgitation. Bioprosthetic aortic valve replacement. Peak transaortic valve gradient of 40 mmHg and mean gradient of 21 mmHg, which is probably normal in the setting of a prosthetic valve. Moderately dilated left atrium. Mild right atrial enlargement. Mild left ventricular enlargement. Moderate diastolic dysfunction. Endocardium not well visualized; grossly preserved left ventricular ejection fraction with hypokinesis of the basal inferior wall. Right ventricular enlargement with normal right ventricular systolic function. Mild to moderate tricuspid regurgitation. Mild pulmonary HTN with PASP= 41 mmHg.  5/11/2021: Normal left ventricular ejection fraction with an EF= 69%. Bioprosthetic aortic valve replacement. Peak transaortic valve gradient of 27 mmHg and mean gradient of 14 mmHg, which is probably normal in the setting of a prosthetic valve. Mild to moderate mitral regurgitation. Mild tricuspid regurgitation. Mild pulmonary HTN. PASP= 47 mmHg. [de-identified] : 3/30/2023: Carotid Doppler 1. Right ICA minimal plaque: 1-19% 2. Mild plaque in the left bulb. 3. Left ICA mild plaque 20-49% [de-identified] : 7/2/2012: Bioprosthetic aortic valve replacement.

## 2024-03-13 NOTE — HISTORY OF PRESENT ILLNESS
[FreeTextEntry1] : Patient is an 84 year old man with a history of severe aortic stenosis secondary to a bicuspid aortic valve status post bioprosthetic aortic valve replacement 7/2012, HTN, hyperlipidemia, former tobacco use, chronic renal insufficiency, thrombocytopenia, and GERD who presents today for follow up of atrial fibrillation and risk stratification prior to knee replacement.  He had rapid atrial fibrillation in [facility] after having COVID infection in early June. His metoprolol was changed to bisoprolol 10 mg daily and he states that he has felt well since that time with good heart rates when he is checked using the pulse oximeter.  His major complaint continues to be knee pain.  He otherwise denies any chest pain, shortness of breath at rest, palpitations, headaches or dizziness.   1+ edema on the left since surgery no dvt on Doppler  ncrease Bisoprolol to 15 mg daily

## 2024-03-13 NOTE — PHYSICAL EXAM
[Well Developed] : well developed [Well Nourished] : well nourished [No Acute Distress] : no acute distress [Normal Rate] : normal [Normal Venous Pressure] : normal venous pressure [Irregularly Irregular] : irregularly irregular [Normal S1] : normal S1 [Normal S2] : normal S2 [II] : a grade 2 [No Pitting Edema] : no pitting edema present [Bruit] : no bruit heard [Clear Lung Fields] : clear lung fields [Good Air Entry] : good air entry [No Respiratory Distress] : no respiratory distress  [Non Tender] : non-tender [Soft] : abdomen soft [Normal Bowel Sounds] : normal bowel sounds [Normal Gait] : normal gait [No Edema] : no edema [No Cyanosis] : no cyanosis [No Rash] : no rash [Moves all extremities] : moves all extremities [No Focal Deficits] : no focal deficits [Normal Speech] : normal speech [Alert and Oriented] : alert and oriented [Normal memory] : normal memory [de-identified] : Extraocular muscles intact. Anicteric sclerae. [de-identified] : Normal oral mucosa.  [de-identified] : No visible skin ulcers.

## 2024-03-13 NOTE — DISCUSSION/SUMMARY
[FreeTextEntry1] : IMPRESSION: Mr. WEISS is a 84 year old man with a history of severe aortic stenosis secondary to a bicuspid aortic valve status post bioprosthetic aortic valve replacement 7/2012, HTN, hyperlipidemia, former tobacco use, chronic renal insufficiency, and GERD who presents today for follow up of atrial fibrillation and risk stratification prior to knee replacement.   PLAN: 1. 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 He had a repeat echocardiogram about 2 months ago that revealed improved gradients across the aortic valve prosthesis. He will continue to follow with Dr. Garcia from CT surgery. He understands that he requires antibiotic prophylaxis status post aortic valve replacement.  2. He has not experienced any recurrent chest discomfort since his last visit with me. He had a nuclear stress test last week that revealed a small area of ischemia with normal LV function. He will continue on medical management.    3. He is in rate controlled atrial fibrillation on his ECG that was performed today.  He will continue on Bisoprolol 10 mg daily for rate control and Eliquis 2.5 mg twice daily for systemic anticoagulation, His platelets have been stable on this dose of Eliquis.  4. His blood pressure is well controlled today, thus he will continue on Amlodipine 5 mg daily and Bisoprolol. 5. His most recent LDL was good, thus he will continue on Atorvastatin 20 mg daily. 6. He does not need to take oral antibiotics for endocarditis prophylaxis given his bioprosthetic aortic valve as he will be receiving IV antibiotics preop.    7. He has intermediate clinical risk predictors for an intermediate risk surgery (knee replacement). He had a nuclear stress test that revealed a preserved EF and no significant ischemia. He may proceed with his surgery from the cardiac standpoint without any further workup. He will hold Eliquis for 72 hours prior to his surgery.  He will take ASA 81 mg daily when he stops taking Eliquis.

## 2024-03-14 ENCOUNTER — APPOINTMENT (OUTPATIENT)
Dept: CARDIOLOGY | Facility: CLINIC | Age: 84
End: 2024-03-14

## 2024-03-29 ENCOUNTER — APPOINTMENT (OUTPATIENT)
Dept: UROLOGY | Facility: CLINIC | Age: 84
End: 2024-03-29

## 2024-04-11 ENCOUNTER — APPOINTMENT (OUTPATIENT)
Dept: CARDIOLOGY | Facility: CLINIC | Age: 84
End: 2024-04-11
Payer: MEDICARE

## 2024-04-11 ENCOUNTER — LABORATORY RESULT (OUTPATIENT)
Age: 84
End: 2024-04-11

## 2024-04-11 ENCOUNTER — APPOINTMENT (OUTPATIENT)
Dept: INTERNAL MEDICINE | Facility: CLINIC | Age: 84
End: 2024-04-11
Payer: MEDICARE

## 2024-04-11 ENCOUNTER — NON-APPOINTMENT (OUTPATIENT)
Age: 84
End: 2024-04-11

## 2024-04-11 VITALS
HEART RATE: 128 BPM | BODY MASS INDEX: 34.84 KG/M2 | OXYGEN SATURATION: 98 % | WEIGHT: 222 LBS | HEIGHT: 67 IN | TEMPERATURE: 97.7 F

## 2024-04-11 VITALS
WEIGHT: 199 LBS | BODY MASS INDEX: 31.23 KG/M2 | HEART RATE: 65 BPM | DIASTOLIC BLOOD PRESSURE: 78 MMHG | OXYGEN SATURATION: 97 % | HEIGHT: 67 IN | SYSTOLIC BLOOD PRESSURE: 122 MMHG | RESPIRATION RATE: 15 BRPM

## 2024-04-11 DIAGNOSIS — M17.11 UNILATERAL PRIMARY OSTEOARTHRITIS, RIGHT KNEE: ICD-10-CM

## 2024-04-11 DIAGNOSIS — M25.561 PAIN IN RIGHT KNEE: ICD-10-CM

## 2024-04-11 DIAGNOSIS — Z87.01 PERSONAL HISTORY OF PNEUMONIA (RECURRENT): ICD-10-CM

## 2024-04-11 DIAGNOSIS — R59.0 LOCALIZED ENLARGED LYMPH NODES: ICD-10-CM

## 2024-04-11 DIAGNOSIS — N20.0 CALCULUS OF KIDNEY: ICD-10-CM

## 2024-04-11 DIAGNOSIS — N30.00 ACUTE CYSTITIS W/OUT HEMATURIA: ICD-10-CM

## 2024-04-11 DIAGNOSIS — Z01.818 ENCOUNTER FOR OTHER PREPROCEDURAL EXAMINATION: ICD-10-CM

## 2024-04-11 DIAGNOSIS — M25.562 PAIN IN RIGHT KNEE: ICD-10-CM

## 2024-04-11 PROCEDURE — 36415 COLL VENOUS BLD VENIPUNCTURE: CPT

## 2024-04-11 PROCEDURE — 99215 OFFICE O/P EST HI 40 MIN: CPT

## 2024-04-11 PROCEDURE — 93000 ELECTROCARDIOGRAM COMPLETE: CPT

## 2024-04-11 PROCEDURE — 99215 OFFICE O/P EST HI 40 MIN: CPT | Mod: 25

## 2024-04-11 PROCEDURE — G2211 COMPLEX E/M VISIT ADD ON: CPT

## 2024-04-11 NOTE — ASSESSMENT
[FreeTextEntry1] : Diagnosis #1 diastolic heart dysfunction, stable.  Followed up by the cardiologist #2 hypertension, stable.Same medication and strict  low salt diet.cmp to lab #3 hyperlipidemia. Continue with same medication and low-fat diet.lipids to lab #4 monitor liver  toxicity due to chronic medication use.cmp to lab #5 hypovitaminosis D., being supplemented.level to lab #6 GE reflux, only occasionally symptomatic and overall stable. cbc  to lab.  Diet and sleeping instructions emphasized to patient.  Continue also current medication #7 history of low B12.  level sent to the lab #8 low back . To continue with Tylenol 1000 mg p.o. every 8 hours as needed #9  History of thrombocytosis. CBC sent to lab #10  Benign prostatic hypertrophy, stable.  Patient followed up by his urologist #11 history of hypovitaminosis D., being supplemented. Level  sent to the lab 12.  Chronic renal insufficiency, CMP and CBC to lab 13.  History of psoriasis, stable.  Continue same treatment.  Followed up by the dermatology 14.  History of nephrolithiasis, stable.  Stable.  Followed by the urologist 15.  Chronic atrial fibrillation with heart rate today approximately 130/min.  Patient advised to see the cardiologist today for medication adjustment to control the rate and if with any chest pain, shortness of breath, paroxysmal nocturnal dyspnea orthopnea to go straight to the emergency room by ambulance 16.  Right knee knee osteoarthritis.  Advised to take only Tylenol 500 mg p.o. every 6 hours as needed.  Left knee much improved after the operation Plan . Patient advised to return after 3 months .Approximately 65 minutes spent totally

## 2024-04-11 NOTE — PHYSICAL EXAM
[Kyphosis] : no kyphosis [Scoliosis] : no scoliosis [de-identified] : Irregularly irregular treatment [de-identified] : I [de-identified] : refused [de-identified] : Slight stiffness of the left knee and tenderness of the right knee to palpation.  No swelling or redness

## 2024-04-11 NOTE — HISTORY OF PRESENT ILLNESS
[FreeTextEntry1] : Patient comes for  followup of his chronic medical problems. Patient feels well without any chest pain, shortness of breath, paroxysmal nocturnal dyspnea or orthopnea. . His gastrointestinal reflux symptoms remain stable.  His left knee pain much improved after the operation.  Now she has right knee pain when she walks but not very bothersome.  His low back pain recurs  intermittently but overall stable.  Most recent blood tests, consults, medication list, allergies reviewed.

## 2024-04-13 LAB
25(OH)D3 SERPL-MCNC: 52.5 NG/ML
ALBUMIN SERPL ELPH-MCNC: 4.5 G/DL
ALP BLD-CCNC: 64 U/L
ALT SERPL-CCNC: 19 U/L
ANION GAP SERPL CALC-SCNC: 11 MMOL/L
APPEARANCE: CLEAR
AST SERPL-CCNC: 19 U/L
BACTERIA: NEGATIVE /HPF
BASOPHILS # BLD AUTO: 0.01 K/UL
BASOPHILS NFR BLD AUTO: 0.2 %
BILIRUB SERPL-MCNC: 0.7 MG/DL
BILIRUBIN URINE: NEGATIVE
BLOOD URINE: ABNORMAL
BUN SERPL-MCNC: 13 MG/DL
CALCIUM SERPL-MCNC: 9.4 MG/DL
CAST: 0 /LPF
CHLORIDE SERPL-SCNC: 105 MMOL/L
CHOLEST SERPL-MCNC: 109 MG/DL
CO2 SERPL-SCNC: 26 MMOL/L
COLOR: YELLOW
CREAT SERPL-MCNC: 1.12 MG/DL
CREAT SPEC-SCNC: 79 MG/DL
EGFR: 65 ML/MIN/1.73M2
EOSINOPHIL # BLD AUTO: 0.01 K/UL
EOSINOPHIL NFR BLD AUTO: 0.2 %
EPITHELIAL CELLS: 0 /HPF
ESTIMATED AVERAGE GLUCOSE: 134 MG/DL
GLUCOSE QUALITATIVE U: NEGATIVE MG/DL
GLUCOSE SERPL-MCNC: 101 MG/DL
HBA1C MFR BLD HPLC: 6.3 %
HCT VFR BLD CALC: 45.4 %
HDLC SERPL-MCNC: 46 MG/DL
HGB BLD-MCNC: 15 G/DL
IMM GRANULOCYTES NFR BLD AUTO: 0.5 %
KETONES URINE: NEGATIVE MG/DL
LDLC SERPL CALC-MCNC: 50 MG/DL
LEUKOCYTE ESTERASE URINE: NEGATIVE
LYMPHOCYTES # BLD AUTO: 1.75 K/UL
LYMPHOCYTES NFR BLD AUTO: 29.2 %
MAN DIFF?: NORMAL
MCHC RBC-ENTMCNC: 30.7 PG
MCHC RBC-ENTMCNC: 33 GM/DL
MCV RBC AUTO: 92.8 FL
MICROALBUMIN 24H UR DL<=1MG/L-MCNC: 2.4 MG/DL
MICROALBUMIN/CREAT 24H UR-RTO: 30 MG/G
MICROSCOPIC-UA: NORMAL
MONOCYTES # BLD AUTO: 1.74 K/UL
MONOCYTES NFR BLD AUTO: 29 %
NEUTROPHILS # BLD AUTO: 2.45 K/UL
NEUTROPHILS NFR BLD AUTO: 40.9 %
NITRITE URINE: NEGATIVE
NONHDLC SERPL-MCNC: 63 MG/DL
PH URINE: 6.5
PLATELET # BLD AUTO: 83 K/UL
POTASSIUM SERPL-SCNC: 4.3 MMOL/L
PROT SERPL-MCNC: 6.9 G/DL
PROTEIN URINE: NEGATIVE MG/DL
RBC # BLD: 4.89 M/UL
RBC # FLD: 13.2 %
RED BLOOD CELLS URINE: 1 /HPF
SODIUM SERPL-SCNC: 142 MMOL/L
SPECIFIC GRAVITY URINE: 1.01
TRIGL SERPL-MCNC: 52 MG/DL
UROBILINOGEN URINE: 1 MG/DL
VIT B12 SERPL-MCNC: 655 PG/ML
WBC # FLD AUTO: 5.99 K/UL
WHITE BLOOD CELLS URINE: 0 /HPF

## 2024-04-16 ENCOUNTER — RX RENEWAL (OUTPATIENT)
Age: 84
End: 2024-04-16

## 2024-04-19 ENCOUNTER — APPOINTMENT (OUTPATIENT)
Dept: ORTHOPEDIC SURGERY | Facility: CLINIC | Age: 84
End: 2024-04-19
Payer: MEDICARE

## 2024-04-19 VITALS — WEIGHT: 199 LBS | BODY MASS INDEX: 31.23 KG/M2 | HEIGHT: 67 IN

## 2024-04-19 DIAGNOSIS — Z96.652 PRESENCE OF LEFT ARTIFICIAL KNEE JOINT: ICD-10-CM

## 2024-04-19 DIAGNOSIS — M17.11 UNILATERAL PRIMARY OSTEOARTHRITIS, RIGHT KNEE: ICD-10-CM

## 2024-04-19 DIAGNOSIS — M17.0 BILATERAL PRIMARY OSTEOARTHRITIS OF KNEE: ICD-10-CM

## 2024-04-19 PROCEDURE — 99214 OFFICE O/P EST MOD 30 MIN: CPT

## 2024-04-19 RX ORDER — APIXABAN 2.5 MG/1
2.5 TABLET, FILM COATED ORAL
Qty: 180 | Refills: 0 | Status: ACTIVE | COMMUNITY
Start: 1900-01-01 | End: 1900-01-01

## 2024-04-20 RX ORDER — BISOPROLOL FUMARATE 10 MG/1
10 TABLET, FILM COATED ORAL DAILY
Qty: 90 | Refills: 3 | Status: ACTIVE | COMMUNITY
Start: 2023-09-20 | End: 1900-01-01

## 2024-04-24 ENCOUNTER — NON-APPOINTMENT (OUTPATIENT)
Age: 84
End: 2024-04-24

## 2024-04-24 ENCOUNTER — APPOINTMENT (OUTPATIENT)
Dept: CARDIOLOGY | Facility: CLINIC | Age: 84
End: 2024-04-24
Payer: MEDICARE

## 2024-04-24 VITALS
BODY MASS INDEX: 35.16 KG/M2 | OXYGEN SATURATION: 97 % | HEIGHT: 67 IN | HEART RATE: 100 BPM | SYSTOLIC BLOOD PRESSURE: 126 MMHG | DIASTOLIC BLOOD PRESSURE: 94 MMHG | WEIGHT: 224 LBS | RESPIRATION RATE: 19 BRPM

## 2024-04-24 PROCEDURE — G2211 COMPLEX E/M VISIT ADD ON: CPT

## 2024-04-24 PROCEDURE — 93000 ELECTROCARDIOGRAM COMPLETE: CPT

## 2024-04-24 PROCEDURE — 99215 OFFICE O/P EST HI 40 MIN: CPT

## 2024-04-24 NOTE — CARDIOLOGY SUMMARY
[de-identified] : 4/24/2024: Atrial Flutter at 131 beats per minute with left anterior fascicular block, nonspecific T-abnormality, and poor R wave progression [de-identified] : Pharmacologic Nuclear Stress Test performed on 11/17/2023: No ischemic ST segment changes. Rare PVCs. Small, mild defect of the basal inferolateral wall that is reversible, consistent with ischemia. LVEF= 65%, revealing normal left ventricular systolic function.  [de-identified] : 9/12/2023: Technically difficult image quality. Endocardium not well visualized; grossly preserved left ventricular ejection fraction with hypokinesis of the basal inferior wall. There is paradoxical septal motion, consistent with prior cardiac surgery. EF is approximately 55-60%. Concentric left ventricular remodeling. Right ventricular cavity is moderately enlarged in size and reduced systolic function. The left atrium is severely dilated in size. The right atrium is moderately dilated in size. Mitral annular calcification with thickened mitral valve leaflets and mild prolapse of the posterior mitral valve leaflet. Mild to moderate eccentric, posteriorly-directed mitral regurgitation.  A bioprosthetic valve replacement present in the aortic position. Trace intravalvular regurgitation. The peak transaortic gradient is 29.7 mmHg and mean transaortic gradient of 16.9 mmHg. The aortic valve area is estimated at 1.07 sqcm, by the continuity equation. Moderate-severe tricuspid regurgitation.  Estimated pulmonary artery systolic pressure is 55 mmHg, consistent with moderate pulmonary hypertension. Trace pericardial effusion.    Transesophageal echocardiogram performed on 4/5/2023: The left ventricular systolic function is normal with an ejection fraction of 66 %. Normal right ventricular cavity size and probably normal systolic function. Mild to moderate mitral regurgitation.  There is no evidence of left atrial or left atrial appendage thrombus. Left atrial enlargement.  A bioprosthetic valve replacement present in the aortic position. This EOAi may be consistent with severe patient prosthesis mismatch. The aortic valve prosthesis EOAi is 0.6 cm2/m2, with a mean gradient of 24 mmHg and peak velocity of 3.4 m/s obtained by TTE imaging. The aortic valve appears trileaflet with normal systolic excursion. There is minimal thickening of the aortic valve leaflets. Unable to obtain gastric views. A limited DARA examination due to increased secretions leading to respiratory compromise (with desaturation into the 80s).     3/20/2023: Endocardium not well visualized; grossly preserved left ventricular ejection fraction with hypokinesis of the basal inferior wall. EF is approximately 65%. Concentric left ventricular remodeling. Moderate left ventricular diastolic dysfunction. Right ventricular enlargement with normal right ventricular systolic function. The left atrium is moderately dilated in size. The right atrium is mildly dilated in size. Mitral annular calcification with thickened mitral valve leaflets. Moderate mitral regurgitation.  A bioprosthetic valve replacement present in the aortic position. Peak transaortic valve gradient of 53 mmHg and mean gradient of 28 mmHg. Mild valvular regurgitation. Moderate tricuspid regurgitation. Estimated pulmonary artery systolic pressure is 52 mmHg, consistent with moderate pulmonary hypertension. Mild-to-moderate pulmonic regurgitation.    2/7/2022: Mild to moderate mitral regurgitation. Bioprosthetic aortic valve replacement. Peak transaortic valve gradient of 40 mmHg and mean gradient of 21 mmHg, which is probably normal in the setting of a prosthetic valve. Moderately dilated left atrium. Mild right atrial enlargement. Mild left ventricular enlargement. Moderate diastolic dysfunction. Endocardium not well visualized; grossly preserved left ventricular ejection fraction with hypokinesis of the basal inferior wall. Right ventricular enlargement with normal right ventricular systolic function. Mild to moderate tricuspid regurgitation. Mild pulmonary HTN with PASP= 41 mmHg.  5/11/2021: Normal left ventricular ejection fraction with an EF= 69%. Bioprosthetic aortic valve replacement. Peak transaortic valve gradient of 27 mmHg and mean gradient of 14 mmHg, which is probably normal in the setting of a prosthetic valve. Mild to moderate mitral regurgitation. Mild tricuspid regurgitation. Mild pulmonary HTN. PASP= 47 mmHg. [de-identified] : Cardiac catheterization performed on 6/22/2012: EF= 70%. Minor luminal irregularities of the LAD, LCx, and RCA. Mild pulmonary HTN. Severe aortic stenosis.  [de-identified] : 7/2/2012: Bioprosthetic aortic valve replacement.  [de-identified] : 3/30/2023: Carotid Doppler 1. Right ICA minimal plaque: 1-19% 2. Mild plaque in the left bulb. 3. Left ICA mild plaque 20-49%

## 2024-04-24 NOTE — HISTORY OF PRESENT ILLNESS
[FreeTextEntry1] : Patient is an 84 year old man with a history of severe aortic stenosis secondary to a bicuspid aortic valve status post bioprosthetic aortic valve replacement 7/2012, HTN, hyperlipidemia, former tobacco use, chronic renal insufficiency, thrombocytopenia, and GERD who presents today for follow up of atrial fibrillation and risk stratification prior to knee replacement.  He had rapid atrial fibrillation in [facility] after having COVID infection in early June. His metoprolol was changed to bisoprolol at that time.  He is currently on Bisoprolol 15 mg daily. He currently denies any chest pain, shortness of breath at rest, palpitations, headaches or dizziness. His wife states that he was again very upset this morning driving to the office.

## 2024-04-24 NOTE — DISCUSSION/SUMMARY
[FreeTextEntry1] : IMPRESSION: Mr. WEISS is a 84 year old man with a history of severe aortic stenosis secondary to a bicuspid aortic valve status post bioprosthetic aortic valve replacement 7/2012, HTN, hyperlipidemia, former tobacco use, chronic renal insufficiency, and GERD who presents today for follow up of atrial fibrillation.   PLAN: 1. 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 He had a repeat echocardiogram about 7 months ago that revealed improved gradients across the aortic valve prosthesis. He will continue to follow with Dr. Garcia from CT surgery. He understands that he requires antibiotic prophylaxis status post aortic valve replacement.  2. He has not experienced any recurrent chest discomfort since his last visit with me. He had a nuclear stress test 5 months ago that revealed a small area of ischemia with normal LV function. He will continue on medical management.    3. He is in rate controlled atrial fibrillation on his ECG that was performed today in the office.  He will continue on Bisoprolol 15 mg daily for rate control and Eliquis 2.5 mg twice daily for systemic anticoagulation. His platelets have been stable on this dose of Eliquis. Given his thrombocytopenia he does not want to take a higher dose of Eliquis. It is likely that his ventricular rates were higher earlier this morning as he was very upset. 4. His blood pressure is well controlled today, thus he will continue on Amlodipine 5 mg daily and Bisoprolol. 5. His most recent LDL was very good, thus he will continue on Atorvastatin 20 mg daily. 6. He requires endocarditis prophylaxis given his bioprosthetic aortic valve. 7. I spent 45 minutes with the patient and his wife during this encounter, which included the time spent on documentation.  [EKG obtained to assist in diagnosis and management of assessed problem(s)] : EKG obtained to assist in diagnosis and management of assessed problem(s)

## 2024-04-24 NOTE — CARDIOLOGY SUMMARY
[de-identified] : 4/11/2024: Atrial fibrillation at 65 beats per minute with left anterior fascicular block, old anterior infarct, and nonspecific T-abnormality.  [de-identified] : Pharmacologic Nuclear Stress Test performed on 11/17/2023: No ischemic ST segment changes. Rare PVCs. Small, mild defect of the basal inferolateral wall that is reversible, consistent with ischemia. LVEF= 65%, revealing normal left ventricular systolic function.  [de-identified] : 9/12/2023: Technically difficult image quality. Endocardium not well visualized; grossly preserved left ventricular ejection fraction with hypokinesis of the basal inferior wall. There is paradoxical septal motion, consistent with prior cardiac surgery. EF is approximately 55-60%. Concentric left ventricular remodeling. Right ventricular cavity is moderately enlarged in size and reduced systolic function. The left atrium is severely dilated in size. The right atrium is moderately dilated in size. Mitral annular calcification with thickened mitral valve leaflets and mild prolapse of the posterior mitral valve leaflet. Mild to moderate eccentric, posteriorly-directed mitral regurgitation.  A bioprosthetic valve replacement present in the aortic position. Trace intravalvular regurgitation. The peak transaortic gradient is 29.7 mmHg and mean transaortic gradient of 16.9 mmHg. The aortic valve area is estimated at 1.07 sqcm, by the continuity equation. Moderate-severe tricuspid regurgitation.  Estimated pulmonary artery systolic pressure is 55 mmHg, consistent with moderate pulmonary hypertension. Trace pericardial effusion.    Transesophageal echocardiogram performed on 4/5/2023: The left ventricular systolic function is normal with an ejection fraction of 66 %. Normal right ventricular cavity size and probably normal systolic function. Mild to moderate mitral regurgitation.  There is no evidence of left atrial or left atrial appendage thrombus. Left atrial enlargement.  A bioprosthetic valve replacement present in the aortic position. This EOAi may be consistent with severe patient prosthesis mismatch. The aortic valve prosthesis EOAi is 0.6 cm2/m2, with a mean gradient of 24 mmHg and peak velocity of 3.4 m/s obtained by TTE imaging. The aortic valve appears trileaflet with normal systolic excursion. There is minimal thickening of the aortic valve leaflets. Unable to obtain gastric views. A limited DARA examination due to increased secretions leading to respiratory compromise (with desaturation into the 80s).     3/20/2023: Endocardium not well visualized; grossly preserved left ventricular ejection fraction with hypokinesis of the basal inferior wall. EF is approximately 65%. Concentric left ventricular remodeling. Moderate left ventricular diastolic dysfunction. Right ventricular enlargement with normal right ventricular systolic function. The left atrium is moderately dilated in size. The right atrium is mildly dilated in size. Mitral annular calcification with thickened mitral valve leaflets. Moderate mitral regurgitation.  A bioprosthetic valve replacement present in the aortic position. Peak transaortic valve gradient of 53 mmHg and mean gradient of 28 mmHg. Mild valvular regurgitation. Moderate tricuspid regurgitation. Estimated pulmonary artery systolic pressure is 52 mmHg, consistent with moderate pulmonary hypertension. Mild-to-moderate pulmonic regurgitation.    2/7/2022: Mild to moderate mitral regurgitation. Bioprosthetic aortic valve replacement. Peak transaortic valve gradient of 40 mmHg and mean gradient of 21 mmHg, which is probably normal in the setting of a prosthetic valve. Moderately dilated left atrium. Mild right atrial enlargement. Mild left ventricular enlargement. Moderate diastolic dysfunction. Endocardium not well visualized; grossly preserved left ventricular ejection fraction with hypokinesis of the basal inferior wall. Right ventricular enlargement with normal right ventricular systolic function. Mild to moderate tricuspid regurgitation. Mild pulmonary HTN with PASP= 41 mmHg.  5/11/2021: Normal left ventricular ejection fraction with an EF= 69%. Bioprosthetic aortic valve replacement. Peak transaortic valve gradient of 27 mmHg and mean gradient of 14 mmHg, which is probably normal in the setting of a prosthetic valve. Mild to moderate mitral regurgitation. Mild tricuspid regurgitation. Mild pulmonary HTN. PASP= 47 mmHg. [de-identified] : Cardiac catheterization performed on 6/22/2012: EF= 70%. Minor luminal irregularities of the LAD, LCx, and RCA. Mild pulmonary HTN. Severe aortic stenosis.  [de-identified] : 7/2/2012: Bioprosthetic aortic valve replacement.  [de-identified] : 3/30/2023: Carotid Doppler 1. Right ICA minimal plaque: 1-19% 2. Mild plaque in the left bulb. 3. Left ICA mild plaque 20-49%

## 2024-04-24 NOTE — PHYSICAL EXAM
[Well Developed] : well developed [Well Nourished] : well nourished [No Acute Distress] : no acute distress [Normal Venous Pressure] : normal venous pressure [Normal Rate] : normal [Irregularly Irregular] : irregularly irregular [Normal S1] : normal S1 [Normal S2] : normal S2 [II] : a grade 2 [No Pitting Edema] : no pitting edema present [Clear Lung Fields] : clear lung fields [Good Air Entry] : good air entry [No Respiratory Distress] : no respiratory distress  [Soft] : abdomen soft [Non Tender] : non-tender [Normal Bowel Sounds] : normal bowel sounds [Normal Gait] : normal gait [No Edema] : no edema [No Cyanosis] : no cyanosis [No Rash] : no rash [Moves all extremities] : moves all extremities [No Focal Deficits] : no focal deficits [Normal Speech] : normal speech [Alert and Oriented] : alert and oriented [Normal memory] : normal memory [Bruit] : no bruit heard [de-identified] : Extraocular muscles intact. Anicteric sclerae. [de-identified] : Normal oral mucosa.  [de-identified] : No visible skin ulcers.

## 2024-04-24 NOTE — DISCUSSION/SUMMARY
[EKG obtained to assist in diagnosis and management of assessed problem(s)] : EKG obtained to assist in diagnosis and management of assessed problem(s) [FreeTextEntry1] : IMPRESSION: Mr. WEISS is a 84 year old man with a history of severe aortic stenosis secondary to a bicuspid aortic valve status post bioprosthetic aortic valve replacement 7/2012, HTN, hyperlipidemia, former tobacco use, chronic renal insufficiency, and GERD who presents today for follow up of atrial fibrillation.   PLAN: 1. 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 He had a repeat echocardiogram about 7 months ago that revealed improved gradients across the aortic valve prosthesis. He will continue to follow with Dr. Garcia from CT surgery. He understands that he requires antibiotic prophylaxis status post aortic valve replacement.  2. He has not experienced any recurrent chest discomfort since his last visit with me. He had a nuclear stress test 5 months ago that revealed a small area of ischemia with normal LV function. He will continue on medical management.    3. He is in atrial flutter with rapid ventricular rates on his ECG that was performed today in the office.  He will increase Bisoprolol to 17.5 mg daily for rate control and continue on Eliquis 2.5 mg twice daily for systemic anticoagulation. His platelets have been stable on this dose of Eliquis. Given his thrombocytopenia he does not want to take a higher dose of Eliquis. He will also follow up with electrophysiology to discuss a further management of his arrhythmia.  4. His blood pressure is mildly elevated, which will hopefully improve on the higher dose of Bisoprolol. He  will continue on Amlodipine 5 mg daily as well. 5. His most recent LDL was very good, thus he will continue on Atorvastatin 20 mg daily. 6. He requires endocarditis prophylaxis given his bioprosthetic aortic valve. 7. I spent 43 minutes with the patient and his wife during this encounter, which included the time spent on documentation.  8. He will follow up with me in 1 week for follow up of his arrhythmia.

## 2024-04-24 NOTE — PHYSICAL EXAM
[Well Developed] : well developed [Well Nourished] : well nourished [No Acute Distress] : no acute distress [Normal Conjunctiva] : normal conjunctiva [Tachycardia] : tachycardic [Rhythm Regular] : regular [Normal S1] : normal S1 [Normal S2] : normal S2 [II] : a grade 2 [No Pitting Edema] : no pitting edema present [Clear Lung Fields] : clear lung fields [Good Air Entry] : good air entry [No Respiratory Distress] : no respiratory distress  [Soft] : abdomen soft [Non Tender] : non-tender [Normal Gait] : normal gait [No Edema] : no edema [No Cyanosis] : no cyanosis [No Rash] : no rash [No Skin Lesions] : no skin lesions [Moves all extremities] : moves all extremities [No Focal Deficits] : no focal deficits [Normal Speech] : normal speech [Alert and Oriented] : alert and oriented [Normal memory] : normal memory [Normal Venous Pressure] : normal venous pressure [Regularly Irregular] : regularly irregular [Normal Bowel Sounds] : normal bowel sounds [Right Carotid Bruit] : no bruit heard over the right carotid [Left Carotid Bruit] : no bruit heard over the left carotid [Bruit] : no bruit heard [de-identified] : Extraocular muscles intact. Anicteric sclerae.  [de-identified] : No oral pallor [de-identified] : No visible skin ulcers.

## 2024-04-24 NOTE — HISTORY OF PRESENT ILLNESS
[FreeTextEntry1] : Patient is an 84 year old man with a history of severe aortic stenosis secondary to a bicuspid aortic valve status post bioprosthetic aortic valve replacement 7/2012, HTN, hyperlipidemia, former tobacco use, chronic renal insufficiency, thrombocytopenia, and GERD who presents today for follow up of atrial fibrillation and risk stratification prior to knee replacement.  He had rapid atrial fibrillation in [facility] after having COVID infection in early June. His metoprolol was changed to bisoprolol at that time.  He is currently on Bisoprolol 15 mg daily. He currently denies any chest pain, shortness of breath at rest, palpitations, headaches or dizziness. He was seen in your office earlier today and found to have an elevated heart rate. He states that he was very upset this morning due to a personal issue, but feels better now.

## 2024-05-01 ENCOUNTER — APPOINTMENT (OUTPATIENT)
Dept: CARDIOLOGY | Facility: CLINIC | Age: 84
End: 2024-05-01
Payer: MEDICARE

## 2024-05-01 ENCOUNTER — NON-APPOINTMENT (OUTPATIENT)
Age: 84
End: 2024-05-01

## 2024-05-01 VITALS
RESPIRATION RATE: 16 BRPM | HEART RATE: 87 BPM | WEIGHT: 226 LBS | OXYGEN SATURATION: 96 % | BODY MASS INDEX: 35.47 KG/M2 | DIASTOLIC BLOOD PRESSURE: 91 MMHG | HEIGHT: 67 IN | SYSTOLIC BLOOD PRESSURE: 150 MMHG

## 2024-05-01 VITALS — DIASTOLIC BLOOD PRESSURE: 84 MMHG | SYSTOLIC BLOOD PRESSURE: 132 MMHG

## 2024-05-01 DIAGNOSIS — R04.0 EPISTAXIS: ICD-10-CM

## 2024-05-01 PROCEDURE — 93000 ELECTROCARDIOGRAM COMPLETE: CPT

## 2024-05-01 PROCEDURE — 99215 OFFICE O/P EST HI 40 MIN: CPT

## 2024-05-01 PROCEDURE — G2211 COMPLEX E/M VISIT ADD ON: CPT

## 2024-05-06 NOTE — CARDIOLOGY SUMMARY
[de-identified] : 5/1/2024: Atrial fibrillation at 100 beats per minute with a left anterior fascicular block, old inferior infarct, and poor R-wave progression -consider old anterior infarct.  He had a repeat ECG performed following the examination on 5/1/2024 that revealed Atrial flutter at 64 beats per minute with left anterior fascicular block, and poor R wave progression (consider an old anterior infarct).  [de-identified] : Pharmacologic Nuclear Stress Test performed on 11/17/2023: No ischemic ST segment changes. Rare PVCs. Small, mild defect of the basal inferolateral wall that is reversible, consistent with ischemia. LVEF= 65%, revealing normal left ventricular systolic function.  [de-identified] : 9/12/2023: Technically difficult image quality. Endocardium not well visualized; grossly preserved left ventricular ejection fraction with hypokinesis of the basal inferior wall. There is paradoxical septal motion, consistent with prior cardiac surgery. EF is approximately 55-60%. Concentric left ventricular remodeling. Right ventricular cavity is moderately enlarged in size and reduced systolic function. The left atrium is severely dilated in size. The right atrium is moderately dilated in size. Mitral annular calcification with thickened mitral valve leaflets and mild prolapse of the posterior mitral valve leaflet. Mild to moderate eccentric, posteriorly-directed mitral regurgitation.  A bioprosthetic valve replacement present in the aortic position. Trace intravalvular regurgitation. The peak transaortic gradient is 29.7 mmHg and mean transaortic gradient of 16.9 mmHg. The aortic valve area is estimated at 1.07 sqcm, by the continuity equation. Moderate-severe tricuspid regurgitation.  Estimated pulmonary artery systolic pressure is 55 mmHg, consistent with moderate pulmonary hypertension. Trace pericardial effusion.    Transesophageal echocardiogram performed on 4/5/2023: The left ventricular systolic function is normal with an ejection fraction of 66 %. Normal right ventricular cavity size and probably normal systolic function. Mild to moderate mitral regurgitation.  There is no evidence of left atrial or left atrial appendage thrombus. Left atrial enlargement.  A bioprosthetic valve replacement present in the aortic position. This EOAi may be consistent with severe patient prosthesis mismatch. The aortic valve prosthesis EOAi is 0.6 cm2/m2, with a mean gradient of 24 mmHg and peak velocity of 3.4 m/s obtained by TTE imaging. The aortic valve appears trileaflet with normal systolic excursion. There is minimal thickening of the aortic valve leaflets. Unable to obtain gastric views. A limited DARA examination due to increased secretions leading to respiratory compromise (with desaturation into the 80s).     3/20/2023: Endocardium not well visualized; grossly preserved left ventricular ejection fraction with hypokinesis of the basal inferior wall. EF is approximately 65%. Concentric left ventricular remodeling. Moderate left ventricular diastolic dysfunction. Right ventricular enlargement with normal right ventricular systolic function. The left atrium is moderately dilated in size. The right atrium is mildly dilated in size. Mitral annular calcification with thickened mitral valve leaflets. Moderate mitral regurgitation.  A bioprosthetic valve replacement present in the aortic position. Peak transaortic valve gradient of 53 mmHg and mean gradient of 28 mmHg. Mild valvular regurgitation. Moderate tricuspid regurgitation. Estimated pulmonary artery systolic pressure is 52 mmHg, consistent with moderate pulmonary hypertension. Mild-to-moderate pulmonic regurgitation.    2/7/2022: Mild to moderate mitral regurgitation. Bioprosthetic aortic valve replacement. Peak transaortic valve gradient of 40 mmHg and mean gradient of 21 mmHg, which is probably normal in the setting of a prosthetic valve. Moderately dilated left atrium. Mild right atrial enlargement. Mild left ventricular enlargement. Moderate diastolic dysfunction. Endocardium not well visualized; grossly preserved left ventricular ejection fraction with hypokinesis of the basal inferior wall. Right ventricular enlargement with normal right ventricular systolic function. Mild to moderate tricuspid regurgitation. Mild pulmonary HTN with PASP= 41 mmHg.  5/11/2021: Normal left ventricular ejection fraction with an EF= 69%. Bioprosthetic aortic valve replacement. Peak transaortic valve gradient of 27 mmHg and mean gradient of 14 mmHg, which is probably normal in the setting of a prosthetic valve. Mild to moderate mitral regurgitation. Mild tricuspid regurgitation. Mild pulmonary HTN. PASP= 47 mmHg. [de-identified] : Cardiac catheterization performed on 6/22/2012: EF= 70%. Minor luminal irregularities of the LAD, LCx, and RCA. Mild pulmonary HTN. Severe aortic stenosis.  [de-identified] : 7/2/2012: Bioprosthetic aortic valve replacement.  [de-identified] : 3/30/2023: Carotid Doppler 1. Right ICA minimal plaque: 1-19% 2. Mild plaque in the left bulb. 3. Left ICA mild plaque 20-49%

## 2024-05-06 NOTE — DISCUSSION/SUMMARY
[EKG obtained to assist in diagnosis and management of assessed problem(s)] : EKG obtained to assist in diagnosis and management of assessed problem(s) [FreeTextEntry1] : IMPRESSION: Mr. WEISS is a 84 year old man with a history of severe aortic stenosis secondary to a bicuspid aortic valve status post bioprosthetic aortic valve replacement 7/2012, HTN, hyperlipidemia, former tobacco use, chronic renal insufficiency, and GERD who presents today for follow up of atrial fibrillation.   PLAN: 1. 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 He had a repeat echocardiogram about 7 months ago that revealed improved gradients across the aortic valve prosthesis. He will continue to follow with Dr. Garcia from CT surgery. He understands that he requires antibiotic prophylaxis status post aortic valve replacement.  2. He has not experienced any recurrent chest discomfort since his last visit with me. He had a nuclear stress test 5 and a half months ago that revealed a small area of ischemia with normal LV function. He will continue on medical management.    3. Ideally, I do not want him holding his Eliquis as he is already on the lower dose.  I have asked him to follow-up with ENT given his nosebleeds and to consider using a saline nasal spray to improve the dryness of his nasal mucosa. 4.  His blood pressure was good when it was repeated at the time of the physical examination.  He will continue on bisoprolol as well as amlodipine 5 mg daily. 5. He was in atrial flutter on his repeat ECG with good ventricular rates.  He will continue on Bisoprolol  17.5 mg daily for rate control and continue on Eliquis 2.5 mg twice daily for systemic anticoagulation. His platelets have been stable on this dose of Eliquis. Given his thrombocytopenia he does not want to take a higher dose of Eliquis. He will also follow up with electrophysiology to discuss a further management of his arrhythmia.  6. His most recent LDL was very good, thus he will continue on Atorvastatin 20 mg daily. 7. He requires endocarditis prophylaxis given his bioprosthetic aortic valve. 8.  He will follow-up with me in 3-4 weeks or sooner should he experience any symptoms in the interim.   9. I have asked him to follow up with ENT given his nosebleeds.  10. I spent approximately 45 minutes with the patient during this encounter which included the time spent on documentation.

## 2024-05-06 NOTE — PHYSICAL EXAM
[Well Developed] : well developed [Well Nourished] : well nourished [No Acute Distress] : no acute distress [Normal Conjunctiva] : normal conjunctiva [Normal Venous Pressure] : normal venous pressure [Tachycardia] : tachycardic [Regularly Irregular] : regularly irregular [Normal S1] : normal S1 [Normal S2] : normal S2 [II] : a grade 2 [No Pitting Edema] : no pitting edema present [Clear Lung Fields] : clear lung fields [Good Air Entry] : good air entry [No Respiratory Distress] : no respiratory distress  [Soft] : abdomen soft [Non Tender] : non-tender [Normal Bowel Sounds] : normal bowel sounds [Normal Gait] : normal gait [No Edema] : no edema [No Cyanosis] : no cyanosis [No Rash] : no rash [Moves all extremities] : moves all extremities [No Focal Deficits] : no focal deficits [Normal Speech] : normal speech [Alert and Oriented] : alert and oriented [Normal memory] : normal memory [Normal Rate] : normal [Irregularly Irregular] : irregularly irregular [Right Carotid Bruit] : no bruit heard over the right carotid [Left Carotid Bruit] : no bruit heard over the left carotid [Bruit] : no bruit heard [de-identified] : Extraocular muscles intact. Anicteric sclerae.  [de-identified] : No oral pallor [de-identified] : No visible skin ulcers.

## 2024-05-06 NOTE — HISTORY OF PRESENT ILLNESS
[FreeTextEntry1] : Patient is an 84 year old man with a history of severe aortic stenosis secondary to a bicuspid aortic valve status post bioprosthetic aortic valve replacement 7/2012, HTN, hyperlipidemia, former tobacco use, chronic renal insufficiency, thrombocytopenia, and GERD who presents today for follow up of atrial fibrillation.  He had rapid atrial fibrillation in [facility] after having COVID infection in early June. His metoprolol was changed to bisoprolol at that time.  He is currently on Bisoprolol 17.5 mg daily. He currently denies any chest pain, shortness of breath at rest, palpitations, headaches or dizziness. His wife states that he was again very upset this morning driving to the office.  His wife states that he had 2 episodes of nosebleeds 2 days ago at which time he held his Eliquis and he restarted again this morning.  He states that he has suffered from nosebleeds in the past.

## 2024-05-22 ENCOUNTER — APPOINTMENT (OUTPATIENT)
Dept: INTERNAL MEDICINE | Facility: CLINIC | Age: 84
End: 2024-05-22
Payer: MEDICARE

## 2024-05-22 ENCOUNTER — LABORATORY RESULT (OUTPATIENT)
Age: 84
End: 2024-05-22

## 2024-05-22 VITALS — SYSTOLIC BLOOD PRESSURE: 132 MMHG | DIASTOLIC BLOOD PRESSURE: 75 MMHG | RESPIRATION RATE: 14 BRPM | HEART RATE: 96 BPM

## 2024-05-22 VITALS
HEART RATE: 112 BPM | HEIGHT: 67 IN | WEIGHT: 219 LBS | OXYGEN SATURATION: 96 % | BODY MASS INDEX: 34.37 KG/M2 | TEMPERATURE: 97.5 F

## 2024-05-22 DIAGNOSIS — R73.03 PREDIABETES.: ICD-10-CM

## 2024-05-22 DIAGNOSIS — T50.905A TOXIC LIVER DISEASE WITH HEPATITIS, NOT ELSEWHERE CLASSIFIED: ICD-10-CM

## 2024-05-22 DIAGNOSIS — E78.5 HYPERLIPIDEMIA, UNSPECIFIED: ICD-10-CM

## 2024-05-22 DIAGNOSIS — K71.6 TOXIC LIVER DISEASE WITH HEPATITIS, NOT ELSEWHERE CLASSIFIED: ICD-10-CM

## 2024-05-22 DIAGNOSIS — N18.31 CHRONIC KIDNEY DISEASE, STAGE 3A: ICD-10-CM

## 2024-05-22 DIAGNOSIS — Z00.00 ENCOUNTER FOR GENERAL ADULT MEDICAL EXAMINATION W/OUT ABNORMAL FINDINGS: ICD-10-CM

## 2024-05-22 DIAGNOSIS — E55.9 VITAMIN D DEFICIENCY, UNSPECIFIED: ICD-10-CM

## 2024-05-22 DIAGNOSIS — R79.89 OTHER SPECIFIED ABNORMAL FINDINGS OF BLOOD CHEMISTRY: ICD-10-CM

## 2024-05-22 DIAGNOSIS — M54.16 RADICULOPATHY, LUMBAR REGION: ICD-10-CM

## 2024-05-22 DIAGNOSIS — E66.9 OBESITY, UNSPECIFIED: ICD-10-CM

## 2024-05-22 DIAGNOSIS — I48.91 UNSPECIFIED ATRIAL FIBRILLATION: ICD-10-CM

## 2024-05-22 DIAGNOSIS — I10 ESSENTIAL (PRIMARY) HYPERTENSION: ICD-10-CM

## 2024-05-22 DIAGNOSIS — I35.9 NONRHEUMATIC AORTIC VALVE DISORDER, UNSPECIFIED: ICD-10-CM

## 2024-05-22 DIAGNOSIS — F41.9 ANXIETY DISORDER, UNSPECIFIED: ICD-10-CM

## 2024-05-22 DIAGNOSIS — I51.89 OTHER ILL-DEFINED HEART DISEASES: ICD-10-CM

## 2024-05-22 DIAGNOSIS — D69.6 THROMBOCYTOPENIA, UNSPECIFIED: ICD-10-CM

## 2024-05-22 DIAGNOSIS — K21.00 GASTRO-ESOPHAGEAL REFLUX DISEASE WITH ESOPHAGITIS, WITHOUT BLEEDING: ICD-10-CM

## 2024-05-22 PROCEDURE — G0447 BEHAVIOR COUNSEL OBESITY 15M: CPT | Mod: 59

## 2024-05-22 PROCEDURE — 99215 OFFICE O/P EST HI 40 MIN: CPT | Mod: 25

## 2024-05-22 PROCEDURE — G0439: CPT

## 2024-05-22 PROCEDURE — 36415 COLL VENOUS BLD VENIPUNCTURE: CPT

## 2024-05-22 RX ORDER — ESCITALOPRAM OXALATE 5 MG/1
5 TABLET ORAL
Qty: 90 | Refills: 3 | Status: ACTIVE | COMMUNITY
Start: 2024-05-22 | End: 1900-01-01

## 2024-05-22 RX ORDER — MELOXICAM 15 MG/1
15 TABLET ORAL
Qty: 60 | Refills: 0 | Status: DISCONTINUED | COMMUNITY
Start: 2020-05-21 | End: 2024-05-22

## 2024-05-22 NOTE — HEALTH RISK ASSESSMENT
[FreeTextEntry1] : health-family [de-identified] : socialy [Change in mental status noted] : No change in mental status noted [Sexually Active] : not sexually active [Reports changes in hearing] : Reports no changes in hearing [Reports changes in vision] : Reports no changes in vision [Reports changes in dental health] : Reports no changes in dental health [Travel to Developing Areas] : does not  travel to developing areas [TB Exposure] : is not being exposed to tuberculosis [Caregiver Concerns] : does not have caregiver concerns

## 2024-05-22 NOTE — HISTORY OF PRESENT ILLNESS
[FreeTextEntry1] : Patient comes for his Medicare annual wellness visit and followup of his chronic medical problems. Patient feels well without any chest pain, shortness of breath, paroxysmal nocturnal dyspnea or orthopnea. . His gastrointestinal reflux symptoms remain stable.Many times he has his known  low back pain mostly when he walks.  According to his wife sometimes becomes  anxious and upset very easily at home most recent blood tests, consults, medication list, allergies reviewed

## 2024-05-22 NOTE — ASSESSMENT
[FreeTextEntry1] : Diagnosis #1 history of atrial fibrillation, stable.  Continue current treatment.  Followed up by the cardiologist as well #2 hypertension.Same medication and strict  low salt diet.cmp to lab #3 hyperlipidemia. Continue with same medication and low-fat diet.lipids to lab #4 monitor liver  toxicity due to chronic medication use.cmp to lab #5 hypovitaminosis D., being supplemented.level to lab #6 GE reflux, only occasionally symptomatic.  Continue same medication and diet.. cbc  to lab. #7 history of low B12.  level sent to the lab #8 low back . To continue with Tylenol 500 mg p.o. every 6 hours p.r.n.  #9  History of thrombocytosis. CBC sent to lab #10  Benign prostatic hypertrophy, stable.  Patient followed up by a urologist #11  Anxiety.  Never took escitalopram 5 mg p.o. once a day which advised to start. 12.  Chronic renal insufficiency, CMP and CBC to lab 13.  History of psoriasis, stable.  Continue same treatment. 14.  History of nephrolithiasis, stable.  Stable.  Followed by the urologist 15 obesity.  Again advised to try to lose weight by cutting back on his calories and increasing his activity.  Importance emphasized to him.  Approximately 15 minutes spent 16.  Status post aortic valve replacement, stable.  Followed up by cardiologist Plan . Patient advised to return after 3 months .Approximately 50 minutes spent totally

## 2024-05-23 ENCOUNTER — NON-APPOINTMENT (OUTPATIENT)
Age: 84
End: 2024-05-23

## 2024-05-23 ENCOUNTER — APPOINTMENT (OUTPATIENT)
Dept: CARDIOLOGY | Facility: CLINIC | Age: 84
End: 2024-05-23
Payer: MEDICARE

## 2024-05-23 VITALS
HEIGHT: 67 IN | RESPIRATION RATE: 16 BRPM | DIASTOLIC BLOOD PRESSURE: 77 MMHG | OXYGEN SATURATION: 96 % | WEIGHT: 220 LBS | BODY MASS INDEX: 34.53 KG/M2 | SYSTOLIC BLOOD PRESSURE: 114 MMHG | HEART RATE: 79 BPM

## 2024-05-23 LAB
25(OH)D3 SERPL-MCNC: 42.7 NG/ML
ALBUMIN SERPL ELPH-MCNC: 4.5 G/DL
ALP BLD-CCNC: 61 U/L
ALT SERPL-CCNC: 18 U/L
ANION GAP SERPL CALC-SCNC: 11 MMOL/L
APPEARANCE: CLEAR
AST SERPL-CCNC: 20 U/L
BACTERIA: NEGATIVE /HPF
BASOPHILS # BLD AUTO: 0.01 K/UL
BASOPHILS NFR BLD AUTO: 0.2 %
BILIRUB SERPL-MCNC: 0.9 MG/DL
BILIRUBIN URINE: NEGATIVE
BLOOD URINE: ABNORMAL
BUN SERPL-MCNC: 13 MG/DL
CALCIUM SERPL-MCNC: 9.3 MG/DL
CAST: 0 /LPF
CHLORIDE SERPL-SCNC: 105 MMOL/L
CHOLEST SERPL-MCNC: 115 MG/DL
CO2 SERPL-SCNC: 23 MMOL/L
COLOR: YELLOW
CREAT SERPL-MCNC: 1.19 MG/DL
CREAT SPEC-SCNC: 94 MG/DL
EGFR: 60 ML/MIN/1.73M2
EOSINOPHIL # BLD AUTO: 0.02 K/UL
EOSINOPHIL NFR BLD AUTO: 0.4 %
EPITHELIAL CELLS: 0 /HPF
ESTIMATED AVERAGE GLUCOSE: 134 MG/DL
GLUCOSE QUALITATIVE U: NEGATIVE MG/DL
GLUCOSE SERPL-MCNC: 104 MG/DL
HBA1C MFR BLD HPLC: 6.3 %
HCT VFR BLD CALC: 43.8 %
HDLC SERPL-MCNC: 46 MG/DL
HGB BLD-MCNC: 14.6 G/DL
IMM GRANULOCYTES NFR BLD AUTO: 1 %
KETONES URINE: NEGATIVE MG/DL
LDLC SERPL CALC-MCNC: 57 MG/DL
LEUKOCYTE ESTERASE URINE: NEGATIVE
LYMPHOCYTES # BLD AUTO: 1.43 K/UL
LYMPHOCYTES NFR BLD AUTO: 28.1 %
MAN DIFF?: NORMAL
MCHC RBC-ENTMCNC: 30.9 PG
MCHC RBC-ENTMCNC: 33.3 GM/DL
MCV RBC AUTO: 92.6 FL
MICROALBUMIN 24H UR DL<=1MG/L-MCNC: 1.7 MG/DL
MICROALBUMIN/CREAT 24H UR-RTO: 18 MG/G
MICROSCOPIC-UA: NORMAL
MONOCYTES # BLD AUTO: 1.38 K/UL
MONOCYTES NFR BLD AUTO: 27.1 %
NEUTROPHILS # BLD AUTO: 2.2 K/UL
NEUTROPHILS NFR BLD AUTO: 43.2 %
NITRITE URINE: NEGATIVE
NONHDLC SERPL-MCNC: 69 MG/DL
PH URINE: 6.5
PLATELET # BLD AUTO: 81 K/UL
POTASSIUM SERPL-SCNC: 4.3 MMOL/L
PROT SERPL-MCNC: 6.9 G/DL
PROTEIN URINE: NORMAL MG/DL
RBC # BLD: 4.73 M/UL
RBC # FLD: 13.1 %
RED BLOOD CELLS URINE: 1 /HPF
REVIEW: NORMAL
SODIUM SERPL-SCNC: 139 MMOL/L
SPECIFIC GRAVITY URINE: 1.01
TRIGL SERPL-MCNC: 53 MG/DL
UROBILINOGEN URINE: 1 MG/DL
VIT B12 SERPL-MCNC: 698 PG/ML
WBC # FLD AUTO: 5.09 K/UL
WHITE BLOOD CELLS URINE: 0 /HPF

## 2024-05-23 PROCEDURE — G2211 COMPLEX E/M VISIT ADD ON: CPT

## 2024-05-23 PROCEDURE — 93000 ELECTROCARDIOGRAM COMPLETE: CPT

## 2024-05-23 PROCEDURE — 99214 OFFICE O/P EST MOD 30 MIN: CPT

## 2024-05-23 RX ORDER — BISOPROLOL FUMARATE 5 MG/1
5 TABLET, FILM COATED ORAL DAILY
Qty: 135 | Refills: 1 | Status: ACTIVE | COMMUNITY
Start: 2024-01-19 | End: 1900-01-01

## 2024-06-05 ENCOUNTER — RX RENEWAL (OUTPATIENT)
Age: 84
End: 2024-06-05

## 2024-06-05 NOTE — DISCUSSION/SUMMARY
[FreeTextEntry1] : IMPRESSION: Mr. WEISS is a 84 year old man with a history of severe aortic stenosis secondary to a bicuspid aortic valve status post bioprosthetic aortic valve replacement 7/2012, HTN, hyperlipidemia, former tobacco use, chronic renal insufficiency, and GERD who presents today for follow up of atrial fibrillation.   PLAN: 1. 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 He had a repeat echocardiogram about 8 months ago that revealed improved gradients across the aortic valve prosthesis. He will continue to follow with Dr. Garcia from CT surgery. He understands that he requires antibiotic prophylaxis status post aortic valve replacement.  2. He has not experienced any recurrent chest discomfort since his last visit with me. He had a nuclear stress test 6 months ago that revealed a small area of ischemia with normal LV function. He will continue on medical management.    3. His blood pressure is good today.  He will continue on bisoprolol as well as amlodipine 5 mg daily. 4. He was in rate controlled atrial fibrillation on his ECG today.  He will continue on Bisoprolol  17.5 mg daily for rate control and continue on Eliquis 2.5 mg twice daily for systemic anticoagulation. His platelets have been stable on this dose of Eliquis. Given his thrombocytopenia he does not want to take a higher dose of Eliquis. He will also follow up with electrophysiology to discuss a further management of his arrhythmia.  5. His most recent LDL was very good, thus he will continue on Atorvastatin 20 mg daily. 6. He requires endocarditis prophylaxis given his bioprosthetic aortic valve. 7.  He will follow-up with me in 3-4 months or sooner should he experience any symptoms in the interim. He states that he will be leaving for [place] next month.  [EKG obtained to assist in diagnosis and management of assessed problem(s)] : EKG obtained to assist in diagnosis and management of assessed problem(s)

## 2024-06-05 NOTE — CARDIOLOGY SUMMARY
[de-identified] : 5/23/2024: Atrial fibrillation at 86 beats per minute with a left anterior fascicular block, old inferior infarct, and poor R-wave progression -consider old anterior infarct. [de-identified] : Pharmacologic Nuclear Stress Test performed on 11/17/2023: No ischemic ST segment changes. Rare PVCs. Small, mild defect of the basal inferolateral wall that is reversible, consistent with ischemia. LVEF= 65%, revealing normal left ventricular systolic function.  [de-identified] : 9/12/2023: Technically difficult image quality. Endocardium not well visualized; grossly preserved left ventricular ejection fraction with hypokinesis of the basal inferior wall. There is paradoxical septal motion, consistent with prior cardiac surgery. EF is approximately 55-60%. Concentric left ventricular remodeling. Right ventricular cavity is moderately enlarged in size and reduced systolic function. The left atrium is severely dilated in size. The right atrium is moderately dilated in size. Mitral annular calcification with thickened mitral valve leaflets and mild prolapse of the posterior mitral valve leaflet. Mild to moderate eccentric, posteriorly-directed mitral regurgitation.  A bioprosthetic valve replacement present in the aortic position. Trace intravalvular regurgitation. The peak transaortic gradient is 29.7 mmHg and mean transaortic gradient of 16.9 mmHg. The aortic valve area is estimated at 1.07 sqcm, by the continuity equation. Moderate-severe tricuspid regurgitation.  Estimated pulmonary artery systolic pressure is 55 mmHg, consistent with moderate pulmonary hypertension. Trace pericardial effusion.    Transesophageal echocardiogram performed on 4/5/2023: The left ventricular systolic function is normal with an ejection fraction of 66 %. Normal right ventricular cavity size and probably normal systolic function. Mild to moderate mitral regurgitation.  There is no evidence of left atrial or left atrial appendage thrombus. Left atrial enlargement.  A bioprosthetic valve replacement present in the aortic position. This EOAi may be consistent with severe patient prosthesis mismatch. The aortic valve prosthesis EOAi is 0.6 cm2/m2, with a mean gradient of 24 mmHg and peak velocity of 3.4 m/s obtained by TTE imaging. The aortic valve appears trileaflet with normal systolic excursion. There is minimal thickening of the aortic valve leaflets. Unable to obtain gastric views. A limited DARA examination due to increased secretions leading to respiratory compromise (with desaturation into the 80s).     3/20/2023: Endocardium not well visualized; grossly preserved left ventricular ejection fraction with hypokinesis of the basal inferior wall. EF is approximately 65%. Concentric left ventricular remodeling. Moderate left ventricular diastolic dysfunction. Right ventricular enlargement with normal right ventricular systolic function. The left atrium is moderately dilated in size. The right atrium is mildly dilated in size. Mitral annular calcification with thickened mitral valve leaflets. Moderate mitral regurgitation.  A bioprosthetic valve replacement present in the aortic position. Peak transaortic valve gradient of 53 mmHg and mean gradient of 28 mmHg. Mild valvular regurgitation. Moderate tricuspid regurgitation. Estimated pulmonary artery systolic pressure is 52 mmHg, consistent with moderate pulmonary hypertension. Mild-to-moderate pulmonic regurgitation.    2/7/2022: Mild to moderate mitral regurgitation. Bioprosthetic aortic valve replacement. Peak transaortic valve gradient of 40 mmHg and mean gradient of 21 mmHg, which is probably normal in the setting of a prosthetic valve. Moderately dilated left atrium. Mild right atrial enlargement. Mild left ventricular enlargement. Moderate diastolic dysfunction. Endocardium not well visualized; grossly preserved left ventricular ejection fraction with hypokinesis of the basal inferior wall. Right ventricular enlargement with normal right ventricular systolic function. Mild to moderate tricuspid regurgitation. Mild pulmonary HTN with PASP= 41 mmHg.  5/11/2021: Normal left ventricular ejection fraction with an EF= 69%. Bioprosthetic aortic valve replacement. Peak transaortic valve gradient of 27 mmHg and mean gradient of 14 mmHg, which is probably normal in the setting of a prosthetic valve. Mild to moderate mitral regurgitation. Mild tricuspid regurgitation. Mild pulmonary HTN. PASP= 47 mmHg. [de-identified] : Cardiac catheterization performed on 6/22/2012: EF= 70%. Minor luminal irregularities of the LAD, LCx, and RCA. Mild pulmonary HTN. Severe aortic stenosis.  [de-identified] : 7/2/2012: Bioprosthetic aortic valve replacement.  [de-identified] : 3/30/2023: Carotid Doppler 1. Right ICA minimal plaque: 1-19% 2. Mild plaque in the left bulb. 3. Left ICA mild plaque 20-49%

## 2024-06-05 NOTE — HISTORY OF PRESENT ILLNESS
[FreeTextEntry1] : Patient is an 84 year old man with a history of severe aortic stenosis secondary to a bicuspid aortic valve status post bioprosthetic aortic valve replacement 7/2012, HTN, hyperlipidemia, former tobacco use, chronic renal insufficiency, thrombocytopenia, and GERD who presents today for follow up of atrial fibrillation.  He had rapid atrial fibrillation in [facility] after having COVID infection in early June. His metoprolol was changed to bisoprolol at that time.  He is currently on Bisoprolol 17.5 mg daily. He currently denies any chest pain, shortness of breath at rest, palpitations, headaches or dizziness. He has not had any episodes of significant nosebleeds since I last saw him.

## 2024-06-05 NOTE — PHYSICAL EXAM
[Well Developed] : well developed [Well Nourished] : well nourished [No Acute Distress] : no acute distress [Normal Conjunctiva] : normal conjunctiva [Normal Venous Pressure] : normal venous pressure [Normal Rate] : normal [Irregularly Irregular] : irregularly irregular [Normal S1] : normal S1 [Normal S2] : normal S2 [II] : a grade 2 [No Pitting Edema] : no pitting edema present [Clear Lung Fields] : clear lung fields [Good Air Entry] : good air entry [No Respiratory Distress] : no respiratory distress  [Soft] : abdomen soft [Non Tender] : non-tender [Normal Bowel Sounds] : normal bowel sounds [Normal Gait] : normal gait [No Edema] : no edema [No Cyanosis] : no cyanosis [No Rash] : no rash [Moves all extremities] : moves all extremities [No Focal Deficits] : no focal deficits [Normal Speech] : normal speech [Alert and Oriented] : alert and oriented [Normal memory] : normal memory [Right Carotid Bruit] : no bruit heard over the right carotid [Left Carotid Bruit] : no bruit heard over the left carotid [Bruit] : no bruit heard [de-identified] : Extraocular muscles intact. Anicteric sclerae.  [de-identified] : No oral pallor [de-identified] : No visible skin ulcers.

## 2024-06-09 NOTE — DISCUSSION/SUMMARY
[FreeTextEntry1] : IMPRESSION: Mr. WEISS is a 83 year old man with a history of severe aortic stenosis secondary to a bicuspid aortic valve status post bioprosthetic aortic valve replacement 7/2012, HTN, hyperlipidemia, former tobacco use, chronic renal insufficiency, and GERD who presents today for follow up of atrial fibrillation.  PLAN: 1. 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 He had a repeat echocardiogram about 5 months ago that revealed improved gradients across the aortic valve prosthesis. He will continue to follow with Dr. Garcia from CT surgery. He understands that he requires antibiotic prophylaxis status post aortic valve replacement.  2. He has not experienced any recurrent chest discomfort since his last visit with me. He had a nuclear stress test last week that revealed a small area of ischemia with normal LV function. He will continue on medical management.    3. He is in rate controlled atrial fibrillation on his ECG that was performed today.  He will continue on Bisoprolol 15 mg daily for rate control and Eliquis 2.5 mg twice daily for systemic anticoagulation, His platelets have been stable on this dose of Eliquis.  4. His blood pressure is well controlled today, thus he will continue on Amlodipine 5 mg daily and Bisoprolol 15 mg daily. 5. His most recent LDL was good, thus he will continue on Atorvastatin 20 mg daily. 6. He does not need to take oral antibiotics for endocarditis prophylaxis given his bioprosthetic aortic valve as he will be receiving IV antibiotics preop.    7. He will follow up with me in 1 month or sooner should he experience any symptoms in the interim.  [EKG obtained to assist in diagnosis and management of assessed problem(s)] : EKG obtained to assist in diagnosis and management of assessed problem(s)

## 2024-06-09 NOTE — DISCUSSION/SUMMARY
[FreeTextEntry1] : IMPRESSION: Mr. WEISS is a 83 year old man with a history of severe aortic stenosis secondary to a bicuspid aortic valve status post bioprosthetic aortic valve replacement 7/2012, HTN, hyperlipidemia, former tobacco use, chronic renal insufficiency, and GERD who presents today for follow up of atrial fibrillation.  PLAN: 1. 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 He had a repeat echocardiogram about 4 months ago that revealed improved gradients across the aortic valve prosthesis. He will continue to follow with Dr. Garcia from CT surgery. He understands that he requires antibiotic prophylaxis status post aortic valve replacement.  2. He has not experienced any recurrent chest discomfort since his last visit with me. He had a nuclear stress test last week that revealed a small area of ischemia with normal LV function. He will continue on medical management.    3. He is in rapid atrial flutter on his ECG that was performed today. He is feeling well and thus does not want to go to the hospital or see EP. He has agreed to increase the dose of his Bisoprolol to 15 mg daily. He will also continue on Eliquis 2.5 mg twice daily for systemic anticoagulation, His platelets have been stable on this dose of Eliquis.  4. His blood pressure is well controlled today, thus he will continue on Amlodipine 5 mg daily and Bisoprolol. 5. His most recent LDL was good, thus he will continue on Atorvastatin 20 mg daily. 6. He will follow up with me in one week for follow up of his arrhythmia.  [EKG obtained to assist in diagnosis and management of assessed problem(s)] : EKG obtained to assist in diagnosis and management of assessed problem(s)

## 2024-06-09 NOTE — CARDIOLOGY SUMMARY
[de-identified] : 1/25/2024: Atrial flutter at 133 beats per minute with left anterior fascicular block.  [de-identified] : Pharmacologic Nuclear Stress Test performed on 11/17/2023: No ischemic ST segment changes. Rare PVCs. Small, mild defect of the basal inferolateral wall that is reversible, consistent with ischemia. LVEF= 65%, revealing normal left ventricular systolic function.  [de-identified] : 9/12/2023: Technically difficult image quality. Endocardium not well visualized; grossly preserved left ventricular ejection fraction with hypokinesis of the basal inferior wall. There is paradoxical septal motion, consistent with prior cardiac surgery. EF is approximately 55-60%. Concentric left ventricular remodeling. Right ventricular cavity is moderately enlarged in size and reduced systolic function. The left atrium is severely dilated in size. The right atrium is moderately dilated in size. Mitral annular calcification with thickened mitral valve leaflets and mild prolapse of the posterior mitral valve leaflet. Mild to moderate eccentric, posteriorly-directed mitral regurgitation.  A bioprosthetic valve replacement present in the aortic position. Trace intravalvular regurgitation. The peak transaortic gradient is 29.7 mmHg and mean transaortic gradient of 16.9 mmHg. The aortic valve area is estimated at 1.07 sqcm, by the continuity equation. Moderate-severe tricuspid regurgitation.  Estimated pulmonary artery systolic pressure is 55 mmHg, consistent with moderate pulmonary hypertension. Trace pericardial effusion.    Transesophageal echocardiogram performed on 4/5/2023: The left ventricular systolic function is normal with an ejection fraction of 66 %. Normal right ventricular cavity size and probably normal systolic function. Mild to moderate mitral regurgitation.  There is no evidence of left atrial or left atrial appendage thrombus. Left atrial enlargement.  A bioprosthetic valve replacement present in the aortic position. This EOAi may be consistent with severe patient prosthesis mismatch. The aortic valve prosthesis EOAi is 0.6 cm2/m2, with a mean gradient of 24 mmHg and peak velocity of 3.4 m/s obtained by TTE imaging. The aortic valve appears trileaflet with normal systolic excursion. There is minimal thickening of the aortic valve leaflets. Unable to obtain gastric views. A limited DARA examination due to increased secretions leading to respiratory compromise (with desaturation into the 80s).     3/20/2023: Endocardium not well visualized; grossly preserved left ventricular ejection fraction with hypokinesis of the basal inferior wall. EF is approximately 65%. Concentric left ventricular remodeling. Moderate left ventricular diastolic dysfunction. Right ventricular enlargement with normal right ventricular systolic function. The left atrium is moderately dilated in size. The right atrium is mildly dilated in size. Mitral annular calcification with thickened mitral valve leaflets. Moderate mitral regurgitation.  A bioprosthetic valve replacement present in the aortic position. Peak transaortic valve gradient of 53 mmHg and mean gradient of 28 mmHg. Mild valvular regurgitation. Moderate tricuspid regurgitation. Estimated pulmonary artery systolic pressure is 52 mmHg, consistent with moderate pulmonary hypertension. Mild-to-moderate pulmonic regurgitation.    2/7/2022: Mild to moderate mitral regurgitation. Bioprosthetic aortic valve replacement. Peak transaortic valve gradient of 40 mmHg and mean gradient of 21 mmHg, which is probably normal in the setting of a prosthetic valve. Moderately dilated left atrium. Mild right atrial enlargement. Mild left ventricular enlargement. Moderate diastolic dysfunction. Endocardium not well visualized; grossly preserved left ventricular ejection fraction with hypokinesis of the basal inferior wall. Right ventricular enlargement with normal right ventricular systolic function. Mild to moderate tricuspid regurgitation. Mild pulmonary HTN with PASP= 41 mmHg.  5/11/2021: Normal left ventricular ejection fraction with an EF= 69%. Bioprosthetic aortic valve replacement. Peak transaortic valve gradient of 27 mmHg and mean gradient of 14 mmHg, which is probably normal in the setting of a prosthetic valve. Mild to moderate mitral regurgitation. Mild tricuspid regurgitation. Mild pulmonary HTN. PASP= 47 mmHg. [de-identified] : Cardiac catheterization performed on 6/22/2012: EF= 70%. Minor luminal irregularities of the LAD, LCx, and RCA. Mild pulmonary HTN. Severe aortic stenosis.  [de-identified] : 7/2/2012: Bioprosthetic aortic valve replacement.

## 2024-06-09 NOTE — HISTORY OF PRESENT ILLNESS
[FreeTextEntry1] : Patient is an 83 year old man with a history of severe aortic stenosis secondary to a bicuspid aortic valve status post bioprosthetic aortic valve replacement 7/2012, HTN, hyperlipidemia, former tobacco use, chronic renal insufficiency, thrombocytopenia, and GERD who presents today for follow up of atrial fibrillation.  He had rapid atrial fibrillation in [facility] after having COVID infection in early June. His metoprolol was changed to bisoprolol 10 mg daily at that time. He states that he has had swelling of his left leg since having knee surgery. He reports having a Doppler that was negative for a DVT. He otherwise denies any chest pain, shortness of breath at rest, palpitations, headaches or dizziness.

## 2024-06-09 NOTE — DISCUSSION/SUMMARY
[FreeTextEntry1] : IMPRESSION: Mr. WEISS is a 83 year old man with a history of severe aortic stenosis secondary to a bicuspid aortic valve status post bioprosthetic aortic valve replacement 7/2012, HTN, hyperlipidemia, former tobacco use, chronic renal insufficiency, and GERD who presents today for follow up of atrial fibrillation.   PLAN: 1. 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 He had a repeat echocardiogram about 4 months ago that revealed improved gradients across the aortic valve prosthesis. He will continue to follow with Dr. Garcia from CT surgery. He understands that he requires antibiotic prophylaxis status post aortic valve replacement.  2. He has not experienced any recurrent chest discomfort since his last visit with me. He had a nuclear stress 2 months ago that revealed a small area of ischemia with normal LV function. He will continue on medical management.    3. He had frequent PVCs on his ECG that was performed today with elevated heart rates in atrial fibrillation. I have increased the dose of his Bisoprolol to 12.5 mg daily to suppress his ectopy and improve his heart rate. He will also continue om Eliquis 2.5 mg twice daily for systemic anticoagulation, His platelets have been stable on this dose of Eliquis.  4. His blood pressure is well controlled today. Given than I am increasing the dose of his Bisoprolol, I have asked him to hold his Amlodipine for now.  5. His most recent LDL was very good, thus he will continue on Atorvastatin 20 mg daily. 6. He will follow up with me in one week for follow up of his atrial fibrillation.  [EKG obtained to assist in diagnosis and management of assessed problem(s)] : EKG obtained to assist in diagnosis and management of assessed problem(s)

## 2024-06-09 NOTE — CARDIOLOGY SUMMARY
[de-identified] : 2/1/2024:  Atrial fibrillation with a PVC at 89 beats per minute with left anterior fascicular block, old anterior infarct, and nonspecific ST and T wave abnormalities.  [de-identified] : Pharmacologic Nuclear Stress Test performed on 11/17/2023: No ischemic ST segment changes. Rare PVCs. Small, mild defect of the basal inferolateral wall that is reversible, consistent with ischemia. LVEF= 65%, revealing normal left ventricular systolic function.  [de-identified] : 9/12/2023: Technically difficult image quality. Endocardium not well visualized; grossly preserved left ventricular ejection fraction with hypokinesis of the basal inferior wall. There is paradoxical septal motion, consistent with prior cardiac surgery. EF is approximately 55-60%. Concentric left ventricular remodeling. Right ventricular cavity is moderately enlarged in size and reduced systolic function. The left atrium is severely dilated in size. The right atrium is moderately dilated in size. Mitral annular calcification with thickened mitral valve leaflets and mild prolapse of the posterior mitral valve leaflet. Mild to moderate eccentric, posteriorly-directed mitral regurgitation.  A bioprosthetic valve replacement present in the aortic position. Trace intravalvular regurgitation. The peak transaortic gradient is 29.7 mmHg and mean transaortic gradient of 16.9 mmHg. The aortic valve area is estimated at 1.07 sqcm, by the continuity equation. Moderate-severe tricuspid regurgitation.  Estimated pulmonary artery systolic pressure is 55 mmHg, consistent with moderate pulmonary hypertension. Trace pericardial effusion.    Transesophageal echocardiogram performed on 4/5/2023: The left ventricular systolic function is normal with an ejection fraction of 66 %. Normal right ventricular cavity size and probably normal systolic function. Mild to moderate mitral regurgitation.  There is no evidence of left atrial or left atrial appendage thrombus. Left atrial enlargement.  A bioprosthetic valve replacement present in the aortic position. This EOAi may be consistent with severe patient prosthesis mismatch. The aortic valve prosthesis EOAi is 0.6 cm2/m2, with a mean gradient of 24 mmHg and peak velocity of 3.4 m/s obtained by TTE imaging. The aortic valve appears trileaflet with normal systolic excursion. There is minimal thickening of the aortic valve leaflets. Unable to obtain gastric views. A limited DARA examination due to increased secretions leading to respiratory compromise (with desaturation into the 80s).     3/20/2023: Endocardium not well visualized; grossly preserved left ventricular ejection fraction with hypokinesis of the basal inferior wall. EF is approximately 65%. Concentric left ventricular remodeling. Moderate left ventricular diastolic dysfunction. Right ventricular enlargement with normal right ventricular systolic function. The left atrium is moderately dilated in size. The right atrium is mildly dilated in size. Mitral annular calcification with thickened mitral valve leaflets. Moderate mitral regurgitation.  A bioprosthetic valve replacement present in the aortic position. Peak transaortic valve gradient of 53 mmHg and mean gradient of 28 mmHg. Mild valvular regurgitation. Moderate tricuspid regurgitation. Estimated pulmonary artery systolic pressure is 52 mmHg, consistent with moderate pulmonary hypertension. Mild-to-moderate pulmonic regurgitation.    2/7/2022: Mild to moderate mitral regurgitation. Bioprosthetic aortic valve replacement. Peak transaortic valve gradient of 40 mmHg and mean gradient of 21 mmHg, which is probably normal in the setting of a prosthetic valve. Moderately dilated left atrium. Mild right atrial enlargement. Mild left ventricular enlargement. Moderate diastolic dysfunction. Endocardium not well visualized; grossly preserved left ventricular ejection fraction with hypokinesis of the basal inferior wall. Right ventricular enlargement with normal right ventricular systolic function. Mild to moderate tricuspid regurgitation. Mild pulmonary HTN with PASP= 41 mmHg.  5/11/2021: Normal left ventricular ejection fraction with an EF= 69%. Bioprosthetic aortic valve replacement. Peak transaortic valve gradient of 27 mmHg and mean gradient of 14 mmHg, which is probably normal in the setting of a prosthetic valve. Mild to moderate mitral regurgitation. Mild tricuspid regurgitation. Mild pulmonary HTN. PASP= 47 mmHg. [de-identified] : Cardiac catheterization performed on 6/22/2012: EF= 70%. Minor luminal irregularities of the LAD, LCx, and RCA. Mild pulmonary HTN. Severe aortic stenosis.  [de-identified] : 7/2/2012: Bioprosthetic aortic valve replacement.

## 2024-06-09 NOTE — PHYSICAL EXAM
[Tachycardia] : tachycardic [Bruit] : no bruit heard [de-identified] : Extraocular muscles intact. Anicteric sclerae. [de-identified] : Normal oral mucosa.  [de-identified] : No visible skin ulcers.

## 2024-06-09 NOTE — HISTORY OF PRESENT ILLNESS
[FreeTextEntry1] : Patient is an 83 year old man with a history of severe aortic stenosis secondary to a bicuspid aortic valve status post bioprosthetic aortic valve replacement 7/2012, HTN, hyperlipidemia, former tobacco use, chronic renal insufficiency, thrombocytopenia, and GERD who presents today for follow up of atrial fibrillation.  He had rapid atrial fibrillation in [facility] after having COVID infection in early June. His metoprolol was changed to bisoprolol 10 mg daily at that time. He states that he has been feeling well on the 15 mg daily of Bisoprolol. He otherwise denies any chest pain, dyspnea, palpitations, headaches, and dizziness.

## 2024-06-09 NOTE — CARDIOLOGY SUMMARY
[de-identified] : 1/19/2024: Atrial fibrillation with frequent PVCs at 98 beats per minute with left anterior fascicular block, old anterior infarct, and nonspecific T-abnormality.  [de-identified] : Pharmacologic Nuclear Stress Test performed on 11/17/2023: No ischemic ST segment changes. Rare PVCs. Small, mild defect of the basal inferolateral wall that is reversible, consistent with ischemia. LVEF= 65%, revealing normal left ventricular systolic function.  [de-identified] : 9/12/2023: Technically difficult image quality. Endocardium not well visualized; grossly preserved left ventricular ejection fraction with hypokinesis of the basal inferior wall. There is paradoxical septal motion, consistent with prior cardiac surgery. EF is approximately 55-60%. Concentric left ventricular remodeling. Right ventricular cavity is moderately enlarged in size and reduced systolic function. The left atrium is severely dilated in size. The right atrium is moderately dilated in size. Mitral annular calcification with thickened mitral valve leaflets and mild prolapse of the posterior mitral valve leaflet. Mild to moderate eccentric, posteriorly-directed mitral regurgitation.  A bioprosthetic valve replacement present in the aortic position. Trace intravalvular regurgitation. The peak transaortic gradient is 29.7 mmHg and mean transaortic gradient of 16.9 mmHg. The aortic valve area is estimated at 1.07 sqcm, by the continuity equation. Moderate-severe tricuspid regurgitation.  Estimated pulmonary artery systolic pressure is 55 mmHg, consistent with moderate pulmonary hypertension. Trace pericardial effusion.    Transesophageal echocardiogram performed on 4/5/2023: The left ventricular systolic function is normal with an ejection fraction of 66 %. Normal right ventricular cavity size and probably normal systolic function. Mild to moderate mitral regurgitation.  There is no evidence of left atrial or left atrial appendage thrombus. Left atrial enlargement.  A bioprosthetic valve replacement present in the aortic position. This EOAi may be consistent with severe patient prosthesis mismatch. The aortic valve prosthesis EOAi is 0.6 cm2/m2, with a mean gradient of 24 mmHg and peak velocity of 3.4 m/s obtained by TTE imaging. The aortic valve appears trileaflet with normal systolic excursion. There is minimal thickening of the aortic valve leaflets. Unable to obtain gastric views. A limited DARA examination due to increased secretions leading to respiratory compromise (with desaturation into the 80s).     3/20/2023: Endocardium not well visualized; grossly preserved left ventricular ejection fraction with hypokinesis of the basal inferior wall. EF is approximately 65%. Concentric left ventricular remodeling. Moderate left ventricular diastolic dysfunction. Right ventricular enlargement with normal right ventricular systolic function. The left atrium is moderately dilated in size. The right atrium is mildly dilated in size. Mitral annular calcification with thickened mitral valve leaflets. Moderate mitral regurgitation.  A bioprosthetic valve replacement present in the aortic position. Peak transaortic valve gradient of 53 mmHg and mean gradient of 28 mmHg. Mild valvular regurgitation. Moderate tricuspid regurgitation. Estimated pulmonary artery systolic pressure is 52 mmHg, consistent with moderate pulmonary hypertension. Mild-to-moderate pulmonic regurgitation.    2/7/2022: Mild to moderate mitral regurgitation. Bioprosthetic aortic valve replacement. Peak transaortic valve gradient of 40 mmHg and mean gradient of 21 mmHg, which is probably normal in the setting of a prosthetic valve. Moderately dilated left atrium. Mild right atrial enlargement. Mild left ventricular enlargement. Moderate diastolic dysfunction. Endocardium not well visualized; grossly preserved left ventricular ejection fraction with hypokinesis of the basal inferior wall. Right ventricular enlargement with normal right ventricular systolic function. Mild to moderate tricuspid regurgitation. Mild pulmonary HTN with PASP= 41 mmHg.  5/11/2021: Normal left ventricular ejection fraction with an EF= 69%. Bioprosthetic aortic valve replacement. Peak transaortic valve gradient of 27 mmHg and mean gradient of 14 mmHg, which is probably normal in the setting of a prosthetic valve. Mild to moderate mitral regurgitation. Mild tricuspid regurgitation. Mild pulmonary HTN. PASP= 47 mmHg. [de-identified] : Cardiac catheterization performed on 6/22/2012: EF= 70%. Minor luminal irregularities of the LAD, LCx, and RCA. Mild pulmonary HTN. Severe aortic stenosis.  [de-identified] : 7/2/2012: Bioprosthetic aortic valve replacement.

## 2024-06-09 NOTE — HISTORY OF PRESENT ILLNESS
[FreeTextEntry1] : Patient is an 83 year old man with a history of severe aortic stenosis secondary to a bicuspid aortic valve status post bioprosthetic aortic valve replacement 7/2012, HTN, hyperlipidemia, former tobacco use, chronic renal insufficiency, thrombocytopenia, and GERD who presents today for follow up of atrial fibrillation.  He had rapid atrial fibrillation in [facility] after having COVID infection in early June. His metoprolol was changed to bisoprolol 10 mg daily at that time. The dose of his Bisoprolol was increased to 12.5 mg daily last week. He still has mild edema of his left leg following his knee surgery. He otherwise denies any chest pain, shortness of breath at rest, palpitations, headaches or dizziness.

## 2024-06-09 NOTE — PHYSICAL EXAM
[Well Developed] : well developed [Well Nourished] : well nourished [No Acute Distress] : no acute distress [Normal Venous Pressure] : normal venous pressure [Normal Rate] : normal [Irregularly Irregular] : irregularly irregular [Normal S1] : normal S1 [Normal S2] : normal S2 [II] : a grade 2 [No Pitting Edema] : no pitting edema present [Clear Lung Fields] : clear lung fields [Good Air Entry] : good air entry [No Respiratory Distress] : no respiratory distress  [Soft] : abdomen soft [Non Tender] : non-tender [Normal Bowel Sounds] : normal bowel sounds [Normal Gait] : normal gait [No Edema] : no edema [No Cyanosis] : no cyanosis [No Rash] : no rash [Moves all extremities] : moves all extremities [No Focal Deficits] : no focal deficits [Normal Speech] : normal speech [Alert and Oriented] : alert and oriented [Normal memory] : normal memory [___+] : [unfilled]U+ pretibial pitting edema on the left [Bruit] : no bruit heard [de-identified] : Extraocular muscles intact. Anicteric sclerae. [de-identified] : Normal oral mucosa.  [de-identified] : No visible skin ulcers.

## 2024-06-09 NOTE — PHYSICAL EXAM
[Well Developed] : well developed [Well Nourished] : well nourished [No Acute Distress] : no acute distress [Normal Venous Pressure] : normal venous pressure [Normal Rate] : normal [Irregularly Irregular] : irregularly irregular [Normal S1] : normal S1 [Normal S2] : normal S2 [II] : a grade 2 [No Pitting Edema] : no pitting edema present [Clear Lung Fields] : clear lung fields [Good Air Entry] : good air entry [No Respiratory Distress] : no respiratory distress  [Soft] : abdomen soft [Non Tender] : non-tender [Normal Bowel Sounds] : normal bowel sounds [Normal Gait] : normal gait [No Edema] : no edema [No Cyanosis] : no cyanosis [No Rash] : no rash [Moves all extremities] : moves all extremities [No Focal Deficits] : no focal deficits [Normal Speech] : normal speech [Alert and Oriented] : alert and oriented [Normal memory] : normal memory [Tachycardia] : tachycardic [___+] : [unfilled]U+ pretibial pitting edema on the left [Bruit] : no bruit heard [de-identified] : Extraocular muscles intact. Anicteric sclerae. [de-identified] : Normal oral mucosa.  [de-identified] : No visible skin ulcers.

## 2024-09-20 ENCOUNTER — APPOINTMENT (OUTPATIENT)
Dept: CARDIOLOGY | Facility: CLINIC | Age: 84
End: 2024-09-20

## 2024-09-25 NOTE — ASSESSMENT
[FreeTextEntry1] : Diagnosis #1 history of atrial fibrillation, stable.  Continue current treatment.  Followed up by the cardiologist as well #2 hypertension.Same medication and strict  low salt diet.cmp to lab #3 hyperlipidemia. Continue with same medication and low-fat diet.lipids to lab #4 monitor liver  toxicity due to chronic medication use.cmp to lab #5 hypovitaminosis D., being supplemented.level to lab #6 GE reflux, only occasionally symptomatic.  Continue same medication and diet.. cbc  to lab. #7 history of low B12.  level sent to the lab #8 low back . To continue with Tylenol 500 mg p.o. every 6 hours p.r.n.  #9  History of thrombocytosis. CBC sent to lab #10  Benign prostatic hypertrophy, stable.  Patient followed up by a urologist #11  Anxiety. . 12.  Chronic renal insufficiency, CMP and CBC to lab 13.  History of psoriasis, stable.  Continue same treatment. 14.  History of nephrolithiasis, stable.  Stable.  Followed by the urologist 15 Status post aortic valve replacement, stable.  Followed up by cardiologist Plan . Patient advised to return after 3 months .Approximately 50 minutes spent totally

## 2024-09-25 NOTE — HISTORY OF PRESENT ILLNESS
[FreeTextEntry1] : Patient comes for  followup of his chronic medical problems. Patient feels well without any chest pain, shortness of breath, paroxysmal nocturnal dyspnea or orthopnea. . His gastrointestinal reflux symptoms remain stable.Many times he has his known  low back pain mostly when he walks. most recent blood tests, consults, medication list, allergies reviewed

## 2024-09-25 NOTE — PHYSICAL EXAM
[No Acute Distress] : no acute distress [Well Developed] : well developed [Well-Appearing] : well-appearing [Normal Voice/Communication] : normal voice/communication [Normal Sclera/Conjunctiva] : normal sclera/conjunctiva [PERRL] : pupils equal round and reactive to light [EOMI] : extraocular movements intact [Normal Outer Ear/Nose] : the outer ears and nose were normal in appearance [Normal Oropharynx] : the oropharynx was normal [Normal TMs] : both tympanic membranes were normal [Normal Nasal Mucosa] : the nasal mucosa was normal [No JVD] : no jugular venous distention [Supple] : supple [No Lymphadenopathy] : no lymphadenopathy [Thyroid Normal, No Nodules] : the thyroid was normal and there were no nodules present [No Respiratory Distress] : no respiratory distress  [Clear to Auscultation] : lungs were clear to auscultation bilaterally [No Accessory Muscle Use] : no accessory muscle use [Normal Percussion] : the chest was normal to percussion [Normal Rate] : normal rate  [Normal S1, S2] : normal S1 and S2 [No Murmur] : no murmur heard [No Carotid Bruits] : no carotid bruits [No Abdominal Bruit] : a ~M bruit was not heard ~T in the abdomen [No Varicosities] : no varicosities [Pedal Pulses Present] : the pedal pulses are present [No Edema] : there was no peripheral edema [No Extremity Clubbing/Cyanosis] : no extremity clubbing/cyanosis [No Axillary Lymphadenopathy] : no axillary lymphadenopathy [Soft] : abdomen soft [Non Tender] : non-tender [Non-distended] : non-distended [No Masses] : no abdominal mass palpated [No HSM] : no HSM [Normal Bowel Sounds] : normal bowel sounds [Declined Rectal Exam] : declined rectal exam [Normal Supraclavicular Nodes] : no supraclavicular lymphadenopathy [Normal Axillary Nodes] : no axillary lymphadenopathy [Normal Posterior Cervical Nodes] : no posterior cervical lymphadenopathy [Normal Femoral Nodes] : no femoral lymphadenopathy [Normal Anterior Cervical Nodes] : no anterior cervical lymphadenopathy [Normal Inguinal Nodes] : no inguinal lymphadenopathy [No CVA Tenderness] : no CVA  tenderness [No Spinal Tenderness] : no spinal tenderness [Kyphosis] : no kyphosis [Scoliosis] : no scoliosis [No Joint Swelling] : no joint swelling [Grossly Normal Strength/Tone] : grossly normal strength/tone [No Rash] : no rash [No Skin Lesions] : no skin lesions [Normal Gait] : normal gait [Coordination Grossly Intact] : coordination grossly intact [No Focal Deficits] : no focal deficits [Deep Tendon Reflexes (DTR)] : deep tendon reflexes were 2+ and symmetric [Speech Grossly Normal] : speech grossly normal [Memory Grossly Normal] : memory grossly normal [Normal Affect] : the affect was normal [Alert and Oriented x3] : oriented to person, place, and time [Normal Mood] : the mood was normal [Normal Insight/Judgement] : insight and judgment were intact [Comprehensive Foot Exam Normal] : Right and left foot were examined and both feet are normal. No ulcers in either foot. Toes are normal and with full ROM.  Normal tactile sensation with monofilament testing throughout both feet [de-identified] : Irregularly irregular rythm [de-identified] : refused

## 2024-09-26 ENCOUNTER — APPOINTMENT (OUTPATIENT)
Dept: INTERNAL MEDICINE | Facility: CLINIC | Age: 84
End: 2024-09-26
Payer: MEDICARE

## 2024-09-26 DIAGNOSIS — I10 ESSENTIAL (PRIMARY) HYPERTENSION: ICD-10-CM

## 2024-09-26 DIAGNOSIS — L98.9 DISORDER OF THE SKIN AND SUBCUTANEOUS TISSUE, UNSPECIFIED: ICD-10-CM

## 2024-09-26 DIAGNOSIS — I48.91 UNSPECIFIED ATRIAL FIBRILLATION: ICD-10-CM

## 2024-09-26 DIAGNOSIS — R79.89 OTHER SPECIFIED ABNORMAL FINDINGS OF BLOOD CHEMISTRY: ICD-10-CM

## 2024-09-26 DIAGNOSIS — E55.9 VITAMIN D DEFICIENCY, UNSPECIFIED: ICD-10-CM

## 2024-09-26 DIAGNOSIS — J30.0 VASOMOTOR RHINITIS: ICD-10-CM

## 2024-09-26 DIAGNOSIS — Z00.00 ENCOUNTER FOR GENERAL ADULT MEDICAL EXAMINATION W/OUT ABNORMAL FINDINGS: ICD-10-CM

## 2024-09-26 DIAGNOSIS — Z23 ENCOUNTER FOR IMMUNIZATION: ICD-10-CM

## 2024-09-26 DIAGNOSIS — R07.0 PAIN IN THROAT: ICD-10-CM

## 2024-09-26 DIAGNOSIS — T50.905A TOXIC LIVER DISEASE WITH HEPATITIS, NOT ELSEWHERE CLASSIFIED: ICD-10-CM

## 2024-09-26 DIAGNOSIS — B35.1 TINEA UNGUIUM: ICD-10-CM

## 2024-09-26 DIAGNOSIS — N18.31 CHRONIC KIDNEY DISEASE, STAGE 3A: ICD-10-CM

## 2024-09-26 DIAGNOSIS — K71.6 TOXIC LIVER DISEASE WITH HEPATITIS, NOT ELSEWHERE CLASSIFIED: ICD-10-CM

## 2024-09-26 DIAGNOSIS — I51.89 OTHER ILL-DEFINED HEART DISEASES: ICD-10-CM

## 2024-09-26 DIAGNOSIS — K21.00 GASTRO-ESOPHAGEAL REFLUX DISEASE WITH ESOPHAGITIS, WITHOUT BLEEDING: ICD-10-CM

## 2024-09-26 DIAGNOSIS — E78.5 HYPERLIPIDEMIA, UNSPECIFIED: ICD-10-CM

## 2024-09-26 DIAGNOSIS — R73.03 PREDIABETES.: ICD-10-CM

## 2024-09-26 PROCEDURE — 90662 IIV NO PRSV INCREASED AG IM: CPT

## 2024-09-26 PROCEDURE — 36415 COLL VENOUS BLD VENIPUNCTURE: CPT

## 2024-09-26 PROCEDURE — G2211 COMPLEX E/M VISIT ADD ON: CPT

## 2024-09-26 PROCEDURE — 99215 OFFICE O/P EST HI 40 MIN: CPT

## 2024-09-26 PROCEDURE — G0008: CPT

## 2024-09-26 RX ORDER — TERBINAFINE HCL 1 %
1 CREAM (GRAM) TOPICAL
Qty: 1 | Refills: 3 | Status: ACTIVE | COMMUNITY
Start: 2024-09-26 | End: 1900-01-01

## 2024-09-26 RX ORDER — FLUTICASONE PROPIONATE 50 UG/1
50 SPRAY, METERED NASAL
Qty: 1 | Refills: 3 | Status: ACTIVE | COMMUNITY
Start: 2024-09-26 | End: 1900-01-01

## 2024-09-26 NOTE — PHYSICAL EXAM
[Kyphosis] : no kyphosis [Scoliosis] : no scoliosis [de-identified] : Congested nose [de-identified] : Irregularly irregular rythm [de-identified] : refused [de-identified] : Onychomycosis of bilateral hands nails, slightly ulcerated lesion in the left lower lateral chest wall and a hyperkeratotic lesion in the right external ear

## 2024-09-26 NOTE — ASSESSMENT
[FreeTextEntry1] : Diagnosis #1 history of atrial fibrillation, stable.  Continue current treatment.  Followed up by the cardiologist as well #2 hypertension.Same medication and strict  low salt diet.cmp to lab #3 hyperlipidemia. Continue with same medication and low-fat diet.lipids to lab #4 monitor liver  toxicity due to chronic medication use.cmp to lab #5 hypovitaminosis D., being supplemented.level to lab #6 GE reflux, only occasionally symptomatic.  Continue same medication and diet.. cbc  to lab. #7 history of low B12.  level sent to the lab #8 low back . To continue with Tylenol 500 mg p.o. every 6 hours p.r.n.  #9  History of thrombocytosis. CBC sent to lab #10  Benign prostatic hypertrophy, stable.  Patient followed up by a urologist #11  Anxiety. . 12.  Chronic renal insufficiency, CMP and CBC to lab 13.  History of psoriasis, stable.  Continue same treatment. 14.  History of nephrolithiasis, stable.  Stable.  Followed by the urologist 15 Status post aortic valve replacement, stable.  Followed up by cardiologist 16.  Edema of lower legs improving.  It was secondary to amlodipine and heat plus small varicose veins.  Patient reassured 17.  Vasomotor rhinitis.  12 apply fluticasone spray every night for 10 days and as needed 18.  Throat discomfort chronic most likely secondary to GE reflux.  Advised to have ear nose throat consult 19.  Skin lesions in right ear and left lateral chest wall.  To have dermatology consult Plan . Patient advised to return after 3 months .Approximately 70 minutes spent totally

## 2024-09-26 NOTE — HISTORY OF PRESENT ILLNESS
[FreeTextEntry1] : Patient comes for  followup of his chronic medical problems. He swollen lower legs during the summer only during the day but were resolving during the night when she was lying in bed.  He has chronic throat discomfort mostly at night when he lays in bed.  He has also recurrent onychomycosis in his hand nail.  Patient feels well without any chest pain, shortness of breath, paroxysmal nocturnal dyspnea or orthopnea. . His gastrointestinal reflux symptoms remain stable.Many times he has his known  low back pain mostly when he walks. most recent blood tests, consults, medication list, allergies reviewed

## 2024-09-28 LAB
25(OH)D3 SERPL-MCNC: 29.5 NG/ML
ALBUMIN SERPL ELPH-MCNC: 4.4 G/DL
ALP BLD-CCNC: 58 U/L
ALT SERPL-CCNC: 23 U/L
ANION GAP SERPL CALC-SCNC: 13 MMOL/L
APPEARANCE: CLEAR
AST SERPL-CCNC: 20 U/L
BACTERIA: NEGATIVE /HPF
BASOPHILS # BLD AUTO: 0.02 K/UL
BASOPHILS NFR BLD AUTO: 0.3 %
BILIRUB SERPL-MCNC: 0.9 MG/DL
BILIRUBIN URINE: NEGATIVE
BLOOD URINE: ABNORMAL
BUN SERPL-MCNC: 16 MG/DL
CALCIUM SERPL-MCNC: 9.1 MG/DL
CAST: 0 /LPF
CHLORIDE SERPL-SCNC: 104 MMOL/L
CHOLEST SERPL-MCNC: 93 MG/DL
CO2 SERPL-SCNC: 26 MMOL/L
COLOR: YELLOW
CREAT SERPL-MCNC: 1.31 MG/DL
CREAT SPEC-SCNC: 69 MG/DL
EGFR: 54 ML/MIN/1.73M2
EOSINOPHIL # BLD AUTO: 0.02 K/UL
EOSINOPHIL NFR BLD AUTO: 0.3 %
EPITHELIAL CELLS: 0 /HPF
ESTIMATED AVERAGE GLUCOSE: 137 MG/DL
GLUCOSE QUALITATIVE U: NEGATIVE MG/DL
GLUCOSE SERPL-MCNC: 88 MG/DL
HBA1C MFR BLD HPLC: 6.4 %
HCT VFR BLD CALC: 44.9 %
HDLC SERPL-MCNC: 39 MG/DL
HGB BLD-MCNC: 14.8 G/DL
IMM GRANULOCYTES NFR BLD AUTO: 1 %
KETONES URINE: NEGATIVE MG/DL
LDLC SERPL CALC-MCNC: 39 MG/DL
LEUKOCYTE ESTERASE URINE: NEGATIVE
LYMPHOCYTES # BLD AUTO: 1.94 K/UL
LYMPHOCYTES NFR BLD AUTO: 29 %
MAN DIFF?: NORMAL
MCHC RBC-ENTMCNC: 31.7 PG
MCHC RBC-ENTMCNC: 33 GM/DL
MCV RBC AUTO: 96.1 FL
MICROALBUMIN 24H UR DL<=1MG/L-MCNC: 1.5 MG/DL
MICROALBUMIN/CREAT 24H UR-RTO: 21 MG/G
MICROSCOPIC-UA: NORMAL
MONOCYTES # BLD AUTO: 1.69 K/UL
MONOCYTES NFR BLD AUTO: 25.2 %
NEUTROPHILS # BLD AUTO: 2.96 K/UL
NEUTROPHILS NFR BLD AUTO: 44.2 %
NITRITE URINE: NEGATIVE
NONHDLC SERPL-MCNC: 54 MG/DL
PH URINE: 7
PLATELET # BLD AUTO: 81 K/UL
POTASSIUM SERPL-SCNC: 4.6 MMOL/L
PROT SERPL-MCNC: 7 G/DL
PROTEIN URINE: NEGATIVE MG/DL
RBC # BLD: 4.67 M/UL
RBC # FLD: 12.8 %
RED BLOOD CELLS URINE: 2 /HPF
SODIUM SERPL-SCNC: 142 MMOL/L
SPECIFIC GRAVITY URINE: 1.01
TRIGL SERPL-MCNC: 68 MG/DL
UROBILINOGEN URINE: 1 MG/DL
VIT B12 SERPL-MCNC: 770 PG/ML
WBC # FLD AUTO: 6.7 K/UL
WHITE BLOOD CELLS URINE: 0 /HPF

## 2024-10-22 ENCOUNTER — APPOINTMENT (OUTPATIENT)
Dept: ORTHOPEDIC SURGERY | Facility: CLINIC | Age: 84
End: 2024-10-22

## 2024-10-23 ENCOUNTER — APPOINTMENT (OUTPATIENT)
Dept: CARDIOLOGY | Facility: CLINIC | Age: 84
End: 2024-10-23
Payer: MEDICARE

## 2024-10-23 ENCOUNTER — NON-APPOINTMENT (OUTPATIENT)
Age: 84
End: 2024-10-23

## 2024-10-23 VITALS
HEIGHT: 67 IN | RESPIRATION RATE: 12 BRPM | SYSTOLIC BLOOD PRESSURE: 110 MMHG | BODY MASS INDEX: 36.73 KG/M2 | OXYGEN SATURATION: 97 % | DIASTOLIC BLOOD PRESSURE: 72 MMHG | HEART RATE: 90 BPM | WEIGHT: 234 LBS

## 2024-10-23 DIAGNOSIS — I35.9 NONRHEUMATIC AORTIC VALVE DISORDER, UNSPECIFIED: ICD-10-CM

## 2024-10-23 PROCEDURE — G2211 COMPLEX E/M VISIT ADD ON: CPT

## 2024-10-23 PROCEDURE — 93000 ELECTROCARDIOGRAM COMPLETE: CPT

## 2024-10-23 PROCEDURE — 99215 OFFICE O/P EST HI 40 MIN: CPT

## 2024-10-25 ENCOUNTER — APPOINTMENT (OUTPATIENT)
Dept: DERMATOLOGY | Facility: CLINIC | Age: 84
End: 2024-10-25
Payer: MEDICARE

## 2024-10-25 VITALS — BODY MASS INDEX: 34.53 KG/M2 | HEIGHT: 67 IN | WEIGHT: 220 LBS

## 2024-10-25 DIAGNOSIS — L82.0 INFLAMED SEBORRHEIC KERATOSIS: ICD-10-CM

## 2024-10-25 PROCEDURE — 17110 DESTRUCTION B9 LES UP TO 14: CPT

## 2024-11-22 ENCOUNTER — APPOINTMENT (OUTPATIENT)
Dept: CARDIOLOGY | Facility: CLINIC | Age: 84
End: 2024-11-22

## 2024-11-22 PROCEDURE — 93306 TTE W/DOPPLER COMPLETE: CPT

## 2024-11-27 ENCOUNTER — RX RENEWAL (OUTPATIENT)
Age: 84
End: 2024-11-27

## 2024-12-12 ENCOUNTER — APPOINTMENT (OUTPATIENT)
Dept: CARDIOLOGY | Facility: CLINIC | Age: 84
End: 2024-12-12

## 2024-12-16 ENCOUNTER — NON-APPOINTMENT (OUTPATIENT)
Age: 84
End: 2024-12-16

## 2024-12-16 ENCOUNTER — APPOINTMENT (OUTPATIENT)
Dept: CARDIOLOGY | Facility: CLINIC | Age: 84
End: 2024-12-16
Payer: MEDICARE

## 2024-12-16 VITALS
DIASTOLIC BLOOD PRESSURE: 84 MMHG | HEART RATE: 99 BPM | OXYGEN SATURATION: 96 % | BODY MASS INDEX: 37.2 KG/M2 | WEIGHT: 237 LBS | RESPIRATION RATE: 19 BRPM | HEIGHT: 67 IN | SYSTOLIC BLOOD PRESSURE: 127 MMHG

## 2024-12-16 PROCEDURE — 99214 OFFICE O/P EST MOD 30 MIN: CPT

## 2024-12-16 PROCEDURE — 93000 ELECTROCARDIOGRAM COMPLETE: CPT

## 2024-12-16 PROCEDURE — G2211 COMPLEX E/M VISIT ADD ON: CPT

## 2024-12-19 ENCOUNTER — LABORATORY RESULT (OUTPATIENT)
Age: 84
End: 2024-12-19

## 2024-12-19 ENCOUNTER — APPOINTMENT (OUTPATIENT)
Dept: INTERNAL MEDICINE | Facility: CLINIC | Age: 84
End: 2024-12-19
Payer: MEDICARE

## 2024-12-19 VITALS — RESPIRATION RATE: 18 BRPM

## 2024-12-19 DIAGNOSIS — E78.5 HYPERLIPIDEMIA, UNSPECIFIED: ICD-10-CM

## 2024-12-19 DIAGNOSIS — I51.89 OTHER ILL-DEFINED HEART DISEASES: ICD-10-CM

## 2024-12-19 DIAGNOSIS — K21.00 GASTRO-ESOPHAGEAL REFLUX DISEASE WITH ESOPHAGITIS, WITHOUT BLEEDING: ICD-10-CM

## 2024-12-19 DIAGNOSIS — T50.905A TOXIC LIVER DISEASE WITH HEPATITIS, NOT ELSEWHERE CLASSIFIED: ICD-10-CM

## 2024-12-19 DIAGNOSIS — Z87.898 PERSONAL HISTORY OF OTHER SPECIFIED CONDITIONS: ICD-10-CM

## 2024-12-19 DIAGNOSIS — M17.11 UNILATERAL PRIMARY OSTEOARTHRITIS, RIGHT KNEE: ICD-10-CM

## 2024-12-19 DIAGNOSIS — Z86.19 PERSONAL HISTORY OF OTHER INFECTIOUS AND PARASITIC DISEASES: ICD-10-CM

## 2024-12-19 DIAGNOSIS — R79.89 OTHER SPECIFIED ABNORMAL FINDINGS OF BLOOD CHEMISTRY: ICD-10-CM

## 2024-12-19 DIAGNOSIS — L82.0 INFLAMED SEBORRHEIC KERATOSIS: ICD-10-CM

## 2024-12-19 DIAGNOSIS — K71.6 TOXIC LIVER DISEASE WITH HEPATITIS, NOT ELSEWHERE CLASSIFIED: ICD-10-CM

## 2024-12-19 DIAGNOSIS — E55.9 VITAMIN D DEFICIENCY, UNSPECIFIED: ICD-10-CM

## 2024-12-19 DIAGNOSIS — I48.91 UNSPECIFIED ATRIAL FIBRILLATION: ICD-10-CM

## 2024-12-19 DIAGNOSIS — M54.16 RADICULOPATHY, LUMBAR REGION: ICD-10-CM

## 2024-12-19 DIAGNOSIS — N18.31 CHRONIC KIDNEY DISEASE, STAGE 3A: ICD-10-CM

## 2024-12-19 DIAGNOSIS — J02.9 ACUTE PHARYNGITIS, UNSPECIFIED: ICD-10-CM

## 2024-12-19 DIAGNOSIS — I10 ESSENTIAL (PRIMARY) HYPERTENSION: ICD-10-CM

## 2024-12-19 DIAGNOSIS — R73.03 PREDIABETES.: ICD-10-CM

## 2024-12-19 DIAGNOSIS — M79.89 OTHER SPECIFIED SOFT TISSUE DISORDERS: ICD-10-CM

## 2024-12-19 PROBLEM — J69.0 ASPIRATION PNEUMONIA DUE TO GASTRIC SECRETIONS, UNSPECIFIED LATERALITY, UNSPECIFIED PART OF LUNG: Status: ACTIVE | Noted: 2024-12-19

## 2024-12-19 PROCEDURE — G2211 COMPLEX E/M VISIT ADD ON: CPT

## 2024-12-19 PROCEDURE — 99215 OFFICE O/P EST HI 40 MIN: CPT

## 2024-12-19 PROCEDURE — 36415 COLL VENOUS BLD VENIPUNCTURE: CPT

## 2024-12-20 DIAGNOSIS — U07.1 COVID-19: ICD-10-CM

## 2024-12-20 LAB
25(OH)D3 SERPL-MCNC: 43.9 NG/ML
ALBUMIN SERPL ELPH-MCNC: 4.4 G/DL
ALP BLD-CCNC: 70 U/L
ALT SERPL-CCNC: 22 U/L
ANION GAP SERPL CALC-SCNC: 11 MMOL/L
APPEARANCE: CLEAR
AST SERPL-CCNC: 19 U/L
BACTERIA: NEGATIVE /HPF
BASOPHILS # BLD AUTO: 0 K/UL
BASOPHILS NFR BLD AUTO: 0 %
BILIRUB SERPL-MCNC: 1 MG/DL
BILIRUBIN URINE: NEGATIVE
BLOOD URINE: ABNORMAL
BUN SERPL-MCNC: 15 MG/DL
CALCIUM SERPL-MCNC: 9.1 MG/DL
CAST: 1 /LPF
CHLORIDE SERPL-SCNC: 101 MMOL/L
CHOLEST SERPL-MCNC: 97 MG/DL
CO2 SERPL-SCNC: 26 MMOL/L
COLOR: YELLOW
CREAT SERPL-MCNC: 1.29 MG/DL
CREAT SPEC-SCNC: 91 MG/DL
EGFR: 55 ML/MIN/1.73M2
EOSINOPHIL # BLD AUTO: 0 K/UL
EOSINOPHIL NFR BLD AUTO: 0 %
EPITHELIAL CELLS: 0 /HPF
ESTIMATED AVERAGE GLUCOSE: 134 MG/DL
GLUCOSE QUALITATIVE U: NEGATIVE MG/DL
GLUCOSE SERPL-MCNC: 145 MG/DL
HBA1C MFR BLD HPLC: 6.3 %
HCT VFR BLD CALC: 47.7 %
HDLC SERPL-MCNC: 42 MG/DL
HGB BLD-MCNC: 15.4 G/DL
INFLUENZA A RESULT: NOT DETECTED
INFLUENZA B RESULT: NOT DETECTED
KETONES URINE: NEGATIVE MG/DL
LDLC SERPL CALC-MCNC: 39 MG/DL
LEUKOCYTE ESTERASE URINE: NEGATIVE
LYMPHOCYTES # BLD AUTO: 1.33 K/UL
LYMPHOCYTES NFR BLD AUTO: 15.9 %
MAN DIFF?: NORMAL
MCHC RBC-ENTMCNC: 31.8 PG
MCHC RBC-ENTMCNC: 32.3 G/DL
MCV RBC AUTO: 98.6 FL
MICROALBUMIN 24H UR DL<=1MG/L-MCNC: 49.5 MG/DL
MICROALBUMIN/CREAT 24H UR-RTO: 544 MG/G
MICROSCOPIC-UA: NORMAL
MONOCYTES # BLD AUTO: 2.8 K/UL
MONOCYTES NFR BLD AUTO: 33.6 %
NEUTROPHILS # BLD AUTO: 4.21 K/UL
NEUTROPHILS NFR BLD AUTO: 50.5 %
NITRITE URINE: NEGATIVE
NONHDLC SERPL-MCNC: 55 MG/DL
PH URINE: 7
PLATELET # BLD AUTO: 79 K/UL
POTASSIUM SERPL-SCNC: 4.4 MMOL/L
PROT SERPL-MCNC: 7.3 G/DL
PROTEIN URINE: 100 MG/DL
RBC # BLD: 4.84 M/UL
RBC # FLD: 12.7 %
RED BLOOD CELLS URINE: 12 /HPF
RESP SYN VIRUS RESULT: NOT DETECTED
SARS-COV-2 RESULT: DETECTED
SODIUM SERPL-SCNC: 138 MMOL/L
SPECIFIC GRAVITY URINE: 1.02
TRIGL SERPL-MCNC: 80 MG/DL
UROBILINOGEN URINE: 1 MG/DL
VIT B12 SERPL-MCNC: 717 PG/ML
WBC # FLD AUTO: 8.34 K/UL
WHITE BLOOD CELLS URINE: 0 /HPF

## 2024-12-20 RX ORDER — NIRMATRELVIR AND RITONAVIR 150-100 MG
10 X 150 MG & KIT ORAL
Qty: 20 | Refills: 0 | Status: ACTIVE | COMMUNITY
Start: 2024-12-20 | End: 1900-01-01

## 2024-12-23 DIAGNOSIS — J45.20 MILD INTERMITTENT ASTHMA, UNCOMPLICATED: ICD-10-CM

## 2024-12-23 DIAGNOSIS — J69.0 PNEUMONITIS DUE TO INHALATION OF FOOD AND VOMIT: ICD-10-CM

## 2024-12-23 LAB — BACTERIA THROAT CULT: NORMAL

## 2024-12-23 RX ORDER — AMOXICILLIN AND CLAVULANATE POTASSIUM 875; 125 MG/1; MG/1
875-125 TABLET, COATED ORAL
Qty: 20 | Refills: 0 | Status: DISCONTINUED | COMMUNITY
Start: 2024-12-19 | End: 2024-12-23

## 2024-12-23 RX ORDER — ALBUTEROL SULFATE 90 UG/1
108 (90 BASE) INHALANT RESPIRATORY (INHALATION)
Qty: 1 | Refills: 1 | Status: ACTIVE | COMMUNITY
Start: 2024-12-23 | End: 1900-01-01

## 2024-12-23 RX ORDER — DOXYCYCLINE 100 MG/1
100 CAPSULE ORAL
Qty: 20 | Refills: 0 | Status: DISCONTINUED | COMMUNITY
Start: 2024-12-19 | End: 2024-12-23

## 2025-01-08 ENCOUNTER — APPOINTMENT (OUTPATIENT)
Dept: INTERNAL MEDICINE | Facility: CLINIC | Age: 85
End: 2025-01-08
Payer: MEDICARE

## 2025-01-08 ENCOUNTER — INPATIENT (INPATIENT)
Facility: HOSPITAL | Age: 85
LOS: 14 days | Discharge: HOME | DRG: 293 | End: 2025-01-23
Attending: STUDENT IN AN ORGANIZED HEALTH CARE EDUCATION/TRAINING PROGRAM | Admitting: STUDENT IN AN ORGANIZED HEALTH CARE EDUCATION/TRAINING PROGRAM
Payer: MEDICARE

## 2025-01-08 ENCOUNTER — NON-APPOINTMENT (OUTPATIENT)
Age: 85
End: 2025-01-08

## 2025-01-08 VITALS — TEMPERATURE: 97.9 F | HEART RATE: 125 BPM | OXYGEN SATURATION: 98 %

## 2025-01-08 VITALS — WEIGHT: 239 LBS | BODY MASS INDEX: 37.43 KG/M2 | DIASTOLIC BLOOD PRESSURE: 68 MMHG | SYSTOLIC BLOOD PRESSURE: 125 MMHG

## 2025-01-08 VITALS
HEART RATE: 125 BPM | TEMPERATURE: 98 F | HEIGHT: 68 IN | DIASTOLIC BLOOD PRESSURE: 71 MMHG | RESPIRATION RATE: 22 BRPM | OXYGEN SATURATION: 96 % | SYSTOLIC BLOOD PRESSURE: 134 MMHG | WEIGHT: 214.95 LBS

## 2025-01-08 DIAGNOSIS — D69.6 THROMBOCYTOPENIA, UNSPECIFIED: ICD-10-CM

## 2025-01-08 DIAGNOSIS — K21.9 GASTRO-ESOPHAGEAL REFLUX DISEASE WITHOUT ESOPHAGITIS: ICD-10-CM

## 2025-01-08 DIAGNOSIS — R06.09 OTHER FORMS OF DYSPNEA: ICD-10-CM

## 2025-01-08 DIAGNOSIS — Z29.9 ENCOUNTER FOR PROPHYLACTIC MEASURES, UNSPECIFIED: ICD-10-CM

## 2025-01-08 DIAGNOSIS — I10 ESSENTIAL (PRIMARY) HYPERTENSION: ICD-10-CM

## 2025-01-08 DIAGNOSIS — E78.5 HYPERLIPIDEMIA, UNSPECIFIED: ICD-10-CM

## 2025-01-08 DIAGNOSIS — I50.9 HEART FAILURE, UNSPECIFIED: ICD-10-CM

## 2025-01-08 DIAGNOSIS — Z90.49 ACQUIRED ABSENCE OF OTHER SPECIFIED PARTS OF DIGESTIVE TRACT: Chronic | ICD-10-CM

## 2025-01-08 DIAGNOSIS — I48.91 UNSPECIFIED ATRIAL FIBRILLATION: ICD-10-CM

## 2025-01-08 DIAGNOSIS — M17.0 BILATERAL PRIMARY OSTEOARTHRITIS OF KNEE: ICD-10-CM

## 2025-01-08 DIAGNOSIS — Z98.890 OTHER SPECIFIED POSTPROCEDURAL STATES: Chronic | ICD-10-CM

## 2025-01-08 LAB
ALBUMIN SERPL ELPH-MCNC: 3.9 G/DL — SIGNIFICANT CHANGE UP (ref 3.3–5)
ALP SERPL-CCNC: 62 U/L — SIGNIFICANT CHANGE UP (ref 40–120)
ALT FLD-CCNC: 43 U/L — SIGNIFICANT CHANGE UP (ref 10–45)
ANION GAP SERPL CALC-SCNC: 12 MMOL/L — SIGNIFICANT CHANGE UP (ref 5–17)
ANISOCYTOSIS BLD QL: SLIGHT — SIGNIFICANT CHANGE UP
APTT BLD: 36 SEC — HIGH (ref 24.5–35.6)
AST SERPL-CCNC: 36 U/L — SIGNIFICANT CHANGE UP (ref 10–40)
BASOPHILS # BLD AUTO: 0 K/UL — SIGNIFICANT CHANGE UP (ref 0–0.2)
BASOPHILS NFR BLD AUTO: 0 % — SIGNIFICANT CHANGE UP (ref 0–2)
BILIRUB SERPL-MCNC: 1.2 MG/DL — SIGNIFICANT CHANGE UP (ref 0.2–1.2)
BUN SERPL-MCNC: 22 MG/DL — SIGNIFICANT CHANGE UP (ref 7–23)
CALCIUM SERPL-MCNC: 9 MG/DL — SIGNIFICANT CHANGE UP (ref 8.4–10.5)
CHLORIDE SERPL-SCNC: 105 MMOL/L — SIGNIFICANT CHANGE UP (ref 96–108)
CO2 SERPL-SCNC: 23 MMOL/L — SIGNIFICANT CHANGE UP (ref 22–31)
CREAT SERPL-MCNC: 1.24 MG/DL — SIGNIFICANT CHANGE UP (ref 0.5–1.3)
DACRYOCYTES BLD QL SMEAR: SLIGHT — SIGNIFICANT CHANGE UP
EGFR: 57 ML/MIN/1.73M2 — LOW
ELLIPTOCYTES BLD QL SMEAR: SLIGHT — SIGNIFICANT CHANGE UP
EOSINOPHIL # BLD AUTO: 0 K/UL — SIGNIFICANT CHANGE UP (ref 0–0.5)
EOSINOPHIL NFR BLD AUTO: 0 % — SIGNIFICANT CHANGE UP (ref 0–6)
FLUAV AG NPH QL: SIGNIFICANT CHANGE UP
FLUBV AG NPH QL: SIGNIFICANT CHANGE UP
GLUCOSE SERPL-MCNC: 96 MG/DL — SIGNIFICANT CHANGE UP (ref 70–99)
HCT VFR BLD CALC: 44.5 % — SIGNIFICANT CHANGE UP (ref 39–50)
HGB BLD-MCNC: 14.3 G/DL — SIGNIFICANT CHANGE UP (ref 13–17)
INR BLD: 1.36 RATIO — HIGH (ref 0.85–1.16)
LYMPHOCYTES # BLD AUTO: 1.77 K/UL — SIGNIFICANT CHANGE UP (ref 1–3.3)
LYMPHOCYTES # BLD AUTO: 19.5 % — SIGNIFICANT CHANGE UP (ref 13–44)
MACROCYTES BLD QL: SIGNIFICANT CHANGE UP
MAGNESIUM SERPL-MCNC: 2.1 MG/DL — SIGNIFICANT CHANGE UP (ref 1.6–2.6)
MANUAL SMEAR VERIFICATION: SIGNIFICANT CHANGE UP
MCHC RBC-ENTMCNC: 32.1 G/DL — SIGNIFICANT CHANGE UP (ref 32–36)
MCHC RBC-ENTMCNC: 32.3 PG — SIGNIFICANT CHANGE UP (ref 27–34)
MCV RBC AUTO: 100.5 FL — HIGH (ref 80–100)
METAMYELOCYTES # FLD: 1.2 % — HIGH (ref 0–0)
METAMYELOCYTES NFR BLD: 1.2 % — HIGH (ref 0–0)
MONOCYTES # BLD AUTO: 2.78 K/UL — HIGH (ref 0–0.9)
MONOCYTES NFR BLD AUTO: 30.5 % — HIGH (ref 2–14)
NEUTROPHILS # BLD AUTO: 4.44 K/UL — SIGNIFICANT CHANGE UP (ref 1.8–7.4)
NEUTROPHILS NFR BLD AUTO: 47.6 % — SIGNIFICANT CHANGE UP (ref 43–77)
NEUTS BAND # BLD: 1.2 % — SIGNIFICANT CHANGE UP (ref 0–8)
NEUTS BAND NFR BLD: 1.2 % — SIGNIFICANT CHANGE UP (ref 0–8)
NT-PROBNP SERPL-SCNC: 3624 PG/ML — HIGH (ref 0–300)
PLAT MORPH BLD: NORMAL — SIGNIFICANT CHANGE UP
PLATELET # BLD AUTO: 70 K/UL — LOW (ref 150–400)
POIKILOCYTOSIS BLD QL AUTO: SLIGHT — SIGNIFICANT CHANGE UP
POTASSIUM SERPL-MCNC: 5 MMOL/L — SIGNIFICANT CHANGE UP (ref 3.5–5.3)
POTASSIUM SERPL-SCNC: 5 MMOL/L — SIGNIFICANT CHANGE UP (ref 3.5–5.3)
PROT SERPL-MCNC: 6.5 G/DL — SIGNIFICANT CHANGE UP (ref 6–8.3)
PROTHROM AB SERPL-ACNC: 15.5 SEC — HIGH (ref 9.9–13.4)
RBC # BLD: 4.43 M/UL — SIGNIFICANT CHANGE UP (ref 4.2–5.8)
RBC # FLD: 13.6 % — SIGNIFICANT CHANGE UP (ref 10.3–14.5)
RBC BLD AUTO: ABNORMAL
RSV RNA NPH QL NAA+NON-PROBE: SIGNIFICANT CHANGE UP
SARS-COV-2 RNA SPEC QL NAA+PROBE: SIGNIFICANT CHANGE UP
SODIUM SERPL-SCNC: 140 MMOL/L — SIGNIFICANT CHANGE UP (ref 135–145)
TARGETS BLD QL SMEAR: SLIGHT — SIGNIFICANT CHANGE UP
TROPONIN T, HIGH SENSITIVITY RESULT: 54 NG/L — HIGH (ref 0–51)
TROPONIN T, HIGH SENSITIVITY RESULT: 59 NG/L — HIGH (ref 0–51)
WBC # BLD: 9.1 K/UL — SIGNIFICANT CHANGE UP (ref 3.8–10.5)
WBC # FLD AUTO: 9.1 K/UL — SIGNIFICANT CHANGE UP (ref 3.8–10.5)

## 2025-01-08 PROCEDURE — 71045 X-RAY EXAM CHEST 1 VIEW: CPT | Mod: 26

## 2025-01-08 PROCEDURE — 99215 OFFICE O/P EST HI 40 MIN: CPT

## 2025-01-08 PROCEDURE — 93000 ELECTROCARDIOGRAM COMPLETE: CPT | Mod: 59

## 2025-01-08 PROCEDURE — 99223 1ST HOSP IP/OBS HIGH 75: CPT | Mod: GC

## 2025-01-08 PROCEDURE — 71275 CT ANGIOGRAPHY CHEST: CPT | Mod: 26

## 2025-01-08 PROCEDURE — 93308 TTE F-UP OR LMTD: CPT | Mod: 26

## 2025-01-08 PROCEDURE — 99285 EMERGENCY DEPT VISIT HI MDM: CPT

## 2025-01-08 RX ORDER — ATORVASTATIN CALCIUM 80 MG/1
20 TABLET, FILM COATED ORAL AT BEDTIME
Refills: 0 | Status: DISCONTINUED | OUTPATIENT
Start: 2025-01-08 | End: 2025-01-23

## 2025-01-08 RX ORDER — APIXABAN 5 MG/1
2.5 TABLET, FILM COATED ORAL ONCE
Refills: 0 | Status: COMPLETED | OUTPATIENT
Start: 2025-01-08 | End: 2025-01-08

## 2025-01-08 RX ORDER — BISOPROLOL FUMARATE 5 MG/1
17.5 TABLET ORAL DAILY
Refills: 0 | Status: DISCONTINUED | OUTPATIENT
Start: 2025-01-09 | End: 2025-01-11

## 2025-01-08 RX ORDER — ACETAMINOPHEN 160 MG/5ML
650 SUSPENSION ORAL EVERY 6 HOURS
Refills: 0 | Status: DISCONTINUED | OUTPATIENT
Start: 2025-01-08 | End: 2025-01-23

## 2025-01-08 RX ORDER — METOPROLOL SUCCINATE 25 MG
50 TABLET, EXTENDED RELEASE 24 HR ORAL ONCE
Refills: 0 | Status: COMPLETED | OUTPATIENT
Start: 2025-01-08 | End: 2025-01-08

## 2025-01-08 RX ORDER — METOPROLOL SUCCINATE 25 MG
100 TABLET, EXTENDED RELEASE 24 HR ORAL ONCE
Refills: 0 | Status: COMPLETED | OUTPATIENT
Start: 2025-01-08 | End: 2025-01-08

## 2025-01-08 RX ORDER — APIXABAN 5 MG/1
2.5 TABLET, FILM COATED ORAL EVERY 12 HOURS
Refills: 0 | Status: DISCONTINUED | OUTPATIENT
Start: 2025-01-08 | End: 2025-01-23

## 2025-01-08 RX ORDER — METOPROLOL SUCCINATE 25 MG
50 TABLET, EXTENDED RELEASE 24 HR ORAL DAILY
Refills: 0 | Status: DISCONTINUED | OUTPATIENT
Start: 2025-01-08 | End: 2025-01-08

## 2025-01-08 RX ADMIN — ATORVASTATIN CALCIUM 20 MILLIGRAM(S): 80 TABLET, FILM COATED ORAL at 21:15

## 2025-01-08 RX ADMIN — APIXABAN 2.5 MILLIGRAM(S): 5 TABLET, FILM COATED ORAL at 13:48

## 2025-01-08 RX ADMIN — APIXABAN 2.5 MILLIGRAM(S): 5 TABLET, FILM COATED ORAL at 18:32

## 2025-01-08 RX ADMIN — Medication 20 MILLIGRAM(S): at 11:23

## 2025-01-08 RX ADMIN — Medication 20 MILLIGRAM(S): at 18:32

## 2025-01-08 RX ADMIN — Medication 100 MILLIGRAM(S): at 13:48

## 2025-01-08 RX ADMIN — Medication 50 MILLIGRAM(S): at 21:15

## 2025-01-08 NOTE — ED PROVIDER NOTE - OBJECTIVE STATEMENT
84-year-old male PMH afib on eliquid, severe aortic stenosis  secondary to bicuspid aortic valve status post aortic valve replacement in 7/2012, hypertension, pulmonary hypertension, hyperlipidemia, chronic renal insufficiency, thrombocytopenia, GERD presenting from Dr. Magallon office for tachycardia. Per sending paperwork  Patient had COVID 19 2 weeks ago was treated with Paxlovid, subsequently had some wheezing was given doxycyline and steroids by Urgent care with improvement in symptoms.  continued to feel weak and dyspneic on exertion with bilateral lower extremity edema prompting visit to PCP today.   Patient tachycardic in office 230 bpm and endorsing dyspnea sent to ED for further eval.  Of note patient states that he did not take his morning medications Bisoprolol/Eliquis. Denies CP, SOB, abd pain, n/v/d, fever, urinary symptoms, Ha, visual changes, numbness, other complaint. 84-year-old male PMH afib on eliquid, severe aortic stenosis  secondary to bicuspid aortic valve status post aortic valve replacement in 7/2012, hypertension, pulmonary hypertension, hyperlipidemia, chronic renal insufficiency, thrombocytopenia, GERD presenting from Dr. Ramirez office for tachycardia. Per sending paperwork  Patient had COVID 19 2 weeks ago was treated with Paxlovid, subsequently had some wheezing was given doxycyline and steroids by Urgent care with improvement in symptoms.  continued to feel weak and dyspneic on exertion with bilateral lower extremity edema prompting visit to PCP today.   Patient tachycardic in office 230 bpm and endorsing dyspnea sent to ED for further eval.  Of note patient states that he did not take his morning medications Bisoprolol/Eliquis. Denies CP, SOB, abd pain, n/v/d, fever, urinary symptoms, Ha, visual changes, numbness, other complaint. 84-year-old male PMH afib on eliquid, severe aortic stenosis  secondary to bicuspid aortic valve status post aortic valve replacement in 7/2012, hypertension, pulmonary hypertension, hyperlipidemia, chronic renal insufficiency, thrombocytopenia, GERD presenting from Dr. Ramirez office for tachycardia. Per sending paperwork  Patient had COVID 2 weeks ago was treated with Paxlovid, subsequently had some wheezing was given doxycyline and steroids by Urgent care with improvement in symptoms.  continued to feel weak and dyspneic on exertion with bilateral lower extremity edema prompting visit to PCP today.   Patient tachycardic in office to 130 bpm and endorsing dyspnea sent to ED for further eval.  Of note patient states that he did not take his morning medications Bisoprolol/Eliquis. Denies CP, SOB, abd pain, n/v/d, fever, urinary symptoms, Ha, visual changes, numbness, other complaint.

## 2025-01-08 NOTE — H&P ADULT - PROBLEM SELECTOR PLAN 1
-Troponin 54->59  -Pro-BNP of 3624.   -CXR (1/8): b/l lower atelectasis vs pneumonia  -CTA (1/8): "Small bibasilar pleural effusions. Interlobular septal thickening, suggestive of interstitial edema."   -ER POCUS TTE (1/8): trace pericardial effusion, global LV hypokinesis, b/l lungs with B-lines and trace pleural effusions.  -s/p lasix 20mg IV in the ER (1/8)    PLAN:  >c/w lasix for diuresis  >f/u TTE  >c/w daily standing weights, strict ins/outs -Hx of HTN and pHTN  -Troponin 54->59  -Pro-BNP of 3624.   -Nuclear stress test (11/2023): small sized mild defect(s) in the basal inferolateral wall that is reversible consistent with ischemia  -TTE (11/2024): "EF is approximately 55%. Mild concentric left ventricular hypertrophy. Right ventricular enlargement with mildly decreased right ventricular systolic function. Left atrium is severely dilated. Right atrium is mildly dilated. Moderate mitral regurgitation. Mitral annular calcification with thickened and mildly calcified mitral valve leaflets. There is mild prolapse of the posterior mitral valve leaflet. Bioprosthetic aortic valve replacement. Aortic root at the sinuses of Valsalva is borderline dilated. Ascending aorta is mildly dilated. Estimated pulmonary artery systolic pressure is 50 mmHg. Moderate to severe tricuspid regurgitation"  -CXR (1/8): b/l lower atelectasis vs pneumonia  -CTA (1/8): "Small bibasilar pleural effusions. Interlobular septal thickening, suggestive of interstitial edema."   -ER POCUS TTE (1/8): trace pericardial effusion, global LV hypokinesis, b/l lungs with B-lines and trace pleural effusions.  -s/p lasix 20mg IV in the ER (1/8)    PLAN:  >c/w lasix 20mg IV for diuresis  >f/u TTE  >c/w daily standing weights, strict ins/outs

## 2025-01-08 NOTE — ED PROVIDER NOTE - CLINICAL SUMMARY MEDICAL DECISION MAKING FREE TEXT BOX
Attending Devi: 85 y/o M w/ PMH of a fib on eliquis, aortic stenosis s/p AV replacement, pulm HTN, HLD, CKD, GERd, HLD presenting w/ SOB. Seen w/ son who assisted w/ translation as needed. Pt referred in by PCP office Dr. Ramirez. Reports was found to have covid 2 weeks ago, was treated at urgent care with doxyclycine and prednisone. Since then however has had persistent shortness of breath. Worse w/ exertion, improves w/ rest. Also noticed his legs have both become swollen. His cough has since resolved. Denies fevers, chills, headache, dizziness, blurred vision, chest pain, abdominal pain, n/v/d/c, urinary symptoms, MSK pain, rash. Pt overall no acute distress. Head NCAT. Lungs grossly clear, mild tachypnea, no resp disthress. HR tachycardic. Abd nondistended/soft/nontender. No CVA tenderness. Non focal neuro exam. Calm and cooperative. Bilateral lower extremity edema. Concern for possible CHF exacerbation. W/ recent viral URI, will eval for post viral PNA. Eval for PE. Tachycardia appears sinus tach not a fib RVR. Plan for labs, imaging, EKG, meds PRN. Likely will require admission. Will reassess the need for additional interventions as clinically warranted. Refer to any progress notes for updates on clinical course and as a continuation of this MDM.     I, Dr. Jose C Quinonez, independently interpreted the EKG which showed sinus tachycardia at a rate of 125.

## 2025-01-08 NOTE — H&P ADULT - NSHPLABSRESULTS_GEN_ALL_CORE
LABS:               14.3   9.10  )-----------( 70       ( 08 Jan 2025 09:22 )             44.5     01-08    140  |  105  |  22  ----------------------------<  96  5.0   |  23  |  1.24    Ca    9.0      08 Jan 2025 09:22  Mg     2.1     01-08    TPro  6.5  /  Alb  3.9  /  TBili  1.2  /  DBili  x   /  AST  36  /  ALT  43  /  AlkPhos  62  01-08    PT/INR - ( 08 Jan 2025 10:45 )   PT: 15.5 sec;   INR: 1.36 ratio    PTT - ( 08 Jan 2025 10:45 )  PTT:36.0 sec    FLU/RSV/COVID: negative    CXR: b/l lower atelectasis vs pneumonia  CTA: "Small bibasilar pleural effusions. Interlobular septal thickening, suggestive of interstitial edema."   POCUS TTE: trace pericardial effusion, global LV hypokinesis, b/l lungs with B-lines and trace pleural effusions.

## 2025-01-08 NOTE — ED ADULT NURSE NOTE - OBJECTIVE STATEMENT
84 year old male presented to the ED via EMS for dyspnea on exertion and palpitations ,  pt is ambulatory A&Ox3 predominantly [place] speaking but able to communicate in enlish states since covid in mid december pt has been having dyspnea, as per EMS pt  JOSEPH worsening x 5 days, pt was at MDs office and advised to come in, pt denies any chest pain no nausea no vomiting lung field cta abd soft non tender non distended no fever no chills no nausea no vomiting rectal temp 98.1 stage one decubiti noted

## 2025-01-08 NOTE — ED PROVIDER NOTE - ATTENDING APP SHARED VISIT CONTRIBUTION OF CARE
Attending Devi: I performed a face to face evaluation of the patient and obtained a history as well as performed a physical exam. I have discussed their management with the SCARLET. I have reviewed the SCARELT note and agree with the documented findings and plan of care, except as noted. This was a shared visit with an SCARLET. I reviewed and verified the documentation and independently performed my own history/exam/medical decision making. My medical decision making and observations are found above. Please refer to any progress notes for updates on clinical course. My notes supersedes the above SCARLET note in case of discrepancy

## 2025-01-08 NOTE — H&P ADULT - PROBLEM SELECTOR PLAN 6
-Per family is supposed to be on home PPI but patient does not take    PLAN:  >Restart PPI when patient agrees

## 2025-01-08 NOTE — ED PROVIDER NOTE - PROGRESS NOTE DETAILS
Colletta NP- reached out to PCP Dr. Ramirez multiple times, unable to reach. Colletta NP -delta trop stable, pro-bnp elevated, echo with global hypokinesis, b/l B lines with pleural effusions. CTA w.o PE. Will admit for chf exacerbation. Labs, CT and incidental findings discussed with wife and patient at bedside and in agreement with plan Attending Negaryo: metoprolol ordered as pt on bisoprolol (therapeutic interchange, 100 mg of metop succinate). informed hospitalist of this.

## 2025-01-08 NOTE — ED ADULT NURSE REASSESSMENT NOTE - NS ED NURSE REASSESS COMMENT FT1
Report received from KIM Segovia. Pt is observed sitting up in stretcher conversing with RN at this time. Pt breathing spontaneous and unlabored. Pt updated on plan of care and awaiting telemetry bed assignment at this time. Safety and comfort measures maintained. Call bell within reach. Pt denies sob at rest, and chest pain dizziness. Pt ambulatory to the bathroom without assistance after meeting patient, denies sob walking to the bathroom. Report received from KIM Segovia. Pt is observed sitting up in stretcher conversing with RN at this time. Pt breathing spontaneous and unlabored. Pt updated on plan of care and awaiting telemetry bed assignment at this time. Safety and comfort measures maintained, cardiac monitor maintained afib. Call bell within reach. Pt denies sob at rest, and chest pain dizziness. Pt ambulatory to the bathroom without assistance after meeting patient, denies sob walking to the bathroom.

## 2025-01-08 NOTE — H&P ADULT - PROBLEM SELECTOR PLAN 8
DVT ppx:  Diet: DASH/TLC, low sodium  Dispo: Active DVT ppx: eliquis 2.5mg BID  Diet: DASH/TLC, low sodium  Dispo: Active

## 2025-01-08 NOTE — H&P ADULT - ATTENDING COMMENTS
84M w/pmh chronic atrial fibrillation, severe aortic stenosis s/p aortic valve replacement (7/2012), HTN, pHTN, HLD, thrombocytopenia, GERD presenting from PCP's office for cc tachycardia, JOSEPH. Admitted for heart failure exacerbation and atrial fibrillation w/RVR. Tolerated lasix 20mg IV x1, will give another dose today to increase diuresis. Reassess in the morning. Repeat echocardiogram. Continue eliquis and bisoprolol.

## 2025-01-08 NOTE — ED PROVIDER NOTE - PHYSICAL EXAMINATION
Gen: NAD, AOx3, able to make needs known, non-toxic  Head: NCAT  HEENT: EOMI, oral mucosa moist, normal conjunctiva  Lung: CTAB, no respiratory distress, no wheezes/rhonchi/rales B/L, speaking in full sentences, 94 % on RA  CV: tachycardic, nonpitting edema b/l lower extremities  Abd: non distended, soft, nontender, no guarding, no CVA tenderness  MSK: no visible deformities  Neuro: Appears non focal  Skin: Warm, well perfused, no rash  Psych: normal affect

## 2025-01-08 NOTE — H&P ADULT - NSICDXPASTSURGICALHX_GEN_ALL_CORE_FT
PAST SURGICAL HISTORY:  H/O aortic valve replacement     H/O hernia repair     Retroperitoneal mass s/p surgery- benign    S/P cardiac catheterization     S/P cholecystectomy     S/P TURP 2005

## 2025-01-08 NOTE — H&P ADULT - HISTORY OF PRESENT ILLNESS
84-year-old male PMH afib on eliquid, severe aortic stenosis (2/2 bicuspid aortic valve), s/p aortic valve replacement (7/2012), HTN, pHTN, HLD, chronic renal insufficiency, thrombocytopenia, GERD presenting from Dr. Ramirez office for tachycardia.   Patient had COVID 2 weeks ago which he was treated with paxlovid for. Subsequently, the patient developed some wheezing and was treated with doxycycline and steroids from an urgent care with improvement in symptoms. The patient continued to feel weak however, and dyspneic on exertion. The patient notes 10 days of leg swelling, with dyspnea on exertion starting 3 days ago with inability to tolerate more than 2-3 steps (baseline does not ambulate much). Of note, the patient experienced some nausea a few days prior and vomited after getting into his car.   The patient went to visit his PCP today for the dyspnea and leg swelling; was noted to be tachycardic to 130 BPM and was sent to ER for further evaluation.   Pt did not take home bisoprolol and eliquis prior to admission. Pt denies chest pain, shortness of breath, n/v/d/, fevers or chills, urinary sx, headache, visual changes, numbness or other complaints.    ER Course: VS notable for sinus tachycardia to 125-128 on admission. Labs notable for thrombocytopenia to 70 (PMHx of thrombocytopenia), Troponin 54->59, Pro-BNP of 3624. RVP was negative, CXR: b/l lower atelectasis vs pneumonia, CTA: "Small bibasilar pleural effusions. Interlobular septal thickening, suggestive of interstitial edema." POCUS TTE: trace pericardial effusion, global LV hypokinesis, b/l lungs with B-lines and trace pleural effusions.  s/p apixaban 2.5mg PO, metoprolol succinate 100mg PO, lasix 20mg IV. Admitted to medicine for likely CHF exacerbation.

## 2025-01-08 NOTE — H&P ADULT - NSHPPHYSICALEXAM_GEN_ALL_CORE
Gen: NAD, AOx3, able to make needs known, non-toxic  Head: NCAT  HEENT: EOMI, oral mucosa moist, normal conjunctiva, +JVD  Lung: CTAB, no respiratory distress, no wheezes/rhonchi/rales B/L, speaking in full sentences, 94 % on RA  CV: tachycardic, pitting edema b/l lower extremities up to top 1/3rd of legs  Abd: non distended, soft, nontender, no guarding, no CVA tenderness  MSK: no visible deformities  Neuro: Appears non focal  Skin: Warm, well perfused, no rash  Psych: normal affect

## 2025-01-08 NOTE — H&P ADULT - PROBLEM SELECTOR PLAN 3
-On home bisoprolol 10mg PO QD    PLAN:  >c/w metoprolol succinate 100mg QD (therapeutic exchange of home bisoprolol 10mg) -On home bisoprolol 17.5mg PO QD  -On home amlodipine 5mg PO QD    PLAN:  >c/w metoprolol succinate mg QD (therapeutic exchange of home bisoprolol 10mg)

## 2025-01-08 NOTE — H&P ADULT - ASSESSMENT
84-year-old male PMH afib on eliquid, severe aortic stenosis (2/2 bicuspid aortic valve), s/p aortic valve replacement (7/2012), HTN, pHTN, HLD, chronic renal insufficiency, thrombocytopenia, GERD admitted to medicine for likely CHF exacerbation.

## 2025-01-09 LAB
ALBUMIN SERPL ELPH-MCNC: 3.8 G/DL — SIGNIFICANT CHANGE UP (ref 3.3–5)
ALP SERPL-CCNC: 67 U/L — SIGNIFICANT CHANGE UP (ref 40–120)
ALT FLD-CCNC: 32 U/L — SIGNIFICANT CHANGE UP (ref 10–45)
ANION GAP SERPL CALC-SCNC: 13 MMOL/L — SIGNIFICANT CHANGE UP (ref 5–17)
AST SERPL-CCNC: 19 U/L — SIGNIFICANT CHANGE UP (ref 10–40)
BASOPHILS # BLD AUTO: 0 K/UL — SIGNIFICANT CHANGE UP (ref 0–0.2)
BASOPHILS NFR BLD AUTO: 0 % — SIGNIFICANT CHANGE UP (ref 0–2)
BILIRUB SERPL-MCNC: 1 MG/DL — SIGNIFICANT CHANGE UP (ref 0.2–1.2)
BUN SERPL-MCNC: 27 MG/DL — HIGH (ref 7–23)
CALCIUM SERPL-MCNC: 9 MG/DL — SIGNIFICANT CHANGE UP (ref 8.4–10.5)
CHLORIDE SERPL-SCNC: 102 MMOL/L — SIGNIFICANT CHANGE UP (ref 96–108)
CO2 SERPL-SCNC: 23 MMOL/L — SIGNIFICANT CHANGE UP (ref 22–31)
CREAT SERPL-MCNC: 1.34 MG/DL — HIGH (ref 0.5–1.3)
EGFR: 52 ML/MIN/1.73M2 — LOW
EOSINOPHIL # BLD AUTO: 0 K/UL — SIGNIFICANT CHANGE UP (ref 0–0.5)
EOSINOPHIL NFR BLD AUTO: 0 % — SIGNIFICANT CHANGE UP (ref 0–6)
GLUCOSE SERPL-MCNC: 175 MG/DL — HIGH (ref 70–99)
HCT VFR BLD CALC: 42.7 % — SIGNIFICANT CHANGE UP (ref 39–50)
HGB BLD-MCNC: 14.1 G/DL — SIGNIFICANT CHANGE UP (ref 13–17)
LYMPHOCYTES # BLD AUTO: 1.66 K/UL — SIGNIFICANT CHANGE UP (ref 1–3.3)
LYMPHOCYTES # BLD AUTO: 22.1 % — SIGNIFICANT CHANGE UP (ref 13–44)
MAGNESIUM SERPL-MCNC: 2 MG/DL — SIGNIFICANT CHANGE UP (ref 1.6–2.6)
MANUAL SMEAR VERIFICATION: SIGNIFICANT CHANGE UP
MCHC RBC-ENTMCNC: 32.3 PG — SIGNIFICANT CHANGE UP (ref 27–34)
MCHC RBC-ENTMCNC: 33 G/DL — SIGNIFICANT CHANGE UP (ref 32–36)
MCV RBC AUTO: 97.7 FL — SIGNIFICANT CHANGE UP (ref 80–100)
MONOCYTES # BLD AUTO: 2.46 K/UL — HIGH (ref 0–0.9)
MONOCYTES NFR BLD AUTO: 32.7 % — HIGH (ref 2–14)
NEUTROPHILS # BLD AUTO: 3.4 K/UL — SIGNIFICANT CHANGE UP (ref 1.8–7.4)
NEUTROPHILS NFR BLD AUTO: 44.3 % — SIGNIFICANT CHANGE UP (ref 43–77)
NEUTS BAND # BLD: 0.9 % — SIGNIFICANT CHANGE UP (ref 0–8)
NEUTS BAND NFR BLD: 0.9 % — SIGNIFICANT CHANGE UP (ref 0–8)
PHOSPHATE SERPL-MCNC: 3.9 MG/DL — SIGNIFICANT CHANGE UP (ref 2.5–4.5)
PLAT MORPH BLD: NORMAL — SIGNIFICANT CHANGE UP
PLATELET # BLD AUTO: 68 K/UL — LOW (ref 150–400)
POTASSIUM SERPL-MCNC: 3.6 MMOL/L — SIGNIFICANT CHANGE UP (ref 3.5–5.3)
POTASSIUM SERPL-SCNC: 3.6 MMOL/L — SIGNIFICANT CHANGE UP (ref 3.5–5.3)
PROT SERPL-MCNC: 6.4 G/DL — SIGNIFICANT CHANGE UP (ref 6–8.3)
RBC # BLD: 4.37 M/UL — SIGNIFICANT CHANGE UP (ref 4.2–5.8)
RBC # FLD: 13.3 % — SIGNIFICANT CHANGE UP (ref 10.3–14.5)
RBC BLD AUTO: SIGNIFICANT CHANGE UP
SODIUM SERPL-SCNC: 138 MMOL/L — SIGNIFICANT CHANGE UP (ref 135–145)
WBC # BLD: 7.52 K/UL — SIGNIFICANT CHANGE UP (ref 3.8–10.5)
WBC # FLD AUTO: 7.52 K/UL — SIGNIFICANT CHANGE UP (ref 3.8–10.5)

## 2025-01-09 PROCEDURE — 99232 SBSQ HOSP IP/OBS MODERATE 35: CPT | Mod: GC

## 2025-01-09 RX ADMIN — APIXABAN 2.5 MILLIGRAM(S): 5 TABLET, FILM COATED ORAL at 18:14

## 2025-01-09 RX ADMIN — ATORVASTATIN CALCIUM 20 MILLIGRAM(S): 80 TABLET, FILM COATED ORAL at 21:52

## 2025-01-09 RX ADMIN — Medication 20 MILLIGRAM(S): at 18:15

## 2025-01-09 RX ADMIN — APIXABAN 2.5 MILLIGRAM(S): 5 TABLET, FILM COATED ORAL at 05:25

## 2025-01-09 RX ADMIN — BISOPROLOL FUMARATE 17.5 MILLIGRAM(S): 5 TABLET ORAL at 05:25

## 2025-01-09 RX ADMIN — Medication 20 MILLIGRAM(S): at 12:55

## 2025-01-09 NOTE — PROGRESS NOTE ADULT - PROBLEM SELECTOR PLAN 2
-EKG on admission with sinus tachycardia to 125-128    PLAN:  >c/w home apixaban 2.5mg PO QD  >c/w bisoprolol 17.5mg PO QD

## 2025-01-09 NOTE — PROGRESS NOTE ADULT - PROBLEM SELECTOR PLAN 3
-On home bisoprolol 17.5mg PO QD  -On home amlodipine 5mg PO QD    PLAN:  >c/w bisoprolol 17.5mg PO QD

## 2025-01-09 NOTE — PROGRESS NOTE ADULT - SUBJECTIVE AND OBJECTIVE BOX
SUBJECTIVE / OVERNIGHT EVENTS:  Pt seen and examined at bedside. VIKTOR.    MEDICATIONS  (STANDING):  apixaban 2.5 milliGRAM(s) Oral every 12 hours  atorvastatin 20 milliGRAM(s) Oral at bedtime  bisoprolol   Tablet 17.5 milliGRAM(s) Oral daily    MEDICATIONS  (PRN):  acetaminophen     Tablet .. 650 milliGRAM(s) Oral every 6 hours PRN Temp greater or equal to 38C (100.4F), Mild Pain (1 - 3)        01-08-25 @ 07:01  -  01-09-25 @ 07:00  --------------------------------------------------------  IN: 0 mL / OUT: 750 mL / NET: -750 mL        PHYSICAL EXAM:  Vital Signs Last 24 Hrs  T(C): 36.4 (09 Jan 2025 04:58), Max: 36.8 (08 Jan 2025 08:24)  T(F): 97.5 (09 Jan 2025 04:58), Max: 98.2 (08 Jan 2025 08:24)  HR: 120 (09 Jan 2025 04:58) (111 - 128)  BP: 137/80 (09 Jan 2025 04:58) (99/64 - 137/80)  BP(mean): --  RR: 16 (09 Jan 2025 04:58) (16 - 22)  SpO2: 95% (09 Jan 2025 04:58) (95% - 97%)    Parameters below as of 09 Jan 2025 04:58  Patient On (Oxygen Delivery Method): room air        CAPILLARY BLOOD GLUCOSE        I&O's Summary    08 Jan 2025 07:01  -  09 Jan 2025 07:00  --------------------------------------------------------  IN: 0 mL / OUT: 750 mL / NET: -750 mL    Gen: NAD, AOx3, able to make needs known, non-toxic  Head: NCAT  HEENT: EOMI, oral mucosa moist, normal conjunctiva, +JVD  Lung: CTAB, no respiratory distress, no wheezes/rhonchi/rales B/L, speaking in full sentences, 94 % on RA  CV: tachycardic, pitting edema b/l lower extremities up to top 1/3rd of legs  Abd: non distended, soft, nontender, no guarding, no CVA tenderness  MSK: no visible deformities  Neuro: Appears non focal  Skin: Warm, well perfused, no rash  Psych: normal affect    LABS:                        14.1   7.52  )-----------( 68       ( 09 Jan 2025 07:07 )             42.7     01-09    138  |  102  |  27[H]  ----------------------------<  175[H]  3.6   |  23  |  1.34[H]    Ca    9.0      09 Jan 2025 07:07  Phos  3.9     01-09  Mg     2.0     01-09    TPro  6.4  /  Alb  3.8  /  TBili  1.0  /  DBili  x   /  AST  19  /  ALT  32  /  AlkPhos  67  01-09    PT/INR - ( 08 Jan 2025 10:45 )   PT: 15.5 sec;   INR: 1.36 ratio         PTT - ( 08 Jan 2025 10:45 )  PTT:36.0 sec      Urinalysis Basic - ( 09 Jan 2025 07:07 )    Color: x / Appearance: x / SG: x / pH: x  Gluc: 175 mg/dL / Ketone: x  / Bili: x / Urobili: x   Blood: x / Protein: x / Nitrite: x   Leuk Esterase: x / RBC: x / WBC x   Sq Epi: x / Non Sq Epi: x / Bacteria: x          IMAGING:    [X] All pertinent imaging reviewed by me

## 2025-01-09 NOTE — PROGRESS NOTE ADULT - ATTENDING COMMENTS
84M w/pmh chronic atrial fibrillation, severe aortic stenosis s/p aortic valve replacement (7/2012), HTN, pHTN, HLD, thrombocytopenia, GERD presenting from PCP's office for cc tachycardia, JOSEPH. Admitted for heart failure exacerbation and atrial fibrillation w/RVR. Tolerated lasix 20mg IV x2 yesterday, will give another dose of 20mg IV lasix today. He JOSEPH is improved. Check TTE. Consult cardiology. Monitor HR, so far has been uncontrolled on home bisoprolol.

## 2025-01-09 NOTE — PROGRESS NOTE ADULT - PROBLEM SELECTOR PLAN 1
-Hx of HTN and pHTN  -Troponin 54->59  -Pro-BNP of 3624.   -Nuclear stress test (11/2023): small sized mild defect(s) in the basal inferolateral wall that is reversible consistent with ischemia  -TTE (11/2024): "EF is approximately 55%. Mild concentric left ventricular hypertrophy. Right ventricular enlargement with mildly decreased right ventricular systolic function. Left atrium is severely dilated. Right atrium is mildly dilated. Moderate mitral regurgitation. Mitral annular calcification with thickened and mildly calcified mitral valve leaflets. There is mild prolapse of the posterior mitral valve leaflet. Bioprosthetic aortic valve replacement. Aortic root at the sinuses of Valsalva is borderline dilated. Ascending aorta is mildly dilated. Estimated pulmonary artery systolic pressure is 50 mmHg. Moderate to severe tricuspid regurgitation"  -CXR (1/8): b/l lower atelectasis vs pneumonia  -CTA (1/8): "Small bibasilar pleural effusions. Interlobular septal thickening, suggestive of interstitial edema."   -ER POCUS TTE (1/8): trace pericardial effusion, global LV hypokinesis, b/l lungs with B-lines and trace pleural effusions.  -s/p lasix 20mg IV in the ER (1/8)    PLAN:  >c/w lasix 20mg IV for diuresis  >f/u TTE  >c/w daily standing weights, strict ins/outs

## 2025-01-10 ENCOUNTER — RESULT REVIEW (OUTPATIENT)
Age: 85
End: 2025-01-10

## 2025-01-10 LAB
ANION GAP SERPL CALC-SCNC: 16 MMOL/L — SIGNIFICANT CHANGE UP (ref 5–17)
BUN SERPL-MCNC: 24 MG/DL — HIGH (ref 7–23)
CALCIUM SERPL-MCNC: 9.4 MG/DL — SIGNIFICANT CHANGE UP (ref 8.4–10.5)
CHLORIDE SERPL-SCNC: 102 MMOL/L — SIGNIFICANT CHANGE UP (ref 96–108)
CO2 SERPL-SCNC: 23 MMOL/L — SIGNIFICANT CHANGE UP (ref 22–31)
CREAT SERPL-MCNC: 1.5 MG/DL — HIGH (ref 0.5–1.3)
EGFR: 46 ML/MIN/1.73M2 — LOW
GLUCOSE SERPL-MCNC: 134 MG/DL — HIGH (ref 70–99)
HCT VFR BLD CALC: 41.7 % — SIGNIFICANT CHANGE UP (ref 39–50)
HGB BLD-MCNC: 13.9 G/DL — SIGNIFICANT CHANGE UP (ref 13–17)
MAGNESIUM SERPL-MCNC: 2 MG/DL — SIGNIFICANT CHANGE UP (ref 1.6–2.6)
MCHC RBC-ENTMCNC: 32.5 PG — SIGNIFICANT CHANGE UP (ref 27–34)
MCHC RBC-ENTMCNC: 33.3 G/DL — SIGNIFICANT CHANGE UP (ref 32–36)
MCV RBC AUTO: 97.4 FL — SIGNIFICANT CHANGE UP (ref 80–100)
NRBC # BLD: 0 /100 WBCS — SIGNIFICANT CHANGE UP (ref 0–0)
NRBC BLD-RTO: 0 /100 WBCS — SIGNIFICANT CHANGE UP (ref 0–0)
PHOSPHATE SERPL-MCNC: 4 MG/DL — SIGNIFICANT CHANGE UP (ref 2.5–4.5)
PLATELET # BLD AUTO: 64 K/UL — LOW (ref 150–400)
POTASSIUM SERPL-MCNC: 3.7 MMOL/L — SIGNIFICANT CHANGE UP (ref 3.5–5.3)
POTASSIUM SERPL-SCNC: 3.7 MMOL/L — SIGNIFICANT CHANGE UP (ref 3.5–5.3)
RBC # BLD: 4.28 M/UL — SIGNIFICANT CHANGE UP (ref 4.2–5.8)
RBC # FLD: 13.5 % — SIGNIFICANT CHANGE UP (ref 10.3–14.5)
SODIUM SERPL-SCNC: 141 MMOL/L — SIGNIFICANT CHANGE UP (ref 135–145)
WBC # BLD: 8.6 K/UL — SIGNIFICANT CHANGE UP (ref 3.8–10.5)
WBC # FLD AUTO: 8.6 K/UL — SIGNIFICANT CHANGE UP (ref 3.8–10.5)

## 2025-01-10 PROCEDURE — 99232 SBSQ HOSP IP/OBS MODERATE 35: CPT | Mod: GC

## 2025-01-10 PROCEDURE — 93306 TTE W/DOPPLER COMPLETE: CPT | Mod: 26

## 2025-01-10 PROCEDURE — 93356 MYOCRD STRAIN IMG SPCKL TRCK: CPT

## 2025-01-10 RX ORDER — PANTOPRAZOLE 20 MG/1
40 TABLET, DELAYED RELEASE ORAL
Refills: 0 | Status: DISCONTINUED | OUTPATIENT
Start: 2025-01-11 | End: 2025-01-23

## 2025-01-10 RX ORDER — SENNOSIDES 8.6 MG
2 TABLET ORAL AT BEDTIME
Refills: 0 | Status: DISCONTINUED | OUTPATIENT
Start: 2025-01-10 | End: 2025-01-16

## 2025-01-10 RX ORDER — PANTOPRAZOLE 20 MG/1
1 TABLET, DELAYED RELEASE ORAL
Qty: 0 | Refills: 0 | DISCHARGE
Start: 2025-01-10

## 2025-01-10 RX ORDER — BISOPROLOL FUMARATE 5 MG/1
3.5 TABLET ORAL
Qty: 0 | Refills: 0 | DISCHARGE
Start: 2025-01-10

## 2025-01-10 RX ORDER — POLYETHYLENE GLYCOL 3350 17 G/17G
17 POWDER, FOR SOLUTION ORAL AT BEDTIME
Refills: 0 | Status: DISCONTINUED | OUTPATIENT
Start: 2025-01-10 | End: 2025-01-23

## 2025-01-10 RX ORDER — PANTOPRAZOLE 20 MG/1
40 TABLET, DELAYED RELEASE ORAL ONCE
Refills: 0 | Status: COMPLETED | OUTPATIENT
Start: 2025-01-10 | End: 2025-01-10

## 2025-01-10 RX ADMIN — APIXABAN 2.5 MILLIGRAM(S): 5 TABLET, FILM COATED ORAL at 05:24

## 2025-01-10 RX ADMIN — Medication 5 MILLIGRAM(S): at 21:49

## 2025-01-10 RX ADMIN — BISOPROLOL FUMARATE 17.5 MILLIGRAM(S): 5 TABLET ORAL at 05:24

## 2025-01-10 RX ADMIN — APIXABAN 2.5 MILLIGRAM(S): 5 TABLET, FILM COATED ORAL at 18:39

## 2025-01-10 RX ADMIN — POLYETHYLENE GLYCOL 3350 17 GRAM(S): 17 POWDER, FOR SOLUTION ORAL at 21:49

## 2025-01-10 RX ADMIN — ATORVASTATIN CALCIUM 20 MILLIGRAM(S): 80 TABLET, FILM COATED ORAL at 21:49

## 2025-01-10 RX ADMIN — PANTOPRAZOLE 40 MILLIGRAM(S): 20 TABLET, DELAYED RELEASE ORAL at 11:26

## 2025-01-10 NOTE — PROGRESS NOTE ADULT - PROBLEM SELECTOR PLAN 8
DVT ppx: eliquis 2.5mg BID  Diet: DASH/TLC, low sodium  Dispo: Active DVT ppx: eliquis 2.5mg BID  Diet: DASH/TLC, low sodium  Dispo: Pending TTE results and possible cardiology consult

## 2025-01-10 NOTE — DISCHARGE NOTE PROVIDER - NSDCCPTREATMENT_GEN_ALL_CORE_FT
Jose Gilbert is calling to request a refill on the following medication(s):    Medication Request:  Requested Prescriptions     Pending Prescriptions Disp Refills    temazepam (RESTORIL) 30 MG capsule [Pharmacy Med Name: TEMAZEPAM 30 MG CAPSULE] 30 capsule      Sig: TAKE ONE CAPSULE BY MOUTH AT BEDTIME AS NEEDED       Last Visit Date (If Applicable):  1/73/9861    Next Visit Date:    Visit date not found PRINCIPAL PROCEDURE  Procedure: Complete transthoracic echocardiography (TTE)  Findings and Treatment:   CONCLUSIONS:      1. Left ventricular cavity is normal in size. Left ventricular systolic function is normal with an ejection fraction visually estimated at 60 to 65 %. There are no regional wall motion abnormalities seen.   2. Mildly enlarged right ventricular cavity size and borderline reduced right ventricular systolic function.   3. Estimated pulmonary artery systolic pressure is 57 mmHg.   4. A 25mm, Thermofix bovine bioprosthetic valve replacement is present in the aortic position. Implanted 7/2/2012. There is evidence of valve degeneration, including thickened leaflets and mild aortic regurgitation. However, gradients are in the normal range.   5. Moderate tricuspid regurgitation.   6. No pericardial effusion seen.   7. Compared to the transthoracic echocardiogram performed on 11/22/2024, there is slightly decreased mitral and tricusipd regurgitation.        SECONDARY PROCEDURE  Procedure: CTA chest without then with IV contrast  Findings and Treatment: FINDINGS:  Lines and tubes: None  Pulmonary arteries: There is a good bolus of contrast in the pulmonary   arterial system. There is opacification through the distal subsegmental   level. There is no respiratory motion artifact. No filling defects are   identified to suggest pulmonary embolism. The main pulmonary artery is   normal in size. The right heart is not enlarged.  Mediastinum: The thyroid and thoracic inlet are normal. There is no   evidence of enlarged mediastinal, hilar, or axillary lymph nodes. There   is cardiomegaly. There is calcification of coronary vessels. Aortic valve   replacement is noted. No pericardial effusion is present. The esophagus   is normal.  Lung: Central tracheobronchial tree is patent. There are interlobular   septal thickening and right greater than left small bibasilar pleural   effusions. Atelectasisin lingula and bilateral lower lobes are noted.  Pleura: No effusions or pneumothorax.  Abdomen: Patient status post cholecystectomy. There is fatty replacement   of the pancreas. There is an exophytic left renal lesion with incomplete   rim calcification. Additional renal cysts are noted. There is a 6 mm   nonobstructing right renal calculus.  Bone and soft tissue: There is no acute osseous or soft tissue   abnormality. Patient status post median sternotomy. There are   degenerative changes of thoracic spine.  IMPRESSION:  No pulmonary embolism.  Small bibasilar pleural effusions. Interlobular septal thickening,   suggestive of interstitial edema      Procedure: XR chest AP  Findings and Treatment: ACC: [id number] EXAM:  XR CHEST PORTABLE URGENT 1V   ORDERED BY: MORGAN COLLETTA   PROCEDURE DATE:  01/08/2025    INTERPRETATION:  EXAMINATION: XR CHEST URGENT  CLINICAL INDICATION: Chest Pain  TECHNIQUE: Single frontal, portable view of the chest was obtained.  COMPARISON: Chest x-ray 11/1/2018.  FINDINGS:  The heart size cannot be accurately assessed on this AP projection.   Median sternotomy.  Bilateral lower lung field opacities consistent with atelectasis.  There is no pneumothorax or pleural effusion.  No acute osseous abnormalities.  IMPRESSION:  Bilateral lower lung field opacities consistent with atelectasis versus   pneumonia.       PRINCIPAL PROCEDURE  Procedure: Cardioversion, with DARA if indicated  Findings and Treatment: DARA with cardioversion for AFib RVR performed 01/14/2025      SECONDARY PROCEDURE  Procedure: CTA chest without then with IV contrast  Findings and Treatment: FINDINGS:  Lines and tubes: None  Pulmonary arteries: There is a good bolus of contrast in the pulmonary   arterial system. There is opacification through the distal subsegmental   level. There is no respiratory motion artifact. No filling defects are   identified to suggest pulmonary embolism. The main pulmonary artery is   normal in size. The right heart is not enlarged.  Mediastinum: The thyroid and thoracic inlet are normal. There is no   evidence of enlarged mediastinal, hilar, or axillary lymph nodes. There   is cardiomegaly. There is calcification of coronary vessels. Aortic valve   replacement is noted. No pericardial effusion is present. The esophagus   is normal.  Lung: Central tracheobronchial tree is patent. There are interlobular   septal thickening and right greater than left small bibasilar pleural   effusions. Atelectasisin lingula and bilateral lower lobes are noted.  Pleura: No effusions or pneumothorax.  Abdomen: Patient status post cholecystectomy. There is fatty replacement   of the pancreas. There is an exophytic left renal lesion with incomplete   rim calcification. Additional renal cysts are noted. There is a 6 mm   nonobstructing right renal calculus.  Bone and soft tissue: There is no acute osseous or soft tissue   abnormality. Patient status post median sternotomy. There are   degenerative changes of thoracic spine.  IMPRESSION:  No pulmonary embolism.  Small bibasilar pleural effusions. Interlobular septal thickening,   suggestive of interstitial edema      Procedure: XR chest AP  Findings and Treatment: ACC: [id number] EXAM:  XR CHEST PORTABLE URGENT 1V   ORDERED BY: MORGAN COLLETTA   PROCEDURE DATE:  01/08/2025    INTERPRETATION:  EXAMINATION: XR CHEST URGENT  CLINICAL INDICATION: Chest Pain  TECHNIQUE: Single frontal, portable view of the chest was obtained.  COMPARISON: Chest x-ray 11/1/2018.  FINDINGS:  The heart size cannot be accurately assessed on this AP projection.   Median sternotomy.  Bilateral lower lung field opacities consistent with atelectasis.  There is no pneumothorax or pleural effusion.  No acute osseous abnormalities.  IMPRESSION:  Bilateral lower lung field opacities consistent with atelectasis versus   pneumonia.      Procedure: Complete transthoracic echocardiography  Findings and Treatment: CONCLUSIONS:      1. Left ventricular cavity is normal in size. Left ventricular systolic function is normal with an ejection fraction visually estimated at 60 to 65 %. There are no regional wall motion abnormalities seen.   2. Mildly enlarged right ventricular cavity size and borderline reduced right ventricular systolic function.   3. Estimated pulmonary artery systolic pressure is 57 mmHg.   4. A 25mm, Thermofix bovine bioprosthetic valve replacement is present in the aortic position. Implanted 7/2/2012. There is evidence of valve degeneration, including thickened leaflets and mild aortic regurgitation. However, gradients are in the normal range.   5. Moderate tricuspid regurgitation.   6. No pericardial effusion seen.   7. Compared to the transthoracic echocardiogram performed on 11/22/2024, there is slightly decreased mitral and tricusipd regurgitation.     PRINCIPAL PROCEDURE  Procedure: Cardioversion, with DARA if indicated  Findings and Treatment: DARA with cardioversion for AFib RVR performed 01/14/2025      SECONDARY PROCEDURE  Procedure: CTA chest without then with IV contrast  Findings and Treatment: FINDINGS:  Lines and tubes: None  Pulmonary arteries: There is a good bolus of contrast in the pulmonary   arterial system. There is opacification through the distal subsegmental   level. There is no respiratory motion artifact. No filling defects are   identified to suggest pulmonary embolism. The main pulmonary artery is   normal in size. The right heart is not enlarged.  Mediastinum: The thyroid and thoracic inlet are normal. There is no   evidence of enlarged mediastinal, hilar, or axillary lymph nodes. There   is cardiomegaly. There is calcification of coronary vessels. Aortic valve   replacement is noted. No pericardial effusion is present. The esophagus   is normal.  Lung: Central tracheobronchial tree is patent. There are interlobular   septal thickening and right greater than left small bibasilar pleural   effusions. Atelectasisin lingula and bilateral lower lobes are noted.  Pleura: No effusions or pneumothorax.  Abdomen: Patient status post cholecystectomy. There is fatty replacement   of the pancreas. There is an exophytic left renal lesion with incomplete   rim calcification. Additional renal cysts are noted. There is a 6 mm   nonobstructing right renal calculus.  Bone and soft tissue: There is no acute osseous or soft tissue   abnormality. Patient status post median sternotomy. There are   degenerative changes of thoracic spine.  IMPRESSION:  No pulmonary embolism.  Small bibasilar pleural effusions. Interlobular septal thickening,   suggestive of interstitial edema      Procedure: XR chest AP  Findings and Treatment: ACC: [id number] EXAM:  XR CHEST PORTABLE URGENT 1V   ORDERED BY: MORGAN COLLETTA   PROCEDURE DATE:  01/08/2025    INTERPRETATION:  EXAMINATION: XR CHEST URGENT  CLINICAL INDICATION: Chest Pain  TECHNIQUE: Single frontal, portable view of the chest was obtained.  COMPARISON: Chest x-ray 11/1/2018.  FINDINGS:  The heart size cannot be accurately assessed on this AP projection.   Median sternotomy.  Bilateral lower lung field opacities consistent with atelectasis.  There is no pneumothorax or pleural effusion.  No acute osseous abnormalities.  IMPRESSION:  Bilateral lower lung field opacities consistent with atelectasis versus   pneumonia.      Procedure: Complete transthoracic echocardiography  Findings and Treatment: CONCLUSIONS:      1. Left ventricular cavity is normal in size. Left ventricular systolic function is normal with an ejection fraction visually estimated at 60 to 65 %. There are no regional wall motion abnormalities seen.   2. Mildly enlarged right ventricular cavity size and borderline reduced right ventricular systolic function.   3. Estimated pulmonary artery systolic pressure is 57 mmHg.   4. A 25mm, Thermofix bovine bioprosthetic valve replacement is present in the aortic position. Implanted 7/2/2012. There is evidence of valve degeneration, including thickened leaflets and mild aortic regurgitation. However, gradients are in the normal range.   5. Moderate tricuspid regurgitation.   6. No pericardial effusion seen.   7. Compared to the transthoracic echocardiogram performed on 11/22/2024, there is slightly decreased mitral and tricusipd regurgitation.    Procedure: XR chest, 1 view  Findings and Treatment:   ACC: [id number] EXAM:  XR CHEST PORTABLE ROUTINE 1V   ORDERED BY:  MARIA GUADALUPE ORELLANA   PROCEDURE DATE:  01/16/2025    INTERPRETATION:  DATE OF STUDY: 1/16/25  PRIOR: 1/8/25 plain chest. 1/8/25 CT angio of chest  CLINICAL INDICATION: Cough; leukocytosis  TECHNIQUE: AP radiograph of the chest.  FINDINGS:  Sternal wires again seen.  The heart is stably enlarged.  No sizable pleural effusion. No pneumothorax  Progression of patchy opacities throughout both lungs - this may be due   to pulmonary edema or to multifocal pneumonia in the right clinical   setting.  No acute bony finding.  IMPRESSION:  Cardiomegaly.  Progression of pulmonary edema changes or bilateral multifocal pneumonia.

## 2025-01-10 NOTE — DISCHARGE NOTE PROVIDER - NSDCCPCAREPLAN_GEN_ALL_CORE_FT
PRINCIPAL DISCHARGE DIAGNOSIS  Diagnosis: CHF exacerbation  Assessment and Plan of Treatment: You came into the hospital short of breath with activity. Your chest imaging was demonstrative of fluid congestion in your lungs. You were administered diuretic medications (Lasix) which increased your urine output and improved your symptoms. A TTE (ultrasound of heart) was performed which did not show any changes from your prior ultrasound scan. Please follow-up with your primary care doctor within 1 week of discharge. Please follow-up with your cardiologist within 1 week of discharge. If you experience shortness of breath, chest pain, palpitations, or feel otherwise ill - please return to the hospital for further evaluation and treatment.     PRINCIPAL DISCHARGE DIAGNOSIS  Diagnosis: CHF exacerbation  Assessment and Plan of Treatment: You came into the hospital short of breath with activity. Your chest imaging was demonstrative of fluid congestion in your lungs. You were administered diuretic medications (Lasix) which increased your urine output and improved your symptoms. A TTE (ultrasound of heart) was performed which did not show any changes from your prior ultrasound scan. Please follow-up with your primary care doctor within 1 week of discharge. Please follow-up with your cardiologist within 1 week of discharge. If you experience shortness of breath, chest pain, palpitations, or feel otherwise ill - please return to the hospital for further evaluation and treatment. Please see your cardiologist within 1 week of discharge.      SECONDARY DISCHARGE DIAGNOSES  Diagnosis: Atrial fibrillation with RVR  Assessment and Plan of Treatment: You came into the hospital with a rapid heart rate. An EKG showed that you were in atrial fibrillation with rapid ventricular response (rapid irregular heart rate). Your home bisoprolol was started with initial improvement in your heart rate. The cardiologists recommended changing your bisoprolol to metoprolol, and added on the medication amiodarone (you also received one dose of digoxin). On January 14th, you underwent DARA (transesophageal echocardiogram) and cardioversion - a procedure that you underwent to attempt and convert your heart rate back to normal. If you experience shortness of breath, chest pain, palpitations, or feel otherwise ill - please return to the hospital for further evaluation and treatment. Please see your cardiologist within 1 week of discharge.     PRINCIPAL DISCHARGE DIAGNOSIS  Diagnosis: CHF exacerbation  Assessment and Plan of Treatment: You came into the hospital short of breath with activity. Your chest imaging was demonstrative of fluid congestion in your lungs. You were administered diuretic medications (Lasix) which increased your urine output and improved your symptoms. A TTE (ultrasound of heart) was performed which did not show any changes from your prior ultrasound scan. Please follow-up with your primary care doctor within 1 week of discharge. Please follow-up with your cardiologist within 1 week of discharge. If you experience shortness of breath, chest pain, palpitations, or feel otherwise ill - please return to the hospital for further evaluation and treatment. Please see your cardiologist within 1 week of discharge.      SECONDARY DISCHARGE DIAGNOSES  Diagnosis: Atrial fibrillation with RVR  Assessment and Plan of Treatment: You came into the hospital with a rapid heart rate. An EKG showed that you were in atrial fibrillation with rapid ventricular response (rapid irregular heart rate). Your home bisoprolol was started with initial improvement in your heart rate. The cardiologists recommended changing your bisoprolol to metoprolol, and added on the medication amiodarone (you also received one dose of digoxin). On January 14th, you underwent DARA (transesophageal echocardiogram) and cardioversion - a procedure that you underwent to attempt and convert your heart rate back to normal. If you experience shortness of breath, chest pain, palpitations, or feel otherwise ill - please return to the hospital for further evaluation and treatment. Please see your cardiologist within 1 week of discharge.    Diagnosis: PETER (acute kidney injury)  Assessment and Plan of Treatment: Your kidneys showed worsening function during this admission, likely due to a combination of removing fluid through the use of medications, contrast used for your CT scan, and your acute medical illness.   [PENDING]     PRINCIPAL DISCHARGE DIAGNOSIS  Diagnosis: CHF exacerbation  Assessment and Plan of Treatment: You came into the hospital short of breath with activity. Your chest imaging was demonstrative of fluid congestion in your lungs. You were administered diuretic medications (Lasix) which increased your urine output and initially improved your symptoms. A TTE (ultrasound of heart) was performed which did not show any changes from your prior ultrasound scan. During your cardioversion procedure, a DARA was performed (a ultrasound of your heart that is higher resolution) that demonstrated severe dysfunction of your heart valves which is likely resulting in the excessive fluid throughout your body and lungs. After your DARA/cardioversion procedure, your kidney function worsened and you became short of breath, so you were administered stronger diuretic medications (Bumex) which increased your urine output and improved your symptoms. Please follow-up with your primary care doctor within 1 week of discharge. Please follow-up with your cardiologist within 1 week of discharge. If you experience shortness of breath, chest pain, palpitations, or feel otherwise ill - please return to the hospital for further evaluation and treatment. Please see your cardiologist within 1 week of discharge.      SECONDARY DISCHARGE DIAGNOSES  Diagnosis: Atrial fibrillation with RVR  Assessment and Plan of Treatment: You came into the hospital with a rapid heart rate. An EKG showed that you were in atrial fibrillation with rapid ventricular response (rapid irregular heart rate). Your home bisoprolol was started with initial improvement in your heart rate. The cardiologists recommended changing your bisoprolol to metoprolol, and added on the medication amiodarone (you also received one dose of digoxin). On January 14th, you underwent DARA (transesophageal echocardiogram) and cardioversion - a procedure that you underwent to attempt and convert your heart rate back to normal. However, your heart rate converted back into atrial fibrillation despite this procedure. We switched your home medication of bisoprolol to metoprolol which you should continue to take as prescribed. If you experience shortness of breath, chest pain, palpitations, or feel otherwise ill - please return to the hospital for further evaluation and treatment. Please see your cardiologist within 1 week of discharge.    Diagnosis: PETER (acute kidney injury)  Assessment and Plan of Treatment: Although you have chronic kidney disease, your kidneys showed worsening function during this admission, likely due to a combination of removing fluid through the use of medications, contrast used for your CT scan, and your acute medical illness. It initially improved after discontinuing fluids, but your kidney function worsened again after your cardioversion/DARA - which could be due to the aforementioned factors but also could be a consequence of the procedure and fluid overload. We administered a stronger diuretic medication to remove fluid from your body (Bumex), and your acute kidney injury and kidney function gradually improved. Please see your cardiologist and primary care doctor within 1 week of discharge.        PRINCIPAL DISCHARGE DIAGNOSIS  Diagnosis: CHF exacerbation  Assessment and Plan of Treatment: You came into the hospital short of breath with activity. Your chest imaging was demonstrative of fluid congestion in your lungs. You were administered diuretic medications (Lasix) which increased your urine output and initially improved your symptoms. A TTE (ultrasound of heart) was performed which did not show any changes from your prior ultrasound scan. During your cardioversion procedure, a DARA was performed (a ultrasound of your heart that is higher resolution) that demonstrated severe dysfunction of your heart valves which is likely resulting in the excessive fluid throughout your body and lungs. After your DARA/cardioversion procedure, your kidney function worsened and you became short of breath, so you were administered stronger diuretic medications (Bumex) which increased your urine output and improved your symptoms. You should continue to take Bumex 2 mg everyday until your cardiology appointment.  Please follow-up with your primary care doctor within 1 week of discharge. Please follow-up with your cardiologist within 1 week of discharge. If you experience shortness of breath, chest pain, palpitations, or feel otherwise ill - please return to the hospital for further evaluation and treatment. Please see your cardiologist within 1 week of discharge.      SECONDARY DISCHARGE DIAGNOSES  Diagnosis: Atrial fibrillation with RVR  Assessment and Plan of Treatment: You came into the hospital with a rapid heart rate. An EKG showed that you were in atrial fibrillation with rapid ventricular response (rapid irregular heart rate). Your home bisoprolol was started with initial improvement in your heart rate. The cardiologists recommended changing your bisoprolol to metoprolol, and added on the medication amiodarone (you also received one dose of digoxin). On January 14th, you underwent DRAA (transesophageal echocardiogram) and cardioversion - a procedure that you underwent to attempt and convert your heart rate back to normal. However, your heart rate converted back into atrial fibrillation despite this procedure. We switched your home medication of bisoprolol to metoprolol (75 mg) which you should continue to take as prescribed.   If you experience shortness of breath, chest pain, palpitations, or feel otherwise ill - please return to the hospital for further evaluation and treatment. Please see your cardiologist within 1 week of discharge.    Diagnosis: Influenza A  Assessment and Plan of Treatment: While you were admitted, you tested positive for the virus Influenza A or known as the Flu. You were started on a medication called Tamiflu. Please continue this medication through 01/25/25. If you have increasing trouble breathing or chest pain, please return to the hospital.    Diagnosis: PETER (acute kidney injury)  Assessment and Plan of Treatment: Although you have chronic kidney disease, your kidneys showed worsening function during this admission, likely due to a combination of removing fluid through the use of medications, contrast used for your CT scan, and your acute medical illness. It initially improved after discontinuing fluids, but your kidney function worsened again after your cardioversion/DARA - which could be due to the aforementioned factors but also could be a consequence of the procedure and fluid overload. We administered a stronger diuretic medication to remove fluid from your body (Bumex), and your acute kidney injury and kidney function gradually improved. Please see your cardiologist and primary care doctor within 1 week of discharge.

## 2025-01-10 NOTE — DISCHARGE NOTE PROVIDER - HOSPITAL COURSE
HPI:  84-year-old male PMH afib on eliquid, severe aortic stenosis (2/2 bicuspid aortic valve), s/p aortic valve replacement (7/2012), HTN, pHTN, HLD, chronic renal insufficiency, thrombocytopenia, GERD presenting from Dr. Ramirez office for tachycardia.   Patient had COVID 2 weeks ago which he was treated with paxlovid for. Subsequently, the patient developed some wheezing and was treated with doxycycline and steroids from an urgent care with improvement in symptoms. The patient continued to feel weak however, and dyspneic on exertion. The patient notes 10 days of leg swelling, with dyspnea on exertion starting 3 days ago with inability to tolerate more than 2-3 steps (baseline does not ambulate much). Of note, the patient experienced some nausea a few days prior and vomited after getting into his car.   The patient went to visit his PCP today for the dyspnea and leg swelling; was noted to be tachycardic to 130 BPM and was sent to ER for further evaluation.   Pt did not take home bisoprolol and eliquis prior to admission. Pt denies chest pain, shortness of breath, n/v/d/, fevers or chills, urinary sx, headache, visual changes, numbness or other complaints.    ER Course: VS notable for sinus tachycardia to 125-128 on admission. Labs notable for thrombocytopenia to 70 (PMHx of thrombocytopenia), Troponin 54->59, Pro-BNP of 3624. RVP was negative, CXR: b/l lower atelectasis vs pneumonia, CTA: "Small bibasilar pleural effusions. Interlobular septal thickening, suggestive of interstitial edema." POCUS TTE: trace pericardial effusion, global LV hypokinesis, b/l lungs with B-lines and trace pleural effusions.  s/p apixaban 2.5mg PO, metoprolol succinate 100mg PO, lasix 20mg IV. Admitted to medicine for likely CHF exacerbation.    (08 Jan 2025 16:29)    Hospital Course:  The patient was admitted for CHF exacerbation. IV Lasix 20mg IV was administered two times with appropriate urine output each time. After administration of IV Lasix x 2, the patient felt symptomatic relief and no longer felt dyspnea on exertion. A TTE was performed which showed an EF of 60-65%, mildly enlarged right ventricular cavity size, borderline reduced right ventricular systolic function, pHTN: 57 mmHg, moderate tricuspid regurgitation. Compared to the patient's outpatient ECHO performed on 11/2024, his repeat ECHO did not demonstrate significant interval changes. The patient was asymptomatically tachycardic to a HR of 110-120 on admission and an EKG demonstrated AFib RVR. The patient was persistently tachycardic despite receiving his home bisprolol 17.5 mg dosing.    Important Medication Changes and Reason:    Active or Pending Issues Requiring Follow-up:  Primary Care Follow-Up - within 1 week  Cardiology Follow-Up within 1 week    Advanced Directives:   [X] Full code  [ ] DNR  [ ] Hospice    Discharge Diagnoses:  CHF exacerbation       HPI:  84-year-old male PMH afib on eliquid, severe aortic stenosis (2/2 bicuspid aortic valve), s/p aortic valve replacement (7/2012), HTN, pHTN, HLD, chronic renal insufficiency, thrombocytopenia, GERD presenting from Dr. Ramirez office for tachycardia.   Patient had COVID 2 weeks ago which he was treated with paxlovid for. Subsequently, the patient developed some wheezing and was treated with doxycycline and steroids from an urgent care with improvement in symptoms. The patient continued to feel weak however, and dyspneic on exertion. The patient notes 10 days of leg swelling, with dyspnea on exertion starting 3 days ago with inability to tolerate more than 2-3 steps (baseline does not ambulate much). Of note, the patient experienced some nausea a few days prior and vomited after getting into his car.   The patient went to visit his PCP today for the dyspnea and leg swelling; was noted to be tachycardic to 130 BPM and was sent to ER for further evaluation.   Pt did not take home bisoprolol and eliquis prior to admission. Pt denies chest pain, shortness of breath, n/v/d/, fevers or chills, urinary sx, headache, visual changes, numbness or other complaints.    ER Course: VS notable for sinus tachycardia to 125-128 on admission. Labs notable for thrombocytopenia to 70 (PMHx of thrombocytopenia), Troponin 54->59, Pro-BNP of 3624. RVP was negative, CXR: b/l lower atelectasis vs pneumonia, CTA: "Small bibasilar pleural effusions. Interlobular septal thickening, suggestive of interstitial edema." POCUS TTE: trace pericardial effusion, global LV hypokinesis, b/l lungs with B-lines and trace pleural effusions.  s/p apixaban 2.5mg PO, metoprolol succinate 100mg PO, lasix 20mg IV. Admitted to medicine for likely CHF exacerbation.    (08 Jan 2025 16:29)    Hospital Course:  The patient was admitted for CHF exacerbation. IV Lasix 20mg IV was administered two times with appropriate urine output each time. After administration of IV Lasix x 2, the patient felt symptomatic relief and no longer felt dyspnea on exertion. A TTE was performed which showed an EF of 60-65%, mildly enlarged right ventricular cavity size, borderline reduced right ventricular systolic function, pHTN: 57 mmHg, moderate tricuspid regurgitation. After receiving IV lasix x 2, the patient developed a mild PETER with an increase in creatinine from 1.2 to 1.5.    Compared to the patient's outpatient ECHO performed on 11/2024, his repeat ECHO did not demonstrate significant interval changes. The patient was asymptomatically tachycardic to a HR of 110-120 on admission and an EKG demonstrated AFib RVR. The patient was persistently tachycardic despite receiving his home bisprolol 17.5 mg dosing.    Important Medication Changes and Reason:    Active or Pending Issues Requiring Follow-up:  Primary Care Follow-Up - within 1 week  Cardiology Follow-Up within 1 week    Advanced Directives:   [X] Full code  [ ] DNR  [ ] Hospice    Discharge Diagnoses:  CHF exacerbation       HPI:  84-year-old male PMH afib on eliquid, severe aortic stenosis (2/2 bicuspid aortic valve), s/p aortic valve replacement (7/2012), HTN, pHTN, HLD, chronic renal insufficiency, thrombocytopenia, GERD presenting from Dr. Ramirez office for tachycardia.   Patient had COVID 2 weeks ago which he was treated with paxlovid for. Subsequently, the patient developed some wheezing and was treated with doxycycline and steroids from an urgent care with improvement in symptoms. The patient continued to feel weak however, and dyspneic on exertion. The patient notes 10 days of leg swelling, with dyspnea on exertion starting 3 days ago with inability to tolerate more than 2-3 steps (baseline does not ambulate much). Of note, the patient experienced some nausea a few days prior and vomited after getting into his car.   The patient went to visit his PCP today for the dyspnea and leg swelling; was noted to be tachycardic to 130 BPM and was sent to ER for further evaluation.   Pt did not take home bisoprolol and eliquis prior to admission. Pt denies chest pain, shortness of breath, n/v/d/, fevers or chills, urinary sx, headache, visual changes, numbness or other complaints.    ER Course: VS notable for sinus tachycardia to 125-128 on admission. Labs notable for thrombocytopenia to 70 (PMHx of thrombocytopenia), Troponin 54->59, Pro-BNP of 3624. RVP was negative, CXR: b/l lower atelectasis vs pneumonia, CTA: "Small bibasilar pleural effusions. Interlobular septal thickening, suggestive of interstitial edema." POCUS TTE: trace pericardial effusion, global LV hypokinesis, b/l lungs with B-lines and trace pleural effusions.  s/p apixaban 2.5mg PO, metoprolol succinate 100mg PO, lasix 20mg IV. Admitted to medicine for likely CHF exacerbation.    (08 Jan 2025 16:29)    Hospital Course:  The patient was admitted for CHF exacerbation. IV Lasix 20mg IV was administered two times with appropriate urine output each time. After administration of IV Lasix x 2, the patient felt symptomatic relief and no longer felt dyspnea on exertion. A TTE was performed which showed an EF of 60-65%, mildly enlarged right ventricular cavity size, borderline reduced right ventricular systolic function, pHTN: 57 mmHg, moderate tricuspid regurgitation. After receiving IV lasix x 2, the patient developed a mild PETER with an increase in creatinine from 1.2 to 1.5.    Compared to the patient's outpatient ECHO performed on 11/2024, his repeat ECHO did not demonstrate significant interval changes. The patient was asymptomatically tachycardic to a HR of 110-120 on admission and an EKG demonstrated AFib RVR. The patient was persistently tachycardic despite receiving his home bisprolol 17.5 mg dosing.    Important Medication Changes and Reason:  Continue bisoprolol 17.5 mg once daily  Continue other home medications    Active or Pending Issues Requiring Follow-up:  Primary Care Follow-Up - within 1 week  Cardiology Follow-Up within 1 week    Advanced Directives:   [X] Full code  [ ] DNR  [ ] Hospice    Discharge Diagnoses:  CHF exacerbation  AFib RVR     HPI:  84-year-old male PMH afib on eliquid, severe aortic stenosis (2/2 bicuspid aortic valve), s/p aortic valve replacement (7/2012), HTN, pHTN, HLD, chronic renal insufficiency, thrombocytopenia, GERD presenting from Dr. Ramirez office for tachycardia.   Patient had COVID 2 weeks ago which he was treated with paxlovid for. Subsequently, the patient developed some wheezing and was treated with doxycycline and steroids from an urgent care with improvement in symptoms. The patient continued to feel weak however, and dyspneic on exertion. The patient notes 10 days of leg swelling, with dyspnea on exertion starting 3 days ago with inability to tolerate more than 2-3 steps (baseline does not ambulate much). Of note, the patient experienced some nausea a few days prior and vomited after getting into his car.   The patient went to visit his PCP today for the dyspnea and leg swelling; was noted to be tachycardic to 130 BPM and was sent to ER for further evaluation.   Pt did not take home bisoprolol and eliquis prior to admission. Pt denies chest pain, shortness of breath, n/v/d/, fevers or chills, urinary sx, headache, visual changes, numbness or other complaints.    ER Course: VS notable for sinus tachycardia to 125-128 on admission. Labs notable for thrombocytopenia to 70 (PMHx of thrombocytopenia), Troponin 54->59, Pro-BNP of 3624. RVP was negative, CXR: b/l lower atelectasis vs pneumonia, CTA: "Small bibasilar pleural effusions. Interlobular septal thickening, suggestive of interstitial edema." POCUS TTE: trace pericardial effusion, global LV hypokinesis, b/l lungs with B-lines and trace pleural effusions.  s/p apixaban 2.5mg PO, metoprolol succinate 100mg PO, lasix 20mg IV. Admitted to medicine for likely CHF exacerbation.    (08 Jan 2025 16:29)    Hospital Course:  The patient was admitted for CHF exacerbation. IV Lasix 20mg IV was administered two times with appropriate urine output each time. After administration of IV Lasix x 2, the patient felt symptomatic relief and no longer felt dyspnea on exertion. A TTE was performed which showed an EF of 60-65%, mildly enlarged right ventricular cavity size, borderline reduced right ventricular systolic function, pHTN: 57 mmHg, moderate tricuspid regurgitation. After receiving IV lasix x 2, the patient developed a mild PETER with an increase in creatinine from 1.2 to 1.6. The creatinine continued to down-trend towards baseline during this admission.    Compared to the patient's outpatient ECHO performed on 11/2024, his repeat ECHO did not demonstrate significant interval changes. The patient was asymptomatically tachycardic to a HR of 110-120 on admission and an EKG demonstrated AFib RVR. The patient was persistently tachycardic despite receiving his home bisprolol 17.5 mg dosing. After discussing with the cardiology team, the patient's bisoprolol was transitioned to metoprolol 25mg ER, and also received a dose of digoxin. The patient was also started on amiodarone to control his AFib RVR. On 1/14, the patient underwent DARA and cardioversion.     Important Medication Changes and Reason:  Continue metoprolol ___ (25mg ER???)  Continue amiodarone ___ (dose?)  Continue other home medications    Active or Pending Issues Requiring Follow-up:  Primary Care Follow-Up - within 1 week  Cardiology Follow-Up within 1 week    Advanced Directives:   [X] Full code  [ ] DNR  [ ] Hospice    Discharge Diagnoses:  CHF exacerbation  AFib RVR     HPI:  84-year-old male PMH afib on eliquid, severe aortic stenosis (2/2 bicuspid aortic valve), s/p aortic valve replacement (7/2012), HTN, pHTN, HLD, chronic renal insufficiency, thrombocytopenia, GERD presenting from Dr. Ramirez office for tachycardia.   Patient had COVID 2 weeks ago which he was treated with paxlovid for. Subsequently, the patient developed some wheezing and was treated with doxycycline and steroids from an urgent care with improvement in symptoms. The patient continued to feel weak however, and dyspneic on exertion. The patient notes 10 days of leg swelling, with dyspnea on exertion starting 3 days ago with inability to tolerate more than 2-3 steps (baseline does not ambulate much). Of note, the patient experienced some nausea a few days prior and vomited after getting into his car.   The patient went to visit his PCP today for the dyspnea and leg swelling; was noted to be tachycardic to 130 BPM and was sent to ER for further evaluation.   Pt did not take home bisoprolol and eliquis prior to admission. Pt denies chest pain, shortness of breath, n/v/d/, fevers or chills, urinary sx, headache, visual changes, numbness or other complaints.    ER Course: VS notable for sinus tachycardia to 125-128 on admission. Labs notable for thrombocytopenia to 70 (PMHx of thrombocytopenia), Troponin 54->59, Pro-BNP of 3624. RVP was negative, CXR: b/l lower atelectasis vs pneumonia, CTA: "Small bibasilar pleural effusions. Interlobular septal thickening, suggestive of interstitial edema." POCUS TTE: trace pericardial effusion, global LV hypokinesis, b/l lungs with B-lines and trace pleural effusions.  s/p apixaban 2.5mg PO, metoprolol succinate 100mg PO, lasix 20mg IV. Admitted to medicine for likely CHF exacerbation.    (08 Jan 2025 16:29)    Hospital Course:  The patient was admitted for CHF exacerbation. IV Lasix 20mg IV was administered two times with appropriate urine output each time. After administration of IV Lasix x 2, the patient felt symptomatic relief and no longer felt dyspnea on exertion. A TTE was performed which showed an EF of 60-65%, mildly enlarged right ventricular cavity size, borderline reduced right ventricular systolic function, pHTN: 57 mmHg, moderate tricuspid regurgitation. After receiving IV lasix x 2, the patient developed a mild PETER with an increase in creatinine from 1.2 to 1.6. The creatinine continued to down-trend towards baseline during this admission.    Compared to the patient's outpatient ECHO performed on 11/2024, his repeat ECHO did not demonstrate significant interval changes. The patient was asymptomatically tachycardic to a HR of 110-120 on admission and an EKG demonstrated AFib RVR. The patient was persistently tachycardic despite receiving his home bisprolol 17.5 mg dosing. After discussing with the cardiology team, the patient's bisoprolol was transitioned to metoprolol 25mg ER, and also received a dose of digoxin. The patient was also started on amiodarone to control his AFib RVR. On 1/14, the patient underwent DARA and cardioversion. The day after the procedure, the patient began experiencing dyspnea on exertion, nausea    Important Medication Changes and Reason:  Continue metoprolol ___ (25mg ER???)  Continue amiodarone ___ (dose?)  Continue other home medications    Active or Pending Issues Requiring Follow-up:  Primary Care Follow-Up - within 1 week  Cardiology Follow-Up within 1 week    Advanced Directives:   [X] Full code  [ ] DNR  [ ] Hospice    Discharge Diagnoses:  CHF exacerbation  AFib RVR     HPI:  84-year-old male PMH afib on eliquid, severe aortic stenosis (2/2 bicuspid aortic valve), s/p aortic valve replacement (7/2012), HTN, pHTN, HLD, chronic renal insufficiency, thrombocytopenia, GERD presenting from Dr. Ramirez office for tachycardia.   Patient had COVID 2 weeks ago which he was treated with paxlovid for. Subsequently, the patient developed some wheezing and was treated with doxycycline and steroids from an urgent care with improvement in symptoms. The patient continued to feel weak however, and dyspneic on exertion. The patient notes 10 days of leg swelling, with dyspnea on exertion starting 3 days ago with inability to tolerate more than 2-3 steps (baseline does not ambulate much). Of note, the patient experienced some nausea a few days prior and vomited after getting into his car.   The patient went to visit his PCP today for the dyspnea and leg swelling; was noted to be tachycardic to 130 BPM and was sent to ER for further evaluation.   Pt did not take home bisoprolol and eliquis prior to admission. Pt denies chest pain, shortness of breath, n/v/d/, fevers or chills, urinary sx, headache, visual changes, numbness or other complaints.    ER Course: VS notable for sinus tachycardia to 125-128 on admission. Labs notable for thrombocytopenia to 70 (PMHx of thrombocytopenia), Troponin 54->59, Pro-BNP of 3624. RVP was negative, CXR: b/l lower atelectasis vs pneumonia, CTA: "Small bibasilar pleural effusions. Interlobular septal thickening, suggestive of interstitial edema." POCUS TTE: trace pericardial effusion, global LV hypokinesis, b/l lungs with B-lines and trace pleural effusions.  s/p apixaban 2.5mg PO, metoprolol succinate 100mg PO, lasix 20mg IV. Admitted to medicine for likely CHF exacerbation.    (08 Jan 2025 16:29)    Hospital Course:  The patient was admitted for CHF exacerbation. IV Lasix 20mg IV was administered two times with appropriate urine output each time. After administration of IV Lasix x 2, the patient felt symptomatic relief and no longer felt dyspnea on exertion. A TTE was performed which showed an EF of 60-65%, mildly enlarged right ventricular cavity size, borderline reduced right ventricular systolic function, pHTN: 57 mmHg, moderate tricuspid regurgitation. After receiving IV lasix x 2, the patient developed a mild PETER with an increase in creatinine from 1.2 to 1.6. The creatinine continued to down-trend towards baseline during this admission.    Compared to the patient's outpatient ECHO performed on 11/2024, his repeat ECHO did not demonstrate significant interval changes. The patient was asymptomatically tachycardic to a HR of 110-120 on admission and an EKG demonstrated AFib RVR. The patient was persistently tachycardic despite receiving his home bisprolol 17.5 mg dosing. After discussing with the cardiology team, the patient's bisoprolol was transitioned to metoprolol 25mg ER, and also received a dose of digoxin. The patient was also started on amiodarone to control his AFib RVR. On 1/14, the patient underwent DARA and cardioversion. The day after the procedure, the patient began experiencing dyspnea on exertion, nausea, orthopnea with associated cough, lethargy, and loss of appetite. Standing weight increased from the day prior, and urine output decreased from the day prior. Blood work revealed hyponatremia and worsening PETER on CKD, urine studies were suggestive of pre-renal vs intrinsic renal etiology, and renal U/S indicative of chronic kidney disease. A trial of Bumex 2mg IVP was ordered.     Important Medication Changes and Reason:  Continue metoprolol 25mg ER  Continue amiodarone 400mg BID  Continue other home medications    Active or Pending Issues Requiring Follow-up:  Primary Care Follow-Up - within 1 week  Cardiology Follow-Up within 1 week    Advanced Directives:   [X] Full code  [ ] DNR  [ ] Hospice    Discharge Diagnoses:  CHF exacerbation  AFib RVR  s/p DARA  s/p Cardioversion     HPI:  84-year-old male PMH afib on eliquid, severe aortic stenosis (2/2 bicuspid aortic valve), s/p aortic valve replacement (7/2012), HTN, pHTN, HLD, chronic renal insufficiency, thrombocytopenia, GERD presenting from Dr. Ramirez office for tachycardia.   Patient had COVID 2 weeks ago which he was treated with paxlovid for. Subsequently, the patient developed some wheezing and was treated with doxycycline and steroids from an urgent care with improvement in symptoms. The patient continued to feel weak however, and dyspneic on exertion. The patient notes 10 days of leg swelling, with dyspnea on exertion starting 3 days ago with inability to tolerate more than 2-3 steps (baseline does not ambulate much). Of note, the patient experienced some nausea a few days prior and vomited after getting into his car.   The patient went to visit his PCP today for the dyspnea and leg swelling; was noted to be tachycardic to 130 BPM and was sent to ER for further evaluation.   Pt did not take home bisoprolol and eliquis prior to admission. Pt denies chest pain, shortness of breath, n/v/d/, fevers or chills, urinary sx, headache, visual changes, numbness or other complaints.    ER Course: VS notable for sinus tachycardia to 125-128 on admission. Labs notable for thrombocytopenia to 70 (PMHx of thrombocytopenia), Troponin 54->59, Pro-BNP of 3624. RVP was negative, CXR: b/l lower atelectasis vs pneumonia, CTA: "Small bibasilar pleural effusions. Interlobular septal thickening, suggestive of interstitial edema." POCUS TTE: trace pericardial effusion, global LV hypokinesis, b/l lungs with B-lines and trace pleural effusions.  s/p apixaban 2.5mg PO, metoprolol succinate 100mg PO, lasix 20mg IV. Admitted to medicine for likely CHF exacerbation.    (08 Jan 2025 16:29)    Hospital Course:  The patient was admitted for CHF exacerbation. IV Lasix 20mg IV was administered two times with appropriate urine output each time. After administration of IV Lasix x 2, the patient felt symptomatic relief and no longer felt dyspnea on exertion. A TTE was performed which showed an EF of 60-65%, mildly enlarged right ventricular cavity size, borderline reduced right ventricular systolic function, pHTN: 57 mmHg, moderate tricuspid regurgitation. After receiving IV lasix x 2, the patient developed a mild PETER with an increase in creatinine from 1.2 to 1.6. The creatinine continued to down-trend towards baseline during this admission.    Compared to the patient's outpatient ECHO performed on 11/2024, his repeat ECHO did not demonstrate significant interval changes. The patient was asymptomatically tachycardic to a HR of 110-120 on admission and an EKG demonstrated AFib RVR. The patient was persistently tachycardic despite receiving his home bisprolol 17.5 mg dosing. After discussing with the cardiology team, the patient's bisoprolol was transitioned to metoprolol 25mg ER, and also received a dose of digoxin. The patient was also started on amiodarone to control his AFib RVR. On 1/14, the patient underwent DARA and cardioversion. The DARA showed severe valvulopathies, including aortic valvular disease which may require further intervention per the cardiology team. The patient was converted to normal sinus rhythm. The day after the procedure, the patient began experiencing dyspnea on exertion, nausea, orthopnea with associated cough, lethargy, and loss of appetite. Standing weight increased from the day prior, and urine output decreased from the day prior. Blood work revealed hyponatremia and worsening PETER on CKD, urine studies were suggestive of pre-renal vs intrinsic renal etiology, and renal U/S indicative of chronic kidney disease. Per nephrology recommendations, the patient was ordered for standing Bumex 2mg IVP BID and given 3% hypertonic saline 2x with improvement in his PETER and his hyponatremia. The patient converted back into AFib despite his cardioversion, and his metoprolol 25mg PO QD was increased to 50mg PO QD. Furthermore, due to a decrease in his platelets, his amiodarone was down-titrated to 200mg QD.    Important Medication Changes and Reason:  Continue metoprolol 50mg ER  Continue amiodarone 200mg QD  Continue other home medications    Active or Pending Issues Requiring Follow-up:  Primary Care Follow-Up - within 1 week  Cardiology Follow-Up within 1 week    Advanced Directives:   [X] Full code  [ ] DNR  [ ] Hospice    Discharge Diagnoses:  CHF exacerbation  AFib RVR  s/p DARA  s/p Cardioversion  PETER on CKD  Hyponatremia     HPI:  84-year-old male PMH afib on eliquid, severe aortic stenosis (2/2 bicuspid aortic valve), s/p aortic valve replacement (7/2012), HTN, pHTN, HLD, chronic renal insufficiency, thrombocytopenia, GERD presenting from Dr. Ramirez office for tachycardia.   Patient had COVID 2 weeks ago which he was treated with paxlovid for. Subsequently, the patient developed some wheezing and was treated with doxycycline and steroids from an urgent care with improvement in symptoms. The patient continued to feel weak however, and dyspneic on exertion. The patient notes 10 days of leg swelling, with dyspnea on exertion starting 3 days ago with inability to tolerate more than 2-3 steps (baseline does not ambulate much). Of note, the patient experienced some nausea a few days prior and vomited after getting into his car.   The patient went to visit his PCP today for the dyspnea and leg swelling; was noted to be tachycardic to 130 BPM and was sent to ER for further evaluation.   Pt did not take home bisoprolol and eliquis prior to admission. Pt denies chest pain, shortness of breath, n/v/d/, fevers or chills, urinary sx, headache, visual changes, numbness or other complaints.    ER Course: VS notable for sinus tachycardia to 125-128 on admission. Labs notable for thrombocytopenia to 70 (PMHx of thrombocytopenia), Troponin 54->59, Pro-BNP of 3624. RVP was negative, CXR: b/l lower atelectasis vs pneumonia, CTA: "Small bibasilar pleural effusions. Interlobular septal thickening, suggestive of interstitial edema." POCUS TTE: trace pericardial effusion, global LV hypokinesis, b/l lungs with B-lines and trace pleural effusions.  s/p apixaban 2.5mg PO, metoprolol succinate 100mg PO, lasix 20mg IV. Admitted to medicine for likely CHF exacerbation.    (08 Jan 2025 16:29)    Hospital Course:  The patient was admitted for CHF exacerbation. IV Lasix 20mg IV was administered two times with appropriate urine output each time. After administration of IV Lasix x 2, the patient felt symptomatic relief and no longer felt dyspnea on exertion. A TTE was performed which showed an EF of 60-65%, mildly enlarged right ventricular cavity size, borderline reduced right ventricular systolic function, pHTN: 57 mmHg, moderate tricuspid regurgitation. After receiving IV lasix x 2, the patient developed a mild PETER with an increase in creatinine from 1.2 to 1.6. The creatinine continued to down-trend towards baseline during this admission.    Compared to the patient's outpatient ECHO performed on 11/2024, his repeat ECHO did not demonstrate significant interval changes. The patient was asymptomatically tachycardic to a HR of 110-120 on admission and an EKG demonstrated AFib RVR. The patient was persistently tachycardic despite receiving his home bisprolol 17.5 mg dosing. After discussing with the cardiology team, the patient's bisoprolol was transitioned to metoprolol 25mg ER, and also received a dose of digoxin. The patient was also started on amiodarone to control his AFib RVR. On 1/14, the patient underwent DARA and cardioversion. The DARA showed severe valvulopathies, including aortic valvular disease which may require further intervention per the cardiology team. The patient was converted to normal sinus rhythm. The day after the procedure, the patient began experiencing dyspnea on exertion, nausea, orthopnea with associated cough, lethargy, and loss of appetite. Standing weight increased from the day prior, and urine output decreased from the day prior. Blood work revealed hyponatremia and worsening PETER on CKD, urine studies were suggestive of pre-renal vs intrinsic renal etiology, and renal U/S indicative of chronic kidney disease. Per nephrology recommendations, the patient was ordered for standing Bumex 2mg IVP BID and given 3% hypertonic saline 2x with improvement in his PETER and his hyponatremia. The patient converted back into AFib despite his cardioversion, and his metoprolol 25mg PO QD was increased to 75mg PO QD. Furthermore, due to a decrease in his platelets, his amiodarone was down-titrated to 200mg QD. During his admission, he tested positive for influenza A and was started on Oseltamivir.     Important Medication Changes and Reason:  Continue metoprolol 75mg ER  Continue amiodarone 200mg QD  Continue Bumex 2 mg PO QD  Continue Oseltamivir through 01/25  Continue other home medications    Active or Pending Issues Requiring Follow-up:  Primary Care Follow-Up - within 1 week  Cardiology Follow-Up within 1 week    Advanced Directives:   [X] Full code  [ ] DNR  [ ] Hospice    Discharge Diagnoses:  CHF exacerbation  AFib RVR  s/p DARA  s/p Cardioversion  PETER on CKD  Hyponatremia     HPI:  84-year-old male PMH afib on eliquis, severe aortic stenosis (2/2 bicuspid aortic valve), s/p aortic valve replacement (7/2012), HTN, pHTN, HLD, chronic renal insufficiency, thrombocytopenia, GERD presenting from Dr. Ramirez office for tachycardia.   Patient had COVID 2 weeks ago which he was treated with paxlovid for. Subsequently, the patient developed some wheezing and was treated with doxycycline and steroids from an urgent care with improvement in symptoms. The patient continued to feel weak however, and dyspneic on exertion. The patient notes 10 days of leg swelling, with dyspnea on exertion starting 3 days ago with inability to tolerate more than 2-3 steps (baseline does not ambulate much). Of note, the patient experienced some nausea a few days prior and vomited after getting into his car.   The patient went to visit his PCP today for the dyspnea and leg swelling; was noted to be tachycardic to 130 BPM and was sent to ER for further evaluation.   Pt did not take home bisoprolol and eliquis prior to admission. Pt denies chest pain, shortness of breath, n/v/d/, fevers or chills, urinary sx, headache, visual changes, numbness or other complaints.    ER Course: VS notable for sinus tachycardia to 125-128 on admission. Labs notable for thrombocytopenia to 70 (PMHx of thrombocytopenia), Troponin 54->59, Pro-BNP of 3624. RVP was negative, CXR: b/l lower atelectasis vs pneumonia, CTA: "Small bibasilar pleural effusions. Interlobular septal thickening, suggestive of interstitial edema." POCUS TTE: trace pericardial effusion, global LV hypokinesis, b/l lungs with B-lines and trace pleural effusions.  s/p apixaban 2.5mg PO, metoprolol succinate 100mg PO, lasix 20mg IV. Admitted to medicine for likely CHF exacerbation.    (08 Jan 2025 16:29)    Hospital Course:  The patient was admitted for CHF exacerbation. IV Lasix 20mg IV was administered two times with appropriate urine output each time. After administration of IV Lasix x 2, the patient felt symptomatic relief and no longer felt dyspnea on exertion. A TTE was performed which showed an EF of 60-65%, mildly enlarged right ventricular cavity size, borderline reduced right ventricular systolic function, pHTN: 57 mmHg, moderate tricuspid regurgitation. After receiving IV lasix x 2, the patient developed a mild PETER with an increase in creatinine from 1.2 to 1.6. The creatinine continued to down-trend towards baseline during this admission.    Compared to the patient's outpatient ECHO performed on 11/2024, his repeat ECHO did not demonstrate significant interval changes. The patient was asymptomatically tachycardic to a HR of 110-120 on admission and an EKG demonstrated AFib RVR. The patient was persistently tachycardic despite receiving his home bisprolol 17.5 mg dosing. After discussing with the cardiology team, the patient's bisoprolol was transitioned to metoprolol 25mg ER, and also received a dose of digoxin. The patient was also started on amiodarone to control his AFib RVR. On 1/14, the patient underwent DARA and cardioversion. The DARA showed severe valvulopathies, including aortic valvular disease which may require further intervention per the cardiology team. The patient was converted to normal sinus rhythm. The day after the procedure, the patient began experiencing dyspnea on exertion, nausea, orthopnea with associated cough, lethargy, and loss of appetite. Standing weight increased from the day prior, and urine output decreased from the day prior. Blood work revealed hyponatremia and worsening PETRE on CKD, urine studies were suggestive of pre-renal vs intrinsic renal etiology, and renal U/S indicative of chronic kidney disease. Per nephrology recommendations, the patient was ordered for standing Bumex 2mg IVP BID and given 3% hypertonic saline 2x with improvement in his PETER and his hyponatremia. The patient converted back into AFib despite his cardioversion, and his metoprolol 25mg PO QD was increased to 75mg PO QD. Furthermore, due to a decrease in his platelets, his amiodarone was down-titrated to 200mg QD. During his admission, he tested positive for influenza A and was started on Oseltamivir.     Important Medication Changes and Reason:  Continue metoprolol 75mg ER  Continue amiodarone 200mg QD  Continue Bumex 2 mg PO QD  Continue Oseltamivir through 01/25  Continue other home medications    Active or Pending Issues Requiring Follow-up:  Primary Care Follow-Up - within 1 week  Cardiology Follow-Up within 1 week    Advanced Directives:   [X] Full code  [ ] DNR  [ ] Hospice    Discharge Diagnoses:  CHF exacerbation  AFib RVR  s/p DARA  s/p Cardioversion  PETER on CKD  Hyponatremia

## 2025-01-10 NOTE — PROGRESS NOTE ADULT - SUBJECTIVE AND OBJECTIVE BOX
SUBJECTIVE / OVERNIGHT EVENTS:  Pt seen and examined at bedside. VIKTOR.    MEDICATIONS  (STANDING):  apixaban 2.5 milliGRAM(s) Oral every 12 hours  atorvastatin 20 milliGRAM(s) Oral at bedtime  bisoprolol   Tablet 17.5 milliGRAM(s) Oral daily    MEDICATIONS  (PRN):  acetaminophen     Tablet .. 650 milliGRAM(s) Oral every 6 hours PRN Temp greater or equal to 38C (100.4F), Mild Pain (1 - 3)        01-09-25 @ 07:01  -  01-10-25 @ 07:00  --------------------------------------------------------  IN: 0 mL / OUT: 2300 mL / NET: -2300 mL        PHYSICAL EXAM:  Vital Signs Last 24 Hrs  T(C): 36.5 (10 Jovanny 2025 05:56), Max: 36.6 (09 Jan 2025 12:54)  T(F): 97.7 (10 Jovanny 2025 05:56), Max: 97.8 (09 Jan 2025 12:54)  HR: 126 (10 Jovanny 2025 05:20) (105 - 126)  BP: 102/61 (10 Jovanny 2025 05:20) (100/66 - 118/70)  BP(mean): --  RR: 18 (10 Jovanny 2025 05:56) (17 - 18)  SpO2: 95% (10 Jovanny 2025 05:56) (94% - 95%)    Parameters below as of 10 Jovanny 2025 05:56  Patient On (Oxygen Delivery Method): room air        CAPILLARY BLOOD GLUCOSE        I&O's Summary    09 Jan 2025 07:01  -  10 Jovanny 2025 07:00  --------------------------------------------------------  IN: 0 mL / OUT: 2300 mL / NET: -2300 mL      Gen: NAD, AOx3, able to make needs known, non-toxic  Head: NCAT  HEENT: EOMI, oral mucosa moist, normal conjunctiva, +JVD  Lung: CTAB, no respiratory distress, no wheezes/rhonchi/rales B/L, speaking in full sentences, 94 % on RA  CV: tachycardic, pitting edema b/l lower extremities up to top 1/3rd of legs  Abd: non distended, soft, nontender, no guarding, no CVA tenderness  MSK: no visible deformities  Neuro: Appears non focal  Skin: Warm, well perfused, no rash  Psych: normal affect    LABS:                        13.9   8.60  )-----------( 64       ( 10 Jovanny 2025 07:03 )             41.7     01-09    138  |  102  |  27[H]  ----------------------------<  175[H]  3.6   |  23  |  1.34[H]    Ca    9.0      09 Jan 2025 07:07  Phos  3.9     01-09  Mg     2.0     01-09    TPro  6.4  /  Alb  3.8  /  TBili  1.0  /  DBili  x   /  AST  19  /  ALT  32  /  AlkPhos  67  01-09    PT/INR - ( 08 Jan 2025 10:45 )   PT: 15.5 sec;   INR: 1.36 ratio         PTT - ( 08 Jan 2025 10:45 )  PTT:36.0 sec      Urinalysis Basic - ( 09 Jan 2025 07:07 )    Color: x / Appearance: x / SG: x / pH: x  Gluc: 175 mg/dL / Ketone: x  / Bili: x / Urobili: x   Blood: x / Protein: x / Nitrite: x   Leuk Esterase: x / RBC: x / WBC x   Sq Epi: x / Non Sq Epi: x / Bacteria: x          IMAGING:    [X] All pertinent imaging reviewed by me

## 2025-01-10 NOTE — DISCHARGE NOTE PROVIDER - NSDCMRMEDTOKEN_GEN_ALL_CORE_FT
acetaminophen 325 mg oral tablet: 3 tab(s) orally every 8 hours x 1 week standing, then as needed for mild pain  bisoprolol 10 mg oral tablet: 1 tab(s) orally once a day  Eliquis 2.5 mg oral tablet: 1 tab(s) orally 2 times a day  finasteride 5 mg oral tablet: 1 tab(s) orally once a day (at bedtime)  Lipitor 20 mg oral tablet: 1 tab(s) orally once a day  Narcan 4 mg/0.1 mL nasal spray: 4 milligram(s) intranasally every 2 to 3 minutes alternating nostrils as needed for overdose  oxyCODONE 5 mg oral tablet: 0.5 tab(s) orally every 4 hours as needed for Severe Pain (7 - 10) MDD: 006  polyethylene glycol 3350 oral powder for reconstitution: 17 gram(s) orally once a day (at bedtime)  senna leaf extract oral tablet: 2 tab(s) orally once a day (at bedtime) as needed for  constipation  traMADol 50 mg oral tablet: 1 tab(s) orally every 6 hours as needed for  moderate pain MDD: 004   acetaminophen 325 mg oral tablet: 3 tab(s) orally every 8 hours x 1 week standing, then as needed for mild pain  bisoprolol 5 mg oral tablet: 3.5 tab(s) orally once a day  Eliquis 2.5 mg oral tablet: 1 tab(s) orally 2 times a day  finasteride 5 mg oral tablet: 1 tab(s) orally once a day (at bedtime)  Lipitor 20 mg oral tablet: 1 tab(s) orally once a day  Narcan 4 mg/0.1 mL nasal spray: 4 milligram(s) intranasally every 2 to 3 minutes alternating nostrils as needed for overdose  oxyCODONE 5 mg oral tablet: 0.5 tab(s) orally every 4 hours as needed for Severe Pain (7 - 10) MDD: 006  pantoprazole 40 mg oral delayed release tablet: 1 tab(s) orally once a day (before a meal)  polyethylene glycol 3350 oral powder for reconstitution: 17 gram(s) orally once a day (at bedtime)  senna leaf extract oral tablet: 2 tab(s) orally once a day (at bedtime) as needed for  constipation  traMADol 50 mg oral tablet: 1 tab(s) orally every 6 hours as needed for  moderate pain MDD: 004   acetaminophen 325 mg oral tablet: 3 tab(s) orally every 8 hours x 1 week standing, then as needed for mild pain  Eliquis 2.5 mg oral tablet: 1 tab(s) orally 2 times a day  finasteride 5 mg oral tablet: 1 tab(s) orally once a day (at bedtime)  Lipitor 20 mg oral tablet: 1 tab(s) orally once a day  Narcan 4 mg/0.1 mL nasal spray: 4 milligram(s) intranasally every 2 to 3 minutes alternating nostrils as needed for overdose  oxyCODONE 5 mg oral tablet: 0.5 tab(s) orally every 4 hours as needed for Severe Pain (7 - 10) MDD: 006  pantoprazole 40 mg oral delayed release tablet: 1 tab(s) orally once a day (before a meal)  polyethylene glycol 3350 oral powder for reconstitution: 17 gram(s) orally once a day (at bedtime)  senna leaf extract oral tablet: 2 tab(s) orally once a day (at bedtime) as needed for  constipation  traMADol 50 mg oral tablet: 1 tab(s) orally every 6 hours as needed for  moderate pain MDD: 004   acetaminophen 325 mg oral tablet: 3 tab(s) orally every 8 hours x 1 week standing, then as needed for mild pain  bumetanide 2 mg oral tablet: 1 tab(s) orally once a day MDD: 1 tab  Eliquis 2.5 mg oral tablet: 1 tab(s) orally 2 times a day  finasteride 5 mg oral tablet: 1 tab(s) orally once a day (at bedtime)  Lipitor 20 mg oral tablet: 1 tab(s) orally once a day  metoprolol succinate 25 mg oral tablet, extended release: 3 tab(s) orally once a day MDD: 3 tabs  Narcan 4 mg/0.1 mL nasal spray: 4 milligram(s) intranasally every 2 to 3 minutes alternating nostrils as needed for overdose  oseltamivir 30 mg oral capsule: 1 cap(s) orally 2 times a day  oxyCODONE 5 mg oral tablet: 0.5 tab(s) orally every 4 hours as needed for Severe Pain (7 - 10) MDD: 006  Pacerone 200 mg oral tablet: 1 tab(s) orally once a day  pantoprazole 40 mg oral delayed release tablet: 1 tab(s) orally once a day (before a meal)  polyethylene glycol 3350 oral powder for reconstitution: 17 gram(s) orally once a day (at bedtime)  senna leaf extract oral tablet: 2 tab(s) orally once a day (at bedtime) as needed for  constipation  traMADol 50 mg oral tablet: 1 tab(s) orally every 6 hours as needed for  moderate pain MDD: 004   acetaminophen 325 mg oral tablet: 3 tab(s) orally every 8 hours x 1 week standing, then as needed for mild pain  bumetanide 2 mg oral tablet: 1 tab(s) orally once a day MDD: 1 tab  Eliquis 2.5 mg oral tablet: 1 tab(s) orally 2 times a day  finasteride 5 mg oral tablet: 1 tab(s) orally once a day (at bedtime)  Lipitor 20 mg oral tablet: 1 tab(s) orally once a day  metoprolol succinate 25 mg oral tablet, extended release: 3 tab(s) orally once a day MDD: 3 tabs  Narcan 4 mg/0.1 mL nasal spray: 4 milligram(s) intranasally every 2 to 3 minutes alternating nostrils as needed for overdose  oseltamivir 30 mg oral capsule: 1 cap(s) orally 2 times a day take one pill, twice a day, until bottle is empty  oxyCODONE 5 mg oral tablet: 0.5 tab(s) orally every 4 hours as needed for Severe Pain (7 - 10) MDD: 006  Pacerone 200 mg oral tablet: 1 tab(s) orally once a day  pantoprazole 40 mg oral delayed release tablet: 1 tab(s) orally once a day (before a meal)  polyethylene glycol 3350 oral powder for reconstitution: 17 gram(s) orally once a day (at bedtime)  senna leaf extract oral tablet: 2 tab(s) orally once a day (at bedtime) as needed for  constipation  traMADol 50 mg oral tablet: 1 tab(s) orally every 6 hours as needed for  moderate pain MDD: 004

## 2025-01-10 NOTE — DISCHARGE NOTE PROVIDER - NSDCFUSCHEDAPPT_GEN_ALL_CORE_FT
Antonino Delgado Physician [facility]  CARDIOLOGY [address]  Scheduled Appointment: 03/13/2025     Valentine Berg  [facility] Physician [facility]  INTMED [id number]  Scheduled Appointment: 02/14/2025    Antonino Delgado  [facility] Physician [facility]  CARDIOLOGY [address]  Scheduled Appointment: 03/13/2025     Antonino Delgado  [facility]  CARDIOLOGY [address]  Scheduled Appointment: 01/27/2025    Valentine Berg  [facility]  INTMED [id number]  Scheduled Appointment: 02/14/2025    Sandy Antonino  [facility]  CARDIOLOGY [address]  Scheduled Appointment: 03/13/2025

## 2025-01-10 NOTE — PROGRESS NOTE ADULT - ATTENDING COMMENTS
84M w/pmh chronic atrial fibrillation, CKD 3a, severe aortic stenosis s/p aortic valve replacement (7/2012), HTN, pHTN, HLD, thrombocytopenia, GERD presenting from PCP's office for cc tachycardia, JOESPH. Admitted for volume overload and atrial fibrillation w/RVR. Responded well to lasix 20mg IV BID, now has PETER, likely from diuresis. Echo does not show left sided heart failure, but has right sided reduced systolic function and elevated pulmonary pressures. He can f/u with his cardiologist for further work up. Monitor Cr, if downtrending then will DC home tomorrow.    HRs are mostly controlled on his home bisoprolol.

## 2025-01-10 NOTE — PROGRESS NOTE ADULT - PROBLEM SELECTOR PLAN 1
-Hx of HTN and pHTN  -Troponin 54->59  -Pro-BNP of 3624.   -Nuclear stress test (11/2023): small sized mild defect(s) in the basal inferolateral wall that is reversible consistent with ischemia  -TTE (11/2024): "EF is approximately 55%. Mild concentric left ventricular hypertrophy. Right ventricular enlargement with mildly decreased right ventricular systolic function. Left atrium is severely dilated. Right atrium is mildly dilated. Moderate mitral regurgitation. Mitral annular calcification with thickened and mildly calcified mitral valve leaflets. There is mild prolapse of the posterior mitral valve leaflet. Bioprosthetic aortic valve replacement. Aortic root at the sinuses of Valsalva is borderline dilated. Ascending aorta is mildly dilated. Estimated pulmonary artery systolic pressure is 50 mmHg. Moderate to severe tricuspid regurgitation"  -CXR (1/8): b/l lower atelectasis vs pneumonia  -CTA (1/8): "Small bibasilar pleural effusions. Interlobular septal thickening, suggestive of interstitial edema."   -ER POCUS TTE (1/8): trace pericardial effusion, global LV hypokinesis, b/l lungs with B-lines and trace pleural effusions.  -s/p lasix 20mg IV in the ER (1/8)  -Dyspnea has improved from admission    PLAN:  >c/w lasix 20mg IV for diuresis  >f/u TTE  >c/w daily standing weights, strict ins/outs -Hx of HTN and pHTN  -Troponin 54->59  -Pro-BNP of 3624.   -Nuclear stress test (11/2023): small sized mild defect(s) in the basal inferolateral wall that is reversible consistent with ischemia  -TTE (11/2024): "EF is approximately 55%. Mild concentric left ventricular hypertrophy. Right ventricular enlargement with mildly decreased right ventricular systolic function. Left atrium is severely dilated. Right atrium is mildly dilated. Moderate mitral regurgitation. Mitral annular calcification with thickened and mildly calcified mitral valve leaflets. There is mild prolapse of the posterior mitral valve leaflet. Bioprosthetic aortic valve replacement. Aortic root at the sinuses of Valsalva is borderline dilated. Ascending aorta is mildly dilated. Estimated pulmonary artery systolic pressure is 50 mmHg. Moderate to severe tricuspid regurgitation"  -CXR (1/8): b/l lower atelectasis vs pneumonia  -CTA (1/8): "Small bibasilar pleural effusions. Interlobular septal thickening, suggestive of interstitial edema."   -ER POCUS TTE (1/8): trace pericardial effusion, global LV hypokinesis, b/l lungs with B-lines and trace pleural effusions.  -s/p lasix 20mg IV in the ER (1/8)  -Dyspnea has improved from admission    PLAN:  >plan to decide on further diuresis pending results of TTE  >f/u TTE  >pending possible cardiology consult based on the results of TTE  >c/w daily standing weights, strict ins/outs

## 2025-01-10 NOTE — DISCHARGE NOTE PROVIDER - CARE PROVIDER_API CALL
Antonino Delgado  Nuclear Cardiology  [id number] [address] [place] [id number]  Phone: [phone]  Fax: [phone]  Established Patient  Follow Up Time: 1 week

## 2025-01-10 NOTE — PROGRESS NOTE ADULT - PROBLEM SELECTOR PLAN 6
-Per family is supposed to be on home PPI but patient does not take    PLAN:  >Restart PPI when patient agrees -Per family is supposed to be on home PPI but patient does not take    PLAN:  >c/w Pantoprazole 40mg QD pre-breakfast   >f/u outpatient with GI

## 2025-01-10 NOTE — DISCHARGE NOTE PROVIDER - NSDCQMSTAIRS_GEN_ALL_CORE
pt. came in c/o b/l hip pain and rt side chest pain since 12 nn today. 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 states he took Dilaudid 8mg PO at 12 nn and @ 2pm but pain with no relief. pt. describes the pain as the same like when he has SCC. pt. denies any SOB nor n/v nor any falls or trauma on hips. pt. with hx of ACS and AVN. awaits MD mishra. will continue to monitor No

## 2025-01-10 NOTE — DISCHARGE NOTE PROVIDER - ATTENDING DISCHARGE PHYSICAL EXAMINATION:
Vital Signs Last 24 Hrs  T(C): 36.3 (23 Jan 2025 11:20), Max: 36.9 (22 Jan 2025 20:37)  T(F): 97.4 (23 Jan 2025 11:20), Max: 98.5 (22 Jan 2025 20:37)  HR: 107 (23 Jan 2025 04:38) (97 - 107)  BP: 101/63 (23 Jan 2025 11:20) (101/63 - 120/64)  BP(mean): --  RR: 18 (23 Jan 2025 11:20) (18 - 18)  SpO2: 97% (23 Jan 2025 11:20) (95% - 97%)    Parameters below as of 23 Jan 2025 11:20  Patient On (Oxygen Delivery Method): room air      GENERAL: NAD  HEAD:  Atraumatic, Normocephalic  EYES: EOMI, conjunctiva and sclera clear  CHEST/LUNG: Clear to auscultation bilaterally; No rales, rhonchi, wheezing, or rubs  HEART: slightly tachycardic, irregular; No murmurs, rubs, or gallops, trace lower extremity edema  ABDOMEN: Soft, Nontender  SKIN: No rashes or lesions  NERVOUS SYSTEM:  Alert & Oriented X3, no focal deficits

## 2025-01-10 NOTE — DISCHARGE NOTE PROVIDER - NSDCFUADDAPPT_GEN_ALL_CORE_FT
APPTS ARE READY TO BE MADE: [X] YES    Best Family or Patient Contact (if needed):    Additional Information about above appointments (if needed):    1: Cardiologist - within 1 week of discharge  2: Primary Care Physician - within 1 week of discharge  3:     Other comments or requests:    APPTS ARE READY TO BE MADE: [X] YES    Best Family or Patient Contact (if needed):    Additional Information about above appointments (if needed):    1: Cardiologist - within 1 week of discharge  2: Primary Care Physician - within 1 week of discharge  3:     Other comments or requests:     Internal Medicine:  Appointment was scheduled in [facility]   Dr. SAILAJA CALLE,[facility] 2/14/2025 11:00 AM  [address], [place] [address] [phone]   APPTS ARE READY TO BE MADE: [X] YES    Best Family or Patient Contact (if needed):    Additional Information about above appointments (if needed):    1: Cardiologist - within 1 week of discharge  2: Primary Care Physician - within 1 week of discharge  3:     Other comments or requests:     Cardiology:  Provider's office was contacted to secure an appointment, however the office will follow up with the patient/caregiver directly. Task Message sent to Dr. Delgado team requesting HFU appointment.     Internal Medicine:  Appointment was scheduled in [facility]   Dr. SAILAJA CALLE,[facility] 2/14/2025 11:00 AM  [address], [place], [place] [id number] [phone]

## 2025-01-11 DIAGNOSIS — N17.9 ACUTE KIDNEY FAILURE, UNSPECIFIED: ICD-10-CM

## 2025-01-11 LAB
ANION GAP SERPL CALC-SCNC: 13 MMOL/L — SIGNIFICANT CHANGE UP (ref 5–17)
BUN SERPL-MCNC: 23 MG/DL — SIGNIFICANT CHANGE UP (ref 7–23)
CALCIUM SERPL-MCNC: 9.5 MG/DL — SIGNIFICANT CHANGE UP (ref 8.4–10.5)
CHLORIDE SERPL-SCNC: 103 MMOL/L — SIGNIFICANT CHANGE UP (ref 96–108)
CO2 SERPL-SCNC: 22 MMOL/L — SIGNIFICANT CHANGE UP (ref 22–31)
CREAT ?TM UR-MCNC: 114 MG/DL — SIGNIFICANT CHANGE UP
CREAT SERPL-MCNC: 1.61 MG/DL — HIGH (ref 0.5–1.3)
EGFR: 42 ML/MIN/1.73M2 — LOW
GLUCOSE SERPL-MCNC: 104 MG/DL — HIGH (ref 70–99)
POTASSIUM SERPL-MCNC: 4.1 MMOL/L — SIGNIFICANT CHANGE UP (ref 3.5–5.3)
POTASSIUM SERPL-SCNC: 4.1 MMOL/L — SIGNIFICANT CHANGE UP (ref 3.5–5.3)
SODIUM SERPL-SCNC: 138 MMOL/L — SIGNIFICANT CHANGE UP (ref 135–145)
SODIUM UR-SCNC: 33 MMOL/L — SIGNIFICANT CHANGE UP

## 2025-01-11 PROCEDURE — 99232 SBSQ HOSP IP/OBS MODERATE 35: CPT

## 2025-01-11 RX ADMIN — POLYETHYLENE GLYCOL 3350 17 GRAM(S): 17 POWDER, FOR SOLUTION ORAL at 21:44

## 2025-01-11 RX ADMIN — APIXABAN 2.5 MILLIGRAM(S): 5 TABLET, FILM COATED ORAL at 05:03

## 2025-01-11 RX ADMIN — ATORVASTATIN CALCIUM 20 MILLIGRAM(S): 80 TABLET, FILM COATED ORAL at 21:43

## 2025-01-11 RX ADMIN — APIXABAN 2.5 MILLIGRAM(S): 5 TABLET, FILM COATED ORAL at 16:20

## 2025-01-11 RX ADMIN — BISOPROLOL FUMARATE 17.5 MILLIGRAM(S): 5 TABLET ORAL at 05:09

## 2025-01-11 RX ADMIN — PANTOPRAZOLE 40 MILLIGRAM(S): 20 TABLET, DELAYED RELEASE ORAL at 05:03

## 2025-01-11 RX ADMIN — Medication 5 MILLIGRAM(S): at 21:43

## 2025-01-11 NOTE — PROGRESS NOTE ADULT - SUBJECTIVE AND OBJECTIVE BOX
Michael Morrison | PGY3| [employer] TEAMS ONLY  Interval Events: No acute events overnight. Pt seen and examined at bedside.  Patient denies any complaints overnight. The patient denies any fevers, chills, nausea, vomiting, or increased pain.    Creatinine worsening.      OBJECTIVE:  ICU Vital Signs Last 24 Hrs  T(C): 37.1 (11 Jan 2025 04:20), Max: 37.1 (11 Jan 2025 04:20)  T(F): 98.7 (11 Jan 2025 04:20), Max: 98.7 (11 Jan 2025 04:20)  HR: 123 (11 Jan 2025 04:20) (101 - 123)  BP: 115/73 (11 Jan 2025 04:20) (97/60 - 115/73)  BP(mean): --  ABP: --  ABP(mean): --  RR: 18 (11 Jan 2025 04:20) (18 - 18)  SpO2: 94% (11 Jan 2025 04:20) (94% - 100%)    O2 Parameters below as of 11 Jan 2025 04:20  Patient On (Oxygen Delivery Method): room air              CAPILLARY BLOOD GLUCOSE        Gen: NAD, AOx3, able to make needs known, non-toxic  Head: NCAT  HEENT: EOMI, oral mucosa moist, normal conjunctiva, +JVD  Lung: CTAB, no respiratory distress, no wheezes/rhonchi/rales B/L, speaking in full sentences, 94 % on RA  CV: tachycardic, pitting edema b/l lower extremities up to top 1/3rd of legs  Abd: non distended, soft, nontender, no guarding, no CVA tenderness  MSK: no visible deformities  Neuro: Appears non focal  Skin: Warm, well perfused, no rash  Psych: normal affect    HOSPITAL MEDICATIONS:  MEDICATIONS  (STANDING):  apixaban 2.5 milliGRAM(s) Oral every 12 hours  atorvastatin 20 milliGRAM(s) Oral at bedtime  bisoprolol   Tablet 17.5 milliGRAM(s) Oral daily  finasteride 5 milliGRAM(s) Oral daily  pantoprazole    Tablet 40 milliGRAM(s) Oral before breakfast  polyethylene glycol 3350 17 Gram(s) Oral at bedtime    MEDICATIONS  (PRN):  acetaminophen     Tablet .. 650 milliGRAM(s) Oral every 6 hours PRN Temp greater or equal to 38C (100.4F), Mild Pain (1 - 3)  senna 2 Tablet(s) Oral at bedtime PRN for constipation      LABS:                        13.9   8.60  )-----------( 64       ( 10 Jovanny 2025 07:03 )             41.7     Hgb Trend: 13.9<--, 14.1<--, 14.3<--  01-11    138  |  103  |  23  ----------------------------<  104[H]  4.1   |  22  |  1.61[H]    Ca    9.5      11 Jan 2025 06:19  Phos  4.0     01-10  Mg     2.0     01-10      Creatinine Trend: 1.61<--, 1.50<--, 1.34<--, 1.24<--    Urinalysis Basic - ( 11 Jan 2025 06:19 )    Color: x / Appearance: x / SG: x / pH: x  Gluc: 104 mg/dL / Ketone: x  / Bili: x / Urobili: x   Blood: x / Protein: x / Nitrite: x   Leuk Esterase: x / RBC: x / WBC x   Sq Epi: x / Non Sq Epi: x / Bacteria: x            MICROBIOLOGY:      Michael Morrison | PGY3| [employer] TEAMS ONLY  Interval Events: No acute events overnight. Pt seen and examined at bedside.  Patient denies any complaints overnight. The patient denies any fevers, chills, nausea, vomiting, or increased pain.    Creatinine worsening.  Family updated at bedside.    [facility]   Nelson Osorio [id number]      OBJECTIVE:  ICU Vital Signs Last 24 Hrs  T(C): 37.1 (11 Jan 2025 04:20), Max: 37.1 (11 Jan 2025 04:20)  T(F): 98.7 (11 Jan 2025 04:20), Max: 98.7 (11 Jan 2025 04:20)  HR: 123 (11 Jan 2025 04:20) (101 - 123)  BP: 115/73 (11 Jan 2025 04:20) (97/60 - 115/73)  BP(mean): --  ABP: --  ABP(mean): --  RR: 18 (11 Jan 2025 04:20) (18 - 18)  SpO2: 94% (11 Jan 2025 04:20) (94% - 100%)    O2 Parameters below as of 11 Jan 2025 04:20  Patient On (Oxygen Delivery Method): room air              CAPILLARY BLOOD GLUCOSE        Gen: NAD, AOx3, able to make needs known, non-toxic  Head: NCAT  HEENT: EOMI, oral mucosa moist, normal conjunctiva, +JVD  Lung: CTAB, no respiratory distress, no wheezes/rhonchi/rales B/L, speaking in full sentences, 94 % on RA  CV: tachycardic, pitting edema b/l lower extremities up to top 1/3rd of legs  Abd: non distended, soft, nontender, no guarding, no CVA tenderness  MSK: no visible deformities  Neuro: Appears non focal  Skin: Warm, well perfused, no rash  Psych: normal affect    HOSPITAL MEDICATIONS:  MEDICATIONS  (STANDING):  apixaban 2.5 milliGRAM(s) Oral every 12 hours  atorvastatin 20 milliGRAM(s) Oral at bedtime  bisoprolol   Tablet 17.5 milliGRAM(s) Oral daily  finasteride 5 milliGRAM(s) Oral daily  pantoprazole    Tablet 40 milliGRAM(s) Oral before breakfast  polyethylene glycol 3350 17 Gram(s) Oral at bedtime    MEDICATIONS  (PRN):  acetaminophen     Tablet .. 650 milliGRAM(s) Oral every 6 hours PRN Temp greater or equal to 38C (100.4F), Mild Pain (1 - 3)  senna 2 Tablet(s) Oral at bedtime PRN for constipation      LABS:                        13.9   8.60  )-----------( 64       ( 10 Jovanny 2025 07:03 )             41.7     Hgb Trend: 13.9<--, 14.1<--, 14.3<--  01-11    138  |  103  |  23  ----------------------------<  104[H]  4.1   |  22  |  1.61[H]    Ca    9.5      11 Jan 2025 06:19  Phos  4.0     01-10  Mg     2.0     01-10      Creatinine Trend: 1.61<--, 1.50<--, 1.34<--, 1.24<--    Urinalysis Basic - ( 11 Jan 2025 06:19 )    Color: x / Appearance: x / SG: x / pH: x  Gluc: 104 mg/dL / Ketone: x  / Bili: x / Urobili: x   Blood: x / Protein: x / Nitrite: x   Leuk Esterase: x / RBC: x / WBC x   Sq Epi: x / Non Sq Epi: x / Bacteria: x            MICROBIOLOGY:

## 2025-01-11 NOTE — PROGRESS NOTE ADULT - PROBLEM SELECTOR PLAN 1
New PETER iso diuresis    f/u urine lytes for FeUrea  Further diuresis prn, but holding off iso peter

## 2025-01-11 NOTE — PROGRESS NOTE ADULT - PROBLEM SELECTOR PLAN 2
-Hx of HTN and pHTN  -Troponin 54->59  -Pro-BNP of 3624.   -Nuclear stress test (11/2023): small sized mild defect(s) in the basal inferolateral wall that is reversible consistent with ischemia  -TTE (11/2024): "EF is approximately 55%. Mild concentric left ventricular hypertrophy. Right ventricular enlargement with mildly decreased right ventricular systolic function. Left atrium is severely dilated. Right atrium is mildly dilated. Moderate mitral regurgitation. Mitral annular calcification with thickened and mildly calcified mitral valve leaflets. There is mild prolapse of the posterior mitral valve leaflet. Bioprosthetic aortic valve replacement. Aortic root at the sinuses of Valsalva is borderline dilated. Ascending aorta is mildly dilated. Estimated pulmonary artery systolic pressure is 50 mmHg. Moderate to severe tricuspid regurgitation"  -CXR (1/8): b/l lower atelectasis vs pneumonia  -CTA (1/8): "Small bibasilar pleural effusions. Interlobular septal thickening, suggestive of interstitial edema."   -ER POCUS TTE (1/8): trace pericardial effusion, global LV hypokinesis, b/l lungs with B-lines and trace pleural effusions.  -s/p lasix 20mg IV in the ER (1/8)  -Dyspnea has improved from admission    PLAN:  >diuresis prn  >c/w daily standing weights, strict ins/outs

## 2025-01-11 NOTE — PROGRESS NOTE ADULT - ATTENDING COMMENTS
Patient is a 84M w/pmh chronic atrial fibrillation, CKD 3a, severe aortic stenosis s/p aortic valve replacement (7/2012), HTN, pHTN, HLD, thrombocytopenia, GERD presenting from PCP's office for cc tachycardia, JOSEPH. Found to have volume overload and atrial fibrillation w/RVR.     1. Acute diastolic HF  2. AF w RVR  3. pHTN  4. PETER on CKD 3    - Responded well to lasix 20mg IV BID, now has PETER, so off diuretic. Cardiology rec metoprolol 25mg/d instead of Bisoprolol  - Scr 1.6- uptrending from 1.2, Trend Scr. Not on ACE/ARB  - Echo does not show left sided heart failure, but has right sided reduced systolic function and elevated pulmonary pressures.   - d/c planning if Scr trend down    Time spent 45 min excluding house staff teaching Patient is a 84M w/pmh chronic atrial fibrillation, CKD 3a, severe aortic stenosis s/p aortic valve replacement (7/2012), HTN, pHTN, HLD, thrombocytopenia, GERD presenting from PCP's office for cc tachycardia, JOSEPH. Found to have volume overload and atrial fibrillation w/RVR.     1. Acute diastolic HF  2. AF w RVR  3. pHTN  4. PETER on CKD 3  5. Chronic thrombocytopenia    - Responded well to lasix 20mg IV BID, now has PETER, so off diuretic. Cardiology rec metoprolol 25mg/d instead of Bisoprolol  - Scr 1.6- uptrending from 1.2, Trend Scr. Not on ACE/ARB  - Echo does not show left sided heart failure, but has right sided reduced systolic function and elevated pulmonary pressures.   - Plt 54, no active bleed. Known chronic thrombocytopenia. Fibrinogen in am  - d/c planning if Scr trend down    Time spent 45 min excluding house staff teaching

## 2025-01-11 NOTE — PROGRESS NOTE ADULT - PROBLEM SELECTOR PLAN 7
-Per family is supposed to be on home PPI but patient does not take    PLAN:  >c/w Pantoprazole 40mg QD pre-breakfast   >f/u outpatient with GI

## 2025-01-12 LAB
ANION GAP SERPL CALC-SCNC: 11 MMOL/L — SIGNIFICANT CHANGE UP (ref 5–17)
BUN SERPL-MCNC: 25 MG/DL — HIGH (ref 7–23)
CALCIUM SERPL-MCNC: 9.6 MG/DL — SIGNIFICANT CHANGE UP (ref 8.4–10.5)
CHLORIDE SERPL-SCNC: 103 MMOL/L — SIGNIFICANT CHANGE UP (ref 96–108)
CO2 SERPL-SCNC: 22 MMOL/L — SIGNIFICANT CHANGE UP (ref 22–31)
CREAT SERPL-MCNC: 1.6 MG/DL — HIGH (ref 0.5–1.3)
EGFR: 42 ML/MIN/1.73M2 — LOW
FIBRINOGEN PPP-MCNC: 254 MG/DL — SIGNIFICANT CHANGE UP (ref 200–445)
GLUCOSE SERPL-MCNC: 110 MG/DL — HIGH (ref 70–99)
POTASSIUM SERPL-MCNC: 4.3 MMOL/L — SIGNIFICANT CHANGE UP (ref 3.5–5.3)
POTASSIUM SERPL-SCNC: 4.3 MMOL/L — SIGNIFICANT CHANGE UP (ref 3.5–5.3)
SODIUM SERPL-SCNC: 136 MMOL/L — SIGNIFICANT CHANGE UP (ref 135–145)
UUN UR-MCNC: 707 MG/DL — SIGNIFICANT CHANGE UP

## 2025-01-12 PROCEDURE — 99233 SBSQ HOSP IP/OBS HIGH 50: CPT | Mod: GC

## 2025-01-12 RX ORDER — AMIODARONE HYDROCHLORIDE 50 MG/ML
400 INJECTION, SOLUTION INTRAVENOUS
Refills: 0 | Status: DISCONTINUED | OUTPATIENT
Start: 2025-01-12 | End: 2025-01-16

## 2025-01-12 RX ORDER — METOPROLOL SUCCINATE 25 MG
25 TABLET, EXTENDED RELEASE 24 HR ORAL DAILY
Refills: 0 | Status: DISCONTINUED | OUTPATIENT
Start: 2025-01-13 | End: 2025-01-14

## 2025-01-12 RX ORDER — METOPROLOL SUCCINATE 25 MG
25 TABLET, EXTENDED RELEASE 24 HR ORAL DAILY
Refills: 0 | Status: DISCONTINUED | OUTPATIENT
Start: 2025-01-12 | End: 2025-01-12

## 2025-01-12 RX ADMIN — APIXABAN 2.5 MILLIGRAM(S): 5 TABLET, FILM COATED ORAL at 16:25

## 2025-01-12 RX ADMIN — Medication 250 MICROGRAM(S): at 10:35

## 2025-01-12 RX ADMIN — ATORVASTATIN CALCIUM 20 MILLIGRAM(S): 80 TABLET, FILM COATED ORAL at 21:12

## 2025-01-12 RX ADMIN — PANTOPRAZOLE 40 MILLIGRAM(S): 20 TABLET, DELAYED RELEASE ORAL at 05:12

## 2025-01-12 RX ADMIN — Medication 5 MILLIGRAM(S): at 21:12

## 2025-01-12 RX ADMIN — APIXABAN 2.5 MILLIGRAM(S): 5 TABLET, FILM COATED ORAL at 05:12

## 2025-01-12 RX ADMIN — AMIODARONE HYDROCHLORIDE 400 MILLIGRAM(S): 50 INJECTION, SOLUTION INTRAVENOUS at 16:26

## 2025-01-12 RX ADMIN — Medication 25 MILLIGRAM(S): at 08:24

## 2025-01-12 NOTE — PROGRESS NOTE ADULT - PROBLEM SELECTOR PLAN 3
-EKG on admission with sinus tachycardia to 125-128    PLAN:  >c/w home apixaban 2.5mg PO QD  >c/w bisoprolol 17.5mg PO QD -EKG on admission with sinus tachycardia to 125-128    PLAN:  >c/w home apixaban 2.5mg PO QD  >d/evelyn bisoprolol 17.5mg PO QD 1/11 per cardiology recs  >add metoprolol ER 25mg QD as needed per cardiology recs  >f/u cardiology recs  >f/u cardiology outpatient -EKG on admission with sinus tachycardia to 125-128    PLAN:  >c/w home apixaban 2.5mg PO QD  >d/evelyn bisoprolol 17.5mg PO QD 1/11 per cardiology recs  >c/w amiodarone and digoxin ordered by cardiology team, will need possible DARA  >add metoprolol ER 25mg QD as needed per cardiology recs  >f/u cardiology recs  >f/u cardiology outpatient

## 2025-01-12 NOTE — PROGRESS NOTE ADULT - PROBLEM SELECTOR PLAN 1
New PETER iso diuresis    f/u urine lytes for FeUrea  Further diuresis prn, but holding off iso peter -New PETER in setting of diuresis  -Creatinine: 1.24 -> 1.34 -> 1.50 -> 1.61 -> 1.60  -Urine studies: Na of 33, Nitrogen of 707, creatinine of 114    PLAN:  >Hold off on diuresis unless necessary  >c/w trending kidney function daily

## 2025-01-12 NOTE — PROGRESS NOTE ADULT - ASSESSMENT
84-year-old male PMH afib on eliquid, severe aortic stenosis (2/2 bicuspid aortic valve), s/p aortic valve replacement (7/2012), HTN, pHTN, HLD, chronic renal insufficiency, thrombocytopenia, GERD admitted to medicine for likely CHF exacerbation.  84-year-old male PMH afib on eliquid, severe aortic stenosis (2/2 bicuspid aortic valve), s/p aortic valve replacement (7/2012), HTN, pHTN, HLD, chronic renal insufficiency, thrombocytopenia, GERD admitted to medicine for likely CHF exacerbation c/b PETER iso diuresis. 84-year-old male PMH afib on eliquid, severe aortic stenosis (2/2 bicuspid aortic valve), s/p aortic valve replacement (7/2012), HTN, pHTN, HLD, chronic renal insufficiency, ITP, GERD admitted to medicine for likely CHF exacerbation c/b PETER iso diuresis.

## 2025-01-12 NOTE — PROGRESS NOTE ADULT - SUBJECTIVE AND OBJECTIVE BOX
SUBJECTIVE / OVERNIGHT EVENTS:  Pt seen and examined at bedside. Bisoprolol d/evelyn yesterday.     MEDICATIONS  (STANDING):  apixaban 2.5 milliGRAM(s) Oral every 12 hours  atorvastatin 20 milliGRAM(s) Oral at bedtime  finasteride 5 milliGRAM(s) Oral daily  pantoprazole    Tablet 40 milliGRAM(s) Oral before breakfast  polyethylene glycol 3350 17 Gram(s) Oral at bedtime    MEDICATIONS  (PRN):  acetaminophen     Tablet .. 650 milliGRAM(s) Oral every 6 hours PRN Temp greater or equal to 38C (100.4F), Mild Pain (1 - 3)  senna 2 Tablet(s) Oral at bedtime PRN for constipation        01-11-25 @ 07:01  -  01-12-25 @ 07:00  --------------------------------------------------------  IN: 960 mL / OUT: 700 mL / NET: 260 mL        PHYSICAL EXAM:  Vital Signs Last 24 Hrs  T(C): 36.3 (12 Jan 2025 04:16), Max: 36.7 (11 Jan 2025 11:29)  T(F): 97.4 (12 Jan 2025 04:16), Max: 98.1 (11 Jan 2025 11:29)  HR: 123 (12 Jan 2025 04:16) (84 - 123)  BP: 99/63 (12 Jan 2025 04:16) (99/63 - 111/62)  BP(mean): --  RR: 18 (12 Jan 2025 04:16) (18 - 18)  SpO2: 91% (12 Jan 2025 04:16) (91% - 97%)    Parameters below as of 12 Jan 2025 04:16  Patient On (Oxygen Delivery Method): room air        CAPILLARY BLOOD GLUCOSE        I&O's Summary    11 Jan 2025 07:01  -  12 Jan 2025 07:00  --------------------------------------------------------  IN: 960 mL / OUT: 700 mL / NET: 260 mL      Gen: NAD, AOx3, able to make needs known, non-toxic  Head: NCAT  HEENT: EOMI, oral mucosa moist, normal conjunctiva, +JVD  Lung: CTAB, no respiratory distress, no wheezes/rhonchi/rales B/L, speaking in full sentences, 94 % on RA  CV: tachycardic, pitting edema b/l lower extremities up to top 1/3rd of legs  Abd: non distended, soft, nontender, no guarding, no CVA tenderness  MSK: no visible deformities  Neuro: Appears non focal  Skin: Warm, well perfused, no rash  Psych: normal affect    LABS:    01-12    136  |  103  |  25[H]  ----------------------------<  110[H]  4.3   |  22  |  1.60[H]    Ca    9.6      12 Jan 2025 05:53            Urinalysis Basic - ( 12 Jan 2025 05:53 )    Color: x / Appearance: x / SG: x / pH: x  Gluc: 110 mg/dL / Ketone: x  / Bili: x / Urobili: x   Blood: x / Protein: x / Nitrite: x   Leuk Esterase: x / RBC: x / WBC x   Sq Epi: x / Non Sq Epi: x / Bacteria: x          IMAGING:    [X] All pertinent imaging reviewed by me SUBJECTIVE / OVERNIGHT EVENTS:  Pt seen and examined at bedside. Bisoprolol d/evelyn yesterday.     MEDICATIONS  (STANDING):  apixaban 2.5 milliGRAM(s) Oral every 12 hours  atorvastatin 20 milliGRAM(s) Oral at bedtime  finasteride 5 milliGRAM(s) Oral daily  pantoprazole    Tablet 40 milliGRAM(s) Oral before breakfast  polyethylene glycol 3350 17 Gram(s) Oral at bedtime    MEDICATIONS  (PRN):  acetaminophen     Tablet .. 650 milliGRAM(s) Oral every 6 hours PRN Temp greater or equal to 38C (100.4F), Mild Pain (1 - 3)  senna 2 Tablet(s) Oral at bedtime PRN for constipation        01-11-25 @ 07:01  -  01-12-25 @ 07:00  --------------------------------------------------------  IN: 960 mL / OUT: 700 mL / NET: 260 mL        PHYSICAL EXAM:  Vital Signs Last 24 Hrs  T(C): 36.3 (12 Jan 2025 04:16), Max: 36.7 (11 Jan 2025 11:29)  T(F): 97.4 (12 Jan 2025 04:16), Max: 98.1 (11 Jan 2025 11:29)  HR: 123 (12 Jan 2025 04:16) (84 - 123)  BP: 99/63 (12 Jan 2025 04:16) (99/63 - 111/62)  BP(mean): --  RR: 18 (12 Jan 2025 04:16) (18 - 18)  SpO2: 91% (12 Jan 2025 04:16) (91% - 97%)    Parameters below as of 12 Jan 2025 04:16  Patient On (Oxygen Delivery Method): room air        CAPILLARY BLOOD GLUCOSE        I&O's Summary    11 Jan 2025 07:01  -  12 Jan 2025 07:00  --------------------------------------------------------  IN: 960 mL / OUT: 700 mL / NET: 260 mL      Gen: NAD, AOx3, able to make needs known, non-toxic  Head: NCAT  HEENT: EOMI, oral mucosa moist, normal conjunctiva, +JVD  Lung: CTAB, no respiratory distress, no wheezes/rhonchi/rales B/L, speaking in full sentences, 94 % on RA  CV: tachycardic, pitting edema b/l lower extremities up to middle of legs  Abd: non distended, soft, nontender, no guarding, no CVA tenderness  MSK: no visible deformities  Neuro: Appears non focal  Skin: Warm, well perfused, no rash  Psych: normal affect    LABS:    01-12    136  |  103  |  25[H]  ----------------------------<  110[H]  4.3   |  22  |  1.60[H]    Ca    9.6      12 Jan 2025 05:53            Urinalysis Basic - ( 12 Jan 2025 05:53 )    Color: x / Appearance: x / SG: x / pH: x  Gluc: 110 mg/dL / Ketone: x  / Bili: x / Urobili: x   Blood: x / Protein: x / Nitrite: x   Leuk Esterase: x / RBC: x / WBC x   Sq Epi: x / Non Sq Epi: x / Bacteria: x          IMAGING:    [X] All pertinent imaging reviewed by me SUBJECTIVE / OVERNIGHT EVENTS:  Pt seen and examined at bedside. Bisoprolol d/evelyn yesterday. Amio/dig ordered by cardiology team, pending possible DARA. No complaints today, denying dyspnea at rest/exertion.    MEDICATIONS  (STANDING):  apixaban 2.5 milliGRAM(s) Oral every 12 hours  atorvastatin 20 milliGRAM(s) Oral at bedtime  finasteride 5 milliGRAM(s) Oral daily  pantoprazole    Tablet 40 milliGRAM(s) Oral before breakfast  polyethylene glycol 3350 17 Gram(s) Oral at bedtime    MEDICATIONS  (PRN):  acetaminophen     Tablet .. 650 milliGRAM(s) Oral every 6 hours PRN Temp greater or equal to 38C (100.4F), Mild Pain (1 - 3)  senna 2 Tablet(s) Oral at bedtime PRN for constipation        01-11-25 @ 07:01  -  01-12-25 @ 07:00  --------------------------------------------------------  IN: 960 mL / OUT: 700 mL / NET: 260 mL        PHYSICAL EXAM:  Vital Signs Last 24 Hrs  T(C): 36.3 (12 Jan 2025 04:16), Max: 36.7 (11 Jan 2025 11:29)  T(F): 97.4 (12 Jan 2025 04:16), Max: 98.1 (11 Jan 2025 11:29)  HR: 123 (12 Jan 2025 04:16) (84 - 123)  BP: 99/63 (12 Jan 2025 04:16) (99/63 - 111/62)  BP(mean): --  RR: 18 (12 Jan 2025 04:16) (18 - 18)  SpO2: 91% (12 Jan 2025 04:16) (91% - 97%)    Parameters below as of 12 Jan 2025 04:16  Patient On (Oxygen Delivery Method): room air        CAPILLARY BLOOD GLUCOSE        I&O's Summary    11 Jan 2025 07:01  -  12 Jan 2025 07:00  --------------------------------------------------------  IN: 960 mL / OUT: 700 mL / NET: 260 mL      Gen: NAD, AOx3, able to make needs known, non-toxic  Head: NCAT  HEENT: EOMI, oral mucosa moist, normal conjunctiva, +JVD  Lung: CTAB, no respiratory distress, no wheezes/rhonchi/rales B/L, speaking in full sentences, 94 % on RA  CV: tachycardic, pitting edema b/l lower extremities up to middle of legs  Abd: non distended, soft, nontender, no guarding, no CVA tenderness  MSK: no visible deformities  Neuro: Appears non focal  Skin: Warm, well perfused, no rash  Psych: normal affect    LABS:    01-12    136  |  103  |  25[H]  ----------------------------<  110[H]  4.3   |  22  |  1.60[H]    Ca    9.6      12 Jan 2025 05:53            Urinalysis Basic - ( 12 Jan 2025 05:53 )    Color: x / Appearance: x / SG: x / pH: x  Gluc: 110 mg/dL / Ketone: x  / Bili: x / Urobili: x   Blood: x / Protein: x / Nitrite: x   Leuk Esterase: x / RBC: x / WBC x   Sq Epi: x / Non Sq Epi: x / Bacteria: x          IMAGING:    [X] All pertinent imaging reviewed by me

## 2025-01-12 NOTE — PROGRESS NOTE ADULT - PROBLEM SELECTOR PLAN 6
-Labs notable for thrombocytopenia to 70 on admission    PLAN:  >c/w monitoring -Labs notable for thrombocytopenia to 70 on admission (70-80 baseline platelets)  -Per outpatient oncologist, has known ITP    PLAN:  >c/w monitoring daily in the setting of amiodarone initiation

## 2025-01-12 NOTE — PROGRESS NOTE ADULT - ATTENDING COMMENTS
Patient is a 84M w/pmh chronic atrial fibrillation, CKD 3a, severe aortic stenosis s/p aortic valve replacement (7/2012), HTN, pHTN, HLD, thrombocytopenia, GERD presenting from PCP's office for cc tachycardia, JOSEPH. Found to have volume overload and atrial fibrillation w/RVR.     1. Acute diastolic HF  2. AF w RVR  3. pHTN  4. PETER on CKD 3  5. Chronic thrombocytopenia    - Responded well to lasix 20mg IV BID, now has PETER, so off diuretic.   - plans noted- given RVR added Amiodarone 400mg bid and metoprolol 25mg/d instead of Bisoprolol, may need DARA/DCCV  - Scr 1.6- uptrending from 1.2, Not on ACE/ARB  - Echo does not show left sided heart failure, but has right sided reduced systolic function and elevated pulmonary pressures.   - Plt 64, no active bleed. Known chronic thrombocytopenia. Fibrinogen 254 ( no DIC)  - PT in progress    Time spent 52 min excluding house staff teaching. Patient is a 84M w/pmh chronic atrial fibrillation, CKD 3a, severe aortic stenosis s/p aortic valve replacement (7/2012), HTN, pHTN, HLD, thrombocytopenia, GERD presenting from PCP's office for cc tachycardia, JOSEPH. Found to have volume overload and atrial fibrillation w/RVR.     1. Acute HFpEF  2. AF w RVR  3. pHTN  4. PETER on CKD 3  5. Chronic thrombocytopenia    - Responded well to lasix 20mg IV BID, now has PETER, so off diuretic.   - plans noted- given RVR added Amiodarone 400mg bid and metoprolol 25mg/d instead of Bisoprolol, may need DARA/DCCV  - Scr 1.6- uptrending from 1.2, Not on ACE/ARB  - Echo does not show left sided heart failure, but has right sided reduced systolic function and elevated pulmonary pressures.   - Plt 64, no active bleed. Known chronic thrombocytopenia. Fibrinogen 254 ( no DIC)  - PT in progress    Time spent 52 min excluding house staff teaching.

## 2025-01-12 NOTE — PROGRESS NOTE ADULT - SUBJECTIVE AND OBJECTIVE BOX
Date of Service: 01-12-25 @ 08:21           CARDIOLOGY     PROGRESS  NOTE   ________________________________________________    CHIEF COMPLAINT:Patient is a 84y old  Male who presents with a chief complaint of CHF exacerbation  sitting up comfortably    	  REVIEW OF SYSTEMS:  CONSTITUTIONAL: No fever, weight loss, or fatigue  EYES: No eye pain, visual disturbances, or discharge  ENT:  No difficulty hearing, tinnitus, vertigo; No sinus or throat pain  NECK: No pain or stiffness  RESPIRATORY: No cough, wheezing, chills or hemoptysis; No Shortness of Breath  CARDIOVASCULAR: No chest pain, palpitations, passing out, dizziness, or leg swelling  GASTROINTESTINAL: No abdominal or epigastric pain. No nausea, vomiting, or hematemesis; No diarrhea or constipation. No melena or hematochezia.  GENITOURINARY: No dysuria, frequency, hematuria, or incontinence  NEUROLOGICAL: No headaches, memory loss, loss of strength, numbness, or tremors  SKIN: No itching, burning, rashes, or lesions   LYMPH Nodes: No enlarged glands  ENDOCRINE: No heat or cold intolerance; No hair loss  MUSCULOSKELETAL: No joint pain or swelling; No muscle, back, or extremity pain  PSYCHIATRIC: No depression, anxiety, mood swings, or difficulty sleeping  HEME/LYMPH: No easy bruising, or bleeding gums  ALLERGY AND IMMUNOLOGIC: No hives or eczema	    [x ] All others negative	  [ ] Unable to obtain    PHYSICAL EXAM:  T(C): 36.3 (01-12-25 @ 04:16), Max: 36.7 (01-11-25 @ 11:29)  HR: 123 (01-12-25 @ 04:16) (84 - 123)  BP: 99/63 (01-12-25 @ 04:16) (99/63 - 111/62)  RR: 18 (01-12-25 @ 04:16) (18 - 18)  SpO2: 91% (01-12-25 @ 04:16) (91% - 97%)  Wt(kg): --  I&O's Summary    11 Jan 2025 07:01  -  12 Jan 2025 07:00  --------------------------------------------------------  IN: 960 mL / OUT: 700 mL / NET: 260 mL        Appearance: Normal	  HEENT:   Normal oral mucosa, PERRL, EOMI	  Lymphatic: No lymphadenopathy  Cardiovascular: Normal S1 S2, No JVD, + murmurs, No edema  Respiratory: rhonchi  Psychiatry: A & O x 3, Mood & affect appropriate  Gastrointestinal:  Soft, Non-tender, + BS	  Skin: No rashes, No ecchymoses, No cyanosis	  Neurologic: Non-focal  Extremities: Normal range of motion, No clubbing, cyanosis or edema  Vascular: Peripheral pulses palpable 2+ bilaterally    MEDICATIONS  (STANDING):  apixaban 2.5 milliGRAM(s) Oral every 12 hours  atorvastatin 20 milliGRAM(s) Oral at bedtime  finasteride 5 milliGRAM(s) Oral daily  metoprolol succinate ER 25 milliGRAM(s) Oral daily  pantoprazole    Tablet 40 milliGRAM(s) Oral before breakfast  polyethylene glycol 3350 17 Gram(s) Oral at bedtime      TELEMETRY: 	    ECG:  	  RADIOLOGY:  OTHER: 	  	  LABS:	 	    CARDIAC MARKERS:            01-12    136  |  103  |  25[H]  ----------------------------<  110[H]  4.3   |  22  |  1.60[H]    Ca    9.6      12 Jan 2025 05:53      proBNP:   Lipid Profile:   HgA1c:   TSH:     < from: TTE W or WO Ultrasound Enhancing Agent (01.10.25 @ 10:25) >   1. Left ventricular cavity is normal in size. Left ventricular systolic function is normal with an ejection fraction visually estimated at 60 to 65 %. There are no regional wall motion abnormalities seen.   2. Mildly enlarged right ventricular cavity size and borderline reduced right ventricular systolic function.   3. Estimated pulmonary artery systolic pressure is 57 mmHg.   4. A 25mm, Thermofix bovine bioprosthetic valve replacement is present in the aortic position. Implanted 7/2/2012. There is evidence of valve degeneration, including thickened leaflets and mild aortic regurgitation. However, gradients are in the normal range.   5. Moderate tricuspid regurgitation.   6. No pericardial effusion seen.   7. Compared to the transthoracic echocardiogram performed on 11/22/2024, there is slightly decreased mitral and tricusipd regurgitation.  < from: CT Angio Chest PE Protocol w/ IV Cont (01.08.25 @ 12:02) >  No pulmonary embolism.  Small bibasilar pleural effusions. Interlobular septal thickening,   suggestive of interstitial edema      Assessment and plan  ---------------------------  84-year-old male PMH afib on eliquid, severe aortic stenosis (2/2 bicuspid aortic valve), s/p aortic valve replacement (7/2012), HTN, pHTN, HLD, chronic renal insufficiency, thrombocytopenia, GERD presenting from Dr. Ramirez office for tachycardia. Patient had COVID 2 weeks ago which he was treated with paxlovid for. Subsequently, the patient developed some wheezing and was treated with doxycycline and steroids from an urgent care with improvement in symptoms. The patient continued to feel weak however, and dyspneic on exertion. The patient notes 10 days of leg swelling, with dyspnea on exertion starting 3 days ago with inability to tolerate more than 2-3 steps (baseline does not ambulate much). Of note, the patient experienced some nausea a few days prior and vomited after getting into his car.   The patient went to visit his PCP today for the dyspnea and leg swelling; was noted to be tachycardic to 130 BPM and was sent to ER for further evaluation.   Pt did not take home bisoprolol and eliquis prior to admission. Pt denies chest pain, shortness of breath, n/v/d/, fevers or chills, urinary sx, headache, visual changes, numbness or other complaints.  ppt with sig cardiac and medical hx with rapid hr, sob and le edema  cta of chest no PE, but increase interstitial marking  cw chf.  s/p AVR overall mild regurgitation ,TILA 1.44 cn2  will need to add diuresis sec to RV dysfunction , will add midodrine if bp can not tolerate  will discuss with family  dc bisprolol for now, will add metoprolol er 25 mg daily as needed  repeat chest x ray to control HR   thrombocytopenia ?etiology  tele a.fib hr 80 to 120, will increase metoprolol er as tolerated  will consider DARA/  cardioversion after small amio load

## 2025-01-12 NOTE — PROGRESS NOTE ADULT - PROBLEM SELECTOR PLAN 4
-On home bisoprolol 17.5mg PO QD  -On home amlodipine 5mg PO QD    PLAN:  >c/w bisoprolol 17.5mg PO QD -On home bisoprolol 17.5mg PO QD  -On home amlodipine 5mg PO QD    PLAN:  >d/evelyn bisoprolol 17.5mg PO QD 1/11 per cardiology recs  >add metoprolol ER 25mg QD as needed per cardiology recs  >f/u cardiology recs

## 2025-01-12 NOTE — PROGRESS NOTE ADULT - PROBLEM SELECTOR PLAN 9
DVT ppx: eliquis 2.5mg BID  Diet: DASH/TLC, low sodium  Dispo: DVT ppx: Eliquis 2.5mg BID  Diet: DASH/TLC, low sodium  Dispo: Active, pending resolution of PETER

## 2025-01-12 NOTE — PROGRESS NOTE ADULT - PROBLEM SELECTOR PLAN 2
-Hx of HTN and pHTN  -Troponin 54->59  -Pro-BNP of 3624.   -Nuclear stress test (11/2023): small sized mild defect(s) in the basal inferolateral wall that is reversible consistent with ischemia  -TTE (11/2024): "EF is approximately 55%. Mild concentric left ventricular hypertrophy. Right ventricular enlargement with mildly decreased right ventricular systolic function. Left atrium is severely dilated. Right atrium is mildly dilated. Moderate mitral regurgitation. Mitral annular calcification with thickened and mildly calcified mitral valve leaflets. There is mild prolapse of the posterior mitral valve leaflet. Bioprosthetic aortic valve replacement. Aortic root at the sinuses of Valsalva is borderline dilated. Ascending aorta is mildly dilated. Estimated pulmonary artery systolic pressure is 50 mmHg. Moderate to severe tricuspid regurgitation"  -CXR (1/8): b/l lower atelectasis vs pneumonia  -CTA (1/8): "Small bibasilar pleural effusions. Interlobular septal thickening, suggestive of interstitial edema."   -ER POCUS TTE (1/8): trace pericardial effusion, global LV hypokinesis, b/l lungs with B-lines and trace pleural effusions.  -s/p lasix 20mg IV in the ER (1/8)  -Dyspnea has improved from admission    PLAN:  >diuresis prn  >c/w daily standing weights, strict ins/outs -Hx of HTN and pHTN  -Troponin 54->59  -Pro-BNP of 3624.   -Nuclear stress test (11/2023): small sized mild defect(s) in the basal inferolateral wall that is reversible consistent with ischemia  -TTE (11/2024): "EF is approximately 55%. Mild concentric left ventricular hypertrophy. Right ventricular enlargement with mildly decreased right ventricular systolic function. Left atrium is severely dilated. Right atrium is mildly dilated. Moderate mitral regurgitation. Mitral annular calcification with thickened and mildly calcified mitral valve leaflets. There is mild prolapse of the posterior mitral valve leaflet. Bioprosthetic aortic valve replacement. Aortic root at the sinuses of Valsalva is borderline dilated. Ascending aorta is mildly dilated. Estimated pulmonary artery systolic pressure is 50 mmHg. Moderate to severe tricuspid regurgitation"  -CXR (1/8): b/l lower atelectasis vs pneumonia  -CTA (1/8): "Small bibasilar pleural effusions. Interlobular septal thickening, suggestive of interstitial edema."   -ER POCUS TTE (1/8): trace pericardial effusion, global LV hypokinesis, b/l lungs with B-lines and trace pleural effusions.  -s/p lasix 20mg IV in the ER (1/8)  -TTE (1/10): LVEF 60-65%, mildly enlarged right ventricular cavity, borderline reduced ventricular systolic function, pHTN 57 mmHg, moderate tricuspid regurgitation.   -Dyspnea has improved from admission    PLAN:  >Hold off on diuresis in setting of PETER  >c/w daily standing weights, strict ins/outs  >f/u cardiology outpatient

## 2025-01-13 LAB
ALBUMIN SERPL ELPH-MCNC: 3.6 G/DL — SIGNIFICANT CHANGE UP (ref 3.3–5)
ALP SERPL-CCNC: 61 U/L — SIGNIFICANT CHANGE UP (ref 40–120)
ALT FLD-CCNC: 24 U/L — SIGNIFICANT CHANGE UP (ref 10–45)
ANION GAP SERPL CALC-SCNC: 13 MMOL/L — SIGNIFICANT CHANGE UP (ref 5–17)
AST SERPL-CCNC: 18 U/L — SIGNIFICANT CHANGE UP (ref 10–40)
BILIRUB SERPL-MCNC: 1.4 MG/DL — HIGH (ref 0.2–1.2)
BUN SERPL-MCNC: 23 MG/DL — SIGNIFICANT CHANGE UP (ref 7–23)
CALCIUM SERPL-MCNC: 9.4 MG/DL — SIGNIFICANT CHANGE UP (ref 8.4–10.5)
CHLORIDE SERPL-SCNC: 104 MMOL/L — SIGNIFICANT CHANGE UP (ref 96–108)
CO2 SERPL-SCNC: 24 MMOL/L — SIGNIFICANT CHANGE UP (ref 22–31)
CREAT SERPL-MCNC: 1.54 MG/DL — HIGH (ref 0.5–1.3)
EGFR: 44 ML/MIN/1.73M2 — LOW
GLUCOSE SERPL-MCNC: 98 MG/DL — SIGNIFICANT CHANGE UP (ref 70–99)
HCT VFR BLD CALC: 40.5 % — SIGNIFICANT CHANGE UP (ref 39–50)
HGB BLD-MCNC: 13.4 G/DL — SIGNIFICANT CHANGE UP (ref 13–17)
MAGNESIUM SERPL-MCNC: 2.2 MG/DL — SIGNIFICANT CHANGE UP (ref 1.6–2.6)
MCHC RBC-ENTMCNC: 32.3 PG — SIGNIFICANT CHANGE UP (ref 27–34)
MCHC RBC-ENTMCNC: 33.1 G/DL — SIGNIFICANT CHANGE UP (ref 32–36)
MCV RBC AUTO: 97.6 FL — SIGNIFICANT CHANGE UP (ref 80–100)
NRBC # BLD: 0 /100 WBCS — SIGNIFICANT CHANGE UP (ref 0–0)
NRBC BLD-RTO: 0 /100 WBCS — SIGNIFICANT CHANGE UP (ref 0–0)
PHOSPHATE SERPL-MCNC: 4.2 MG/DL — SIGNIFICANT CHANGE UP (ref 2.5–4.5)
PLATELET # BLD AUTO: 63 K/UL — LOW (ref 150–400)
POTASSIUM SERPL-MCNC: 4 MMOL/L — SIGNIFICANT CHANGE UP (ref 3.5–5.3)
POTASSIUM SERPL-SCNC: 4 MMOL/L — SIGNIFICANT CHANGE UP (ref 3.5–5.3)
PROT SERPL-MCNC: 6.2 G/DL — SIGNIFICANT CHANGE UP (ref 6–8.3)
RBC # BLD: 4.15 M/UL — LOW (ref 4.2–5.8)
RBC # FLD: 13.3 % — SIGNIFICANT CHANGE UP (ref 10.3–14.5)
SODIUM SERPL-SCNC: 141 MMOL/L — SIGNIFICANT CHANGE UP (ref 135–145)
WBC # BLD: 8.65 K/UL — SIGNIFICANT CHANGE UP (ref 3.8–10.5)
WBC # FLD AUTO: 8.65 K/UL — SIGNIFICANT CHANGE UP (ref 3.8–10.5)

## 2025-01-13 PROCEDURE — 99232 SBSQ HOSP IP/OBS MODERATE 35: CPT | Mod: GC

## 2025-01-13 RX ORDER — FAMOTIDINE 10 MG/ML
20 INJECTION INTRAVENOUS AT BEDTIME
Refills: 0 | Status: DISCONTINUED | OUTPATIENT
Start: 2025-01-13 | End: 2025-01-23

## 2025-01-13 RX ADMIN — AMIODARONE HYDROCHLORIDE 400 MILLIGRAM(S): 50 INJECTION, SOLUTION INTRAVENOUS at 17:21

## 2025-01-13 RX ADMIN — Medication 25 MILLIGRAM(S): at 05:23

## 2025-01-13 RX ADMIN — Medication 5 MILLIGRAM(S): at 21:21

## 2025-01-13 RX ADMIN — APIXABAN 2.5 MILLIGRAM(S): 5 TABLET, FILM COATED ORAL at 17:21

## 2025-01-13 RX ADMIN — APIXABAN 2.5 MILLIGRAM(S): 5 TABLET, FILM COATED ORAL at 05:22

## 2025-01-13 RX ADMIN — AMIODARONE HYDROCHLORIDE 400 MILLIGRAM(S): 50 INJECTION, SOLUTION INTRAVENOUS at 05:24

## 2025-01-13 RX ADMIN — PANTOPRAZOLE 40 MILLIGRAM(S): 20 TABLET, DELAYED RELEASE ORAL at 05:22

## 2025-01-13 RX ADMIN — FAMOTIDINE 20 MILLIGRAM(S): 10 INJECTION INTRAVENOUS at 21:21

## 2025-01-13 RX ADMIN — ATORVASTATIN CALCIUM 20 MILLIGRAM(S): 80 TABLET, FILM COATED ORAL at 21:21

## 2025-01-13 NOTE — PROGRESS NOTE ADULT - SUBJECTIVE AND OBJECTIVE BOX
SUBJECTIVE / OVERNIGHT EVENTS:  Pt seen and examined at bedside. VIKTOR. Plan for DARA/cardioversion tomorrow.     MEDICATIONS  (STANDING):  aMIOdarone    Tablet 400 milliGRAM(s) Oral two times a day  apixaban 2.5 milliGRAM(s) Oral every 12 hours  atorvastatin 20 milliGRAM(s) Oral at bedtime  finasteride 5 milliGRAM(s) Oral daily  metoprolol succinate ER 25 milliGRAM(s) Oral daily  pantoprazole    Tablet 40 milliGRAM(s) Oral before breakfast  polyethylene glycol 3350 17 Gram(s) Oral at bedtime    MEDICATIONS  (PRN):  acetaminophen     Tablet .. 650 milliGRAM(s) Oral every 6 hours PRN Temp greater or equal to 38C (100.4F), Mild Pain (1 - 3)  senna 2 Tablet(s) Oral at bedtime PRN for constipation        01-12-25 @ 07:01  -  01-13-25 @ 07:00  --------------------------------------------------------  IN: 900 mL / OUT: 1450 mL / NET: -550 mL        PHYSICAL EXAM:  Vital Signs Last 24 Hrs  T(C): 36.6 (13 Jan 2025 04:42), Max: 36.8 (12 Jan 2025 11:06)  T(F): 97.9 (13 Jan 2025 04:42), Max: 98.2 (12 Jan 2025 11:06)  HR: 116 (13 Jan 2025 04:42) (74 - 116)  BP: 120/72 (13 Jan 2025 04:42) (101/51 - 129/77)  BP(mean): --  RR: 18 (13 Jan 2025 04:42) (18 - 18)  SpO2: 93% (13 Jan 2025 04:42) (93% - 96%)    Parameters below as of 13 Jan 2025 04:42  Patient On (Oxygen Delivery Method): room air        CAPILLARY BLOOD GLUCOSE        I&O's Summary    12 Jan 2025 07:01  -  13 Jan 2025 07:00  --------------------------------------------------------  IN: 900 mL / OUT: 1450 mL / NET: -550 mL      Gen: NAD, AOx3, able to make needs known, non-toxic  Head: NCAT  HEENT: EOMI, oral mucosa moist, normal conjunctiva, +JVD  Lung: CTAB, no respiratory distress, no wheezes/rhonchi/rales B/L, speaking in full sentences, 94 % on RA  CV: tachycardic, pitting edema b/l lower extremities up to middle of legs  Abd: non distended, soft, nontender, no guarding, no CVA tenderness  MSK: no visible deformities  Neuro: Appears non focal  Skin: Warm, well perfused, no rash  Psych: normal affect    LABS:                        13.4   8.65  )-----------( 63       ( 13 Jan 2025 06:02 )             40.5     01-13    141  |  104  |  23  ----------------------------<  98  4.0   |  24  |  1.54[H]    Ca    9.4      13 Jan 2025 06:02  Phos  4.2     01-13  Mg     2.2     01-13    TPro  6.2  /  Alb  3.6  /  TBili  1.4[H]  /  DBili  x   /  AST  18  /  ALT  24  /  AlkPhos  61  01-13          Urinalysis Basic - ( 13 Jan 2025 06:02 )    Color: x / Appearance: x / SG: x / pH: x  Gluc: 98 mg/dL / Ketone: x  / Bili: x / Urobili: x   Blood: x / Protein: x / Nitrite: x   Leuk Esterase: x / RBC: x / WBC x   Sq Epi: x / Non Sq Epi: x / Bacteria: x          IMAGING:    [X] All pertinent imaging reviewed by me

## 2025-01-13 NOTE — PROGRESS NOTE ADULT - PROBLEM SELECTOR PLAN 4
-On home bisoprolol 17.5mg PO QD  -On home amlodipine 5mg PO QD    PLAN:  >d/evelyn bisoprolol 17.5mg PO QD 1/11 per cardiology recs  >add metoprolol ER 25mg QD as needed per cardiology recs  >f/u cardiology recs

## 2025-01-13 NOTE — PROGRESS NOTE ADULT - PROBLEM SELECTOR PLAN 2
-Hx of HTN and pHTN  -Troponin 54->59  -Pro-BNP of 3624.   -Nuclear stress test (11/2023): small sized mild defect(s) in the basal inferolateral wall that is reversible consistent with ischemia  -TTE (11/2024): "EF is approximately 55%. Mild concentric left ventricular hypertrophy. Right ventricular enlargement with mildly decreased right ventricular systolic function. Left atrium is severely dilated. Right atrium is mildly dilated. Moderate mitral regurgitation. Mitral annular calcification with thickened and mildly calcified mitral valve leaflets. There is mild prolapse of the posterior mitral valve leaflet. Bioprosthetic aortic valve replacement. Aortic root at the sinuses of Valsalva is borderline dilated. Ascending aorta is mildly dilated. Estimated pulmonary artery systolic pressure is 50 mmHg. Moderate to severe tricuspid regurgitation"  -CXR (1/8): b/l lower atelectasis vs pneumonia  -CTA (1/8): "Small bibasilar pleural effusions. Interlobular septal thickening, suggestive of interstitial edema."   -ER POCUS TTE (1/8): trace pericardial effusion, global LV hypokinesis, b/l lungs with B-lines and trace pleural effusions.  -s/p lasix 20mg IV in the ER (1/8)  -TTE (1/10): LVEF 60-65%, mildly enlarged right ventricular cavity, borderline reduced ventricular systolic function, pHTN 57 mmHg, moderate tricuspid regurgitation.   -Dyspnea has improved from admission    PLAN:  >Hold off on diuresis in setting of PETER  >c/w daily standing weights, strict ins/outs  >f/u cardiology outpatient

## 2025-01-13 NOTE — PROGRESS NOTE ADULT - ATTENDING COMMENTS
Patient is a 84M w/pmh chronic atrial fibrillation, CKD 3a, severe aortic stenosis s/p aortic valve replacement (7/2012), HTN, pHTN, HLD, thrombocytopenia, GERD presenting from PCP's office for cc tachycardia, JOSEPH. Found to have volume overload and atrial fibrillation w/RVR.     1. Acute HFpEF  2. chronic atrial fibrillation w RVR  3. pHTN  4. PETER on CKD 3  5. Chronic thrombocytopenia    - Responded well to lasix 20mg IV BID, now appears euvolemic, now has PETER, so off diuretic.   - plans noted- given RVR added Amiodarone 400mg bid and metoprolol 25mg/d instead of Bisoprolol, plan for DARA/DCCV 1/14  - Echo does not show left sided heart failure, but has right sided reduced systolic function and elevated pulmonary pressures  - PT in progress

## 2025-01-13 NOTE — PROGRESS NOTE ADULT - PROBLEM SELECTOR PLAN 6
-Labs notable for thrombocytopenia to 70 on admission (70-80 baseline platelets)  -Per outpatient oncologist, has known ITP    PLAN:  >c/w monitoring daily in the setting of amiodarone initiation

## 2025-01-13 NOTE — PROGRESS NOTE ADULT - PROBLEM SELECTOR PLAN 3
-EKG on admission with sinus tachycardia to 125-128    PLAN:  >Plan for DARA tomorrow AM, NPO at midnight   >c/w home apixaban 2.5mg PO QD  >d/evelyn bisoprolol 17.5mg PO QD 1/11 per cardiology recs  >c/w amiodarone and digoxin ordered by cardiology team (1/13 -     >c/w metoprolol ER 25mg QD as needed per cardiology recs (1/13 -   >f/u cardiology recs  >f/u cardiology outpatient

## 2025-01-13 NOTE — PROGRESS NOTE ADULT - PROBLEM SELECTOR PLAN 9
DVT ppx: Eliquis 2.5mg BID  Diet: DASH/TLC, low sodium; NPO at midnight for DARA tomorrow  Dispo: Active, pending resolution of PETER

## 2025-01-13 NOTE — PROGRESS NOTE ADULT - ASSESSMENT
84-year-old male PMH afib on eliquid, severe aortic stenosis (2/2 bicuspid aortic valve), s/p aortic valve replacement (7/2012), HTN, pHTN, HLD, chronic renal insufficiency, ITP, GERD admitted to medicine for likely CHF exacerbation c/b PETER iso diuresis pending DARA/cardioversion

## 2025-01-13 NOTE — PROGRESS NOTE ADULT - PROBLEM SELECTOR PLAN 1
-New PETER in setting of diuresis  -Creatinine: 1.24 -> 1.34 -> 1.50 -> 1.61 -> 1.60 -> 1.54  -Urine studies: Na of 33, Nitrogen of 707, creatinine of 114    PLAN:  >Hold off on diuresis unless necessary  >c/w trending kidney function daily

## 2025-01-13 NOTE — CONSULT NOTE ADULT - NS ATTEND AMEND GEN_ALL_CORE FT
85 y/o M with extensive cardiac history who was admitted for a CHF exacerbation. Diuresis has been limited d/t PETER. He follows with Dr. Junior in our practice for ITP, platelets normally ranging in the 70's-80's outpatient. Platelets now are in the 60's - not far off baseline. There is no absolute contraindication to amiodarone use from a hematologic perspective, will monitor platelet count. Can also continue the eliquis for afib for now but would hold if platelet count drops below 50.

## 2025-01-13 NOTE — PROGRESS NOTE ADULT - SUBJECTIVE AND OBJECTIVE BOX
Date of Service: 01-13-25 @ 07:05           CARDIOLOGY     PROGRESS  NOTE   ________________________________________________    CHIEF COMPLAINT:Patient is a 84y old  Male who presents with a chief complaint of CHF exacerbation		 (10 Jovanny 2025 14:13)  doing better  	  REVIEW OF SYSTEMS:  CONSTITUTIONAL: No fever, weight loss, or fatigue  EYES: No eye pain, visual disturbances, or discharge  ENT:  No difficulty hearing, tinnitus, vertigo; No sinus or throat pain  NECK: No pain or stiffness  RESPIRATORY: No cough, wheezing, chills or hemoptysis; No Shortness of Breath  CARDIOVASCULAR: No chest pain, palpitations, passing out, dizziness, or leg swelling  GASTROINTESTINAL: No abdominal or epigastric pain. No nausea, vomiting, or hematemesis; No diarrhea or constipation. No melena or hematochezia.  GENITOURINARY: No dysuria, frequency, hematuria, or incontinence  NEUROLOGICAL: No headaches, memory loss, loss of strength, numbness, or tremors  SKIN: No itching, burning, rashes, or lesions   LYMPH Nodes: No enlarged glands  ENDOCRINE: No heat or cold intolerance; No hair loss  MUSCULOSKELETAL: No joint pain or swelling; No muscle, back, or extremity pain  PSYCHIATRIC: No depression, anxiety, mood swings, or difficulty sleeping  HEME/LYMPH: No easy bruising, or bleeding gums  ALLERGY AND IMMUNOLOGIC: No hives or eczema	    [ x] All others negative	  [ ] Unable to obtain    PHYSICAL EXAM:  T(C): 36.6 (01-13-25 @ 04:42), Max: 36.8 (01-12-25 @ 11:06)  HR: 116 (01-13-25 @ 04:42) (74 - 116)  BP: 120/72 (01-13-25 @ 04:42) (101/51 - 129/77)  RR: 18 (01-13-25 @ 04:42) (18 - 18)  SpO2: 93% (01-13-25 @ 04:42) (93% - 96%)  Wt(kg): --  I&O's Summary    12 Jan 2025 07:01  -  13 Jan 2025 07:00  --------------------------------------------------------  IN: 900 mL / OUT: 1450 mL / NET: -550 mL        Appearance: Normal	  HEENT:   Normal oral mucosa, PERRL, EOMI	  Lymphatic: No lymphadenopathy  Cardiovascular: Normal S1 S2, No JVD, + murmurs, No edema  Respiratory: rhonchi  Psychiatry: A & O x 3, Mood & affect appropriate  Gastrointestinal:  Soft, Non-tender, + BS	  Skin: No rashes, No ecchymoses, No cyanosis	  Neurologic: Non-focal  Extremities: Normal range of motion, No clubbing, cyanosis or edema  Vascular: Peripheral pulses palpable 2+ bilaterally    MEDICATIONS  (STANDING):  aMIOdarone    Tablet 400 milliGRAM(s) Oral two times a day  apixaban 2.5 milliGRAM(s) Oral every 12 hours  atorvastatin 20 milliGRAM(s) Oral at bedtime  finasteride 5 milliGRAM(s) Oral daily  metoprolol succinate ER 25 milliGRAM(s) Oral daily  pantoprazole    Tablet 40 milliGRAM(s) Oral before breakfast  polyethylene glycol 3350 17 Gram(s) Oral at bedtime      TELEMETRY: 	    ECG:  	  RADIOLOGY:  OTHER: 	  	  LABS:	 	    CARDIAC MARKERS:                                13.4   8.65  )-----------( 63       ( 13 Jan 2025 06:02 )             40.5     01-13    141  |  104  |  23  ----------------------------<  98  4.0   |  24  |  1.54[H]    Ca    9.4      13 Jan 2025 06:02  Phos  4.2     01-13  Mg     2.2     01-13    TPro  6.2  /  Alb  3.6  /  TBili  1.4[H]  /  DBili  x   /  AST  18  /  ALT  24  /  AlkPhos  61  01-13    proBNP:   Lipid Profile:   HgA1c:   TSH:         Assessment and plan  ---------------------------  84-year-old male PMH afib on eliquid, severe aortic stenosis (2/2 bicuspid aortic valve), s/p aortic valve replacement (7/2012), HTN, pHTN, HLD, chronic renal insufficiency, thrombocytopenia, GERD presenting from Dr. Ramirez office for tachycardia. Patient had COVID 2 weeks ago which he was treated with paxlovid for. Subsequently, the patient developed some wheezing and was treated with doxycycline and steroids from an urgent care with improvement in symptoms. The patient continued to feel weak however, and dyspneic on exertion. The patient notes 10 days of leg swelling, with dyspnea on exertion starting 3 days ago with inability to tolerate more than 2-3 steps (baseline does not ambulate much). Of note, the patient experienced some nausea a few days prior and vomited after getting into his car.   The patient went to visit his PCP today for the dyspnea and leg swelling; was noted to be tachycardic to 130 BPM and was sent to ER for further evaluation.   Pt did not take home bisoprolol and eliquis prior to admission. Pt denies chest pain, shortness of breath, n/v/d/, fevers or chills, urinary sx, headache, visual changes, numbness or other complaints.  ppt with sig cardiac and medical hx with rapid hr, sob and le edema  cta of chest no PE, but increase interstitial marking  cw chf.  s/p AVR overall mild regurgitation ,TILA 1.44 cn2  will need to add diuresis sec to RV dysfunction , will add midodrine if bp can not tolerate  will discuss with family  dc bisprolol for now, will add metoprolol er 25 mg daily as needed  repeat chest x ray to control HR   thrombocytopenia ?etiology  tele a.fib hr 80 to 120, will increase metoprolol er as tolerated  will consider DARA/  cardioversion after small amio load in amhr has much improved, will increase beta blocker if hr elevated

## 2025-01-14 ENCOUNTER — RESULT REVIEW (OUTPATIENT)
Age: 85
End: 2025-01-14

## 2025-01-14 DIAGNOSIS — N17.9 ACUTE KIDNEY FAILURE, UNSPECIFIED: ICD-10-CM

## 2025-01-14 LAB
ALBUMIN SERPL ELPH-MCNC: 3.8 G/DL — SIGNIFICANT CHANGE UP (ref 3.3–5)
ALP SERPL-CCNC: 64 U/L — SIGNIFICANT CHANGE UP (ref 40–120)
ALT FLD-CCNC: 23 U/L — SIGNIFICANT CHANGE UP (ref 10–45)
ANION GAP SERPL CALC-SCNC: 12 MMOL/L — SIGNIFICANT CHANGE UP (ref 5–17)
APTT BLD: 35.1 SEC — SIGNIFICANT CHANGE UP (ref 24.5–35.6)
AST SERPL-CCNC: 17 U/L — SIGNIFICANT CHANGE UP (ref 10–40)
BASOPHILS # BLD AUTO: 0.08 K/UL — SIGNIFICANT CHANGE UP (ref 0–0.2)
BASOPHILS NFR BLD AUTO: 0.9 % — SIGNIFICANT CHANGE UP (ref 0–2)
BILIRUB SERPL-MCNC: 1.2 MG/DL — SIGNIFICANT CHANGE UP (ref 0.2–1.2)
BUN SERPL-MCNC: 21 MG/DL — SIGNIFICANT CHANGE UP (ref 7–23)
CALCIUM SERPL-MCNC: 9.4 MG/DL — SIGNIFICANT CHANGE UP (ref 8.4–10.5)
CHLORIDE SERPL-SCNC: 105 MMOL/L — SIGNIFICANT CHANGE UP (ref 96–108)
CO2 SERPL-SCNC: 22 MMOL/L — SIGNIFICANT CHANGE UP (ref 22–31)
CREAT SERPL-MCNC: 1.53 MG/DL — HIGH (ref 0.5–1.3)
EGFR: 45 ML/MIN/1.73M2 — LOW
EOSINOPHIL # BLD AUTO: 0 K/UL — SIGNIFICANT CHANGE UP (ref 0–0.5)
EOSINOPHIL NFR BLD AUTO: 0 % — SIGNIFICANT CHANGE UP (ref 0–6)
GIANT PLATELETS BLD QL SMEAR: PRESENT — SIGNIFICANT CHANGE UP
GLUCOSE SERPL-MCNC: 104 MG/DL — HIGH (ref 70–99)
HCT VFR BLD CALC: 41.9 % — SIGNIFICANT CHANGE UP (ref 39–50)
HGB BLD-MCNC: 14 G/DL — SIGNIFICANT CHANGE UP (ref 13–17)
INR BLD: 1.33 RATIO — HIGH (ref 0.85–1.16)
LYMPHOCYTES # BLD AUTO: 1.67 K/UL — SIGNIFICANT CHANGE UP (ref 1–3.3)
LYMPHOCYTES # BLD AUTO: 19.1 % — SIGNIFICANT CHANGE UP (ref 13–44)
MAGNESIUM SERPL-MCNC: 2.2 MG/DL — SIGNIFICANT CHANGE UP (ref 1.6–2.6)
MANUAL SMEAR VERIFICATION: SIGNIFICANT CHANGE UP
MCHC RBC-ENTMCNC: 32.7 PG — SIGNIFICANT CHANGE UP (ref 27–34)
MCHC RBC-ENTMCNC: 33.4 G/DL — SIGNIFICANT CHANGE UP (ref 32–36)
MCV RBC AUTO: 97.9 FL — SIGNIFICANT CHANGE UP (ref 80–100)
MONOCYTES # BLD AUTO: 1.6 K/UL — HIGH (ref 0–0.9)
MONOCYTES NFR BLD AUTO: 18.3 % — HIGH (ref 2–14)
MYELOCYTES NFR BLD: 0.9 % — HIGH (ref 0–0)
NEUTROPHILS # BLD AUTO: 5.17 K/UL — SIGNIFICANT CHANGE UP (ref 1.8–7.4)
NEUTROPHILS NFR BLD AUTO: 59.1 % — SIGNIFICANT CHANGE UP (ref 43–77)
PHOSPHATE SERPL-MCNC: 3.9 MG/DL — SIGNIFICANT CHANGE UP (ref 2.5–4.5)
PLAT MORPH BLD: NORMAL — SIGNIFICANT CHANGE UP
PLATELET # BLD AUTO: 64 K/UL — LOW (ref 150–400)
PLATELET COUNT - ESTIMATE: ABNORMAL
POTASSIUM SERPL-MCNC: 4 MMOL/L — SIGNIFICANT CHANGE UP (ref 3.5–5.3)
POTASSIUM SERPL-SCNC: 4 MMOL/L — SIGNIFICANT CHANGE UP (ref 3.5–5.3)
PROT SERPL-MCNC: 6.4 G/DL — SIGNIFICANT CHANGE UP (ref 6–8.3)
PROTHROM AB SERPL-ACNC: 15.2 SEC — HIGH (ref 9.9–13.4)
RBC # BLD: 4.28 M/UL — SIGNIFICANT CHANGE UP (ref 4.2–5.8)
RBC # FLD: 13.3 % — SIGNIFICANT CHANGE UP (ref 10.3–14.5)
RBC BLD AUTO: SIGNIFICANT CHANGE UP
SODIUM SERPL-SCNC: 139 MMOL/L — SIGNIFICANT CHANGE UP (ref 135–145)
VARIANT LYMPHS # BLD: 1.7 % — SIGNIFICANT CHANGE UP (ref 0–6)
VARIANT LYMPHS NFR BLD MANUAL: 1.7 % — SIGNIFICANT CHANGE UP (ref 0–6)
WBC # BLD: 8.75 K/UL — SIGNIFICANT CHANGE UP (ref 3.8–10.5)
WBC # FLD AUTO: 8.75 K/UL — SIGNIFICANT CHANGE UP (ref 3.8–10.5)

## 2025-01-14 PROCEDURE — 76770 US EXAM ABDO BACK WALL COMP: CPT | Mod: 26

## 2025-01-14 PROCEDURE — 93312 ECHO TRANSESOPHAGEAL: CPT | Mod: 26

## 2025-01-14 PROCEDURE — 93010 ELECTROCARDIOGRAM REPORT: CPT

## 2025-01-14 PROCEDURE — 99223 1ST HOSP IP/OBS HIGH 75: CPT | Mod: GC

## 2025-01-14 PROCEDURE — 93325 DOPPLER ECHO COLOR FLOW MAPG: CPT | Mod: 26

## 2025-01-14 PROCEDURE — 93320 DOPPLER ECHO COMPLETE: CPT | Mod: 26

## 2025-01-14 PROCEDURE — 99232 SBSQ HOSP IP/OBS MODERATE 35: CPT | Mod: GC

## 2025-01-14 RX ORDER — METOPROLOL SUCCINATE 25 MG
25 TABLET, EXTENDED RELEASE 24 HR ORAL
Refills: 0 | Status: DISCONTINUED | OUTPATIENT
Start: 2025-01-14 | End: 2025-01-14

## 2025-01-14 RX ORDER — METOPROLOL SUCCINATE 25 MG
25 TABLET, EXTENDED RELEASE 24 HR ORAL DAILY
Refills: 0 | Status: DISCONTINUED | OUTPATIENT
Start: 2025-01-14 | End: 2025-01-17

## 2025-01-14 RX ADMIN — FAMOTIDINE 20 MILLIGRAM(S): 10 INJECTION INTRAVENOUS at 21:27

## 2025-01-14 RX ADMIN — APIXABAN 2.5 MILLIGRAM(S): 5 TABLET, FILM COATED ORAL at 17:09

## 2025-01-14 RX ADMIN — APIXABAN 2.5 MILLIGRAM(S): 5 TABLET, FILM COATED ORAL at 05:22

## 2025-01-14 RX ADMIN — PANTOPRAZOLE 40 MILLIGRAM(S): 20 TABLET, DELAYED RELEASE ORAL at 05:21

## 2025-01-14 RX ADMIN — ATORVASTATIN CALCIUM 20 MILLIGRAM(S): 80 TABLET, FILM COATED ORAL at 21:27

## 2025-01-14 RX ADMIN — Medication 5 MILLIGRAM(S): at 21:27

## 2025-01-14 RX ADMIN — AMIODARONE HYDROCHLORIDE 400 MILLIGRAM(S): 50 INJECTION, SOLUTION INTRAVENOUS at 05:21

## 2025-01-14 RX ADMIN — Medication 25 MILLIGRAM(S): at 05:21

## 2025-01-14 RX ADMIN — ACETAMINOPHEN 650 MILLIGRAM(S): 160 SUSPENSION ORAL at 02:21

## 2025-01-14 RX ADMIN — ACETAMINOPHEN 650 MILLIGRAM(S): 160 SUSPENSION ORAL at 19:27

## 2025-01-14 RX ADMIN — AMIODARONE HYDROCHLORIDE 400 MILLIGRAM(S): 50 INJECTION, SOLUTION INTRAVENOUS at 17:08

## 2025-01-14 NOTE — PROGRESS NOTE ADULT - PROBLEM SELECTOR PLAN 7
-Per family is supposed to be on home PPI but patient does not take    PLAN:  >c/w Pantoprazole 40mg QD pre-breakfast   >c/w famotidine 20mg QHS  >f/u outpatient with GI

## 2025-01-14 NOTE — CONSULT NOTE ADULT - PROBLEM SELECTOR RECOMMENDATION 9
Patient with PETER on CKD in the setting of transient hypotension, hemodynamic effects from afib with RVR, and administration of IV contrast. On review of labs on [facility], patient noted to have elevated Scr/episodes of PETER since 2017. Last Scr was 1.29 (eGFR 57) on 12/19/24. Admission Scr remained stable at 1.24 on 1/8/25, peaked at 1.61 on 1/11, and elevated/improved to 1.53 today. UACR elevated at 544 on 12/19/24. Patient underwent CT PE on 1/8/25. Received IV Lasix 20mg BID on 1/8 and 1/9. Volume overloaded on exam. Underwent successful DARA cardioversion on 1/14. CT PE with exophytic L renal lesion and renal cysts.     Patient likely in ATN.   Recommend PO Lasix 40mg QD.   Recommend to obtain UA, UPCR, and kidney US. Monitor labs and urine output. Maintain neg negative fluid status. Check daily weights. Avoid nephrotoxins. Dose medications as per eGFR.    If you have any questions, please feel free to contact me.  Jose Rodriguez MD  Nephrology Fellow  [id number] / [employer] Teams (Preferred)  (Please check the on-call schedule to reach the appropriate Nephrology Fellow)

## 2025-01-14 NOTE — PROGRESS NOTE ADULT - SUBJECTIVE AND OBJECTIVE BOX
Patient is a 84y old  Male who presents with a chief complaint of CHF (13 Jan 2025 10:22)    Patient seen and examined at bedside, spoke w/ wife. Feeling well.     MEDICATIONS  (STANDING):  aMIOdarone    Tablet 400 milliGRAM(s) Oral two times a day  apixaban 2.5 milliGRAM(s) Oral every 12 hours  atorvastatin 20 milliGRAM(s) Oral at bedtime  famotidine    Tablet 20 milliGRAM(s) Oral at bedtime  finasteride 5 milliGRAM(s) Oral daily  metoprolol succinate ER 25 milliGRAM(s) Oral daily  pantoprazole    Tablet 40 milliGRAM(s) Oral before breakfast  polyethylene glycol 3350 17 Gram(s) Oral at bedtime    MEDICATIONS  (PRN):  acetaminophen     Tablet .. 650 milliGRAM(s) Oral every 6 hours PRN Temp greater or equal to 38C (100.4F), Mild Pain (1 - 3)  senna 2 Tablet(s) Oral at bedtime PRN for constipation    Vital Signs Last 24 Hrs  T(C): 36.7 (14 Jan 2025 12:45), Max: 36.7 (14 Jan 2025 12:45)  T(F): 98 (14 Jan 2025 12:45), Max: 98 (14 Jan 2025 12:45)  HR: 75 (14 Jan 2025 12:45) (66 - 114)  BP: 107/65 (14 Jan 2025 12:45) (91/54 - 122/72)  BP(mean): 66 (14 Jan 2025 11:10) (66 - 66)  RR: 19 (14 Jan 2025 12:45) (16 - 19)  SpO2: 93% (14 Jan 2025 12:45) (92% - 94%)    Parameters below as of 14 Jan 2025 12:45  Patient On (Oxygen Delivery Method): room air    PE  NAD  Awake, alert  Anicteric, MMM  RRR  CTAB  Abd soft, NT, ND  No c/c/e  No rash grossly                        14.0   8.75  )-----------( 64       ( 14 Jan 2025 05:39 )             41.9       01-14    139  |  105  |  21  ----------------------------<  104[H]  4.0   |  22  |  1.53[H]    Ca    9.4      14 Jan 2025 05:39  Phos  3.9     01-14  Mg     2.2     01-14    TPro  6.4  /  Alb  3.8  /  TBili  1.2  /  DBili  x   /  AST  17  /  ALT  23  /  AlkPhos  64  01-14

## 2025-01-14 NOTE — PROGRESS NOTE ADULT - PROBLEM SELECTOR PLAN 3
-EKG on admission with sinus tachycardia to 125-128    PLAN:  >Plan for DARA/Cardioversion today, 1/14  >c/w home apixaban 2.5mg PO QD  >c/w amiodarone and digoxin ordered by cardiology team (1/12 -     >c/w metoprolol ER 25mg QD as needed per cardiology recs (1/13 -   >f/u cardiology recs  >f/u cardiology outpatient

## 2025-01-14 NOTE — CONSULT NOTE ADULT - ASSESSMENT
84-year-old male PMH afib on eliquid, severe aortic stenosis (2/2 bicuspid aortic valve), s/p aortic valve replacement (7/2012), HTN, pHTN, HLD, chronic renal insufficiency, thrombocytopenia, GERD presenting from Dr. Ramirez office for tachycardia. Patient had COVID 2 weeks ago which he was treated with paxlovid for. Subsequently, the patient developed some wheezing and was treated with doxycycline and steroids from an urgent care with improvement in symptoms. The patient continued to feel weak however, and dyspneic on exertion. The patient notes 10 days of leg swelling, with dyspnea on exertion starting 3 days ago with inability to tolerate more than 2-3 steps (baseline does not ambulate much). Of note, the patient experienced some nausea a few days prior and vomited after getting into his car.   The patient went to visit his PCP today for the dyspnea and leg swelling; was noted to be tachycardic to 130 BPM and was sent to ER for further evaluation.   Pt did not take home bisoprolol and eliquis prior to admission. Pt denies chest pain, shortness of breath, n/v/d/, fevers or chills, urinary sx, headache, visual changes, numbness or other complaints.  ppt with sig cardiac and medical hx with rapid hr, sob and le edema  cta of chest no PE, but increase interstitial marking  cw chf.  s/p AVR overall mild regurgitation ,TILA 1.44 cn2  will need to add diuresis sec to RV dysfunction , will add midodrine if bp can not tolerte  will discuss with family  dc bisprolol for now, will add metoprolol er 25 mg daily as needd to control HR   thrombocytopenia ?etiology  
84-year-old male PMH afib on eliquid, severe aortic stenosis (2/2 bicuspid aortic valve), s/p aortic valve replacement (7/2012), HTN, pHTN, HLD, chronic renal insufficiency, thrombocytopenia, GERD presenting from Dr. Ramirez office for tachycardia.     ITP   - Platelet range between 79-85K, follows with Dr. Junior at [facility].   - Prior work up generally speaking unremarkable. He has no evidence of a monoclonal gammopathy. Flow cytometry was with no abnormal immunophenotype. No evidence of B12 or folic acid deficiency. He has mild hemolysis of unclear etiology, although there is no anemia. Direct Onofre was negative. His KENNA was slightly elevated at 1:160.   - Currently just being monitored as outpatient, not on any treatment    CHF exacerbation  - 1/8 CTA chest w/ no PE. Small bibasilar pleural effusions. Interlobular septal thickening, suggestive of interstitial edema  - Hold off on diuresis in setting of PETER  - Per cardiology, will consider DARA/cardioversion    Will continue to follow.    Tung Leo PA-C  Hematology/Oncology  [facility]  [phone] (office)  Evenings and weekends please call MD on call or office
Patient with PETER on CKD

## 2025-01-14 NOTE — CONSULT NOTE ADULT - ATTENDING COMMENTS
PETER in setting of hemodynamically mediated rapid afib and with contrast nephropathy  Cr stable at this time  Cardioversion per cards  Could benefit from further diuresis  Trend renal function for now  Outpt followup with PMD and with nephrology if no improvement to baseline    Mone Pastrana MD  Off: [phone]  contact me on teams    (After 5 pm or on weekends please page the on-call fellow/attending, can check [email or web address] for schedule. Login is francisco morrow, schedule under [facility] medicine, psych, derm)

## 2025-01-14 NOTE — PRE-ANESTHESIA EVALUATION ADULT - WEIGHT IN LBS
SW sent updates to [facility] on 6/27/22. Pt to return once medically cleared.     SW to continue to follow.    MANAV Comer  [id number], pager: [phone]   [phone]

## 2025-01-14 NOTE — PRE-ANESTHESIA EVALUATION ADULT - NSRADCARDRESULTSFT_GEN_ALL_CORE
DARA 1/10/25   1. Left ventricular cavity is normal in size. Left ventricular systolic function is normal with an ejection fraction visually estimated at 60 to 65 %. There are no regional wall motion abnormalities seen.   2. Mildly enlarged right ventricular cavity size and borderline reduced right ventricular systolic function.   3. Estimated pulmonary artery systolic pressure is 57 mmHg.   4. A 25mm, Thermofix bovine bioprosthetic valve replacement is present in the aortic position. Implanted 7/2/2012. There is evidence of valve degeneration, including thickened leaflets and mild aortic regurgitation. However, gradients are in the normal range.   5. Moderate tricuspid regurgitation.   6. No pericardial effusion seen.   7. Compared to the transthoracic echocardiogram performed on 11/22/2024, there is slightly decreased mitral and tricusipd regurgitation.

## 2025-01-14 NOTE — PROGRESS NOTE ADULT - SUBJECTIVE AND OBJECTIVE BOX
Date of Service: 01-14-25 @ 05:53           CARDIOLOGY     PROGRESS  NOTE   ________________________________________________    CHIEF COMPLAINT:Patient is a 84y old  Male who presents with a chief complaint of CHF (13 Jan 2025 10:22)  no complain  	  REVIEW OF SYSTEMS:  CONSTITUTIONAL: No fever, weight loss, or fatigue  EYES: No eye pain, visual disturbances, or discharge  ENT:  No difficulty hearing, tinnitus, vertigo; No sinus or throat pain  NECK: No pain or stiffness  RESPIRATORY: No cough, wheezing, chills or hemoptysis; No Shortness of Breath  CARDIOVASCULAR: No chest pain, palpitations, passing out, dizziness, or leg swelling  GASTROINTESTINAL: No abdominal or epigastric pain. No nausea, vomiting, or hematemesis; No diarrhea or constipation. No melena or hematochezia.  GENITOURINARY: No dysuria, frequency, hematuria, or incontinence  NEUROLOGICAL: No headaches, memory loss, loss of strength, numbness, or tremors  SKIN: No itching, burning, rashes, or lesions   LYMPH Nodes: No enlarged glands  ENDOCRINE: No heat or cold intolerance; No hair loss  MUSCULOSKELETAL: No joint pain or swelling; No muscle, back, or extremity pain  PSYCHIATRIC: No depression, anxiety, mood swings, or difficulty sleeping  HEME/LYMPH: No easy bruising, or bleeding gums  ALLERGY AND IMMUNOLOGIC: No hives or eczema	    [ x] All others negative	  [ ] Unable to obtain    PHYSICAL EXAM:  T(C): 36.5 (01-14-25 @ 04:17), Max: 36.8 (01-13-25 @ 11:41)  HR: 113 (01-14-25 @ 04:17) (93 - 114)  BP: 122/72 (01-14-25 @ 04:17) (104/63 - 122/72)  RR: 18 (01-14-25 @ 04:17) (18 - 18)  SpO2: 94% (01-14-25 @ 04:17) (93% - 94%)  Wt(kg): --  I&O's Summary    12 Jan 2025 07:01  -  13 Jan 2025 07:00  --------------------------------------------------------  IN: 900 mL / OUT: 1450 mL / NET: -550 mL    13 Jan 2025 07:01  -  14 Jan 2025 05:53  --------------------------------------------------------  IN: 900 mL / OUT: 1800 mL / NET: -900 mL        Appearance: Normal	  HEENT:   Normal oral mucosa, PERRL, EOMI	  Lymphatic: No lymphadenopathy  Cardiovascular: Normal S1 S2, No JVD, + murmurs, No edema  Respiratory: Lungs clear to auscultation	  Psychiatry: A & O x 3, Mood & affect appropriate  Gastrointestinal:  Soft, Non-tender, + BS	  Skin: No rashes, No ecchymoses, No cyanosis	  Neurologic: Non-focal  Extremities: Normal range of motion, No clubbing, cyanosis or edema  Vascular: Peripheral pulses palpable 2+ bilaterally    MEDICATIONS  (STANDING):  aMIOdarone    Tablet 400 milliGRAM(s) Oral two times a day  apixaban 2.5 milliGRAM(s) Oral every 12 hours  atorvastatin 20 milliGRAM(s) Oral at bedtime  famotidine    Tablet 20 milliGRAM(s) Oral at bedtime  finasteride 5 milliGRAM(s) Oral daily  metoprolol succinate ER 25 milliGRAM(s) Oral daily  pantoprazole    Tablet 40 milliGRAM(s) Oral before breakfast  polyethylene glycol 3350 17 Gram(s) Oral at bedtime      TELEMETRY: 	    ECG:  	  RADIOLOGY:  OTHER: 	  	  LABS:	 	    CARDIAC MARKERS:                            14.0   8.75  )-----------( x        ( 14 Jan 2025 05:39 )             41.9     01-13    141  |  104  |  23  ----------------------------<  98  4.0   |  24  |  1.54[H]    Ca    9.4      13 Jan 2025 06:02  Phos  4.2     01-13  Mg     2.2     01-13    TPro  6.2  /  Alb  3.6  /  TBili  1.4[H]  /  DBili  x   /  AST  18  /  ALT  24  /  AlkPhos  61  01-13    proBNP:   Lipid Profile:   HgA1c:   TSH:         Assessment and plan  ---------------------------  84-year-old male PMH afib on eliquid, severe aortic stenosis (2/2 bicuspid aortic valve), s/p aortic valve replacement (7/2012), HTN, pHTN, HLD, chronic renal insufficiency, thrombocytopenia, GERD presenting from Dr. Ramirez office for tachycardia. Patient had COVID 2 weeks ago which he was treated with paxlovid for. Subsequently, the patient developed some wheezing and was treated with doxycycline and steroids from an urgent care with improvement in symptoms. The patient continued to feel weak however, and dyspneic on exertion. The patient notes 10 days of leg swelling, with dyspnea on exertion starting 3 days ago with inability to tolerate more than 2-3 steps (baseline does not ambulate much). Of note, the patient experienced some nausea a few days prior and vomited after getting into his car.   The patient went to visit his PCP today for the dyspnea and leg swelling; was noted to be tachycardic to 130 BPM and was sent to ER for further evaluation.   Pt did not take home bisoprolol and eliquis prior to admission. Pt denies chest pain, shortness of breath, n/v/d/, fevers or chills, urinary sx, headache, visual changes, numbness or other complaints.  ppt with sig cardiac and medical hx with rapid hr, sob and le edema  cta of chest no PE, but increase interstitial marking  cw chf.  s/p AVR overall mild regurgitation ,TILA 1.44 cn2  will need to add diuresis sec to RV dysfunction , will add midodrine if bp can not tolerate  will discuss with family  dc bisprolol for now, will add metoprolol er 25 mg daily as needed  repeat chest x ray to control HR   thrombocytopenia ?etiology  tele a.fib hr 80 to 120, will increase metoprolol er as tolerated  will consider DARA/  cardioversion after small amio load in am mhr has much improved, will increase beta blocker if hr elevated  discussed with ester agreed with cardioversion  HR is controlled

## 2025-01-14 NOTE — PROGRESS NOTE ADULT - PROBLEM SELECTOR PLAN 9
DVT ppx: Eliquis 2.5mg BID  Diet: DASH/TLC, low sodium; NPO this AM for DARA/cardioversion  Dispo: Active, pending resolution of PETER and DARA/cardioversion

## 2025-01-14 NOTE — PROGRESS NOTE ADULT - PROBLEM SELECTOR PLAN 4
-On home bisoprolol 17.5mg PO QD  -On home amlodipine 5mg PO QD    PLAN:  >d/evelyn bisoprolol 17.5mg PO QD (1/11) per cardiology recs  >c/w metoprolol ER 25mg QD (1/13- per cardiology recs  >f/u cardiology recs

## 2025-01-14 NOTE — PROGRESS NOTE ADULT - ASSESSMENT
84-year-old male PMH afib on eliquid, severe aortic stenosis (2/2 bicuspid aortic valve), s/p aortic valve replacement (7/2012), HTN, pHTN, HLD, chronic renal insufficiency, thrombocytopenia, GERD presenting from Dr. Ramirez office for tachycardia.     ITP   - Platelet range between 79-85K, follows with Dr. Junior at [facility].   - Prior work up generally speaking unremarkable. He has no evidence of a monoclonal gammopathy. Flow cytometry was with no abnormal immunophenotype. No evidence of B12 or folic acid deficiency. He has mild hemolysis of unclear etiology, although there is no anemia. Direct Onofre was negative. His KENNA was slightly elevated at 1:160.   - There is no absolute contraindication to amiodarone use from a hematologic perspective, will monitor platelet count.     CHF exacerbation  - 1/8 CTA chest w/ no PE. Small bibasilar pleural effusions. Interlobular septal thickening, suggestive of interstitial edema  - Can continue the eliquis for afib for now but would hold if platelet count drops below 50.  - Management per cardiology, went for DARA/cardioversion today, 1/14.   - Renal US pending for PETER in setting of diuresis    Will continue to follow.    Tung Leo PA-C  Hematology/Oncology  [facility]  [phone] (office)  Evenings and weekends please call MD on call or office

## 2025-01-14 NOTE — CONSULT NOTE ADULT - SUBJECTIVE AND OBJECTIVE BOX
Date of Service, 01-11-25 @ 11:36  CHIEF COMPLAINT:Patient is a 84y old  Male who presents with a chief complaint of CHF exacerbation		 (10 Jovanny 2025 14:13)      HPI:  84-year-old male PMH afib on eliquid, severe aortic stenosis (2/2 bicuspid aortic valve), s/p aortic valve replacement (7/2012), HTN, pHTN, HLD, chronic renal insufficiency, thrombocytopenia, GERD presenting from Dr. Ramirez office for tachycardia. Patient had COVID 2 weeks ago which he was treated with paxlovid for. Subsequently, the patient developed some wheezing and was treated with doxycycline and steroids from an urgent care with improvement in symptoms. The patient continued to feel weak however, and dyspneic on exertion. The patient notes 10 days of leg swelling, with dyspnea on exertion starting 3 days ago with inability to tolerate more than 2-3 steps (baseline does not ambulate much). Of note, the patient experienced some nausea a few days prior and vomited after getting into his car.   The patient went to visit his PCP today for the dyspnea and leg swelling; was noted to be tachycardic to 130 BPM and was sent to ER for further evaluation.   Pt did not take home bisoprolol and eliquis prior to admission. Pt denies chest pain, shortness of breath, n/v/d/, fevers or chills, urinary sx, headache, visual changes, numbness or other complaints.    ER Course: VS notable for sinus tachycardia to 125-128 on admission. Labs notable for thrombocytopenia to 70 (PMHx of thrombocytopenia), Troponin 54->59, Pro-BNP of 3624. RVP was negative, CXR: b/l lower atelectasis vs pneumonia, CTA: "Small bibasilar pleural effusions. Interlobular septal thickening, suggestive of interstitial edema." POCUS TTE: trace pericardial effusion, global LV hypokinesis, b/l lungs with B-lines and trace pleural effusions.  s/p apixaban 2.5mg PO, metoprolol succinate 100mg PO, lasix 20mg IV. Admitted to medicine for likely CHF exacerbation.       PAST MEDICAL & SURGICAL HISTORY:  Aortic Stenosis  GERD (Gastroesophageal Reflux Disease)  Hypercholesterolemia  Enlarged Prostate  HTN (hypertension)  Atrial fibrillation  H/O thrombocytopenia  Primary osteoarthritis of both knees  2019 novel coronavirus disease (COVID-19)  S/P TURP  2005  Retroperitoneal mass  s/p surgery- benign  S/P cardiac catheterization  S/P cholecystectomy  H/O aortic valve replacement  H/O hernia repair      MEDICATIONS  (STANDING):  apixaban 2.5 milliGRAM(s) Oral every 12 hours  atorvastatin 20 milliGRAM(s) Oral at bedtime  bisoprolol   Tablet 17.5 milliGRAM(s) Oral daily  finasteride 5 milliGRAM(s) Oral daily  pantoprazole    Tablet 40 milliGRAM(s) Oral before breakfast  polyethylene glycol 3350 17 Gram(s) Oral at bedtime    MEDICATIONS  (PRN):  acetaminophen     Tablet .. 650 milliGRAM(s) Oral every 6 hours PRN Temp greater or equal to 38C (100.4F), Mild Pain (1 - 3)  senna 2 Tablet(s) Oral at bedtime PRN for constipation      FAMILY HISTORY:      SOCIAL HISTORY:    [x] Non-smoker  [ ] Smoker  [ ] Alcohol    Allergies    No Known Allergies    Intolerances    	    REVIEW OF SYSTEMS:  CONSTITUTIONAL: No fever, weight loss, or fatigue  EYES: No eye pain, visual disturbances, or discharge  ENT:  No difficulty hearing, tinnitus, vertigo; No sinus or throat pain  NECK: No pain or stiffness  RESPIRATORY: No cough, wheezing, chills or hemoptysis; No Shortness of Breath  CARDIOVASCULAR: No chest pain, +alpitations, no sing out, dizziness, +leg swelling  GASTROINTESTINAL: No abdominal or epigastric pain. No nausea, vomiting, or hematemesis; No diarrhea or constipation. No melena or hematochezia.  GENITOURINARY: No dysuria, frequency, hematuria, or incontinence  NEUROLOGICAL: No headaches, memory loss, loss of strength, numbness, or tremors  SKIN: No itching, burning, rashes, or lesions   LYMPH Nodes: No enlarged glands  ENDOCRINE: No heat or cold intolerance; No hair loss  MUSCULOSKELETAL: No joint pain or swelling; No muscle, back, or extremity pain  PSYCHIATRIC: No depression, anxiety, mood swings, or difficulty sleeping  HEME/LYMPH: No easy bruising, or bleeding gums  ALLERGY AND IMMUNOLOGIC: No hives or eczema	    [x] All others negative	  [ ] Unable to obtain    PHYSICAL EXAM:  T(C): 36.7 (01-11-25 @ 11:29), Max: 37.1 (01-11-25 @ 04:20)  HR: 84 (01-11-25 @ 11:29) (84 - 123)  BP: 100/63 (01-11-25 @ 11:29) (97/60 - 115/73)  RR: 18 (01-11-25 @ 11:29) (18 - 18)  SpO2: 97% (01-11-25 @ 11:29) (94% - 100%)  Wt(kg): --  I&O's Summary    11 Jan 2025 07:01  -  11 Jan 2025 11:36  --------------------------------------------------------  IN: 240 mL / OUT: 0 mL / NET: 240 mL        Appearance: Normal	  HEENT:   Normal oral mucosa, PERRL, EOMI	  Lymphatic: No lymphadenopathy  Cardiovascular: Normal S1 S2, No JVD, +murmurs, +edema  Respiratory: Lungs clear to auscultation	  Psychiatry: A & O x 3, Mood & affect appropriate  Gastrointestinal:  Soft, Non-tender, + BS	  Skin: No rashes, No ecchymoses, No cyanosis	  Neurologic: Non-focal  Extremities: Normal range of motion, No clubbing, cyanosis +edema  Vascular: Peripheral pulses palpable 2+ bilaterally    TELEMETRY: 	    ECG:  	  RADIOLOGY:  OTHER: 	  	  LABS:	 	    CARDIAC MARKERS:                              13.9   8.60  )-----------( 64       ( 10 Jovanny 2025 07:03 )             41.7     01-11    138  |  103  |  23  ----------------------------<  104[H]  4.1   |  22  |  1.61[H]    Ca    9.5      11 Jan 2025 06:19  Phos  4.0     01-10  Mg     2.0     01-10      proBNP:   Lipid Profile:   HgA1c:   TSH:       PREVIOUS DIAGNOSTIC TESTING:     < from: 12 Lead ECG (01.08.25 @ 08:35) >  Diagnosis Line SINUS TACHYCARDIA  LEFT AXIS DEVIATION  INCOMPLETE RIGHT BUNDLE BRANCH BLOCK  MINIMAL VOLTAGE CRITERIA FOR LVH, MAY BE NORMAL VARIANT ( R in aVL )  ST & T WAVE ABNORMALITY, CONSIDER LATERAL ISCHEMIA  ABNORMAL ECG  WHEN COMPARED WITH ECG OF 29-NOV-2023 16:19,  SIGNIFICANT CHANGES HAVE OCCURRED      < from: CT Angio Chest PE Protocol w/ IV Cont (01.08.25 @ 12:02) >  Pulmonary arteries: There is a good bolus of contrast in the pulmonary   arterial system. There is opacification through the distal subsegmental   level. There is no respiratory motion artifact. No filling defects are   identified to suggest pulmonary embolism. The main pulmonary artery is   normal in size. The right heart is not enlarged.    Mediastinum: The thyroid and thoracic inlet are normal. There is no   evidence of enlarged mediastinal, hilar, or axillary lymph nodes. There   is cardiomegaly. There is calcification of coronary vessels. Aortic valve   replacement is noted. No pericardial effusion is present. The esophagus   is normal.    Lung: Central tracheobronchial tree is patent. There are interlobular   septal thickening and right greater than left small bibasilar pleural   effusions. Atelectasisin lingula and bilateral lower lobes are noted.    Pleura: No effusions or pneumothorax.    Abdomen: Patient status post cholecystectomy. There is fatty replacement   of the pancreas. There is an exophytic left renal lesion with incomplete   rim calcification. Additional renal cysts are noted. There is a 6 mm   nonobstructing right renal calculus.    Bone and soft tissue: There is no acute osseous or soft tissue   abnormality. Patient status post median sternotomy. There are   degenerative changes of thoracic spine.    IMPRESSION:  No pulmonary embolism.  Small bibasilar pleural effusions. Interlobular septal thickening,   suggestive of interstitial edema        < from: Xray Chest 1 View- PORTABLE-Urgent (01.08.25 @ 10:42) >  IMPRESSION:  Bilateral lower lung field opacities consistent with atelectasis versus   pneumonia.      < from: TTE W or WO Ultrasound Enhancing Agent (01.10.25 @ 10:25) >   1. Left ventricular cavity is normal in size. Left ventricular systolic function is normal with an ejection fraction visually estimated at 60 to 65 %. There are no regional wall motion abnormalities seen.   2. Mildly enlarged right ventricular cavity size and borderline reduced right ventricular systolic function.   3. Estimated pulmonary artery systolic pressure is 57 mmHg.   4. A 25mm, Thermofix bovine bioprosthetic valve replacement is present in the aortic position. Implanted 7/2/2012. There is evidence of valve degeneration, including thickened leaflets and mild aortic regurgitation. However, gradients are in the normal range.   5. Moderate tricuspid regurgitation.   6. No pericardial effusion seen.   7. Compared to the transthoracic echocardiogram performed on 11/22/2024, there is slightly decreased mitral and tricusipd regurgitation.    < from: TTE W or WO Ultrasound Enhancing Agent (01.10.25 @ 10:25) >  Aortic Valve:  A 25mm, Thermofix bovine bioprosthetic valve replacement is present in the aortic position. Implanted 7/2/2012. The prosthetic valve is well seated. Echo findings are consistent with thickened leaflets of the aortic prosthesis. There is mild intravalvular regurgitation. There is no paravalvular regurgitation. Thepeak transaortic velocity is 2.35 m/s, peak transaortic gradient is 22.1 mmHg and mean transaortic gradient is 16.0 mmHg with an LVOT/aortic valve VTI ratio of 0.38. The aortic valve area is estimated at 1.44 cm² by the continuity equation.           
[facility] DIVISION OF KIDNEY DISEASES AND HYPERTENSION -- [phone]  -- INITIAL CONSULT NOTE  --------------------------------------------------------------------------------  HPI: 84M with PMH of HTN, HLD, pre-DM2, AS 2/2 bicuspid aortic valve s/p AVR (7/2012), Afib, CKD Stage 3a (Scr 1.2-1.4), BPH, nephrolithiasis, microscopic hematuria, ITP, and GERD who was sent in to the ED due to tachycardia, SOB, and weakness. Nephrology consulted for PETER on CKD.     [facility] noted to have elevated Scr/episodes of PETER since 2017. Last Scr was 1.29 (eGFR 57) on 12/19/24. Admission Scr remained stable at 1.24 on 1/8/25, peaked at 1.61 on 1/11, and elevated/improved to 1.53 today. UACR elevated at 544 on 12/19/24. Patient underwent CT PE on 1/8/25.     Patient seen and examined this afternoon. Currently feels well. Underwent successful DARA cardioversion today for Afib. Reports LE edema. Denies HA, fevers/chills, CP, SOB, abdominal pain, or dysuria.     PAST HISTORY  --------------------------------------------------------------------------------  PAST MEDICAL & SURGICAL HISTORY:  Aortic Stenosis  GERD (Gastroesophageal Reflux Disease)  Hypercholesterolemia  Enlarged Prostate  HTN (hypertension)  Atrial fibrillation  H/O thrombocytopenia  Primary osteoarthritis of both knees  2019 novel coronavirus disease (COVID-19)  S/P TURP  2005  Retroperitoneal mass  s/p surgery- benign  S/P cardiac catheterization  S/P cholecystectomy  H/O aortic valve replacement  H/O hernia repair    FAMILY HISTORY:  Former smoker    PAST SOCIAL HISTORY:  Ex-smoker, denies alcohol use, denies illicit drug use. (08 Jan 2025 16:29)    ALLERGIES & MEDICATIONS  --------------------------------------------------------------------------------  Allergies  No Known Allergies    Intolerances    Standing Inpatient MedicationsaMIOdarone    Tablet 400 milliGRAM(s) Oral two times a day  apixaban 2.5 milliGRAM(s) Oral every 12 hours  atorvastatin 20 milliGRAM(s) Oral at bedtime  famotidine    Tablet 20 milliGRAM(s) Oral at bedtime  finasteride 5 milliGRAM(s) Oral daily  metoprolol succinate ER 25 milliGRAM(s) Oral daily  pantoprazole    Tablet 40 milliGRAM(s) Oral before breakfast  polyethylene glycol 3350 17 Gram(s) Oral at bedtime    PRN Inpatient Medicationsacetaminophen     Tablet .. 650 milliGRAM(s) Oral every 6 hours PRN  senna 2 Tablet(s) Oral at bedtime PRN    REVIEW OF SYSTEMS  --------------------------------------------------------------------------------  Gen: No fevers/chills  Head/Eyes/Ears: No HA  Respiratory: No dyspnea, cough  CV: No chest pain  GI: No abdominal pain, diarrhea  : No dysuria, hematuria  MSK: +edema  Heme: No easy bruising or bleeding  Psych: No significant depression    All other systems were reviewed and are negative, except as noted.    VITALS/PHYSICAL EXAM  --------------------------------------------------------------------------------  T(C): 36.7 (01-14-25 @ 12:45), Max: 36.7 (01-14-25 @ 12:45)  HR: 75 (01-14-25 @ 12:45) (66 - 114)  BP: 107/65 (01-14-25 @ 12:45) (91/54 - 122/72)  RR: 19 (01-14-25 @ 12:45) (16 - 19)  SpO2: 93% (01-14-25 @ 12:45) (92% - 94%)  Wt(kg): --  Height (cm): 172.7 (01-14-25 @ 10:05)  Weight (kg): 108 (01-14-25 @ 10:05)  BMI (kg/m2): 36.2 (01-14-25 @ 10:05)  BSA (m2): 2.2 (01-14-25 @ 10:05)    01-13-25 @ 07:01  -  01-14-25 @ 07:00  --------------------------------------------------------  IN: 900 mL / OUT: 1800 mL / NET: -900 mL    Physical Exam:  Gen: NAD  HEENT: MMM  Pulm: Crackles/rales in bases B/L  CV: S1S2  Abd: Soft, +BS   Ext: B/L LE edema  Neuro: Awake  Skin: Warm and dry    LABS/STUDIES  --------------------------------------------------------------------------------              14.0   8.75  >-----------<  64       [01-14-25 @ 05:39]              41.9     139  |  105  |  21  ----------------------------<  104      [01-14-25 @ 05:39]  4.0   |  22  |  1.53        Ca     9.4     [01-14-25 @ 05:39]      Mg     2.2     [01-14-25 @ 05:39]      Phos  3.9     [01-14-25 @ 05:39]    TPro  6.4  /  Alb  3.8  /  TBili  1.2  /  DBili  x   /  AST  17  /  ALT  23  /  AlkPhos  64  [01-14-25 @ 05:39]    PT/INR: PT 15.2 , INR 1.33       [01-14-25 @ 05:39]  PTT: 35.1       [01-14-25 @ 05:39]    Creatinine Trend:  SCr 1.53 [01-14 @ 05:39]  SCr 1.54 [01-13 @ 06:02]  SCr 1.60 [01-12 @ 05:53]  SCr 1.61 [01-11 @ 06:19]  SCr 1.50 [01-10 @ 07:04]    Urine Creatinine 114      [01-11-25 @ 10:55]  Urine Sodium 33      [01-11-25 @ 10:55]  Urine Urea Nitrogen 707      [01-11-25 @ 10:55]
Reason for consult: ITP     HPI:  84-year-old male PMH afib on eliquid, severe aortic stenosis (2/2 bicuspid aortic valve), s/p aortic valve replacement (7/2012), HTN, pHTN, HLD, chronic renal insufficiency, thrombocytopenia, GERD presenting from Dr. Ramirez office for tachycardia.   Patient had COVID 2 weeks ago which he was treated with paxlovid for. Subsequently, the patient developed some wheezing and was treated with doxycycline and steroids from an urgent care with improvement in symptoms. The patient continued to feel weak however, and dyspneic on exertion. The patient notes 10 days of leg swelling, with dyspnea on exertion starting 3 days ago with inability to tolerate more than 2-3 steps (baseline does not ambulate much). Of note, the patient experienced some nausea a few days prior and vomited after getting into his car.   The patient went to visit his PCP today for the dyspnea and leg swelling; was noted to be tachycardic to 130 BPM and was sent to ER for further evaluation.   Pt did not take home bisoprolol and eliquis prior to admission. Pt denies chest pain, shortness of breath, n/v/d/, fevers or chills, urinary sx, headache, visual changes, numbness or other complaints.    ER Course: VS notable for sinus tachycardia to 125-128 on admission. Labs notable for thrombocytopenia to 70 (PMHx of thrombocytopenia), Troponin 54->59, Pro-BNP of 3624. RVP was negative, CXR: b/l lower atelectasis vs pneumonia, CTA: "Small bibasilar pleural effusions. Interlobular septal thickening, suggestive of interstitial edema." POCUS TTE: trace pericardial effusion, global LV hypokinesis, b/l lungs with B-lines and trace pleural effusions.  s/p apixaban 2.5mg PO, metoprolol succinate 100mg PO, lasix 20mg IV. Admitted to medicine for likely CHF exacerbation.    (08 Jan 2025 16:29)    Hematology/oncology consulted on this patient with a history of ITP with a platelet range between 79-85K, follows with Dr. [facility] at [facility]. Prior work up generally speaking unremarkable. He has no evidence of a monoclonal gammopathy. Flow cytometry was with no abnormal immunophenotype. No evidence of B12 or folic acid deficiency. He has mild hemolysis of unclear etiology, although there is no anemia. Direct Onofre was negative. His KENNA was slightly elevated at 1:160.     PAST MEDICAL & SURGICAL HISTORY:  Aortic Stenosis      GERD (Gastroesophageal Reflux Disease)      Hypercholesterolemia      Enlarged Prostate      HTN (hypertension)      Atrial fibrillation      H/O thrombocytopenia      Primary osteoarthritis of both knees      2019 novel coronavirus disease (COVID-19)      S/P TURP  2005      Retroperitoneal mass  s/p surgery- benign      S/P cardiac catheterization      S/P cholecystectomy      H/O aortic valve replacement      H/O hernia repair          FAMILY HISTORY:      Alochol: Denied  Smoking: Nonsmoker  Drug Use: Denied  Marital Status:         Allergies    No Known Allergies    Intolerances        MEDICATIONS  (STANDING):  aMIOdarone    Tablet 400 milliGRAM(s) Oral two times a day  apixaban 2.5 milliGRAM(s) Oral every 12 hours  atorvastatin 20 milliGRAM(s) Oral at bedtime  finasteride 5 milliGRAM(s) Oral daily  metoprolol succinate ER 25 milliGRAM(s) Oral daily  pantoprazole    Tablet 40 milliGRAM(s) Oral before breakfast  polyethylene glycol 3350 17 Gram(s) Oral at bedtime    MEDICATIONS  (PRN):  acetaminophen     Tablet .. 650 milliGRAM(s) Oral every 6 hours PRN Temp greater or equal to 38C (100.4F), Mild Pain (1 - 3)  senna 2 Tablet(s) Oral at bedtime PRN for constipation      ROS  No fever, sweats, chills  No epistaxis, HA, sore throat  No CP, SOB, cough, sputum  No n/v/d, abd pain, melena, hematochezia  No edema  No rash  No anxiety  No back pain, joint pain  No bleeding, bruising  No dysuria, hematuria    T(C): 36.6 (01-13-25 @ 04:42), Max: 36.8 (01-12-25 @ 11:06)  HR: 116 (01-13-25 @ 04:42) (74 - 116)  BP: 120/72 (01-13-25 @ 04:42) (101/51 - 120/72)  RR: 18 (01-13-25 @ 04:42) (18 - 18)  SpO2: 93% (01-13-25 @ 04:42) (93% - 96%)  Wt(kg): --    PE  NAD  Awake, alert  Anicteric, MMM  RRR  CTAB  Abd soft, NT, ND  No c/c/e  No rash grossly  FROM                        13.4   8.65  )-----------( 63       ( 13 Jan 2025 06:02 )             40.5     01-13    141  |  104  |  23  ----------------------------<  98  4.0   |  24  |  1.54[H]    Ca    9.4      13 Jan 2025 06:02  Phos  4.2     01-13  Mg     2.2     01-13    TPro  6.2  /  Alb  3.6  /  TBili  1.4[H]  /  DBili  x   /  AST  18  /  ALT  24  /  AlkPhos  61  01-13

## 2025-01-14 NOTE — PRE-ANESTHESIA EVALUATION ADULT - NSANTHPMHFT_GEN_ALL_CORE
84M with PMH s/f HTN, HL, hx severe AS (d/t bicuspid AV s/p AVR 2012), afib (on eliquis), pHTN, CKD, thrombocytopenia, GERD who presented with tachycardia and SOB. Now presents for DARA cardioversion.

## 2025-01-14 NOTE — PROGRESS NOTE ADULT - ATTENDING COMMENTS
Patient is a 84M w/pmh chronic atrial fibrillation, CKD 3a, severe aortic stenosis s/p aortic valve replacement (7/2012), HTN, pHTN, HLD, thrombocytopenia, GERD presenting from PCP's office for cc tachycardia, JOSEPH. Found to have volume overload and atrial fibrillation w/RVR.     1. Acute HFpEF  2. chronic atrial fibrillation w RVR  3. pHTN  4. PETER on CKD 3  5. Chronic thrombocytopenia    - Responded well to lasix 20mg IV BID, now appears euvolemic, now has PETER, so off diuretic. PETER not improving after multiple days, probably ATN, baseline Cr is 1.1-1.2. Obtain bladder scan, UA, urine protein/creatinine ratio, renal US, nephrology consult.  - s/p DARA/DCCV 1/14, now in normal sinus rhythm, continue amiodarone, metoprolol  - Echo does not show left sided heart failure, but has right sided reduced systolic function and elevated pulmonary pressures    Plan for DC home 1/15

## 2025-01-14 NOTE — PROGRESS NOTE ADULT - PROBLEM SELECTOR PLAN 1
-New PETER in setting of diuresis  -Creatinine: 1.24 -> 1.34 -> 1.50 -> 1.61 -> 1.60 -> 1.54 -> 1.53  -Urine studies: Na of 33, Nitrogen of 707, creatinine of 114    PLAN:  >Hold off on diuresis unless necessary  >c/w trending kidney function daily -New PETER in setting of diuresis  -Creatinine: 1.24 -> 1.34 -> 1.50 -> 1.61 -> 1.60 -> 1.54 -> 1.53  -Urine studies: Na of 33, Nitrogen of 707, creatinine of 114    PLAN:  >Hold off on diuresis unless necessary  >c/w trending kidney function daily  >f/u UA, repeat urine lytes, bladder/kidney US   >f/u nephro consult

## 2025-01-14 NOTE — PROGRESS NOTE ADULT - SUBJECTIVE AND OBJECTIVE BOX
SUBJECTIVE / OVERNIGHT EVENTS:  Pt seen and examined at bedside. 9 beats WCT. Pending DARA/Cardioversion today.    MEDICATIONS  (STANDING):  aMIOdarone    Tablet 400 milliGRAM(s) Oral two times a day  apixaban 2.5 milliGRAM(s) Oral every 12 hours  atorvastatin 20 milliGRAM(s) Oral at bedtime  famotidine    Tablet 20 milliGRAM(s) Oral at bedtime  finasteride 5 milliGRAM(s) Oral daily  metoprolol succinate ER 25 milliGRAM(s) Oral daily  pantoprazole    Tablet 40 milliGRAM(s) Oral before breakfast  polyethylene glycol 3350 17 Gram(s) Oral at bedtime    MEDICATIONS  (PRN):  acetaminophen     Tablet .. 650 milliGRAM(s) Oral every 6 hours PRN Temp greater or equal to 38C (100.4F), Mild Pain (1 - 3)  senna 2 Tablet(s) Oral at bedtime PRN for constipation        01-13-25 @ 07:01  -  01-14-25 @ 07:00  --------------------------------------------------------  IN: 900 mL / OUT: 1800 mL / NET: -900 mL        PHYSICAL EXAM:  Vital Signs Last 24 Hrs  T(C): 36.5 (14 Jan 2025 04:17), Max: 36.8 (13 Jan 2025 11:41)  T(F): 97.7 (14 Jan 2025 04:17), Max: 98.2 (13 Jan 2025 11:41)  HR: 113 (14 Jan 2025 04:17) (93 - 114)  BP: 122/72 (14 Jan 2025 04:17) (104/63 - 122/72)  BP(mean): --  RR: 18 (14 Jan 2025 04:17) (18 - 18)  SpO2: 94% (14 Jan 2025 04:17) (93% - 94%)    Parameters below as of 14 Jan 2025 04:17  Patient On (Oxygen Delivery Method): room air        CAPILLARY BLOOD GLUCOSE        I&O's Summary    13 Jan 2025 07:01  -  14 Jan 2025 07:00  --------------------------------------------------------  IN: 900 mL / OUT: 1800 mL / NET: -900 mL    Gen: NAD, AOx3, able to make needs known, non-toxic  Head: NCAT  HEENT: EOMI, oral mucosa moist, normal conjunctiva, +JVD  Lung: CTAB, no respiratory distress, no wheezes/rhonchi/rales B/L, speaking in full sentences, 94 % on RA  CV: tachycardic, pitting edema b/l lower extremities up to middle of legs  Abd: non distended, soft, nontender, no guarding, no CVA tenderness  MSK: no visible deformities  Neuro: Appears non focal  Skin: Warm, well perfused, no rash  Psych: normal affect    LABS:                        14.0   8.75  )-----------( 64       ( 14 Jan 2025 05:39 )             41.9     01-14    139  |  105  |  21  ----------------------------<  104[H]  4.0   |  22  |  1.53[H]    Ca    9.4      14 Jan 2025 05:39  Phos  3.9     01-14  Mg     2.2     01-14    TPro  6.4  /  Alb  3.8  /  TBili  1.2  /  DBili  x   /  AST  17  /  ALT  23  /  AlkPhos  64  01-14    PT/INR - ( 14 Jan 2025 05:39 )   PT: 15.2 sec;   INR: 1.33 ratio         PTT - ( 14 Jan 2025 05:39 )  PTT:35.1 sec      Urinalysis Basic - ( 14 Jan 2025 05:39 )    Color: x / Appearance: x / SG: x / pH: x  Gluc: 104 mg/dL / Ketone: x  / Bili: x / Urobili: x   Blood: x / Protein: x / Nitrite: x   Leuk Esterase: x / RBC: x / WBC x   Sq Epi: x / Non Sq Epi: x / Bacteria: x          IMAGING:    [X] All pertinent imaging reviewed by me

## 2025-01-15 DIAGNOSIS — E87.1 HYPO-OSMOLALITY AND HYPONATREMIA: ICD-10-CM

## 2025-01-15 LAB
ANION GAP SERPL CALC-SCNC: 13 MMOL/L — SIGNIFICANT CHANGE UP (ref 5–17)
ANION GAP SERPL CALC-SCNC: 15 MMOL/L — SIGNIFICANT CHANGE UP (ref 5–17)
ANION GAP SERPL CALC-SCNC: 16 MMOL/L — SIGNIFICANT CHANGE UP (ref 5–17)
APPEARANCE UR: ABNORMAL
BACTERIA # UR AUTO: ABNORMAL /HPF
BASOPHILS # BLD AUTO: 0.1 K/UL — SIGNIFICANT CHANGE UP (ref 0–0.2)
BASOPHILS NFR BLD AUTO: 0.9 % — SIGNIFICANT CHANGE UP (ref 0–2)
BILIRUB UR-MCNC: ABNORMAL
BUN SERPL-MCNC: 34 MG/DL — HIGH (ref 7–23)
BUN SERPL-MCNC: 36 MG/DL — HIGH (ref 7–23)
BUN SERPL-MCNC: 38 MG/DL — HIGH (ref 7–23)
CALCIUM SERPL-MCNC: 8.6 MG/DL — SIGNIFICANT CHANGE UP (ref 8.4–10.5)
CALCIUM SERPL-MCNC: 8.8 MG/DL — SIGNIFICANT CHANGE UP (ref 8.4–10.5)
CALCIUM SERPL-MCNC: 8.9 MG/DL — SIGNIFICANT CHANGE UP (ref 8.4–10.5)
CAST: 32 /LPF — HIGH (ref 0–4)
CHLORIDE SERPL-SCNC: 91 MMOL/L — LOW (ref 96–108)
CHLORIDE SERPL-SCNC: 93 MMOL/L — LOW (ref 96–108)
CHLORIDE SERPL-SCNC: 95 MMOL/L — LOW (ref 96–108)
CO2 SERPL-SCNC: 18 MMOL/L — LOW (ref 22–31)
CO2 SERPL-SCNC: 19 MMOL/L — LOW (ref 22–31)
CO2 SERPL-SCNC: 20 MMOL/L — LOW (ref 22–31)
COLOR SPEC: SIGNIFICANT CHANGE UP
CREAT ?TM UR-MCNC: 261 MG/DL — SIGNIFICANT CHANGE UP
CREAT SERPL-MCNC: 2.69 MG/DL — HIGH (ref 0.5–1.3)
CREAT SERPL-MCNC: 2.74 MG/DL — HIGH (ref 0.5–1.3)
CREAT SERPL-MCNC: 2.86 MG/DL — HIGH (ref 0.5–1.3)
DIFF PNL FLD: NEGATIVE — SIGNIFICANT CHANGE UP
EGFR: 21 ML/MIN/1.73M2 — LOW
EGFR: 22 ML/MIN/1.73M2 — LOW
EGFR: 23 ML/MIN/1.73M2 — LOW
EOSINOPHIL # BLD AUTO: 0 K/UL — SIGNIFICANT CHANGE UP (ref 0–0.5)
EOSINOPHIL NFR BLD AUTO: 0 % — SIGNIFICANT CHANGE UP (ref 0–6)
FINE GRAN CASTS #/AREA URNS AUTO: PRESENT
GLUCOSE SERPL-MCNC: 119 MG/DL — HIGH (ref 70–99)
GLUCOSE SERPL-MCNC: 126 MG/DL — HIGH (ref 70–99)
GLUCOSE SERPL-MCNC: 138 MG/DL — HIGH (ref 70–99)
GLUCOSE UR QL: NEGATIVE MG/DL — SIGNIFICANT CHANGE UP
HCT VFR BLD CALC: 43.6 % — SIGNIFICANT CHANGE UP (ref 39–50)
HGB BLD-MCNC: 14.1 G/DL — SIGNIFICANT CHANGE UP (ref 13–17)
KETONES UR-MCNC: NEGATIVE MG/DL — SIGNIFICANT CHANGE UP
LEUKOCYTE ESTERASE UR-ACNC: NEGATIVE — SIGNIFICANT CHANGE UP
LYMPHOCYTES # BLD AUTO: 1.91 K/UL — SIGNIFICANT CHANGE UP (ref 1–3.3)
LYMPHOCYTES # BLD AUTO: 17.3 % — SIGNIFICANT CHANGE UP (ref 13–44)
MAGNESIUM SERPL-MCNC: 2.1 MG/DL — SIGNIFICANT CHANGE UP (ref 1.6–2.6)
MANUAL SMEAR VERIFICATION: SIGNIFICANT CHANGE UP
MCHC RBC-ENTMCNC: 31.6 PG — SIGNIFICANT CHANGE UP (ref 27–34)
MCHC RBC-ENTMCNC: 32.3 G/DL — SIGNIFICANT CHANGE UP (ref 32–36)
MCV RBC AUTO: 97.8 FL — SIGNIFICANT CHANGE UP (ref 80–100)
MONOCYTES # BLD AUTO: 3.21 K/UL — HIGH (ref 0–0.9)
MONOCYTES NFR BLD AUTO: 29.1 % — HIGH (ref 2–14)
MYELOCYTES NFR BLD: 0.9 % — HIGH (ref 0–0)
NEUTROPHILS # BLD AUTO: 5.71 K/UL — SIGNIFICANT CHANGE UP (ref 1.8–7.4)
NEUTROPHILS NFR BLD AUTO: 49.1 % — SIGNIFICANT CHANGE UP (ref 43–77)
NEUTS BAND # BLD: 2.7 % — SIGNIFICANT CHANGE UP (ref 0–8)
NEUTS BAND NFR BLD: 2.7 % — SIGNIFICANT CHANGE UP (ref 0–8)
NITRITE UR-MCNC: POSITIVE
NRBC # BLD: 1 /100 WBCS — HIGH (ref 0–0)
NRBC BLD-RTO: 1 /100 WBCS — HIGH (ref 0–0)
OSMOLALITY UR: 489 MOS/KG — SIGNIFICANT CHANGE UP (ref 300–900)
PH UR: 5.5 — SIGNIFICANT CHANGE UP (ref 5–8)
PHOSPHATE SERPL-MCNC: 5.2 MG/DL — HIGH (ref 2.5–4.5)
PLAT MORPH BLD: NORMAL — SIGNIFICANT CHANGE UP
PLATELET # BLD AUTO: 62 K/UL — LOW (ref 150–400)
POTASSIUM SERPL-MCNC: 4.4 MMOL/L — SIGNIFICANT CHANGE UP (ref 3.5–5.3)
POTASSIUM SERPL-MCNC: 4.6 MMOL/L — SIGNIFICANT CHANGE UP (ref 3.5–5.3)
POTASSIUM SERPL-MCNC: 5 MMOL/L — SIGNIFICANT CHANGE UP (ref 3.5–5.3)
POTASSIUM SERPL-SCNC: 4.4 MMOL/L — SIGNIFICANT CHANGE UP (ref 3.5–5.3)
POTASSIUM SERPL-SCNC: 4.6 MMOL/L — SIGNIFICANT CHANGE UP (ref 3.5–5.3)
POTASSIUM SERPL-SCNC: 5 MMOL/L — SIGNIFICANT CHANGE UP (ref 3.5–5.3)
POTASSIUM UR-SCNC: 70 MMOL/L — SIGNIFICANT CHANGE UP
PROT ?TM UR-MCNC: 155 MG/DL — HIGH (ref 0–12)
PROT UR-MCNC: 300 MG/DL
PROT/CREAT UR-RTO: 0.6 RATIO — HIGH (ref 0–0.2)
RBC # BLD: 4.46 M/UL — SIGNIFICANT CHANGE UP (ref 4.2–5.8)
RBC # FLD: 13.5 % — SIGNIFICANT CHANGE UP (ref 10.3–14.5)
RBC BLD AUTO: SIGNIFICANT CHANGE UP
RBC CASTS # UR COMP ASSIST: 2 /HPF — SIGNIFICANT CHANGE UP (ref 0–4)
REVIEW: SIGNIFICANT CHANGE UP
SODIUM SERPL-SCNC: 126 MMOL/L — LOW (ref 135–145)
SODIUM SERPL-SCNC: 126 MMOL/L — LOW (ref 135–145)
SODIUM SERPL-SCNC: 128 MMOL/L — LOW (ref 135–145)
SODIUM UR-SCNC: 31 MMOL/L — SIGNIFICANT CHANGE UP
SP GR SPEC: 1.02 — SIGNIFICANT CHANGE UP (ref 1–1.03)
SQUAMOUS # UR AUTO: 11 /HPF — HIGH (ref 0–5)
UROBILINOGEN FLD QL: 1 MG/DL — SIGNIFICANT CHANGE UP (ref 0.2–1)
UUN UR-MCNC: 686 MG/DL — SIGNIFICANT CHANGE UP
WBC # BLD: 11.03 K/UL — HIGH (ref 3.8–10.5)
WBC # FLD AUTO: 11.03 K/UL — HIGH (ref 3.8–10.5)
WBC UR QL: 1 /HPF — SIGNIFICANT CHANGE UP (ref 0–5)

## 2025-01-15 PROCEDURE — 99233 SBSQ HOSP IP/OBS HIGH 50: CPT | Mod: GC

## 2025-01-15 RX ORDER — SEVELAMER CARBONATE 800 MG/1
800 TABLET, FILM COATED ORAL ONCE
Refills: 0 | Status: COMPLETED | OUTPATIENT
Start: 2025-01-15 | End: 2025-01-15

## 2025-01-15 RX ORDER — BUMETANIDE 2 MG/1
2 TABLET ORAL ONCE
Refills: 0 | Status: COMPLETED | OUTPATIENT
Start: 2025-01-15 | End: 2025-01-15

## 2025-01-15 RX ORDER — MAGNESIUM, ALUMINUM HYDROXIDE 200-225/5
30 SUSPENSION, ORAL (FINAL DOSE FORM) ORAL EVERY 6 HOURS
Refills: 0 | Status: DISCONTINUED | OUTPATIENT
Start: 2025-01-15 | End: 2025-01-23

## 2025-01-15 RX ORDER — ONDANSETRON 4 MG/1
4 TABLET, ORALLY DISINTEGRATING ORAL ONCE
Refills: 0 | Status: COMPLETED | OUTPATIENT
Start: 2025-01-15 | End: 2025-01-15

## 2025-01-15 RX ORDER — ONDANSETRON 4 MG/1
4 TABLET, ORALLY DISINTEGRATING ORAL EVERY 6 HOURS
Refills: 0 | Status: DISCONTINUED | OUTPATIENT
Start: 2025-01-15 | End: 2025-01-23

## 2025-01-15 RX ADMIN — AMIODARONE HYDROCHLORIDE 400 MILLIGRAM(S): 50 INJECTION, SOLUTION INTRAVENOUS at 17:35

## 2025-01-15 RX ADMIN — APIXABAN 2.5 MILLIGRAM(S): 5 TABLET, FILM COATED ORAL at 05:13

## 2025-01-15 RX ADMIN — ATORVASTATIN CALCIUM 20 MILLIGRAM(S): 80 TABLET, FILM COATED ORAL at 21:09

## 2025-01-15 RX ADMIN — POLYETHYLENE GLYCOL 3350 17 GRAM(S): 17 POWDER, FOR SOLUTION ORAL at 21:09

## 2025-01-15 RX ADMIN — Medication 30 MILLILITER(S): at 00:44

## 2025-01-15 RX ADMIN — ONDANSETRON 4 MILLIGRAM(S): 4 TABLET, ORALLY DISINTEGRATING ORAL at 05:12

## 2025-01-15 RX ADMIN — AMIODARONE HYDROCHLORIDE 400 MILLIGRAM(S): 50 INJECTION, SOLUTION INTRAVENOUS at 05:14

## 2025-01-15 RX ADMIN — SEVELAMER CARBONATE 800 MILLIGRAM(S): 800 TABLET, FILM COATED ORAL at 08:57

## 2025-01-15 RX ADMIN — Medication 25 MILLIGRAM(S): at 05:13

## 2025-01-15 RX ADMIN — Medication 5 MILLIGRAM(S): at 21:09

## 2025-01-15 RX ADMIN — FAMOTIDINE 20 MILLIGRAM(S): 10 INJECTION INTRAVENOUS at 21:09

## 2025-01-15 RX ADMIN — APIXABAN 2.5 MILLIGRAM(S): 5 TABLET, FILM COATED ORAL at 17:35

## 2025-01-15 RX ADMIN — PANTOPRAZOLE 40 MILLIGRAM(S): 20 TABLET, DELAYED RELEASE ORAL at 05:13

## 2025-01-15 RX ADMIN — BUMETANIDE 2 MILLIGRAM(S): 2 TABLET ORAL at 11:07

## 2025-01-15 NOTE — PROGRESS NOTE ADULT - SUBJECTIVE AND OBJECTIVE BOX
SUBJECTIVE / OVERNIGHT EVENTS:  Pt seen and examined at bedside. s/p DARA+Cardioversion day 1, complained of abdominal pain, nausea, vomiting overnight -> Given GI cocktail overnight.    MEDICATIONS  (STANDING):  aMIOdarone    Tablet 400 milliGRAM(s) Oral two times a day  apixaban 2.5 milliGRAM(s) Oral every 12 hours  atorvastatin 20 milliGRAM(s) Oral at bedtime  famotidine    Tablet 20 milliGRAM(s) Oral at bedtime  finasteride 5 milliGRAM(s) Oral daily  metoprolol succinate ER 25 milliGRAM(s) Oral daily  pantoprazole    Tablet 40 milliGRAM(s) Oral before breakfast  polyethylene glycol 3350 17 Gram(s) Oral at bedtime    MEDICATIONS  (PRN):  acetaminophen     Tablet .. 650 milliGRAM(s) Oral every 6 hours PRN Temp greater or equal to 38C (100.4F), Mild Pain (1 - 3)  aluminum hydroxide/magnesium hydroxide/simethicone Suspension 30 milliLiter(s) Oral every 6 hours PRN Dyspepsia  senna 2 Tablet(s) Oral at bedtime PRN for constipation        01-14-25 @ 07:01  -  01-15-25 @ 07:00  --------------------------------------------------------  IN: 240 mL / OUT: 0 mL / NET: 240 mL        PHYSICAL EXAM:  Vital Signs Last 24 Hrs  T(C): 36.1 (15 Jovanny 2025 04:10), Max: 37 (15 Jovanny 2025 00:01)  T(F): 96.9 (15 Jovanny 2025 04:10), Max: 98.6 (15 Jovanny 2025 00:01)  HR: 65 (15 Jovanny 2025 04:10) (64 - 113)  BP: 113/51 (15 Jovanny 2025 04:10) (91/54 - 122/72)  BP(mean): 72 (14 Jan 2025 21:14) (66 - 72)  RR: 20 (15 Jovanny 2025 04:10) (16 - 20)  SpO2: 90% (15 Jovanny 2025 04:10) (90% - 94%)    Parameters below as of 15 Jovanny 2025 04:10  Patient On (Oxygen Delivery Method): room air        CAPILLARY BLOOD GLUCOSE        I&O's Summary    14 Jan 2025 07:01  -  15 Jovanny 2025 07:00  --------------------------------------------------------  IN: 240 mL / OUT: 0 mL / NET: 240 mL    Gen: NAD, AOx3, able to make needs known, non-toxic  Head: NCAT  HEENT: EOMI, oral mucosa moist, normal conjunctiva, +JVD  Lung: CTAB, no respiratory distress, no wheezes/rhonchi/rales B/L, speaking in full sentences, 94 % on RA  CV: normal rate and rhythm, pitting edema b/l lower extremities up to middle of legs  Abd: non distended, soft, nontender, no guarding, no CVA tenderness  MSK: no visible deformities  Neuro: Appears non focal  Skin: Warm, well perfused, no rash  Psych: normal affect    LABS:                        14.1   11.03 )-----------( x        ( 15 Jovanny 2025 06:37 )             43.6     01-14    139  |  105  |  21  ----------------------------<  104[H]  4.0   |  22  |  1.53[H]    Ca    9.4      14 Jan 2025 05:39  Phos  3.9     01-14  Mg     2.2     01-14    TPro  6.4  /  Alb  3.8  /  TBili  1.2  /  DBili  x   /  AST  17  /  ALT  23  /  AlkPhos  64  01-14    PT/INR - ( 14 Jan 2025 05:39 )   PT: 15.2 sec;   INR: 1.33 ratio         PTT - ( 14 Jan 2025 05:39 )  PTT:35.1 sec      Urinalysis Basic - ( 14 Jan 2025 05:39 )    Color: x / Appearance: x / SG: x / pH: x  Gluc: 104 mg/dL / Ketone: x  / Bili: x / Urobili: x   Blood: x / Protein: x / Nitrite: x   Leuk Esterase: x / RBC: x / WBC x   Sq Epi: x / Non Sq Epi: x / Bacteria: x          IMAGING:    [X] All pertinent imaging reviewed by me SUBJECTIVE / OVERNIGHT EVENTS:  Pt seen and examined at bedside. s/p DARA+Cardioversion day 1, complained of abdominal pain, nausea, vomiting overnight -> Given GI cocktail overnight. This morning, is complaining of lethargy, orthopnea with associated cough, exertional dyspnea, and loss of appetite.    MEDICATIONS  (STANDING):  aMIOdarone    Tablet 400 milliGRAM(s) Oral two times a day  apixaban 2.5 milliGRAM(s) Oral every 12 hours  atorvastatin 20 milliGRAM(s) Oral at bedtime  famotidine    Tablet 20 milliGRAM(s) Oral at bedtime  finasteride 5 milliGRAM(s) Oral daily  metoprolol succinate ER 25 milliGRAM(s) Oral daily  pantoprazole    Tablet 40 milliGRAM(s) Oral before breakfast  polyethylene glycol 3350 17 Gram(s) Oral at bedtime    MEDICATIONS  (PRN):  acetaminophen     Tablet .. 650 milliGRAM(s) Oral every 6 hours PRN Temp greater or equal to 38C (100.4F), Mild Pain (1 - 3)  aluminum hydroxide/magnesium hydroxide/simethicone Suspension 30 milliLiter(s) Oral every 6 hours PRN Dyspepsia  senna 2 Tablet(s) Oral at bedtime PRN for constipation        01-14-25 @ 07:01  -  01-15-25 @ 07:00  --------------------------------------------------------  IN: 240 mL / OUT: 0 mL / NET: 240 mL        PHYSICAL EXAM:  Vital Signs Last 24 Hrs  T(C): 36.1 (15 Jovanny 2025 04:10), Max: 37 (15 Jovanny 2025 00:01)  T(F): 96.9 (15 Jovanny 2025 04:10), Max: 98.6 (15 Jovanny 2025 00:01)  HR: 65 (15 Jovanny 2025 04:10) (64 - 113)  BP: 113/51 (15 Jovanny 2025 04:10) (91/54 - 122/72)  BP(mean): 72 (14 Jan 2025 21:14) (66 - 72)  RR: 20 (15 Jovanny 2025 04:10) (16 - 20)  SpO2: 90% (15 Jovanny 2025 04:10) (90% - 94%)    Parameters below as of 15 Jovanny 2025 04:10  Patient On (Oxygen Delivery Method): room air        CAPILLARY BLOOD GLUCOSE        I&O's Summary    14 Jan 2025 07:01  -  15 Jovanny 2025 07:00  --------------------------------------------------------  IN: 240 mL / OUT: 0 mL / NET: 240 mL    Gen: NAD, AOx3, able to make needs known, non-toxic  Head: NCAT  HEENT: EOMI, oral mucosa moist, normal conjunctiva, +JVD  Lung: CTAB, no respiratory distress, rales b/l, speaking in full sentences, 94 % on RA  CV: normal rate and rhythm, pitting edema b/l lower extremities up to middle of legs  Abd: non distended, soft, nontender, no guarding, no CVA tenderness  MSK: no visible deformities  Neuro: Appears non focal  Skin: Warm, well perfused, no rash  Psych: normal affect    LABS:                        14.1   11.03 )-----------( x        ( 15 Jovanny 2025 06:37 )             43.6     01-14    139  |  105  |  21  ----------------------------<  104[H]  4.0   |  22  |  1.53[H]    Ca    9.4      14 Jan 2025 05:39  Phos  3.9     01-14  Mg     2.2     01-14    TPro  6.4  /  Alb  3.8  /  TBili  1.2  /  DBili  x   /  AST  17  /  ALT  23  /  AlkPhos  64  01-14    PT/INR - ( 14 Jan 2025 05:39 )   PT: 15.2 sec;   INR: 1.33 ratio         PTT - ( 14 Jan 2025 05:39 )  PTT:35.1 sec      Urinalysis Basic - ( 14 Jan 2025 05:39 )    Color: x / Appearance: x / SG: x / pH: x  Gluc: 104 mg/dL / Ketone: x  / Bili: x / Urobili: x   Blood: x / Protein: x / Nitrite: x   Leuk Esterase: x / RBC: x / WBC x   Sq Epi: x / Non Sq Epi: x / Bacteria: x          IMAGING:    [X] All pertinent imaging reviewed by me

## 2025-01-15 NOTE — PROGRESS NOTE ADULT - SUBJECTIVE AND OBJECTIVE BOX
[facility] Division of Kidney Diseases & Hypertension  FOLLOW UP NOTE  [phone]--------------------------------------------------------------------------------    Chief Complaint: PETER on CKD    24 hour events/subjective: Patient seen and examined today. Feels nauseous, mild SOB, decreased UOP, and LE edema. Dizziness improved. Denies HA, fevers/chills, CP, abdominal pain.     PAST HISTORY  --------------------------------------------------------------------------------  No significant changes to PMH, PSH, FHx, SHx, unless otherwise noted    ALLERGIES & MEDICATIONS  --------------------------------------------------------------------------------  Allergies  No Known Allergies    Intolerances    Standing Inpatient Medications  aMIOdarone    Tablet 400 milliGRAM(s) Oral two times a day  apixaban 2.5 milliGRAM(s) Oral every 12 hours  atorvastatin 20 milliGRAM(s) Oral at bedtime  famotidine    Tablet 20 milliGRAM(s) Oral at bedtime  finasteride 5 milliGRAM(s) Oral daily  metoprolol succinate ER 25 milliGRAM(s) Oral daily  pantoprazole    Tablet 40 milliGRAM(s) Oral before breakfast  polyethylene glycol 3350 17 Gram(s) Oral at bedtime    PRN Inpatient Medications  acetaminophen     Tablet .. 650 milliGRAM(s) Oral every 6 hours PRN  aluminum hydroxide/magnesium hydroxide/simethicone Suspension 30 milliLiter(s) Oral every 6 hours PRN  ondansetron Injectable 4 milliGRAM(s) IV Push every 6 hours PRN  senna 2 Tablet(s) Oral at bedtime PRN    REVIEW OF SYSTEMS  --------------------------------------------------------------------------------  Gen: No fevers/chills  Head/Eyes/Ears: No HA  Respiratory: Mild SOB  CV: No chest pain  GI: No abdominal pain, diarrhea, +nausea  : No dysuria, hematuria  MSK: +edema  Heme: No easy bruising or bleeding  Psych: No significant depression    All other systems were reviewed and are negative, except as noted.    VITALS/PHYSICAL EXAM  --------------------------------------------------------------------------------  T(C): 36.1 (01-15-25 @ 04:10), Max: 37 (01-15-25 @ 00:01)  HR: 65 (01-15-25 @ 04:10) (64 - 75)  BP: 113/51 (01-15-25 @ 04:10) (92/54 - 120/62)  RR: 20 (01-15-25 @ 04:10) (16 - 20)  SpO2: 90% (01-15-25 @ 04:10) (90% - 94%)  Wt(kg): --  Height (cm): 172.7 (01-14-25 @ 10:05)  Weight (kg): 108 (01-14-25 @ 10:05)  BMI (kg/m2): 36.2 (01-14-25 @ 10:05)  BSA (m2): 2.2 (01-14-25 @ 10:05)    01-14-25 @ 07:01  -  01-15-25 @ 07:00  --------------------------------------------------------  IN: 240 mL / OUT: 0 mL / NET: 240 mL    01-15-25 @ 07:01  -  01-15-25 @ 11:20  --------------------------------------------------------  IN: 240 mL / OUT: 150 mL / NET: 90 mL    Physical Exam:  Gen: NAD  HEENT: MMM  Pulm: Crackles/rales in bases B/L  CV: S1S2  Abd: Soft, +BS   Ext: B/L LE edema  Neuro: Awake  Skin: Warm and dry    LABS/STUDIES  --------------------------------------------------------------------------------              14.1   11.03 >-----------<  62       [01-15-25 @ 06:37]              43.6     126  |  93  |  36  ----------------------------<  138      [01-15-25 @ 09:44]  5.0   |  20  |  2.86        Ca     8.6     [01-15-25 @ 09:44]      Mg     2.1     [01-15-25 @ 06:37]      Phos  5.2     [01-15-25 @ 06:37]    TPro  6.4  /  Alb  3.8  /  TBili  1.2  /  DBili  x   /  AST  17  /  ALT  23  /  AlkPhos  64  [01-14-25 @ 05:39]    PT/INR: PT 15.2 , INR 1.33       [01-14-25 @ 05:39]  PTT: 35.1       [01-14-25 @ 05:39]    Creatinine Trend:  SCr 2.86 [01-15 @ 09:44]  SCr 2.74 [01-15 @ 06:37]  SCr 1.53 [01-14 @ 05:39]  SCr 1.54 [01-13 @ 06:02]  SCr 1.60 [01-12 @ 05:53]    Urine Creatinine 261      [01-15-25 @ 07:44]  Urine Protein 155      [01-15-25 @ 07:44]  Urine Sodium 31      [01-15-25 @ 07:44]  Urine Urea Nitrogen 707      [01-11-25 @ 10:55]  Urine Potassium 70      [01-15-25 @ 07:44]  Urine Osmolality 489      [01-15-25 @ 07:43]

## 2025-01-15 NOTE — PROGRESS NOTE ADULT - PROBLEM SELECTOR PLAN 1
-New PETER in setting of diuresis  -Creatinine: 1.24 -> 1.34 -> 1.50 -> 1.61 -> 1.60 -> 1.54 -> 1.53  -Urine studies: Na of 33, Nitrogen of 707, creatinine of 114; FeUrea suggestive of intrinsic renal disease  -Kidney/Bladder US: "No hydronephrosis. Increased renal cortical echogenicity, compatible with renal   parenchymal disease. 0.9 x 0.5 x 1.1 cm right upper pole nonobstructing stone, similarly seen on CT from 1/17/2018. Left upper pole cyst measuring 2.6 x 1.9 x 2.8 cm with rim calcifications is similarly seen on CT abdomen pelvis from 1/17/2018. Additional bilateral simple cysts as above."  -Ddx: ATN vs pre-renal    PLAN:  >Hold off on diuresis unless necessary  >c/w trending kidney function daily  >f/u UA, repeat urine lytes  >f/u nephro recs -New PETER in setting of diuresis  -Creatinine: 1.24 -> 1.34 -> 1.50 -> 1.61 -> 1.60 -> 1.54 -> 1.53 -> 2.74  -Urine studies: Na of 33, Nitrogen of 707, creatinine of 114; FeUrea suggestive of intrinsic renal disease  -Urine studies (1/15): Na of 31, creatinine of 261, p/c ratio: 0.6, urine osmol: 489; FeNa suggestive of pre-renal  -Kidney/Bladder US: "No hydronephrosis. Increased renal cortical echogenicity, compatible with renal   parenchymal disease. 0.9 x 0.5 x 1.1 cm right upper pole nonobstructing stone, similarly seen on CT from 1/17/2018. Left upper pole cyst measuring 2.6 x 1.9 x 2.8 cm with rim calcifications is similarly seen on CT abdomen pelvis from 1/17/2018. Additional bilateral simple cysts as above."  -Ddx: ATN vs pre-renal    PLAN:  >Will consider lasix/bumex  >Repeat BMP  >c/w trending kidney function daily  >f/u nephro recs

## 2025-01-15 NOTE — PROGRESS NOTE ADULT - PROBLEM SELECTOR PLAN 7
-Per family is supposed to be on home PPI but patient does not take    PLAN:  >c/w Pantoprazole 40mg QD pre-breakfast   >c/w famotidine 20mg QHS  >f/u outpatient with GI -Labs notable for thrombocytopenia to 70 on admission (70-80 baseline platelets)  -Per outpatient oncologist, has known ITP    PLAN:  >c/w monitoring daily in the setting of amiodarone initiation  >Hold eliquis if Plt <50  >f/u hematology recs

## 2025-01-15 NOTE — PROGRESS NOTE ADULT - ASSESSMENT
84-year-old male PMH afib on eliquid, severe aortic stenosis (2/2 bicuspid aortic valve), s/p aortic valve replacement (7/2012), HTN, pHTN, HLD, chronic renal insufficiency, ITP, GERD admitted to medicine for likely CHF exacerbation c/b PETER iso diuresis, AFib RVR s/p DARA/cardioversion

## 2025-01-15 NOTE — PROGRESS NOTE ADULT - SUBJECTIVE AND OBJECTIVE BOX
Date of Service: 01-15-25 @ 11:05           CARDIOLOGY     PROGRESS  NOTE   ________________________________________________    CHIEF COMPLAINT:Patient is a 84y old  Male who presents with a chief complaint of CHF (13 Jan 2025 10:22)    	  REVIEW OF SYSTEMS:  CONSTITUTIONAL: No fever, weight loss, or fatigue  EYES: No eye pain, visual disturbances, or discharge  ENT:  No difficulty hearing, tinnitus, vertigo; No sinus or throat pain  NECK: No pain or stiffness  RESPIRATORY: No cough, wheezing, chills or hemoptysis; No Shortness of Breath  CARDIOVASCULAR: No chest pain, palpitations, passing out, dizziness, or leg swelling  GASTROINTESTINAL: No abdominal or epigastric pain. No nausea, vomiting, or hematemesis; No diarrhea or constipation. No melena or hematochezia.  GENITOURINARY: No dysuria, frequency, hematuria, or incontinence  NEUROLOGICAL: No headaches, memory loss, loss of strength, numbness, or tremors  SKIN: No itching, burning, rashes, or lesions   LYMPH Nodes: No enlarged glands  ENDOCRINE: No heat or cold intolerance; No hair loss  MUSCULOSKELETAL: No joint pain or swelling; No muscle, back, or extremity pain  PSYCHIATRIC: No depression, anxiety, mood swings, or difficulty sleeping  HEME/LYMPH: No easy bruising, or bleeding gums  ALLERGY AND IMMUNOLOGIC: No hives or eczema	    [ ] All others negative	  [ ] Unable to obtain    PHYSICAL EXAM:  T(C): 36.1 (01-15-25 @ 04:10), Max: 37 (01-15-25 @ 00:01)  HR: 65 (01-15-25 @ 04:10) (64 - 75)  BP: 113/51 (01-15-25 @ 04:10) (91/54 - 120/62)  RR: 20 (01-15-25 @ 04:10) (16 - 20)  SpO2: 90% (01-15-25 @ 04:10) (90% - 94%)  Wt(kg): --  I&O's Summary    14 Jan 2025 07:01  -  15 Jovanny 2025 07:00  --------------------------------------------------------  IN: 240 mL / OUT: 0 mL / NET: 240 mL    15 Jovanny 2025 07:01  -  15 Jovanny 2025 11:05  --------------------------------------------------------  IN: 240 mL / OUT: 150 mL / NET: 90 mL        Appearance: Normal	  HEENT:   Normal oral mucosa, PERRL, EOMI	  Lymphatic: No lymphadenopathy  Cardiovascular: Normal S1 S2, No JVD, No murmurs, No edema  Respiratory: Lungs clear to auscultation	  Psychiatry: A & O x 3, Mood & affect appropriate  Gastrointestinal:  Soft, Non-tender, + BS	  Skin: No rashes, No ecchymoses, No cyanosis	  Neurologic: Non-focal  Extremities: Normal range of motion, No clubbing, cyanosis or edema  Vascular: Peripheral pulses palpable 2+ bilaterally    MEDICATIONS  (STANDING):  aMIOdarone    Tablet 400 milliGRAM(s) Oral two times a day  apixaban 2.5 milliGRAM(s) Oral every 12 hours  atorvastatin 20 milliGRAM(s) Oral at bedtime  buMETAnide Injectable 2 milliGRAM(s) IV Push once  famotidine    Tablet 20 milliGRAM(s) Oral at bedtime  finasteride 5 milliGRAM(s) Oral daily  metoprolol succinate ER 25 milliGRAM(s) Oral daily  pantoprazole    Tablet 40 milliGRAM(s) Oral before breakfast  polyethylene glycol 3350 17 Gram(s) Oral at bedtime      TELEMETRY: 	    ECG:  	  RADIOLOGY:  OTHER: 	  	  LABS:	 	    CARDIAC MARKERS:                                14.1   11.03 )-----------( 62       ( 15 Jovanny 2025 06:37 )             43.6     01-15    126[L]  |  93[L]  |  36[H]  ----------------------------<  138[H]  5.0   |  20[L]  |  2.86[H]    Ca    8.6      15 Jovanny 2025 09:44  Phos  5.2     01-15  Mg     2.1     01-15    TPro  6.4  /  Alb  3.8  /  TBili  1.2  /  DBili  x   /  AST  17  /  ALT  23  /  AlkPhos  64  01-14    proBNP:   Lipid Profile:   HgA1c:   TSH:   PT/INR - ( 14 Jan 2025 05:39 )   PT: 15.2 sec;   INR: 1.33 ratio         PTT - ( 14 Jan 2025 05:39 )  PTT:35.1 sec        Assessment and plan  ---------------------------  84-year-old male PMH afib on eliquid, severe aortic stenosis (2/2 bicuspid aortic valve), s/p aortic valve replacement (7/2012), HTN, pHTN, HLD, chronic renal insufficiency, thrombocytopenia, GERD presenting from Dr. Ramirez office for tachycardia. Patient had COVID 2 weeks ago which he was treated with paxlovid for. Subsequently, the patient developed some wheezing and was treated with doxycycline and steroids from an urgent care with improvement in symptoms. The patient continued to feel weak however, and dyspneic on exertion. The patient notes 10 days of leg swelling, with dyspnea on exertion starting 3 days ago with inability to tolerate more than 2-3 steps (baseline does not ambulate much). Of note, the patient experienced some nausea a few days prior and vomited after getting into his car.   The patient went to visit his PCP today for the dyspnea and leg swelling; was noted to be tachycardic to 130 BPM and was sent to ER for further evaluation.   Pt did not take home bisoprolol and eliquis prior to admission. Pt denies chest pain, shortness of breath, n/v/d/, fevers or chills, urinary sx, headache, visual changes, numbness or other complaints.  ppt with sig cardiac and medical hx with rapid hr, sob and le edema  cta of chest no PE, but increase interstitial marking  cw chf.  s/p AVR overall mild regurgitation ,TILA 1.44 cn2  will need to add diuresis sec to RV dysfunction , will add midodrine if bp can not tolerate  will discuss with family  dc bisprolol for now, will add metoprolol er 25 mg daily as needed  repeat chest x ray to control HR   thrombocytopenia ?etiology  tele a.fib hr 80 to 120, will increase metoprolol er as tolerated  will consider DARA/  cardioversion after small amio load in am mhr has much improved, will increase beta blocker if hr elevated  discussed with daughter agreed with cardioversion  HR is controlled  acute renal failure??   s/p DARA cardioversion remain in NSR, check bladder scan plan as per renal

## 2025-01-15 NOTE — PROGRESS NOTE ADULT - PROBLEM SELECTOR PLAN 5
-On home lipitor 20mg QD    PLAN:  >c/w home atorvastatin 20mg QD -On home bisoprolol 17.5mg PO QD  -d/evelyn bisoprolol 17.5mg PO QD (1/11) per cardiology recs  -On home amlodipine 5mg PO QD    PLAN:  >c/w metoprolol ER 25mg QD (1/13- per cardiology recs  >f/u cardiology recs

## 2025-01-15 NOTE — PROGRESS NOTE ADULT - SUBJECTIVE AND OBJECTIVE BOX
Patient is a 84y old  Male who presents with a chief complaint of CHF (13 Jan 2025 10:22)    Patient seen and examined, wife at bedside. Feeling okay. No acute complaints.     MEDICATIONS  (STANDING):  aMIOdarone    Tablet 400 milliGRAM(s) Oral two times a day  apixaban 2.5 milliGRAM(s) Oral every 12 hours  atorvastatin 20 milliGRAM(s) Oral at bedtime  buMETAnide Injectable 2 milliGRAM(s) IV Push once  famotidine    Tablet 20 milliGRAM(s) Oral at bedtime  finasteride 5 milliGRAM(s) Oral daily  metoprolol succinate ER 25 milliGRAM(s) Oral daily  pantoprazole    Tablet 40 milliGRAM(s) Oral before breakfast  polyethylene glycol 3350 17 Gram(s) Oral at bedtime    MEDICATIONS  (PRN):  acetaminophen     Tablet .. 650 milliGRAM(s) Oral every 6 hours PRN Temp greater or equal to 38C (100.4F), Mild Pain (1 - 3)  aluminum hydroxide/magnesium hydroxide/simethicone Suspension 30 milliLiter(s) Oral every 6 hours PRN Dyspepsia  ondansetron Injectable 4 milliGRAM(s) IV Push every 6 hours PRN Nausea and/or Vomiting  senna 2 Tablet(s) Oral at bedtime PRN for constipation    Vital Signs Last 24 Hrs  T(C): 36.1 (15 Jovanny 2025 04:10), Max: 37 (15 Jovanny 2025 00:01)  T(F): 96.9 (15 Jovanny 2025 04:10), Max: 98.6 (15 Jovanny 2025 00:01)  HR: 65 (15 Jovanny 2025 04:10) (64 - 75)  BP: 113/51 (15 Jovanny 2025 04:10) (91/54 - 120/62)  BP(mean): 72 (14 Jan 2025 21:14) (66 - 72)  RR: 20 (15 Jovanny 2025 04:10) (16 - 20)  SpO2: 90% (15 Jovanny 2025 04:10) (90% - 94%)    Parameters below as of 15 Jovanny 2025 04:10  Patient On (Oxygen Delivery Method): room air    PE  NAD  Awake, alert  Anicteric, MMM  RRR  CTAB  Abd soft, NT, ND  No c/c/e  No rash grossly                        14.1   11.03 )-----------( 62       ( 15 Jovanny 2025 06:37 )             43.6       01-15    126[L]  |  93[L]  |  36[H]  ----------------------------<  138[H]  5.0   |  20[L]  |  2.86[H]    Ca    8.6      15 Jovanny 2025 09:44  Phos  5.2     01-15  Mg     2.1     01-15    TPro  6.4  /  Alb  3.8  /  TBili  1.2  /  DBili  x   /  AST  17  /  ALT  23  /  AlkPhos  64  01-14

## 2025-01-15 NOTE — PROGRESS NOTE ADULT - ATTENDING COMMENTS
PETER with hyponatremia today  Had cardioversion yesterday  Nauseous overnight  ADH activated  Volume overloaded- Fluid restrict 1 liter  Bumex Iv BID for volume overload  Treatment for nausea  Trend BMP  dw wife    Mone Pastrana MD  Off: [phone]  contact me on teams    (After 5 pm or on weekends please page the on-call fellow/attending, can check [email or web address] for schedule. Login is francisco morrow, schedule under [facility] medicine, psych, derm)

## 2025-01-15 NOTE — PROGRESS NOTE ADULT - PROBLEM SELECTOR PLAN 8
PLAN:  >c/w tylenol PRN, topical ketorolac PRN -Per family is supposed to be on home PPI but patient does not take    PLAN:  >c/w Pantoprazole 40mg QD pre-breakfast   >c/w famotidine 20mg QHS  >f/u outpatient with GI

## 2025-01-15 NOTE — PROGRESS NOTE ADULT - PROBLEM SELECTOR PLAN 9
DVT ppx: Eliquis 2.5mg BID  Diet: DASH/TLC, low sodium  Dispo: Active, pending resolution of PETER PLAN:  >c/w tylenol PRN, topical ketorolac PRN

## 2025-01-15 NOTE — PROGRESS NOTE ADULT - ASSESSMENT
84-year-old male PMH afib on eliquid, severe aortic stenosis (2/2 bicuspid aortic valve), s/p aortic valve replacement (7/2012), HTN, pHTN, HLD, chronic renal insufficiency, thrombocytopenia, GERD presenting from Dr. Ramirez office for tachycardia.     ITP   - Platelet range between 79-85K, follows with Dr. Junior at [facility].   - Prior work up generally speaking unremarkable. He has no evidence of a monoclonal gammopathy. Flow cytometry was with no abnormal immunophenotype. No evidence of B12 or folic acid deficiency. He has mild hemolysis of unclear etiology, although there is no anemia. Direct Onofre was negative. His KENNA was slightly elevated at 1:160.   - There is no absolute contraindication to amiodarone from a hematologic perspective, will monitor platelet count.     CHF exacerbation  - 1/8 CTA chest w/ no PE. Small bibasilar pleural effusions. Interlobular septal thickening, suggestive of interstitial edema  - Can continue the eliquis for afib for now but would hold if platelet count drops below 50.  - Management per cardiology, went for DARA/cardioversion 1/14 w/ Severe mitral regurgitation and severe tricuspid regurgitation.   - Renal US for PETER w/ no hydronephrosis, renal parenchymal disease. 0.9 x 0.5 x 1.1 cm right upper pole nonobstructing stone, similarly seen on CT from 1/17/2018. Left upper pole cyst 2.6 x 1.9 x 2.8 cm.    Will continue to follow.    Tung Leo PA-C  Hematology/Oncology  [facility]  [phone] (office)  Evenings and weekends please call MD on call or office

## 2025-01-15 NOTE — PROGRESS NOTE ADULT - ATTENDING COMMENTS
Patient is a 84M w/pmh chronic atrial fibrillation, CKD 3a, severe aortic stenosis s/p aortic valve replacement (7/2012), HTN, pHTN, HLD, thrombocytopenia, GERD presenting from PCP's office for cc tachycardia, JOSEPH. Found to have volume overload and atrial fibrillation w/RVR.     1. Acute HFpEF  2. chronic atrial fibrillation w RVR  3. pHTN  4. PETER on CKD 3  5. Chronic thrombocytopenia  6. Hyponatremia    -initially responded well to lasix 20mg IV BID, then developed PETER and diuretics were held (last dose given 1/9). However 1/15 he developed acute volume overload again with PETER, likely due to cardiorenal syndrome.   - Hyponatremia is multifactorial, suspect due to volume overload, recheck BMP in the afternoon after 2mg IV bumex   - s/p DARA/DCCV 1/14, now in normal sinus rhythm, continue amiodarone, metoprolol  - Echo does not show left sided heart failure, but has right sided reduced systolic function and elevated pulmonary pressures

## 2025-01-15 NOTE — PROGRESS NOTE ADULT - PROBLEM SELECTOR PLAN 4
-On home bisoprolol 17.5mg PO QD  -d/evelyn bisoprolol 17.5mg PO QD (1/11) per cardiology recs  -On home amlodipine 5mg PO QD    PLAN:  >c/w metoprolol ER 25mg QD (1/13- per cardiology recs  >f/u cardiology recs -EKG on admission with sinus tachycardia to 125-128  -On home bisoprolol 17.5mg PO QD  -d/evelyn bisoprolol 17.5mg PO QD (1/11) per cardiology recs  -s/p DARA/cardioversion on 1/14  -s/p Digoxin on 1/12  -Pt is now sinus rhythm    PLAN:  >c/w home apixaban 2.5mg PO QD  >c/w amiodarone 400mg BID (1/12 -     >c/w metoprolol ER 25mg QD per cardiology recs (1/13 -   >f/u cardiology recs  >f/u cardiology outpatient

## 2025-01-15 NOTE — PROGRESS NOTE ADULT - PROBLEM SELECTOR PLAN 6
-Labs notable for thrombocytopenia to 70 on admission (70-80 baseline platelets)  -Per outpatient oncologist, has known ITP    PLAN:  >c/w monitoring daily in the setting of amiodarone initiation  >Hold eliquis if Plt <50  >f/u hematology recs -On home lipitor 20mg QD    PLAN:  >c/w home atorvastatin 20mg QD

## 2025-01-15 NOTE — PROGRESS NOTE ADULT - PROBLEM SELECTOR PLAN 2
-Hx of HTN and pHTN  -Troponin 54->59  -Pro-BNP of 3624.   -Nuclear stress test (11/2023): small sized mild defect(s) in the basal inferolateral wall that is reversible consistent with ischemia  -TTE (11/2024): "EF is approximately 55%. Mild concentric left ventricular hypertrophy. Right ventricular enlargement with mildly decreased right ventricular systolic function. Left atrium is severely dilated. Right atrium is mildly dilated. Moderate mitral regurgitation. Mitral annular calcification with thickened and mildly calcified mitral valve leaflets. There is mild prolapse of the posterior mitral valve leaflet. Bioprosthetic aortic valve replacement. Aortic root at the sinuses of Valsalva is borderline dilated. Ascending aorta is mildly dilated. Estimated pulmonary artery systolic pressure is 50 mmHg. Moderate to severe tricuspid regurgitation"  -CXR (1/8): b/l lower atelectasis vs pneumonia  -CTA (1/8): "Small bibasilar pleural effusions. Interlobular septal thickening, suggestive of interstitial edema."   -ER POCUS TTE (1/8): trace pericardial effusion, global LV hypokinesis, b/l lungs with B-lines and trace pleural effusions.  -s/p lasix 20mg IV in the ER (1/8)  -TTE (1/10): LVEF 60-65%, mildly enlarged right ventricular cavity, borderline reduced ventricular systolic function, pHTN 57 mmHg, moderate tricuspid regurgitation.   -Dyspnea has improved from admission    PLAN:  >Hold off on diuresis in setting of PETER  >c/w daily standing weights, strict ins/outs  >f/u cardiology outpatient  >Would likely benefit from starting PO lasix on d/c -Na: 128 noted on labs 1/15  -Creatinine up-trending, patient looking more fluid overloaded  -Urine studies (1/15): Na of 31, creatinine of 261, p/c ratio: 0.6, urine osmol: 489; FeNa suggestive of pre-renal  -Ddx: Hypervolemia & PETER    PLAN:  >c/w monitoring Na levels daily  >c/w fluid restriction  >Will consider lasix/bumex

## 2025-01-15 NOTE — PROGRESS NOTE ADULT - PROBLEM SELECTOR PLAN 3
-EKG on admission with sinus tachycardia to 125-128  -On home bisoprolol 17.5mg PO QD  -d/evelyn bisoprolol 17.5mg PO QD (1/11) per cardiology recs  -s/p DARA/cardioversion on 1/14  -s/p Digoxin on 1/12  -Pt is now sinus rhythm    PLAN:  >c/w home apixaban 2.5mg PO QD  >c/w amiodarone 400mg BID (1/12 -     >c/w metoprolol ER 25mg QD per cardiology recs (1/13 -   >f/u cardiology recs  >f/u cardiology outpatient -Hx of HTN and pHTN  -Troponin 54->59  -Pro-BNP of 3624.   -Nuclear stress test (11/2023): small sized mild defect(s) in the basal inferolateral wall that is reversible consistent with ischemia  -TTE (11/2024): "EF is approximately 55%. Mild concentric left ventricular hypertrophy. Right ventricular enlargement with mildly decreased right ventricular systolic function. Left atrium is severely dilated. Right atrium is mildly dilated. Moderate mitral regurgitation. Mitral annular calcification with thickened and mildly calcified mitral valve leaflets. There is mild prolapse of the posterior mitral valve leaflet. Bioprosthetic aortic valve replacement. Aortic root at the sinuses of Valsalva is borderline dilated. Ascending aorta is mildly dilated. Estimated pulmonary artery systolic pressure is 50 mmHg. Moderate to severe tricuspid regurgitation"  -CXR (1/8): b/l lower atelectasis vs pneumonia  -CTA (1/8): "Small bibasilar pleural effusions. Interlobular septal thickening, suggestive of interstitial edema."   -ER POCUS TTE (1/8): trace pericardial effusion, global LV hypokinesis, b/l lungs with B-lines and trace pleural effusions.  -s/p lasix 20mg IV BID X 2 Days  -TTE (1/10): LVEF 60-65%, mildly enlarged right ventricular cavity, borderline reduced ventricular systolic function, pHTN 57 mmHg, moderate tricuspid regurgitation.   -Dyspnea initially improved from admission, now worsened    PLAN:  >Will consider lasix/bumex today  >c/w daily standing weights, strict ins/outs  >f/u cardiology outpatient  >Would likely benefit from starting PO lasix on d/c

## 2025-01-15 NOTE — PROGRESS NOTE ADULT - PROBLEM SELECTOR PLAN 1
Patient with PETER on CKD in the setting of transient hypotension, hemodynamic effects from afib with RVR, and administration of IV contrast. On review of labs on [facility], patient noted to have elevated Scr/episodes of PETER since 2017. Last Scr was 1.29 (eGFR 57) on 12/19/24. Admission Scr remained stable at 1.24 on 1/8/25, peaked at 1.61 on 1/11, and elevated/improved to 1.53 on 1/14. UACR elevated at 544 on 12/19/24. Patient underwent CT PE on 1/8/25. Received IV Lasix 20mg BID on 1/8 and 1/9. Volume overloaded on exam. Underwent successful DARA cardioversion on 1/14. CT PE with exophytic L renal lesion and renal cysts. Kidney sonogram on 1/14 with no evidence of hydronephrosis, increased renal cortical echogenicity, non obstructing stone on R, and multiple B/L cysts.    Scr acutely jacobo from 1.5 (1/14) to 2.7 (1/15). Decreased UOP. Bladder scan with 17ccs.   Urine lytes reviewed. UA with 300mg of protein, small bilirubin, (+) nitrites. UPCR elevated at 0.6.   Started on IV Bumex 2mg. Recheck BMP at 5pm.   Monitor labs and urine output. Maintain neg negative fluid status. Check daily weights. Avoid nephrotoxins. Dose medications as per eGFR.    If you have any questions, please feel free to contact me.  Jose Rodriguez MD  Nephrology Fellow  [email or web address] / [employer] Teams (Preferred)  (Please check the on-call schedule to reach the appropriate Nephrology Fellow).

## 2025-01-16 LAB
ANION GAP SERPL CALC-SCNC: 13 MMOL/L — SIGNIFICANT CHANGE UP (ref 5–17)
ANION GAP SERPL CALC-SCNC: 15 MMOL/L — SIGNIFICANT CHANGE UP (ref 5–17)
BASOPHILS # BLD AUTO: 0.01 K/UL — SIGNIFICANT CHANGE UP (ref 0–0.2)
BASOPHILS NFR BLD AUTO: 0.1 % — SIGNIFICANT CHANGE UP (ref 0–2)
BLD GP AB SCN SERPL QL: NEGATIVE — SIGNIFICANT CHANGE UP
BUN SERPL-MCNC: 38 MG/DL — HIGH (ref 7–23)
BUN SERPL-MCNC: 44 MG/DL — HIGH (ref 7–23)
CALCIUM SERPL-MCNC: 8.7 MG/DL — SIGNIFICANT CHANGE UP (ref 8.4–10.5)
CALCIUM SERPL-MCNC: 8.8 MG/DL — SIGNIFICANT CHANGE UP (ref 8.4–10.5)
CHLORIDE SERPL-SCNC: 92 MMOL/L — LOW (ref 96–108)
CHLORIDE SERPL-SCNC: 94 MMOL/L — LOW (ref 96–108)
CO2 SERPL-SCNC: 18 MMOL/L — LOW (ref 22–31)
CO2 SERPL-SCNC: 19 MMOL/L — LOW (ref 22–31)
CREAT SERPL-MCNC: 2.4 MG/DL — HIGH (ref 0.5–1.3)
CREAT SERPL-MCNC: 2.57 MG/DL — HIGH (ref 0.5–1.3)
EGFR: 24 ML/MIN/1.73M2 — LOW
EGFR: 26 ML/MIN/1.73M2 — LOW
EOSINOPHIL # BLD AUTO: 0.01 K/UL — SIGNIFICANT CHANGE UP (ref 0–0.5)
EOSINOPHIL NFR BLD AUTO: 0.1 % — SIGNIFICANT CHANGE UP (ref 0–6)
FLUAV AG NPH QL: SIGNIFICANT CHANGE UP
FLUBV AG NPH QL: SIGNIFICANT CHANGE UP
GLUCOSE SERPL-MCNC: 118 MG/DL — HIGH (ref 70–99)
GLUCOSE SERPL-MCNC: 145 MG/DL — HIGH (ref 70–99)
HCT VFR BLD CALC: 38.6 % — LOW (ref 39–50)
HGB BLD-MCNC: 13.1 G/DL — SIGNIFICANT CHANGE UP (ref 13–17)
IMM GRANULOCYTES NFR BLD AUTO: 1.8 % — HIGH (ref 0–0.9)
LYMPHOCYTES # BLD AUTO: 1.04 K/UL — SIGNIFICANT CHANGE UP (ref 1–3.3)
LYMPHOCYTES # BLD AUTO: 7.8 % — LOW (ref 13–44)
MAGNESIUM SERPL-MCNC: 2.1 MG/DL — SIGNIFICANT CHANGE UP (ref 1.6–2.6)
MCHC RBC-ENTMCNC: 31.8 PG — SIGNIFICANT CHANGE UP (ref 27–34)
MCHC RBC-ENTMCNC: 33.9 G/DL — SIGNIFICANT CHANGE UP (ref 32–36)
MCV RBC AUTO: 93.7 FL — SIGNIFICANT CHANGE UP (ref 80–100)
MONOCYTES # BLD AUTO: 5.35 K/UL — HIGH (ref 0–0.9)
MONOCYTES NFR BLD AUTO: 40.1 % — HIGH (ref 2–14)
NEUTROPHILS # BLD AUTO: 6.7 K/UL — SIGNIFICANT CHANGE UP (ref 1.8–7.4)
NEUTROPHILS NFR BLD AUTO: 50.1 % — SIGNIFICANT CHANGE UP (ref 43–77)
NRBC # BLD: 0 /100 WBCS — SIGNIFICANT CHANGE UP (ref 0–0)
NRBC BLD-RTO: 0 /100 WBCS — SIGNIFICANT CHANGE UP (ref 0–0)
PHOSPHATE SERPL-MCNC: 3.3 MG/DL — SIGNIFICANT CHANGE UP (ref 2.5–4.5)
PLATELET # BLD AUTO: 51 K/UL — LOW (ref 150–400)
POTASSIUM SERPL-MCNC: 4.4 MMOL/L — SIGNIFICANT CHANGE UP (ref 3.5–5.3)
POTASSIUM SERPL-MCNC: 4.6 MMOL/L — SIGNIFICANT CHANGE UP (ref 3.5–5.3)
POTASSIUM SERPL-SCNC: 4.4 MMOL/L — SIGNIFICANT CHANGE UP (ref 3.5–5.3)
POTASSIUM SERPL-SCNC: 4.6 MMOL/L — SIGNIFICANT CHANGE UP (ref 3.5–5.3)
RBC # BLD: 4.12 M/UL — LOW (ref 4.2–5.8)
RBC # FLD: 13.1 % — SIGNIFICANT CHANGE UP (ref 10.3–14.5)
RH IG SCN BLD-IMP: POSITIVE — SIGNIFICANT CHANGE UP
RSV RNA NPH QL NAA+NON-PROBE: SIGNIFICANT CHANGE UP
SARS-COV-2 RNA SPEC QL NAA+PROBE: SIGNIFICANT CHANGE UP
SODIUM SERPL-SCNC: 125 MMOL/L — LOW (ref 135–145)
SODIUM SERPL-SCNC: 126 MMOL/L — LOW (ref 135–145)
WBC # BLD: 13.35 K/UL — HIGH (ref 3.8–10.5)
WBC # FLD AUTO: 13.35 K/UL — HIGH (ref 3.8–10.5)

## 2025-01-16 PROCEDURE — 99232 SBSQ HOSP IP/OBS MODERATE 35: CPT | Mod: GC

## 2025-01-16 PROCEDURE — 71045 X-RAY EXAM CHEST 1 VIEW: CPT | Mod: 26

## 2025-01-16 RX ORDER — BUMETANIDE 2 MG/1
2 TABLET ORAL ONCE
Refills: 0 | Status: COMPLETED | OUTPATIENT
Start: 2025-01-16 | End: 2025-01-16

## 2025-01-16 RX ORDER — SODIUM CHLORIDE 5 % 5 %
100 INTRAVENOUS SOLUTION INTRAVENOUS ONCE
Refills: 0 | Status: COMPLETED | OUTPATIENT
Start: 2025-01-16 | End: 2025-01-16

## 2025-01-16 RX ORDER — SENNOSIDES 8.6 MG
2 TABLET ORAL AT BEDTIME
Refills: 0 | Status: DISCONTINUED | OUTPATIENT
Start: 2025-01-16 | End: 2025-01-23

## 2025-01-16 RX ORDER — AMIODARONE HYDROCHLORIDE 50 MG/ML
200 INJECTION, SOLUTION INTRAVENOUS
Refills: 0 | Status: DISCONTINUED | OUTPATIENT
Start: 2025-01-16 | End: 2025-01-17

## 2025-01-16 RX ORDER — BUMETANIDE 2 MG/1
1 TABLET ORAL ONCE
Refills: 0 | Status: DISCONTINUED | OUTPATIENT
Start: 2025-01-16 | End: 2025-01-16

## 2025-01-16 RX ADMIN — Medication 25 MILLIGRAM(S): at 05:33

## 2025-01-16 RX ADMIN — Medication 5 MILLIGRAM(S): at 21:05

## 2025-01-16 RX ADMIN — Medication 600 MILLILITER(S): at 12:38

## 2025-01-16 RX ADMIN — AMIODARONE HYDROCHLORIDE 400 MILLIGRAM(S): 50 INJECTION, SOLUTION INTRAVENOUS at 05:34

## 2025-01-16 RX ADMIN — APIXABAN 2.5 MILLIGRAM(S): 5 TABLET, FILM COATED ORAL at 05:33

## 2025-01-16 RX ADMIN — FAMOTIDINE 20 MILLIGRAM(S): 10 INJECTION INTRAVENOUS at 21:04

## 2025-01-16 RX ADMIN — POLYETHYLENE GLYCOL 3350 17 GRAM(S): 17 POWDER, FOR SOLUTION ORAL at 21:04

## 2025-01-16 RX ADMIN — APIXABAN 2.5 MILLIGRAM(S): 5 TABLET, FILM COATED ORAL at 17:41

## 2025-01-16 RX ADMIN — ATORVASTATIN CALCIUM 20 MILLIGRAM(S): 80 TABLET, FILM COATED ORAL at 21:05

## 2025-01-16 RX ADMIN — Medication 2 TABLET(S): at 21:09

## 2025-01-16 RX ADMIN — BUMETANIDE 2 MILLIGRAM(S): 2 TABLET ORAL at 17:41

## 2025-01-16 RX ADMIN — BUMETANIDE 2 MILLIGRAM(S): 2 TABLET ORAL at 08:31

## 2025-01-16 RX ADMIN — AMIODARONE HYDROCHLORIDE 200 MILLIGRAM(S): 50 INJECTION, SOLUTION INTRAVENOUS at 17:41

## 2025-01-16 RX ADMIN — ONDANSETRON 4 MILLIGRAM(S): 4 TABLET, ORALLY DISINTEGRATING ORAL at 03:55

## 2025-01-16 RX ADMIN — PANTOPRAZOLE 40 MILLIGRAM(S): 20 TABLET, DELAYED RELEASE ORAL at 05:33

## 2025-01-16 NOTE — PROGRESS NOTE ADULT - ATTENDING COMMENTS
Patient is a 84M w/pmh chronic atrial fibrillation, CKD 3a, severe aortic stenosis s/p aortic valve replacement (7/2012), HTN, pHTN, HLD, thrombocytopenia, GERD presenting from PCP's office for cc tachycardia, JOSEPH. Found to have volume overload and atrial fibrillation w/RVR.     1. Acute HFpEF  2. chronic atrial fibrillation w RVR  3. pHTN  4. PETER on CKD 3  5. Chronic thrombocytopenia  6. Hyponatremia    -initially responded well to lasix 20mg IV BID, then developed PETER and diuretics were held (last dose given 1/9). However 1/15 he developed acute volume overload again with PETER, likely due to cardiorenal syndrome. 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

## 2025-01-16 NOTE — PROGRESS NOTE ADULT - SUBJECTIVE AND OBJECTIVE BOX
Date of Service: 01-16-25 @ 06:42           CARDIOLOGY     PROGRESS  NOTE   ________________________________________________    CHIEF COMPLAINT:Patient is a 84y old  Male who presents with a chief complaint of CHF (13 Jan 2025 10:22)  no complain  	  REVIEW OF SYSTEMS:  CONSTITUTIONAL: No fever, weight loss, or fatigue  EYES: No eye pain, visual disturbances, or discharge  ENT:  No difficulty hearing, tinnitus, vertigo; No sinus or throat pain  NECK: No pain or stiffness  RESPIRATORY: No cough, wheezing, chills or hemoptysis; No Shortness of Breath  CARDIOVASCULAR: No chest pain, palpitations, passing out, dizziness, or leg swelling  GASTROINTESTINAL: No abdominal or epigastric pain. No nausea, vomiting, or hematemesis; No diarrhea or constipation. No melena or hematochezia.  GENITOURINARY: No dysuria, frequency, hematuria, or incontinence  NEUROLOGICAL: No headaches, memory loss, loss of strength, numbness, or tremors  SKIN: No itching, burning, rashes, or lesions   LYMPH Nodes: No enlarged glands  ENDOCRINE: No heat or cold intolerance; No hair loss  MUSCULOSKELETAL: No joint pain or swelling; No muscle, back, or extremity pain  PSYCHIATRIC: No depression, anxiety, mood swings, or difficulty sleeping  HEME/LYMPH: No easy bruising, or bleeding gums  ALLERGY AND IMMUNOLOGIC: No hives or eczema	    [x ] All others negative	  [ ] Unable to obtain    PHYSICAL EXAM:  T(C): 36.4 (01-16-25 @ 04:39), Max: 36.6 (01-16-25 @ 03:50)  HR: 71 (01-16-25 @ 04:39) (68 - 90)  BP: 115/57 (01-16-25 @ 04:39) (114/48 - 154/64)  RR: 19 (01-16-25 @ 04:39) (18 - 19)  SpO2: 90% (01-16-25 @ 04:39) (90% - 92%)  Wt(kg): --  I&O's Summary    14 Jan 2025 07:01  -  15 Jovanny 2025 07:00  --------------------------------------------------------  IN: 240 mL / OUT: 0 mL / NET: 240 mL    15 Jovanny 2025 07:01  -  16 Jan 2025 06:42  --------------------------------------------------------  IN: 480 mL / OUT: 585 mL / NET: -105 mL        Appearance: Normal	  HEENT:   Normal oral mucosa, PERRL, EOMI	  Lymphatic: No lymphadenopathy  Cardiovascular: Normal S1 S2, No JVD,+ murmurs, No edema  Respiratory: Lungs clear to auscultation	  Psychiatry: A & O x 3, Mood & affect appropriate  Gastrointestinal:  Soft, Non-tender, + BS	  Skin: No rashes, No ecchymoses, No cyanosis	  Neurologic: Non-focal  Extremities: Normal range of motion, No clubbing, cyanosis or edema  Vascular: Peripheral pulses palpable 2+ bilaterally    MEDICATIONS  (STANDING):  aMIOdarone    Tablet 400 milliGRAM(s) Oral two times a day  apixaban 2.5 milliGRAM(s) Oral every 12 hours  atorvastatin 20 milliGRAM(s) Oral at bedtime  famotidine    Tablet 20 milliGRAM(s) Oral at bedtime  finasteride 5 milliGRAM(s) Oral daily  metoprolol succinate ER 25 milliGRAM(s) Oral daily  pantoprazole    Tablet 40 milliGRAM(s) Oral before breakfast  polyethylene glycol 3350 17 Gram(s) Oral at bedtime      TELEMETRY: 	    ECG:  	  RADIOLOGY:  OTHER: 	  	  LABS:	 	    CARDIAC MARKERS:                                14.1   11.03 )-----------( 62       ( 15 Jovanny 2025 06:37 )             43.6     01-15    126[L]  |  91[L]  |  38[H]  ----------------------------<  126[H]  4.6   |  19[L]  |  2.69[H]    Ca    8.8      15 Jovanny 2025 18:14  Phos  5.2     01-15  Mg     2.1     01-15      proBNP:   Lipid Profile:   HgA1c:   TSH:     PETER with hyponatremia today  Had cardioversion yesterday  Nauseous overnight  ADH activated  Volume overloaded- Fluid restrict 1 liter  Bumex Iv BID for volume overload  Treatment for nausea  Trend BMP      Assessment and plan  ---------------------------  84-year-old male PMH afib on eliquid, severe aortic stenosis (2/2 bicuspid aortic valve), s/p aortic valve replacement (7/2012), HTN, pHTN, HLD, chronic renal insufficiency, thrombocytopenia, GERD presenting from Dr. Ramirez office for tachycardia. Patient had COVID 2 weeks ago which he was treated with paxlovid for. Subsequently, the patient developed some wheezing and was treated with doxycycline and steroids from an urgent care with improvement in symptoms. The patient continued to feel weak however, and dyspneic on exertion. The patient notes 10 days of leg swelling, with dyspnea on exertion starting 3 days ago with inability to tolerate more than 2-3 steps (baseline does not ambulate much). Of note, the patient experienced some nausea a few days prior and vomited after getting into his car.   The patient went to visit his PCP today for the dyspnea and leg swelling; was noted to be tachycardic to 130 BPM and was sent to ER for further evaluation.   Pt did not take home bisoprolol and eliquis prior to admission. Pt denies chest pain, shortness of breath, n/v/d/, fevers or chills, urinary sx, headache, visual changes, numbness or other complaints.  ppt with sig cardiac and medical hx with rapid hr, sob and le edema  cta of chest no PE, but increase interstitial marking  cw chf.  s/p AVR overall mild regurgitation ,TILA 1.44 cn2  will need to add diuresis sec to RV dysfunction , will add midodrine if bp can not tolerate  will discuss with family  dc bisprolol for now, will add metoprolol er 25 mg daily as needed  repeat chest x ray to control HR   thrombocytopenia ?etiology  tele a.fib hr 80 to 120, will increase metoprolol er as tolerated  will consider DARA/  cardioversion after small amio load in am mhr has much improved, will increase beta blocker if hr elevated  discussed with daughter agreed with cardioversion  HR is controlled  acute renal failure??   s/p DARA cardioversion remain in NSR, check bladder scan plan as per renal  nephrology noted pt did not get contrast for the procedure or DARA  pt will eventually needs TAVR with sig AI on the bioprosthetic valve in 2012  awaiting blood work  will switch amio to 200 mg daily tomorrow

## 2025-01-16 NOTE — PROGRESS NOTE ADULT - PROBLEM SELECTOR PLAN 10
DVT ppx: Eliquis 2.5mg BID  Diet: DASH/TLC, low sodium  Dispo: Active, pending resolution of PETER DVT ppx: Eliquis 2.5mg BID - hold if Plt <50  Diet: DASH/TLC, low sodium  Dispo: Active, pending resolution of PETER

## 2025-01-16 NOTE — PROGRESS NOTE ADULT - PROBLEM SELECTOR PLAN 7
-Labs notable for thrombocytopenia to 70 on admission (70-80 baseline platelets)  -Per outpatient oncologist, has known ITP    PLAN:  >c/w monitoring daily in the setting of amiodarone initiation  >Hold eliquis if Plt <50  >f/u hematology recs -Labs notable for thrombocytopenia to 70 on admission (70-80 baseline platelets)  -Per outpatient oncologist, has known ITP    PLAN:  >c/w monitoring daily in the setting of amiodarone initiation; titrate amiodarone if Plt keeps dropping   >Hold eliquis if Plt <50  >f/u hematology recs

## 2025-01-16 NOTE — PROGRESS NOTE ADULT - PROBLEM SELECTOR PLAN 1
-New PETER in setting of diuresis  -Creatinine: 1.24 -> 1.34 -> 1.50 -> 1.61 -> 1.60 -> 1.54 -> 1.53 -> 2.74 -> 2.89 -> 2.69  -Urine studies: Na of 33, Nitrogen of 707, creatinine of 114; FeUrea suggestive of intrinsic renal disease  -Urine studies (1/15): Na of 31, creatinine of 261, p/c ratio: 0.6, urine osmol: 489; FeNa suggestive of pre-renal  -Kidney/Bladder US: "No hydronephrosis. Increased renal cortical echogenicity, compatible with renal   parenchymal disease. 0.9 x 0.5 x 1.1 cm right upper pole nonobstructing stone, similarly seen on CT from 1/17/2018. Left upper pole cyst measuring 2.6 x 1.9 x 2.8 cm with rim calcifications is similarly seen on CT abdomen pelvis from 1/17/2018. Additional bilateral simple cysts as above."  -s/p Bumex 2mg IVP 1x on 1/16  -Ddx: ATN vs pre-renal    PLAN:  >Will consider further lasix/bumex  >Repeat BMP  >c/w trending kidney function daily  >f/u nephro recs -New PETER in setting of diuresis  -Creatinine: 1.24 -> 1.34 -> 1.50 -> 1.61 -> 1.60 -> 1.54 -> 1.53 -> 2.74 -> 2.89 -> 2.69  -Urine studies: Na of 33, Nitrogen of 707, creatinine of 114; FeUrea suggestive of intrinsic renal disease  -Urine studies (1/15): Na of 31, creatinine of 261, p/c ratio: 0.6, urine osmol: 489; FeNa suggestive of pre-renal  -Kidney/Bladder US: "No hydronephrosis. Increased renal cortical echogenicity, compatible with renal   parenchymal disease. 0.9 x 0.5 x 1.1 cm right upper pole nonobstructing stone, similarly seen on CT from 1/17/2018. Left upper pole cyst measuring 2.6 x 1.9 x 2.8 cm with rim calcifications is similarly seen on CT abdomen pelvis from 1/17/2018. Additional bilateral simple cysts as above."  -s/p Bumex 2mg IVP 1x on 1/16  -Ddx: ATN vs pre-renal    PLAN:  >c/w Bumex 2mg IVP as needed   >Repeat BMP  >c/w trending kidney function daily  >f/u nephro recs

## 2025-01-16 NOTE — PROGRESS NOTE ADULT - PROBLEM SELECTOR PLAN 5
-On home bisoprolol 17.5mg PO QD  -d/evelyn bisoprolol 17.5mg PO QD (1/11) per cardiology recs  -On home amlodipine 5mg PO QD    PLAN:  >c/w metoprolol ER 25mg QD (1/13- per cardiology recs  >f/u cardiology recs

## 2025-01-16 NOTE — PROGRESS NOTE ADULT - PROBLEM SELECTOR PLAN 4
-EKG on admission with sinus tachycardia to 125-128  -On home bisoprolol 17.5mg PO QD  -d/evelyn bisoprolol 17.5mg PO QD (1/11) per cardiology recs  -s/p DARA/cardioversion on 1/14  -s/p Digoxin on 1/12  -Pt is now sinus rhythm    PLAN:  >c/w home apixaban 2.5mg PO QD  >c/w amiodarone 400mg BID (1/12 -     >c/w metoprolol ER 25mg QD per cardiology recs (1/13 -   >f/u cardiology recs  >f/u cardiology outpatient -EKG on admission with sinus tachycardia to 125-128  -On home bisoprolol 17.5mg PO QD  -d/evelyn bisoprolol 17.5mg PO QD (1/11) per cardiology recs  -s/p DARA/cardioversion on 1/14  -s/p Digoxin on 1/12  -Pt is now sinus rhythm    PLAN:  >c/w home apixaban 2.5mg PO QD - hold if Plt<50  >c/w amiodarone 200mg BID, will decrease to QD tomorrow (1/12 -     >c/w metoprolol ER 25mg QD per cardiology recs (1/13 -   >f/u cardiology recs  >f/u cardiology outpatient

## 2025-01-16 NOTE — PROGRESS NOTE ADULT - PROBLEM SELECTOR PLAN 1
Patient with PETER on CKD in the setting of transient hypotension, hemodynamic effects from afib with RVR, and administration of IV contrast. On review of labs on [facility], patient noted to have elevated Scr/episodes of PETER since 2017. Last Scr was 1.29 (eGFR 57) on 12/19/24. Admission Scr remained stable at 1.24 on 1/8/25, peaked at 1.61 on 1/11, and elevated/improved to 1.53 on 1/14. UACR elevated at 544 on 12/19/24. Patient underwent CT PE on 1/8/25. Received IV Lasix 20mg BID on 1/8 and 1/9. Volume overloaded on exam. Underwent successful DARA cardioversion on 1/14. CT PE with exophytic L renal lesion and renal cysts. Kidney sonogram on 1/14 with no evidence of hydronephrosis, increased renal cortical echogenicity, non obstructing stone on R, and multiple B/L cysts.    Scr acutely jacobo from 1.5 (1/14) to 2.7 (1/15). Started on IV Bumex 2mg. Scr improved to 2.4 today.   Urine lytes reviewed. UA with 300mg of protein, small bilirubin, (+) nitrites. UPCR elevated at 0.6.   Recommend 3% HTS 100cc bolus followed by IV Bumex 1mg 1 hour after. Check BMP this evening.  Monitor labs and urine output. Maintain neg negative fluid status. 1L fluid restriction. Check daily weights. Avoid nephrotoxins. Dose medications as per eGFR.    If you have any questions, please feel free to contact me.  Jose Rodriguez MD  Nephrology Fellow  [id number] / [employer] Teams (Preferred)  (Please check the on-call schedule to reach the appropriate Nephrology Fellow). Patient with PETER on CKD in the setting of transient hypotension, hemodynamic effects from afib with RVR, and administration of IV contrast. On review of labs on [place], patient noted to have elevated Scr/episodes of PETER since 2017. Last Scr was 1.29 (eGFR 57) on 12/19/24. Admission Scr remained stable at 1.24 on 1/8/25, peaked at 1.61 on 1/11, and elevated/improved to 1.53 on 1/14. UACR elevated at 544 on 12/19/24. Patient underwent CT PE on 1/8/25. Received IV Lasix 20mg BID on 1/8 and 1/9.  Underwent successful DARA cardioversion on 1/14. CT PE with exophytic L renal lesion and renal cysts. Kidney sonogram on 1/14 with no evidence of hydronephrosis, increased renal cortical echogenicity, non obstructing stone on R, and multiple B/L cysts.    Scr acutely jaocbo from 1.5 (1/14) to 2.7 (1/15). Possibly hemodynamically mediated ATN.   Echo: severe MR/TR  Started on IV Bumex 2mg. Scr improved to 2.4 today. Hyponatremia worse at 125   Urine lytes reviewed. UA with 300mg of protein, small bilirubin, (+) nitrites. UPCR elevated at 0.6.   Recommend 3% HTS 100cc bolus followed by IV Bumex 2mg after. Check BMP this evening.  Monitor labs and urine output. Maintain neg negative fluid status. 1L fluid restriction. Check daily weights. Avoid nephrotoxins. Dose medications as per eGFR.    If you have any questions, please feel free to contact me.  Jose Rodriguez MD  Nephrology Fellow  [id number] / [employer] Teams (Preferred)  (Please check the on-call schedule to reach the appropriate Nephrology Fellow).

## 2025-01-16 NOTE — PROGRESS NOTE ADULT - SUBJECTIVE AND OBJECTIVE BOX
Patient is a 84y old  Male who presents with a chief complaint of CHF (13 Jan 2025 10:22)      MEDICATIONS  (STANDING):  aMIOdarone    Tablet 200 milliGRAM(s) Oral two times a day  apixaban 2.5 milliGRAM(s) Oral every 12 hours  atorvastatin 20 milliGRAM(s) Oral at bedtime  famotidine    Tablet 20 milliGRAM(s) Oral at bedtime  finasteride 5 milliGRAM(s) Oral daily  metoprolol succinate ER 25 milliGRAM(s) Oral daily  pantoprazole    Tablet 40 milliGRAM(s) Oral before breakfast  polyethylene glycol 3350 17 Gram(s) Oral at bedtime    MEDICATIONS  (PRN):  acetaminophen     Tablet .. 650 milliGRAM(s) Oral every 6 hours PRN Temp greater or equal to 38C (100.4F), Mild Pain (1 - 3)  aluminum hydroxide/magnesium hydroxide/simethicone Suspension 30 milliLiter(s) Oral every 6 hours PRN Dyspepsia  ondansetron Injectable 4 milliGRAM(s) IV Push every 6 hours PRN Nausea and/or Vomiting  senna 2 Tablet(s) Oral at bedtime PRN for constipation      ROS  No fever, sweats, chills  No epistaxis, HA, sore throat  No CP, SOB, cough, sputum  No n/v/d, abd pain, melena, hematochezia  No edema  No rash  No anxiety  No back pain, joint pain  No bleeding, bruising  No dysuria, hematuria    Vital Signs Last 24 Hrs  T(C): 36.4 (16 Jan 2025 04:39), Max: 36.6 (16 Jan 2025 03:50)  T(F): 97.6 (16 Jan 2025 04:39), Max: 97.8 (16 Jan 2025 03:50)  HR: 69 (16 Jan 2025 08:34) (68 - 90)  BP: 116/58 (16 Jan 2025 08:34) (114/48 - 154/64)  BP(mean): 70 (15 Jovanny 2025 21:40) (70 - 70)  RR: 19 (16 Jan 2025 04:39) (18 - 19)  SpO2: 90% (16 Jan 2025 04:39) (90% - 92%)    Parameters below as of 16 Jan 2025 04:39  Patient On (Oxygen Delivery Method): room air    PE  NAD  Awake, alert  Anicteric, MMM  RRR  CTAB  Abd soft, NT, ND  No c/c/e  No rash grossly                        13.1   13.35 )-----------( 51       ( 16 Jan 2025 06:48 )             38.6       01-16    125[L]  |  92[L]  |  38[H]  ----------------------------<  118[H]  4.4   |  18[L]  |  2.40[H]    Ca    8.8      16 Jan 2025 06:47  Phos  3.3     01-16  Mg     2.1     01-16

## 2025-01-16 NOTE — PROGRESS NOTE ADULT - PROBLEM SELECTOR PLAN 2
-Na: 128 noted on labs 1/15  -Creatinine up-trending, patient looking more fluid overloaded  -Urine studies (1/15): Na of 31, creatinine of 261, p/c ratio: 0.6, urine osmol: 489; FeNa suggestive of pre-renal  -Ddx: Hypervolemia & PETER    PLAN:  >c/w monitoring Na levels daily  >c/w fluid restriction  >Will consider further lasix/bumex -Na: 128 noted on labs 1/15  -Creatinine up-trending, patient looking more fluid overloaded  -Urine studies (1/15): Na of 31, creatinine of 261, p/c ratio: 0.6, urine osmol: 489; FeNa suggestive of pre-renal  -Ddx: Hypervolemia & PETER    PLAN:  >1x dose of hypertonic saline to be given today  >c/w monitoring Na levels daily  >c/w fluid restriction  >c/w Bumex 2mg IVP as needed

## 2025-01-16 NOTE — PROGRESS NOTE ADULT - PROBLEM SELECTOR PLAN 3
-Hx of HTN and pHTN  -Troponin 54->59  -Pro-BNP of 3624.   -Nuclear stress test (11/2023): small sized mild defect(s) in the basal inferolateral wall that is reversible consistent with ischemia  -TTE (11/2024): "EF is approximately 55%. Mild concentric left ventricular hypertrophy. Right ventricular enlargement with mildly decreased right ventricular systolic function. Left atrium is severely dilated. Right atrium is mildly dilated. Moderate mitral regurgitation. Mitral annular calcification with thickened and mildly calcified mitral valve leaflets. There is mild prolapse of the posterior mitral valve leaflet. Bioprosthetic aortic valve replacement. Aortic root at the sinuses of Valsalva is borderline dilated. Ascending aorta is mildly dilated. Estimated pulmonary artery systolic pressure is 50 mmHg. Moderate to severe tricuspid regurgitation"  -CXR (1/8): b/l lower atelectasis vs pneumonia  -CTA (1/8): "Small bibasilar pleural effusions. Interlobular septal thickening, suggestive of interstitial edema."   -ER POCUS TTE (1/8): trace pericardial effusion, global LV hypokinesis, b/l lungs with B-lines and trace pleural effusions.  -s/p lasix 20mg IV BID X 2 Days  -TTE (1/10): LVEF 60-65%, mildly enlarged right ventricular cavity, borderline reduced ventricular systolic function, pHTN 57 mmHg, moderate tricuspid regurgitation.   -Dyspnea initially improved from admission, now worsened    PLAN:  >Will consider further lasix/bumex today  >c/w daily standing weights, strict ins/outs  >f/u cardiology outpatient  >Would likely benefit from starting PO lasix on d/c -Hx of HTN and pHTN  -Troponin 54->59  -Pro-BNP of 3624.   -Nuclear stress test (11/2023): small sized mild defect(s) in the basal inferolateral wall that is reversible consistent with ischemia  -TTE (11/2024): "EF is approximately 55%. Mild concentric left ventricular hypertrophy. Right ventricular enlargement with mildly decreased right ventricular systolic function. Left atrium is severely dilated. Right atrium is mildly dilated. Moderate mitral regurgitation. Mitral annular calcification with thickened and mildly calcified mitral valve leaflets. There is mild prolapse of the posterior mitral valve leaflet. Bioprosthetic aortic valve replacement. Aortic root at the sinuses of Valsalva is borderline dilated. Ascending aorta is mildly dilated. Estimated pulmonary artery systolic pressure is 50 mmHg. Moderate to severe tricuspid regurgitation"  -CXR (1/8): b/l lower atelectasis vs pneumonia  -CTA (1/8): "Small bibasilar pleural effusions. Interlobular septal thickening, suggestive of interstitial edema."   -ER POCUS TTE (1/8): trace pericardial effusion, global LV hypokinesis, b/l lungs with B-lines and trace pleural effusions.  -s/p lasix 20mg IV BID X 2 Days  -TTE (1/10): LVEF 60-65%, mildly enlarged right ventricular cavity, borderline reduced ventricular systolic function, pHTN 57 mmHg, moderate tricuspid regurgitation.   -DARA (1/14): normal LVEF, severe mitral/tricuspid regurgitation, aortic valve AS moderate-severe, intravalvular regurgitation   -Dyspnea initially improved from admission, now worsened    PLAN:  >c/w Bumex 2mg IVP as needed   >c/w daily standing weights, strict ins/outs  >f/u cardiology outpatient  >Would likely benefit from starting PO lasix on d/c

## 2025-01-16 NOTE — PROGRESS NOTE ADULT - ASSESSMENT
84-year-old male PMH afib on eliquid, severe aortic stenosis (2/2 bicuspid aortic valve), s/p aortic valve replacement (7/2012), HTN, pHTN, HLD, chronic renal insufficiency, thrombocytopenia, GERD presenting from Dr. Ramirez office for tachycardia.     ITP   - Platelet range between 79-85K, follows with Dr. Junior at [facility].   - Prior work up generally speaking unremarkable. He has no evidence of a monoclonal gammopathy. Flow cytometry was with no abnormal immunophenotype. No evidence of B12 or folic acid deficiency. He has mild hemolysis of unclear etiology, although there is no anemia. Direct Onofre was negative. His KENNA was slightly elevated at 1:160.   - There is no absolute contraindication to amiodarone from a hematologic perspective, will monitor platelet count.     CHF exacerbation  - 1/8 CTA chest w/ no PE. Small bibasilar pleural effusions. Interlobular septal thickening, suggestive of interstitial edema  - Can continue the eliquis for afib for now but would hold if platelet count drops below 50.  - Management per cardiology, went for DARA/cardioversion 1/14 w/ Severe mitral regurgitation and severe tricuspid regurgitation.   - Renal US for PETER w/ no hydronephrosis, renal parenchymal disease. 0.9 x 0.5 x 1.1 cm right upper pole nonobstructing stone, similarly seen on CT from 1/17/2018. Left upper pole cyst 2.6 x 1.9 x 2.8 cm.    Will continue to follow.    Tung Leo PA-C  Hematology/Oncology  [facility]  [phone] (office)  Evenings and weekends please call MD on call or office

## 2025-01-16 NOTE — PROGRESS NOTE ADULT - SUBJECTIVE AND OBJECTIVE BOX
[facility] Division of Kidney Diseases & Hypertension  FOLLOW UP NOTE  [phone]--------------------------------------------------------------------------------    Chief Complaint: PETER on CKD    24 hour events/subjective: Patient seen and examined today. Symptoms improved today, no nausea, breathing is improved, and LE swelling improved. Denies HA, fevers/chills, CP, abdominal pain.     PAST HISTORY  --------------------------------------------------------------------------------  No significant changes to PMH, PSH, FHx, SHx, unless otherwise noted    ALLERGIES & MEDICATIONS  --------------------------------------------------------------------------------  Allergies  No Known Allergies    Intolerances    Standing Inpatient Medications  aMIOdarone    Tablet 200 milliGRAM(s) Oral two times a day  apixaban 2.5 milliGRAM(s) Oral every 12 hours  atorvastatin 20 milliGRAM(s) Oral at bedtime  buMETAnide Injectable 1 milliGRAM(s) IV Push once  famotidine    Tablet 20 milliGRAM(s) Oral at bedtime  finasteride 5 milliGRAM(s) Oral daily  metoprolol succinate ER 25 milliGRAM(s) Oral daily  pantoprazole    Tablet 40 milliGRAM(s) Oral before breakfast  polyethylene glycol 3350 17 Gram(s) Oral at bedtime  senna 2 Tablet(s) Oral at bedtime  sodium chloride 3% Bolus 100 milliLiter(s) IV Bolus once    PRN Inpatient Medications  acetaminophen     Tablet .. 650 milliGRAM(s) Oral every 6 hours PRN  aluminum hydroxide/magnesium hydroxide/simethicone Suspension 30 milliLiter(s) Oral every 6 hours PRN  ondansetron Injectable 4 milliGRAM(s) IV Push every 6 hours PRN    REVIEW OF SYSTEMS  --------------------------------------------------------------------------------  Gen: No fevers/chills  Head/Eyes/Ears: No HA  Respiratory: SOB improved  CV: No chest pain  GI: No abdominal pain, diarrhea  : No dysuria, hematuria  MSK: +edema improved  Heme: No easy bruising or bleeding  Psych: No significant depression    All other systems were reviewed and are negative, except as noted.    VITALS/PHYSICAL EXAM  --------------------------------------------------------------------------------  T(C): 36.4 (01-16-25 @ 04:39), Max: 36.6 (01-16-25 @ 03:50)  HR: 69 (01-16-25 @ 08:34) (69 - 90)  BP: 116/58 (01-16-25 @ 08:34) (114/48 - 154/64)  RR: 19 (01-16-25 @ 04:39) (18 - 19)  SpO2: 90% (01-16-25 @ 04:39) (90% - 92%)  Wt(kg): --    01-15-25 @ 07:01  -  01-16-25 @ 07:00  --------------------------------------------------------  IN: 480 mL / OUT: 585 mL / NET: -105 mL    Physical Exam:  Gen: NAD  HEENT: MMM  Pulm: Minimal crackles in bases B/L  CV: S1S2  Abd: Soft, +BS   Ext: Mild B/L LE edema  Neuro: Awake  Skin: Warm and dry    LABS/STUDIES  --------------------------------------------------------------------------------              13.1   13.35 >-----------<  51       [01-16-25 @ 06:48]              38.6     125  |  92  |  38  ----------------------------<  118      [01-16-25 @ 06:47]  4.4   |  18  |  2.40        Ca     8.8     [01-16-25 @ 06:47]      Mg     2.1     [01-16-25 @ 06:47]      Phos  3.3     [01-16-25 @ 06:47]    Creatinine Trend:  SCr 2.40 [01-16 @ 06:47]  SCr 2.69 [01-15 @ 18:14]  SCr 2.86 [01-15 @ 09:44]  SCr 2.74 [01-15 @ 06:37]  SCr 1.53 [01-14 @ 05:39]    Urine Creatinine 261      [01-15-25 @ 07:44]  Urine Protein 155      [01-15-25 @ 07:44]  Urine Sodium 31      [01-15-25 @ 07:44]  Urine Urea Nitrogen 686      [01-15-25 @ 07:57]  Urine Potassium 70      [01-15-25 @ 07:44]  Urine Osmolality 489      [01-15-25 @ 07:43]

## 2025-01-16 NOTE — PROGRESS NOTE ADULT - SUBJECTIVE AND OBJECTIVE BOX
SUBJECTIVE / OVERNIGHT EVENTS:  Pt seen and examined at bedside. s/p Bumex 2mg IVP 1x yesterday.    MEDICATIONS  (STANDING):  aMIOdarone    Tablet 400 milliGRAM(s) Oral two times a day  apixaban 2.5 milliGRAM(s) Oral every 12 hours  atorvastatin 20 milliGRAM(s) Oral at bedtime  famotidine    Tablet 20 milliGRAM(s) Oral at bedtime  finasteride 5 milliGRAM(s) Oral daily  metoprolol succinate ER 25 milliGRAM(s) Oral daily  pantoprazole    Tablet 40 milliGRAM(s) Oral before breakfast  polyethylene glycol 3350 17 Gram(s) Oral at bedtime    MEDICATIONS  (PRN):  acetaminophen     Tablet .. 650 milliGRAM(s) Oral every 6 hours PRN Temp greater or equal to 38C (100.4F), Mild Pain (1 - 3)  aluminum hydroxide/magnesium hydroxide/simethicone Suspension 30 milliLiter(s) Oral every 6 hours PRN Dyspepsia  ondansetron Injectable 4 milliGRAM(s) IV Push every 6 hours PRN Nausea and/or Vomiting  senna 2 Tablet(s) Oral at bedtime PRN for constipation        01-15-25 @ 07:01  -  01-16-25 @ 07:00  --------------------------------------------------------  IN: 480 mL / OUT: 585 mL / NET: -105 mL        PHYSICAL EXAM:  Vital Signs Last 24 Hrs  T(C): 36.4 (16 Jan 2025 04:39), Max: 36.6 (16 Jan 2025 03:50)  T(F): 97.6 (16 Jan 2025 04:39), Max: 97.8 (16 Jan 2025 03:50)  HR: 71 (16 Jan 2025 04:39) (68 - 90)  BP: 115/57 (16 Jan 2025 04:39) (114/48 - 154/64)  BP(mean): 70 (15 Jovanny 2025 21:40) (70 - 70)  RR: 19 (16 Jan 2025 04:39) (18 - 19)  SpO2: 90% (16 Jan 2025 04:39) (90% - 92%)    Parameters below as of 16 Jan 2025 04:39  Patient On (Oxygen Delivery Method): room air        CAPILLARY BLOOD GLUCOSE        I&O's Summary    15 Jovanny 2025 07:01  -  16 Jan 2025 07:00  --------------------------------------------------------  IN: 480 mL / OUT: 585 mL / NET: -105 mL    Gen: NAD, AOx3, able to make needs known, non-toxic  Head: NCAT  HEENT: EOMI, oral mucosa moist, normal conjunctiva, +JVD  Lung: CTAB, no respiratory distress, rales b/l, speaking in full sentences, 94 % on RA  CV: normal rate and rhythm, pitting edema b/l lower extremities up to middle of legs  Abd: non distended, soft, nontender, no guarding, no CVA tenderness  MSK: no visible deformities  Neuro: Appears non focal  Skin: Warm, well perfused, no rash  Psych: normal affect    LABS:                        14.1   11.03 )-----------( 62       ( 15 Jovanny 2025 06:37 )             43.6     01-15    126[L]  |  91[L]  |  38[H]  ----------------------------<  126[H]  4.6   |  19[L]  |  2.69[H]    Ca    8.8      15 Jovanny 2025 18:14  Phos  5.2     01-15  Mg     2.1     01-15            Urinalysis Basic - ( 15 Jovanny 2025 18:14 )    Color: x / Appearance: x / SG: x / pH: x  Gluc: 126 mg/dL / Ketone: x  / Bili: x / Urobili: x   Blood: x / Protein: x / Nitrite: x   Leuk Esterase: x / RBC: x / WBC x   Sq Epi: x / Non Sq Epi: x / Bacteria: x          IMAGING:    [X] All pertinent imaging reviewed by me SUBJECTIVE / OVERNIGHT EVENTS:  Pt seen and examined at bedside. s/p Bumex 2mg IVP 1x yesterday. Reports subjective improvement, 1+Lb weight gain from yesterday    MEDICATIONS  (STANDING):  aMIOdarone    Tablet 400 milliGRAM(s) Oral two times a day  apixaban 2.5 milliGRAM(s) Oral every 12 hours  atorvastatin 20 milliGRAM(s) Oral at bedtime  famotidine    Tablet 20 milliGRAM(s) Oral at bedtime  finasteride 5 milliGRAM(s) Oral daily  metoprolol succinate ER 25 milliGRAM(s) Oral daily  pantoprazole    Tablet 40 milliGRAM(s) Oral before breakfast  polyethylene glycol 3350 17 Gram(s) Oral at bedtime    MEDICATIONS  (PRN):  acetaminophen     Tablet .. 650 milliGRAM(s) Oral every 6 hours PRN Temp greater or equal to 38C (100.4F), Mild Pain (1 - 3)  aluminum hydroxide/magnesium hydroxide/simethicone Suspension 30 milliLiter(s) Oral every 6 hours PRN Dyspepsia  ondansetron Injectable 4 milliGRAM(s) IV Push every 6 hours PRN Nausea and/or Vomiting  senna 2 Tablet(s) Oral at bedtime PRN for constipation        01-15-25 @ 07:01  -  01-16-25 @ 07:00  --------------------------------------------------------  IN: 480 mL / OUT: 585 mL / NET: -105 mL        PHYSICAL EXAM:  Vital Signs Last 24 Hrs  T(C): 36.4 (16 Jan 2025 04:39), Max: 36.6 (16 Jan 2025 03:50)  T(F): 97.6 (16 Jan 2025 04:39), Max: 97.8 (16 Jan 2025 03:50)  HR: 71 (16 Jan 2025 04:39) (68 - 90)  BP: 115/57 (16 Jan 2025 04:39) (114/48 - 154/64)  BP(mean): 70 (15 Jovanny 2025 21:40) (70 - 70)  RR: 19 (16 Jan 2025 04:39) (18 - 19)  SpO2: 90% (16 Jan 2025 04:39) (90% - 92%)    Parameters below as of 16 Jan 2025 04:39  Patient On (Oxygen Delivery Method): room air        CAPILLARY BLOOD GLUCOSE        I&O's Summary    15 Jovanny 2025 07:01  -  16 Jan 2025 07:00  --------------------------------------------------------  IN: 480 mL / OUT: 585 mL / NET: -105 mL    Gen: NAD, AOx3, able to make needs known, non-toxic  Head: NCAT  HEENT: EOMI, oral mucosa moist, normal conjunctiva, +JVD  Lung: CTAB, no respiratory distress, rales b/l, speaking in full sentences, 94 % on RA  CV: normal rate and rhythm, pitting edema b/l lower extremities up to middle of legs  Abd: non distended, soft, nontender, no guarding, no CVA tenderness  MSK: no visible deformities  Neuro: Appears non focal  Skin: Warm, well perfused, no rash  Psych: normal affect    LABS:                        14.1   11.03 )-----------( 62       ( 15 Jovanny 2025 06:37 )             43.6     01-15    126[L]  |  91[L]  |  38[H]  ----------------------------<  126[H]  4.6   |  19[L]  |  2.69[H]    Ca    8.8      15 Jovanny 2025 18:14  Phos  5.2     01-15  Mg     2.1     01-15            Urinalysis Basic - ( 15 Jovanny 2025 18:14 )    Color: x / Appearance: x / SG: x / pH: x  Gluc: 126 mg/dL / Ketone: x  / Bili: x / Urobili: x   Blood: x / Protein: x / Nitrite: x   Leuk Esterase: x / RBC: x / WBC x   Sq Epi: x / Non Sq Epi: x / Bacteria: x          IMAGING:    [X] All pertinent imaging reviewed by me

## 2025-01-17 LAB
ANION GAP SERPL CALC-SCNC: 14 MMOL/L — SIGNIFICANT CHANGE UP (ref 5–17)
BASOPHILS # BLD AUTO: 0.01 K/UL — SIGNIFICANT CHANGE UP (ref 0–0.2)
BASOPHILS NFR BLD AUTO: 0.1 % — SIGNIFICANT CHANGE UP (ref 0–2)
BUN SERPL-MCNC: 41 MG/DL — HIGH (ref 7–23)
CALCIUM SERPL-MCNC: 8.6 MG/DL — SIGNIFICANT CHANGE UP (ref 8.4–10.5)
CHLORIDE SERPL-SCNC: 95 MMOL/L — LOW (ref 96–108)
CO2 SERPL-SCNC: 22 MMOL/L — SIGNIFICANT CHANGE UP (ref 22–31)
CREAT SERPL-MCNC: 2.33 MG/DL — HIGH (ref 0.5–1.3)
EGFR: 27 ML/MIN/1.73M2 — LOW
EOSINOPHIL # BLD AUTO: 0.03 K/UL — SIGNIFICANT CHANGE UP (ref 0–0.5)
EOSINOPHIL NFR BLD AUTO: 0.2 % — SIGNIFICANT CHANGE UP (ref 0–6)
GLUCOSE SERPL-MCNC: 143 MG/DL — HIGH (ref 70–99)
HCT VFR BLD CALC: 38.6 % — LOW (ref 39–50)
HGB BLD-MCNC: 13.1 G/DL — SIGNIFICANT CHANGE UP (ref 13–17)
IMM GRANULOCYTES NFR BLD AUTO: 1.7 % — HIGH (ref 0–0.9)
LYMPHOCYTES # BLD AUTO: 1.26 K/UL — SIGNIFICANT CHANGE UP (ref 1–3.3)
LYMPHOCYTES # BLD AUTO: 10.2 % — LOW (ref 13–44)
MAGNESIUM SERPL-MCNC: 2 MG/DL — SIGNIFICANT CHANGE UP (ref 1.6–2.6)
MCHC RBC-ENTMCNC: 31.7 PG — SIGNIFICANT CHANGE UP (ref 27–34)
MCHC RBC-ENTMCNC: 33.9 G/DL — SIGNIFICANT CHANGE UP (ref 32–36)
MCV RBC AUTO: 93.5 FL — SIGNIFICANT CHANGE UP (ref 80–100)
MONOCYTES # BLD AUTO: 4.93 K/UL — HIGH (ref 0–0.9)
MONOCYTES NFR BLD AUTO: 39.9 % — HIGH (ref 2–14)
NEUTROPHILS # BLD AUTO: 5.91 K/UL — SIGNIFICANT CHANGE UP (ref 1.8–7.4)
NEUTROPHILS NFR BLD AUTO: 47.9 % — SIGNIFICANT CHANGE UP (ref 43–77)
NRBC # BLD: 0 /100 WBCS — SIGNIFICANT CHANGE UP (ref 0–0)
NRBC BLD-RTO: 0 /100 WBCS — SIGNIFICANT CHANGE UP (ref 0–0)
PHOSPHATE SERPL-MCNC: 2.8 MG/DL — SIGNIFICANT CHANGE UP (ref 2.5–4.5)
PLATELET # BLD AUTO: 56 K/UL — LOW (ref 150–400)
POTASSIUM SERPL-MCNC: 3.6 MMOL/L — SIGNIFICANT CHANGE UP (ref 3.5–5.3)
POTASSIUM SERPL-SCNC: 3.6 MMOL/L — SIGNIFICANT CHANGE UP (ref 3.5–5.3)
RBC # BLD: 4.13 M/UL — LOW (ref 4.2–5.8)
RBC # FLD: 13.2 % — SIGNIFICANT CHANGE UP (ref 10.3–14.5)
SODIUM SERPL-SCNC: 131 MMOL/L — LOW (ref 135–145)
WBC # BLD: 12.35 K/UL — HIGH (ref 3.8–10.5)
WBC # FLD AUTO: 12.35 K/UL — HIGH (ref 3.8–10.5)

## 2025-01-17 PROCEDURE — 99232 SBSQ HOSP IP/OBS MODERATE 35: CPT | Mod: GC

## 2025-01-17 PROCEDURE — 99233 SBSQ HOSP IP/OBS HIGH 50: CPT | Mod: GC

## 2025-01-17 RX ORDER — METOPROLOL SUCCINATE 25 MG
50 TABLET, EXTENDED RELEASE 24 HR ORAL DAILY
Refills: 0 | Status: DISCONTINUED | OUTPATIENT
Start: 2025-01-18 | End: 2025-01-22

## 2025-01-17 RX ORDER — SODIUM CHLORIDE 5 % 5 %
150 INTRAVENOUS SOLUTION INTRAVENOUS ONCE
Refills: 0 | Status: COMPLETED | OUTPATIENT
Start: 2025-01-17 | End: 2025-01-17

## 2025-01-17 RX ORDER — METOPROLOL SUCCINATE 25 MG
25 TABLET, EXTENDED RELEASE 24 HR ORAL ONCE
Refills: 0 | Status: COMPLETED | OUTPATIENT
Start: 2025-01-17 | End: 2025-01-17

## 2025-01-17 RX ORDER — BUMETANIDE 2 MG/1
2 TABLET ORAL ONCE
Refills: 0 | Status: COMPLETED | OUTPATIENT
Start: 2025-01-17 | End: 2025-01-17

## 2025-01-17 RX ORDER — AMIODARONE HYDROCHLORIDE 50 MG/ML
200 INJECTION, SOLUTION INTRAVENOUS DAILY
Refills: 0 | Status: DISCONTINUED | OUTPATIENT
Start: 2025-01-17 | End: 2025-01-23

## 2025-01-17 RX ORDER — BUMETANIDE 2 MG/1
2 TABLET ORAL EVERY 12 HOURS
Refills: 0 | Status: DISCONTINUED | OUTPATIENT
Start: 2025-01-18 | End: 2025-01-20

## 2025-01-17 RX ADMIN — PANTOPRAZOLE 40 MILLIGRAM(S): 20 TABLET, DELAYED RELEASE ORAL at 05:08

## 2025-01-17 RX ADMIN — APIXABAN 2.5 MILLIGRAM(S): 5 TABLET, FILM COATED ORAL at 05:08

## 2025-01-17 RX ADMIN — BUMETANIDE 2 MILLIGRAM(S): 2 TABLET ORAL at 11:20

## 2025-01-17 RX ADMIN — AMIODARONE HYDROCHLORIDE 200 MILLIGRAM(S): 50 INJECTION, SOLUTION INTRAVENOUS at 05:08

## 2025-01-17 RX ADMIN — Medication 25 MILLIGRAM(S): at 05:08

## 2025-01-17 RX ADMIN — BUMETANIDE 2 MILLIGRAM(S): 2 TABLET ORAL at 21:27

## 2025-01-17 RX ADMIN — APIXABAN 2.5 MILLIGRAM(S): 5 TABLET, FILM COATED ORAL at 17:15

## 2025-01-17 RX ADMIN — Medication 25 MILLIGRAM(S): at 17:15

## 2025-01-17 RX ADMIN — POLYETHYLENE GLYCOL 3350 17 GRAM(S): 17 POWDER, FOR SOLUTION ORAL at 21:23

## 2025-01-17 RX ADMIN — AMIODARONE HYDROCHLORIDE 200 MILLIGRAM(S): 50 INJECTION, SOLUTION INTRAVENOUS at 21:23

## 2025-01-17 RX ADMIN — Medication 2 TABLET(S): at 21:22

## 2025-01-17 RX ADMIN — FAMOTIDINE 20 MILLIGRAM(S): 10 INJECTION INTRAVENOUS at 21:23

## 2025-01-17 RX ADMIN — Medication 5 MILLIGRAM(S): at 21:22

## 2025-01-17 RX ADMIN — Medication 300 MILLILITER(S): at 17:17

## 2025-01-17 RX ADMIN — ATORVASTATIN CALCIUM 20 MILLIGRAM(S): 80 TABLET, FILM COATED ORAL at 21:23

## 2025-01-17 NOTE — PROGRESS NOTE ADULT - ASSESSMENT
84-year-old male PMH afib on eliquid, severe aortic stenosis (2/2 bicuspid aortic valve), s/p aortic valve replacement (7/2012), HTN, pHTN, HLD, chronic renal insufficiency, thrombocytopenia, GERD presenting from Dr. Ramirez office for tachycardia.     ITP   - Platelet range between 79-85K, follows with Dr. Junior at [facility].   - Prior work up generally speaking unremarkable. He has no evidence of a monoclonal gammopathy. Flow cytometry was with no abnormal immunophenotype. No evidence of B12 or folic acid deficiency. He has mild hemolysis of unclear etiology, although there is no anemia. Direct Onofre was negative. His KENNA was slightly elevated at 1:160.   - No intervention at this time given platelets are above 50K  - There is no absolute contraindication to amiodarone from a hematologic perspective, will monitor platelet count.     CHF exacerbation  - 1/8 CTA chest w/ no PE. Small bibasilar pleural effusions. Interlobular septal thickening, suggestive of interstitial edema  - Can continue the eliquis for afib for now but would hold if platelet count drops below 50.  - Management per cardiology, s/p DARA/cardioversion 1/14 w/ Severe mitral regurgitation and severe tricuspid regurgitation. Eventual AVR    PETER on CKD  - Renal US w/ no hydronephrosis, renal parenchymal disease. 0.9 x 0.5 x 1.1 cm right upper pole nonobstructing stone, similarly seen on CT from 1/17/2018. Left upper pole cyst 2.6 x 1.9 x 2.8 cm.  - Per nephrology, PETER on CKD in the setting of hypotension, afib, IV contrast. Possibly hemodynamically mediated ATN.   - Received Bumex, on 1L fluid restriction    Will continue to follow.    Tung Leo PA-C  Hematology/Oncology  [facility]  [phone] (office)  Evenings and weekends please call MD on call or office

## 2025-01-17 NOTE — PROGRESS NOTE ADULT - PROBLEM SELECTOR PLAN 7
-Labs notable for thrombocytopenia to 70 on admission (70-80 baseline platelets)  -Per outpatient oncologist, has known ITP    PLAN:  >c/w monitoring daily in the setting of amiodarone initiation; titrate amiodarone if Plt keeps dropping   >Hold eliquis if Plt <50  >f/u hematology recs

## 2025-01-17 NOTE — PROGRESS NOTE ADULT - PROBLEM SELECTOR PLAN 1
Patient with PETER on CKD in the setting of transient hypotension, hemodynamic effects from afib with RVR, and administration of IV contrast. On review of labs on [place] [facility], patient noted to have elevated Scr/episodes of PETER since 2017. Last Scr was 1.29 (eGFR 57) on 12/19/24. Admission Scr remained stable at 1.24 on 1/8/25, peaked at 1.61 on 1/11, and elevated/improved to 1.53 on 1/14. UACR elevated at 544 on 12/19/24. Patient underwent CT PE on 1/8/25. Received IV Lasix 20mg BID on 1/8 and 1/9. Underwent successful DARA cardioversion on 1/14. CT PE with exophytic L renal lesion and renal cysts. Kidney sonogram on 1/14 with no evidence of hydronephrosis, increased renal cortical echogenicity, non obstructing stone on R, and multiple B/L cysts.    Scr acutely jacobo from 1.5 (1/14) to 2.7 (1/15). Possibly hemodynamically mediated ATN. Volume overloaded on exam.  Echo: severe MR/TR  Continue on IV Bumex 2mg BID. Scr improved to 2.33 today. Non-oliguric UOP. Hyponatremia improved to 131.  Monitor labs and urine output. Maintain neg negative fluid status. 1L fluid restriction. Check daily standing weights. Avoid nephrotoxins. Dose medications as per eGFR.    If you have any questions, please feel free to contact me.  Jose Rodriguez MD  Nephrology Fellow  [phone] / [employer] Teams (Preferred)  (Please check the on-call schedule to reach the appropriate Nephrology Fellow). Patient with PETER on CKD in the setting of transient hypotension, hemodynamic effects from afib with RVR, and administration of IV contrast. On review of labs on [place] [facility], patient noted to have elevated Scr/episodes of PETER since 2017. Last Scr was 1.29 (eGFR 57) on 12/19/24. Admission Scr remained stable at 1.24 on 1/8/25, peaked at 1.61 on 1/11, and elevated/improved to 1.53 on 1/14. UACR elevated at 544 on 12/19/24. Patient underwent CT PE on 1/8/25. Received IV Lasix 20mg BID on 1/8 and 1/9. Underwent successful DARA cardioversion on 1/14. CT PE with exophytic L renal lesion and renal cysts. Kidney sonogram on 1/14 with no evidence of hydronephrosis, increased renal cortical echogenicity, non obstructing stone on R, and multiple B/L cysts.    Scr acutely jacobo from 1.5 (1/14) to 2.7 (1/15). Possibly hemodynamically mediated ATN. Remains volume overloaded on exam.  Echo: severe MR/TR  Continue on IV Bumex 2mg BID today. Would give additional 100 cc of 3%HTS again today prior to the PM bumex dose. Scr improved to 2.33 today. Non-oliguric UOP. Hyponatremia improved to 131.  Monitor labs and urine output. Maintain neg negative fluid status. 1L fluid restriction. Check daily standing weights. Avoid nephrotoxins. Dose medications as per eGFR.    If you have any questions, please feel free to contact me.  Jose Rodriguez MD  Nephrology Fellow  [email or web address] / [employer] Teams (Preferred)  (Please check the on-call schedule to reach the appropriate Nephrology Fellow).

## 2025-01-17 NOTE — PROGRESS NOTE ADULT - PROBLEM SELECTOR PLAN 1
-New PETER in setting of diuresis  -Creatinine: 1.24 -> 1.34 -> 1.50 -> 1.61 -> 1.60 -> 1.54 -> 1.53 -> 2.74 -> 2.89 -> 2.69 -> 2.40 -> 2.57  -Urine studies: Na of 33, Nitrogen of 707, creatinine of 114; FeUrea suggestive of intrinsic renal disease  -Urine studies (1/15): Na of 31, creatinine of 261, p/c ratio: 0.6, urine osmol: 489; FeNa suggestive of pre-renal  -Kidney/Bladder US: "No hydronephrosis. Increased renal cortical echogenicity, compatible with renal   parenchymal disease. 0.9 x 0.5 x 1.1 cm right upper pole nonobstructing stone, similarly seen on CT from 1/17/2018. Left upper pole cyst measuring 2.6 x 1.9 x 2.8 cm with rim calcifications is similarly seen on CT abdomen pelvis from 1/17/2018. Additional bilateral simple cysts as above."  -s/p Bumex 2mg IVP 1x on 1/16, Bumex 2mg IVP 2x on 1/17  -Ddx: ATN vs pre-renal    PLAN:  >c/w Bumex 2mg IVP as needed   >Consider repeat BMP  >c/w trending kidney function daily  >f/u nephro recs -New PETER in setting of diuresis  -Creatinine: 1.24 -> 1.34 -> 1.50 -> 1.61 -> 1.60 -> 1.54 -> 1.53 -> 2.74 -> 2.89 -> 2.69 -> 2.40 -> 2.57 -> 2.33  -Urine studies: Na of 33, Nitrogen of 707, creatinine of 114; FeUrea suggestive of intrinsic renal disease  -Urine studies (1/15): Na of 31, creatinine of 261, p/c ratio: 0.6, urine osmol: 489; FeNa suggestive of pre-renal  -Kidney/Bladder US: "No hydronephrosis. Increased renal cortical echogenicity, compatible with renal   parenchymal disease. 0.9 x 0.5 x 1.1 cm right upper pole nonobstructing stone, similarly seen on CT from 1/17/2018. Left upper pole cyst measuring 2.6 x 1.9 x 2.8 cm with rim calcifications is similarly seen on CT abdomen pelvis from 1/17/2018. Additional bilateral simple cysts as above."  -s/p Bumex 2mg IVP 1x on 1/16, Bumex 2mg IVP 2x on 1/17  -Ddx: ATN vs pre-renal    PLAN:  >c/w Bumex 2mg IVP today as needed  >c/w trending kidney function daily  >f/u nephro recs

## 2025-01-17 NOTE — PROGRESS NOTE ADULT - PROBLEM SELECTOR PLAN 2
-Na: 128 noted on labs 1/15  -Creatinine up-trending, patient looking more fluid overloaded  -Urine studies (1/15): Na of 31, creatinine of 261, p/c ratio: 0.6, urine osmol: 489; FeNa suggestive of pre-renal  -s/p 1x dose of hypertonic saline on 1/16  -Ddx: Hypervolemia & PETER    PLAN:  >c/w monitoring Na levels daily  >c/w fluid restriction  >c/w Bumex 2mg IVP as needed -Na: 128 noted on labs 1/15  -Creatinine up-trending, patient looking more fluid overloaded  -Urine studies (1/15): Na of 31, creatinine of 261, p/c ratio: 0.6, urine osmol: 489; FeNa suggestive of pre-renal  -s/p 1x dose of hypertonic saline on 1/16  -Ddx: Hypervolemia & PETER    PLAN:  >c/w monitoring Na levels daily  >c/w fluid restriction to 1L  >c/w Bumex 2mg IVP today as needed

## 2025-01-17 NOTE — PROGRESS NOTE ADULT - SUBJECTIVE AND OBJECTIVE BOX
[facility] Division of Kidney Diseases & Hypertension  FOLLOW UP NOTE  [phone]--------------------------------------------------------------------------------    Chief Complaint: PETER on CKD    24 hour events/subjective: Patient seen and examined today. Feels well. Reports LE swelling. Denies HA, fevers/chills, CP, abdominal pain.     PAST HISTORY  --------------------------------------------------------------------------------  No significant changes to PMH, PSH, FHx, SHx, unless otherwise noted    ALLERGIES & MEDICATIONS  --------------------------------------------------------------------------------  Allergies  No Known Allergies    Intolerances    Standing Inpatient Medications  aMIOdarone    Tablet 200 milliGRAM(s) Oral daily  apixaban 2.5 milliGRAM(s) Oral every 12 hours  atorvastatin 20 milliGRAM(s) Oral at bedtime  famotidine    Tablet 20 milliGRAM(s) Oral at bedtime  finasteride 5 milliGRAM(s) Oral daily  metoprolol succinate ER 25 milliGRAM(s) Oral daily  pantoprazole    Tablet 40 milliGRAM(s) Oral before breakfast  polyethylene glycol 3350 17 Gram(s) Oral at bedtime  senna 2 Tablet(s) Oral at bedtime    PRN Inpatient Medications  acetaminophen     Tablet .. 650 milliGRAM(s) Oral every 6 hours PRN  aluminum hydroxide/magnesium hydroxide/simethicone Suspension 30 milliLiter(s) Oral every 6 hours PRN  ondansetron Injectable 4 milliGRAM(s) IV Push every 6 hours PRN    REVIEW OF SYSTEMS  --------------------------------------------------------------------------------  Gen: No fevers/chills  Head/Eyes/Ears: No HA  Respiratory: +SOB improved  CV: No chest pain  GI: No abdominal pain, diarrhea  : No dysuria, hematuria  MSK: +edema  Heme: No easy bruising or bleeding  Psych: No significant depression    All other systems were reviewed and are negative, except as noted.    VITALS/PHYSICAL EXAM  --------------------------------------------------------------------------------  T(C): 36.9 (01-17-25 @ 11:10), Max: 36.9 (01-17-25 @ 11:10)  HR: 80 (01-17-25 @ 11:10) (65 - 108)  BP: 125/61 (01-17-25 @ 11:10) (116/54 - 130/61)  RR: 18 (01-17-25 @ 11:10) (17 - 18)  SpO2: 96% (01-17-25 @ 11:10) (90% - 96%)  Wt(kg): --    01-16-25 @ 07:01  -  01-17-25 @ 07:00  --------------------------------------------------------  IN: 600 mL / OUT: 1200 mL / NET: -600 mL    Physical Exam:  Gen: NAD  HEENT: MMM  Pulm: Minimal crackles in bases B/L  CV: S1S2  Abd: Soft, +BS   Ext: B/L LE edema  Neuro: Awake  Skin: Warm and dry    LABS/STUDIES  --------------------------------------------------------------------------------              13.1   12.35 >-----------<  56       [01-17-25 @ 07:00]              38.6     131  |  95  |  41  ----------------------------<  143      [01-17-25 @ 07:00]  3.6   |  22  |  2.33        Ca     8.6     [01-17-25 @ 07:00]      Mg     2.0     [01-17-25 @ 07:00]      Phos  2.8     [01-17-25 @ 07:00]    Creatinine Trend:  SCr 2.33 [01-17 @ 07:00]  SCr 2.57 [01-16 @ 17:38]  SCr 2.40 [01-16 @ 06:47]  SCr 2.69 [01-15 @ 18:14]  SCr 2.86 [01-15 @ 09:44]    Urine Creatinine 261      [01-15-25 @ 07:44]  Urine Protein 155      [01-15-25 @ 07:44]  Urine Sodium 31      [01-15-25 @ 07:44]  Urine Urea Nitrogen 686      [01-15-25 @ 07:57]  Urine Potassium 70      [01-15-25 @ 07:44]  Urine Osmolality 489      [01-15-25 @ 07:43]

## 2025-01-17 NOTE — PROGRESS NOTE ADULT - PROBLEM SELECTOR PLAN 4
-EKG on admission with sinus tachycardia to 125-128  -On home bisoprolol 17.5mg PO QD  -d/evelyn bisoprolol 17.5mg PO QD (1/11) per cardiology recs  -s/p DARA/cardioversion on 1/14  -s/p Digoxin on 1/12  -Pt is now sinus rhythm    PLAN:  >c/w home apixaban 2.5mg PO QD - hold if Plt<50  >c/w amiodarone 200mg QD; down-titrated (1/12 -     >c/w metoprolol ER 25mg QD per cardiology recs (1/13 -   >f/u cardiology recs  >f/u cardiology outpatient -EKG on admission with sinus tachycardia to 125-128  -On home bisoprolol 17.5mg PO QD  -d/evelyn bisoprolol 17.5mg PO QD (1/11) per cardiology recs  -s/p DARA/cardioversion on 1/14  -s/p Digoxin on 1/12  -Patient is now back in AFib on 1/17    PLAN:  >c/w home apixaban 2.5mg PO QD - hold if Plt<50  >c/w amiodarone 200mg QD; down-titrated (1/12 -     >c/w metoprolol ER 25mg QD per cardiology recs; consider up-titrating (1/13 -   >f/u cardiology recs  >f/u cardiology outpatient

## 2025-01-17 NOTE — PROGRESS NOTE ADULT - PROBLEM SELECTOR PLAN 5
-On home bisoprolol 17.5mg PO QD  -d/evelyn bisoprolol 17.5mg PO QD (1/11) per cardiology recs  -On home amlodipine 5mg PO QD    PLAN:  >c/w metoprolol ER 25mg QD (1/13- per cardiology recs  >f/u cardiology recs -On home bisoprolol 17.5mg PO QD  -d/evelyn bisoprolol 17.5mg PO QD (1/11) per cardiology recs  -On home amlodipine 5mg PO QD    PLAN:  >c/w metoprolol ER 25mg QD; consider up-titrating  (1/13- per cardiology recs  >f/u cardiology recs

## 2025-01-17 NOTE — PROGRESS NOTE ADULT - SUBJECTIVE AND OBJECTIVE BOX
Patient is a 84y old  Male who presents with a chief complaint of CHF (13 Jan 2025 10:22)    Patient seen and examined at bedside, no acute complaints. Platelets 56K.    MEDICATIONS  (STANDING):  aMIOdarone    Tablet 200 milliGRAM(s) Oral daily  apixaban 2.5 milliGRAM(s) Oral every 12 hours  atorvastatin 20 milliGRAM(s) Oral at bedtime  famotidine    Tablet 20 milliGRAM(s) Oral at bedtime  finasteride 5 milliGRAM(s) Oral daily  metoprolol succinate ER 25 milliGRAM(s) Oral daily  pantoprazole    Tablet 40 milliGRAM(s) Oral before breakfast  polyethylene glycol 3350 17 Gram(s) Oral at bedtime  senna 2 Tablet(s) Oral at bedtime    MEDICATIONS  (PRN):  acetaminophen     Tablet .. 650 milliGRAM(s) Oral every 6 hours PRN Temp greater or equal to 38C (100.4F), Mild Pain (1 - 3)  aluminum hydroxide/magnesium hydroxide/simethicone Suspension 30 milliLiter(s) Oral every 6 hours PRN Dyspepsia  ondansetron Injectable 4 milliGRAM(s) IV Push every 6 hours PRN Nausea and/or Vomiting    Vital Signs Last 24 Hrs  T(C): 36.9 (17 Jan 2025 11:10), Max: 36.9 (17 Jan 2025 11:10)  T(F): 98.4 (17 Jan 2025 11:10), Max: 98.4 (17 Jan 2025 11:10)  HR: 80 (17 Jan 2025 11:10) (80 - 108)  BP: 125/61 (17 Jan 2025 11:10) (120/65 - 130/61)  BP(mean): 84 (16 Jan 2025 21:15) (84 - 84)  RR: 18 (17 Jan 2025 11:10) (18 - 18)  SpO2: 96% (17 Jan 2025 11:10) (90% - 96%)    Parameters below as of 17 Jan 2025 11:10  Patient On (Oxygen Delivery Method): room air    PE  NAD  Awake, alert  Anicteric, MMM  RRR  CTAB  Abd soft, NT, ND  No c/c/e  No rash grossly                        13.1   12.35 )-----------( 56       ( 17 Jan 2025 07:00 )             38.6       01-17    131[L]  |  95[L]  |  41[H]  ----------------------------<  143[H]  3.6   |  22  |  2.33[H]    Ca    8.6      17 Jan 2025 07:00  Phos  2.8     01-17  Mg     2.0     01-17

## 2025-01-17 NOTE — PROGRESS NOTE ADULT - ATTENDING COMMENTS
84M w/pmh chronic atrial fibrillation, CKD 3a, severe aortic stenosis s/p aortic valve replacement (7/2012), HTN, pHTN, HLD, thrombocytopenia, GERD presenting from PCP's office for cc tachycardia, JOSEPH. Found to have volume overload and atrial fibrillation w/RVR.     1. Acute on chronic right side systolic heart failure  2. chronic atrial fibrillation w RVR  3. pHTN  4. PETER on CKD 3 due to cardiorenal syndrome  5. Chronic thrombocytopenia  6. Moderate hyponatremia  7. 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 However 1/15 he developed acute volume overload again with PETER. 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

## 2025-01-17 NOTE — PROGRESS NOTE ADULT - PROBLEM SELECTOR PLAN 3
-Hx of HTN and pHTN  -Troponin 54->59  -Pro-BNP of 3624.   -Nuclear stress test (11/2023): small sized mild defect(s) in the basal inferolateral wall that is reversible consistent with ischemia  -TTE (11/2024): "EF is approximately 55%. Mild concentric left ventricular hypertrophy. Right ventricular enlargement with mildly decreased right ventricular systolic function. Left atrium is severely dilated. Right atrium is mildly dilated. Moderate mitral regurgitation. Mitral annular calcification with thickened and mildly calcified mitral valve leaflets. There is mild prolapse of the posterior mitral valve leaflet. Bioprosthetic aortic valve replacement. Aortic root at the sinuses of Valsalva is borderline dilated. Ascending aorta is mildly dilated. Estimated pulmonary artery systolic pressure is 50 mmHg. Moderate to severe tricuspid regurgitation"  -CXR (1/8): b/l lower atelectasis vs pneumonia  -CTA (1/8): "Small bibasilar pleural effusions. Interlobular septal thickening, suggestive of interstitial edema."   -ER POCUS TTE (1/8): trace pericardial effusion, global LV hypokinesis, b/l lungs with B-lines and trace pleural effusions.  -s/p lasix 20mg IV BID X 2 Days  -TTE (1/10): LVEF 60-65%, mildly enlarged right ventricular cavity, borderline reduced ventricular systolic function, pHTN 57 mmHg, moderate tricuspid regurgitation.   -DARA (1/14): normal LVEF, severe mitral/tricuspid regurgitation, aortic valve AS moderate-severe, intravalvular regurgitation   -Dyspnea initially improved from admission, now worsened    PLAN:  >c/w Bumex 2mg IVP as needed   >c/w daily standing weights, strict ins/outs  >f/u cardiology outpatient  >Would likely benefit from starting PO lasix on d/c -Hx of HTN and pHTN  -Troponin 54->59  -Pro-BNP of 3624.   -Nuclear stress test (11/2023): small sized mild defect(s) in the basal inferolateral wall that is reversible consistent with ischemia  -TTE (11/2024): "EF is approximately 55%. Mild concentric left ventricular hypertrophy. Right ventricular enlargement with mildly decreased right ventricular systolic function. Left atrium is severely dilated. Right atrium is mildly dilated. Moderate mitral regurgitation. Mitral annular calcification with thickened and mildly calcified mitral valve leaflets. There is mild prolapse of the posterior mitral valve leaflet. Bioprosthetic aortic valve replacement. Aortic root at the sinuses of Valsalva is borderline dilated. Ascending aorta is mildly dilated. Estimated pulmonary artery systolic pressure is 50 mmHg. Moderate to severe tricuspid regurgitation"  -CXR (1/8): b/l lower atelectasis vs pneumonia  -CTA (1/8): "Small bibasilar pleural effusions. Interlobular septal thickening, suggestive of interstitial edema."   -ER POCUS TTE (1/8): trace pericardial effusion, global LV hypokinesis, b/l lungs with B-lines and trace pleural effusions.  -s/p lasix 20mg IV BID X 2 Days  -TTE (1/10): LVEF 60-65%, mildly enlarged right ventricular cavity, borderline reduced ventricular systolic function, pHTN 57 mmHg, moderate tricuspid regurgitation.   -DARA (1/14): normal LVEF, severe mitral/tricuspid regurgitation, aortic valve AS moderate-severe, intravalvular regurgitation   -Dyspnea initially improved from admission, now worsened    PLAN:  >c/w Bumex 2mg IVP today as needed  >c/w daily standing weights, strict ins/outs  >f/u cardiology outpatient  >Would likely benefit from starting PO lasix on d/c

## 2025-01-17 NOTE — PROGRESS NOTE ADULT - SUBJECTIVE AND OBJECTIVE BOX
Date of Service: 01-17-25 @ 08:08           CARDIOLOGY     PROGRESS  NOTE   ________________________________________________    CHIEF COMPLAINT:Patient is a 84y old  Male who presents with a chief complaint of CHF (13 Jan 2025 10:22)  doing better  	  REVIEW OF SYSTEMS:  CONSTITUTIONAL: No fever, weight loss, or fatigue  EYES: No eye pain, visual disturbances, or discharge  ENT:  No difficulty hearing, tinnitus, vertigo; No sinus or throat pain  NECK: No pain or stiffness  RESPIRATORY: No cough, wheezing, chills or hemoptysis; No Shortness of Breath  CARDIOVASCULAR: No chest pain, palpitations, passing out, dizziness, or leg swelling  GASTROINTESTINAL: No abdominal or epigastric pain. No nausea, vomiting, or hematemesis; No diarrhea or constipation. No melena or hematochezia.  GENITOURINARY: No dysuria, frequency, hematuria, or incontinence  NEUROLOGICAL: No headaches, memory loss, loss of strength, numbness, or tremors  SKIN: No itching, burning, rashes, or lesions   LYMPH Nodes: No enlarged glands  ENDOCRINE: No heat or cold intolerance; No hair loss  MUSCULOSKELETAL: No joint pain or swelling; No muscle, back, or extremity pain  PSYCHIATRIC: No depression, anxiety, mood swings, or difficulty sleeping  HEME/LYMPH: No easy bruising, or bleeding gums  ALLERGY AND IMMUNOLOGIC: No hives or eczema	    [x ] All others negative	  [ ] Unable to obtain    PHYSICAL EXAM:  T(C): 36.5 (01-17-25 @ 05:04), Max: 36.6 (01-16-25 @ 12:32)  HR: 108 (01-17-25 @ 05:04) (65 - 108)  BP: 121/57 (01-17-25 @ 05:04) (116/54 - 130/61)  RR: 18 (01-17-25 @ 05:04) (17 - 18)  SpO2: 91% (01-17-25 @ 05:04) (90% - 91%)  Wt(kg): --  I&O's Summary    16 Jan 2025 07:01  -  17 Jan 2025 07:00  --------------------------------------------------------  IN: 600 mL / OUT: 1200 mL / NET: -600 mL        Appearance: Normal	  HEENT:   Normal oral mucosa, PERRL, EOMI	  Lymphatic: No lymphadenopathy  Cardiovascular: Normal S1 S2, No JVD,+murmurs, + edema  Respiratory: rhonchi  Psychiatry: A & O x 3, Mood & affect appropriate  Gastrointestinal:  Soft, Non-tender, + BS	  Skin: No rashes, No ecchymoses, No cyanosis	  Neurologic: Non-focal  Extremities: Normal range of motion, No clubbing, cyanosis , + decrease edema  Vascular: Peripheral pulses palpable 2+ bilaterally    MEDICATIONS  (STANDING):  aMIOdarone    Tablet 200 milliGRAM(s) Oral daily  apixaban 2.5 milliGRAM(s) Oral every 12 hours  atorvastatin 20 milliGRAM(s) Oral at bedtime  famotidine    Tablet 20 milliGRAM(s) Oral at bedtime  finasteride 5 milliGRAM(s) Oral daily  metoprolol succinate ER 25 milliGRAM(s) Oral daily  pantoprazole    Tablet 40 milliGRAM(s) Oral before breakfast  polyethylene glycol 3350 17 Gram(s) Oral at bedtime  senna 2 Tablet(s) Oral at bedtime      TELEMETRY: 	    ECG:  	  RADIOLOGY:  OTHER: 	  	  LABS:	 	    CARDIAC MARKERS:                            13.1   12.35 )-----------( 56       ( 17 Jan 2025 07:00 )             38.6     01-17    131[L]  |  95[L]  |  41[H]  ----------------------------<  143[H]  3.6   |  22  |  2.33[H]    Ca    8.6      17 Jan 2025 07:00  Phos  2.8     01-17  Mg     2.0     01-17      proBNP:   Lipid Profile:   HgA1c:   TSH:         Assessment and plan  ---------------------------  84-year-old male PMH afib on eliquid, severe aortic stenosis (2/2 bicuspid aortic valve), s/p aortic valve replacement (7/2012), HTN, pHTN, HLD, chronic renal insufficiency, thrombocytopenia, GERD presenting from Dr. Ramirze office for tachycardia. Patient had COVID 2 weeks ago which he was treated with paxlovid for. Subsequently, the patient developed some wheezing and was treated with doxycycline and steroids from an urgent care with improvement in symptoms. The patient continued to feel weak however, and dyspneic on exertion. The patient notes 10 days of leg swelling, with dyspnea on exertion starting 3 days ago with inability to tolerate more than 2-3 steps (baseline does not ambulate much). Of note, the patient experienced some nausea a few days prior and vomited after getting into his car.   The patient went to visit his PCP today for the dyspnea and leg swelling; was noted to be tachycardic to 130 BPM and was sent to ER for further evaluation.   Pt did not take home bisoprolol and eliquis prior to admission. Pt denies chest pain, shortness of breath, n/v/d/, fevers or chills, urinary sx, headache, visual changes, numbness or other complaints.  ppt with sig cardiac and medical hx with rapid hr, sob and le edema  cta of chest no PE, but increase interstitial marking  cw chf.  s/p AVR overall mild regurgitation ,TILA 1.44 cn2  will need to add diuresis sec to RV dysfunction , will add midodrine if bp can not tolerate  will discuss with family  dc bisprolol for now, will add metoprolol er 25 mg daily as needed  repeat chest x ray to control HR   thrombocytopenia ?etiology  tele a.fib hr 80 to 120, will increase metoprolol er as tolerated  will consider DARA/  cardioversion after small amio load in am mhr has much improved, will increase beta blocker if hr elevated  discussed with daughter agreed with cardioversion  HR is controlled  acute renal failure??   s/p DARA cardioversion remain in NSR, check bladder scan plan as per renal  nephrology noted pt did not get contrast for the procedure or DARA  pt will eventually needs TAVR with sig AI on the bioprosthetic valve in 2012  will switch amio to 200 mg daily today, pt converted to a.fib at 4 52 am   continue current meds  PETER ?etiology, renal appreciated  continue  ac may needs to increase beta blocker to bid if need to control hr  pt needs AVR eventually

## 2025-01-17 NOTE — PROGRESS NOTE ADULT - SUBJECTIVE AND OBJECTIVE BOX
SUBJECTIVE / OVERNIGHT EVENTS:  Pt seen and examined at bedside. VIKTOR.    MEDICATIONS  (STANDING):  aMIOdarone    Tablet 200 milliGRAM(s) Oral two times a day  apixaban 2.5 milliGRAM(s) Oral every 12 hours  atorvastatin 20 milliGRAM(s) Oral at bedtime  famotidine    Tablet 20 milliGRAM(s) Oral at bedtime  finasteride 5 milliGRAM(s) Oral daily  metoprolol succinate ER 25 milliGRAM(s) Oral daily  pantoprazole    Tablet 40 milliGRAM(s) Oral before breakfast  polyethylene glycol 3350 17 Gram(s) Oral at bedtime  senna 2 Tablet(s) Oral at bedtime    MEDICATIONS  (PRN):  acetaminophen     Tablet .. 650 milliGRAM(s) Oral every 6 hours PRN Temp greater or equal to 38C (100.4F), Mild Pain (1 - 3)  aluminum hydroxide/magnesium hydroxide/simethicone Suspension 30 milliLiter(s) Oral every 6 hours PRN Dyspepsia  ondansetron Injectable 4 milliGRAM(s) IV Push every 6 hours PRN Nausea and/or Vomiting        01-16-25 @ 07:01  -  01-17-25 @ 07:00  --------------------------------------------------------  IN: 600 mL / OUT: 1200 mL / NET: -600 mL        PHYSICAL EXAM:  Vital Signs Last 24 Hrs  T(C): 36.5 (17 Jan 2025 05:04), Max: 36.6 (16 Jan 2025 12:32)  T(F): 97.7 (17 Jan 2025 05:04), Max: 97.9 (16 Jan 2025 12:32)  HR: 108 (17 Jan 2025 05:04) (65 - 108)  BP: 121/57 (17 Jan 2025 05:04) (116/54 - 130/61)  BP(mean): 84 (16 Jan 2025 21:15) (84 - 84)  RR: 18 (17 Jan 2025 05:04) (17 - 18)  SpO2: 91% (17 Jan 2025 05:04) (90% - 91%)    Parameters below as of 17 Jan 2025 05:04  Patient On (Oxygen Delivery Method): room air        CAPILLARY BLOOD GLUCOSE        I&O's Summary    16 Jan 2025 07:01  -  17 Jan 2025 07:00  --------------------------------------------------------  IN: 600 mL / OUT: 1200 mL / NET: -600 mL    Gen: NAD, AOx3, able to make needs known, non-toxic  Head: NCAT  HEENT: EOMI, oral mucosa moist, normal conjunctiva, +JVD  Lung: CTAB, no respiratory distress, rales b/l, speaking in full sentences, 94 % on RA  CV: normal rate and rhythm, pitting edema b/l lower extremities up to middle of legs  Abd: non distended, soft, nontender, no guarding, no CVA tenderness  MSK: no visible deformities  Neuro: Appears non focal  Skin: Warm, well perfused, no rash  Psych: normal affect    LABS:                        13.1   12.35 )-----------( 56       ( 17 Jan 2025 07:00 )             38.6     01-16    126[L]  |  94[L]  |  44[H]  ----------------------------<  145[H]  4.6   |  19[L]  |  2.57[H]    Ca    8.7      16 Jan 2025 17:38  Phos  3.3     01-16  Mg     2.1     01-16            Urinalysis Basic - ( 16 Jan 2025 17:38 )    Color: x / Appearance: x / SG: x / pH: x  Gluc: 145 mg/dL / Ketone: x  / Bili: x / Urobili: x   Blood: x / Protein: x / Nitrite: x   Leuk Esterase: x / RBC: x / WBC x   Sq Epi: x / Non Sq Epi: x / Bacteria: x          IMAGING:    [X] All pertinent imaging reviewed by me SUBJECTIVE / OVERNIGHT EVENTS:  Pt seen and examined at bedside. VIKTOR. Reports improved dyspnea on exertion, though still has cough when supine. Standing weights decreasing, s/p bumex IV 2mg x2 yesterday. Overall volume status is improving.    MEDICATIONS  (STANDING):  aMIOdarone    Tablet 200 milliGRAM(s) Oral two times a day  apixaban 2.5 milliGRAM(s) Oral every 12 hours  atorvastatin 20 milliGRAM(s) Oral at bedtime  famotidine    Tablet 20 milliGRAM(s) Oral at bedtime  finasteride 5 milliGRAM(s) Oral daily  metoprolol succinate ER 25 milliGRAM(s) Oral daily  pantoprazole    Tablet 40 milliGRAM(s) Oral before breakfast  polyethylene glycol 3350 17 Gram(s) Oral at bedtime  senna 2 Tablet(s) Oral at bedtime    MEDICATIONS  (PRN):  acetaminophen     Tablet .. 650 milliGRAM(s) Oral every 6 hours PRN Temp greater or equal to 38C (100.4F), Mild Pain (1 - 3)  aluminum hydroxide/magnesium hydroxide/simethicone Suspension 30 milliLiter(s) Oral every 6 hours PRN Dyspepsia  ondansetron Injectable 4 milliGRAM(s) IV Push every 6 hours PRN Nausea and/or Vomiting        01-16-25 @ 07:01  -  01-17-25 @ 07:00  --------------------------------------------------------  IN: 600 mL / OUT: 1200 mL / NET: -600 mL        PHYSICAL EXAM:  Vital Signs Last 24 Hrs  T(C): 36.5 (17 Jan 2025 05:04), Max: 36.6 (16 Jan 2025 12:32)  T(F): 97.7 (17 Jan 2025 05:04), Max: 97.9 (16 Jan 2025 12:32)  HR: 108 (17 Jan 2025 05:04) (65 - 108)  BP: 121/57 (17 Jan 2025 05:04) (116/54 - 130/61)  BP(mean): 84 (16 Jan 2025 21:15) (84 - 84)  RR: 18 (17 Jan 2025 05:04) (17 - 18)  SpO2: 91% (17 Jan 2025 05:04) (90% - 91%)    Parameters below as of 17 Jan 2025 05:04  Patient On (Oxygen Delivery Method): room air        CAPILLARY BLOOD GLUCOSE        I&O's Summary    16 Jan 2025 07:01  -  17 Jan 2025 07:00  --------------------------------------------------------  IN: 600 mL / OUT: 1200 mL / NET: -600 mL    Gen: NAD, AOx3, able to make needs known, non-toxic  Head: NCAT  HEENT: EOMI, oral mucosa moist, normal conjunctiva, +JVD  Lung: CTAB, no respiratory distress, faint rales b/l at bases, speaking in full sentences, 94 % on RA  CV: normal rate and rhythm, pitting edema b/l lower extremities up to middle of legs  Abd: non distended, soft, nontender, no guarding, no CVA tenderness  MSK: no visible deformities  Neuro: Appears non focal  Skin: Warm, well perfused, no rash  Psych: normal affect    LABS:                        13.1   12.35 )-----------( 56       ( 17 Jan 2025 07:00 )             38.6     01-16    126[L]  |  94[L]  |  44[H]  ----------------------------<  145[H]  4.6   |  19[L]  |  2.57[H]    Ca    8.7      16 Jan 2025 17:38  Phos  3.3     01-16  Mg     2.1     01-16            Urinalysis Basic - ( 16 Jan 2025 17:38 )    Color: x / Appearance: x / SG: x / pH: x  Gluc: 145 mg/dL / Ketone: x  / Bili: x / Urobili: x   Blood: x / Protein: x / Nitrite: x   Leuk Esterase: x / RBC: x / WBC x   Sq Epi: x / Non Sq Epi: x / Bacteria: x          IMAGING:    [X] All pertinent imaging reviewed by me

## 2025-01-17 NOTE — PROGRESS NOTE ADULT - PROBLEM SELECTOR PLAN 10
DVT ppx: Eliquis 2.5mg BID - hold if Plt <50  Diet: DASH/TLC, low sodium  Dispo: Active, pending resolution of PETER DVT ppx: Eliquis 2.5mg BID - hold if Plt <50  Diet: DASH/TLC, low sodium, fluid restriction to 1L  Dispo: Active, pending resolution of PETER and volume status

## 2025-01-18 LAB
ANION GAP SERPL CALC-SCNC: 15 MMOL/L — SIGNIFICANT CHANGE UP (ref 5–17)
BASOPHILS # BLD AUTO: 0.02 K/UL — SIGNIFICANT CHANGE UP (ref 0–0.2)
BASOPHILS NFR BLD AUTO: 0.2 % — SIGNIFICANT CHANGE UP (ref 0–2)
BUN SERPL-MCNC: 35 MG/DL — HIGH (ref 7–23)
CALCIUM SERPL-MCNC: 8.8 MG/DL — SIGNIFICANT CHANGE UP (ref 8.4–10.5)
CHLORIDE SERPL-SCNC: 98 MMOL/L — SIGNIFICANT CHANGE UP (ref 96–108)
CO2 SERPL-SCNC: 23 MMOL/L — SIGNIFICANT CHANGE UP (ref 22–31)
CREAT SERPL-MCNC: 2.03 MG/DL — HIGH (ref 0.5–1.3)
EGFR: 32 ML/MIN/1.73M2 — LOW
EOSINOPHIL # BLD AUTO: 0.03 K/UL — SIGNIFICANT CHANGE UP (ref 0–0.5)
EOSINOPHIL NFR BLD AUTO: 0.2 % — SIGNIFICANT CHANGE UP (ref 0–6)
GLUCOSE SERPL-MCNC: 88 MG/DL — SIGNIFICANT CHANGE UP (ref 70–99)
HCT VFR BLD CALC: 40.7 % — SIGNIFICANT CHANGE UP (ref 39–50)
HGB BLD-MCNC: 14 G/DL — SIGNIFICANT CHANGE UP (ref 13–17)
IMM GRANULOCYTES NFR BLD AUTO: 2.4 % — HIGH (ref 0–0.9)
LYMPHOCYTES # BLD AUTO: 1.27 K/UL — SIGNIFICANT CHANGE UP (ref 1–3.3)
LYMPHOCYTES # BLD AUTO: 10.4 % — LOW (ref 13–44)
MAGNESIUM SERPL-MCNC: 1.9 MG/DL — SIGNIFICANT CHANGE UP (ref 1.6–2.6)
MCHC RBC-ENTMCNC: 32 PG — SIGNIFICANT CHANGE UP (ref 27–34)
MCHC RBC-ENTMCNC: 34.4 G/DL — SIGNIFICANT CHANGE UP (ref 32–36)
MCV RBC AUTO: 93.1 FL — SIGNIFICANT CHANGE UP (ref 80–100)
MONOCYTES # BLD AUTO: 5.71 K/UL — HIGH (ref 0–0.9)
MONOCYTES NFR BLD AUTO: 46.8 % — HIGH (ref 2–14)
NEUTROPHILS # BLD AUTO: 4.89 K/UL — SIGNIFICANT CHANGE UP (ref 1.8–7.4)
NEUTROPHILS NFR BLD AUTO: 40 % — LOW (ref 43–77)
NRBC # BLD: 0 /100 WBCS — SIGNIFICANT CHANGE UP (ref 0–0)
NRBC BLD-RTO: 0 /100 WBCS — SIGNIFICANT CHANGE UP (ref 0–0)
PHOSPHATE SERPL-MCNC: 3.1 MG/DL — SIGNIFICANT CHANGE UP (ref 2.5–4.5)
PLATELET # BLD AUTO: 60 K/UL — LOW (ref 150–400)
POTASSIUM SERPL-MCNC: 4.2 MMOL/L — SIGNIFICANT CHANGE UP (ref 3.5–5.3)
POTASSIUM SERPL-SCNC: 4.2 MMOL/L — SIGNIFICANT CHANGE UP (ref 3.5–5.3)
RBC # BLD: 4.37 M/UL — SIGNIFICANT CHANGE UP (ref 4.2–5.8)
RBC # FLD: 13.2 % — SIGNIFICANT CHANGE UP (ref 10.3–14.5)
SODIUM SERPL-SCNC: 136 MMOL/L — SIGNIFICANT CHANGE UP (ref 135–145)
WBC # BLD: 12.21 K/UL — HIGH (ref 3.8–10.5)
WBC # FLD AUTO: 12.21 K/UL — HIGH (ref 3.8–10.5)

## 2025-01-18 PROCEDURE — 99233 SBSQ HOSP IP/OBS HIGH 50: CPT

## 2025-01-18 RX ORDER — PHENOL 1.4 %
1 AEROSOL, SPRAY (ML) MUCOUS MEMBRANE ONCE
Refills: 0 | Status: COMPLETED | OUTPATIENT
Start: 2025-01-18 | End: 2025-01-18

## 2025-01-18 RX ADMIN — Medication 2 TABLET(S): at 21:36

## 2025-01-18 RX ADMIN — FAMOTIDINE 20 MILLIGRAM(S): 10 INJECTION INTRAVENOUS at 21:36

## 2025-01-18 RX ADMIN — Medication 50 MILLIGRAM(S): at 05:33

## 2025-01-18 RX ADMIN — Medication 5 MILLIGRAM(S): at 21:36

## 2025-01-18 RX ADMIN — AMIODARONE HYDROCHLORIDE 200 MILLIGRAM(S): 50 INJECTION, SOLUTION INTRAVENOUS at 05:33

## 2025-01-18 RX ADMIN — BUMETANIDE 2 MILLIGRAM(S): 2 TABLET ORAL at 05:37

## 2025-01-18 RX ADMIN — BUMETANIDE 2 MILLIGRAM(S): 2 TABLET ORAL at 17:34

## 2025-01-18 RX ADMIN — APIXABAN 2.5 MILLIGRAM(S): 5 TABLET, FILM COATED ORAL at 17:34

## 2025-01-18 RX ADMIN — PANTOPRAZOLE 40 MILLIGRAM(S): 20 TABLET, DELAYED RELEASE ORAL at 05:33

## 2025-01-18 RX ADMIN — APIXABAN 2.5 MILLIGRAM(S): 5 TABLET, FILM COATED ORAL at 05:33

## 2025-01-18 RX ADMIN — ATORVASTATIN CALCIUM 20 MILLIGRAM(S): 80 TABLET, FILM COATED ORAL at 21:36

## 2025-01-18 RX ADMIN — Medication 1 LOZENGE: at 22:28

## 2025-01-18 NOTE — PROGRESS NOTE ADULT - SUBJECTIVE AND OBJECTIVE BOX
SUBJECTIVE / OVERNIGHT EVENTS:  Pt seen and examined at bedside. VITKOR.    MEDICATIONS  (STANDING):  aMIOdarone    Tablet 200 milliGRAM(s) Oral daily  apixaban 2.5 milliGRAM(s) Oral every 12 hours  atorvastatin 20 milliGRAM(s) Oral at bedtime  buMETAnide Injectable 2 milliGRAM(s) IV Push every 12 hours  famotidine    Tablet 20 milliGRAM(s) Oral at bedtime  finasteride 5 milliGRAM(s) Oral daily  metoprolol succinate ER 50 milliGRAM(s) Oral daily  pantoprazole    Tablet 40 milliGRAM(s) Oral before breakfast  polyethylene glycol 3350 17 Gram(s) Oral at bedtime  senna 2 Tablet(s) Oral at bedtime    MEDICATIONS  (PRN):  acetaminophen     Tablet .. 650 milliGRAM(s) Oral every 6 hours PRN Temp greater or equal to 38C (100.4F), Mild Pain (1 - 3)  aluminum hydroxide/magnesium hydroxide/simethicone Suspension 30 milliLiter(s) Oral every 6 hours PRN Dyspepsia  ondansetron Injectable 4 milliGRAM(s) IV Push every 6 hours PRN Nausea and/or Vomiting        01-17-25 @ 07:01  -  01-18-25 @ 07:00  --------------------------------------------------------  IN: 960 mL / OUT: 3850 mL / NET: -2890 mL        PHYSICAL EXAM:  Vital Signs Last 24 Hrs  T(C): 36.7 (18 Jan 2025 05:11), Max: 36.9 (17 Jan 2025 11:10)  T(F): 98.1 (18 Jan 2025 05:11), Max: 98.4 (17 Jan 2025 11:10)  HR: 107 (18 Jan 2025 05:11) (80 - 110)  BP: 131/76 (18 Jan 2025 05:11) (112/70 - 131/76)  BP(mean): --  RR: 18 (18 Jan 2025 05:11) (18 - 18)  SpO2: 92% (18 Jan 2025 05:11) (91% - 96%)    Parameters below as of 18 Jan 2025 05:11  Patient On (Oxygen Delivery Method): room air        CAPILLARY BLOOD GLUCOSE        I&O's Summary    17 Jan 2025 07:01  -  18 Jan 2025 07:00  --------------------------------------------------------  IN: 960 mL / OUT: 3850 mL / NET: -2890 mL      Gen: NAD, AOx3, able to make needs known, non-toxic  Head: NCAT  HEENT: EOMI, oral mucosa moist, normal conjunctiva, +JVD  Lung: CTAB, no respiratory distress, faint rales b/l at bases, speaking in full sentences, 94 % on RA  CV: normal rate and rhythm, pitting edema b/l lower extremities up to middle of legs  Abd: non distended, soft, nontender, no guarding, no CVA tenderness  MSK: no visible deformities  Neuro: Appears non focal  Skin: Warm, well perfused, no rash  Psych: normal affect    LABS:                        14.0   12.21 )-----------( 60       ( 18 Jan 2025 06:49 )             40.7     01-17    131[L]  |  95[L]  |  41[H]  ----------------------------<  143[H]  3.6   |  22  |  2.33[H]    Ca    8.6      17 Jan 2025 07:00  Phos  2.8     01-17  Mg     2.0     01-17            Urinalysis Basic - ( 17 Jan 2025 07:00 )    Color: x / Appearance: x / SG: x / pH: x  Gluc: 143 mg/dL / Ketone: x  / Bili: x / Urobili: x   Blood: x / Protein: x / Nitrite: x   Leuk Esterase: x / RBC: x / WBC x   Sq Epi: x / Non Sq Epi: x / Bacteria: x          IMAGING:    [X] All pertinent imaging reviewed by me

## 2025-01-18 NOTE — PROGRESS NOTE ADULT - PROBLEM SELECTOR PLAN 5
-On home bisoprolol 17.5mg PO QD  -d/evelyn bisoprolol 17.5mg PO QD (1/11) per cardiology recs  -On home amlodipine 5mg PO QD    PLAN:  >c/w metoprolol ER 50mg QD per cardiology recs; up-titrated (1/13 -   >f/u cardiology recs

## 2025-01-18 NOTE — PROGRESS NOTE ADULT - PROBLEM SELECTOR PLAN 4
-EKG on admission with sinus tachycardia to 125-128  -On home bisoprolol 17.5mg PO QD  -d/evelyn bisoprolol 17.5mg PO QD (1/11) per cardiology recs  -s/p DARA/cardioversion on 1/14  -s/p Digoxin on 1/12  -Patient is now back in AFib on 1/17    PLAN:  >c/w home apixaban 2.5mg PO QD - hold if Plt<50  >c/w amiodarone 200mg QD; down-titrated (1/12 -     >c/w metoprolol ER 50mg QD per cardiology recs; up-titrated (1/13 -   >f/u cardiology recs  >f/u cardiology outpatient

## 2025-01-18 NOTE — PROVIDER CONTACT NOTE (OTHER) - SITUATION
Tele Tech called RN asking to do a EKG because pt is showing A.Fib/A.flutter on tele.
Pt complaining of cough.
Patient complaining of abdomen discomfort and vomiting. Last BM 1/14.
pt admitted for heart failure. pt had 19 beat WCT and has a hx of 10 beats WCT.

## 2025-01-18 NOTE — PROVIDER CONTACT NOTE (OTHER) - REASON
19 beats WCT
pt converted into atrial fibrillation
Pt complaining of cough
Abdomen Discomfort and Vomiting

## 2025-01-18 NOTE — PROGRESS NOTE ADULT - PROBLEM SELECTOR PLAN 1
-New PETER in setting of diuresis  -Creatinine: 1.24 -> 1.34 -> 1.50 -> 1.61 -> 1.60 -> 1.54 -> 1.53 -> 2.74 -> 2.89 -> 2.69 -> 2.40 -> 2.57 -> 2.33  -Urine studies: Na of 33, Nitrogen of 707, creatinine of 114; FeUrea suggestive of intrinsic renal disease  -Urine studies (1/15): Na of 31, creatinine of 261, p/c ratio: 0.6, urine osmol: 489; FeNa suggestive of pre-renal  -Kidney/Bladder US: "No hydronephrosis. Increased renal cortical echogenicity, compatible with renal   parenchymal disease. 0.9 x 0.5 x 1.1 cm right upper pole nonobstructing stone, similarly seen on CT from 1/17/2018. Left upper pole cyst measuring 2.6 x 1.9 x 2.8 cm with rim calcifications is similarly seen on CT abdomen pelvis from 1/17/2018. Additional bilateral simple cysts as above."  -s/p Bumex 2mg IVP 1x on 1/16, Bumex 2mg IVP 2x on 1/17  -Ddx: ATN vs pre-renal    PLAN:  >c/w Bumex 2mg IVP BID per nephro  >c/w trending kidney function daily  >f/u nephro recs -New PETER in setting of diuresis  -Creatinine: 1.24 -> 1.34 -> 1.50 -> 1.61 -> 1.60 -> 1.54 -> 1.53 -> 2.74 -> 2.89 -> 2.69 -> 2.40 -> 2.57 -> 2.33 -> 2.03  -Urine studies: Na of 33, Nitrogen of 707, creatinine of 114; FeUrea suggestive of intrinsic renal disease  -Urine studies (1/15): Na of 31, creatinine of 261, p/c ratio: 0.6, urine osmol: 489; FeNa suggestive of pre-renal  -Kidney/Bladder US: "No hydronephrosis. Increased renal cortical echogenicity, compatible with renal   parenchymal disease. 0.9 x 0.5 x 1.1 cm right upper pole nonobstructing stone, similarly seen on CT from 1/17/2018. Left upper pole cyst measuring 2.6 x 1.9 x 2.8 cm with rim calcifications is similarly seen on CT abdomen pelvis from 1/17/2018. Additional bilateral simple cysts as above."  -s/p Bumex 2mg IVP 1x on 1/16, Bumex 2mg IVP 2x on 1/17  -Ddx: ATN vs pre-renal    PLAN:  >c/w Bumex 2mg IVP BID per nephro  >c/w trending kidney function daily  >f/u nephro recs

## 2025-01-18 NOTE — PROGRESS NOTE ADULT - PROBLEM SELECTOR PLAN 7
-Labs notable for thrombocytopenia to 70 on admission (70-80 baseline platelets)  -Per outpatient oncologist, has known ITP    PLAN:  >c/w monitoring daily in the setting of amiodarone initiation; titrate/dc amiodarone if Plt keeps dropping   >Hold eliquis if Plt <50  >f/u hematology recs

## 2025-01-18 NOTE — PROGRESS NOTE ADULT - SUBJECTIVE AND OBJECTIVE BOX
Date of Service: 01-18-25 @ 08:35           CARDIOLOGY     PROGRESS  NOTE   ________________________________________________    CHIEF COMPLAINT:Patient is a 84y old  Male who presents with a chief complaint of HF (17 Jan 2025 12:36)  no complain  	  REVIEW OF SYSTEMS:  CONSTITUTIONAL: No fever, weight loss, or fatigue  EYES: No eye pain, visual disturbances, or discharge  ENT:  No difficulty hearing, tinnitus, vertigo; No sinus or throat pain  NECK: No pain or stiffness  RESPIRATORY: No cough, wheezing, chills or hemoptysis; No Shortness of Breath  CARDIOVASCULAR: No chest pain, palpitations, passing out, dizziness, or leg swelling  GASTROINTESTINAL: No abdominal or epigastric pain. No nausea, vomiting, or hematemesis; No diarrhea or constipation. No melena or hematochezia.  GENITOURINARY: No dysuria, frequency, hematuria, or incontinence  NEUROLOGICAL: No headaches, memory loss, loss of strength, numbness, or tremors  SKIN: No itching, burning, rashes, or lesions   LYMPH Nodes: No enlarged glands  ENDOCRINE: No heat or cold intolerance; No hair loss  MUSCULOSKELETAL: No joint pain or swelling; No muscle, back, or extremity pain  PSYCHIATRIC: No depression, anxiety, mood swings, or difficulty sleeping  HEME/LYMPH: No easy bruising, or bleeding gums  ALLERGY AND IMMUNOLOGIC: No hives or eczema	    [x ] All others negative	  [ ] Unable to obtain    PHYSICAL EXAM:  T(C): 36.7 (01-18-25 @ 05:11), Max: 36.9 (01-17-25 @ 11:10)  HR: 107 (01-18-25 @ 05:11) (80 - 110)  BP: 131/76 (01-18-25 @ 05:11) (112/70 - 131/76)  RR: 18 (01-18-25 @ 05:11) (18 - 18)  SpO2: 92% (01-18-25 @ 05:11) (91% - 96%)  Wt(kg): --  I&O's Summary    17 Jan 2025 07:01  -  18 Jan 2025 07:00  --------------------------------------------------------  IN: 960 mL / OUT: 3850 mL / NET: -2890 mL        Appearance: Normal	  HEENT:   Normal oral mucosa, PERRL, EOMI	  Lymphatic: No lymphadenopathy  Cardiovascular: Normal S1 S2, No JVD, + murmurs, No edema  Respiratory: rhonchi  Psychiatry: A & O x 3, Mood & affect appropriate  Gastrointestinal:  Soft, Non-tender, + BS	  Skin: No rashes, No ecchymoses, No cyanosis	  Neurologic: Non-focal  Extremities: Normal range of motion, No clubbing, cyanosis or edema  Vascular: Peripheral pulses palpable 2+ bilaterally    MEDICATIONS  (STANDING):  aMIOdarone    Tablet 200 milliGRAM(s) Oral daily  apixaban 2.5 milliGRAM(s) Oral every 12 hours  atorvastatin 20 milliGRAM(s) Oral at bedtime  buMETAnide Injectable 2 milliGRAM(s) IV Push every 12 hours  famotidine    Tablet 20 milliGRAM(s) Oral at bedtime  finasteride 5 milliGRAM(s) Oral daily  metoprolol succinate ER 50 milliGRAM(s) Oral daily  pantoprazole    Tablet 40 milliGRAM(s) Oral before breakfast  polyethylene glycol 3350 17 Gram(s) Oral at bedtime  senna 2 Tablet(s) Oral at bedtime      TELEMETRY: 	    ECG:  	  RADIOLOGY:  OTHER: 	  	  LABS:	 	    CARDIAC MARKERS:                          14.0   12.21 )-----------( 60       ( 18 Jan 2025 06:49 )             40.7     01-18    136  |  98  |  35[H]  ----------------------------<  88  4.2   |  23  |  2.03[H]    Ca    8.8      18 Jan 2025 06:48  Phos  3.1     01-18  Mg     1.9     01-18      proBNP:   Lipid Profile:   HgA1c:   TSH:         Assessment and plan  ---------------------------  84-year-old male PMH afib on eliquid, severe aortic stenosis (2/2 bicuspid aortic valve), s/p aortic valve replacement (7/2012), HTN, pHTN, HLD, chronic renal insufficiency, thrombocytopenia, GERD presenting from Dr. Ramirez office for tachycardia. Patient had COVID 2 weeks ago which he was treated with paxlovid for. Subsequently, the patient developed some wheezing and was treated with doxycycline and steroids from an urgent care with improvement in symptoms. The patient continued to feel weak however, and dyspneic on exertion. The patient notes 10 days of leg swelling, with dyspnea on exertion starting 3 days ago with inability to tolerate more than 2-3 steps (baseline does not ambulate much). Of note, the patient experienced some nausea a few days prior and vomited after getting into his car.   The patient went to visit his PCP today for the dyspnea and leg swelling; was noted to be tachycardic to 130 BPM and was sent to ER for further evaluation.   Pt did not take home bisoprolol and eliquis prior to admission. Pt denies chest pain, shortness of breath, n/v/d/, fevers or chills, urinary sx, headache, visual changes, numbness or other complaints.  ppt with sig cardiac and medical hx with rapid hr, sob and le edema  cta of chest no PE, but increase interstitial marking  cw chf.  s/p AVR overall mild regurgitation ,TILA 1.44 cn2  will need to add diuresis sec to RV dysfunction , will add midodrine if bp can not tolerate  will discuss with family  dc bisprolol for now, will add metoprolol er 25 mg daily as needed  repeat chest x ray to control HR   thrombocytopenia ?etiology  tele a.fib hr 80 to 120, will increase metoprolol er as tolerated  will consider DARA/  cardioversion after small amio load in am mhr has much improved, will increase beta blocker if hr elevated  discussed with daughter agreed with cardioversion  HR is controlled  acute renal failure??   s/p DARA cardioversion remain in NSR, check bladder scan plan as per renal  nephrology noted pt did not get contrast for the procedure or DARA  pt will eventually needs TAVR with sig AI on the bioprosthetic valve in 2012  will switch amio to 200 mg daily today, pt converted to a.fib at 4 52 am   continue current meds  PETER ?etiology, renal appreciated  continue  ac may needs to increase beta blocker to bid if need to control hr, will increase to bid  continue Diuresis, will fu renal function closely  will get structural heart to see pt  pt needs AVR eventually

## 2025-01-18 NOTE — PROVIDER CONTACT NOTE (OTHER) - ACTION/TREATMENT ORDERED:
provider made aware, no interventions at this time.
Provider made aware, Maalox ordered and Zofran PO ordered
Provider made aware, lozenge ordered PRN
picture of EKG sent to provider Samson Morrison, Tele tech called with results, pt converted to A.fib, provider made aware.

## 2025-01-18 NOTE — PROGRESS NOTE ADULT - PROBLEM SELECTOR PLAN 2
-Na: 128 noted on labs 1/15  -Creatinine up-trending, patient looking more fluid overloaded  -Urine studies (1/15): Na of 31, creatinine of 261, p/c ratio: 0.6, urine osmol: 489; FeNa suggestive of pre-renal  -s/p 1x dose of hypertonic saline on 1/16 & 1/17  -Ddx: Hypervolemia & PETER    PLAN:  >c/w monitoring Na levels daily  >c/w fluid restriction to 1L  >c/w Bumex 2mg IVP BID per nephro

## 2025-01-18 NOTE — PROGRESS NOTE ADULT - ATTENDING COMMENTS
84M w/pmh chronic atrial fibrillation, CKD 3a, severe aortic stenosis s/p aortic valve replacement (7/2012), HTN, pHTN, HLD, thrombocytopenia, GERD presenting from PCP's office for cc tachycardia, JOSEPH. Found to have volume overload and atrial fibrillation w/RVR. Continue IV bumex BID, improving. s/p DARA/DCCV 1/14, initially converted to NSR, now back to afib RVR, continue amiodarone, metoprolol. Cardiology f/u. PT/OT. Treatment will be dependent on clinical course.     Agree with progress note as outlined above, edited where appropriate.

## 2025-01-18 NOTE — PROGRESS NOTE ADULT - PROBLEM SELECTOR PLAN 10
DVT ppx: Eliquis 2.5mg BID - hold if Plt <50  Diet: DASH/TLC, low sodium, fluid restriction to 1L  Dispo: Active, pending resolution of PETER and volume status

## 2025-01-18 NOTE — PROGRESS NOTE ADULT - SUBJECTIVE AND OBJECTIVE BOX
Patient is a 84y old  Male who presents with a chief complaint of HF (17 Jan 2025 12:36)    Patient seen and examined, wife at bedside. C/o cough overnight.     MEDICATIONS  (STANDING):  aMIOdarone    Tablet 200 milliGRAM(s) Oral daily  apixaban 2.5 milliGRAM(s) Oral every 12 hours  atorvastatin 20 milliGRAM(s) Oral at bedtime  buMETAnide Injectable 2 milliGRAM(s) IV Push every 12 hours  famotidine    Tablet 20 milliGRAM(s) Oral at bedtime  finasteride 5 milliGRAM(s) Oral daily  metoprolol succinate ER 50 milliGRAM(s) Oral daily  pantoprazole    Tablet 40 milliGRAM(s) Oral before breakfast  polyethylene glycol 3350 17 Gram(s) Oral at bedtime  senna 2 Tablet(s) Oral at bedtime    MEDICATIONS  (PRN):  acetaminophen     Tablet .. 650 milliGRAM(s) Oral every 6 hours PRN Temp greater or equal to 38C (100.4F), Mild Pain (1 - 3)  aluminum hydroxide/magnesium hydroxide/simethicone Suspension 30 milliLiter(s) Oral every 6 hours PRN Dyspepsia  ondansetron Injectable 4 milliGRAM(s) IV Push every 6 hours PRN Nausea and/or Vomiting    Vital Signs Last 24 Hrs  T(C): 36.7 (18 Jan 2025 05:11), Max: 36.9 (17 Jan 2025 11:10)  T(F): 98.1 (18 Jan 2025 05:11), Max: 98.4 (17 Jan 2025 11:10)  HR: 107 (18 Jan 2025 05:11) (80 - 110)  BP: 131/76 (18 Jan 2025 05:11) (112/70 - 131/76)  BP(mean): --  RR: 18 (18 Jan 2025 05:11) (18 - 18)  SpO2: 92% (18 Jan 2025 05:11) (91% - 96%)    Parameters below as of 18 Jan 2025 05:11  Patient On (Oxygen Delivery Method): room air    PE  NAD  Awake, alert  Anicteric, MMM  RRR  CTAB  Abd soft, NT, ND  No c/c/e  No rash grossly                          14.0   12.21 )-----------( 60       ( 18 Jan 2025 06:49 )             40.7       01-18    136  |  98  |  35[H]  ----------------------------<  88  4.2   |  23  |  2.03[H]    Ca    8.8      18 Jan 2025 06:48  Phos  3.1     01-18  Mg     1.9     01-18

## 2025-01-18 NOTE — PROGRESS NOTE ADULT - ASSESSMENT
84-year-old male PMH afib on eliquid, severe aortic stenosis (2/2 bicuspid aortic valve), s/p aortic valve replacement (7/2012), HTN, pHTN, HLD, chronic renal insufficiency, thrombocytopenia, GERD presenting from Dr. Ramirez office for tachycardia.     ITP   - Platelet range between 79-85K, follows with Dr. Junior at [facility].   - Prior work up generally speaking unremarkable. He has no evidence of a monoclonal gammopathy. Flow cytometry was with no abnormal immunophenotype. No evidence of B12 or folic acid deficiency. He has mild hemolysis of unclear etiology, although there is no anemia. Direct Onofre was negative. His KENNA was slightly elevated at 1:160.   - No intervention at this time for ITP given platelets are above 50K  - There is no absolute contraindication to amiodarone from a hematologic perspective, will monitor platelet count.     CHF exacerbation  - 1/8 CTA chest w/ no PE. Small bibasilar pleural effusions. Interlobular septal thickening, suggestive of interstitial edema  - Can continue the eliquis for afib for now but would hold if platelet count drops below 50.  - Management per cardiology, s/p DARA/cardioversion 1/14 w/ Severe mitral regurgitation and severe tricuspid regurgitation. Eventual AVR  - Chest x ray 1/16: Cardiomegaly. Progression of pulmonary edema changes or bilateral multifocal pneumonia.    PETER on CKD  - Renal US w/ no hydronephrosis, renal parenchymal disease. 0.9 x 0.5 x 1.1 cm right upper pole nonobstructing stone, similarly seen on CT from 1/17/2018. Left upper pole cyst 2.6 x 1.9 x 2.8 cm.  - Per nephrology, PETER on CKD in the setting of hypotension, afib, IV contrast. Possibly hemodynamically mediated ATN.   - On Bumex, on 1L fluid restriction    Will continue to follow.    Tung Leo PA-C  Hematology/Oncology  [facility]  [phone] (office)  Evenings and weekends please call MD on call or office

## 2025-01-18 NOTE — PROGRESS NOTE ADULT - PROBLEM SELECTOR PLAN 3
-Hx of HTN and pHTN  -Troponin 54->59  -Pro-BNP of 3624.   -Nuclear stress test (11/2023): small sized mild defect(s) in the basal inferolateral wall that is reversible consistent with ischemia  -TTE (11/2024): "EF is approximately 55%. Mild concentric left ventricular hypertrophy. Right ventricular enlargement with mildly decreased right ventricular systolic function. Left atrium is severely dilated. Right atrium is mildly dilated. Moderate mitral regurgitation. Mitral annular calcification with thickened and mildly calcified mitral valve leaflets. There is mild prolapse of the posterior mitral valve leaflet. Bioprosthetic aortic valve replacement. Aortic root at the sinuses of Valsalva is borderline dilated. Ascending aorta is mildly dilated. Estimated pulmonary artery systolic pressure is 50 mmHg. Moderate to severe tricuspid regurgitation"  -CXR (1/8): b/l lower atelectasis vs pneumonia  -CTA (1/8): "Small bibasilar pleural effusions. Interlobular septal thickening, suggestive of interstitial edema."   -ER POCUS TTE (1/8): trace pericardial effusion, global LV hypokinesis, b/l lungs with B-lines and trace pleural effusions.  -s/p lasix 20mg IV BID X 2 Days  -TTE (1/10): LVEF 60-65%, mildly enlarged right ventricular cavity, borderline reduced ventricular systolic function, pHTN 57 mmHg, moderate tricuspid regurgitation.   -DARA (1/14): normal LVEF, severe mitral/tricuspid regurgitation, aortic valve AS moderate-severe, intravalvular regurgitation   -Dyspnea initially improved from admission, now worsened    PLAN:  >c/w Bumex 2mg IVP BID  >c/w daily standing weights, strict ins/outs  >f/u cardiology outpatient  >Would likely benefit from starting PO lasix on d/c -Hx of HTN and pHTN  -Troponin 54->59  -Pro-BNP of 3624.   -Nuclear stress test (11/2023): small sized mild defect(s) in the basal inferolateral wall that is reversible consistent with ischemia  -TTE (11/2024): "EF is approximately 55%. Mild concentric left ventricular hypertrophy. Right ventricular enlargement with mildly decreased right ventricular systolic function. Left atrium is severely dilated. Right atrium is mildly dilated. Moderate mitral regurgitation. Mitral annular calcification with thickened and mildly calcified mitral valve leaflets. There is mild prolapse of the posterior mitral valve leaflet. Bioprosthetic aortic valve replacement. Aortic root at the sinuses of Valsalva is borderline dilated. Ascending aorta is mildly dilated. Estimated pulmonary artery systolic pressure is 50 mmHg. Moderate to severe tricuspid regurgitation"  -CXR (1/8): b/l lower atelectasis vs pneumonia  -CTA (1/8): "Small bibasilar pleural effusions. Interlobular septal thickening, suggestive of interstitial edema."   -ER POCUS TTE (1/8): trace pericardial effusion, global LV hypokinesis, b/l lungs with B-lines and trace pleural effusions.  -s/p lasix 20mg IV BID X 2 Days  -TTE (1/10): LVEF 60-65%, mildly enlarged right ventricular cavity, borderline reduced ventricular systolic function, pHTN 57 mmHg, moderate tricuspid regurgitation.   -DARA (1/14): normal LVEF, severe mitral/tricuspid regurgitation, aortic valve AS moderate-severe, intravalvular regurgitation   -Dyspnea initially improved from admission, now worsened    PLAN:  >c/w Bumex 2mg IVP BID  >c/w daily standing weights, strict ins/outs  >f/u structural heart evaluation  >f/u cardiology outpatient  >Would likely benefit from starting PO lasix on d/c

## 2025-01-19 DIAGNOSIS — R50.9 FEVER, UNSPECIFIED: ICD-10-CM

## 2025-01-19 LAB
ANION GAP SERPL CALC-SCNC: 15 MMOL/L — SIGNIFICANT CHANGE UP (ref 5–17)
APPEARANCE UR: CLEAR — SIGNIFICANT CHANGE UP
BACTERIA # UR AUTO: NEGATIVE /HPF — SIGNIFICANT CHANGE UP
BASOPHILS # BLD AUTO: 0 K/UL — SIGNIFICANT CHANGE UP (ref 0–0.2)
BASOPHILS NFR BLD AUTO: 0 % — SIGNIFICANT CHANGE UP (ref 0–2)
BILIRUB UR-MCNC: NEGATIVE — SIGNIFICANT CHANGE UP
BUN SERPL-MCNC: 29 MG/DL — HIGH (ref 7–23)
BURR CELLS BLD QL SMEAR: PRESENT — SIGNIFICANT CHANGE UP
CALCIUM SERPL-MCNC: 8.9 MG/DL — SIGNIFICANT CHANGE UP (ref 8.4–10.5)
CAST: 0 /LPF — SIGNIFICANT CHANGE UP (ref 0–4)
CHLORIDE SERPL-SCNC: 94 MMOL/L — LOW (ref 96–108)
CO2 SERPL-SCNC: 25 MMOL/L — SIGNIFICANT CHANGE UP (ref 22–31)
COLOR SPEC: YELLOW — SIGNIFICANT CHANGE UP
CREAT SERPL-MCNC: 1.96 MG/DL — HIGH (ref 0.5–1.3)
DIFF PNL FLD: ABNORMAL
EGFR: 33 ML/MIN/1.73M2 — LOW
EOSINOPHIL # BLD AUTO: 0.08 K/UL — SIGNIFICANT CHANGE UP (ref 0–0.5)
EOSINOPHIL NFR BLD AUTO: 0.9 % — SIGNIFICANT CHANGE UP (ref 0–6)
FLUAV AG NPH QL: DETECTED
FLUBV AG NPH QL: SIGNIFICANT CHANGE UP
GLUCOSE BLDC GLUCOMTR-MCNC: 100 MG/DL — HIGH (ref 70–99)
GLUCOSE SERPL-MCNC: 98 MG/DL — SIGNIFICANT CHANGE UP (ref 70–99)
GLUCOSE UR QL: NEGATIVE MG/DL — SIGNIFICANT CHANGE UP
HCT VFR BLD CALC: 39 % — SIGNIFICANT CHANGE UP (ref 39–50)
HGB BLD-MCNC: 13.4 G/DL — SIGNIFICANT CHANGE UP (ref 13–17)
KETONES UR-MCNC: NEGATIVE MG/DL — SIGNIFICANT CHANGE UP
LEUKOCYTE ESTERASE UR-ACNC: NEGATIVE — SIGNIFICANT CHANGE UP
LYMPHOCYTES # BLD AUTO: 0.84 K/UL — LOW (ref 1–3.3)
LYMPHOCYTES # BLD AUTO: 8.9 % — LOW (ref 13–44)
MAGNESIUM SERPL-MCNC: 1.8 MG/DL — SIGNIFICANT CHANGE UP (ref 1.6–2.6)
MANUAL SMEAR VERIFICATION: SIGNIFICANT CHANGE UP
MCHC RBC-ENTMCNC: 32.9 PG — SIGNIFICANT CHANGE UP (ref 27–34)
MCHC RBC-ENTMCNC: 34.4 G/DL — SIGNIFICANT CHANGE UP (ref 32–36)
MCV RBC AUTO: 95.8 FL — SIGNIFICANT CHANGE UP (ref 80–100)
MONOCYTES # BLD AUTO: 5.48 K/UL — HIGH (ref 0–0.9)
MONOCYTES NFR BLD AUTO: 58 % — HIGH (ref 2–14)
MYELOCYTES NFR BLD: 1.8 % — HIGH (ref 0–0)
NEUTROPHILS # BLD AUTO: 2.87 K/UL — SIGNIFICANT CHANGE UP (ref 1.8–7.4)
NEUTROPHILS NFR BLD AUTO: 29.5 % — LOW (ref 43–77)
NEUTS BAND # BLD: 0.9 % — SIGNIFICANT CHANGE UP (ref 0–8)
NEUTS BAND NFR BLD: 0.9 % — SIGNIFICANT CHANGE UP (ref 0–8)
NITRITE UR-MCNC: NEGATIVE — SIGNIFICANT CHANGE UP
PH UR: 6 — SIGNIFICANT CHANGE UP (ref 5–8)
PHOSPHATE SERPL-MCNC: 3.1 MG/DL — SIGNIFICANT CHANGE UP (ref 2.5–4.5)
PLAT MORPH BLD: NORMAL — SIGNIFICANT CHANGE UP
PLATELET # BLD AUTO: 61 K/UL — LOW (ref 150–400)
POTASSIUM SERPL-MCNC: 3.2 MMOL/L — LOW (ref 3.5–5.3)
POTASSIUM SERPL-SCNC: 3.2 MMOL/L — LOW (ref 3.5–5.3)
PROT UR-MCNC: NEGATIVE MG/DL — SIGNIFICANT CHANGE UP
RBC # BLD: 4.07 M/UL — LOW (ref 4.2–5.8)
RBC # FLD: 13.4 % — SIGNIFICANT CHANGE UP (ref 10.3–14.5)
RBC BLD AUTO: SIGNIFICANT CHANGE UP
RBC CASTS # UR COMP ASSIST: 6 /HPF — HIGH (ref 0–4)
RSV RNA NPH QL NAA+NON-PROBE: SIGNIFICANT CHANGE UP
SARS-COV-2 RNA SPEC QL NAA+PROBE: SIGNIFICANT CHANGE UP
SMUDGE CELLS # BLD: PRESENT — SIGNIFICANT CHANGE UP
SODIUM SERPL-SCNC: 134 MMOL/L — LOW (ref 135–145)
SP GR SPEC: 1.01 — SIGNIFICANT CHANGE UP (ref 1–1.03)
SQUAMOUS # UR AUTO: 0 /HPF — SIGNIFICANT CHANGE UP (ref 0–5)
UROBILINOGEN FLD QL: 4 MG/DL (ref 0.2–1)
WBC # BLD: 9.44 K/UL — SIGNIFICANT CHANGE UP (ref 3.8–10.5)
WBC # FLD AUTO: 9.44 K/UL — SIGNIFICANT CHANGE UP (ref 3.8–10.5)
WBC UR QL: 0 /HPF — SIGNIFICANT CHANGE UP (ref 0–5)

## 2025-01-19 PROCEDURE — 99233 SBSQ HOSP IP/OBS HIGH 50: CPT

## 2025-01-19 PROCEDURE — 71045 X-RAY EXAM CHEST 1 VIEW: CPT | Mod: 26

## 2025-01-19 RX ORDER — POTASSIUM CHLORIDE 750 MG/1
40 TABLET, EXTENDED RELEASE ORAL EVERY 4 HOURS
Refills: 0 | Status: COMPLETED | OUTPATIENT
Start: 2025-01-19 | End: 2025-01-19

## 2025-01-19 RX ORDER — CEFTRIAXONE 250 MG/1
1000 INJECTION, POWDER, FOR SOLUTION INTRAMUSCULAR; INTRAVENOUS EVERY 24 HOURS
Refills: 0 | Status: DISCONTINUED | OUTPATIENT
Start: 2025-01-19 | End: 2025-01-20

## 2025-01-19 RX ORDER — MAGNESIUM SULFATE 0.8 MEQ/ML
2 AMPUL (ML) INJECTION ONCE
Refills: 0 | Status: COMPLETED | OUTPATIENT
Start: 2025-01-19 | End: 2025-01-19

## 2025-01-19 RX ADMIN — BUMETANIDE 2 MILLIGRAM(S): 2 TABLET ORAL at 17:34

## 2025-01-19 RX ADMIN — APIXABAN 2.5 MILLIGRAM(S): 5 TABLET, FILM COATED ORAL at 17:34

## 2025-01-19 RX ADMIN — Medication 50 MILLIGRAM(S): at 05:25

## 2025-01-19 RX ADMIN — POTASSIUM CHLORIDE 40 MILLIEQUIVALENT(S): 750 TABLET, EXTENDED RELEASE ORAL at 10:43

## 2025-01-19 RX ADMIN — PANTOPRAZOLE 40 MILLIGRAM(S): 20 TABLET, DELAYED RELEASE ORAL at 05:25

## 2025-01-19 RX ADMIN — POTASSIUM CHLORIDE 40 MILLIEQUIVALENT(S): 750 TABLET, EXTENDED RELEASE ORAL at 13:19

## 2025-01-19 RX ADMIN — ACETAMINOPHEN 650 MILLIGRAM(S): 160 SUSPENSION ORAL at 13:23

## 2025-01-19 RX ADMIN — Medication 2 TABLET(S): at 22:04

## 2025-01-19 RX ADMIN — FAMOTIDINE 20 MILLIGRAM(S): 10 INJECTION INTRAVENOUS at 22:04

## 2025-01-19 RX ADMIN — ATORVASTATIN CALCIUM 20 MILLIGRAM(S): 80 TABLET, FILM COATED ORAL at 22:04

## 2025-01-19 RX ADMIN — AMIODARONE HYDROCHLORIDE 200 MILLIGRAM(S): 50 INJECTION, SOLUTION INTRAVENOUS at 05:26

## 2025-01-19 RX ADMIN — Medication 25 GRAM(S): at 13:21

## 2025-01-19 RX ADMIN — APIXABAN 2.5 MILLIGRAM(S): 5 TABLET, FILM COATED ORAL at 05:25

## 2025-01-19 RX ADMIN — BUMETANIDE 2 MILLIGRAM(S): 2 TABLET ORAL at 05:26

## 2025-01-19 RX ADMIN — Medication 5 MILLIGRAM(S): at 22:04

## 2025-01-19 RX ADMIN — CEFTRIAXONE 100 MILLIGRAM(S): 250 INJECTION, POWDER, FOR SOLUTION INTRAMUSCULAR; INTRAVENOUS at 13:22

## 2025-01-19 NOTE — PROGRESS NOTE ADULT - PROBLEM SELECTOR PLAN 3
-Hx of HTN and pHTN  -Troponin 54->59  -Pro-BNP of 3624.   -Nuclear stress test (11/2023): small sized mild defect(s) in the basal inferolateral wall that is reversible consistent with ischemia  -TTE (11/2024): "EF is approximately 55%. Mild concentric left ventricular hypertrophy. Right ventricular enlargement with mildly decreased right ventricular systolic function. Left atrium is severely dilated. Right atrium is mildly dilated. Moderate mitral regurgitation. Mitral annular calcification with thickened and mildly calcified mitral valve leaflets. There is mild prolapse of the posterior mitral valve leaflet. Bioprosthetic aortic valve replacement. Aortic root at the sinuses of Valsalva is borderline dilated. Ascending aorta is mildly dilated. Estimated pulmonary artery systolic pressure is 50 mmHg. Moderate to severe tricuspid regurgitation"  -CXR (1/8): b/l lower atelectasis vs pneumonia  -CTA (1/8): "Small bibasilar pleural effusions. Interlobular septal thickening, suggestive of interstitial edema."   -ER POCUS TTE (1/8): trace pericardial effusion, global LV hypokinesis, b/l lungs with B-lines and trace pleural effusions.  -s/p lasix 20mg IV BID X 2 Days  -TTE (1/10): LVEF 60-65%, mildly enlarged right ventricular cavity, borderline reduced ventricular systolic function, pHTN 57 mmHg, moderate tricuspid regurgitation.   -DARA (1/14): normal LVEF, severe mitral/tricuspid regurgitation, aortic valve AS moderate-severe, intravalvular regurgitation   -Dyspnea initially improved from admission, now worsened    PLAN:  >c/w Bumex 2mg IVP BID  >c/w daily standing weights, strict ins/outs  >f/u structural heart evaluation  >f/u cardiology outpatient  >Would likely benefit from starting PO lasix on d/c -Na: 128 noted on labs 1/15  -Creatinine up-trending, patient looking more fluid overloaded  -Urine studies (1/15): Na of 31, creatinine of 261, p/c ratio: 0.6, urine osmol: 489; FeNa suggestive of pre-renal  -s/p 1x dose of hypertonic saline on 1/16 & 1/17  -Ddx: Hypervolemia & PETER    PLAN:  >c/w monitoring Na levels daily  >c/w fluid restriction to 1L  >c/w Bumex 2mg IVP BID per nephro

## 2025-01-19 NOTE — PROGRESS NOTE ADULT - PROBLEM SELECTOR PLAN 4
-EKG on admission with sinus tachycardia to 125-128  -On home bisoprolol 17.5mg PO QD  -d/evelyn bisoprolol 17.5mg PO QD (1/11) per cardiology recs  -s/p DARA/cardioversion on 1/14  -s/p Digoxin on 1/12  -Patient is now back in AFib on 1/17    PLAN:  >c/w home apixaban 2.5mg PO QD - hold if Plt<50  >c/w amiodarone 200mg QD; down-titrated (1/12 -     >c/w metoprolol ER 50mg QD per cardiology recs; up-titrated (1/13 -   >f/u cardiology recs  >f/u cardiology outpatient -Hx of HTN and pHTN  -Troponin 54->59  -Pro-BNP of 3624.   -Nuclear stress test (11/2023): small sized mild defect(s) in the basal inferolateral wall that is reversible consistent with ischemia  -TTE (11/2024): "EF is approximately 55%. Mild concentric left ventricular hypertrophy. Right ventricular enlargement with mildly decreased right ventricular systolic function. Left atrium is severely dilated. Right atrium is mildly dilated. Moderate mitral regurgitation. Mitral annular calcification with thickened and mildly calcified mitral valve leaflets. There is mild prolapse of the posterior mitral valve leaflet. Bioprosthetic aortic valve replacement. Aortic root at the sinuses of Valsalva is borderline dilated. Ascending aorta is mildly dilated. Estimated pulmonary artery systolic pressure is 50 mmHg. Moderate to severe tricuspid regurgitation"  -CXR (1/8): b/l lower atelectasis vs pneumonia  -CTA (1/8): "Small bibasilar pleural effusions. Interlobular septal thickening, suggestive of interstitial edema."   -ER POCUS TTE (1/8): trace pericardial effusion, global LV hypokinesis, b/l lungs with B-lines and trace pleural effusions.  -s/p lasix 20mg IV BID X 2 Days  -TTE (1/10): LVEF 60-65%, mildly enlarged right ventricular cavity, borderline reduced ventricular systolic function, pHTN 57 mmHg, moderate tricuspid regurgitation.   -DARA (1/14): normal LVEF, severe mitral/tricuspid regurgitation, aortic valve AS moderate-severe, intravalvular regurgitation   -Dyspnea initially improved from admission, now worsened    PLAN:  >c/w Bumex 2mg IVP BID  >c/w daily standing weights, strict ins/outs  >f/u structural heart evaluation  >f/u cardiology outpatient  >Would likely benefit from starting PO lasix on d/c

## 2025-01-19 NOTE — PROGRESS NOTE ADULT - PROBLEM SELECTOR PLAN 1
-New PETER in setting of diuresis  -Creatinine: 1.24 -> 1.34 -> 1.50 -> 1.61 -> 1.60 -> 1.54 -> 1.53 -> 2.74 -> 2.89 -> 2.69 -> 2.40 -> 2.57 -> 2.33 -> 2.03  -Urine studies: Na of 33, Nitrogen of 707, creatinine of 114; FeUrea suggestive of intrinsic renal disease  -Urine studies (1/15): Na of 31, creatinine of 261, p/c ratio: 0.6, urine osmol: 489; FeNa suggestive of pre-renal  -Kidney/Bladder US: "No hydronephrosis. Increased renal cortical echogenicity, compatible with renal   parenchymal disease. 0.9 x 0.5 x 1.1 cm right upper pole nonobstructing stone, similarly seen on CT from 1/17/2018. Left upper pole cyst measuring 2.6 x 1.9 x 2.8 cm with rim calcifications is similarly seen on CT abdomen pelvis from 1/17/2018. Additional bilateral simple cysts as above."  -s/p Bumex 2mg IVP 1x on 1/16, Bumex 2mg IVP 2x on 1/17  -Ddx: ATN vs pre-renal    PLAN:  >c/w Bumex 2mg IVP BID per nephro  >c/w trending kidney function daily  >f/u nephro recs - Tmax 101 orally on 1/19  - cough productive of clear sputum tinged with intermittent blood  - potentially viral process as symptoms not as severe  > repeat sepsis w/u with blood cxs, UA, CXR, RVP  > monitor off abx for now - Tmax 101 orally on 1/19  - cough productive of clear sputum tinged with intermittent blood  - potentially viral process as symptoms not as severe  > repeat sepsis w/u with blood cxs, UA, CXR, RVP  > empiric abx with ctx (1/19- ) to covering for respiratory source, dc if RVP+ or blood cxs neg

## 2025-01-19 NOTE — PROGRESS NOTE ADULT - PROBLEM SELECTOR PLAN 10
DVT ppx: Eliquis 2.5mg BID - hold if Plt <50  Diet: DASH/TLC, low sodium, fluid restriction to 1L  Dispo: Active, pending resolution of PETER and volume status PLAN:  >c/w tylenol PRN, topical ketorolac PRN

## 2025-01-19 NOTE — PROGRESS NOTE ADULT - PROBLEM SELECTOR PLAN 7
-Labs notable for thrombocytopenia to 70 on admission (70-80 baseline platelets)  -Per outpatient oncologist, has known ITP    PLAN:  >c/w monitoring daily in the setting of amiodarone initiation; titrate/dc amiodarone if Plt keeps dropping   >Hold eliquis if Plt <50  >f/u hematology recs -On home lipitor 20mg QD    PLAN:  >c/w home atorvastatin 20mg QD

## 2025-01-19 NOTE — PROGRESS NOTE ADULT - PROBLEM SELECTOR PLAN 2
-Na: 128 noted on labs 1/15  -Creatinine up-trending, patient looking more fluid overloaded  -Urine studies (1/15): Na of 31, creatinine of 261, p/c ratio: 0.6, urine osmol: 489; FeNa suggestive of pre-renal  -s/p 1x dose of hypertonic saline on 1/16 & 1/17  -Ddx: Hypervolemia & PETER    PLAN:  >c/w monitoring Na levels daily  >c/w fluid restriction to 1L  >c/w Bumex 2mg IVP BID per nephro -Urine studies: Na of 33, Nitrogen of 707, creatinine of 114; FeUrea suggestive of intrinsic renal disease  -Urine studies (1/15): Na of 31, creatinine of 261, p/c ratio: 0.6, urine osmol: 489; FeNa suggestive of pre-renal  -Kidney/Bladder US: "No hydronephrosis. Increased renal cortical echogenicity, compatible with renal   parenchymal disease. 0.9 x 0.5 x 1.1 cm right upper pole nonobstructing stone, similarly seen on CT from 1/17/2018. Left upper pole cyst measuring 2.6 x 1.9 x 2.8 cm with rim calcifications is similarly seen on CT abdomen pelvis from 1/17/2018. Additional bilateral simple cysts as above."  -s/p Bumex 2mg IVP 1x on 1/16, Bumex 2mg IVP 2x on 1/17  -Ddx: ATN vs pre-renal    PLAN:  >c/w Bumex 2mg IVP BID, reassess tomorrow as still with orthopnea  >c/w trending kidney function daily  >f/u nephro recs

## 2025-01-19 NOTE — PROGRESS NOTE ADULT - PROBLEM SELECTOR PLAN 8
-Per family is supposed to be on home PPI but patient does not take    PLAN:  >c/w Pantoprazole 40mg QD pre-breakfast   >c/w famotidine 20mg QHS  >f/u outpatient with GI -Labs notable for thrombocytopenia to 70 on admission (70-80 baseline platelets)  -Per outpatient oncologist, has known ITP    PLAN:  >c/w monitoring daily in the setting of amiodarone initiation; titrate/dc amiodarone if Plt keeps dropping   >Hold eliquis if Plt <50  >f/u hematology recs

## 2025-01-19 NOTE — PROGRESS NOTE ADULT - ATTENDING COMMENTS
84M w/pmh chronic atrial fibrillation, CKD 3a, severe aortic stenosis s/p aortic valve replacement (7/2012), HTN, pHTN, HLD, thrombocytopenia, GERD presenting from PCP's office for cc tachycardia, JOSEPH. Found to have volume overload and atrial fibrillation w/RVR. Continue IV bumex BID, improving. s/p DARA/DCCV 1/14, initially converted to NSR, now back to afib RVR, continue amiodarone, metoprolol. Cardiology f/u. Febrile on 1/19. Empiric abx with ctx (1/19- ) to covering for respiratory source, dc if RVP+ or blood cxs neg. PT/OT. Treatment will be dependent on clinical course.     Agree with progress note as outlined above, edited where appropriate.

## 2025-01-19 NOTE — PROGRESS NOTE ADULT - PROBLEM SELECTOR PLAN 5
-On home bisoprolol 17.5mg PO QD  -d/evelyn bisoprolol 17.5mg PO QD (1/11) per cardiology recs  -On home amlodipine 5mg PO QD    PLAN:  >c/w metoprolol ER 50mg QD per cardiology recs; up-titrated (1/13 -   >f/u cardiology recs -EKG on admission with sinus tachycardia to 125-128  -On home bisoprolol 17.5mg PO QD  -d/evelyn bisoprolol 17.5mg PO QD (1/11) per cardiology recs  -s/p DARA/cardioversion on 1/14  -s/p Digoxin on 1/12  -Patient is now back in AFib on 1/17    PLAN:  >c/w home apixaban 2.5mg PO QD - hold if Plt<50  >c/w amiodarone 200mg QD; down-titrated (1/12 -     >c/w metoprolol ER 50mg QD per cardiology recs; up-titrated (1/13 -   >f/u cardiology recs  >f/u cardiology outpatient

## 2025-01-19 NOTE — PROGRESS NOTE ADULT - SUBJECTIVE AND OBJECTIVE BOX
Date of Service: 01-19-25 @ 08:34           CARDIOLOGY     PROGRESS  NOTE   ________________________________________________    CHIEF COMPLAINT:Patient is a 84y old  Male who presents with a chief complaint of HF (17 Jan 2025 12:36)  doing better  	  REVIEW OF SYSTEMS:  CONSTITUTIONAL: No fever, weight loss, or fatigue  EYES: No eye pain, visual disturbances, or discharge  ENT:  No difficulty hearing, tinnitus, vertigo; No sinus or throat pain  NECK: No pain or stiffness  RESPIRATORY: No cough, wheezing, chills or hemoptysis; No Shortness of Breath  CARDIOVASCULAR: No chest pain, palpitations, passing out, dizziness, or leg swelling  GASTROINTESTINAL: No abdominal or epigastric pain. No nausea, vomiting, or hematemesis; No diarrhea or constipation. No melena or hematochezia.  GENITOURINARY: No dysuria, frequency, hematuria, or incontinence  NEUROLOGICAL: No headaches, memory loss, loss of strength, numbness, or tremors  SKIN: No itching, burning, rashes, or lesions   LYMPH Nodes: No enlarged glands  ENDOCRINE: No heat or cold intolerance; No hair loss  MUSCULOSKELETAL: No joint pain or swelling; No muscle, back, or extremity pain  PSYCHIATRIC: No depression, anxiety, mood swings, or difficulty sleeping  HEME/LYMPH: No easy bruising, or bleeding gums  ALLERGY AND IMMUNOLOGIC: No hives or eczema	    [x ] All others negative	  [ ] Unable to obtain    PHYSICAL EXAM:  T(C): 36.8 (01-19-25 @ 04:34), Max: 37.3 (01-18-25 @ 20:17)  HR: 107 (01-19-25 @ 04:34) (97 - 107)  BP: 128/58 (01-19-25 @ 04:34) (120/58 - 130/65)  RR: 18 (01-19-25 @ 04:34) (18 - 18)  SpO2: 92% (01-19-25 @ 04:34) (92% - 93%)  Wt(kg): --  I&O's Summary    18 Jan 2025 07:01  -  19 Jan 2025 07:00  --------------------------------------------------------  IN: 270 mL / OUT: 1600 mL / NET: -1330 mL        Appearance: Normal	  HEENT:   Normal oral mucosa, PERRL, EOMI	  Lymphatic: No lymphadenopathy  Cardiovascular: Normal S1 S2, No JVD, +murmurs, No edema  Respiratory: rhonchi	  Psychiatry: A & O x 3, Mood & affect appropriate  Gastrointestinal:  Soft, Non-tender, + BS	  Skin: No rashes, No ecchymoses, No cyanosis	  Extremities: Normal range of motion, No clubbing, cyanosis or edema  Vascular: Peripheral pulses palpable 2+ bilaterally    MEDICATIONS  (STANDING):  aMIOdarone    Tablet 200 milliGRAM(s) Oral daily  apixaban 2.5 milliGRAM(s) Oral every 12 hours  atorvastatin 20 milliGRAM(s) Oral at bedtime  buMETAnide Injectable 2 milliGRAM(s) IV Push every 12 hours  famotidine    Tablet 20 milliGRAM(s) Oral at bedtime  finasteride 5 milliGRAM(s) Oral daily  metoprolol succinate ER 50 milliGRAM(s) Oral daily  pantoprazole    Tablet 40 milliGRAM(s) Oral before breakfast  polyethylene glycol 3350 17 Gram(s) Oral at bedtime  senna 2 Tablet(s) Oral at bedtime      TELEMETRY: 	    ECG:  	  RADIOLOGY:  OTHER: 	  	  LABS:	 	    CARDIAC MARKERS:                          13.4   9.44  )-----------( 61       ( 19 Jan 2025 07:34 )             39.0     01-19    134[L]  |  94[L]  |  29[H]  ----------------------------<  98  3.2[L]   |  25  |  1.96[H]    Ca    8.9      19 Jan 2025 07:34  Phos  3.1     01-19  Mg     1.8     01-19      proBNP:   Lipid Profile:   HgA1c:   TSH:         Assessment and plan  ---------------------------  84-year-old male PMH afib on eliquid, severe aortic stenosis (2/2 bicuspid aortic valve), s/p aortic valve replacement (7/2012), HTN, pHTN, HLD, chronic renal insufficiency, thrombocytopenia, GERD presenting from Dr. Ramirez office for tachycardia. Patient had COVID 2 weeks ago which he was treated with paxlovid for. Subsequently, the patient developed some wheezing and was treated with doxycycline and steroids from an urgent care with improvement in symptoms. The patient continued to feel weak however, and dyspneic on exertion. The patient notes 10 days of leg swelling, with dyspnea on exertion starting 3 days ago with inability to tolerate more than 2-3 steps (baseline does not ambulate much). Of note, the patient experienced some nausea a few days prior and vomited after getting into his car.   The patient went to visit his PCP today for the dyspnea and leg swelling; was noted to be tachycardic to 130 BPM and was sent to ER for further evaluation.   Pt did not take home bisoprolol and eliquis prior to admission. Pt denies chest pain, shortness of breath, n/v/d/, fevers or chills, urinary sx, headache, visual changes, numbness or other complaints.  ppt with sig cardiac and medical hx with rapid hr, sob and le edema  cta of chest no PE, but increase interstitial marking  cw chf.  s/p AVR overall mild regurgitation ,TILA 1.44 cn2  will need to add diuresis sec to RV dysfunction , will add midodrine if bp can not tolerate  will discuss with family  dc bisprolol for now, will add metoprolol er 25 mg daily as needed  repeat chest x ray to control HR   thrombocytopenia ?etiology  tele a.fib hr 80 to 120, will increase metoprolol er as tolerated  will consider DARA/  cardioversion after small amio load in am mhr has much improved, will increase beta blocker if hr elevated  discussed with daughter agreed with cardioversion  HR is controlled  acute renal failure??   s/p DARA cardioversion remain in NSR, check bladder scan plan as per renal  nephrology noted pt did not get contrast for the procedure or DARA  pt will eventually needs TAVR with sig AI on the bioprosthetic valve in 2012  will switch amio to 200 mg daily today, pt converted to a.fib at 4 52 am   continue current meds  PETER ?etiology, renal appreciated  continue  ac may needs to increase beta blocker to bid if need to control hr, will increase to bid  continue Diuresis, will fu renal function closely  will get structural heart to see pt needs TAVR eventually  continue diuresis  replete K, will adjust hr

## 2025-01-19 NOTE — PROGRESS NOTE ADULT - SUBJECTIVE AND OBJECTIVE BOX
***************************************************************  Kami Goetz, PGY-3  Internal Medicine   pager: [phone]  ***************************************************************    ARABELLA WEISS  MRN: [id number]    Patient is a 84y old Male who presents with a chief complaint of HF (17 Jan 2025 12:36)    Subjective: No acute events overnight.      MEDICATIONS  (STANDING):  aMIOdarone    Tablet 200 milliGRAM(s) Oral daily  apixaban 2.5 milliGRAM(s) Oral every 12 hours  atorvastatin 20 milliGRAM(s) Oral at bedtime  buMETAnide Injectable 2 milliGRAM(s) IV Push every 12 hours  famotidine    Tablet 20 milliGRAM(s) Oral at bedtime  finasteride 5 milliGRAM(s) Oral daily  metoprolol succinate ER 50 milliGRAM(s) Oral daily  pantoprazole    Tablet 40 milliGRAM(s) Oral before breakfast  polyethylene glycol 3350 17 Gram(s) Oral at bedtime  senna 2 Tablet(s) Oral at bedtime    MEDICATIONS  (PRN):  acetaminophen     Tablet .. 650 milliGRAM(s) Oral every 6 hours PRN Temp greater or equal to 38C (100.4F), Mild Pain (1 - 3)  aluminum hydroxide/magnesium hydroxide/simethicone Suspension 30 milliLiter(s) Oral every 6 hours PRN Dyspepsia  ondansetron Injectable 4 milliGRAM(s) IV Push every 6 hours PRN Nausea and/or Vomiting    Objective:  Vitals: Vital Signs Last 24 Hrs  T(C): 36.8 (01-19-25 @ 04:34), Max: 37.3 (01-18-25 @ 20:17)  T(F): 98.3 (01-19-25 @ 04:34), Max: 99.1 (01-18-25 @ 20:17)  HR: 107 (01-19-25 @ 04:34) (97 - 107)  BP: 128/58 (01-19-25 @ 04:34) (120/58 - 130/65)  BP(mean): 77 (01-19-25 @ 04:34) (77 - 86)  RR: 18 (01-19-25 @ 04:34) (18 - 18)  SpO2: 92% (01-19-25 @ 04:34) (92% - 93%)               I&O's Summary:  18 Jan 2025 07:01  -  19 Jan 2025 07:00  --------------------------------------------------------  IN: 270 mL / OUT: 1600 mL / NET: -1330 mL    PHYSICAL EXAM:  General: NAD, resting in bed, on RA  Lungs: clear to auscultation in anterior lung fields, no wheezes/rhonchi/rales  Heart: +s1/s2, RRR, no murmurs/gallops/rubs  Abdomen: soft, non-distended, non-tender to palpation  Extremities: +DP pulse bilaterally, no cyanosis/clubbing/edema      LABS:  01-18    136  |  98  |  35[H]  ----------------------------<  88  4.2   |  23  |  2.03[H]  01-17    131[L]  |  95[L]  |  41[H]  ----------------------------<  143[H]  3.6   |  22  |  2.33[H]  01-16    126[L]  |  94[L]  |  44[H]  ----------------------------<  145[H]  4.6   |  19[L]  |  2.57[H]    Ca    8.8      18 Jan 2025 06:48  Ca    8.6      17 Jan 2025 07:00  Ca    8.7      16 Jan 2025 17:38  Phos  3.1     01-18  Mg     1.9     01-18                Urinalysis Basic - ( 18 Jan 2025 06:48 )    Color: x / Appearance: x / SG: x / pH: x  Gluc: 88 mg/dL / Ketone: x  / Bili: x / Urobili: x   Blood: x / Protein: x / Nitrite: x   Leuk Esterase: x / RBC: x / WBC x   Sq Epi: x / Non Sq Epi: x / Bacteria: x                          14.0   12.21 )-----------( 60       ( 18 Jan 2025 06:49 )             40.7                         13.1   12.35 )-----------( 56       ( 17 Jan 2025 07:00 )             38.6     RADIOLOGY & ADDITIONAL TESTS:    Imaging Personally Reviewed:  [x] YES  [ ] NO    Consultants involved in case:   Consultant(s) Notes Reviewed:  [x] YES  [ ] NO:   Care Discussed with Consultants/Other Providers [x] YES  [ ] NO ***************************************************************  Kami Goetz, PGY-3  Internal Medicine   pager: [phone]  ***************************************************************    ARABELLA WEISS  MRN: [id number]    Patient is a 84y old Male who presents with a chief complaint of HF (17 Jan 2025 12:36)    Subjective: No acute events overnight.       MEDICATIONS  (STANDING):  aMIOdarone    Tablet 200 milliGRAM(s) Oral daily  apixaban 2.5 milliGRAM(s) Oral every 12 hours  atorvastatin 20 milliGRAM(s) Oral at bedtime  buMETAnide Injectable 2 milliGRAM(s) IV Push every 12 hours  famotidine    Tablet 20 milliGRAM(s) Oral at bedtime  finasteride 5 milliGRAM(s) Oral daily  metoprolol succinate ER 50 milliGRAM(s) Oral daily  pantoprazole    Tablet 40 milliGRAM(s) Oral before breakfast  polyethylene glycol 3350 17 Gram(s) Oral at bedtime  senna 2 Tablet(s) Oral at bedtime    MEDICATIONS  (PRN):  acetaminophen     Tablet .. 650 milliGRAM(s) Oral every 6 hours PRN Temp greater or equal to 38C (100.4F), Mild Pain (1 - 3)  aluminum hydroxide/magnesium hydroxide/simethicone Suspension 30 milliLiter(s) Oral every 6 hours PRN Dyspepsia  ondansetron Injectable 4 milliGRAM(s) IV Push every 6 hours PRN Nausea and/or Vomiting    Objective:  Vitals: Vital Signs Last 24 Hrs  T(C): 36.8 (01-19-25 @ 04:34), Max: 37.3 (01-18-25 @ 20:17)  T(F): 98.3 (01-19-25 @ 04:34), Max: 99.1 (01-18-25 @ 20:17)  HR: 107 (01-19-25 @ 04:34) (97 - 107)  BP: 128/58 (01-19-25 @ 04:34) (120/58 - 130/65)  BP(mean): 77 (01-19-25 @ 04:34) (77 - 86)  RR: 18 (01-19-25 @ 04:34) (18 - 18)  SpO2: 92% (01-19-25 @ 04:34) (92% - 93%)               I&O's Summary:  18 Jan 2025 07:01  -  19 Jan 2025 07:00  --------------------------------------------------------  IN: 270 mL / OUT: 1600 mL / NET: -1330 mL    PHYSICAL EXAM:  General: NAD, resting in bed, on RA  Lungs: clear to auscultation in anterior lung fields, no wheezes  Heart: +s1/s2, RRR, no murmurs/gallops/rubs  Abdomen: distended but soft, non-tender to palpation  Extremities: +1 pitting edema up to bilateral ankles/lower shins      LABS:  01-18    136  |  98  |  35[H]  ----------------------------<  88  4.2   |  23  |  2.03[H]  01-17    131[L]  |  95[L]  |  41[H]  ----------------------------<  143[H]  3.6   |  22  |  2.33[H]  01-16    126[L]  |  94[L]  |  44[H]  ----------------------------<  145[H]  4.6   |  19[L]  |  2.57[H]    Ca    8.8      18 Jan 2025 06:48  Ca    8.6      17 Jan 2025 07:00  Ca    8.7      16 Jan 2025 17:38  Phos  3.1     01-18  Mg     1.9     01-18                Urinalysis Basic - ( 18 Jan 2025 06:48 )    Color: x / Appearance: x / SG: x / pH: x  Gluc: 88 mg/dL / Ketone: x  / Bili: x / Urobili: x   Blood: x / Protein: x / Nitrite: x   Leuk Esterase: x / RBC: x / WBC x   Sq Epi: x / Non Sq Epi: x / Bacteria: x                          14.0   12.21 )-----------( 60       ( 18 Jan 2025 06:49 )             40.7                         13.1   12.35 )-----------( 56       ( 17 Jan 2025 07:00 )             38.6     RADIOLOGY & ADDITIONAL TESTS:    Imaging Personally Reviewed:  [x] YES  [ ] NO    Consultants involved in case:   Consultant(s) Notes Reviewed:  [x] YES  [ ] NO:   Care Discussed with Consultants/Other Providers [x] YES  [ ] NO

## 2025-01-19 NOTE — PROGRESS NOTE ADULT - PROBLEM SELECTOR PLAN 6
-On home lipitor 20mg QD    PLAN:  >c/w home atorvastatin 20mg QD -On home bisoprolol 17.5mg PO QD  -d/evelyn bisoprolol 17.5mg PO QD (1/11) per cardiology recs  -On home amlodipine 5mg PO QD    PLAN:  >c/w metoprolol ER 50mg QD per cardiology recs; up-titrated (1/13 -   >f/u cardiology recs

## 2025-01-20 LAB
ADD ON TEST-SPECIMEN IN LAB: SIGNIFICANT CHANGE UP
ALBUMIN SERPL ELPH-MCNC: 3.7 G/DL — SIGNIFICANT CHANGE UP (ref 3.3–5)
ALP SERPL-CCNC: 66 U/L — SIGNIFICANT CHANGE UP (ref 40–120)
ALT FLD-CCNC: 34 U/L — SIGNIFICANT CHANGE UP (ref 10–45)
ANION GAP SERPL CALC-SCNC: 14 MMOL/L — SIGNIFICANT CHANGE UP (ref 5–17)
AST SERPL-CCNC: 44 U/L — HIGH (ref 10–40)
BASOPHILS # BLD AUTO: 0.02 K/UL — SIGNIFICANT CHANGE UP (ref 0–0.2)
BASOPHILS NFR BLD AUTO: 0.2 % — SIGNIFICANT CHANGE UP (ref 0–2)
BILIRUB DIRECT SERPL-MCNC: 0.7 MG/DL — HIGH (ref 0–0.3)
BILIRUB SERPL-MCNC: 1.7 MG/DL — HIGH (ref 0.2–1.2)
BUN SERPL-MCNC: 32 MG/DL — HIGH (ref 7–23)
CALCIUM SERPL-MCNC: 8.7 MG/DL — SIGNIFICANT CHANGE UP (ref 8.4–10.5)
CHLORIDE SERPL-SCNC: 95 MMOL/L — LOW (ref 96–108)
CO2 SERPL-SCNC: 27 MMOL/L — SIGNIFICANT CHANGE UP (ref 22–31)
CREAT SERPL-MCNC: 2.13 MG/DL — HIGH (ref 0.5–1.3)
EGFR: 30 ML/MIN/1.73M2 — LOW
EOSINOPHIL # BLD AUTO: 0.03 K/UL — SIGNIFICANT CHANGE UP (ref 0–0.5)
EOSINOPHIL NFR BLD AUTO: 0.3 % — SIGNIFICANT CHANGE UP (ref 0–6)
GLUCOSE SERPL-MCNC: 96 MG/DL — SIGNIFICANT CHANGE UP (ref 70–99)
HCT VFR BLD CALC: 41.3 % — SIGNIFICANT CHANGE UP (ref 39–50)
HGB BLD-MCNC: 13.7 G/DL — SIGNIFICANT CHANGE UP (ref 13–17)
IMM GRANULOCYTES NFR BLD AUTO: 1.2 % — HIGH (ref 0–0.9)
LYMPHOCYTES # BLD AUTO: 1.18 K/UL — SIGNIFICANT CHANGE UP (ref 1–3.3)
LYMPHOCYTES # BLD AUTO: 12 % — LOW (ref 13–44)
MAGNESIUM SERPL-MCNC: 2 MG/DL — SIGNIFICANT CHANGE UP (ref 1.6–2.6)
MCHC RBC-ENTMCNC: 31.4 PG — SIGNIFICANT CHANGE UP (ref 27–34)
MCHC RBC-ENTMCNC: 33.2 G/DL — SIGNIFICANT CHANGE UP (ref 32–36)
MCV RBC AUTO: 94.7 FL — SIGNIFICANT CHANGE UP (ref 80–100)
MONOCYTES # BLD AUTO: 6.73 K/UL — HIGH (ref 0–0.9)
MONOCYTES NFR BLD AUTO: 68.5 % — HIGH (ref 2–14)
NEUTROPHILS # BLD AUTO: 1.74 K/UL — LOW (ref 1.8–7.4)
NEUTROPHILS NFR BLD AUTO: 17.8 % — LOW (ref 43–77)
NRBC # BLD: 0 /100 WBCS — SIGNIFICANT CHANGE UP (ref 0–0)
NRBC BLD-RTO: 0 /100 WBCS — SIGNIFICANT CHANGE UP (ref 0–0)
PHOSPHATE SERPL-MCNC: 3.4 MG/DL — SIGNIFICANT CHANGE UP (ref 2.5–4.5)
PLATELET # BLD AUTO: 61 K/UL — LOW (ref 150–400)
POTASSIUM SERPL-MCNC: 3.6 MMOL/L — SIGNIFICANT CHANGE UP (ref 3.5–5.3)
POTASSIUM SERPL-SCNC: 3.6 MMOL/L — SIGNIFICANT CHANGE UP (ref 3.5–5.3)
PROT SERPL-MCNC: 6.5 G/DL — SIGNIFICANT CHANGE UP (ref 6–8.3)
RBC # BLD: 4.36 M/UL — SIGNIFICANT CHANGE UP (ref 4.2–5.8)
RBC # FLD: 13.3 % — SIGNIFICANT CHANGE UP (ref 10.3–14.5)
SODIUM SERPL-SCNC: 136 MMOL/L — SIGNIFICANT CHANGE UP (ref 135–145)
WBC # BLD: 9.82 K/UL — SIGNIFICANT CHANGE UP (ref 3.8–10.5)
WBC # FLD AUTO: 9.82 K/UL — SIGNIFICANT CHANGE UP (ref 3.8–10.5)

## 2025-01-20 PROCEDURE — 99233 SBSQ HOSP IP/OBS HIGH 50: CPT

## 2025-01-20 RX ORDER — BUMETANIDE 2 MG/1
2 TABLET ORAL DAILY
Refills: 0 | Status: DISCONTINUED | OUTPATIENT
Start: 2025-01-20 | End: 2025-01-21

## 2025-01-20 RX ADMIN — APIXABAN 2.5 MILLIGRAM(S): 5 TABLET, FILM COATED ORAL at 05:28

## 2025-01-20 RX ADMIN — AMIODARONE HYDROCHLORIDE 200 MILLIGRAM(S): 50 INJECTION, SOLUTION INTRAVENOUS at 05:28

## 2025-01-20 RX ADMIN — ACETAMINOPHEN 650 MILLIGRAM(S): 160 SUSPENSION ORAL at 17:33

## 2025-01-20 RX ADMIN — APIXABAN 2.5 MILLIGRAM(S): 5 TABLET, FILM COATED ORAL at 17:20

## 2025-01-20 RX ADMIN — BUMETANIDE 2 MILLIGRAM(S): 2 TABLET ORAL at 05:29

## 2025-01-20 RX ADMIN — Medication 5 MILLIGRAM(S): at 21:17

## 2025-01-20 RX ADMIN — ATORVASTATIN CALCIUM 20 MILLIGRAM(S): 80 TABLET, FILM COATED ORAL at 21:18

## 2025-01-20 RX ADMIN — FAMOTIDINE 20 MILLIGRAM(S): 10 INJECTION INTRAVENOUS at 21:18

## 2025-01-20 RX ADMIN — Medication 2 TABLET(S): at 21:18

## 2025-01-20 RX ADMIN — Medication 50 MILLIGRAM(S): at 05:28

## 2025-01-20 RX ADMIN — PANTOPRAZOLE 40 MILLIGRAM(S): 20 TABLET, DELAYED RELEASE ORAL at 05:28

## 2025-01-20 NOTE — PROGRESS NOTE ADULT - SUBJECTIVE AND OBJECTIVE BOX
ARABELLA WEISS  84y  MRN: [id number]    Patient is a 84y old  Male who presents with a chief complaint of HF (17 Jan 2025 12:36)      Interval/Overnight Events: no events ON. Febrile to 101.3F 01/19, started on CTX and found to be influenza A+.    Subjective: Pt seen and examined at bedside.     MEDICATIONS  (STANDING):  aMIOdarone    Tablet 200 milliGRAM(s) Oral daily  apixaban 2.5 milliGRAM(s) Oral every 12 hours  atorvastatin 20 milliGRAM(s) Oral at bedtime  buMETAnide Injectable 2 milliGRAM(s) IV Push daily  cefTRIAXone   IVPB 1000 milliGRAM(s) IV Intermittent every 24 hours  famotidine    Tablet 20 milliGRAM(s) Oral at bedtime  finasteride 5 milliGRAM(s) Oral daily  metoprolol succinate ER 50 milliGRAM(s) Oral daily  pantoprazole    Tablet 40 milliGRAM(s) Oral before breakfast  polyethylene glycol 3350 17 Gram(s) Oral at bedtime  senna 2 Tablet(s) Oral at bedtime    MEDICATIONS  (PRN):  acetaminophen     Tablet .. 650 milliGRAM(s) Oral every 6 hours PRN Temp greater or equal to 38C (100.4F), Mild Pain (1 - 3)  aluminum hydroxide/magnesium hydroxide/simethicone Suspension 30 milliLiter(s) Oral every 6 hours PRN Dyspepsia  ondansetron Injectable 4 milliGRAM(s) IV Push every 6 hours PRN Nausea and/or Vomiting      Objective:    Vitals: Vital Signs Last 24 Hrs  T(C): 37.3 (01-20-25 @ 04:25), Max: 38.5 (01-19-25 @ 11:36)  T(F): 99.1 (01-20-25 @ 04:25), Max: 101.3 (01-19-25 @ 11:36)  HR: 101 (01-20-25 @ 04:25) (92 - 106)  BP: 113/54 (01-20-25 @ 04:25) (105/54 - 113/54)  BP(mean): --  RR: 18 (01-20-25 @ 04:25) (18 - 18)  SpO2: 91% (01-20-25 @ 04:25) (91% - 93%)                I&O's Summary    19 Jan 2025 07:01  -  20 Jan 2025 07:00  --------------------------------------------------------  IN: 840 mL / OUT: 1900 mL / NET: -1060 mL        PHYSICAL EXAM:  GENERAL: NAD  HEAD:  Atraumatic, Normocephalic  EYES: EOMI, conjunctiva and sclera clear  CHEST/LUNG: Clear to auscultation bilaterally; No rales, rhonchi, wheezing, or rubs  HEART: Regular rate and rhythm; No murmurs, rubs, or gallops  ABDOMEN: Soft, Nontender, Nondistended;   SKIN: No rashes or lesions  NERVOUS SYSTEM:  Alert & Oriented X3, no focal deficits    LABS:                        13.7   9.82  )-----------( 61       ( 20 Jan 2025 06:31 )             41.3                         13.4   9.44  )-----------( 61       ( 19 Jan 2025 07:34 )             39.0                         14.0   12.21 )-----------( 60       ( 18 Jan 2025 06:49 )             40.7     01-20    136  |  95[L]  |  32[H]  ----------------------------<  96  3.6   |  27  |  2.13[H]  01-19    134[L]  |  94[L]  |  29[H]  ----------------------------<  98  3.2[L]   |  25  |  1.96[H]  01-18    136  |  98  |  35[H]  ----------------------------<  88  4.2   |  23  |  2.03[H]    Ca    8.7      20 Jan 2025 06:31  Ca    8.9      19 Jan 2025 07:34  Ca    8.8      18 Jan 2025 06:48  Phos  3.4     01-20  Mg     2.0     01-20    TPro  6.5  /  Alb  3.7  /  TBili  1.7[H]  /  DBili  x   /  AST  44[H]  /  ALT  34  /  AlkPhos  66  01-20    CAPILLARY BLOOD GLUCOSE      POCT Blood Glucose.: 100 mg/dL (19 Jan 2025 12:13)    RADIOLOGY & ADDITIONAL TESTS:    Imaging Personally Reviewed:  [x ] YES  [ ] NO    Consultants involved in case:   Consultant(s) Notes Reviewed:  [ x] YES  [ ] NO:   Care Discussed with Consultants/Other Providers [x ] YES  [ ] NO           ARABELLA WEISS  84y  MRN: [id number]    Patient is a 84y old  Male who presents with a chief complaint of HF (17 Jan 2025 12:36)  Translation: [id number]    Interval/Overnight Events: no events ON. Febrile to 101.3F 01/19, started on CTX and found to be influenza A+.    Subjective: Pt seen and examined at bedside. No acute concerns. Eating/drinking okay with slightly decreased appetite, urinating/regular bowel movements, no SOB/chest pain.    MEDICATIONS  (STANDING):  aMIOdarone    Tablet 200 milliGRAM(s) Oral daily  apixaban 2.5 milliGRAM(s) Oral every 12 hours  atorvastatin 20 milliGRAM(s) Oral at bedtime  buMETAnide Injectable 2 milliGRAM(s) IV Push daily  cefTRIAXone   IVPB 1000 milliGRAM(s) IV Intermittent every 24 hours  famotidine    Tablet 20 milliGRAM(s) Oral at bedtime  finasteride 5 milliGRAM(s) Oral daily  metoprolol succinate ER 50 milliGRAM(s) Oral daily  pantoprazole    Tablet 40 milliGRAM(s) Oral before breakfast  polyethylene glycol 3350 17 Gram(s) Oral at bedtime  senna 2 Tablet(s) Oral at bedtime    MEDICATIONS  (PRN):  acetaminophen     Tablet .. 650 milliGRAM(s) Oral every 6 hours PRN Temp greater or equal to 38C (100.4F), Mild Pain (1 - 3)  aluminum hydroxide/magnesium hydroxide/simethicone Suspension 30 milliLiter(s) Oral every 6 hours PRN Dyspepsia  ondansetron Injectable 4 milliGRAM(s) IV Push every 6 hours PRN Nausea and/or Vomiting      Objective:    Vitals: Vital Signs Last 24 Hrs  T(C): 37.3 (01-20-25 @ 04:25), Max: 38.5 (01-19-25 @ 11:36)  T(F): 99.1 (01-20-25 @ 04:25), Max: 101.3 (01-19-25 @ 11:36)  HR: 101 (01-20-25 @ 04:25) (92 - 106)  BP: 113/54 (01-20-25 @ 04:25) (105/54 - 113/54)  BP(mean): --  RR: 18 (01-20-25 @ 04:25) (18 - 18)  SpO2: 91% (01-20-25 @ 04:25) (91% - 93%)                I&O's Summary    19 Jan 2025 07:01  -  20 Jan 2025 07:00  --------------------------------------------------------  IN: 840 mL / OUT: 1900 mL / NET: -1060 mL      PHYSICAL EXAM:  GENERAL: NAD  HEAD:  Atraumatic, Normocephalic  EYES: EOMI, conjunctiva and sclera clear  CHEST/LUNG: Clear to auscultation bilaterally; No rales, rhonchi, wheezing, or rubs  HEART: Regular rate and rhythm; No murmurs, rubs, or gallops  ABDOMEN: Soft, Nontender  SKIN: No rashes or lesions  NERVOUS SYSTEM:  Alert & Oriented X3, no focal deficits  EXTREMITIES: 1+ pitting lower extremity edema    LABS:                        13.7   9.82  )-----------( 61       ( 20 Jan 2025 06:31 )             41.3                         13.4   9.44  )-----------( 61       ( 19 Jan 2025 07:34 )             39.0                         14.0   12.21 )-----------( 60       ( 18 Jan 2025 06:49 )             40.7     01-20    136  |  95[L]  |  32[H]  ----------------------------<  96  3.6   |  27  |  2.13[H]  01-19    134[L]  |  94[L]  |  29[H]  ----------------------------<  98  3.2[L]   |  25  |  1.96[H]  01-18    136  |  98  |  35[H]  ----------------------------<  88  4.2   |  23  |  2.03[H]    Ca    8.7      20 Jan 2025 06:31  Ca    8.9      19 Jan 2025 07:34  Ca    8.8      18 Jan 2025 06:48  Phos  3.4     01-20  Mg     2.0     01-20    TPro  6.5  /  Alb  3.7  /  TBili  1.7[H]  /  DBili  x   /  AST  44[H]  /  ALT  34  /  AlkPhos  66  01-20    CAPILLARY BLOOD GLUCOSE      POCT Blood Glucose.: 100 mg/dL (19 Jan 2025 12:13)    RADIOLOGY & ADDITIONAL TESTS:    Imaging Personally Reviewed:  [x ] YES  [ ] NO    Consultants involved in case:   Consultant(s) Notes Reviewed:  [ x] YES  [ ] NO:   Care Discussed with Consultants/Other Providers [x ] YES  [ ] NO

## 2025-01-20 NOTE — PROGRESS NOTE ADULT - SUBJECTIVE AND OBJECTIVE BOX
Date of Service: 01-20-25 @ 07:05           CARDIOLOGY     PROGRESS  NOTE   ________________________________________________    CHIEF COMPLAINT:Patient is a 84y old  Male who presents with a chief complaint of HF (17 Jan 2025 12:36)  doing better  	  REVIEW OF SYSTEMS:  CONSTITUTIONAL: No fever, weight loss, or fatigue  EYES: No eye pain, visual disturbances, or discharge  ENT:  No difficulty hearing, tinnitus, vertigo; No sinus or throat pain  NECK: No pain or stiffness  RESPIRATORY: No cough, wheezing, chills or hemoptysis; decrease  Shortness of Breath  CARDIOVASCULAR: No chest pain, palpitations, passing out, dizziness, or leg swelling  GASTROINTESTINAL: No abdominal or epigastric pain. No nausea, vomiting, or hematemesis; No diarrhea or constipation. No melena or hematochezia.  GENITOURINARY: No dysuria, frequency, hematuria, or incontinence  NEUROLOGICAL: No headaches, memory loss, loss of strength, numbness, or tremors  SKIN: No itching, burning, rashes, or lesions   LYMPH Nodes: No enlarged glands  ENDOCRINE: No heat or cold intolerance; No hair loss  MUSCULOSKELETAL: No joint pain or swelling; No muscle, back, or extremity pain  PSYCHIATRIC: No depression, anxiety, mood swings, or difficulty sleeping  HEME/LYMPH: No easy bruising, or bleeding gums  ALLERGY AND IMMUNOLOGIC: No hives or eczema	    [ ] All others negative	  [ ] Unable to obtain    PHYSICAL EXAM:  T(C): 37.3 (01-20-25 @ 04:25), Max: 38.5 (01-19-25 @ 11:36)  HR: 101 (01-20-25 @ 04:25) (92 - 106)  BP: 113/54 (01-20-25 @ 04:25) (105/54 - 113/54)  RR: 18 (01-20-25 @ 04:25) (18 - 18)  SpO2: 91% (01-20-25 @ 04:25) (91% - 93%)  Wt(kg): --  I&O's Summary    19 Jan 2025 07:01  -  20 Jan 2025 07:00  --------------------------------------------------------  IN: 840 mL / OUT: 1900 mL / NET: -1060 mL        Appearance: Normal	  HEENT:   Normal oral mucosa, PERRL, EOMI	  Lymphatic: No lymphadenopathy  Cardiovascular: Normal S1 S2, No JVD, + murmurs, No edema  Respiratory: rhonchi  Psychiatry: A & O x 3, Mood & affect appropriate  Gastrointestinal:  Soft, Non-tender, + BS	  Skin: No rashes, No ecchymoses, No cyanosis	  Extremities: Normal range of motion, No clubbing, cyanosis or edema  Vascular: Peripheral pulses palpable 2+ bilaterally    MEDICATIONS  (STANDING):  aMIOdarone    Tablet 200 milliGRAM(s) Oral daily  apixaban 2.5 milliGRAM(s) Oral every 12 hours  atorvastatin 20 milliGRAM(s) Oral at bedtime  buMETAnide Injectable 2 milliGRAM(s) IV Push every 12 hours  cefTRIAXone   IVPB 1000 milliGRAM(s) IV Intermittent every 24 hours  famotidine    Tablet 20 milliGRAM(s) Oral at bedtime  finasteride 5 milliGRAM(s) Oral daily  metoprolol succinate ER 50 milliGRAM(s) Oral daily  pantoprazole    Tablet 40 milliGRAM(s) Oral before breakfast  polyethylene glycol 3350 17 Gram(s) Oral at bedtime  senna 2 Tablet(s) Oral at bedtime      TELEMETRY: 	    ECG:  	  RADIOLOGY:  OTHER: 	  	  LABS:	 	    CARDIAC MARKERS:                                13.7   9.82  )-----------( 61       ( 20 Jan 2025 06:31 )             41.3     01-20    136  |  95[L]  |  32[H]  ----------------------------<  96  3.6   |  27  |  2.13[H]    Ca    8.7      20 Jan 2025 06:31  Phos  3.4     01-20  Mg     2.0     01-20    TPro  6.5  /  Alb  3.7  /  TBili  1.7[H]  /  DBili  x   /  AST  44[H]  /  ALT  34  /  AlkPhos  66  01-20    proBNP:   Lipid Profile:   HgA1c:   TSH:         Assessment and plan  ---------------------------  84-year-old male PMH afib on eliquid, severe aortic stenosis (2/2 bicuspid aortic valve), s/p aortic valve replacement (7/2012), HTN, pHTN, HLD, chronic renal insufficiency, thrombocytopenia, GERD presenting from Dr. Ramirez office for tachycardia. Patient had COVID 2 weeks ago which he was treated with paxlovid for. Subsequently, the patient developed some wheezing and was treated with doxycycline and steroids from an urgent care with improvement in symptoms. The patient continued to feel weak however, and dyspneic on exertion. The patient notes 10 days of leg swelling, with dyspnea on exertion starting 3 days ago with inability to tolerate more than 2-3 steps (baseline does not ambulate much). Of note, the patient experienced some nausea a few days prior and vomited after getting into his car.   The patient went to visit his PCP today for the dyspnea and leg swelling; was noted to be tachycardic to 130 BPM and was sent to ER for further evaluation.   Pt did not take home bisoprolol and eliquis prior to admission. Pt denies chest pain, shortness of breath, n/v/d/, fevers or chills, urinary sx, headache, visual changes, numbness or other complaints.  ppt with sig cardiac and medical hx with rapid hr, sob and le edema  cta of chest no PE, but increase interstitial marking  cw chf.  s/p AVR overall mild regurgitation ,TILA 1.44 cn2  will need to add diuresis sec to RV dysfunction , will add midodrine if bp can not tolerate  will discuss with family  dc bisprolol for now, will add metoprolol er 25 mg daily as needed  repeat chest x ray to control HR   thrombocytopenia ?etiology  tele a.fib hr 80 to 120, will increase metoprolol er as tolerated  will consider DARA/  cardioversion after small amio load in am mhr has much improved, will increase beta blocker if hr elevated  discussed with daughter agreed with cardioversion  HR is controlled  acute renal failure??   s/p DARA cardioversion remain in NSR, check bladder scan plan as per renal  nephrology noted pt did not get contrast for the procedure or DARA  pt will eventually needs TAVR with sig AI on the bioprosthetic valve in 2012  will switch amio to 200 mg daily today, pt converted to a.fib at 4 52 am   continue current meds  PETER ?etiology, renal appreciated  continue  ac may needs to increase beta blocker to bid if need to control hr, will increase to bid  continue Diuresis, will fu renal function closely  will get structural heart to see pt needs TAVR eventually  continue diuresis, decrease to daily  replete K, will adjust hr, add metoprolol 25 mpo qpm if hr remains elevated

## 2025-01-20 NOTE — PROGRESS NOTE ADULT - PROBLEM SELECTOR PLAN 1
- Tmax 101 orally on 1/19  - cough productive of clear sputum tinged with intermittent blood  - potentially viral process as symptoms not as severe  > repeat sepsis w/u with blood cxs, UA, CXR, RVP  > empiric abx with ctx (1/19- ) to covering for respiratory source, dc if RVP+ or blood cxs neg - Tmax 101 orally on 1/19. Cough productive of clear sputum tinged with intermittent blood. CXR and UA without infection. Influenza A+ on RVP   - d/c CTX  - monitor off antivirals in setting of clinical stability

## 2025-01-20 NOTE — PROGRESS NOTE ADULT - PROBLEM SELECTOR PLAN 4
-Hx of HTN and pHTN  -Troponin 54->59  -Pro-BNP of 3624.   -Nuclear stress test (11/2023): small sized mild defect(s) in the basal inferolateral wall that is reversible consistent with ischemia  -TTE (11/2024): "EF is approximately 55%. Mild concentric left ventricular hypertrophy. Right ventricular enlargement with mildly decreased right ventricular systolic function. Left atrium is severely dilated. Right atrium is mildly dilated. Moderate mitral regurgitation. Mitral annular calcification with thickened and mildly calcified mitral valve leaflets. There is mild prolapse of the posterior mitral valve leaflet. Bioprosthetic aortic valve replacement. Aortic root at the sinuses of Valsalva is borderline dilated. Ascending aorta is mildly dilated. Estimated pulmonary artery systolic pressure is 50 mmHg. Moderate to severe tricuspid regurgitation"  -CXR (1/8): b/l lower atelectasis vs pneumonia  -CTA (1/8): "Small bibasilar pleural effusions. Interlobular septal thickening, suggestive of interstitial edema."   -ER POCUS TTE (1/8): trace pericardial effusion, global LV hypokinesis, b/l lungs with B-lines and trace pleural effusions.  -s/p lasix 20mg IV BID X 2 Days  -TTE (1/10): LVEF 60-65%, mildly enlarged right ventricular cavity, borderline reduced ventricular systolic function, pHTN 57 mmHg, moderate tricuspid regurgitation.   -DARA (1/14): normal LVEF, severe mitral/tricuspid regurgitation, aortic valve AS moderate-severe, intravalvular regurgitation   -Dyspnea initially improved from admission, now worsened    PLAN:  >c/w Bumex 2mg IVP BID  >c/w daily standing weights, strict ins/outs  >f/u structural heart evaluation  >f/u cardiology outpatient  >Would likely benefit from starting PO lasix on d/c

## 2025-01-20 NOTE — PROGRESS NOTE ADULT - PROBLEM SELECTOR PROBLEM 10
[facility]     Hospitalist Progress Note     Judy Ibarra Patient Status:  Inpatient    1977 MRN [id number]   [facility] 4SW-A Attending Dante Manley MD   Hosp Day # 52 PCP PHYSICIAN NONSTAFF     Chief Complaint: sob    Subject (A) 12/03/2021       Pro-Calcitonin  No results for input(s): PCT in the last 168 hours. Cardiac  No results for input(s): TROP, PBNP in the last 168 hours. Creatinine Kinase  No results for input(s): CK in the last 168 hours.     Inflammatory Markers cultures clearing, completed Vancomycin    -Hyperglycemia - new DM2  -monitor accuchecks  -insulin, levemir 35 units nightly  -a1c 13.1    -Anemia - suspect critical illness as etiology; no signs of bleeding, stable    -Shock, due to sepsis, vent, sedation Primary osteoarthritis of both knees

## 2025-01-20 NOTE — PROGRESS NOTE ADULT - NS ATTEND AMEND GEN_ALL_CORE FT
Agree with above assessment and plan.   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 Baseline platelet count 70s-80s. Here with slightly lower. No need for any intervention. As long as platelet over 50 safe for anticoagulation. Nephrology following for CKF and Cardiology for afib/CHF. No intervention at this time for current platelet count.
I agree with the above assessment and plan
cont to monitor plt counts. cardio follow up
I agree with the above assessment and plan
Agree with above assessment and plan.   h/o ITP baseline platelet count 70s-80s, today 56. HF/afib now on ac. No intervention for ITP unless plt count <50K. Follow up with Dr Junior at [facility] outpatient after discharge
Patient seen and examined with the PA  I agree with her assessment and plan

## 2025-01-20 NOTE — PROGRESS NOTE ADULT - SUBJECTIVE AND OBJECTIVE BOX
Patient is a 84y old  Male who presents with a chief complaint of HF (17 Jan 2025 12:36)    Patient seen and examined  OOB in chair, reports feeling better since starting Tamiflu  No new complaints offered  No acute overnight events    MEDICATIONS  (STANDING):  aMIOdarone    Tablet 200 milliGRAM(s) Oral daily  apixaban 2.5 milliGRAM(s) Oral every 12 hours  atorvastatin 20 milliGRAM(s) Oral at bedtime  buMETAnide Injectable 2 milliGRAM(s) IV Push daily  famotidine    Tablet 20 milliGRAM(s) Oral at bedtime  finasteride 5 milliGRAM(s) Oral daily  metoprolol succinate ER 50 milliGRAM(s) Oral daily  pantoprazole    Tablet 40 milliGRAM(s) Oral before breakfast  polyethylene glycol 3350 17 Gram(s) Oral at bedtime  senna 2 Tablet(s) Oral at bedtime    MEDICATIONS  (PRN):  acetaminophen     Tablet .. 650 milliGRAM(s) Oral every 6 hours PRN Temp greater or equal to 38C (100.4F), Mild Pain (1 - 3)  aluminum hydroxide/magnesium hydroxide/simethicone Suspension 30 milliLiter(s) Oral every 6 hours PRN Dyspepsia  ondansetron Injectable 4 milliGRAM(s) IV Push every 6 hours PRN Nausea and/or Vomiting      Vital Signs Last 24 Hrs  T(C): 37.3 (20 Jan 2025 04:25), Max: 38.5 (19 Jan 2025 11:36)  T(F): 99.1 (20 Jan 2025 04:25), Max: 101.3 (19 Jan 2025 11:36)  HR: 101 (20 Jan 2025 04:25) (92 - 106)  BP: 113/54 (20 Jan 2025 04:25) (105/54 - 113/54)  BP(mean): --  RR: 18 (20 Jan 2025 04:25) (18 - 18)  SpO2: 91% (20 Jan 2025 04:25) (91% - 93%)    Parameters below as of 20 Jan 2025 04:25  Patient On (Oxygen Delivery Method): room air        PE  Awake, alert  Anicteric, MMM  RRR  CTAB  Abd soft, NT, ND  No c/c                        13.7   9.82  )-----------( 61       ( 20 Jan 2025 06:31 )             41.3       01-20    136  |  95[L]  |  32[H]  ----------------------------<  96  3.6   |  27  |  2.13[H]    Ca    8.7      20 Jan 2025 06:31  Phos  3.4     01-20  Mg     2.0     01-20    TPro  6.5  /  Alb  3.7  /  TBili  1.7[H]  /  DBili  x   /  AST  44[H]  /  ALT  34  /  AlkPhos  66  01-20

## 2025-01-20 NOTE — PROGRESS NOTE ADULT - PROBLEM SELECTOR PLAN 2
-Urine studies: Na of 33, Nitrogen of 707, creatinine of 114; FeUrea suggestive of intrinsic renal disease  -Urine studies (1/15): Na of 31, creatinine of 261, p/c ratio: 0.6, urine osmol: 489; FeNa suggestive of pre-renal  -Kidney/Bladder US: "No hydronephrosis. Increased renal cortical echogenicity, compatible with renal   parenchymal disease. 0.9 x 0.5 x 1.1 cm right upper pole nonobstructing stone, similarly seen on CT from 1/17/2018. Left upper pole cyst measuring 2.6 x 1.9 x 2.8 cm with rim calcifications is similarly seen on CT abdomen pelvis from 1/17/2018. Additional bilateral simple cysts as above."  -s/p Bumex 2mg IVP 1x on 1/16, Bumex 2mg IVP 2x on 1/17  -Ddx: ATN vs pre-renal    PLAN:  >c/w Bumex 2mg IVP BID, reassess tomorrow as still with orthopnea  >c/w trending kidney function daily  >f/u nephro recs -Urine studies: Na of 33, Nitrogen of 707, creatinine of 114; FeUrea suggestive of intrinsic renal disease  -Urine studies (1/15): Na of 31, creatinine of 261, p/c ratio: 0.6, urine osmol: 489; FeNa suggestive of pre-renal  -Kidney/Bladder US: "No hydronephrosis. Increased renal cortical echogenicity, compatible with renal   parenchymal disease. 0.9 x 0.5 x 1.1 cm right upper pole nonobstructing stone, similarly seen on CT from 1/17/2018. Left upper pole cyst measuring 2.6 x 1.9 x 2.8 cm with rim calcifications is similarly seen on CT abdomen pelvis from 1/17/2018. Additional bilateral simple cysts as above."  -s/p Bumex 2mg IVP 1x on 1/16, Bumex 2mg IVP 2x on 1/17  -Ddx: ATN vs pre-renal    PLAN:  >c/w Bumex 2mg IVP BID, reassess tomorrow  >c/w trending kidney function daily  >f/u nephro recs

## 2025-01-20 NOTE — PROGRESS NOTE ADULT - PROBLEM SELECTOR PLAN 5
-EKG on admission with sinus tachycardia to 125-128  -On home bisoprolol 17.5mg PO QD  -d/evelyn bisoprolol 17.5mg PO QD (1/11) per cardiology recs  -s/p DARA/cardioversion on 1/14  -s/p Digoxin on 1/12  -Patient is now back in AFib on 1/17    PLAN:  >c/w home apixaban 2.5mg PO QD - hold if Plt<50  >c/w amiodarone 200mg QD; down-titrated (1/12 -     >c/w metoprolol ER 50mg QD per cardiology recs; up-titrated (1/13 -   >f/u cardiology recs  >f/u cardiology outpatient -EKG on admission with sinus tachycardia to 125-128  -On home bisoprolol 17.5mg PO QD  -d/evelyn bisoprolol 17.5mg PO QD (1/11) per cardiology recs  -s/p DARA/cardioversion on 1/14  -s/p Digoxin on 1/12  -Patient is now back in AFib on 1/17  - Tachycardic also in setting of influenza A+    PLAN:  >c/w home apixaban 2.5mg PO QD - hold if Plt<50  >c/w amiodarone 200mg QD; down-titrated (1/12 -     >c/w metoprolol ER 50mg QD per cardiology recs; up-titrated (1/13 -   >f/u cardiology recs  >f/u cardiology outpatient

## 2025-01-20 NOTE — PROGRESS NOTE ADULT - ASSESSMENT
84-year-old male PMH afib on eliquid, severe aortic stenosis (2/2 bicuspid aortic valve), s/p aortic valve replacement (7/2012), HTN, pHTN, HLD, chronic renal insufficiency, thrombocytopenia, GERD presenting from Dr. Ramirez office for tachycardia.     ITP   - Platelet range between 79-85K, follows with Dr. Junior at [facility].   - Prior work up generally speaking unremarkable. He has no evidence of a monoclonal gammopathy. Flow cytometry was with no abnormal immunophenotype. No evidence of B12 or folic acid deficiency. He has mild hemolysis of unclear etiology, although there is no anemia. Direct Onofre was negative. His KENNA was slightly elevated at 1:160.   - No intervention at this time for ITP given platelets are above 50K  - There is no absolute contraindication to amiodarone from a hematologic perspective, will monitor platelet count.     CHF exacerbation  - 1/8 CTA chest w/ no PE. Small bibasilar pleural effusions. Interlobular septal thickening, suggestive of interstitial edema  - Can continue the eliquis for afib for now but would hold if platelet count drops below 50.  - Management per cardiology, s/p DARA/cardioversion 1/14 w/ Severe mitral regurgitation and severe tricuspid regurgitation. Eventual AVR  - Chest x ray 1/16: Cardiomegaly. Progression of pulmonary edema changes or bilateral multifocal pneumonia.  - c/o tele, denies CP    PETER on CKD  - Renal US w/ no hydronephrosis, renal parenchymal disease. 0.9 x 0.5 x 1.1 cm right upper pole nonobstructing stone, similarly seen on CT from 1/17/2018. Left upper pole cyst 2.6 x 1.9 x 2.8 cm.  - Per nephrology, PETER on CKD in the setting of hypotension, afib, IV contrast. Possibly hemodynamically mediated ATN.   - On Bumex, on 1L fluid restriction    Will continue to follow.    Cecilia Beck NP  Hematology/ Oncology  [facility]  [phone] (office)  [phone] (alt office)  Evenings and weekends please call MD on call or office   73.44

## 2025-01-20 NOTE — PROGRESS NOTE ADULT - ATTENDING COMMENTS
84M w/pmh chronic atrial fibrillation, CKD 3a, severe aortic stenosis s/p aortic valve replacement (7/2012), HTN, pHTN, HLD, thrombocytopenia, GERD presenting from PCP's office for cc tachycardia, JOSEPH. Found to have volume overload and atrial fibrillation w/RVR. Continue IV bumex BID, improving. s/p DARA/DCCV 1/14, initially converted to NSR, now back to afib RVR, continue amiodarone, metoprolol. Cardiology f/u. Febrile on 1/19. Dc IV abx. RVP+ influenza. Continue diuresis. Monitor renal function. Plan to follow up with structural heart outpatient to discuss TAVR. Treatment will be dependent on clinical course.     Agree with progress note as outlined above, edited where appropriate.

## 2025-01-21 LAB
ALBUMIN SERPL ELPH-MCNC: 3.5 G/DL — SIGNIFICANT CHANGE UP (ref 3.3–5)
ALP SERPL-CCNC: 64 U/L — SIGNIFICANT CHANGE UP (ref 40–120)
ALT FLD-CCNC: 33 U/L — SIGNIFICANT CHANGE UP (ref 10–45)
ANION GAP SERPL CALC-SCNC: 13 MMOL/L — SIGNIFICANT CHANGE UP (ref 5–17)
AST SERPL-CCNC: 46 U/L — HIGH (ref 10–40)
BASOPHILS # BLD AUTO: 0.03 K/UL — SIGNIFICANT CHANGE UP (ref 0–0.2)
BASOPHILS NFR BLD AUTO: 0.4 % — SIGNIFICANT CHANGE UP (ref 0–2)
BILIRUB SERPL-MCNC: 1.5 MG/DL — HIGH (ref 0.2–1.2)
BUN SERPL-MCNC: 30 MG/DL — HIGH (ref 7–23)
CALCIUM SERPL-MCNC: 8.6 MG/DL — SIGNIFICANT CHANGE UP (ref 8.4–10.5)
CHLORIDE SERPL-SCNC: 96 MMOL/L — SIGNIFICANT CHANGE UP (ref 96–108)
CO2 SERPL-SCNC: 25 MMOL/L — SIGNIFICANT CHANGE UP (ref 22–31)
CREAT SERPL-MCNC: 1.91 MG/DL — HIGH (ref 0.5–1.3)
EGFR: 34 ML/MIN/1.73M2 — LOW
EOSINOPHIL # BLD AUTO: 0.03 K/UL — SIGNIFICANT CHANGE UP (ref 0–0.5)
EOSINOPHIL NFR BLD AUTO: 0.4 % — SIGNIFICANT CHANGE UP (ref 0–6)
GLUCOSE SERPL-MCNC: 98 MG/DL — SIGNIFICANT CHANGE UP (ref 70–99)
HCT VFR BLD CALC: 41.5 % — SIGNIFICANT CHANGE UP (ref 39–50)
HGB BLD-MCNC: 14 G/DL — SIGNIFICANT CHANGE UP (ref 13–17)
IMM GRANULOCYTES NFR BLD AUTO: 1.1 % — HIGH (ref 0–0.9)
LYMPHOCYTES # BLD AUTO: 1.34 K/UL — SIGNIFICANT CHANGE UP (ref 1–3.3)
LYMPHOCYTES # BLD AUTO: 18.2 % — SIGNIFICANT CHANGE UP (ref 13–44)
MAGNESIUM SERPL-MCNC: 2 MG/DL — SIGNIFICANT CHANGE UP (ref 1.6–2.6)
MCHC RBC-ENTMCNC: 31.7 PG — SIGNIFICANT CHANGE UP (ref 27–34)
MCHC RBC-ENTMCNC: 33.7 G/DL — SIGNIFICANT CHANGE UP (ref 32–36)
MCV RBC AUTO: 94.1 FL — SIGNIFICANT CHANGE UP (ref 80–100)
MONOCYTES # BLD AUTO: 4 K/UL — HIGH (ref 0–0.9)
MONOCYTES NFR BLD AUTO: 54.2 % — HIGH (ref 2–14)
NEUTROPHILS # BLD AUTO: 1.9 K/UL — SIGNIFICANT CHANGE UP (ref 1.8–7.4)
NEUTROPHILS NFR BLD AUTO: 25.7 % — LOW (ref 43–77)
NRBC # BLD: 0 /100 WBCS — SIGNIFICANT CHANGE UP (ref 0–0)
NRBC BLD-RTO: 0 /100 WBCS — SIGNIFICANT CHANGE UP (ref 0–0)
PHOSPHATE SERPL-MCNC: 3.8 MG/DL — SIGNIFICANT CHANGE UP (ref 2.5–4.5)
PLATELET # BLD AUTO: 53 K/UL — LOW (ref 150–400)
POTASSIUM SERPL-MCNC: 3.4 MMOL/L — LOW (ref 3.5–5.3)
POTASSIUM SERPL-SCNC: 3.4 MMOL/L — LOW (ref 3.5–5.3)
PROT SERPL-MCNC: 6.2 G/DL — SIGNIFICANT CHANGE UP (ref 6–8.3)
RBC # BLD: 4.41 M/UL — SIGNIFICANT CHANGE UP (ref 4.2–5.8)
RBC # FLD: 13.3 % — SIGNIFICANT CHANGE UP (ref 10.3–14.5)
SODIUM SERPL-SCNC: 134 MMOL/L — LOW (ref 135–145)
WBC # BLD: 7.38 K/UL — SIGNIFICANT CHANGE UP (ref 3.8–10.5)
WBC # FLD AUTO: 7.38 K/UL — SIGNIFICANT CHANGE UP (ref 3.8–10.5)

## 2025-01-21 PROCEDURE — 99232 SBSQ HOSP IP/OBS MODERATE 35: CPT | Mod: GC

## 2025-01-21 RX ORDER — POTASSIUM CHLORIDE 750 MG/1
40 TABLET, EXTENDED RELEASE ORAL ONCE
Refills: 0 | Status: COMPLETED | OUTPATIENT
Start: 2025-01-21 | End: 2025-01-21

## 2025-01-21 RX ORDER — BUMETANIDE 2 MG/1
2 TABLET ORAL DAILY
Refills: 0 | Status: DISCONTINUED | OUTPATIENT
Start: 2025-01-22 | End: 2025-01-23

## 2025-01-21 RX ORDER — OSELTAMIVIR PHOSPHATE 75 MG/1
30 CAPSULE ORAL
Refills: 0 | Status: DISCONTINUED | OUTPATIENT
Start: 2025-01-21 | End: 2025-01-23

## 2025-01-21 RX ADMIN — Medication 5 MILLIGRAM(S): at 21:37

## 2025-01-21 RX ADMIN — Medication 50 MILLIGRAM(S): at 05:16

## 2025-01-21 RX ADMIN — APIXABAN 2.5 MILLIGRAM(S): 5 TABLET, FILM COATED ORAL at 05:16

## 2025-01-21 RX ADMIN — AMIODARONE HYDROCHLORIDE 200 MILLIGRAM(S): 50 INJECTION, SOLUTION INTRAVENOUS at 05:16

## 2025-01-21 RX ADMIN — ATORVASTATIN CALCIUM 20 MILLIGRAM(S): 80 TABLET, FILM COATED ORAL at 21:36

## 2025-01-21 RX ADMIN — BUMETANIDE 2 MILLIGRAM(S): 2 TABLET ORAL at 05:15

## 2025-01-21 RX ADMIN — FAMOTIDINE 20 MILLIGRAM(S): 10 INJECTION INTRAVENOUS at 21:37

## 2025-01-21 RX ADMIN — APIXABAN 2.5 MILLIGRAM(S): 5 TABLET, FILM COATED ORAL at 17:21

## 2025-01-21 RX ADMIN — OSELTAMIVIR PHOSPHATE 30 MILLIGRAM(S): 75 CAPSULE ORAL at 17:21

## 2025-01-21 RX ADMIN — POTASSIUM CHLORIDE 40 MILLIEQUIVALENT(S): 750 TABLET, EXTENDED RELEASE ORAL at 09:21

## 2025-01-21 RX ADMIN — PANTOPRAZOLE 40 MILLIGRAM(S): 20 TABLET, DELAYED RELEASE ORAL at 05:16

## 2025-01-21 RX ADMIN — OSELTAMIVIR PHOSPHATE 30 MILLIGRAM(S): 75 CAPSULE ORAL at 09:21

## 2025-01-21 NOTE — PROGRESS NOTE ADULT - PROBLEM SELECTOR PLAN 1
Patient with PETER on CKD in the setting of transient hypotension, hemodynamic effects from afib with RVR, and administration of IV contrast. On review of labs on [facility], patient noted to have elevated Scr/episodes of PETER since 2017. Last Scr was 1.29 (eGFR 57) on 12/19/24.     Admission Scr remained stable at 1.24 on 1/8/25, peaked at 1.61 on 1/11, and elevated/improved to 1.53 on 1/14. UACR elevated at 544 on 12/19/24. Patient underwent CT PE on 1/8/25. Underwent successful DARA cardioversion on 1/14. CT PE with exophytic L renal lesion and renal cysts. Kidney sonogram on 1/14 with no evidence of hydronephrosis, increased renal cortical echogenicity, non obstructing stone on R, and multiple B/L cysts.    Scr acutely jacobo from 1.5 (1/14) to 2.7 (1/15). Possibly hemodynamically mediated ATN. Remains volume overloaded on exam. Echo: severe MR/TR  Continue on IV Bumex 2mg BID. Scr improved to 1.91 today. Non-oliguric UOP. Weight decreasing.  Replete potassium to goal of ~4.   Monitor labs and urine output. Maintain neg negative fluid status. 1L fluid restriction. Check daily standing weights. Avoid nephrotoxins. Dose medications as per eGFR.    If you have any questions, please feel free to contact me.  Jose Rodriguez MD  Nephrology Fellow  [email or web address] / [employer] Teams (Preferred)  (Please check the on-call schedule to reach the appropriate Nephrology Fellow). Patient with PETER on CKD in the setting of transient hypotension, hemodynamic effects from afib with RVR, and administration of IV contrast. On review of labs on [place], patient noted to have elevated Scr/episodes of PETER since 2017. Last Scr was 1.29 (eGFR 57) on 12/19/24.     Admission Scr remained stable at 1.24 on 1/8/25, peaked at 1.61 on 1/11, and elevated/improved to 1.53 on 1/14. UACR elevated at 544 on 12/19/24. Patient underwent CT PE on 1/8/25. Underwent successful DARA cardioversion on 1/14. CT PE with exophytic L renal lesion and renal cysts. Kidney sonogram on 1/14 with no evidence of hydronephrosis, increased renal cortical echogenicity, non obstructing stone on R, and multiple B/L cysts.    Scr acutely jacobo from 1.5 (1/14) to 2.7 (1/15). Possibly hemodynamically mediated ATN.  Echo: severe MR/TR    Continue on IV Bumex 2mg daily. Scr improved to 1.91 today. Non-oliguric UOP. Weight decreasing.  Please check Bladder scans to ensure not retaining  Replete potassium to goal of ~4.   Monitor labs and urine output. Maintain neg negative fluid status. 1L fluid restriction. Check daily standing weights. Avoid nephrotoxins. Dose medications as per eGFR.    If you have any questions, please feel free to contact me.  Jose Rodriguez MD  Nephrology Fellow  [id number] / [employer] Teams (Preferred)  (Please check the on-call schedule to reach the appropriate Nephrology Fellow).

## 2025-01-21 NOTE — PROGRESS NOTE ADULT - ATTENDING COMMENTS
84M w/pmh chronic atrial fibrillation, CKD 3a, severe aortic stenosis s/p aortic valve replacement (7/2012), HTN, pHTN, HLD, thrombocytopenia, GERD presenting from PCP's office for cc tachycardia, JOSEPH. Found to have volume overload and atrial fibrillation w/RVR. Diuresed with IV bumex BID, improving. s/p DARA/DCCV 1/14, initially converted to NSR, now back to afib RVR, continue amiodarone, metoprolol. Cardiology f/u. Febrile on 1/19. Dc IV abx. RVP+ influenza, treating with tamiflu. Continue diuresis, plan to transition to PO bumex 1/22. Monitor renal function. Plan to follow up with structural heart outpatient to discuss TAVR. Treatment will be dependent on clinical course, likely dispo planning in next day or so.    Agree with progress note as outlined above, edited where appropriate.    Discussed plan with patient's daughter.

## 2025-01-21 NOTE — PROGRESS NOTE ADULT - PROBLEM SELECTOR PLAN 4
-Hx of HTN and pHTN  -Troponin 54->59  -Pro-BNP of 3624.   -Nuclear stress test (11/2023): small sized mild defect(s) in the basal inferolateral wall that is reversible consistent with ischemia  -TTE (11/2024): "EF is approximately 55%. Mild concentric left ventricular hypertrophy. Right ventricular enlargement with mildly decreased right ventricular systolic function. Left atrium is severely dilated. Right atrium is mildly dilated. Moderate mitral regurgitation. Mitral annular calcification with thickened and mildly calcified mitral valve leaflets. There is mild prolapse of the posterior mitral valve leaflet. Bioprosthetic aortic valve replacement. Aortic root at the sinuses of Valsalva is borderline dilated. Ascending aorta is mildly dilated. Estimated pulmonary artery systolic pressure is 50 mmHg. Moderate to severe tricuspid regurgitation"  -CXR (1/8): b/l lower atelectasis vs pneumonia  -CTA (1/8): "Small bibasilar pleural effusions. Interlobular septal thickening, suggestive of interstitial edema."   -ER POCUS TTE (1/8): trace pericardial effusion, global LV hypokinesis, b/l lungs with B-lines and trace pleural effusions.  -s/p lasix 20mg IV BID X 2 Days  -TTE (1/10): LVEF 60-65%, mildly enlarged right ventricular cavity, borderline reduced ventricular systolic function, pHTN 57 mmHg, moderate tricuspid regurgitation.   -DARA (1/14): normal LVEF, severe mitral/tricuspid regurgitation, aortic valve AS moderate-severe, intravalvular regurgitation   -Dyspnea initially improved from admission, now worsened    PLAN:  >c/w Bumex 2mg IVP BID  >c/w daily standing weights, strict ins/outs  >f/u structural heart evaluation  >f/u cardiology outpatient  >Would likely benefit from starting PO lasix on d/c -Hx of HTN and pHTN  -Troponin 54->59  -Pro-BNP of 3624.   -Nuclear stress test (11/2023): small sized mild defect(s) in the basal inferolateral wall that is reversible consistent with ischemia  -TTE (11/2024): "EF is approximately 55%. Mild concentric left ventricular hypertrophy. Right ventricular enlargement with mildly decreased right ventricular systolic function. Left atrium is severely dilated. Right atrium is mildly dilated. Moderate mitral regurgitation. Mitral annular calcification with thickened and mildly calcified mitral valve leaflets. There is mild prolapse of the posterior mitral valve leaflet. Bioprosthetic aortic valve replacement. Aortic root at the sinuses of Valsalva is borderline dilated. Ascending aorta is mildly dilated. Estimated pulmonary artery systolic pressure is 50 mmHg. Moderate to severe tricuspid regurgitation"  -CXR (1/8): b/l lower atelectasis vs pneumonia  -CTA (1/8): "Small bibasilar pleural effusions. Interlobular septal thickening, suggestive of interstitial edema."   -ER POCUS TTE (1/8): trace pericardial effusion, global LV hypokinesis, b/l lungs with B-lines and trace pleural effusions.  -s/p lasix 20mg IV BID X 2 Days  -TTE (1/10): LVEF 60-65%, mildly enlarged right ventricular cavity, borderline reduced ventricular systolic function, pHTN 57 mmHg, moderate tricuspid regurgitation.   -DARA (1/14): normal LVEF, severe mitral/tricuspid regurgitation, aortic valve AS moderate-severe, intravalvular regurgitation       PLAN:  >diuresis as above  >c/w daily standing weights, strict ins/outs  >f/u structural heart evaluation  >f/u cardiology outpatient

## 2025-01-21 NOTE — PROGRESS NOTE ADULT - SUBJECTIVE AND OBJECTIVE BOX
[facility] Division of Kidney Diseases & Hypertension  FOLLOW UP NOTE  [phone]--------------------------------------------------------------------------------    Chief Complaint: PETER on CKD    24 hour events/subjective: Patient seen and examined today. Feels well. Reports significant improvement in LE swelling. Denies HA, fevers/chills, CP, SOB, abdominal pain, or dysuria.     PAST HISTORY  --------------------------------------------------------------------------------  No significant changes to PMH, PSH, FHx, SHx, unless otherwise noted    ALLERGIES & MEDICATIONS  --------------------------------------------------------------------------------  Allergies  No Known Allergies    Intolerances    Standing Inpatient Medications  aMIOdarone    Tablet 200 milliGRAM(s) Oral daily  apixaban 2.5 milliGRAM(s) Oral every 12 hours  atorvastatin 20 milliGRAM(s) Oral at bedtime  buMETAnide Injectable 2 milliGRAM(s) IV Push daily  famotidine    Tablet 20 milliGRAM(s) Oral at bedtime  finasteride 5 milliGRAM(s) Oral daily  metoprolol succinate ER 50 milliGRAM(s) Oral daily  oseltamivir 30 milliGRAM(s) Oral two times a day  pantoprazole    Tablet 40 milliGRAM(s) Oral before breakfast  polyethylene glycol 3350 17 Gram(s) Oral at bedtime  senna 2 Tablet(s) Oral at bedtime    PRN Inpatient Medications  acetaminophen     Tablet .. 650 milliGRAM(s) Oral every 6 hours PRN  aluminum hydroxide/magnesium hydroxide/simethicone Suspension 30 milliLiter(s) Oral every 6 hours PRN  ondansetron Injectable 4 milliGRAM(s) IV Push every 6 hours PRN    REVIEW OF SYSTEMS  --------------------------------------------------------------------------------  Gen: No fevers/chills  Head/Eyes/Ears: No HA  Respiratory: +SOB improved  CV: No chest pain  GI: No abdominal pain, diarrhea  : No dysuria, hematuria  MSK: +edema improved  Heme: No easy bruising or bleeding  Psych: No significant depression    All other systems were reviewed and are negative, except as noted.    VITALS/PHYSICAL EXAM  --------------------------------------------------------------------------------  T(C): 36.7 (01-21-25 @ 11:22), Max: 37.1 (01-20-25 @ 20:25)  HR: 114 (01-21-25 @ 11:22) (100 - 114)  BP: 100/54 (01-21-25 @ 11:22) (100/54 - 147/71)  RR: 18 (01-21-25 @ 11:22) (18 - 18)  SpO2: 92% (01-21-25 @ 11:22) (92% - 92%)  Wt(kg): --    01-20-25 @ 07:01  -  01-21-25 @ 07:00  --------------------------------------------------------  IN: 360 mL / OUT: 850 mL / NET: -490 mL    01-21-25 @ 07:01  -  01-21-25 @ 12:26  --------------------------------------------------------  IN: 120 mL / OUT: 400 mL / NET: -280 mL    Physical Exam:  Gen: NAD  HEENT: MMM  Pulm: Minimal crackles in bases B/L  CV: S1S2  Abd: Soft, +BS   Ext: B/L LE edema improved  Neuro: Awake  Skin: Warm and dry    LABS/STUDIES  --------------------------------------------------------------------------------              14.0   7.38  >-----------<  53       [01-21-25 @ 06:51]              41.5     134  |  96  |  30  ----------------------------<  98      [01-21-25 @ 06:46]  3.4   |  25  |  1.91        Ca     8.6     [01-21-25 @ 06:46]      Mg     2.0     [01-21-25 @ 06:46]      Phos  3.8     [01-21-25 @ 06:46]    TPro  6.2  /  Alb  3.5  /  TBili  1.5  /  DBili  x   /  AST  46  /  ALT  33  /  AlkPhos  64  [01-21-25 @ 06:46]    Creatinine Trend:  SCr 1.91 [01-21 @ 06:46]  SCr 2.13 [01-20 @ 06:31]  SCr 1.96 [01-19 @ 07:34]  SCr 2.03 [01-18 @ 06:48]  SCr 2.33 [01-17 @ 07:00]    Urine Creatinine 261      [01-15-25 @ 07:44]  Urine Protein 155      [01-15-25 @ 07:44]  Urine Sodium 31      [01-15-25 @ 07:44]  Urine Urea Nitrogen 686      [01-15-25 @ 07:57]  Urine Potassium 70      [01-15-25 @ 07:44]  Urine Osmolality 489      [01-15-25 @ 07:43]

## 2025-01-21 NOTE — PHYSICAL THERAPY INITIAL EVALUATION ADULT - ADDITIONAL COMMENTS
Pt lives in a private home with 3 steps to enter with rail and first floor set up for him and his wife. Pt owns cane and RW, however, he states he does not need to use them. Pt has had no falls.

## 2025-01-21 NOTE — PROGRESS NOTE ADULT - PROBLEM SELECTOR PLAN 2
-Urine studies: Na of 33, Nitrogen of 707, creatinine of 114; FeUrea suggestive of intrinsic renal disease  -Urine studies (1/15): Na of 31, creatinine of 261, p/c ratio: 0.6, urine osmol: 489; FeNa suggestive of pre-renal  -Kidney/Bladder US: "No hydronephrosis. Increased renal cortical echogenicity, compatible with renal   parenchymal disease. 0.9 x 0.5 x 1.1 cm right upper pole nonobstructing stone, similarly seen on CT from 1/17/2018. Left upper pole cyst measuring 2.6 x 1.9 x 2.8 cm with rim calcifications is similarly seen on CT abdomen pelvis from 1/17/2018. Additional bilateral simple cysts as above."  -s/p Bumex 2mg IVP 1x on 1/16, Bumex 2mg IVP 2x on 1/17  -Ddx: ATN vs pre-renal    PLAN:  >c/w Bumex 2mg IVP BID, reassess tomorrow  >c/w trending kidney function daily  >f/u nephro recs -Urine studies: Na of 33, Nitrogen of 707, creatinine of 114; FeUrea suggestive of intrinsic renal disease  -Urine studies (1/15): Na of 31, creatinine of 261, p/c ratio: 0.6, urine osmol: 489; FeNa suggestive of pre-renal  -Kidney/Bladder US: "No hydronephrosis. Increased renal cortical echogenicity, compatible with renal   parenchymal disease. 0.9 x 0.5 x 1.1 cm right upper pole nonobstructing stone, similarly seen on CT from 1/17/2018. Left upper pole cyst measuring 2.6 x 1.9 x 2.8 cm with rim calcifications is similarly seen on CT abdomen pelvis from 1/17/2018. Additional bilateral simple cysts as above."  -Ddx: ATN vs pre-renal    PLAN:  >c/w Bumex 2mg IVP daily, reassess tomorrow  >c/w trending kidney function daily  >f/u nephro recs

## 2025-01-21 NOTE — PHYSICAL THERAPY INITIAL EVALUATION ADULT - GENERAL OBSERVATIONS, REHAB EVAL
Pt received seated EOB, in NAD, +IV Lock. /64, , SpO2 94% on room air. Pt AOx4, pleasant and cooperative. Czech  used t/o session.

## 2025-01-21 NOTE — PHYSICAL THERAPY INITIAL EVALUATION ADULT - PERTINENT HX OF CURRENT PROBLEM, REHAB EVAL
84-year-old male PMH afib on eliquid, severe aortic stenosis (2/2 bicuspid aortic valve), s/p aortic valve replacement (7/2012), HTN, pHTN, HLD, chronic renal insufficiency. Dx: Fever,  Influenza A+ on RVP, PETER (acute kidney injury), Acute hyponatremia, Acute exacerbation of CHF (congestive heart failure), Atrial fibrillation, HTN (hypertension), HLD (hyperlipidemia), Thrombocytopenia, GERD (gastroesophageal reflux disease), Primary osteoarthritis of both knees.

## 2025-01-21 NOTE — PROGRESS NOTE ADULT - PROBLEM SELECTOR PLAN 1
- Tmax 101 orally on 1/19. Cough productive of clear sputum tinged with intermittent blood. CXR and UA without infection. Influenza A+ on RVP   - d/c CTX  - monitor off antivirals in setting of clinical stability - Tmax 101 orally on 1/19. Cough productive of clear sputum tinged with intermittent blood. CXR and UA without infection. Influenza A+ on RVP   - d/c CTX  - Oseltamivir (01/21 - 01/25), renally dosed at 30 mg BID

## 2025-01-21 NOTE — PROGRESS NOTE ADULT - PROBLEM SELECTOR PLAN 3
-Na: 128 noted on labs 1/15  -Creatinine up-trending, patient looking more fluid overloaded  -Urine studies (1/15): Na of 31, creatinine of 261, p/c ratio: 0.6, urine osmol: 489; FeNa suggestive of pre-renal  -s/p 1x dose of hypertonic saline on 1/16 & 1/17  -Ddx: Hypervolemia & PETER    PLAN:  >c/w monitoring Na levels daily  >c/w fluid restriction to 1L  >c/w Bumex 2mg IVP BID per nephro -Na: 128 noted on labs 1/15  -Creatinine up-trending, patient looking more fluid overloaded  -Urine studies (1/15): Na of 31, creatinine of 261, p/c ratio: 0.6, urine osmol: 489; FeNa suggestive of pre-renal  -s/p 1x dose of hypertonic saline on 1/16 & 1/17  -Ddx: Hypervolemia & PETER  - improved    PLAN:  >c/w monitoring Na levels daily  >c/w fluid restriction to 1L  >diuresis as above

## 2025-01-21 NOTE — PROGRESS NOTE ADULT - PROBLEM SELECTOR PLAN 11
DVT ppx: Eliquis 2.5mg BID - hold if Plt <50  Diet: DASH/TLC, low sodium, fluid restriction to 1L  Dispo: Active, pending resolution of PETER and volume status DVT ppx: Eliquis 2.5mg BID - hold if Plt <50  Diet: DASH/TLC, low sodium, fluid restriction to 1L  Dispo: Active, pending resolution of PETER and volume status  PT consulted, recs appreciated

## 2025-01-21 NOTE — PROGRESS NOTE ADULT - PROBLEM SELECTOR PLAN 5
-EKG on admission with sinus tachycardia to 125-128  -On home bisoprolol 17.5mg PO QD  -d/evelyn bisoprolol 17.5mg PO QD (1/11) per cardiology recs  -s/p DARA/cardioversion on 1/14  -s/p Digoxin on 1/12  -Patient is now back in AFib on 1/17  - Tachycardic also in setting of influenza A+    PLAN:  >c/w home apixaban 2.5mg PO QD - hold if Plt<50  >c/w amiodarone 200mg QD; down-titrated (1/12 -     >c/w metoprolol ER 50mg QD per cardiology recs; up-titrated (1/13 -   >f/u cardiology recs  >f/u cardiology outpatient -EKG on admission with sinus tachycardia to 125-128  -On home bisoprolol 17.5mg PO QD  -d/evelyn bisoprolol 17.5mg PO QD (1/11) per cardiology recs  -s/p DARA/cardioversion on 1/14  -s/p Digoxin on 1/12  -Patient is now back in AFib on 1/17  - Tachycardic also in setting of influenza A+    PLAN:  >c/w home apixaban 2.5mg PO QD - hold if Plt<50  >c/w amiodarone 200mg QD; down-titrated (1/12 -     >c/w metoprolol ER 50mg QD   >f/u cardiology recs  >f/u cardiology outpatient

## 2025-01-21 NOTE — PROGRESS NOTE ADULT - PROBLEM SELECTOR PLAN 6
-On home bisoprolol 17.5mg PO QD  -d/evelyn bisoprolol 17.5mg PO QD (1/11) per cardiology recs  -On home amlodipine 5mg PO QD    PLAN:  >c/w metoprolol ER 50mg QD per cardiology recs; up-titrated (1/13 -   >f/u cardiology recs -On home bisoprolol 17.5mg PO QD  - d/evelyn bisoprolol 17.5mg PO QD (1/11) per cardiology recs  - On home amlodipine 5mg PO QD    PLAN:  >c/w metoprolol ER 50mg QD  >f/u cardiology recs

## 2025-01-21 NOTE — PROGRESS NOTE ADULT - SUBJECTIVE AND OBJECTIVE BOX
ARABELLA WEISS  84y  MRN: [id number]    Patient is a 84y old  Male who presents with a chief complaint of HF (17 Jan 2025 12:36)      Interval/Overnight Events: no events ON.     Subjective: Pt seen and examined at bedside.     MEDICATIONS  (STANDING):  aMIOdarone    Tablet 200 milliGRAM(s) Oral daily  apixaban 2.5 milliGRAM(s) Oral every 12 hours  atorvastatin 20 milliGRAM(s) Oral at bedtime  buMETAnide Injectable 2 milliGRAM(s) IV Push daily  famotidine    Tablet 20 milliGRAM(s) Oral at bedtime  finasteride 5 milliGRAM(s) Oral daily  metoprolol succinate ER 50 milliGRAM(s) Oral daily  oseltamivir 30 milliGRAM(s) Oral two times a day  pantoprazole    Tablet 40 milliGRAM(s) Oral before breakfast  polyethylene glycol 3350 17 Gram(s) Oral at bedtime  potassium chloride    Tablet ER 40 milliEquivalent(s) Oral once  senna 2 Tablet(s) Oral at bedtime    MEDICATIONS  (PRN):  acetaminophen     Tablet .. 650 milliGRAM(s) Oral every 6 hours PRN Temp greater or equal to 38C (100.4F), Mild Pain (1 - 3)  aluminum hydroxide/magnesium hydroxide/simethicone Suspension 30 milliLiter(s) Oral every 6 hours PRN Dyspepsia  ondansetron Injectable 4 milliGRAM(s) IV Push every 6 hours PRN Nausea and/or Vomiting      Objective:    Vitals: Vital Signs Last 24 Hrs  T(C): 36.5 (01-21-25 @ 05:10), Max: 37.1 (01-20-25 @ 20:25)  T(F): 97.7 (01-21-25 @ 05:10), Max: 98.8 (01-20-25 @ 20:25)  HR: 112 (01-21-25 @ 05:10) (100 - 112)  BP: 100/64 (01-21-25 @ 05:10) (100/64 - 147/71)  BP(mean): --  RR: 18 (01-21-25 @ 05:10) (18 - 18)  SpO2: 92% (01-21-25 @ 05:10) (92% - 92%)                I&O's Summary    20 Jan 2025 07:01  -  21 Jan 2025 07:00  --------------------------------------------------------  IN: 360 mL / OUT: 850 mL / NET: -490 mL        PHYSICAL EXAM:  GENERAL: NAD  HEAD:  Atraumatic, Normocephalic  EYES: EOMI, conjunctiva and sclera clear  CHEST/LUNG: Clear to auscultation bilaterally; No rales, rhonchi, wheezing, or rubs  HEART: Regular rate and rhythm; No murmurs, rubs, or gallops  ABDOMEN: Soft, Nontender, Nondistended;   SKIN: No rashes or lesions  NERVOUS SYSTEM:  Alert & Oriented X3, no focal deficits    LABS:                        14.0   7.38  )-----------( 53       ( 21 Jan 2025 06:51 )             41.5                         13.7   9.82  )-----------( 61       ( 20 Jan 2025 06:31 )             41.3                         13.4   9.44  )-----------( 61       ( 19 Jan 2025 07:34 )             39.0     01-21    134[L]  |  96  |  30[H]  ----------------------------<  98  3.4[L]   |  25  |  1.91[H]  01-20    136  |  95[L]  |  32[H]  ----------------------------<  96  3.6   |  27  |  2.13[H]  01-19    134[L]  |  94[L]  |  29[H]  ----------------------------<  98  3.2[L]   |  25  |  1.96[H]    Ca    8.6      21 Jan 2025 06:46  Ca    8.7      20 Jan 2025 06:31  Ca    8.9      19 Jan 2025 07:34  Phos  3.8     01-21  Mg     2.0     01-21    TPro  6.2  /  Alb  3.5  /  TBili  1.5[H]  /  DBili  x   /  AST  46[H]  /  ALT  33  /  AlkPhos  64  01-21  TPro  6.5  /  Alb  3.7  /  TBili  1.7[H]  /  DBili  0.7[H]  /  AST  44[H]  /  ALT  34  /  AlkPhos  66  01-20    CAPILLARY BLOOD GLUCOSE      RADIOLOGY & ADDITIONAL TESTS:    Imaging Personally Reviewed:  [x ] YES  [ ] NO    Consultants involved in case:   Consultant(s) Notes Reviewed:  [ x] YES  [ ] NO:   Care Discussed with Consultants/Other Providers [x ] YES  [ ] NO           ARABELLA WEISS  84y  MRN: [id number]    Patient is a 84y old  Male who presents with a chief complaint of HF (17 Jan 2025 12:36)  Interpretation: [id number]    Interval/Overnight Events: no events ON. Started on Oseltamivir.    Subjective: Pt seen and examined at bedside. Denies acute concerns. Denies SOB, orthopnea, pain. Eating/drinking well, having bowel movements and urinating without issue. Eager to go home.    MEDICATIONS  (STANDING):  aMIOdarone    Tablet 200 milliGRAM(s) Oral daily  apixaban 2.5 milliGRAM(s) Oral every 12 hours  atorvastatin 20 milliGRAM(s) Oral at bedtime  buMETAnide Injectable 2 milliGRAM(s) IV Push daily  famotidine    Tablet 20 milliGRAM(s) Oral at bedtime  finasteride 5 milliGRAM(s) Oral daily  metoprolol succinate ER 50 milliGRAM(s) Oral daily  oseltamivir 30 milliGRAM(s) Oral two times a day  pantoprazole    Tablet 40 milliGRAM(s) Oral before breakfast  polyethylene glycol 3350 17 Gram(s) Oral at bedtime  potassium chloride    Tablet ER 40 milliEquivalent(s) Oral once  senna 2 Tablet(s) Oral at bedtime    MEDICATIONS  (PRN):  acetaminophen     Tablet .. 650 milliGRAM(s) Oral every 6 hours PRN Temp greater or equal to 38C (100.4F), Mild Pain (1 - 3)  aluminum hydroxide/magnesium hydroxide/simethicone Suspension 30 milliLiter(s) Oral every 6 hours PRN Dyspepsia  ondansetron Injectable 4 milliGRAM(s) IV Push every 6 hours PRN Nausea and/or Vomiting      Objective:    Vitals: Vital Signs Last 24 Hrs  T(C): 36.5 (01-21-25 @ 05:10), Max: 37.1 (01-20-25 @ 20:25)  T(F): 97.7 (01-21-25 @ 05:10), Max: 98.8 (01-20-25 @ 20:25)  HR: 112 (01-21-25 @ 05:10) (100 - 112)  BP: 100/64 (01-21-25 @ 05:10) (100/64 - 147/71)  BP(mean): --  RR: 18 (01-21-25 @ 05:10) (18 - 18)  SpO2: 92% (01-21-25 @ 05:10) (92% - 92%)                I&O's Summary    20 Jan 2025 07:01  -  21 Jan 2025 07:00  --------------------------------------------------------  IN: 360 mL / OUT: 850 mL / NET: -490 mL        PHYSICAL EXAM:  GENERAL: NAD  HEAD:  Atraumatic, Normocephalic  EYES: EOMI, conjunctiva and sclera clear  CHEST/LUNG: Clear to auscultation bilaterally; No rales, rhonchi, wheezing, or rubs  HEART: Regular rate and rhythm; No murmurs, rubs, or gallops. Trace lower extremity edema bilaterally  ABDOMEN: Soft, Nontender, Nondistended;   SKIN: No rashes or lesions  NERVOUS SYSTEM:  Alert & Oriented X3, no focal deficits    LABS:                        14.0   7.38  )-----------( 53       ( 21 Jan 2025 06:51 )             41.5                         13.7   9.82  )-----------( 61       ( 20 Jan 2025 06:31 )             41.3                         13.4   9.44  )-----------( 61       ( 19 Jan 2025 07:34 )             39.0     01-21    134[L]  |  96  |  30[H]  ----------------------------<  98  3.4[L]   |  25  |  1.91[H]  01-20    136  |  95[L]  |  32[H]  ----------------------------<  96  3.6   |  27  |  2.13[H]  01-19    134[L]  |  94[L]  |  29[H]  ----------------------------<  98  3.2[L]   |  25  |  1.96[H]    Ca    8.6      21 Jan 2025 06:46  Ca    8.7      20 Jan 2025 06:31  Ca    8.9      19 Jan 2025 07:34  Phos  3.8     01-21  Mg     2.0     01-21    TPro  6.2  /  Alb  3.5  /  TBili  1.5[H]  /  DBili  x   /  AST  46[H]  /  ALT  33  /  AlkPhos  64  01-21  TPro  6.5  /  Alb  3.7  /  TBili  1.7[H]  /  DBili  0.7[H]  /  AST  44[H]  /  ALT  34  /  AlkPhos  66  01-20    CAPILLARY BLOOD GLUCOSE      RADIOLOGY & ADDITIONAL TESTS:    Imaging Personally Reviewed:  [x ] YES  [ ] NO    Consultants involved in case:   Consultant(s) Notes Reviewed:  [ x] YES  [ ] NO:   Care Discussed with Consultants/Other Providers [x ] YES  [ ] NO

## 2025-01-22 DIAGNOSIS — J10.1 INFLUENZA DUE TO OTHER IDENTIFIED INFLUENZA VIRUS WITH OTHER RESPIRATORY MANIFESTATIONS: ICD-10-CM

## 2025-01-22 LAB
ALBUMIN SERPL ELPH-MCNC: 3.7 G/DL — SIGNIFICANT CHANGE UP (ref 3.3–5)
ALP SERPL-CCNC: 69 U/L — SIGNIFICANT CHANGE UP (ref 40–120)
ALT FLD-CCNC: 40 U/L — SIGNIFICANT CHANGE UP (ref 10–45)
ANION GAP SERPL CALC-SCNC: 12 MMOL/L — SIGNIFICANT CHANGE UP (ref 5–17)
AST SERPL-CCNC: 50 U/L — HIGH (ref 10–40)
BILIRUB SERPL-MCNC: 1.1 MG/DL — SIGNIFICANT CHANGE UP (ref 0.2–1.2)
BUN SERPL-MCNC: 32 MG/DL — HIGH (ref 7–23)
CALCIUM SERPL-MCNC: 8.6 MG/DL — SIGNIFICANT CHANGE UP (ref 8.4–10.5)
CHLORIDE SERPL-SCNC: 99 MMOL/L — SIGNIFICANT CHANGE UP (ref 96–108)
CO2 SERPL-SCNC: 27 MMOL/L — SIGNIFICANT CHANGE UP (ref 22–31)
CREAT SERPL-MCNC: 1.78 MG/DL — HIGH (ref 0.5–1.3)
EGFR: 37 ML/MIN/1.73M2 — LOW
GLUCOSE SERPL-MCNC: 96 MG/DL — SIGNIFICANT CHANGE UP (ref 70–99)
HCT VFR BLD CALC: 43.6 % — SIGNIFICANT CHANGE UP (ref 39–50)
HGB BLD-MCNC: 14.7 G/DL — SIGNIFICANT CHANGE UP (ref 13–17)
MAGNESIUM SERPL-MCNC: 2.1 MG/DL — SIGNIFICANT CHANGE UP (ref 1.6–2.6)
MCHC RBC-ENTMCNC: 32.1 PG — SIGNIFICANT CHANGE UP (ref 27–34)
MCHC RBC-ENTMCNC: 33.7 G/DL — SIGNIFICANT CHANGE UP (ref 32–36)
MCV RBC AUTO: 95.2 FL — SIGNIFICANT CHANGE UP (ref 80–100)
NRBC # BLD: 0 /100 WBCS — SIGNIFICANT CHANGE UP (ref 0–0)
NRBC BLD-RTO: 0 /100 WBCS — SIGNIFICANT CHANGE UP (ref 0–0)
PHOSPHATE SERPL-MCNC: 3.5 MG/DL — SIGNIFICANT CHANGE UP (ref 2.5–4.5)
PLATELET # BLD AUTO: 63 K/UL — LOW (ref 150–400)
POTASSIUM SERPL-MCNC: 3.3 MMOL/L — LOW (ref 3.5–5.3)
POTASSIUM SERPL-SCNC: 3.3 MMOL/L — LOW (ref 3.5–5.3)
PROT SERPL-MCNC: 6.5 G/DL — SIGNIFICANT CHANGE UP (ref 6–8.3)
RBC # BLD: 4.58 M/UL — SIGNIFICANT CHANGE UP (ref 4.2–5.8)
RBC # FLD: 13.3 % — SIGNIFICANT CHANGE UP (ref 10.3–14.5)
SODIUM SERPL-SCNC: 138 MMOL/L — SIGNIFICANT CHANGE UP (ref 135–145)
WBC # BLD: 5.92 K/UL — SIGNIFICANT CHANGE UP (ref 3.8–10.5)
WBC # FLD AUTO: 5.92 K/UL — SIGNIFICANT CHANGE UP (ref 3.8–10.5)

## 2025-01-22 PROCEDURE — 99232 SBSQ HOSP IP/OBS MODERATE 35: CPT | Mod: GC

## 2025-01-22 RX ORDER — POTASSIUM CHLORIDE 750 MG/1
20 TABLET, EXTENDED RELEASE ORAL
Refills: 0 | Status: COMPLETED | OUTPATIENT
Start: 2025-01-22 | End: 2025-01-22

## 2025-01-22 RX ORDER — METOPROLOL SUCCINATE 25 MG
75 TABLET, EXTENDED RELEASE 24 HR ORAL DAILY
Refills: 0 | Status: DISCONTINUED | OUTPATIENT
Start: 2025-01-22 | End: 2025-01-23

## 2025-01-22 RX ADMIN — FAMOTIDINE 20 MILLIGRAM(S): 10 INJECTION INTRAVENOUS at 22:07

## 2025-01-22 RX ADMIN — POTASSIUM CHLORIDE 20 MILLIEQUIVALENT(S): 750 TABLET, EXTENDED RELEASE ORAL at 09:08

## 2025-01-22 RX ADMIN — AMIODARONE HYDROCHLORIDE 200 MILLIGRAM(S): 50 INJECTION, SOLUTION INTRAVENOUS at 05:49

## 2025-01-22 RX ADMIN — POTASSIUM CHLORIDE 20 MILLIEQUIVALENT(S): 750 TABLET, EXTENDED RELEASE ORAL at 11:20

## 2025-01-22 RX ADMIN — OSELTAMIVIR PHOSPHATE 30 MILLIGRAM(S): 75 CAPSULE ORAL at 17:08

## 2025-01-22 RX ADMIN — Medication 75 MILLIGRAM(S): at 06:02

## 2025-01-22 RX ADMIN — ATORVASTATIN CALCIUM 20 MILLIGRAM(S): 80 TABLET, FILM COATED ORAL at 22:07

## 2025-01-22 RX ADMIN — APIXABAN 2.5 MILLIGRAM(S): 5 TABLET, FILM COATED ORAL at 17:08

## 2025-01-22 RX ADMIN — BUMETANIDE 2 MILLIGRAM(S): 2 TABLET ORAL at 06:02

## 2025-01-22 RX ADMIN — OSELTAMIVIR PHOSPHATE 30 MILLIGRAM(S): 75 CAPSULE ORAL at 05:48

## 2025-01-22 RX ADMIN — APIXABAN 2.5 MILLIGRAM(S): 5 TABLET, FILM COATED ORAL at 05:49

## 2025-01-22 RX ADMIN — POTASSIUM CHLORIDE 20 MILLIEQUIVALENT(S): 750 TABLET, EXTENDED RELEASE ORAL at 13:26

## 2025-01-22 RX ADMIN — PANTOPRAZOLE 40 MILLIGRAM(S): 20 TABLET, DELAYED RELEASE ORAL at 05:49

## 2025-01-22 RX ADMIN — Medication 5 MILLIGRAM(S): at 22:07

## 2025-01-22 NOTE — PROGRESS NOTE ADULT - PROBLEM SELECTOR PLAN 4
-Hx of HTN and pHTN  -Troponin 54->59  -Pro-BNP of 3624.   -Nuclear stress test (11/2023): small sized mild defect(s) in the basal inferolateral wall that is reversible consistent with ischemia  -TTE (11/2024): "EF is approximately 55%. Mild concentric left ventricular hypertrophy. Right ventricular enlargement with mildly decreased right ventricular systolic function. Left atrium is severely dilated. Right atrium is mildly dilated. Moderate mitral regurgitation. Mitral annular calcification with thickened and mildly calcified mitral valve leaflets. There is mild prolapse of the posterior mitral valve leaflet. Bioprosthetic aortic valve replacement. Aortic root at the sinuses of Valsalva is borderline dilated. Ascending aorta is mildly dilated. Estimated pulmonary artery systolic pressure is 50 mmHg. Moderate to severe tricuspid regurgitation"  -CXR (1/8): b/l lower atelectasis vs pneumonia  -CTA (1/8): "Small bibasilar pleural effusions. Interlobular septal thickening, suggestive of interstitial edema."   -ER POCUS TTE (1/8): trace pericardial effusion, global LV hypokinesis, b/l lungs with B-lines and trace pleural effusions.  -s/p lasix 20mg IV BID X 2 Days  -TTE (1/10): LVEF 60-65%, mildly enlarged right ventricular cavity, borderline reduced ventricular systolic function, pHTN 57 mmHg, moderate tricuspid regurgitation.   -DARA (1/14): normal LVEF, severe mitral/tricuspid regurgitation, aortic valve AS moderate-severe, intravalvular regurgitation       PLAN:  >diuresis as above  >c/w daily standing weights, strict ins/outs  >f/u structural heart evaluation  >f/u cardiology outpatient

## 2025-01-22 NOTE — PROGRESS NOTE ADULT - SUBJECTIVE AND OBJECTIVE BOX
Date of Service: 01-22-25 @ 04:17           CARDIOLOGY     PROGRESS  NOTE   ________________________________________________    CHIEF COMPLAINT:Patient is a 84y old  Male who presents with a chief complaint of HF (17 Jan 2025 12:36)  no complain  	  REVIEW OF SYSTEMS:  CONSTITUTIONAL: No fever, weight loss, or fatigue  EYES: No eye pain, visual disturbances, or discharge  ENT:  No difficulty hearing, tinnitus, vertigo; No sinus or throat pain  NECK: No pain or stiffness  RESPIRATORY: No cough, wheezing, chills or hemoptysis; No Shortness of Breath  CARDIOVASCULAR: No chest pain, palpitations, passing out, dizziness, or leg swelling  GASTROINTESTINAL: No abdominal or epigastric pain. No nausea, vomiting, or hematemesis; No diarrhea or constipation. No melena or hematochezia.  GENITOURINARY: No dysuria, frequency, hematuria, or incontinence  NEUROLOGICAL: No headaches, memory loss, loss of strength, numbness, or tremors  SKIN: No itching, burning, rashes, or lesions   LYMPH Nodes: No enlarged glands  ENDOCRINE: No heat or cold intolerance; No hair loss  MUSCULOSKELETAL: No joint pain or swelling; No muscle, back, or extremity pain  PSYCHIATRIC: No depression, anxiety, mood swings, or difficulty sleeping  HEME/LYMPH: No easy bruising, or bleeding gums  ALLERGY AND IMMUNOLOGIC: No hives or eczema	    x[ ] All others negative	  [ ] Unable to obtain    PHYSICAL EXAM:  T(C): 36.9 (01-21-25 @ 21:43), Max: 36.9 (01-21-25 @ 21:43)  HR: 118 (01-21-25 @ 21:43) (112 - 118)  BP: 102/52 (01-21-25 @ 21:43) (100/54 - 102/52)  RR: 18 (01-21-25 @ 21:43) (18 - 18)  SpO2: 92% (01-21-25 @ 21:43) (92% - 92%)  Wt(kg): --  I&O's Summary    20 Jan 2025 07:01  -  21 Jan 2025 07:00  --------------------------------------------------------  IN: 360 mL / OUT: 850 mL / NET: -490 mL    21 Jan 2025 07:01  -  22 Jan 2025 04:17  --------------------------------------------------------  IN: 540 mL / OUT: 1050 mL / NET: -510 mL        Appearance: Normal	  HEENT:   Normal oral mucosa, PERRL, EOMI	  Lymphatic: No lymphadenopathy  Cardiovascular: Normal S1 S2, No JVD, + murmurs, No edema  Respiratory: rhonchi  Psychiatry: A & O x 3, Mood & affect appropriate  Gastrointestinal:  Soft, Non-tender, + BS	  Skin: No rashes, No ecchymoses, No cyanosis	  Neurologic: Non-focal  Extremities: Normal range of motion, No clubbing, cyanosis or edema  Vascular: Peripheral pulses palpable 2+ bilaterally    MEDICATIONS  (STANDING):  aMIOdarone    Tablet 200 milliGRAM(s) Oral daily  apixaban 2.5 milliGRAM(s) Oral every 12 hours  atorvastatin 20 milliGRAM(s) Oral at bedtime  buMETAnide 2 milliGRAM(s) Oral daily  famotidine    Tablet 20 milliGRAM(s) Oral at bedtime  finasteride 5 milliGRAM(s) Oral daily  metoprolol succinate ER 50 milliGRAM(s) Oral daily  oseltamivir 30 milliGRAM(s) Oral two times a day  pantoprazole    Tablet 40 milliGRAM(s) Oral before breakfast  polyethylene glycol 3350 17 Gram(s) Oral at bedtime  senna 2 Tablet(s) Oral at bedtime      TELEMETRY: 	    ECG:  	  RADIOLOGY:  OTHER: 	  	  LABS:	 	    CARDIAC MARKERS:                                14.0   7.38  )-----------( 53       ( 21 Jan 2025 06:51 )             41.5     01-21    134[L]  |  96  |  30[H]  ----------------------------<  98  3.4[L]   |  25  |  1.91[H]    Ca    8.6      21 Jan 2025 06:46  Phos  3.8     01-21  Mg     2.0     01-21    TPro  6.2  /  Alb  3.5  /  TBili  1.5[H]  /  DBili  x   /  AST  46[H]  /  ALT  33  /  AlkPhos  64  01-21    proBNP:   Lipid Profile:   HgA1c:   TSH:         Assessment and plan  ---------------------------  84-year-old male PMH afib on eliquid, severe aortic stenosis (2/2 bicuspid aortic valve), s/p aortic valve replacement (7/2012), HTN, pHTN, HLD, chronic renal insufficiency, thrombocytopenia, GERD presenting from Dr. Ramirez office for tachycardia. Patient had COVID 2 weeks ago which he was treated with paxlovid for. Subsequently, the patient developed some wheezing and was treated with doxycycline and steroids from an urgent care with improvement in symptoms. The patient continued to feel weak however, and dyspneic on exertion. The patient notes 10 days of leg swelling, with dyspnea on exertion starting 3 days ago with inability to tolerate more than 2-3 steps (baseline does not ambulate much). Of note, the patient experienced some nausea a few days prior and vomited after getting into his car.   The patient went to visit his PCP today for the dyspnea and leg swelling; was noted to be tachycardic to 130 BPM and was sent to ER for further evaluation.   Pt did not take home bisoprolol and eliquis prior to admission. Pt denies chest pain, shortness of breath, n/v/d/, fevers or chills, urinary sx, headache, visual changes, numbness or other complaints.  ppt with sig cardiac and medical hx with rapid hr, sob and le edema  cta of chest no PE, but increase interstitial marking  cw chf.  s/p AVR overall mild regurgitation ,TILA 1.44 cn2  will need to add diuresis sec to RV dysfunction , will add midodrine if bp can not tolerate  will discuss with family  dc bisprolol for now, will add metoprolol er 25 mg daily as needed  repeat chest x ray to control HR   thrombocytopenia ?etiology  tele a.fib hr 80 to 120, will increase metoprolol er as tolerated  will consider DARA/  cardioversion after small amio load in am mhr has much improved, will increase beta blocker if hr elevated  discussed with daughter agreed with cardioversion  HR is controlled  acute renal failure??   s/p DARA cardioversion remain in NSR, check bladder scan plan as per renal  nephrology noted pt did not get contrast for the procedure or DARA  pt will eventually needs TAVR with sig AI on the bioprosthetic valve in 2012  will switch amio to 200 mg daily today, pt converted to a.fib at 4 52 am   continue current meds, bumerx changed to 2 mg daily  increase metoprolol er to 75 mg po daily, fu hr

## 2025-01-22 NOTE — PROGRESS NOTE ADULT - PROBLEM SELECTOR PLAN 11
DVT ppx: Eliquis 2.5mg BID - hold if Plt <50  Diet: DASH/TLC, low sodium, fluid restriction to 1L  Dispo: Active, pending resolution of PETER and volume status  PT consulted, recs appreciated DVT ppx: Eliquis 2.5mg BID - hold if Plt <50  Diet: DASH/TLC, low sodium, fluid restriction to 1L  Dispo: anticipate home 01/23  PT consulted, no needs

## 2025-01-22 NOTE — PROGRESS NOTE ADULT - PROBLEM SELECTOR PLAN 1
Patient with PETER on CKD in the setting of transient hypotension, hemodynamic effects from afib with RVR, and administration of IV contrast. On review of labs on [place], patient noted to have elevated Scr/episodes of PETER since 2017. Last Scr was 1.29 (eGFR 57) on 12/19/24.     Admission Scr remained stable at 1.24 on 1/8/25, peaked at 1.61 on 1/11, and elevated/improved to 1.53 on 1/14. UACR elevated at 544 on 12/19/24. Patient underwent CT PE on 1/8/25. Underwent successful DARA cardioversion on 1/14. CT PE with exophytic L renal lesion and renal cysts. Kidney sonogram on 1/14 with no evidence of hydronephrosis, increased renal cortical echogenicity, non obstructing stone on R, and multiple B/L cysts.    Scr acutely jacobo from 1.5 (1/14) to 2.7 (1/15). Possibly hemodynamically mediated ATN. Echo: severe MR/TR    Transitioned from IV Bumex 2mg QD to PO. Scr improved to 1.78 today. Non-oliguric UOP. Weight decreasing.  Replete potassium to goal of ~4. Toprol increased today for tachycardia.  Monitor labs and urine output. Maintain neg negative fluid status. 1L fluid restriction. Check daily standing weights. Avoid nephrotoxins. Dose medications as per eGFR.    If you have any questions, please feel free to contact me.  Jose Rodriguez MD  Nephrology Fellow  [email or web address] / [employer] Teams (Preferred)  (Please check the on-call schedule to reach the appropriate Nephrology Fellow).

## 2025-01-22 NOTE — PROGRESS NOTE ADULT - PROBLEM SELECTOR PLAN 6
-On home bisoprolol 17.5mg PO QD  - d/evelyn bisoprolol 17.5mg PO QD (1/11) per cardiology recs  - On home amlodipine 5mg PO QD    PLAN:  >c/w metoprolol ER 50mg QD  >f/u cardiology recs -On home bisoprolol 17.5mg PO QD  - d/evelyn bisoprolol 17.5mg PO QD (1/11) per cardiology recs  - On home amlodipine 5mg PO QD    PLAN:  >c/w metoprolol ER 75mg QD  >f/u cardiology recs

## 2025-01-22 NOTE — PROGRESS NOTE ADULT - ATTENDING COMMENTS
84M w/pmh chronic atrial fibrillation, CKD 3a, severe aortic stenosis s/p aortic valve replacement (7/2012), HTN, pHTN, HLD, thrombocytopenia, GERD presenting from PCP's office for cc tachycardia, JOSEPH. Found to have volume overload and atrial fibrillation w/RVR. Diuresed initially with IV bumex BID, improving. s/p DARA/DCCV 1/14, initially converted to NSR, now back to afib RVR, continue amiodarone, metoprolol. Cardiology f/u. Febrile on 1/19. Dc IV abx. RVP+ influenza, treating with tamiflu. Continue diuresis, now on PO bumex beginning 1/22. Increased toprol to 75mg. Monitor renal function. Plan to follow up with structural heart outpatient to discuss TAVR. Treatment will be dependent on clinical course, likely discharge tomorrow.    Agree with progress note as outlined above, edited where appropriate.    Discussed plan with patient's daughter.

## 2025-01-22 NOTE — PROGRESS NOTE ADULT - SUBJECTIVE AND OBJECTIVE BOX
[facility] Division of Kidney Diseases & Hypertension  FOLLOW UP NOTE  [phone]--------------------------------------------------------------------------------    Chief Complaint: PETER on CKD    24 hour events/subjective: Patient seen and examined today. Feels well. Reports significant improvement in LE swelling. Denies HA, fevers/chills, CP, SOB, abdominal pain, or dysuria.     PAST HISTORY  --------------------------------------------------------------------------------  No significant changes to PMH, PSH, FHx, SHx, unless otherwise noted    ALLERGIES & MEDICATIONS  --------------------------------------------------------------------------------  Allergies  No Known Allergies    Intolerances    Standing Inpatient Medications  aMIOdarone    Tablet 200 milliGRAM(s) Oral daily  apixaban 2.5 milliGRAM(s) Oral every 12 hours  atorvastatin 20 milliGRAM(s) Oral at bedtime  buMETAnide 2 milliGRAM(s) Oral daily  famotidine    Tablet 20 milliGRAM(s) Oral at bedtime  finasteride 5 milliGRAM(s) Oral daily  metoprolol succinate ER 75 milliGRAM(s) Oral daily  oseltamivir 30 milliGRAM(s) Oral two times a day  pantoprazole    Tablet 40 milliGRAM(s) Oral before breakfast  polyethylene glycol 3350 17 Gram(s) Oral at bedtime  potassium chloride    Tablet ER 20 milliEquivalent(s) Oral every 2 hours  senna 2 Tablet(s) Oral at bedtime    PRN Inpatient Medications  acetaminophen     Tablet .. 650 milliGRAM(s) Oral every 6 hours PRN  aluminum hydroxide/magnesium hydroxide/simethicone Suspension 30 milliLiter(s) Oral every 6 hours PRN  ondansetron Injectable 4 milliGRAM(s) IV Push every 6 hours PRN    REVIEW OF SYSTEMS  --------------------------------------------------------------------------------  Gen: No fevers/chills  Head/Eyes/Ears: No HA  Respiratory: +SOB improved  CV: No chest pain  GI: No abdominal pain, diarrhea  : No dysuria, hematuria  MSK: +edema improved  Heme: No easy bruising or bleeding  Psych: No significant depression    All other systems were reviewed and are negative, except as noted.    VITALS/PHYSICAL EXAM  --------------------------------------------------------------------------------  T(C): 36.9 (01-22-25 @ 04:43), Max: 36.9 (01-21-25 @ 21:43)  HR: 103 (01-22-25 @ 04:43) (103 - 118)  BP: 107/53 (01-22-25 @ 04:43) (102/52 - 107/53)  RR: 18 (01-22-25 @ 04:43) (18 - 18)  SpO2: 95% (01-22-25 @ 04:43) (92% - 95%)  Wt(kg): --    01-21-25 @ 07:01  -  01-22-25 @ 07:00  --------------------------------------------------------  IN: 540 mL / OUT: 1450 mL / NET: -910 mL    01-22-25 @ 07:01  -  01-22-25 @ 12:02  --------------------------------------------------------  IN: 240 mL / OUT: 450 mL / NET: -210 mL    Physical Exam:  Gen: NAD  HEENT: MMM  Pulm: Fine bibasilar rales  CV: S1S2, tachycardic  Abd: Soft, +BS   Ext: No edema  Neuro: Awake  Skin: Warm and dry    LABS/STUDIES  --------------------------------------------------------------------------------              14.7   5.92  >-----------<  63       [01-22-25 @ 06:17]              43.6     138  |  99  |  32  ----------------------------<  96      [01-22-25 @ 06:17]  3.3   |  27  |  1.78        Ca     8.6     [01-22-25 @ 06:17]      Mg     2.1     [01-22-25 @ 06:17]      Phos  3.5     [01-22-25 @ 06:17]    TPro  6.5  /  Alb  3.7  /  TBili  1.1  /  DBili  x   /  AST  50  /  ALT  40  /  AlkPhos  69  [01-22-25 @ 06:17]    Creatinine Trend:  SCr 1.78 [01-22 @ 06:17]  SCr 1.91 [01-21 @ 06:46]  SCr 2.13 [01-20 @ 06:31]  SCr 1.96 [01-19 @ 07:34]  SCr 2.03 [01-18 @ 06:48]

## 2025-01-22 NOTE — PROGRESS NOTE ADULT - PROBLEM SELECTOR PLAN 3
-Na: 128 noted on labs 1/15  -Creatinine up-trending, patient looking more fluid overloaded  -Urine studies (1/15): Na of 31, creatinine of 261, p/c ratio: 0.6, urine osmol: 489; FeNa suggestive of pre-renal  -s/p 1x dose of hypertonic saline on 1/16 & 1/17  -Ddx: Hypervolemia & PETER  - improved    PLAN:  >c/w monitoring Na levels daily  >c/w fluid restriction to 1L  >diuresis as above

## 2025-01-22 NOTE — PROGRESS NOTE ADULT - PROBLEM SELECTOR PLAN 1
- Tmax 101 orally on 1/19. Cough productive of clear sputum tinged with intermittent blood. CXR and UA without infection. Influenza A+ on RVP   - d/c CTX  - Oseltamivir (01/21 - 01/25), renally dosed at 30 mg BID Tmax 101 orally on 1/19. Cough productive of clear sputum tinged with intermittent blood. CXR and UA without infection. Influenza A+ on RVP.    - Oseltamivir (01/21 - 01/25), renally dosed at 30 mg BID

## 2025-01-22 NOTE — PROGRESS NOTE ADULT - PROBLEM SELECTOR PLAN 5
-EKG on admission with sinus tachycardia to 125-128  -On home bisoprolol 17.5mg PO QD  -d/evelyn bisoprolol 17.5mg PO QD (1/11) per cardiology recs  -s/p DARA/cardioversion on 1/14  -s/p Digoxin on 1/12  -Patient is now back in AFib on 1/17  - Tachycardic also in setting of influenza A+    PLAN:  >c/w home apixaban 2.5mg PO QD - hold if Plt<50  >c/w amiodarone 200mg QD; down-titrated (1/12 -     >c/w metoprolol ER 50mg QD   >f/u cardiology recs  >f/u cardiology outpatient -EKG on admission with sinus tachycardia to 125-128  -On home bisoprolol 17.5mg PO QD  -d/evelyn bisoprolol 17.5mg PO QD (1/11) per cardiology recs  -s/p DARA/cardioversion on 1/14  -s/p Digoxin on 1/12  -Patient is now back in AFib on 1/17  - Tachycardic also in setting of influenza A+    PLAN:  >c/w home apixaban 2.5mg PO QD - hold if Plt<50  >c/w amiodarone 200mg QD  >increased metoprolol ER 75mg QD   >f/u cardiology recs  >f/u cardiology outpatient

## 2025-01-22 NOTE — PROGRESS NOTE ADULT - SUBJECTIVE AND OBJECTIVE BOX
ARABELLA WEISS  84y  MRN: [id number]    Patient is a 84y old  Male who presents with a chief complaint of HF (17 Jan 2025 12:36)      Interval/Overnight Events: no events ON.     Subjective: Pt seen and examined at bedside.     MEDICATIONS  (STANDING):  aMIOdarone    Tablet 200 milliGRAM(s) Oral daily  apixaban 2.5 milliGRAM(s) Oral every 12 hours  atorvastatin 20 milliGRAM(s) Oral at bedtime  buMETAnide 2 milliGRAM(s) Oral daily  famotidine    Tablet 20 milliGRAM(s) Oral at bedtime  finasteride 5 milliGRAM(s) Oral daily  metoprolol succinate ER 75 milliGRAM(s) Oral daily  oseltamivir 30 milliGRAM(s) Oral two times a day  pantoprazole    Tablet 40 milliGRAM(s) Oral before breakfast  polyethylene glycol 3350 17 Gram(s) Oral at bedtime  potassium chloride    Tablet ER 20 milliEquivalent(s) Oral every 2 hours  senna 2 Tablet(s) Oral at bedtime    MEDICATIONS  (PRN):  acetaminophen     Tablet .. 650 milliGRAM(s) Oral every 6 hours PRN Temp greater or equal to 38C (100.4F), Mild Pain (1 - 3)  aluminum hydroxide/magnesium hydroxide/simethicone Suspension 30 milliLiter(s) Oral every 6 hours PRN Dyspepsia  ondansetron Injectable 4 milliGRAM(s) IV Push every 6 hours PRN Nausea and/or Vomiting      Objective:    Vitals: Vital Signs Last 24 Hrs  T(C): 36.9 (01-22-25 @ 04:43), Max: 36.9 (01-21-25 @ 21:43)  T(F): 98.5 (01-22-25 @ 04:43), Max: 98.5 (01-22-25 @ 04:43)  HR: 103 (01-22-25 @ 04:43) (103 - 118)  BP: 107/53 (01-22-25 @ 04:43) (100/54 - 107/53)  BP(mean): --  RR: 18 (01-22-25 @ 04:43) (18 - 18)  SpO2: 95% (01-22-25 @ 04:43) (92% - 95%)                I&O's Summary    21 Jan 2025 07:01  -  22 Jan 2025 07:00  --------------------------------------------------------  IN: 540 mL / OUT: 1450 mL / NET: -910 mL        PHYSICAL EXAM:  GENERAL: NAD  HEAD:  Atraumatic, Normocephalic  EYES: EOMI, conjunctiva and sclera clear  CHEST/LUNG: Clear to auscultation bilaterally; No rales, rhonchi, wheezing, or rubs  HEART: Regular rate and rhythm; No murmurs, rubs, or gallops  ABDOMEN: Soft, Nontender, Nondistended;   SKIN: No rashes or lesions  NERVOUS SYSTEM:  Alert & Oriented X3, no focal deficits    LABS:                        14.7   5.92  )-----------( 63       ( 22 Jan 2025 06:17 )             43.6                         14.0   7.38  )-----------( 53       ( 21 Jan 2025 06:51 )             41.5                         13.7   9.82  )-----------( 61       ( 20 Jan 2025 06:31 )             41.3     01-22    138  |  99  |  32[H]  ----------------------------<  96  3.3[L]   |  27  |  1.78[H]  01-21    134[L]  |  96  |  30[H]  ----------------------------<  98  3.4[L]   |  25  |  1.91[H]  01-20    136  |  95[L]  |  32[H]  ----------------------------<  96  3.6   |  27  |  2.13[H]    Ca    8.6      22 Jan 2025 06:17  Ca    8.6      21 Jan 2025 06:46  Ca    8.7      20 Jan 2025 06:31  Phos  3.5     01-22  Mg     2.1     01-22    TPro  6.5  /  Alb  3.7  /  TBili  1.1  /  DBili  x   /  AST  50[H]  /  ALT  40  /  AlkPhos  69  01-22  TPro  6.2  /  Alb  3.5  /  TBili  1.5[H]  /  DBili  x   /  AST  46[H]  /  ALT  33  /  AlkPhos  64  01-21  TPro  6.5  /  Alb  3.7  /  TBili  1.7[H]  /  DBili  0.7[H]  /  AST  44[H]  /  ALT  34  /  AlkPhos  66  01-20    CAPILLARY BLOOD GLUCOSE           ARABELLA WEISS  84y  MRN: [id number]    Patient is a 84y old  Male who presents with a chief complaint of HF (17 Jan 2025 12:36)      Interval/Overnight Events: no events ON.     Subjective: Pt seen and examined at bedside. Feels well overall, no SOB, chest pain, abdominal pain, new concerns this AM. Eating/drinking/urinating/having bowel movements without issues.    MEDICATIONS  (STANDING):  aMIOdarone    Tablet 200 milliGRAM(s) Oral daily  apixaban 2.5 milliGRAM(s) Oral every 12 hours  atorvastatin 20 milliGRAM(s) Oral at bedtime  buMETAnide 2 milliGRAM(s) Oral daily  famotidine    Tablet 20 milliGRAM(s) Oral at bedtime  finasteride 5 milliGRAM(s) Oral daily  metoprolol succinate ER 75 milliGRAM(s) Oral daily  oseltamivir 30 milliGRAM(s) Oral two times a day  pantoprazole    Tablet 40 milliGRAM(s) Oral before breakfast  polyethylene glycol 3350 17 Gram(s) Oral at bedtime  potassium chloride    Tablet ER 20 milliEquivalent(s) Oral every 2 hours  senna 2 Tablet(s) Oral at bedtime    MEDICATIONS  (PRN):  acetaminophen     Tablet .. 650 milliGRAM(s) Oral every 6 hours PRN Temp greater or equal to 38C (100.4F), Mild Pain (1 - 3)  aluminum hydroxide/magnesium hydroxide/simethicone Suspension 30 milliLiter(s) Oral every 6 hours PRN Dyspepsia  ondansetron Injectable 4 milliGRAM(s) IV Push every 6 hours PRN Nausea and/or Vomiting      Objective:    Vitals: Vital Signs Last 24 Hrs  T(C): 36.9 (01-22-25 @ 04:43), Max: 36.9 (01-21-25 @ 21:43)  T(F): 98.5 (01-22-25 @ 04:43), Max: 98.5 (01-22-25 @ 04:43)  HR: 103 (01-22-25 @ 04:43) (103 - 118)  BP: 107/53 (01-22-25 @ 04:43) (100/54 - 107/53)  BP(mean): --  RR: 18 (01-22-25 @ 04:43) (18 - 18)  SpO2: 95% (01-22-25 @ 04:43) (92% - 95%)                I&O's Summary    21 Jan 2025 07:01  -  22 Jan 2025 07:00  --------------------------------------------------------  IN: 540 mL / OUT: 1450 mL / NET: -910 mL        PHYSICAL EXAM:  GENERAL: NAD  HEAD:  Atraumatic, Normocephalic  EYES: EOMI, conjunctiva and sclera clear  CHEST/LUNG: Clear to auscultation bilaterally; No rales, rhonchi, wheezing, or rubs  HEART: Tachycardic, irregular; No murmurs, rubs, or gallops, trace lower extremity edema  ABDOMEN: Soft, Nontender, Nondistended;   SKIN: No rashes or lesions  NERVOUS SYSTEM:  Alert & Oriented X3, no focal deficits    LABS:                        14.7   5.92  )-----------( 63       ( 22 Jan 2025 06:17 )             43.6                         14.0   7.38  )-----------( 53       ( 21 Jan 2025 06:51 )             41.5                         13.7   9.82  )-----------( 61       ( 20 Jan 2025 06:31 )             41.3     01-22    138  |  99  |  32[H]  ----------------------------<  96  3.3[L]   |  27  |  1.78[H]  01-21    134[L]  |  96  |  30[H]  ----------------------------<  98  3.4[L]   |  25  |  1.91[H]  01-20    136  |  95[L]  |  32[H]  ----------------------------<  96  3.6   |  27  |  2.13[H]    Ca    8.6      22 Jan 2025 06:17  Ca    8.6      21 Jan 2025 06:46  Ca    8.7      20 Jan 2025 06:31  Phos  3.5     01-22  Mg     2.1     01-22    TPro  6.5  /  Alb  3.7  /  TBili  1.1  /  DBili  x   /  AST  50[H]  /  ALT  40  /  AlkPhos  69  01-22  TPro  6.2  /  Alb  3.5  /  TBili  1.5[H]  /  DBili  x   /  AST  46[H]  /  ALT  33  /  AlkPhos  64  01-21  TPro  6.5  /  Alb  3.7  /  TBili  1.7[H]  /  DBili  0.7[H]  /  AST  44[H]  /  ALT  34  /  AlkPhos  66  01-20    CAPILLARY BLOOD GLUCOSE           ARABELLA WEISS  84y  MRN: [id number]    Patient is a 84y old  Male who presents with a chief complaint of HF (17 Jan 2025 12:36)      Interval/Overnight Events: no events ON.     Subjective: Pt seen and examined at bedside. Feels well overall, no SOB, chest pain, abdominal pain, new concerns this AM. Eating/drinking/urinating/having bowel movements without issues.    MEDICATIONS  (STANDING):  aMIOdarone    Tablet 200 milliGRAM(s) Oral daily  apixaban 2.5 milliGRAM(s) Oral every 12 hours  atorvastatin 20 milliGRAM(s) Oral at bedtime  buMETAnide 2 milliGRAM(s) Oral daily  famotidine    Tablet 20 milliGRAM(s) Oral at bedtime  finasteride 5 milliGRAM(s) Oral daily  metoprolol succinate ER 75 milliGRAM(s) Oral daily  oseltamivir 30 milliGRAM(s) Oral two times a day  pantoprazole    Tablet 40 milliGRAM(s) Oral before breakfast  polyethylene glycol 3350 17 Gram(s) Oral at bedtime  potassium chloride    Tablet ER 20 milliEquivalent(s) Oral every 2 hours  senna 2 Tablet(s) Oral at bedtime    MEDICATIONS  (PRN):  acetaminophen     Tablet .. 650 milliGRAM(s) Oral every 6 hours PRN Temp greater or equal to 38C (100.4F), Mild Pain (1 - 3)  aluminum hydroxide/magnesium hydroxide/simethicone Suspension 30 milliLiter(s) Oral every 6 hours PRN Dyspepsia  ondansetron Injectable 4 milliGRAM(s) IV Push every 6 hours PRN Nausea and/or Vomiting      Objective:    Vitals: Vital Signs Last 24 Hrs  T(C): 36.9 (01-22-25 @ 04:43), Max: 36.9 (01-21-25 @ 21:43)  T(F): 98.5 (01-22-25 @ 04:43), Max: 98.5 (01-22-25 @ 04:43)  HR: 103 (01-22-25 @ 04:43) (103 - 118)  BP: 107/53 (01-22-25 @ 04:43) (100/54 - 107/53)  BP(mean): --  RR: 18 (01-22-25 @ 04:43) (18 - 18)  SpO2: 95% (01-22-25 @ 04:43) (92% - 95%)                I&O's Summary    21 Jan 2025 07:01  -  22 Jan 2025 07:00  --------------------------------------------------------  IN: 540 mL / OUT: 1450 mL / NET: -910 mL    PHYSICAL EXAM:  GENERAL: NAD  HEAD:  Atraumatic, Normocephalic  EYES: EOMI, conjunctiva and sclera clear  CHEST/LUNG: Clear to auscultation bilaterally; No rales, rhonchi, wheezing, or rubs  HEART: Tachycardic, irregular; No murmurs, rubs, or gallops, trace lower extremity edema  ABDOMEN: Soft, Nontender, Nondistended;   SKIN: No rashes or lesions  NERVOUS SYSTEM:  Alert & Oriented X3, no focal deficits    LABS:                        14.7   5.92  )-----------( 63       ( 22 Jan 2025 06:17 )             43.6                         14.0   7.38  )-----------( 53       ( 21 Jan 2025 06:51 )             41.5                         13.7   9.82  )-----------( 61       ( 20 Jan 2025 06:31 )             41.3     01-22    138  |  99  |  32[H]  ----------------------------<  96  3.3[L]   |  27  |  1.78[H]  01-21    134[L]  |  96  |  30[H]  ----------------------------<  98  3.4[L]   |  25  |  1.91[H]  01-20    136  |  95[L]  |  32[H]  ----------------------------<  96  3.6   |  27  |  2.13[H]    Ca    8.6      22 Jan 2025 06:17  Ca    8.6      21 Jan 2025 06:46  Ca    8.7      20 Jan 2025 06:31  Phos  3.5     01-22  Mg     2.1     01-22    TPro  6.5  /  Alb  3.7  /  TBili  1.1  /  DBili  x   /  AST  50[H]  /  ALT  40  /  AlkPhos  69  01-22  TPro  6.2  /  Alb  3.5  /  TBili  1.5[H]  /  DBili  x   /  AST  46[H]  /  ALT  33  /  AlkPhos  64  01-21  TPro  6.5  /  Alb  3.7  /  TBili  1.7[H]  /  DBili  0.7[H]  /  AST  44[H]  /  ALT  34  /  AlkPhos  66  01-20    CAPILLARY BLOOD GLUCOSE           ARABELLA WEISS  84y  MRN: [id number]    Patient is a 84y old  Male who presents with a chief complaint of HF (17 Jan 2025 12:36)      Interval/Overnight Events: no events ON.     Subjective: Pt seen and examined at bedside. Feels well overall, no SOB, chest pain, abdominal pain, new concerns this AM. Eating/drinking/urinating/having bowel movements without issues.    MEDICATIONS  (STANDING):  aMIOdarone    Tablet 200 milliGRAM(s) Oral daily  apixaban 2.5 milliGRAM(s) Oral every 12 hours  atorvastatin 20 milliGRAM(s) Oral at bedtime  buMETAnide 2 milliGRAM(s) Oral daily  famotidine    Tablet 20 milliGRAM(s) Oral at bedtime  finasteride 5 milliGRAM(s) Oral daily  metoprolol succinate ER 75 milliGRAM(s) Oral daily  oseltamivir 30 milliGRAM(s) Oral two times a day  pantoprazole    Tablet 40 milliGRAM(s) Oral before breakfast  polyethylene glycol 3350 17 Gram(s) Oral at bedtime  potassium chloride    Tablet ER 20 milliEquivalent(s) Oral every 2 hours  senna 2 Tablet(s) Oral at bedtime    MEDICATIONS  (PRN):  acetaminophen     Tablet .. 650 milliGRAM(s) Oral every 6 hours PRN Temp greater or equal to 38C (100.4F), Mild Pain (1 - 3)  aluminum hydroxide/magnesium hydroxide/simethicone Suspension 30 milliLiter(s) Oral every 6 hours PRN Dyspepsia  ondansetron Injectable 4 milliGRAM(s) IV Push every 6 hours PRN Nausea and/or Vomiting      Objective:    Vitals: Vital Signs Last 24 Hrs  T(C): 36.9 (01-22-25 @ 04:43), Max: 36.9 (01-21-25 @ 21:43)  T(F): 98.5 (01-22-25 @ 04:43), Max: 98.5 (01-22-25 @ 04:43)  HR: 103 (01-22-25 @ 04:43) (103 - 118)  BP: 107/53 (01-22-25 @ 04:43) (100/54 - 107/53)  BP(mean): --  RR: 18 (01-22-25 @ 04:43) (18 - 18)  SpO2: 95% (01-22-25 @ 04:43) (92% - 95%)                I&O's Summary    21 Jan 2025 07:01  -  22 Jan 2025 07:00  --------------------------------------------------------  IN: 540 mL / OUT: 1450 mL / NET: -910 mL    PHYSICAL EXAM:  GENERAL: NAD  HEAD:  Atraumatic, Normocephalic  EYES: EOMI, conjunctiva and sclera clear  CHEST/LUNG: Clear to auscultation bilaterally; No rales, rhonchi, wheezing, or rubs  HEART: Tachycardic, irregular; No murmurs, rubs, or gallops, trace lower extremity edema  ABDOMEN: Soft, Nontender  SKIN: No rashes or lesions  NERVOUS SYSTEM:  Alert & Oriented X3, no focal deficits    LABS:                        14.7   5.92  )-----------( 63       ( 22 Jan 2025 06:17 )             43.6                         14.0   7.38  )-----------( 53       ( 21 Jan 2025 06:51 )             41.5                         13.7   9.82  )-----------( 61       ( 20 Jan 2025 06:31 )             41.3     01-22    138  |  99  |  32[H]  ----------------------------<  96  3.3[L]   |  27  |  1.78[H]  01-21    134[L]  |  96  |  30[H]  ----------------------------<  98  3.4[L]   |  25  |  1.91[H]  01-20    136  |  95[L]  |  32[H]  ----------------------------<  96  3.6   |  27  |  2.13[H]    Ca    8.6      22 Jan 2025 06:17  Ca    8.6      21 Jan 2025 06:46  Ca    8.7      20 Jan 2025 06:31  Phos  3.5     01-22  Mg     2.1     01-22    TPro  6.5  /  Alb  3.7  /  TBili  1.1  /  DBili  x   /  AST  50[H]  /  ALT  40  /  AlkPhos  69  01-22  TPro  6.2  /  Alb  3.5  /  TBili  1.5[H]  /  DBili  x   /  AST  46[H]  /  ALT  33  /  AlkPhos  64  01-21  TPro  6.5  /  Alb  3.7  /  TBili  1.7[H]  /  DBili  0.7[H]  /  AST  44[H]  /  ALT  34  /  AlkPhos  66  01-20    CAPILLARY BLOOD GLUCOSE

## 2025-01-22 NOTE — PROGRESS NOTE ADULT - PROBLEM SELECTOR PLAN 2
-Urine studies: Na of 33, Nitrogen of 707, creatinine of 114; FeUrea suggestive of intrinsic renal disease  -Urine studies (1/15): Na of 31, creatinine of 261, p/c ratio: 0.6, urine osmol: 489; FeNa suggestive of pre-renal  -Kidney/Bladder US: "No hydronephrosis. Increased renal cortical echogenicity, compatible with renal   parenchymal disease. 0.9 x 0.5 x 1.1 cm right upper pole nonobstructing stone, similarly seen on CT from 1/17/2018. Left upper pole cyst measuring 2.6 x 1.9 x 2.8 cm with rim calcifications is similarly seen on CT abdomen pelvis from 1/17/2018. Additional bilateral simple cysts as above."  -Ddx: ATN vs pre-renal    PLAN:  >c/w Bumex 2mg IVP daily, reassess tomorrow  >c/w trending kidney function daily  >f/u nephro recs -Urine studies: Na of 33, Nitrogen of 707, creatinine of 114; FeUrea suggestive of intrinsic renal disease  -Urine studies (1/15): Na of 31, creatinine of 261, p/c ratio: 0.6, urine osmol: 489; FeNa suggestive of pre-renal  -Kidney/Bladder US: "No hydronephrosis. Increased renal cortical echogenicity, compatible with renal   parenchymal disease. 0.9 x 0.5 x 1.1 cm right upper pole nonobstructing stone, similarly seen on CT from 1/17/2018. Left upper pole cyst measuring 2.6 x 1.9 x 2.8 cm with rim calcifications is similarly seen on CT abdomen pelvis from 1/17/2018. Additional bilateral simple cysts as above."  -Ddx: ATN vs pre-renal    PLAN:  >changed to Bumex 2mg PO in anticipation of discharge soon  >c/w trending kidney function daily  >f/u nephro recs

## 2025-01-23 ENCOUNTER — TRANSCRIPTION ENCOUNTER (OUTPATIENT)
Age: 85
End: 2025-01-23

## 2025-01-23 VITALS — WEIGHT: 223.11 LBS

## 2025-01-23 LAB
ALBUMIN SERPL ELPH-MCNC: 3.4 G/DL — SIGNIFICANT CHANGE UP (ref 3.3–5)
ALP SERPL-CCNC: 66 U/L — SIGNIFICANT CHANGE UP (ref 40–120)
ALT FLD-CCNC: 43 U/L — SIGNIFICANT CHANGE UP (ref 10–45)
ANION GAP SERPL CALC-SCNC: 11 MMOL/L — SIGNIFICANT CHANGE UP (ref 5–17)
AST SERPL-CCNC: 50 U/L — HIGH (ref 10–40)
BILIRUB SERPL-MCNC: 1 MG/DL — SIGNIFICANT CHANGE UP (ref 0.2–1.2)
BUN SERPL-MCNC: 30 MG/DL — HIGH (ref 7–23)
CALCIUM SERPL-MCNC: 8.1 MG/DL — LOW (ref 8.4–10.5)
CHLORIDE SERPL-SCNC: 99 MMOL/L — SIGNIFICANT CHANGE UP (ref 96–108)
CO2 SERPL-SCNC: 25 MMOL/L — SIGNIFICANT CHANGE UP (ref 22–31)
CREAT SERPL-MCNC: 1.7 MG/DL — HIGH (ref 0.5–1.3)
EGFR: 39 ML/MIN/1.73M2 — LOW
GLUCOSE SERPL-MCNC: 90 MG/DL — SIGNIFICANT CHANGE UP (ref 70–99)
HCT VFR BLD CALC: 41.8 % — SIGNIFICANT CHANGE UP (ref 39–50)
HGB BLD-MCNC: 13.9 G/DL — SIGNIFICANT CHANGE UP (ref 13–17)
MAGNESIUM SERPL-MCNC: 1.9 MG/DL — SIGNIFICANT CHANGE UP (ref 1.6–2.6)
MCHC RBC-ENTMCNC: 31.4 PG — SIGNIFICANT CHANGE UP (ref 27–34)
MCHC RBC-ENTMCNC: 33.3 G/DL — SIGNIFICANT CHANGE UP (ref 32–36)
MCV RBC AUTO: 94.6 FL — SIGNIFICANT CHANGE UP (ref 80–100)
NRBC # BLD: 0 /100 WBCS — SIGNIFICANT CHANGE UP (ref 0–0)
NRBC BLD-RTO: 0 /100 WBCS — SIGNIFICANT CHANGE UP (ref 0–0)
PHOSPHATE SERPL-MCNC: 2.7 MG/DL — SIGNIFICANT CHANGE UP (ref 2.5–4.5)
PLATELET # BLD AUTO: 59 K/UL — LOW (ref 150–400)
POTASSIUM SERPL-MCNC: 3.6 MMOL/L — SIGNIFICANT CHANGE UP (ref 3.5–5.3)
POTASSIUM SERPL-SCNC: 3.6 MMOL/L — SIGNIFICANT CHANGE UP (ref 3.5–5.3)
PROT SERPL-MCNC: 6.1 G/DL — SIGNIFICANT CHANGE UP (ref 6–8.3)
RBC # BLD: 4.42 M/UL — SIGNIFICANT CHANGE UP (ref 4.2–5.8)
RBC # FLD: 13.2 % — SIGNIFICANT CHANGE UP (ref 10.3–14.5)
SODIUM SERPL-SCNC: 135 MMOL/L — SIGNIFICANT CHANGE UP (ref 135–145)
WBC # BLD: 6.03 K/UL — SIGNIFICANT CHANGE UP (ref 3.8–10.5)
WBC # FLD AUTO: 6.03 K/UL — SIGNIFICANT CHANGE UP (ref 3.8–10.5)

## 2025-01-23 PROCEDURE — 82962 GLUCOSE BLOOD TEST: CPT

## 2025-01-23 PROCEDURE — 81001 URINALYSIS AUTO W/SCOPE: CPT

## 2025-01-23 PROCEDURE — 85610 PROTHROMBIN TIME: CPT

## 2025-01-23 PROCEDURE — 83935 ASSAY OF URINE OSMOLALITY: CPT

## 2025-01-23 PROCEDURE — 86900 BLOOD TYPING SEROLOGIC ABO: CPT

## 2025-01-23 PROCEDURE — 85025 COMPLETE CBC W/AUTO DIFF WBC: CPT

## 2025-01-23 PROCEDURE — 93320 DOPPLER ECHO COMPLETE: CPT

## 2025-01-23 PROCEDURE — 82570 ASSAY OF URINE CREATININE: CPT

## 2025-01-23 PROCEDURE — 99232 SBSQ HOSP IP/OBS MODERATE 35: CPT | Mod: GC

## 2025-01-23 PROCEDURE — 86850 RBC ANTIBODY SCREEN: CPT

## 2025-01-23 PROCEDURE — 94640 AIRWAY INHALATION TREATMENT: CPT

## 2025-01-23 PROCEDURE — 76770 US EXAM ABDO BACK WALL COMP: CPT

## 2025-01-23 PROCEDURE — 99291 CRITICAL CARE FIRST HOUR: CPT

## 2025-01-23 PROCEDURE — 85027 COMPLETE CBC AUTOMATED: CPT

## 2025-01-23 PROCEDURE — 84540 ASSAY OF URINE/UREA-N: CPT

## 2025-01-23 PROCEDURE — 83735 ASSAY OF MAGNESIUM: CPT

## 2025-01-23 PROCEDURE — 84300 ASSAY OF URINE SODIUM: CPT

## 2025-01-23 PROCEDURE — 87637 SARSCOV2&INF A&B&RSV AMP PRB: CPT

## 2025-01-23 PROCEDURE — 82248 BILIRUBIN DIRECT: CPT

## 2025-01-23 PROCEDURE — 96374 THER/PROPH/DIAG INJ IV PUSH: CPT

## 2025-01-23 PROCEDURE — 85730 THROMBOPLASTIN TIME PARTIAL: CPT

## 2025-01-23 PROCEDURE — 93306 TTE W/DOPPLER COMPLETE: CPT

## 2025-01-23 PROCEDURE — 84100 ASSAY OF PHOSPHORUS: CPT

## 2025-01-23 PROCEDURE — 84484 ASSAY OF TROPONIN QUANT: CPT

## 2025-01-23 PROCEDURE — 71045 X-RAY EXAM CHEST 1 VIEW: CPT

## 2025-01-23 PROCEDURE — 86901 BLOOD TYPING SEROLOGIC RH(D): CPT

## 2025-01-23 PROCEDURE — 99239 HOSP IP/OBS DSCHRG MGMT >30: CPT | Mod: GC

## 2025-01-23 PROCEDURE — 84156 ASSAY OF PROTEIN URINE: CPT

## 2025-01-23 PROCEDURE — 80048 BASIC METABOLIC PNL TOTAL CA: CPT

## 2025-01-23 PROCEDURE — 93005 ELECTROCARDIOGRAM TRACING: CPT

## 2025-01-23 PROCEDURE — 93312 ECHO TRANSESOPHAGEAL: CPT

## 2025-01-23 PROCEDURE — 71275 CT ANGIOGRAPHY CHEST: CPT | Mod: MC

## 2025-01-23 PROCEDURE — 93356 MYOCRD STRAIN IMG SPCKL TRCK: CPT

## 2025-01-23 PROCEDURE — 80053 COMPREHEN METABOLIC PANEL: CPT

## 2025-01-23 PROCEDURE — 84133 ASSAY OF URINE POTASSIUM: CPT

## 2025-01-23 PROCEDURE — 83880 ASSAY OF NATRIURETIC PEPTIDE: CPT

## 2025-01-23 PROCEDURE — 36415 COLL VENOUS BLD VENIPUNCTURE: CPT

## 2025-01-23 PROCEDURE — 97162 PT EVAL MOD COMPLEX 30 MIN: CPT

## 2025-01-23 PROCEDURE — 93325 DOPPLER ECHO COLOR FLOW MAPG: CPT

## 2025-01-23 PROCEDURE — 93308 TTE F-UP OR LMTD: CPT

## 2025-01-23 PROCEDURE — 87040 BLOOD CULTURE FOR BACTERIA: CPT

## 2025-01-23 RX ORDER — AMIODARONE HYDROCHLORIDE 50 MG/ML
1 INJECTION, SOLUTION INTRAVENOUS
Qty: 60 | Refills: 1
Start: 2025-01-23 | End: 2025-05-22

## 2025-01-23 RX ORDER — METOPROLOL SUCCINATE 25 MG
3 TABLET, EXTENDED RELEASE 24 HR ORAL
Qty: 180 | Refills: 1
Start: 2025-01-23 | End: 2025-05-22

## 2025-01-23 RX ORDER — MAGNESIUM SULFATE 0.8 MEQ/ML
1 AMPUL (ML) INJECTION ONCE
Refills: 0 | Status: COMPLETED | OUTPATIENT
Start: 2025-01-23 | End: 2025-01-23

## 2025-01-23 RX ORDER — AMIODARONE HYDROCHLORIDE 50 MG/ML
1 INJECTION, SOLUTION INTRAVENOUS
Qty: 60 | Refills: 0
Start: 2025-01-23 | End: 2025-03-23

## 2025-01-23 RX ORDER — BUMETANIDE 2 MG/1
1 TABLET ORAL
Qty: 30 | Refills: 1
Start: 2025-01-23 | End: 2025-03-23

## 2025-01-23 RX ORDER — METOPROLOL SUCCINATE 50 MG/1
4 TABLET, EXTENDED RELEASE ORAL
Qty: 180 | Refills: 1 | DISCHARGE
Start: 2025-01-23 | End: 2025-05-22

## 2025-01-23 RX ORDER — OSELTAMIVIR PHOSPHATE 75 MG/1
1 CAPSULE ORAL
Qty: 4 | Refills: 0
Start: 2025-01-23 | End: 2025-01-24

## 2025-01-23 RX ADMIN — APIXABAN 2.5 MILLIGRAM(S): 5 TABLET, FILM COATED ORAL at 05:24

## 2025-01-23 RX ADMIN — APIXABAN 2.5 MILLIGRAM(S): 5 TABLET, FILM COATED ORAL at 17:27

## 2025-01-23 RX ADMIN — OSELTAMIVIR PHOSPHATE 30 MILLIGRAM(S): 75 CAPSULE ORAL at 17:27

## 2025-01-23 RX ADMIN — Medication 75 MILLIGRAM(S): at 05:26

## 2025-01-23 RX ADMIN — Medication 100 GRAM(S): at 08:21

## 2025-01-23 RX ADMIN — OSELTAMIVIR PHOSPHATE 30 MILLIGRAM(S): 75 CAPSULE ORAL at 05:25

## 2025-01-23 RX ADMIN — BUMETANIDE 2 MILLIGRAM(S): 2 TABLET ORAL at 05:25

## 2025-01-23 RX ADMIN — PANTOPRAZOLE 40 MILLIGRAM(S): 20 TABLET, DELAYED RELEASE ORAL at 05:25

## 2025-01-23 RX ADMIN — AMIODARONE HYDROCHLORIDE 200 MILLIGRAM(S): 50 INJECTION, SOLUTION INTRAVENOUS at 05:26

## 2025-01-23 NOTE — DIETITIAN INITIAL EVALUATION ADULT - ENERGY INTAKE
Pt reports good appetite. Per RN flowsheets, Pt consuming % of most meals. Daughter reports Pt's wife brings in foods from home daily which Pt prefers to the hospital foods. Provided education on low sodium diet and fluid restriction iso CHF and PETER. Daughter verbalized understanding and states she will tell Pt and Pt's wife. Declined handouts. Labs reviewed. Adequate (%)

## 2025-01-23 NOTE — PROGRESS NOTE ADULT - PROBLEM SELECTOR PLAN 1
Tmax 101 orally on 1/19. Cough productive of clear sputum tinged with intermittent blood. CXR and UA without infection. Influenza A+ on RVP.    - Oseltamivir (01/21 - 01/25), renally dosed at 30 mg BID

## 2025-01-23 NOTE — DISCHARGE NOTE NURSING/CASE MANAGEMENT/SOCIAL WORK - PATIENT PORTAL LINK FT
You can access the FollowMyHealth Patient Portal offered by [facility] by registering at the following website: [email or web address]. By joining [facility]’s FollowMyHealth portal, you will also be able to view your health information using other applications (apps) compatible with our system.

## 2025-01-23 NOTE — DIETITIAN INITIAL EVALUATION ADULT - OTHER CALCULATIONS
defer to MD team for fluid needs while on PO diuretics and fluid restriction; IBW 70 kg - Pt is 145% of IBW

## 2025-01-23 NOTE — PROGRESS NOTE ADULT - PROVIDER SPECIALTY LIST ADULT
Cardiology
Heme/Onc
Nephrology-Cardiorenal
Cardiology
Heme/Onc
Internal Medicine
Nephrology-Cardiorenal
Internal Medicine
Nephrology-Cardiorenal
Nephrology-Cardiorenal
Internal Medicine
Nephrology-Cardiorenal
Nephrology-Cardiorenal
Internal Medicine

## 2025-01-23 NOTE — DIETITIAN INITIAL EVALUATION ADULT - REASON INDICATOR FOR ASSESSMENT
length of stay  Pt speaks Maltese, declined  and preferred daughter Ramila interpret; RD called daughter on the phone during interview

## 2025-01-23 NOTE — DIETITIAN INITIAL EVALUATION ADULT - ORAL INTAKE PTA/DIET HISTORY
Chart reviewed, events noted. Per daughter, Pt with good PO intake PTA. Wife cooks. No issues chewing/swallowing. NKFA.

## 2025-01-23 NOTE — DIETITIAN INITIAL EVALUATION ADULT - PERTINENT LABORATORY DATA
01-23    135  |  99  |  30[H]  ----------------------------<  90  3.6   |  25  |  1.70[H]    Ca    8.1[L]      23 Jan 2025 06:49  Phos  2.7     01-23  Mg     1.9     01-23    TPro  6.1  /  Alb  3.4  /  TBili  1.0  /  DBili  x   /  AST  50[H]  /  ALT  43  /  AlkPhos  66  01-23

## 2025-01-23 NOTE — PROGRESS NOTE ADULT - PROBLEM SELECTOR PLAN 2
-Urine studies: Na of 33, Nitrogen of 707, creatinine of 114; FeUrea suggestive of intrinsic renal disease  -Urine studies (1/15): Na of 31, creatinine of 261, p/c ratio: 0.6, urine osmol: 489; FeNa suggestive of pre-renal  -Kidney/Bladder US: "No hydronephrosis. Increased renal cortical echogenicity, compatible with renal   parenchymal disease. 0.9 x 0.5 x 1.1 cm right upper pole nonobstructing stone, similarly seen on CT from 1/17/2018. Left upper pole cyst measuring 2.6 x 1.9 x 2.8 cm with rim calcifications is similarly seen on CT abdomen pelvis from 1/17/2018. Additional bilateral simple cysts as above."  -Ddx: ATN vs pre-renal    PLAN:  >changed to Bumex 2mg PO in anticipation of discharge soon  >c/w trending kidney function daily  >f/u nephro recs -Urine studies: Na of 33, Nitrogen of 707, creatinine of 114; FeUrea suggestive of intrinsic renal disease  -Urine studies (1/15): Na of 31, creatinine of 261, p/c ratio: 0.6, urine osmol: 489; FeNa suggestive of pre-renal  -Kidney/Bladder US: "No hydronephrosis. Increased renal cortical echogenicity, compatible with renal   parenchymal disease. 0.9 x 0.5 x 1.1 cm right upper pole nonobstructing stone, similarly seen on CT from 1/17/2018. Left upper pole cyst measuring 2.6 x 1.9 x 2.8 cm with rim calcifications is similarly seen on CT abdomen pelvis from 1/17/2018. Additional bilateral simple cysts as above."  -Ddx: ATN vs pre-renal    PLAN:  >c/w Bumex 2mg PO in anticipation of discharge soon  >c/w trending kidney function daily  >f/u nephro recs

## 2025-01-23 NOTE — DIETITIAN INITIAL EVALUATION ADULT - ADD RECOMMEND
1) continue low sodium diet; fluid restriction per MD team  2) daily wts to trend  3) reinforce diet education at f/u and PRN  4) Monitor PO intake, weight, labs, skin, GI status, diet

## 2025-01-23 NOTE — PROGRESS NOTE ADULT - ATTENDING COMMENTS
84M w/pmh chronic atrial fibrillation, CKD 3a, severe aortic stenosis s/p aortic valve replacement (7/2012), HTN, pHTN, HLD, thrombocytopenia, GERD presenting from PCP's office for cc tachycardia, JOSEPH. Found to have volume overload and atrial fibrillation w/RVR. Diuresed initially with IV bumex BID, improving. s/p DARA/DCCV 1/14, initially converted to NSR, now back to afib RVR, continue amiodarone, metoprolol. Cardiology f/u. Febrile on 1/19. Dc IV abx. RVP+ influenza, treating with tamiflu. Continue diuresis, now on PO bumex beginning 1/22. Continue toprol 75mg. Monitor renal function. Plan to follow up with structural heart outpatient to discuss TAVR. Treatment will be dependent on clinical course, discharge today    Agree with progress note as outlined above, edited where appropriate.    Discussed plan with patient's daughter.

## 2025-01-23 NOTE — DISCHARGE NOTE NURSING/CASE MANAGEMENT/SOCIAL WORK - FINANCIAL ASSISTANCE
[facility] provides services at a reduced cost to those who are determined to be eligible through [facility]’s financial assistance program. Information regarding [facility]’s financial assistance program can be found by going to [email or web address] or by calling [phone].

## 2025-01-23 NOTE — DIETITIAN INITIAL EVALUATION ADULT - PERTINENT MEDS FT
MEDICATIONS  (STANDING):  aMIOdarone    Tablet 200 milliGRAM(s) Oral daily  apixaban 2.5 milliGRAM(s) Oral every 12 hours  atorvastatin 20 milliGRAM(s) Oral at bedtime  buMETAnide 2 milliGRAM(s) Oral daily  famotidine    Tablet 20 milliGRAM(s) Oral at bedtime  finasteride 5 milliGRAM(s) Oral daily  metoprolol succinate ER 75 milliGRAM(s) Oral daily  oseltamivir 30 milliGRAM(s) Oral two times a day  pantoprazole    Tablet 40 milliGRAM(s) Oral before breakfast  polyethylene glycol 3350 17 Gram(s) Oral at bedtime  senna 2 Tablet(s) Oral at bedtime    MEDICATIONS  (PRN):  acetaminophen     Tablet .. 650 milliGRAM(s) Oral every 6 hours PRN Temp greater or equal to 38C (100.4F), Mild Pain (1 - 3)  aluminum hydroxide/magnesium hydroxide/simethicone Suspension 30 milliLiter(s) Oral every 6 hours PRN Dyspepsia  ondansetron Injectable 4 milliGRAM(s) IV Push every 6 hours PRN Nausea and/or Vomiting

## 2025-01-23 NOTE — PROGRESS NOTE ADULT - PROBLEM SELECTOR PLAN 5
-EKG on admission with sinus tachycardia to 125-128  -On home bisoprolol 17.5mg PO QD  -d/evelyn bisoprolol 17.5mg PO QD (1/11) per cardiology recs  -s/p DARA/cardioversion on 1/14  -s/p Digoxin on 1/12  -Patient is now back in AFib on 1/17  - Tachycardic also in setting of influenza A+    PLAN:  >c/w home apixaban 2.5mg PO QD - hold if Plt<50  >c/w amiodarone 200mg QD  >increased metoprolol ER 75mg QD   >f/u cardiology recs  >f/u cardiology outpatient -EKG on admission with sinus tachycardia to 125-128  -On home bisoprolol 17.5mg PO QD  -d/evelyn bisoprolol 17.5mg PO QD (1/11) per cardiology recs  -s/p DARA/cardioversion on 1/14  -s/p Digoxin on 1/12  -Patient is now back in AFib on 1/17  - Tachycardic also in setting of influenza A+    PLAN:  >c/w home apixaban 2.5mg PO QD - hold if Plt<50  >c/w amiodarone 200mg QD  >c/w metoprolol ER 75mg QD   >f/u cardiology recs  >f/u cardiology outpatient

## 2025-01-23 NOTE — PROGRESS NOTE ADULT - SUBJECTIVE AND OBJECTIVE BOX
ARABELLA WEISS  84y  MRN: [id number]    Patient is a 84y old  Male who presents with a chief complaint of HF (17 Jan 2025 12:36)      Interval/Overnight Events: no events ON.     Subjective: Pt seen and examined at bedside.     MEDICATIONS  (STANDING):  aMIOdarone    Tablet 200 milliGRAM(s) Oral daily  apixaban 2.5 milliGRAM(s) Oral every 12 hours  atorvastatin 20 milliGRAM(s) Oral at bedtime  buMETAnide 2 milliGRAM(s) Oral daily  famotidine    Tablet 20 milliGRAM(s) Oral at bedtime  finasteride 5 milliGRAM(s) Oral daily  metoprolol succinate ER 75 milliGRAM(s) Oral daily  oseltamivir 30 milliGRAM(s) Oral two times a day  pantoprazole    Tablet 40 milliGRAM(s) Oral before breakfast  polyethylene glycol 3350 17 Gram(s) Oral at bedtime  senna 2 Tablet(s) Oral at bedtime    MEDICATIONS  (PRN):  acetaminophen     Tablet .. 650 milliGRAM(s) Oral every 6 hours PRN Temp greater or equal to 38C (100.4F), Mild Pain (1 - 3)  aluminum hydroxide/magnesium hydroxide/simethicone Suspension 30 milliLiter(s) Oral every 6 hours PRN Dyspepsia  ondansetron Injectable 4 milliGRAM(s) IV Push every 6 hours PRN Nausea and/or Vomiting      Objective:    Vitals: Vital Signs Last 24 Hrs  T(C): 36.7 (01-23-25 @ 04:38), Max: 36.9 (01-22-25 @ 20:37)  T(F): 98 (01-23-25 @ 04:38), Max: 98.5 (01-22-25 @ 20:37)  HR: 107 (01-23-25 @ 04:38) (72 - 107)  BP: 120/64 (01-23-25 @ 04:38) (104/61 - 120/64)  BP(mean): --  RR: 18 (01-23-25 @ 04:38) (17 - 18)  SpO2: 95% (01-23-25 @ 04:38) (95% - 97%)                I&O's Summary    22 Jan 2025 07:01  -  23 Jan 2025 07:00  --------------------------------------------------------  IN: 900 mL / OUT: 775 mL / NET: 125 mL        PHYSICAL EXAM:  GENERAL: NAD  HEAD:  Atraumatic, Normocephalic  EYES: EOMI, conjunctiva and sclera clear  CHEST/LUNG: Clear to auscultation bilaterally; No rales, rhonchi, wheezing, or rubs  HEART: Tachycardic, irregular; No murmurs, rubs, or gallops, trace lower extremity edema  ABDOMEN: Soft, Nontender  SKIN: No rashes or lesions  NERVOUS SYSTEM:  Alert & Oriented X3, no focal deficits  LABS:                        14.7   5.92  )-----------( 63       ( 22 Jan 2025 06:17 )             43.6                         14.0   7.38  )-----------( 53       ( 21 Jan 2025 06:51 )             41.5     01-22    138  |  99  |  32[H]  ----------------------------<  96  3.3[L]   |  27  |  1.78[H]  01-21    134[L]  |  96  |  30[H]  ----------------------------<  98  3.4[L]   |  25  |  1.91[H]    Ca    8.6      22 Jan 2025 06:17  Ca    8.6      21 Jan 2025 06:46  Phos  3.5     01-22  Mg     2.1     01-22    TPro  6.5  /  Alb  3.7  /  TBili  1.1  /  DBili  x   /  AST  50[H]  /  ALT  40  /  AlkPhos  69  01-22  TPro  6.2  /  Alb  3.5  /  TBili  1.5[H]  /  DBili  x   /  AST  46[H]  /  ALT  33  /  AlkPhos  64  01-21    CAPILLARY BLOOD GLUCOSE           ARABELLA WEISS  84y  MRN: [id number]    Patient is a 84y old  Male who presents with a chief complaint of HF (17 Jan 2025 12:36)      Interval/Overnight Events: no events ON.     Subjective: Pt seen and examined at bedside. Feels well this morning. No SOB, chest pain, abdominal pain. Eating/drinking/urinating/having bowel movements without issue.    MEDICATIONS  (STANDING):  aMIOdarone    Tablet 200 milliGRAM(s) Oral daily  apixaban 2.5 milliGRAM(s) Oral every 12 hours  atorvastatin 20 milliGRAM(s) Oral at bedtime  buMETAnide 2 milliGRAM(s) Oral daily  famotidine    Tablet 20 milliGRAM(s) Oral at bedtime  finasteride 5 milliGRAM(s) Oral daily  metoprolol succinate ER 75 milliGRAM(s) Oral daily  oseltamivir 30 milliGRAM(s) Oral two times a day  pantoprazole    Tablet 40 milliGRAM(s) Oral before breakfast  polyethylene glycol 3350 17 Gram(s) Oral at bedtime  senna 2 Tablet(s) Oral at bedtime    MEDICATIONS  (PRN):  acetaminophen     Tablet .. 650 milliGRAM(s) Oral every 6 hours PRN Temp greater or equal to 38C (100.4F), Mild Pain (1 - 3)  aluminum hydroxide/magnesium hydroxide/simethicone Suspension 30 milliLiter(s) Oral every 6 hours PRN Dyspepsia  ondansetron Injectable 4 milliGRAM(s) IV Push every 6 hours PRN Nausea and/or Vomiting      Objective:    Vitals: Vital Signs Last 24 Hrs  T(C): 36.7 (01-23-25 @ 04:38), Max: 36.9 (01-22-25 @ 20:37)  T(F): 98 (01-23-25 @ 04:38), Max: 98.5 (01-22-25 @ 20:37)  HR: 107 (01-23-25 @ 04:38) (72 - 107)  BP: 120/64 (01-23-25 @ 04:38) (104/61 - 120/64)  BP(mean): --  RR: 18 (01-23-25 @ 04:38) (17 - 18)  SpO2: 95% (01-23-25 @ 04:38) (95% - 97%)                I&O's Summary    22 Jan 2025 07:01  -  23 Jan 2025 07:00  --------------------------------------------------------  IN: 900 mL / OUT: 775 mL / NET: 125 mL      PHYSICAL EXAM:  GENERAL: NAD  HEAD:  Atraumatic, Normocephalic  EYES: EOMI, conjunctiva and sclera clear  CHEST/LUNG: Clear to auscultation bilaterally; No rales, rhonchi, wheezing, or rubs  HEART: Tachycardic, irregular; No murmurs, rubs, or gallops, trace lower extremity edema  ABDOMEN: Soft, Nontender  SKIN: No rashes or lesions  NERVOUS SYSTEM:  Alert & Oriented X3, no focal deficits    LABS:                        14.7   5.92  )-----------( 63       ( 22 Jan 2025 06:17 )             43.6                         14.0   7.38  )-----------( 53       ( 21 Jan 2025 06:51 )             41.5     01-22    138  |  99  |  32[H]  ----------------------------<  96  3.3[L]   |  27  |  1.78[H]  01-21    134[L]  |  96  |  30[H]  ----------------------------<  98  3.4[L]   |  25  |  1.91[H]    Ca    8.6      22 Jan 2025 06:17  Ca    8.6      21 Jan 2025 06:46  Phos  3.5     01-22  Mg     2.1     01-22    TPro  6.5  /  Alb  3.7  /  TBili  1.1  /  DBili  x   /  AST  50[H]  /  ALT  40  /  AlkPhos  69  01-22  TPro  6.2  /  Alb  3.5  /  TBili  1.5[H]  /  DBili  x   /  AST  46[H]  /  ALT  33  /  AlkPhos  64  01-21    CAPILLARY BLOOD GLUCOSE

## 2025-01-23 NOTE — PROGRESS NOTE ADULT - PROBLEM SELECTOR PLAN 1
Patient with PETER on CKD in the setting of transient hypotension, hemodynamic effects from afib with RVR, and administration of IV contrast. On review of labs on [place] [facility], patient noted to have elevated Scr/episodes of PETER since 2017. Last Scr was 1.29 (eGFR 57) on 12/19/24.     Admission Scr remained stable at 1.24 on 1/8/25, peaked at 1.61 on 1/11, and elevated/improved to 1.53 on 1/14. UACR elevated at 544 on 12/19/24. Patient underwent CT PE on 1/8/25. Underwent successful DARA cardioversion on 1/14. CT PE with exophytic L renal lesion and renal cysts. Kidney sonogram on 1/14 with no evidence of hydronephrosis, increased renal cortical echogenicity, non obstructing stone on R, and multiple B/L cysts.    Scr acutely jacobo from 1.5 (1/14) to 2.7 (1/15). Possibly hemodynamically mediated ATN. Echo: severe MR/TR    Transitioned from IV Bumex 2mg QD to PO. Scr improved to 1.70 today, but UOP decreased from 1.4L to 775cc in 24h and pt net ~even today. Weight decreasing slightly today. May need to increase Bumex PO dose if UOP continues to decrease.  Monitor labs and urine output. Maintain neg negative fluid status. 1L fluid restriction. Check daily standing weights. Avoid nephrotoxins. Dose medications as per eGFR. Patient with PETER on CKD in the setting of transient hypotension, hemodynamic effects from afib with RVR, and administration of IV contrast. On review of labs on [place] [facility], patient noted to have elevated Scr/episodes of PETER since 2017. Last Scr was 1.29 (eGFR 57) on 12/19/24. UACR elevated at 544 on 12/19/24.     Admission Scr  at 1.24 on 1/8/25, patient underwent CT PE on 1/8/25. Developed mild PETER post contrast with peak creatinine of  1.61 on 1/11, and elevated/improved to 1.53 on 1/14. Pt Underwent successful DARA cardioversion on 1/14. Scr acutely jacobo from 1.5 (1/14) to 2.7 (1/15). Possibly hemodynamically mediated ATN.   Echo: severe MR/TR    CT PE with exophytic L renal lesion and renal cysts. Kidney sonogram on 1/14 with no evidence of hydronephrosis, increased renal cortical echogenicity, non obstructing stone on R, and multiple B/L cysts.      Kidney function overall stable   Transitioned from IV Bumex 2mg QD to PO.   UOP decreased from 1.4L to 775cc in 24h and pt net ~even today. Weight decreasing slightly today. May need to increase Bumex PO dose if UOP continues to decrease.  Monitor labs and urine output. Maintain neg negative fluid status. 1L fluid restriction. Check daily standing weights. Avoid nephrotoxins. Dose medications as per eGFR.    Please have patient f.u with Nephrology office at  with one of the physicians in  2-3 weeks  Our address is [address] [place] [id number]

## 2025-01-23 NOTE — PROGRESS NOTE ADULT - SUBJECTIVE AND OBJECTIVE BOX
[facility] DIVISION OF KIDNEY DISEASES AND HYPERTENSION --    Reason for consult: PETER on CKD    24 hour events/subjective: Patient seen and examined at bedside. Feels well overall. Reports improved LE edema. Denies fever, dizziness, SOB, CP, n/v, abd pain, or urinary issues.      PAST HISTORY  --------------------------------------------------------------------------------  No significant changes to PMH, PSH, FHx, SHx, unless otherwise noted    ALLERGIES & MEDICATIONS  --------------------------------------------------------------------------------  Allergies    No Known Allergies      Standing Inpatient Medications  aMIOdarone    Tablet 200 milliGRAM(s) Oral daily  apixaban 2.5 milliGRAM(s) Oral every 12 hours  atorvastatin 20 milliGRAM(s) Oral at bedtime  buMETAnide 2 milliGRAM(s) Oral daily  famotidine    Tablet 20 milliGRAM(s) Oral at bedtime  finasteride 5 milliGRAM(s) Oral daily  metoprolol succinate ER 75 milliGRAM(s) Oral daily  oseltamivir 30 milliGRAM(s) Oral two times a day  pantoprazole    Tablet 40 milliGRAM(s) Oral before breakfast  polyethylene glycol 3350 17 Gram(s) Oral at bedtime  senna 2 Tablet(s) Oral at bedtime    PRN Inpatient Medications  acetaminophen     Tablet .. 650 milliGRAM(s) Oral every 6 hours PRN  aluminum hydroxide/magnesium hydroxide/simethicone Suspension 30 milliLiter(s) Oral every 6 hours PRN  ondansetron Injectable 4 milliGRAM(s) IV Push every 6 hours PRN      REVIEW OF SYSTEMS  --------------------------------------------------------------------------------  Gen: No fever  Respiratory: No dyspnea  CV: No chest pain  GI: No abdominal pain, diarrhea, constipation, nausea, vomiting  : No increased frequency, dysuria, hematuria  MSK: No edema  Neuro: No dizziness/lightheadedness    All other systems were reviewed and are negative, except as noted.    VITALS/PHYSICAL EXAM  --------------------------------------------------------------------------------  T(C): 36.3 (01-23-25 @ 11:20), Max: 36.9 (01-22-25 @ 20:37)  HR: 107 (01-23-25 @ 04:38) (93 - 107)  BP: 101/63 (01-23-25 @ 11:20) (101/63 - 120/64)  RR: 18 (01-23-25 @ 11:20) (17 - 18)  SpO2: 97% (01-23-25 @ 11:20) (95% - 97%)  Wt(kg): --        01-22-25 @ 07:01  -  01-23-25 @ 07:00  --------------------------------------------------------  IN: 900 mL / OUT: 775 mL / NET: 125 mL    01-23-25 @ 07:01  -  01-23-25 @ 12:23  --------------------------------------------------------  IN: 360 mL / OUT: 200 mL / NET: 160 mL      PHYSICAL EXAM:  Gen: NAD  Neuro: non-focal  HEENT: Anicteric, MMM  Pulm: CTA B/L  CV: +S1S2  Abd: soft, non-tender/non-distended  Extremities: +LE varicose veins w/ trace non-pitting edema  Skin: Warm    LABS/STUDIES  --------------------------------------------------------------------------------              13.9   6.03  >-----------<  59       [01-23-25 @ 06:49]              41.8     135  |  99  |  30  ----------------------------<  90      [01-23-25 @ 06:49]  3.6   |  25  |  1.70        Ca     8.1     [01-23-25 @ 06:49]      Mg     1.9     [01-23-25 @ 06:49]      Phos  2.7     [01-23-25 @ 06:49]    TPro  6.1  /  Alb  3.4  /  TBili  1.0  /  DBili  x   /  AST  50  /  ALT  43  /  AlkPhos  66  [01-23-25 @ 06:49]    Creatinine Trend:  SCr 1.70 [01-23 @ 06:49]  SCr 1.78 [01-22 @ 06:17]  SCr 1.91 [01-21 @ 06:46]  SCr 2.13 [01-20 @ 06:31]  SCr 1.96 [01-19 @ 07:34]

## 2025-01-23 NOTE — PROGRESS NOTE ADULT - PROBLEM SELECTOR PLAN 11
DVT ppx: Eliquis 2.5mg BID - hold if Plt <50  Diet: DASH/TLC, low sodium, fluid restriction to 1L  Dispo: anticipate home 01/23  PT consulted, no needs

## 2025-01-23 NOTE — PROGRESS NOTE ADULT - SUBJECTIVE AND OBJECTIVE BOX
Date of Service: 01-23-25 @ 04:26           CARDIOLOGY     PROGRESS  NOTE   ________________________________________________    CHIEF COMPLAINT:Patient is a 84y old  Male who presents with a chief complaint of HF (17 Jan 2025 12:36)  no complain  	  REVIEW OF SYSTEMS:  CONSTITUTIONAL: No fever, weight loss, or fatigue  EYES: No eye pain, visual disturbances, or discharge  ENT:  No difficulty hearing, tinnitus, vertigo; No sinus or throat pain  NECK: No pain or stiffness  RESPIRATORY: No cough, wheezing, chills or hemoptysis; No Shortness of Breath  CARDIOVASCULAR: No chest pain, palpitations, passing out, dizziness, or leg swelling  GASTROINTESTINAL: No abdominal or epigastric pain. No nausea, vomiting, or hematemesis; No diarrhea or constipation. No melena or hematochezia.  GENITOURINARY: No dysuria, frequency, hematuria, or incontinence  NEUROLOGICAL: No headaches, memory loss, loss of strength, numbness, or tremors  SKIN: No itching, burning, rashes, or lesions   LYMPH Nodes: No enlarged glands  ENDOCRINE: No heat or cold intolerance; No hair loss  MUSCULOSKELETAL: No joint pain or swelling; No muscle, back, or extremity pain  PSYCHIATRIC: No depression, anxiety, mood swings, or difficulty sleeping  HEME/LYMPH: No easy bruising, or bleeding gums  ALLERGY AND IMMUNOLOGIC: No hives or eczema	    [x ] All others negative	  [ ] Unable to obtain    PHYSICAL EXAM:  T(C): 36.9 (01-22-25 @ 20:37), Max: 36.9 (01-22-25 @ 04:43)  HR: 97 (01-22-25 @ 20:37) (72 - 103)  BP: 104/61 (01-22-25 @ 20:37) (104/61 - 108/62)  RR: 18 (01-22-25 @ 20:37) (17 - 18)  SpO2: 95% (01-22-25 @ 20:37) (95% - 97%)  Wt(kg): --  I&O's Summary    21 Jan 2025 07:01  -  22 Jan 2025 07:00  --------------------------------------------------------  IN: 540 mL / OUT: 1450 mL / NET: -910 mL    22 Jan 2025 07:01  -  23 Jan 2025 04:26  --------------------------------------------------------  IN: 900 mL / OUT: 450 mL / NET: 450 mL        Appearance: Normal	  HEENT:   Normal oral mucosa, PERRL, EOMI	  Lymphatic: No lymphadenopathy  Cardiovascular: Normal S1 S2, No JVD, + murmurs, No edema  Respiratory: rhonchi  Psychiatry: A & O x 3, Mood & affect appropriate  Gastrointestinal:  Soft, Non-tender, + BS	  Skin: No rashes, No ecchymoses, No cyanosis	  Extremities: Normal range of motion, No clubbing, cyanosis or edema  Vascular: Peripheral pulses palpable 2+ bilaterally    MEDICATIONS  (STANDING):  aMIOdarone    Tablet 200 milliGRAM(s) Oral daily  apixaban 2.5 milliGRAM(s) Oral every 12 hours  atorvastatin 20 milliGRAM(s) Oral at bedtime  buMETAnide 2 milliGRAM(s) Oral daily  famotidine    Tablet 20 milliGRAM(s) Oral at bedtime  finasteride 5 milliGRAM(s) Oral daily  metoprolol succinate ER 75 milliGRAM(s) Oral daily  oseltamivir 30 milliGRAM(s) Oral two times a day  pantoprazole    Tablet 40 milliGRAM(s) Oral before breakfast  polyethylene glycol 3350 17 Gram(s) Oral at bedtime  senna 2 Tablet(s) Oral at bedtime      TELEMETRY: 	    ECG:  	  RADIOLOGY:  OTHER: 	  	  LABS:	 	    CARDIAC MARKERS:                            14.7   5.92  )-----------( 63       ( 22 Jan 2025 06:17 )             43.6     01-22    138  |  99  |  32[H]  ----------------------------<  96  3.3[L]   |  27  |  1.78[H]    Ca    8.6      22 Jan 2025 06:17  Phos  3.5     01-22  Mg     2.1     01-22    TPro  6.5  /  Alb  3.7  /  TBili  1.1  /  DBili  x   /  AST  50[H]  /  ALT  40  /  AlkPhos  69  01-22    proBNP:   Lipid Profile:   HgA1c:   TSH:         Assessment and plan  ---------------------------  84-year-old male PMH afib on eliquid, severe aortic stenosis (2/2 bicuspid aortic valve), s/p aortic valve replacement (7/2012), HTN, pHTN, HLD, chronic renal insufficiency, thrombocytopenia, GERD presenting from Dr. Ramirez office for tachycardia. Patient had COVID 2 weeks ago which he was treated with paxlovid for. Subsequently, the patient developed some wheezing and was treated with doxycycline and steroids from an urgent care with improvement in symptoms. The patient continued to feel weak however, and dyspneic on exertion. The patient notes 10 days of leg swelling, with dyspnea on exertion starting 3 days ago with inability to tolerate more than 2-3 steps (baseline does not ambulate much). Of note, the patient experienced some nausea a few days prior and vomited after getting into his car.   The patient went to visit his PCP today for the dyspnea and leg swelling; was noted to be tachycardic to 130 BPM and was sent to ER for further evaluation.   Pt did not take home bisoprolol and eliquis prior to admission. Pt denies chest pain, shortness of breath, n/v/d/, fevers or chills, urinary sx, headache, visual changes, numbness or other complaints.  ppt with sig cardiac and medical hx with rapid hr, sob and le edema  cta of chest no PE, but increase interstitial marking  cw chf.  s/p AVR overall mild regurgitation ,TILA 1.44 cn2  will need to add diuresis sec to RV dysfunction , will add midodrine if bp can not tolerate  will discuss with family  dc bisprolol for now, will add metoprolol er 25 mg daily as needed  repeat chest x ray to control HR   thrombocytopenia ?etiology  tele a.fib hr 80 to 120, will increase metoprolol er as tolerated  will consider DARA/  cardioversion after small amio load in am mhr has much improved, will increase beta blocker if hr elevated  discussed with daughter agreed with cardioversion  HR is controlled  acute renal failure??   s/p DARA cardioversion remain in NSR, check bladder scan plan as per renal  nephrology noted pt did not get contrast for the procedure or DARA  pt will eventually needs TAVR with sig AI on the bioprosthetic valve in 2012  will switch amio to 200 mg daily today, pt converted to a.fib at 4 52 am   continue current meds, bumerx changed to 2 mg daily  increase metoprolol er to 75 mg po daily, fu hr better controlled, will arrange structural heart as out pt  continue current meds

## 2025-01-23 NOTE — DIETITIAN INITIAL EVALUATION ADULT - NS FNS DIET ORDER
Diet, Regular:   1000mL Fluid Restriction ([id number])  Low Sodium (01-16-25 @ 10:27) [Active]

## 2025-01-23 NOTE — DIETITIAN INITIAL EVALUATION ADULT - PROBLEM SELECTOR PLAN 3
-On home bisoprolol 17.5mg PO QD  -On home amlodipine 5mg PO QD    PLAN:  >c/w metoprolol succinate mg QD (therapeutic exchange of home bisoprolol 10mg)

## 2025-01-23 NOTE — PROGRESS NOTE ADULT - TIME BILLING
review of labs, imaging, notes, discussion of plan with team, which are independent of time spent teaching
review of labs, imaging, notes, discussion of plan with team, which are independent of time spent teaching
Preparation to see the patient including review of labs and prior notes, performing a physical exam, counseling and educating the patient, ordering medications and tests, documenting the note, and coordinating care.
review of labs, imaging, notes, discussion of plan with team, which are independent of time spent teaching
Preparation to see the patient including review of labs and prior notes, performing a physical exam, counseling and educating the patient, ordering medications and tests, documenting the note, and coordinating care.
review of labs, imaging, notes, discussion of plan with team, which are independent of time spent teaching
review of labs, imaging, notes, discussion of plan with team, which are independent of time spent teaching
Preparation to see the patient including review of labs and prior notes, performing a physical exam, counseling and educating the patient, ordering medications and tests, documenting the note, and coordinating care.
review of labs, imaging, notes, discussion of plan with team, which are independent of time spent teaching

## 2025-01-23 NOTE — DISCHARGE NOTE NURSING/CASE MANAGEMENT/SOCIAL WORK - NSDCFUADDAPPT_GEN_ALL_CORE_FT
APPTS ARE READY TO BE MADE: [X] YES    Best Family or Patient Contact (if needed):    Additional Information about above appointments (if needed):    1: Cardiologist - within 1 week of discharge  2: Primary Care Physician - within 1 week of discharge  3:     Other comments or requests:     Cardiology:  Provider's office was contacted to secure an appointment, however the office will follow up with the patient/caregiver directly. Task Message sent to Dr. Delgado team requesting HFU appointment.     Internal Medicine:  Appointment was scheduled in [facility]   Dr. SAILAJA CALLE,[facility] 2/14/2025 11:00 AM  [address], [place], [place] [address] [phone]

## 2025-01-23 NOTE — PROGRESS NOTE ADULT - PROBLEM SELECTOR PLAN 6
-On home bisoprolol 17.5mg PO QD  - d/evelyn bisoprolol 17.5mg PO QD (1/11) per cardiology recs  - On home amlodipine 5mg PO QD    PLAN:  >c/w metoprolol ER 75mg QD  >f/u cardiology recs

## 2025-01-23 NOTE — PHARMACOTHERAPY INTERVENTION NOTE - COMMENTS
Counseled patient, patient's wife, and patient's daughter, Ramila (helped translate Uzbek over the phone), on the following medications names (brand/generic), indication, and possible side effects:    Medications Discussed:  acetaminophen 325 mg oral tablet: 3 tab(s) orally every 8 hours x 1 week standing, then as needed for mild pain  bumetanide 2 mg oral tablet: 1 tab(s) orally once a day MDD: 1 tab  Eliquis 2.5 mg oral tablet: 1 tab(s) orally 2 times a day  finasteride 5 mg oral tablet: 1 tab(s) orally once a day (at bedtime)  Lipitor 20 mg oral tablet: 1 tab(s) orally once a day  metoprolol succinate 25 mg oral tablet, extended release: 3 tab(s) orally once a day MDD: 3 tabs  Narcan 4 mg/0.1 mL nasal spray: 4 milligram(s) intranasally every 2 to 3 minutes alternating nostrils as needed for overdose  oseltamivir 30 mg oral capsule: 1 cap(s) orally 2 times a day take one pill, twice a day, until bottle is empty  oxyCODONE 5 mg oral tablet: 0.5 tab(s) orally every 4 hours as needed for Severe Pain (7 - 10) MDD: 006  Pacerone 200 mg oral tablet: 1 tab(s) orally once a day  pantoprazole 40 mg oral delayed release tablet: 1 tab(s) orally once a day (before a meal)  polyethylene glycol 3350 oral powder for reconstitution: 17 gram(s) orally once a day (at bedtime)  senna leaf extract oral tablet: 2 tab(s) orally once a day (at bedtime) as needed for  constipation  traMADol 50 mg oral tablet: 1 tab(s) orally every 6 hours as needed for  moderate pain MDD: 004    Provided medication cards. Patient questions and concerns were answered and addressed. Patient demonstrated understanding.    Brian De Los SantosD, BCPS  Clinical Pharmacy Specialist  Available on [employer]  Cell: [phone]

## 2025-01-23 NOTE — DIETITIAN INITIAL EVALUATION ADULT - OTHER INFO
dosing wt: 108 kg (1/14)  daily wt: 101.2 kg (1/23) *Pt with desirable/expected wt loss of 6.3% x >1 week iso diuresis/subsiding edema  wt hx per [facility] HIE: 107.5 kg (12/16/24), 106.1 kg (10/23/24), 99.8 kg (1/20/24)  reported UBW: ~92 kg *per Pt, question accuracy as Pt's daughter also questioned Pt's report

## 2025-01-24 LAB
CULTURE RESULTS: SIGNIFICANT CHANGE UP
CULTURE RESULTS: SIGNIFICANT CHANGE UP
SPECIMEN SOURCE: SIGNIFICANT CHANGE UP
SPECIMEN SOURCE: SIGNIFICANT CHANGE UP

## 2025-01-27 ENCOUNTER — APPOINTMENT (OUTPATIENT)
Dept: CARDIOLOGY | Facility: CLINIC | Age: 85
End: 2025-01-27

## 2025-01-27 ENCOUNTER — LABORATORY RESULT (OUTPATIENT)
Age: 85
End: 2025-01-27

## 2025-01-27 ENCOUNTER — NON-APPOINTMENT (OUTPATIENT)
Age: 85
End: 2025-01-27

## 2025-01-27 VITALS
HEART RATE: 80 BPM | SYSTOLIC BLOOD PRESSURE: 100 MMHG | OXYGEN SATURATION: 98 % | WEIGHT: 225 LBS | HEIGHT: 67 IN | BODY MASS INDEX: 35.31 KG/M2 | DIASTOLIC BLOOD PRESSURE: 58 MMHG | RESPIRATION RATE: 12 BRPM

## 2025-01-27 PROCEDURE — 99215 OFFICE O/P EST HI 40 MIN: CPT | Mod: 25

## 2025-01-27 PROCEDURE — 93000 ELECTROCARDIOGRAM COMPLETE: CPT

## 2025-01-28 NOTE — CHART NOTE - NSCHARTNOTEFT_GEN_A_CORE
DARA no clots  ?AI  200J SYNC to NSR   successful cardioversion  continue AC/amio/beta blocker  pt in nsr now
Post-Discharge Medication Review: Completed	  	  Patient's preferred pharmacy was updated in OMR: [facility]	  	  Caregiver (Ramila, daughter) contacted to offer medication counseling post-discharge. Medication reconciliation completed. Per Caregiver, medications include:	   	  1.	bumetanide 2 mg oral tablet 1 tab(s) orally once a day MDD: 1 tab  2.	Eliquis 2.5 mg oral tablet 1 tab(s) orally 2 times a day  3.	finasteride 5 mg oral tablet 1 tab(s) orally once a day (at bedtime)  4.	Lipitor 20 mg oral tablet 1 tab(s) orally once a day  5.	metoprolol succinate 25 mg oral tablet, extended release 3 tab(s) orally once a day MDD: 3 tabs	  6.	Pacerone 200 mg oral tablet 1 tab(s) orally once a day  7.	pantoprazole 40 mg oral delayed release tablet 1 tab(s) orally once a day (before a meal)  8.	polyethylene glycol 3350 oral powder for reconstitution 17 gram(s) orally once a day (at bedtime)    Medications removed from OMR (updated per discussion with Caregiver):	  1.	acetaminophen 325 mg oral tablet 3 tab(s) orally every 8 hours x 1 week standing, then as needed for mild pain  2.	Narcan 4 mg/0.1 mL nasal spray 4 milligram(s) intranasally every 2 to 3 minutes alternating nostrils as needed for overdose  3.	oseltamivir 30 mg oral capsule 1 cap(s) orally 2 times a day take one pill, twice a day, until bottle is empty  4.	oxyCODONE 5 mg oral tablet 0.5 tab(s) orally every 4 hours as needed for Severe Pain (7 - 10) MDD: 006  5.	senna leaf extract oral tablet 2 tab(s) orally once a day (at bedtime) as needed for constipation  6.	traMADol 50 mg oral tablet 1 tab(s) orally every 6 hours as needed for moderate pain MDD: 004     Medication name, indication, administration, side effects, and monitoring reviewed for new medications during post discharge counseling visit with Caregiver. Caregiver demonstrated understanding. Counseling offered for all medications.	  	  Nephrology note had recommended that patient follow up in their office in 2-3 weeks. Provided Caregiver with address and phone number for the office.	  	  Dalia Thurston PharmD	  Clinical Pharmacy Specialist, Pharmacy Telehealth Team	  Can be reached via MS Teams or [phone]

## 2025-01-29 LAB
ALBUMIN SERPL ELPH-MCNC: 4.1 G/DL
ALP BLD-CCNC: 72 U/L
ALT SERPL-CCNC: 49 U/L
ANION GAP SERPL CALC-SCNC: 12 MMOL/L
AST SERPL-CCNC: 37 U/L
BILIRUB SERPL-MCNC: 0.9 MG/DL
BUN SERPL-MCNC: 33 MG/DL
CALCIUM SERPL-MCNC: 9.3 MG/DL
CHLORIDE SERPL-SCNC: 99 MMOL/L
CO2 SERPL-SCNC: 28 MMOL/L
CREAT SERPL-MCNC: 2.05 MG/DL
EGFR: 31 ML/MIN/1.73M2
GLUCOSE SERPL-MCNC: 109 MG/DL
POTASSIUM SERPL-SCNC: 4.8 MMOL/L
PROT SERPL-MCNC: 6.6 G/DL
SODIUM SERPL-SCNC: 140 MMOL/L

## 2025-01-30 LAB — TSH SERPL-ACNC: 7.29 UIU/ML

## 2025-02-03 ENCOUNTER — APPOINTMENT (OUTPATIENT)
Dept: INTERNAL MEDICINE | Facility: CLINIC | Age: 85
End: 2025-02-03
Payer: MEDICARE

## 2025-02-03 ENCOUNTER — LABORATORY RESULT (OUTPATIENT)
Age: 85
End: 2025-02-03

## 2025-02-03 VITALS
HEART RATE: 84 BPM | RESPIRATION RATE: 16 BRPM | DIASTOLIC BLOOD PRESSURE: 69 MMHG | BODY MASS INDEX: 34.61 KG/M2 | WEIGHT: 221 LBS | SYSTOLIC BLOOD PRESSURE: 122 MMHG

## 2025-02-03 DIAGNOSIS — K21.00 GASTRO-ESOPHAGEAL REFLUX DISEASE WITH ESOPHAGITIS, WITHOUT BLEEDING: ICD-10-CM

## 2025-02-03 DIAGNOSIS — R79.89 OTHER SPECIFIED ABNORMAL FINDINGS OF BLOOD CHEMISTRY: ICD-10-CM

## 2025-02-03 DIAGNOSIS — N18.31 CHRONIC KIDNEY DISEASE, STAGE 3A: ICD-10-CM

## 2025-02-03 DIAGNOSIS — U07.1 COVID-19: ICD-10-CM

## 2025-02-03 DIAGNOSIS — R07.0 PAIN IN THROAT: ICD-10-CM

## 2025-02-03 DIAGNOSIS — I50.32 CHRONIC DIASTOLIC (CONGESTIVE) HEART FAILURE: ICD-10-CM

## 2025-02-03 DIAGNOSIS — I10 ESSENTIAL (PRIMARY) HYPERTENSION: ICD-10-CM

## 2025-02-03 DIAGNOSIS — Z86.79 PERSONAL HISTORY OF OTHER DISEASES OF THE CIRCULATORY SYSTEM: ICD-10-CM

## 2025-02-03 DIAGNOSIS — D69.6 THROMBOCYTOPENIA, UNSPECIFIED: ICD-10-CM

## 2025-02-03 DIAGNOSIS — E66.9 OBESITY, UNSPECIFIED: ICD-10-CM

## 2025-02-03 DIAGNOSIS — R73.03 PREDIABETES.: ICD-10-CM

## 2025-02-03 DIAGNOSIS — J45.20 MILD INTERMITTENT ASTHMA, UNCOMPLICATED: ICD-10-CM

## 2025-02-03 PROCEDURE — 99495 TRANSJ CARE MGMT MOD F2F 14D: CPT

## 2025-02-03 PROCEDURE — 99215 OFFICE O/P EST HI 40 MIN: CPT

## 2025-02-03 PROCEDURE — 36415 COLL VENOUS BLD VENIPUNCTURE: CPT

## 2025-02-03 RX ORDER — FLUTICASONE FUROATE, UMECLIDINIUM BROMIDE AND VILANTEROL TRIFENATATE 100; 62.5; 25 UG/1; UG/1; UG/1
100-62.5-25 POWDER RESPIRATORY (INHALATION)
Qty: 3 | Refills: 3 | Status: ACTIVE | COMMUNITY
Start: 2025-02-03

## 2025-02-03 RX ORDER — METOPROLOL SUCCINATE 25 MG/1
25 TABLET, EXTENDED RELEASE ORAL
Qty: 270 | Refills: 3 | Status: ACTIVE | COMMUNITY
Start: 2025-02-03

## 2025-02-04 DIAGNOSIS — R31.29 OTHER MICROSCOPIC HEMATURIA: ICD-10-CM

## 2025-02-04 LAB
ALBUMIN SERPL ELPH-MCNC: 3.8 G/DL
ALP BLD-CCNC: 73 U/L
ALT SERPL-CCNC: 26 U/L
ANION GAP SERPL CALC-SCNC: 10 MMOL/L
APPEARANCE: CLEAR
AST SERPL-CCNC: 19 U/L
BACTERIA: NEGATIVE /HPF
BASOPHILS # BLD AUTO: 0.01 K/UL
BASOPHILS NFR BLD AUTO: 0.2 %
BILIRUB SERPL-MCNC: 1.1 MG/DL
BILIRUBIN URINE: NEGATIVE
BLOOD URINE: ABNORMAL
BUN SERPL-MCNC: 25 MG/DL
CALCIUM SERPL-MCNC: 9.2 MG/DL
CAST: 0 /LPF
CHLORIDE SERPL-SCNC: 100 MMOL/L
CO2 SERPL-SCNC: 29 MMOL/L
COLOR: YELLOW
CREAT SERPL-MCNC: 1.95 MG/DL
EGFR: 33 ML/MIN/1.73M2
EOSINOPHIL # BLD AUTO: 0.02 K/UL
EOSINOPHIL NFR BLD AUTO: 0.4 %
EPITHELIAL CELLS: 0 /HPF
GLUCOSE QUALITATIVE U: NEGATIVE MG/DL
GLUCOSE SERPL-MCNC: 190 MG/DL
HCT VFR BLD CALC: 40.5 %
HGB BLD-MCNC: 13.2 G/DL
IMM GRANULOCYTES NFR BLD AUTO: 1.1 %
KETONES URINE: NEGATIVE MG/DL
LEUKOCYTE ESTERASE URINE: NEGATIVE
LYMPHOCYTES # BLD AUTO: 0.99 K/UL
LYMPHOCYTES NFR BLD AUTO: 18.7 %
MAN DIFF?: NORMAL
MCHC RBC-ENTMCNC: 31.8 PG
MCHC RBC-ENTMCNC: 32.6 G/DL
MCV RBC AUTO: 97.6 FL
MICROSCOPIC-UA: NORMAL
MONOCYTES # BLD AUTO: 1.67 K/UL
MONOCYTES NFR BLD AUTO: 31.5 %
NEUTROPHILS # BLD AUTO: 2.55 K/UL
NEUTROPHILS NFR BLD AUTO: 48.1 %
NITRITE URINE: NEGATIVE
PH URINE: 7
PLATELET # BLD AUTO: 120 K/UL
POTASSIUM SERPL-SCNC: 4.3 MMOL/L
PROT SERPL-MCNC: 6.4 G/DL
PROTEIN URINE: NORMAL MG/DL
RBC # BLD: 4.15 M/UL
RBC # FLD: 13.3 %
RED BLOOD CELLS URINE: 6 /HPF
SODIUM SERPL-SCNC: 139 MMOL/L
SPECIFIC GRAVITY URINE: 1.02
UROBILINOGEN URINE: 1 MG/DL
WBC # FLD AUTO: 5.3 K/UL
WHITE BLOOD CELLS URINE: 0 /HPF

## 2025-02-05 DIAGNOSIS — E03.9 HYPOTHYROIDISM, UNSPECIFIED: ICD-10-CM

## 2025-02-05 RX ORDER — LEVOTHYROXINE SODIUM 0.03 MG/1
25 TABLET ORAL
Qty: 90 | Refills: 3 | Status: ACTIVE | COMMUNITY
Start: 2025-02-05 | End: 1900-01-01

## 2025-02-11 ENCOUNTER — APPOINTMENT (OUTPATIENT)
Dept: CARDIOLOGY | Facility: CLINIC | Age: 85
End: 2025-02-11

## 2025-02-11 ENCOUNTER — LABORATORY RESULT (OUTPATIENT)
Age: 85
End: 2025-02-11

## 2025-02-11 ENCOUNTER — NON-APPOINTMENT (OUTPATIENT)
Age: 85
End: 2025-02-11

## 2025-02-11 VITALS
HEART RATE: 86 BPM | DIASTOLIC BLOOD PRESSURE: 60 MMHG | OXYGEN SATURATION: 98 % | SYSTOLIC BLOOD PRESSURE: 102 MMHG | WEIGHT: 225 LBS | RESPIRATION RATE: 12 BRPM | HEIGHT: 67 IN | BODY MASS INDEX: 35.31 KG/M2

## 2025-02-11 DIAGNOSIS — I48.91 UNSPECIFIED ATRIAL FIBRILLATION: ICD-10-CM

## 2025-02-11 LAB
ALBUMIN SERPL ELPH-MCNC: 3.9 G/DL
ALP BLD-CCNC: 76 U/L
ALT SERPL-CCNC: 20 U/L
ANION GAP SERPL CALC-SCNC: 13 MMOL/L
AST SERPL-CCNC: 21 U/L
BILIRUB SERPL-MCNC: 0.8 MG/DL
BUN SERPL-MCNC: 26 MG/DL
CALCIUM SERPL-MCNC: 9.4 MG/DL
CHLORIDE SERPL-SCNC: 99 MMOL/L
CO2 SERPL-SCNC: 26 MMOL/L
CREAT SERPL-MCNC: 1.87 MG/DL
EGFR: 35 ML/MIN/1.73M2
GLUCOSE SERPL-MCNC: 132 MG/DL
NT-PROBNP SERPL-MCNC: 1988 PG/ML
POTASSIUM SERPL-SCNC: 4.5 MMOL/L
PROT SERPL-MCNC: 6.7 G/DL
SODIUM SERPL-SCNC: 137 MMOL/L
TSH SERPL-ACNC: 8 UIU/ML

## 2025-02-11 PROCEDURE — 93000 ELECTROCARDIOGRAM COMPLETE: CPT

## 2025-02-11 PROCEDURE — 99214 OFFICE O/P EST MOD 30 MIN: CPT | Mod: 25

## 2025-02-14 ENCOUNTER — APPOINTMENT (OUTPATIENT)
Dept: INTERNAL MEDICINE | Facility: CLINIC | Age: 85
End: 2025-02-14

## 2025-03-03 ENCOUNTER — APPOINTMENT (OUTPATIENT)
Dept: INTERNAL MEDICINE | Facility: CLINIC | Age: 85
End: 2025-03-03
Payer: MEDICARE

## 2025-03-03 ENCOUNTER — LABORATORY RESULT (OUTPATIENT)
Age: 85
End: 2025-03-03

## 2025-03-03 VITALS
TEMPERATURE: 97.4 F | SYSTOLIC BLOOD PRESSURE: 128 MMHG | BODY MASS INDEX: 35.79 KG/M2 | HEART RATE: 82 BPM | WEIGHT: 228 LBS | OXYGEN SATURATION: 99 % | HEIGHT: 67 IN | DIASTOLIC BLOOD PRESSURE: 78 MMHG

## 2025-03-03 DIAGNOSIS — I10 ESSENTIAL (PRIMARY) HYPERTENSION: ICD-10-CM

## 2025-03-03 DIAGNOSIS — R04.0 EPISTAXIS: ICD-10-CM

## 2025-03-03 DIAGNOSIS — I48.91 UNSPECIFIED ATRIAL FIBRILLATION: ICD-10-CM

## 2025-03-03 DIAGNOSIS — E03.9 HYPOTHYROIDISM, UNSPECIFIED: ICD-10-CM

## 2025-03-03 DIAGNOSIS — N18.31 CHRONIC KIDNEY DISEASE, STAGE 3A: ICD-10-CM

## 2025-03-03 PROCEDURE — G2211 COMPLEX E/M VISIT ADD ON: CPT

## 2025-03-03 PROCEDURE — 36415 COLL VENOUS BLD VENIPUNCTURE: CPT

## 2025-03-03 PROCEDURE — 99214 OFFICE O/P EST MOD 30 MIN: CPT

## 2025-03-04 LAB
ALBUMIN SERPL ELPH-MCNC: 4.2 G/DL
ALP BLD-CCNC: 72 U/L
ALT SERPL-CCNC: 15 U/L
ANION GAP SERPL CALC-SCNC: 12 MMOL/L
AST SERPL-CCNC: 17 U/L
BASOPHILS # BLD AUTO: 0.02 K/UL
BASOPHILS NFR BLD AUTO: 0.3 %
BILIRUB SERPL-MCNC: 0.7 MG/DL
BUN SERPL-MCNC: 26 MG/DL
CALCIUM SERPL-MCNC: 9.2 MG/DL
CHLORIDE SERPL-SCNC: 100 MMOL/L
CO2 SERPL-SCNC: 27 MMOL/L
CREAT SERPL-MCNC: 1.69 MG/DL
EGFR: 40 ML/MIN/1.73M2
EOSINOPHIL # BLD AUTO: 0.02 K/UL
EOSINOPHIL NFR BLD AUTO: 0.3 %
GLUCOSE SERPL-MCNC: 138 MG/DL
HCT VFR BLD CALC: 42.1 %
HGB BLD-MCNC: 13.7 G/DL
IMM GRANULOCYTES NFR BLD AUTO: 1.1 %
LYMPHOCYTES # BLD AUTO: 1.56 K/UL
LYMPHOCYTES NFR BLD AUTO: 25.4 %
MAN DIFF?: NORMAL
MCHC RBC-ENTMCNC: 32.5 G/DL
MCHC RBC-ENTMCNC: 32.8 PG
MCV RBC AUTO: 100.7 FL
MONOCYTES # BLD AUTO: 1.31 K/UL
MONOCYTES NFR BLD AUTO: 21.4 %
NEUTROPHILS # BLD AUTO: 3.15 K/UL
NEUTROPHILS NFR BLD AUTO: 51.5 %
PLATELET # BLD AUTO: 78 K/UL
POTASSIUM SERPL-SCNC: 4.5 MMOL/L
PROT SERPL-MCNC: 6.8 G/DL
RBC # BLD: 4.18 M/UL
RBC # FLD: 14.3 %
SODIUM SERPL-SCNC: 139 MMOL/L
TSH SERPL-ACNC: 12.8 UIU/ML
WBC # FLD AUTO: 6.13 K/UL

## 2025-03-12 NOTE — ED ADULT NURSE NOTE - OBJECTIVE STATEMENT
Vaccine status unknown
Pt brought to [facility] 4.  BIBEMS c/o of  Dizziness and b/l Leg Onset of symptoms around 11 am today. PMH Aortic valve replacement, and HTN. Symptoms aggravated by Standing up and relieved by Sitting down - Denies N/V.  #18 gauge in RAC x's 1 attempt pt. tolerated well, labs drawn and sent, EKG performed. a provider  aware and at bedside evaluating. Denies CP or sob. Pt has a steady gait. Safety and support provided.

## 2025-03-13 ENCOUNTER — APPOINTMENT (OUTPATIENT)
Dept: CARDIOLOGY | Facility: CLINIC | Age: 85
End: 2025-03-13
Payer: MEDICARE

## 2025-03-13 ENCOUNTER — NON-APPOINTMENT (OUTPATIENT)
Age: 85
End: 2025-03-13

## 2025-03-13 VITALS
BODY MASS INDEX: 36.73 KG/M2 | TEMPERATURE: 97.3 F | OXYGEN SATURATION: 100 % | WEIGHT: 234 LBS | RESPIRATION RATE: 12 BRPM | SYSTOLIC BLOOD PRESSURE: 113 MMHG | HEIGHT: 67 IN | HEART RATE: 84 BPM | DIASTOLIC BLOOD PRESSURE: 65 MMHG

## 2025-03-13 DIAGNOSIS — I50.32 CHRONIC DIASTOLIC (CONGESTIVE) HEART FAILURE: ICD-10-CM

## 2025-03-13 PROCEDURE — 99214 OFFICE O/P EST MOD 30 MIN: CPT | Mod: 25

## 2025-03-13 PROCEDURE — 93000 ELECTROCARDIOGRAM COMPLETE: CPT

## 2025-03-13 RX ORDER — MUPIROCIN 20 MG/G
2 OINTMENT TOPICAL
Qty: 1 | Refills: 0 | Status: ACTIVE | COMMUNITY
Start: 2025-03-13 | End: 1900-01-01

## 2025-03-14 LAB — NT-PROBNP SERPL-MCNC: 2049 PG/ML

## 2025-03-19 ENCOUNTER — APPOINTMENT (OUTPATIENT)
Dept: NEPHROLOGY | Facility: CLINIC | Age: 85
End: 2025-03-19
Payer: MEDICARE

## 2025-03-19 VITALS
HEART RATE: 108 BPM | SYSTOLIC BLOOD PRESSURE: 129 MMHG | BODY MASS INDEX: 36.57 KG/M2 | OXYGEN SATURATION: 95 % | WEIGHT: 233 LBS | TEMPERATURE: 96.6 F | DIASTOLIC BLOOD PRESSURE: 74 MMHG | HEIGHT: 67 IN

## 2025-03-19 DIAGNOSIS — N18.31 CHRONIC KIDNEY DISEASE, STAGE 3A: ICD-10-CM

## 2025-03-19 PROCEDURE — 99215 OFFICE O/P EST HI 40 MIN: CPT

## 2025-03-20 LAB
ALBUMIN SERPL ELPH-MCNC: 4.3 G/DL
ANION GAP SERPL CALC-SCNC: 12 MMOL/L
APPEARANCE: CLEAR
BACTERIA: NEGATIVE /HPF
BILIRUBIN URINE: NEGATIVE
BLOOD URINE: NEGATIVE
BUN SERPL-MCNC: 22 MG/DL
CALCIUM SERPL-MCNC: 9.9 MG/DL
CAST: 0 /LPF
CHLORIDE SERPL-SCNC: 103 MMOL/L
CO2 SERPL-SCNC: 29 MMOL/L
COLOR: YELLOW
CREAT SERPL-MCNC: 1.73 MG/DL
CREAT SPEC-SCNC: 12 MG/DL
EGFRCR SERPLBLD CKD-EPI 2021: 38 ML/MIN/1.73M2
EPITHELIAL CELLS: 0 /HPF
GLUCOSE QUALITATIVE U: NEGATIVE MG/DL
GLUCOSE SERPL-MCNC: 98 MG/DL
KETONES URINE: NEGATIVE MG/DL
LEUKOCYTE ESTERASE URINE: NEGATIVE
MICROALBUMIN 24H UR DL<=1MG/L-MCNC: <1.2 MG/DL
MICROALBUMIN/CREAT 24H UR-RTO: NORMAL MG/G
MICROSCOPIC-UA: NORMAL
NITRITE URINE: NEGATIVE
PH URINE: 7
PHOSPHATE SERPL-MCNC: 4 MG/DL
POTASSIUM SERPL-SCNC: 4.8 MMOL/L
PROTEIN URINE: NEGATIVE MG/DL
RED BLOOD CELLS URINE: 0 /HPF
SODIUM SERPL-SCNC: 143 MMOL/L
SPECIFIC GRAVITY URINE: 1.01
UROBILINOGEN URINE: 0.2 MG/DL
WHITE BLOOD CELLS URINE: 0 /HPF

## 2025-03-28 ENCOUNTER — APPOINTMENT (OUTPATIENT)
Dept: OTOLARYNGOLOGY | Facility: CLINIC | Age: 85
End: 2025-03-28

## 2025-04-14 PROBLEM — T82.857A: Status: ACTIVE | Noted: 2025-04-14

## 2025-04-15 ENCOUNTER — APPOINTMENT (OUTPATIENT)
Dept: CARDIOTHORACIC SURGERY | Facility: CLINIC | Age: 85
End: 2025-04-15
Payer: MEDICARE

## 2025-04-15 ENCOUNTER — APPOINTMENT (OUTPATIENT)
Dept: CARDIOTHORACIC SURGERY | Facility: CLINIC | Age: 85
End: 2025-04-15

## 2025-04-15 ENCOUNTER — LABORATORY RESULT (OUTPATIENT)
Age: 85
End: 2025-04-15

## 2025-04-15 VITALS
SYSTOLIC BLOOD PRESSURE: 108 MMHG | DIASTOLIC BLOOD PRESSURE: 65 MMHG | HEART RATE: 82 BPM | HEIGHT: 67 IN | RESPIRATION RATE: 14 BRPM | TEMPERATURE: 98.5 F | OXYGEN SATURATION: 94 %

## 2025-04-15 VITALS — WEIGHT: 224.87 LBS | BODY MASS INDEX: 35.22 KG/M2

## 2025-04-15 DIAGNOSIS — N40.1 BENIGN PROSTATIC HYPERPLASIA WITH LOWER URINARY TRACT SYMPMS: ICD-10-CM

## 2025-04-15 DIAGNOSIS — N13.8 BENIGN PROSTATIC HYPERPLASIA WITH LOWER URINARY TRACT SYMPMS: ICD-10-CM

## 2025-04-15 DIAGNOSIS — I48.91 UNSPECIFIED ATRIAL FIBRILLATION: ICD-10-CM

## 2025-04-15 DIAGNOSIS — K21.00 GASTRO-ESOPHAGEAL REFLUX DISEASE WITH ESOPHAGITIS, WITHOUT BLEEDING: ICD-10-CM

## 2025-04-15 DIAGNOSIS — I35.9 NONRHEUMATIC AORTIC VALVE DISORDER, UNSPECIFIED: ICD-10-CM

## 2025-04-15 DIAGNOSIS — I50.32 CHRONIC DIASTOLIC (CONGESTIVE) HEART FAILURE: ICD-10-CM

## 2025-04-15 DIAGNOSIS — N18.31 CHRONIC KIDNEY DISEASE, STAGE 3A: ICD-10-CM

## 2025-04-15 DIAGNOSIS — T82.857A STENOSIS OF OTHER CARDIAC PROSTHETIC, INITIAL ENCOUNTER: ICD-10-CM

## 2025-04-15 DIAGNOSIS — I34.0 NONRHEUMATIC MITRAL (VALVE) INSUFFICIENCY: ICD-10-CM

## 2025-04-15 DIAGNOSIS — E78.5 HYPERLIPIDEMIA, UNSPECIFIED: ICD-10-CM

## 2025-04-15 LAB
ALBUMIN SERPL ELPH-MCNC: 4.4 G/DL
ALP BLD-CCNC: 69 U/L
ALT SERPL-CCNC: 18 U/L
ANION GAP SERPL CALC-SCNC: 13 MMOL/L
AST SERPL-CCNC: 22 U/L
BILIRUB SERPL-MCNC: 0.9 MG/DL
BUN SERPL-MCNC: 29 MG/DL
CALCIUM SERPL-MCNC: 9.3 MG/DL
CHLORIDE SERPL-SCNC: 102 MMOL/L
CO2 SERPL-SCNC: 26 MMOL/L
CREAT SERPL-MCNC: 1.77 MG/DL
EGFRCR SERPLBLD CKD-EPI 2021: 37 ML/MIN/1.73M2
GLUCOSE SERPL-MCNC: 131 MG/DL
NT-PROBNP SERPL-MCNC: 2199 PG/ML
POTASSIUM SERPL-SCNC: 5.1 MMOL/L
PROT SERPL-MCNC: 7.1 G/DL
SODIUM SERPL-SCNC: 141 MMOL/L

## 2025-04-15 PROCEDURE — 99214 OFFICE O/P EST MOD 30 MIN: CPT

## 2025-04-15 PROCEDURE — 99205 OFFICE O/P NEW HI 60 MIN: CPT

## 2025-04-16 LAB
BASOPHILS # BLD AUTO: 0.06 K/UL
BASOPHILS NFR BLD AUTO: 0.9 %
EOSINOPHIL # BLD AUTO: 0.06 K/UL
EOSINOPHIL NFR BLD AUTO: 0.9 %
HCT VFR BLD CALC: 41.9 %
HGB BLD-MCNC: 13.7 G/DL
LYMPHOCYTES # BLD AUTO: 1.33 K/UL
LYMPHOCYTES NFR BLD AUTO: 20 %
MAN DIFF?: NORMAL
MCHC RBC-ENTMCNC: 32.7 G/DL
MCHC RBC-ENTMCNC: 32.9 PG
MCV RBC AUTO: 100.5 FL
MONOCYTES # BLD AUTO: 1.62 K/UL
MONOCYTES NFR BLD AUTO: 24.3 %
NEUTROPHILS # BLD AUTO: 3.07 K/UL
NEUTROPHILS NFR BLD AUTO: 46.1 %
PLATELET # BLD AUTO: 86 K/UL
RBC # BLD: 4.17 M/UL
RBC # FLD: 13.9 %
WBC # FLD AUTO: 6.67 K/UL

## 2025-04-23 ENCOUNTER — OUTPATIENT (OUTPATIENT)
Dept: OUTPATIENT SERVICES | Facility: HOSPITAL | Age: 85
LOS: 1 days | End: 2025-04-23
Payer: MEDICARE

## 2025-04-23 ENCOUNTER — APPOINTMENT (OUTPATIENT)
Dept: CT IMAGING | Facility: CLINIC | Age: 85
End: 2025-04-23
Payer: MEDICARE

## 2025-04-23 DIAGNOSIS — T82.857A STENOSIS OF OTHER CARDIAC PROSTHETIC DEVICES, IMPLANTS AND GRAFTS, INITIAL ENCOUNTER: ICD-10-CM

## 2025-04-23 DIAGNOSIS — Z90.49 ACQUIRED ABSENCE OF OTHER SPECIFIED PARTS OF DIGESTIVE TRACT: Chronic | ICD-10-CM

## 2025-04-23 DIAGNOSIS — Z98.890 OTHER SPECIFIED POSTPROCEDURAL STATES: Chronic | ICD-10-CM

## 2025-04-23 PROCEDURE — 74174 CTA ABD&PLVS W/CONTRAST: CPT | Mod: 26

## 2025-04-23 PROCEDURE — 71275 CT ANGIOGRAPHY CHEST: CPT

## 2025-04-23 PROCEDURE — 74174 CTA ABD&PLVS W/CONTRAST: CPT

## 2025-04-23 PROCEDURE — 75574 CT ANGIO HRT W/3D IMAGE: CPT

## 2025-04-23 PROCEDURE — 75574 CT ANGIO HRT W/3D IMAGE: CPT | Mod: 26

## 2025-04-23 PROCEDURE — 71275 CT ANGIOGRAPHY CHEST: CPT | Mod: 26

## 2025-04-28 ENCOUNTER — RX RENEWAL (OUTPATIENT)
Age: 85
End: 2025-04-28

## 2025-05-01 ENCOUNTER — APPOINTMENT (OUTPATIENT)
Dept: CARDIOLOGY | Facility: CLINIC | Age: 85
End: 2025-05-01
Payer: MEDICARE

## 2025-05-01 ENCOUNTER — NON-APPOINTMENT (OUTPATIENT)
Age: 85
End: 2025-05-01

## 2025-05-01 VITALS
RESPIRATION RATE: 15 BRPM | WEIGHT: 229 LBS | OXYGEN SATURATION: 96 % | HEART RATE: 84 BPM | TEMPERATURE: 97.3 F | HEIGHT: 67 IN | SYSTOLIC BLOOD PRESSURE: 105 MMHG | DIASTOLIC BLOOD PRESSURE: 63 MMHG | BODY MASS INDEX: 35.94 KG/M2

## 2025-05-01 PROCEDURE — 99214 OFFICE O/P EST MOD 30 MIN: CPT | Mod: 25

## 2025-05-01 PROCEDURE — 93000 ELECTROCARDIOGRAM COMPLETE: CPT

## 2025-05-02 PROBLEM — I35.1 AORTIC INSUFFICIENCY: Status: ACTIVE | Noted: 2025-05-02

## 2025-05-02 PROBLEM — R93.1 ABNORMAL ECHOCARDIOGRAM: Status: ACTIVE | Noted: 2025-05-02

## 2025-05-02 PROBLEM — I35.0 AORTIC STENOSIS: Status: ACTIVE | Noted: 2025-05-02

## 2025-05-02 PROBLEM — I50.30 DIASTOLIC CONGESTIVE HEART FAILURE, UNSPECIFIED HF CHRONICITY: Status: ACTIVE | Noted: 2025-05-02

## 2025-05-02 PROBLEM — I48.20 ATRIAL FIBRILLATION, CHRONIC: Status: ACTIVE | Noted: 2025-05-02

## 2025-05-02 PROBLEM — R06.00 DYSPNEA: Status: ACTIVE | Noted: 2025-05-02

## 2025-05-02 LAB
ANION GAP SERPL CALC-SCNC: 12 MMOL/L
BUN SERPL-MCNC: 26 MG/DL
CALCIUM SERPL-MCNC: 9.2 MG/DL
CHLORIDE SERPL-SCNC: 103 MMOL/L
CO2 SERPL-SCNC: 26 MMOL/L
CREAT SERPL-MCNC: 1.95 MG/DL
EGFRCR SERPLBLD CKD-EPI 2021: 33 ML/MIN/1.73M2
GLUCOSE SERPL-MCNC: 106 MG/DL
POTASSIUM SERPL-SCNC: 4.6 MMOL/L
SODIUM SERPL-SCNC: 140 MMOL/L

## 2025-05-05 ENCOUNTER — NON-APPOINTMENT (OUTPATIENT)
Age: 85
End: 2025-05-05

## 2025-05-07 ENCOUNTER — OUTPATIENT (OUTPATIENT)
Dept: OUTPATIENT SERVICES | Facility: HOSPITAL | Age: 85
LOS: 1 days | End: 2025-05-07
Payer: MEDICARE

## 2025-05-07 ENCOUNTER — TRANSCRIPTION ENCOUNTER (OUTPATIENT)
Age: 85
End: 2025-05-07

## 2025-05-07 VITALS
DIASTOLIC BLOOD PRESSURE: 54 MMHG | HEART RATE: 64 BPM | OXYGEN SATURATION: 94 % | RESPIRATION RATE: 16 BRPM | SYSTOLIC BLOOD PRESSURE: 96 MMHG

## 2025-05-07 VITALS
HEART RATE: 126 BPM | SYSTOLIC BLOOD PRESSURE: 134 MMHG | RESPIRATION RATE: 18 BRPM | OXYGEN SATURATION: 97 % | WEIGHT: 220.02 LBS | HEIGHT: 66 IN | TEMPERATURE: 98 F | DIASTOLIC BLOOD PRESSURE: 66 MMHG

## 2025-05-07 DIAGNOSIS — I35.9 NONRHEUMATIC AORTIC VALVE DISORDER, UNSPECIFIED: ICD-10-CM

## 2025-05-07 DIAGNOSIS — Z90.49 ACQUIRED ABSENCE OF OTHER SPECIFIED PARTS OF DIGESTIVE TRACT: Chronic | ICD-10-CM

## 2025-05-07 DIAGNOSIS — Z98.890 OTHER SPECIFIED POSTPROCEDURAL STATES: Chronic | ICD-10-CM

## 2025-05-07 LAB
ANION GAP SERPL CALC-SCNC: 14 MMOL/L — SIGNIFICANT CHANGE UP (ref 5–17)
BUN SERPL-MCNC: 28 MG/DL — HIGH (ref 7–23)
CALCIUM SERPL-MCNC: 10 MG/DL — SIGNIFICANT CHANGE UP (ref 8.4–10.5)
CHLORIDE SERPL-SCNC: 99 MMOL/L — SIGNIFICANT CHANGE UP (ref 96–108)
CO2 SERPL-SCNC: 26 MMOL/L — SIGNIFICANT CHANGE UP (ref 22–31)
CREAT SERPL-MCNC: 1.74 MG/DL — HIGH (ref 0.5–1.3)
EGFR: 38 ML/MIN/1.73M2 — LOW
EGFR: 38 ML/MIN/1.73M2 — LOW
GLUCOSE SERPL-MCNC: 105 MG/DL — HIGH (ref 70–99)
HCT VFR BLD CALC: 42.9 % — SIGNIFICANT CHANGE UP (ref 39–50)
HGB BLD-MCNC: 14.3 G/DL — SIGNIFICANT CHANGE UP (ref 13–17)
MCHC RBC-ENTMCNC: 33 PG — SIGNIFICANT CHANGE UP (ref 27–34)
MCHC RBC-ENTMCNC: 33.3 G/DL — SIGNIFICANT CHANGE UP (ref 32–36)
MCV RBC AUTO: 99.1 FL — SIGNIFICANT CHANGE UP (ref 80–100)
NRBC BLD AUTO-RTO: 0 /100 WBCS — SIGNIFICANT CHANGE UP (ref 0–0)
PLATELET # BLD AUTO: 88 K/UL — LOW (ref 150–400)
POTASSIUM SERPL-MCNC: 4 MMOL/L — SIGNIFICANT CHANGE UP (ref 3.5–5.3)
POTASSIUM SERPL-SCNC: 4 MMOL/L — SIGNIFICANT CHANGE UP (ref 3.5–5.3)
RBC # BLD: 4.33 M/UL — SIGNIFICANT CHANGE UP (ref 4.2–5.8)
RBC # FLD: 12.8 % — SIGNIFICANT CHANGE UP (ref 10.3–14.5)
SODIUM SERPL-SCNC: 139 MMOL/L — SIGNIFICANT CHANGE UP (ref 135–145)
WBC # BLD: 7.84 K/UL — SIGNIFICANT CHANGE UP (ref 3.8–10.5)
WBC # FLD AUTO: 7.84 K/UL — SIGNIFICANT CHANGE UP (ref 3.8–10.5)

## 2025-05-07 PROCEDURE — 99152 MOD SED SAME PHYS/QHP 5/>YRS: CPT

## 2025-05-07 PROCEDURE — 36415 COLL VENOUS BLD VENIPUNCTURE: CPT

## 2025-05-07 PROCEDURE — C1769: CPT

## 2025-05-07 PROCEDURE — C1894: CPT

## 2025-05-07 PROCEDURE — C1887: CPT

## 2025-05-07 PROCEDURE — 93005 ELECTROCARDIOGRAM TRACING: CPT

## 2025-05-07 PROCEDURE — 93010 ELECTROCARDIOGRAM REPORT: CPT

## 2025-05-07 PROCEDURE — 93454 CORONARY ARTERY ANGIO S&I: CPT | Mod: 26

## 2025-05-07 PROCEDURE — 85027 COMPLETE CBC AUTOMATED: CPT

## 2025-05-07 PROCEDURE — 93454 CORONARY ARTERY ANGIO S&I: CPT

## 2025-05-07 PROCEDURE — 80048 BASIC METABOLIC PNL TOTAL CA: CPT

## 2025-05-07 RX ORDER — METOPROLOL SUCCINATE 50 MG/1
5 TABLET, EXTENDED RELEASE ORAL ONCE
Refills: 0 | Status: DISCONTINUED | OUTPATIENT
Start: 2025-05-07 | End: 2025-05-07

## 2025-05-07 RX ORDER — METOPROLOL SUCCINATE 50 MG/1
5 TABLET, EXTENDED RELEASE ORAL ONCE
Refills: 0 | Status: COMPLETED | OUTPATIENT
Start: 2025-05-07 | End: 2025-05-07

## 2025-05-07 RX ADMIN — METOPROLOL SUCCINATE 5 MILLIGRAM(S): 50 TABLET, EXTENDED RELEASE ORAL at 07:42

## 2025-05-07 RX ADMIN — METOPROLOL SUCCINATE 5 MILLIGRAM(S): 50 TABLET, EXTENDED RELEASE ORAL at 07:06

## 2025-05-07 NOTE — ASU DISCHARGE PLAN (ADULT/PEDIATRIC) - CARE PROVIDER_API CALL
Pavel Calvillo  Interventional Cardiology  [address] [place] [id number]  Phone: [phone]  Fax: [phone]  Established Patient  Follow Up Time: Routine

## 2025-05-07 NOTE — H&P CARDIOLOGY - NSICDXPASTMEDICALHX_GEN_ALL_CORE_FT
PAST MEDICAL HISTORY:  2019 novel coronavirus disease (COVID-19)     Aortic Stenosis     Atrial fibrillation     Chronic kidney disease (CKD)     Enlarged Prostate     GERD (Gastroesophageal Reflux Disease)     H/O thrombocytopenia     HTN (hypertension)     Hypercholesterolemia     Nephrolithiasis     Primary osteoarthritis of both knees

## 2025-05-07 NOTE — H&P CARDIOLOGY - HISTORY OF PRESENT ILLNESS
85 year old man (daughter at bedside) with a history of Afib on Eliquis, severe aortic stenosis secondary to a bicuspid aortic valve status post bioprosthetic aortic valve replacement 7/2012  (he had a SAVR with Dr Diaz, a 25mm bioAVR), HTN, prior COVID infection, hyperlipidemia, former tobacco use, chronic renal insufficiency, thrombocytopenia, and GERD. e tested positive for COVID on 12/19/2024 and was treated with Paxlovid. He then went to urgent care as he was getting more dyspneic and he was treated with Doxycycline and steroids. On1/8 he he had shortness of breath and lower extremity edema and he was sent to the hospital where he was found to be in rapid atrial fibrillation and in decompensated heart failure and was diuresed. He had a DARA/ DCCV during the hospitalization and went converted to sinus rhythm, however, he subsequently converted back to atrial fibrillation later that night. He was discharged on Bumex 2 mg daily and Amiodarone 200 mg daily (he was started on Amiodarone prior to the cardioversion). It was not felt that the Bisoprolol was giving him adequate rate control and was therefore changed to Metoprolol 75 mg daily.    He was referred by Dr. Delgado to Dr. 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 Dr Garcia asked that  Structural Heart team evaluate him to discuss consideration for a Valve in Valve TAVR--as the MR and TR were present prior and overall, well tolerated. It seems his clinical status deteriorated in January when he was hospitalized with COVID with worsening SOB, at which time he was also treated for decompensated HF.  He is not active, fatigues easily, and is limited by chronic knee pain, with a TKR last year. He reports no JOSEPH, but his daughter notes he has become very sedentary. He states that when he walks "I have to find a place to sit down because my back is no good" and has knee pain. He has no angina, dizziness, or syncope.   He presents today for [facility] with Dr. Jones    Referred by Dr. Pavel Calvillo 85 year old man (daughter at bedside) with a history of Afib on Eliquis (last dose Monday evening), severe aortic stenosis secondary to a bicuspid aortic valve status post bioprosthetic aortic valve replacement 7/2012  (he had a SAVR with Dr Diaz, a 25mm bioAVR), HTN, prior COVID infection, hyperlipidemia, former tobacco use, chronic renal insufficiency, thrombocytopenia, and GERD. e tested positive for COVID on 12/19/2024 and was treated with Paxlovid. He then went to urgent care as he was getting more dyspneic and he was treated with Doxycycline and steroids. On1/8 he he had shortness of breath and lower extremity edema and he was sent to the hospital where he was found to be in rapid atrial fibrillation and in decompensated heart failure and was diuresed. He had a DARA/ DCCV during the hospitalization and went converted to sinus rhythm, however, he subsequently converted back to atrial fibrillation later that night. He was discharged on Bumex 2 mg daily and Amiodarone 200 mg daily (he was started on Amiodarone prior to the cardioversion). It was not felt that the Bisoprolol was giving him adequate rate control and was therefore changed to Metoprolol 75 mg daily.    He was referred by Dr. Delgado to Dr. 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 Dr Garcia asked that  Structural Heart team evaluate him to discuss consideration for a Valve in Valve TAVR--as the MR and TR were present prior and overall, well tolerated. It seems his clinical status deteriorated in January when he was hospitalized with COVID with worsening SOB, at which time he was also treated for decompensated HF.  He is not active, fatigues easily, and is limited by chronic knee pain, with a TKR last year. He reports no JOSEPH, but his daughter notes he has become very sedentary. He states that when he walks "I have to find a place to sit down because my back is no good" and has knee pain. He has no angina, dizziness, or syncope.   He presents today for [facility] with Dr. Jones    Referred by Dr. Pavel Calvillo

## 2025-05-07 NOTE — ASU PATIENT PROFILE, ADULT - FALL HARM RISK - UNIVERSAL INTERVENTIONS
Bed in lowest position, wheels locked, appropriate side rails in place/Call bell, personal items and telephone in reach/Instruct patient to call for assistance before getting out of bed or chair/Non-slip footwear when patient is out of bed/[place] to call system/Physically safe environment - no spills, clutter or unnecessary equipment/Purposeful Proactive Rounding/Room/bathroom lighting operational, light cord in reach

## 2025-05-07 NOTE — ASU DISCHARGE PLAN (ADULT/PEDIATRIC) - NS MD DC FALL RISK RISK
[FreeTextEntry1] : 56 yo postmenopausal female with a history of right breast cancer ER+ [place]+ Her2 positive T2N0 treated with neoadjuvant chemotherapy/targeted therapy in 2015 with breast conservation therapy presents for clinical evaluation. She is tolerating Arimidex.   She developed right breast inframammary fold rash recently which she states is resolving with the use of bacitracin.  Will send in antifungal cream and MR breast in 3 weeks.  If MR is benign then followup in 6 months.\par 1. Clinical breast examination in 6 months 3/2021\par 2. Continue Arimidex daily\par 3. Follow up with Dr. Peterson\par 4. Annual bilateral screening mammogram/ultrasound 3/6/2021\par 5. MR breast- New rash with history of breast cancer\par \par \par  For information on Fall & Injury Prevention, visit: [email or web address] OR  [email or web address] OR  https://www.cdc.gov/steadi/patient.html

## 2025-05-07 NOTE — ASU PATIENT PROFILE, ADULT - FALL HARM RISK - FACTORS NURSING JUDGEMENT
HPI   66-year-old male patient presents to emergency department for evaluation of puncture wound to left foot. Patient reporting that he was in chest pain today and he had stepped on a rock in the water. He has some associated redness and pain at the site. Last tetanus shot was 14 years ago. He notes pain with ambulation. There is no streaking up the leg. There is no global foot swelling. No drainage no fevers or chills. Review of Systems   Constitutional:  Negative for chills and fever. HENT:  Negative for ear pain, sinus pressure and sore throat. Eyes:  Negative for pain, discharge and redness. Respiratory:  Negative for cough, shortness of breath and wheezing. Cardiovascular:  Negative for chest pain. Gastrointestinal:  Negative for abdominal pain, diarrhea, nausea and vomiting. Genitourinary:  Negative for dysuria and frequency. Musculoskeletal:  Negative for arthralgias and back pain. Foot pain and swelling   Skin:  Positive for wound. Negative for rash. Neurological:  Negative for weakness and headaches. Hematological:  Negative for adenopathy. All other systems reviewed and are negative. Physical Exam  Vitals and nursing note reviewed. Constitutional:       Appearance: He is well-developed. HENT:      Head: Normocephalic and atraumatic. Eyes:      Conjunctiva/sclera: Conjunctivae normal.   Cardiovascular:      Rate and Rhythm: Normal rate and regular rhythm. Heart sounds: Normal heart sounds. No murmur heard. Pulmonary:      Effort: Pulmonary effort is normal. No respiratory distress. Breath sounds: Normal breath sounds. No wheezing or rales. Abdominal:      General: Bowel sounds are normal.      Palpations: Abdomen is soft. Tenderness: There is no abdominal tenderness. There is no guarding or rebound. Musculoskeletal:         General: No tenderness or deformity. Cervical back: Normal range of motion and neck supple.       Comments: 2+
No

## 2025-05-07 NOTE — ASU DISCHARGE PLAN (ADULT/PEDIATRIC) - FINANCIAL ASSISTANCE
[facility] provides services at a reduced cost to those who are determined to be eligible through [facility]’s financial assistance program. Information regarding [facility]’s financial assistance program can be found by going to [email or web address] or by calling [phone].

## 2025-05-13 ENCOUNTER — OUTPATIENT (OUTPATIENT)
Dept: OUTPATIENT SERVICES | Facility: HOSPITAL | Age: 85
LOS: 1 days | End: 2025-05-13
Payer: MEDICARE

## 2025-05-13 VITALS
TEMPERATURE: 98 F | OXYGEN SATURATION: 96 % | HEART RATE: 95 BPM | SYSTOLIC BLOOD PRESSURE: 115 MMHG | HEIGHT: 70 IN | RESPIRATION RATE: 20 BRPM | DIASTOLIC BLOOD PRESSURE: 68 MMHG | WEIGHT: 227.96 LBS

## 2025-05-13 DIAGNOSIS — Z98.890 OTHER SPECIFIED POSTPROCEDURAL STATES: Chronic | ICD-10-CM

## 2025-05-13 DIAGNOSIS — G47.33 OBSTRUCTIVE SLEEP APNEA (ADULT) (PEDIATRIC): ICD-10-CM

## 2025-05-13 DIAGNOSIS — I35.0 NONRHEUMATIC AORTIC (VALVE) STENOSIS: ICD-10-CM

## 2025-05-13 DIAGNOSIS — Z01.818 ENCOUNTER FOR OTHER PREPROCEDURAL EXAMINATION: ICD-10-CM

## 2025-05-13 DIAGNOSIS — Z90.49 ACQUIRED ABSENCE OF OTHER SPECIFIED PARTS OF DIGESTIVE TRACT: Chronic | ICD-10-CM

## 2025-05-13 DIAGNOSIS — Z96.652 PRESENCE OF LEFT ARTIFICIAL KNEE JOINT: Chronic | ICD-10-CM

## 2025-05-13 LAB
ANION GAP SERPL CALC-SCNC: 12 MMOL/L — SIGNIFICANT CHANGE UP (ref 5–17)
BUN SERPL-MCNC: 22 MG/DL — SIGNIFICANT CHANGE UP (ref 7–23)
CALCIUM SERPL-MCNC: 9.3 MG/DL — SIGNIFICANT CHANGE UP (ref 8.4–10.5)
CHLORIDE SERPL-SCNC: 103 MMOL/L — SIGNIFICANT CHANGE UP (ref 96–108)
CO2 SERPL-SCNC: 24 MMOL/L — SIGNIFICANT CHANGE UP (ref 22–31)
CREAT SERPL-MCNC: 1.65 MG/DL — HIGH (ref 0.5–1.3)
EGFR: 40 ML/MIN/1.73M2 — LOW
EGFR: 40 ML/MIN/1.73M2 — LOW
GLUCOSE SERPL-MCNC: 134 MG/DL — HIGH (ref 70–99)
NT-PROBNP SERPL-SCNC: 2908 PG/ML — HIGH (ref 0–300)
POTASSIUM SERPL-MCNC: 4 MMOL/L — SIGNIFICANT CHANGE UP (ref 3.5–5.3)
POTASSIUM SERPL-SCNC: 4 MMOL/L — SIGNIFICANT CHANGE UP (ref 3.5–5.3)
SODIUM SERPL-SCNC: 139 MMOL/L — SIGNIFICANT CHANGE UP (ref 135–145)

## 2025-05-13 PROCEDURE — 80048 BASIC METABOLIC PNL TOTAL CA: CPT

## 2025-05-13 PROCEDURE — 85045 AUTOMATED RETICULOCYTE COUNT: CPT

## 2025-05-13 PROCEDURE — 86923 COMPATIBILITY TEST ELECTRIC: CPT

## 2025-05-13 PROCEDURE — 87640 STAPH A DNA AMP PROBE: CPT

## 2025-05-13 PROCEDURE — 86140 C-REACTIVE PROTEIN: CPT

## 2025-05-13 PROCEDURE — 86901 BLOOD TYPING SEROLOGIC RH(D): CPT

## 2025-05-13 PROCEDURE — 85025 COMPLETE CBC W/AUTO DIFF WBC: CPT

## 2025-05-13 PROCEDURE — 83036 HEMOGLOBIN GLYCOSYLATED A1C: CPT

## 2025-05-13 PROCEDURE — 83880 ASSAY OF NATRIURETIC PEPTIDE: CPT

## 2025-05-13 PROCEDURE — 86900 BLOOD TYPING SEROLOGIC ABO: CPT

## 2025-05-13 PROCEDURE — 82607 VITAMIN B-12: CPT

## 2025-05-13 PROCEDURE — 83550 IRON BINDING TEST: CPT

## 2025-05-13 PROCEDURE — 86850 RBC ANTIBODY SCREEN: CPT

## 2025-05-13 PROCEDURE — 87641 MR-STAPH DNA AMP PROBE: CPT

## 2025-05-13 PROCEDURE — 83540 ASSAY OF IRON: CPT

## 2025-05-13 PROCEDURE — G0463: CPT

## 2025-05-13 PROCEDURE — 82746 ASSAY OF FOLIC ACID SERUM: CPT

## 2025-05-13 PROCEDURE — 82728 ASSAY OF FERRITIN: CPT

## 2025-05-13 RX ORDER — CEFUROXIME SODIUM 1.5 G
1500 VIAL (EA) INJECTION ONCE
Refills: 0 | Status: DISCONTINUED | OUTPATIENT
Start: 2025-05-28 | End: 2025-05-29

## 2025-05-13 NOTE — H&P PST ADULT - NSICDXPASTSURGICALHX_GEN_ALL_CORE_FT
PAST SURGICAL HISTORY:  H/O aortic valve replacement     H/O total knee replacement, left     Retroperitoneal mass s/p surgery- benign    S/P cardiac catheterization     S/P cholecystectomy     S/P TURP 2005

## 2025-05-13 NOTE — H&P PST ADULT - PROBLEM SELECTOR PLAN 1
TAVR 5/28/25  Pre- Op Instructions discussed   Labs sent  Advise to hold Eliquis 3 days prior to surgery

## 2025-05-13 NOTE — H&P PST ADULT - HISTORY OF PRESENT ILLNESS
85 year old male with a history of Afib on Eliquis , severe aortic stenosis  status post bioprosthetic aortic valve replacement 7/2012 HTN, hyperlipidemia, former tobacco use, chronic renal insufficiency, thrombocytopenia, and GERD. He had a DARA/ DCCV during the hospitalization and went converted to sinus rhythm, however, he subsequently converted back to atrial fibrillation . He was discharged on Bumex 2 mg daily and Amiodarone 200 mg daily (he was started on Amiodarone prior to the cardioversion). It was not felt that the B   85 year old male with a history of Afib on Eliquis , severe aortic stenosis  status post bioprosthetic aortic valve replacement 7/2012 , HTN, hyperlipidemia, former tobacco use, chronic renal insufficiency and GERD. as per notes - He had a recent hospitalization in 1/25  with  CHF /Afib  s/pT EE/ DCCV during the hospitalization i 1/25  and went converted to sinus rhythm, however, he subsequently converted back to atrial fibrillation . Presents to PST for scheduled TAVR  on 5/28/25.

## 2025-05-13 NOTE — H&P PST ADULT - ASSESSMENT
ACTIVITY- daily activities , walking   Energy Expenditure(Mets):    6.25 by dasi mets  Symptoms-JOSEPH  Airway:  normal    Mallampati-     3  Dental: Patient denies loose teeth   CAPRINI SCORE    AGE RELATED RISK FACTORS                                                             [ ] Age 41-60 years                                            (1 Point)  [ ] Age: 61-74 years                                           (2 Points)                 [x ] Age= 75 years                                                (3 Points)             DISEASE RELATED RISK FACTORS                                                       [ ] Edema in the lower extremities                 (1 Point)                     [ ] Varicose veins                                               (1 Point)                                 [x ] BMI > 25 Kg/m2                                            (1 Point)                                  [ ] Serious infection (ie PNA)                            (1 Point)                     [ ] Lung disease ( COPD, Emphysema)            (1 Point)                                                                          [ ] Acute myocardial infarction                         (1 Point)                  [ ] Congestive heart failure (in the previous month)  (1 Point)         [ ] Inflammatory bowel disease                            (1 Point)                  [ ] Central venous access, PICC or Port               (2 points)       (within the last month)                                                                [ ] Stroke (in the previous month)                        (5 Points)    [ ] Previous or present malignancy                       (2 points)                                                                                                                                                         HEMATOLOGY RELATED FACTORS                                                         [ ] Prior episodes of VTE                                     (3 Points)                     [ ] Positive family history for VTE                      (3 Points)                  [ ] Prothrombin 67338 A                                     (3 Points)                     [ ] Factor V Leiden                                                (3 Points)                        [ ] Lupus anticoagulants                                      (3 Points)                                                           [ ] Anticardiolipin antibodies                              (3 Points)                                                       [ ] High homocysteine in the blood                   (3 Points)                                             [ ] Other congenital or acquired thrombophilia      (3 Points)                                                [ ] Heparin induced thrombocytopenia                  (3 Points)                                        MOBILITY RELATED FACTORS  [ ] Bed rest                                                         (1 Point)  [ ] Plaster cast                                                    (2 points)  [ ] Bed bound for more than 72 hours           (2 Points)    GENDER SPECIFIC FACTORS  [ ] Pregnancy or had a baby within the last month   (1 Point)  [ ] Post-partum < 6 weeks                                   (1 Point)  [ ] Hormonal therapy  or oral contraception   (1 Point)  [ ] History of pregnancy complications              (1 point)  [ ] Unexplained or recurrent              (1 Point)    OTHER RISK FACTORS                                           (1 Point)  [ ] BMI >40, smoking, diabetes requiring insulin, chemotherapy  blood transfusions and length of surgery over 2 hours    SURGERY RELATED RISK FACTORS  [ ]  Section within the last month     (1 Point)  [ ] Minor surgery                                                  (1 Point)  [ ] Arthroscopic surgery                                       (2 Points)  [x ] Planned major surgery lasting more            (2 Points)      than 45 minutes     [ ] Elective hip or knee joint replacement       (5 points)       surgery                                                TRAUMA RELATED RISK FACTORS  [ ] Fracture of the hip, pelvis, or leg                       (5 Points)  [ ] Spinal cord injury resulting in paralysis             (5 points)       (in the previous month)    [ ] Paralysis  (less than 1 month)                             (5 Points)  [ ] Multiple Trauma within 1 month                        (5 Points)    Total Score [  6      ]    Caprini Score 0-2: Low Risk, NO VTE prophylaxis required for most patients, encourage ambulation  Caprini Score 3-6: Moderate Risk , pharmacologic VTE prophylaxis is indicated for most patients (in the absence of contraindications)  Caprini Score Greater than or =7: High risk, pharmocologic VTE prophylaxis indicated for most patients (in the absence of contraindications)

## 2025-05-13 NOTE — H&P PST ADULT - BMI (KG/M2)
How Severe Is It?: moderate Is This A New Presentation, Or A Follow-Up?: Follow Up Isotretinoin 32.7

## 2025-05-14 LAB
A1C WITH ESTIMATED AVERAGE GLUCOSE RESULT: 5.9 % — HIGH (ref 4–5.6)
BASOPHILS # BLD AUTO: 0.01 K/UL — SIGNIFICANT CHANGE UP (ref 0–0.2)
BASOPHILS NFR BLD AUTO: 0.2 % — SIGNIFICANT CHANGE UP (ref 0–2)
CRP SERPL-MCNC: 5 MG/L — HIGH
EOSINOPHIL # BLD AUTO: 0.02 K/UL — SIGNIFICANT CHANGE UP (ref 0–0.5)
EOSINOPHIL NFR BLD AUTO: 0.3 % — SIGNIFICANT CHANGE UP (ref 0–6)
ESTIMATED AVERAGE GLUCOSE: 123 MG/DL — HIGH (ref 68–114)
FERRITIN SERPL-MCNC: 82 NG/ML — SIGNIFICANT CHANGE UP (ref 30–400)
FOLATE SERPL-MCNC: 7.8 NG/ML — SIGNIFICANT CHANGE UP
HCT VFR BLD CALC: 38.6 % — LOW (ref 39–50)
HGB BLD-MCNC: 12.8 G/DL — LOW (ref 13–17)
IMM GRANULOCYTES NFR BLD AUTO: 0.8 % — SIGNIFICANT CHANGE UP (ref 0–0.9)
IRON SATN MFR SERPL: 25 % — SIGNIFICANT CHANGE UP (ref 16–55)
IRON SATN MFR SERPL: 82 UG/DL — SIGNIFICANT CHANGE UP (ref 45–165)
LYMPHOCYTES # BLD AUTO: 1.36 K/UL — SIGNIFICANT CHANGE UP (ref 1–3.3)
LYMPHOCYTES # BLD AUTO: 21.9 % — SIGNIFICANT CHANGE UP (ref 13–44)
MCHC RBC-ENTMCNC: 33.1 PG — SIGNIFICANT CHANGE UP (ref 27–34)
MCHC RBC-ENTMCNC: 33.2 G/DL — SIGNIFICANT CHANGE UP (ref 32–36)
MCV RBC AUTO: 99.7 FL — SIGNIFICANT CHANGE UP (ref 80–100)
MONOCYTES # BLD AUTO: 1.54 K/UL — HIGH (ref 0–0.9)
MONOCYTES NFR BLD AUTO: 24.8 % — HIGH (ref 2–14)
MRSA PCR RESULT.: SIGNIFICANT CHANGE UP
NEUTROPHILS # BLD AUTO: 3.23 K/UL — SIGNIFICANT CHANGE UP (ref 1.8–7.4)
NEUTROPHILS NFR BLD AUTO: 52 % — SIGNIFICANT CHANGE UP (ref 43–77)
PLATELET # BLD AUTO: 85 K/UL — LOW (ref 150–400)
RBC # BLD: 3.87 M/UL — LOW (ref 4.2–5.8)
RBC # FLD: 13.1 % — SIGNIFICANT CHANGE UP (ref 10.3–14.5)
RETICS #: 61.9 K/UL — SIGNIFICANT CHANGE UP (ref 25–125)
RETICS/RBC NFR: 1.6 % — SIGNIFICANT CHANGE UP (ref 0.5–2.5)
S AUREUS DNA NOSE QL NAA+PROBE: SIGNIFICANT CHANGE UP
TIBC SERPL-MCNC: 327 UG/DL — SIGNIFICANT CHANGE UP (ref 220–430)
UIBC SERPL-MCNC: 245 UG/DL — SIGNIFICANT CHANGE UP (ref 110–370)
VIT B12 SERPL-MCNC: 362 PG/ML — SIGNIFICANT CHANGE UP (ref 232–1245)
WBC # BLD: 6.21 K/UL — SIGNIFICANT CHANGE UP (ref 3.8–10.5)
WBC # FLD AUTO: 6.21 K/UL — SIGNIFICANT CHANGE UP (ref 3.8–10.5)

## 2025-05-27 PROBLEM — N20.0 CALCULUS OF KIDNEY: Chronic | Status: ACTIVE | Noted: 2025-05-07

## 2025-05-28 ENCOUNTER — RESULT REVIEW (OUTPATIENT)
Age: 85
End: 2025-05-28

## 2025-05-28 ENCOUNTER — TRANSCRIPTION ENCOUNTER (OUTPATIENT)
Age: 85
End: 2025-05-28

## 2025-05-28 ENCOUNTER — INPATIENT (INPATIENT)
Facility: HOSPITAL | Age: 85
LOS: 0 days | Discharge: HOME HEALTH | DRG: 307 | End: 2025-05-29
Attending: STUDENT IN AN ORGANIZED HEALTH CARE EDUCATION/TRAINING PROGRAM | Admitting: STUDENT IN AN ORGANIZED HEALTH CARE EDUCATION/TRAINING PROGRAM
Payer: MEDICARE

## 2025-05-28 ENCOUNTER — APPOINTMENT (OUTPATIENT)
Dept: CARDIOTHORACIC SURGERY | Facility: HOSPITAL | Age: 85
End: 2025-05-28

## 2025-05-28 VITALS
WEIGHT: 222.67 LBS | RESPIRATION RATE: 16 BRPM | HEART RATE: 120 BPM | HEIGHT: 70 IN | TEMPERATURE: 98 F | DIASTOLIC BLOOD PRESSURE: 79 MMHG | OXYGEN SATURATION: 96 % | SYSTOLIC BLOOD PRESSURE: 128 MMHG

## 2025-05-28 DIAGNOSIS — Z96.652 PRESENCE OF LEFT ARTIFICIAL KNEE JOINT: Chronic | ICD-10-CM

## 2025-05-28 DIAGNOSIS — Z90.49 ACQUIRED ABSENCE OF OTHER SPECIFIED PARTS OF DIGESTIVE TRACT: Chronic | ICD-10-CM

## 2025-05-28 DIAGNOSIS — I10 ESSENTIAL (PRIMARY) HYPERTENSION: ICD-10-CM

## 2025-05-28 DIAGNOSIS — I35.0 NONRHEUMATIC AORTIC (VALVE) STENOSIS: ICD-10-CM

## 2025-05-28 DIAGNOSIS — I48.91 UNSPECIFIED ATRIAL FIBRILLATION: ICD-10-CM

## 2025-05-28 DIAGNOSIS — N40.0 BENIGN PROSTATIC HYPERPLASIA WITHOUT LOWER URINARY TRACT SYMPTOMS: ICD-10-CM

## 2025-05-28 LAB — GLUCOSE BLDC GLUCOMTR-MCNC: 110 MG/DL — HIGH (ref 70–99)

## 2025-05-28 PROCEDURE — 99232 SBSQ HOSP IP/OBS MODERATE 35: CPT

## 2025-05-28 PROCEDURE — 33361 REPLACE AORTIC VALVE PERQ: CPT | Mod: Q0,62

## 2025-05-28 PROCEDURE — 33361 REPLACE AORTIC VALVE PERQ: CPT | Mod: 62,Q0

## 2025-05-28 PROCEDURE — 93306 TTE W/DOPPLER COMPLETE: CPT | Mod: 26

## 2025-05-28 PROCEDURE — 93010 ELECTROCARDIOGRAM REPORT: CPT

## 2025-05-28 DEVICE — SUT PERCLOSE PROGLIDE 6FR: Type: IMPLANTABLE DEVICE | Status: FUNCTIONAL

## 2025-05-28 DEVICE — IMPLANTABLE DEVICE: Type: IMPLANTABLE DEVICE | Status: FUNCTIONAL

## 2025-05-28 DEVICE — CATH DXTERITY 5F 110CM PGSTR: Type: IMPLANTABLE DEVICE | Status: FUNCTIONAL

## 2025-05-28 DEVICE — SHEATH INTRODUCER TERUMO PINNACLE CORONARY 8FR X 10CM X 0.038" MINI WIRE: Type: IMPLANTABLE DEVICE | Status: FUNCTIONAL

## 2025-05-28 DEVICE — SHEATH INTRODUCER TERUMO PINNACLE CORONARY 6FR X 10CM X 0.038" MINI WIRE: Type: IMPLANTABLE DEVICE | Status: FUNCTIONAL

## 2025-05-28 DEVICE — CATH DIAG DXTERITY PIGTAIL 5F 110CM PG145: Type: IMPLANTABLE DEVICE | Status: FUNCTIONAL

## 2025-05-28 DEVICE — KIT A-LINE 1LUM 20G X 12CM SAFE KIT: Type: IMPLANTABLE DEVICE | Status: FUNCTIONAL

## 2025-05-28 DEVICE — WIRE GD SAFARI2 275CM XSML CRV: Type: IMPLANTABLE DEVICE | Status: FUNCTIONAL

## 2025-05-28 DEVICE — WIRE GUIDE EXCHANGE J TIP 3MM X 260CM: Type: IMPLANTABLE DEVICE | Status: FUNCTIONAL

## 2025-05-28 DEVICE — GWIRE STR .038X180 STIFF LONG TAPR: Type: IMPLANTABLE DEVICE | Status: FUNCTIONAL

## 2025-05-28 DEVICE — GWIRE GUID  0.035INX150CM: Type: IMPLANTABLE DEVICE | Status: FUNCTIONAL

## 2025-05-28 DEVICE — KIT CVC 2LUM MAC 9FR CHG: Type: IMPLANTABLE DEVICE | Status: FUNCTIONAL

## 2025-05-28 DEVICE — GUIDEWIRE AMPLATZ EXTRA-STIFF CURVED .035" X 260CM: Type: IMPLANTABLE DEVICE | Status: FUNCTIONAL

## 2025-05-28 DEVICE — INTRODUCER SHEATH DRYSEAL FLEX 18FR X 33CM: Type: IMPLANTABLE DEVICE | Status: FUNCTIONAL

## 2025-05-28 DEVICE — VLV AORTIC EVOLUT FX PLUS 29MM: Type: IMPLANTABLE DEVICE | Status: FUNCTIONAL

## 2025-05-28 DEVICE — PACING CATH PACEL RIGHT HEART CURVE 5FR: Type: IMPLANTABLE DEVICE | Status: FUNCTIONAL

## 2025-05-28 DEVICE — CATH TAVR EVOLUT FX 14FR 23-29MM: Type: IMPLANTABLE DEVICE | Status: FUNCTIONAL

## 2025-05-28 DEVICE — CATH DIAG DXTERITY AMPLATZ LEFT 5F 100CM AL10: Type: IMPLANTABLE DEVICE | Status: FUNCTIONAL

## 2025-05-28 DEVICE — CATH DIAG 5F JR4 100CM: Type: IMPLANTABLE DEVICE | Status: FUNCTIONAL

## 2025-05-28 DEVICE — ANGIOSEAL VASC CLOS VIP 6FR: Type: IMPLANTABLE DEVICE | Status: FUNCTIONAL

## 2025-05-28 RX ORDER — AMIODARONE HYDROCHLORIDE 50 MG/ML
1 INJECTION, SOLUTION INTRAVENOUS
Refills: 0 | DISCHARGE

## 2025-05-28 RX ORDER — AMIODARONE HYDROCHLORIDE 50 MG/ML
100 INJECTION, SOLUTION INTRAVENOUS DAILY
Refills: 0 | Status: DISCONTINUED | OUTPATIENT
Start: 2025-05-28 | End: 2025-05-29

## 2025-05-28 RX ORDER — CEFUROXIME SODIUM 1.5 G
1500 VIAL (EA) INJECTION EVERY 8 HOURS
Refills: 0 | Status: COMPLETED | OUTPATIENT
Start: 2025-05-28 | End: 2025-05-29

## 2025-05-28 RX ORDER — ASPIRIN 325 MG
81 TABLET ORAL DAILY
Refills: 0 | Status: DISCONTINUED | OUTPATIENT
Start: 2025-05-28 | End: 2025-05-29

## 2025-05-28 RX ORDER — ATORVASTATIN CALCIUM 80 MG/1
20 TABLET, FILM COATED ORAL AT BEDTIME
Refills: 0 | Status: DISCONTINUED | OUTPATIENT
Start: 2025-05-28 | End: 2025-05-29

## 2025-05-28 RX ORDER — FINASTERIDE 1 MG/1
5 TABLET, FILM COATED ORAL DAILY
Refills: 0 | Status: DISCONTINUED | OUTPATIENT
Start: 2025-05-28 | End: 2025-05-29

## 2025-05-28 RX ORDER — LIDOCAINE HCL/PF 10 MG/ML
0.2 VIAL (ML) INJECTION ONCE
Refills: 0 | Status: COMPLETED | OUTPATIENT
Start: 2025-05-28 | End: 2025-05-28

## 2025-05-28 RX ORDER — LEVOTHYROXINE SODIUM 300 MCG
50 TABLET ORAL DAILY
Refills: 0 | Status: DISCONTINUED | OUTPATIENT
Start: 2025-05-28 | End: 2025-05-29

## 2025-05-28 RX ORDER — LEVOTHYROXINE SODIUM 300 MCG
1 TABLET ORAL
Refills: 0 | DISCHARGE

## 2025-05-28 RX ORDER — METOPROLOL SUCCINATE 50 MG/1
75 TABLET, EXTENDED RELEASE ORAL DAILY
Refills: 0 | Status: DISCONTINUED | OUTPATIENT
Start: 2025-05-28 | End: 2025-05-29

## 2025-05-28 RX ADMIN — METOPROLOL SUCCINATE 75 MILLIGRAM(S): 50 TABLET, EXTENDED RELEASE ORAL at 17:13

## 2025-05-28 RX ADMIN — Medication 81 MILLIGRAM(S): at 17:13

## 2025-05-28 RX ADMIN — FINASTERIDE 5 MILLIGRAM(S): 1 TABLET, FILM COATED ORAL at 17:13

## 2025-05-28 RX ADMIN — Medication 100 MILLIGRAM(S): at 18:53

## 2025-05-28 RX ADMIN — Medication 0.2 MILLILITER(S): at 08:51

## 2025-05-28 NOTE — PROGRESS NOTE ADULT - PROBLEM SELECTOR PLAN 1
s/p 5/28 TAVR- JUAN CARLOS  postop course unremarkable- tx 2cohen fl  afib - resume eliquis in am thur; resume amio/bb   resume preop meds; bilateral groin sites stable  ekg/ echo in am   discharge home in am with [facility]

## 2025-05-28 NOTE — BRIEF OPERATIVE NOTE - NSICDXBRIEFPROCEDURE_GEN_ALL_CORE_FT
PROCEDURES:  Percutaneous transcatheter aortic valve replacement (TAVR) by femoral artery approach 28-May-2025 12:46:44  Maria A Ray A

## 2025-05-28 NOTE — PRE-OP CHECKLIST - 4.
Patient  preferred son to translate for anesthesia consent. Offered  services and pt declined preferring son at this time.

## 2025-05-28 NOTE — CHART NOTE - NSCHARTNOTEFT_GEN_A_CORE
Signout as per Dr. Calvillo as follows     Bam TAVR   Anesthesia: MAC  Valve: 29mm Evolut Fx valve within a 25mm Thermofix surgical valve  Access: transfemoral  Right groin 18Fr arterial sheath - closed with Perclose x 2   Left groin 6Fr arterial sheath closed with Angioseal  Left groin 8Fr venous sheath - TVP removed in the lab, manual pressure    Arrived to PACU at 1218 s/p TAVR under conscious sedation  Patient is alert and oriented and answering questions appropriately  Denies pain or SOB    T(C): 36.4 (05-28-25 @ 12:18), Max: 36.4 (05-28-25 @ 08:23)  HR: 75 (05-28-25 @ 12:45) (75 - 120)  BP: 110/61 (05-28-25 @ 12:45) (109/57 - 128/79)  RR: 16 (05-28-25 @ 12:45) (16 - 16)  SpO2: 93% (05-28-25 @ 12:45) (93% - 96%)    EKG:   Pre-op  5/7  AFib, rate 126  , QTc 483    Post-op  AFib, rate 90    QTc 472    aMIOdarone    Tablet 100 milliGRAM(s) Oral daily  aspirin enteric coated 81 milliGRAM(s) Oral daily  atorvastatin 20 milliGRAM(s) Oral at bedtime  cefuroxime  IVPB 1500 milliGRAM(s) IV Intermittent once  cefuroxime  IVPB 1500 milliGRAM(s) IV Intermittent every 8 hours  finasteride 5 milliGRAM(s) Oral daily  levothyroxine 50 MICROGram(s) Oral daily  metoprolol succinate ER 75 milliGRAM(s) Oral daily  pantoprazole    Tablet 40 milliGRAM(s) Oral before breakfast      on exam  Pulm CTA anteriorly and laterally  CV S1S2 RRR   abd soft non tender +BS  b/l groin - C/D/I with dressing, no hematoma or bleeding. +2 pulses       Assessment/Plan   - pBNP 4/15 = 2199, 5/13 = 2908, NYHA II, Chronic Diastolic heart failure    - Abx per protocol    - Echo and EKG ordered for AM   - Home meds reviewed     Afib - Continue Amiodarone, continue Metoprolol. Resume Eliquis 2.5mg BID tomorrow      HLD - atorvastatin continued     BPH - Finasteride continued     Hypothyroidism - Levothyroxine continued     Start ASA 81mg 4 hours post op     Chronic Diastolic HF - pt. with hx of CKD, bumex was recently discontinued, reassess diuretic needs prior to discharge   - DC planning, home tomorrow with [facility]    Signout given to 2[facility] ACP

## 2025-05-28 NOTE — PRE-OP CHECKLIST - STERILIZATION AFFIRMATION
Per radiology, for breast lesion, a diagnostic mammogram must be ordered in addition to the US. Codi Diaz RN on 11/20/2019 at 12:00 PM     n/a

## 2025-05-28 NOTE — BRIEF OPERATIVE NOTE - COMMENTS
Right Transfemoral valve in valve TAVR, 29mm Medtronic Evolut Fx valve within a 25mm Thermofix surgical valve, TVP wire removed

## 2025-05-28 NOTE — BRIEF OPERATIVE NOTE - FIRST ASSIST
Emergency [address] by MERON IBARRA [facility]  [address] [facility], [address] [facility]  Open M, W, F: 8AM - 5PM and T, Th: 8AM-6PM  Phone: [phone], press 4  $70 for Emergency Care  $60 for first routine care, then pay by sliding scale based upon income. [facility]  [address] [place]  Phone: [phone]    [facility]  [address] [facility], [address]  Open Monday - Friday 8AM - 4:30 PM  Phone: [address] [place] [address] Dentistry Urgent [address] [place] [address] Dentistry, [address] [place] [address], [place] [address], [address] [place] [address] starting at 8:30 AM M-F  Phone: [phone], press 2  Fee: $150 per tooth (x-ray & extractions only)  Pediatrics Phone[de-identified] [phone], 8-5 M-F    [address] Dentistry, [address] [facility], [facility] 45, 2nd Floor, [address] [place] [address] starting at 8:30 AM - 3 PM [address] [place] [address] St  225 [facility], [address] [place] [address]  Phone: [phone] or [phone]  Emergency Hours: 9:30AM - 11AM (extractions)  Simple tooth extraction $ per tooth. #75 for x-ray    [facility] Residents only, over the age of 25  Phone: [phone]. Leave message saying you need an appointment to register. Hours: Tuesday Evenings            Tooth Decay: Care Instructions  Your Care Instructions    Tooth decay is damage to a tooth caused by plaque. Plaque is a thin film of bacteria that sticks to the teeth above and below the gum line. If plaque isn't removed from the teeth, it can build up and harden into tartar. The bacteria in plaque and tartar use sugars in food to make acids. These acids can cause tooth decay and gum disease. Any part of your tooth can decay, from the roots below the gum line to the chewing surface.  Decay can affect the outer layer (enamel) or inner layer (dentin) of your teeth. The deeper the decay, the worse the damage. Untreated tooth decay will get worse and may lead to tooth loss. If you have a small hole (cavity) in your tooth, your dentist can repair it by removing the decay and filling the hole. If you have deeper decay, you may need more treatment. A very badly damaged tooth may have to be removed. Follow-up care is a key part of your treatment and safety. Be sure to make and go to all appointments, and call your dentist if you are having problems. It's also a good idea to know your test results and keep a list of the medicines you take. How can you care for yourself at home? If you have pain:  · Take an over-the-counter pain medicine, such as acetaminophen (Tylenol), ibuprofen (Advil, Motrin), or naproxen (Aleve). Be safe with medicines. Read and follow all instructions on the label. ¨ Do not take two or more pain medicines at the same time unless the doctor told you to. Many pain medicines have acetaminophen, which is Tylenol. Too much acetaminophen (Tylenol) can be harmful. · Put ice or a cold pack on your cheek over the tooth for 10 to 15 minutes at a time. Put a thin cloth between the ice and your skin. To prevent tooth decay  · Brush teeth twice a day, and floss once a day. Brushing with fluoride toothpaste and flossing may be enough to reverse early decay. · Use a toothbrush with soft, rounded-end bristles and a head that is small enough to reach all parts of your teeth and mouth. Replace your toothbrush every 3 or 4 months. You may also use an electric toothbrush that has rotating and oscillating (back-and-forth) action. · Ask your dentist about having fluoride treatments at the dental office. · Brush your tongue to help get rid of bacteria. · Eat healthy foods that include whole grains, vegetables, and fruits. · Have your teeth cleaned by a professional at least two times a year.   · Do not smoke or use smokeless tobacco. Tobacco can make tooth decay worse. When should you call for help? Call your dentist now or seek immediate medical care if:  · You have signs of infection, such as:  ¨ Increased pain, swelling, warmth, or redness. ¨ Red streaks on the gum leading from a tooth. ¨ Pus draining from the gum around a tooth. ¨ A fever. · You have a toothache. Watch closely for changes in your health, and be sure to contact your dentist if you have any problems. Where can you learn more? Go to http://remigio-markos.info/. Enter P514 in the search box to learn more about \"Tooth Decay: Care Instructions. \"  Current as of: August 9, 2016  Content Version: 11.2  © 1286-3019 [employer]. Care instructions adapted under license by [employer] (which disclaims liability or warranty for this information). If you have questions about a medical condition or this instruction, always ask your healthcare professional. [place] [address] any warranty or liability for your use of this information. Dental Pain: After Your Visit  Your Care Instructions  The most common cause of dental pain is tooth decay. It can also be caused by an infection of the tooth (abscess) or gum, a tooth that has not broken all the way through the gum (impacted tooth), or a problem with the nerve-filled center of the tooth. Follow-up care is a key part of your treatment and safety. Be sure to make and go to all appointments, and call your doctor if you are having problems. Its also a good idea to know your test results and keep a list of the medicines you take. How can you care for yourself at home? · Contact a dentist for follow-up care. · Put ice or a cold pack on the outside of your mouth for 10 to 20 minutes at a time to reduce pain and swelling. Put a thin cloth between the ice and your skin.   · Take an over-the-counter pain medicine, such as acetaminophen (Tylenol), ibuprofen (Advil, Motrin), or naproxen (Sia). Read and follow all instructions on the label. · Do not take two or more pain medicines at the same time unless the doctor told you to. Many pain medicines have acetaminophen, which is Tylenol. Too much acetaminophen (Tylenol) can be harmful. · Rinse your mouth with warm salt water every 2 hours to help relieve pain and swelling from an infected tooth. Mix 1 teaspoon of salt in 8 ounces of water. · If your doctor prescribed antibiotics, take them as directed. Do not stop taking them just because you feel better. You need to take the full course of antibiotics. When should you call for help? Call your doctor now or seek immediate medical care if:  · You have signs of infection, such as:  ¨ Increased pain, swelling, warmth, or redness. ¨ Pus draining from the gum, tooth, or face. ¨ A fever. Watch closely for changes in your health, and be sure to contact your doctor if:  · You do not get better as expected. Where can you learn more? Go to Kormeli.be  Enter V264 in the search box to learn more about \"Dental Pain: After Your Visit. \"   © 6500-8094 [employer]. Care instructions adapted under license by Eugenia [employer] (which disclaims liability or warranty for this information). This care instruction is for use with your licensed healthcare professional. If you have questions about a medical condition or this instruction, always ask your healthcare professional. [address] any warranty or liability for your use of this information. Content Version: 50.3.139193;  Last Revised: May 17, 2013 No Assistant

## 2025-05-29 ENCOUNTER — TRANSCRIPTION ENCOUNTER (OUTPATIENT)
Age: 85
End: 2025-05-29

## 2025-05-29 ENCOUNTER — NON-APPOINTMENT (OUTPATIENT)
Age: 85
End: 2025-05-29

## 2025-05-29 ENCOUNTER — RESULT REVIEW (OUTPATIENT)
Age: 85
End: 2025-05-29

## 2025-05-29 VITALS
DIASTOLIC BLOOD PRESSURE: 81 MMHG | RESPIRATION RATE: 18 BRPM | SYSTOLIC BLOOD PRESSURE: 120 MMHG | TEMPERATURE: 98 F | OXYGEN SATURATION: 97 % | HEART RATE: 63 BPM

## 2025-05-29 LAB
ALBUMIN SERPL ELPH-MCNC: 3.9 G/DL — SIGNIFICANT CHANGE UP (ref 3.3–5)
ALP SERPL-CCNC: 69 U/L — SIGNIFICANT CHANGE UP (ref 40–120)
ALT FLD-CCNC: 13 U/L — SIGNIFICANT CHANGE UP (ref 10–45)
ANION GAP SERPL CALC-SCNC: 14 MMOL/L — SIGNIFICANT CHANGE UP (ref 5–17)
AST SERPL-CCNC: 17 U/L — SIGNIFICANT CHANGE UP (ref 10–40)
BASOPHILS # BLD AUTO: 0 K/UL — SIGNIFICANT CHANGE UP (ref 0–0.2)
BASOPHILS NFR BLD AUTO: 0 % — SIGNIFICANT CHANGE UP (ref 0–2)
BILIRUB SERPL-MCNC: 1 MG/DL — SIGNIFICANT CHANGE UP (ref 0.2–1.2)
BUN SERPL-MCNC: 28 MG/DL — HIGH (ref 7–23)
CALCIUM SERPL-MCNC: 9 MG/DL — SIGNIFICANT CHANGE UP (ref 8.4–10.5)
CHLORIDE SERPL-SCNC: 104 MMOL/L — SIGNIFICANT CHANGE UP (ref 96–108)
CO2 SERPL-SCNC: 21 MMOL/L — LOW (ref 22–31)
CREAT SERPL-MCNC: 1.53 MG/DL — HIGH (ref 0.5–1.3)
DACRYOCYTES BLD QL SMEAR: SLIGHT — SIGNIFICANT CHANGE UP
EGFR: 44 ML/MIN/1.73M2 — LOW
EGFR: 44 ML/MIN/1.73M2 — LOW
ELLIPTOCYTES BLD QL SMEAR: SLIGHT — SIGNIFICANT CHANGE UP
EOSINOPHIL # BLD AUTO: 0 K/UL — SIGNIFICANT CHANGE UP (ref 0–0.5)
EOSINOPHIL NFR BLD AUTO: 0 % — SIGNIFICANT CHANGE UP (ref 0–6)
GLUCOSE BLDC GLUCOMTR-MCNC: 134 MG/DL — HIGH (ref 70–99)
GLUCOSE BLDC GLUCOMTR-MCNC: 84 MG/DL — SIGNIFICANT CHANGE UP (ref 70–99)
GLUCOSE SERPL-MCNC: 90 MG/DL — SIGNIFICANT CHANGE UP (ref 70–99)
HCT VFR BLD CALC: 37.4 % — LOW (ref 39–50)
HGB BLD-MCNC: 12.4 G/DL — LOW (ref 13–17)
LYMPHOCYTES # BLD AUTO: 0.64 K/UL — LOW (ref 1–3.3)
LYMPHOCYTES # BLD AUTO: 8.7 % — LOW (ref 13–44)
MANUAL SMEAR VERIFICATION: SIGNIFICANT CHANGE UP
MCHC RBC-ENTMCNC: 32.8 PG — SIGNIFICANT CHANGE UP (ref 27–34)
MCHC RBC-ENTMCNC: 33.2 G/DL — SIGNIFICANT CHANGE UP (ref 32–36)
MCV RBC AUTO: 98.9 FL — SIGNIFICANT CHANGE UP (ref 80–100)
MONOCYTES # BLD AUTO: 1.59 K/UL — HIGH (ref 0–0.9)
MONOCYTES NFR BLD AUTO: 21.7 % — HIGH (ref 2–14)
NEUTROPHILS # BLD AUTO: 5.1 K/UL — SIGNIFICANT CHANGE UP (ref 1.8–7.4)
NEUTROPHILS NFR BLD AUTO: 69.6 % — SIGNIFICANT CHANGE UP (ref 43–77)
PLAT MORPH BLD: NORMAL — SIGNIFICANT CHANGE UP
PLATELET # BLD AUTO: 66 K/UL — LOW (ref 150–400)
POIKILOCYTOSIS BLD QL AUTO: SLIGHT — SIGNIFICANT CHANGE UP
POLYCHROMASIA BLD QL SMEAR: SLIGHT — SIGNIFICANT CHANGE UP
POTASSIUM SERPL-MCNC: 3.8 MMOL/L — SIGNIFICANT CHANGE UP (ref 3.5–5.3)
POTASSIUM SERPL-SCNC: 3.8 MMOL/L — SIGNIFICANT CHANGE UP (ref 3.5–5.3)
PROT SERPL-MCNC: 6.6 G/DL — SIGNIFICANT CHANGE UP (ref 6–8.3)
RBC # BLD: 3.78 M/UL — LOW (ref 4.2–5.8)
RBC # FLD: 12.9 % — SIGNIFICANT CHANGE UP (ref 10.3–14.5)
RBC BLD AUTO: ABNORMAL
SODIUM SERPL-SCNC: 139 MMOL/L — SIGNIFICANT CHANGE UP (ref 135–145)
WBC # BLD: 7.33 K/UL — SIGNIFICANT CHANGE UP (ref 3.8–10.5)
WBC # FLD AUTO: 7.33 K/UL — SIGNIFICANT CHANGE UP (ref 3.8–10.5)

## 2025-05-29 PROCEDURE — 93005 ELECTROCARDIOGRAM TRACING: CPT

## 2025-05-29 PROCEDURE — 93306 TTE W/DOPPLER COMPLETE: CPT

## 2025-05-29 PROCEDURE — 80053 COMPREHEN METABOLIC PANEL: CPT

## 2025-05-29 PROCEDURE — C1760: CPT

## 2025-05-29 PROCEDURE — 93306 TTE W/DOPPLER COMPLETE: CPT | Mod: 26

## 2025-05-29 PROCEDURE — 82962 GLUCOSE BLOOD TEST: CPT

## 2025-05-29 PROCEDURE — C1889: CPT

## 2025-05-29 PROCEDURE — C1769: CPT

## 2025-05-29 PROCEDURE — 85025 COMPLETE CBC W/AUTO DIFF WBC: CPT

## 2025-05-29 PROCEDURE — C1887: CPT

## 2025-05-29 PROCEDURE — 93010 ELECTROCARDIOGRAM REPORT: CPT

## 2025-05-29 PROCEDURE — 76000 FLUOROSCOPY <1 HR PHYS/QHP: CPT

## 2025-05-29 PROCEDURE — 36415 COLL VENOUS BLD VENIPUNCTURE: CPT

## 2025-05-29 PROCEDURE — C8929: CPT

## 2025-05-29 PROCEDURE — 99232 SBSQ HOSP IP/OBS MODERATE 35: CPT

## 2025-05-29 PROCEDURE — C1894: CPT

## 2025-05-29 RX ORDER — ASPIRIN 325 MG
1 TABLET ORAL
Qty: 30 | Refills: 0
Start: 2025-05-29 | End: 2025-06-27

## 2025-05-29 RX ADMIN — FINASTERIDE 5 MILLIGRAM(S): 1 TABLET, FILM COATED ORAL at 11:09

## 2025-05-29 RX ADMIN — Medication 40 MILLIGRAM(S): at 06:33

## 2025-05-29 RX ADMIN — Medication 20 MILLIEQUIVALENT(S): at 08:20

## 2025-05-29 RX ADMIN — Medication 50 MICROGRAM(S): at 06:32

## 2025-05-29 RX ADMIN — AMIODARONE HYDROCHLORIDE 100 MILLIGRAM(S): 50 INJECTION, SOLUTION INTRAVENOUS at 06:36

## 2025-05-29 RX ADMIN — Medication 100 MILLIGRAM(S): at 03:29

## 2025-05-29 RX ADMIN — METOPROLOL SUCCINATE 75 MILLIGRAM(S): 50 TABLET, EXTENDED RELEASE ORAL at 06:32

## 2025-05-29 NOTE — DISCHARGE NOTE PROVIDER - CARE PROVIDER_API CALL
Romulo Bullock  Thoracic and Cardiac Surgery  [address] [place] [address]  Phone: [phone]  Fax: [phone]  Follow Up Time:     Pavel Calvillo  Interventional Cardiology  [address] [place] [address]  Phone: [phone]  Fax: [phone]  Follow Up Time: 1 month    Ismael Ramirez  Internal Medicine  [address] [place] [address], [facility] [address]  [place] [address]  Phone: [phone]  Fax: [phone]  Established Patient  Follow Up Time: 2 weeks    Antonino Delgado  Nuclear Cardiology  [address] [place] [address]  Phone: [phone]  Fax: [phone]  Established Patient  Follow Up Time: 1 month   Romulo Bullock  Thoracic and Cardiac Surgery  [address] [place] [address]  Phone: [phone]  Fax: [phone]  Scheduled Appointment: 06/03/2025 03:15 AM    Pavel Calvillo  Interventional Cardiology  [address] [place] [address]  Phone: [phone]  Fax: [phone]  Follow Up Time: 1 month    Ismael Ramirez  Internal Medicine  [address] [place] [address], [address]  [place] [address]  Phone: [phone]  Fax: [phone]  Established Patient  Follow Up Time: 2 weeks    Antonino Delgado  Nuclear Cardiology  [address] [place] [address]  Phone: [phone]  Fax: [phone]  Established Patient  Follow Up Time: 1 month

## 2025-05-29 NOTE — DISCHARGE NOTE PROVIDER - NSDCCPCAREPLAN_GEN_ALL_CORE_FT
PRINCIPAL DISCHARGE DIAGNOSIS  Diagnosis: S/p TAVR (transcatheter aortic valve replacement), bioprosthetic  Assessment and Plan of Treatment:   1. Daily Shower   2. Weight yourself daily and notify any weight gain greater than 2-3 pounds in 24 hours.  3. Regular DASH diet - low fat, low cholesterol, no added salt.  4. Notify MD and Dentist the need of antibiotic prophylaxis before any dental procedure to prevent infection of your new valve.   5. Follow Post op TAVR Do's and Don'ts discharge instructions.   6. No heavy lifting or straining x 2 day.   7. Call / Notify MD any fever greater than 101.0  8. Increase Activity as tolerated.   9. Check your groin site for bleeding and/or swelling daily following procedure and call your doctor immediately if it occurs or if you experience increased pain at the site.  10. Resume home medication as prescribed and instructed in discharge paperwork.   11. Continue to wear your MCOT Patch for 30 days duration.   12. Change the patch if instructed by the monitor or if the patch begins to loosen. Patches should last approximately 5 days.  13. Keep the monitor and sensor within close range of each other throughout your monitoring period. To avoid getting alert messages on the monitor, keep the monitor within 30 feet of the sensor at all times.  14. Record any symptoms as they occur. Promptly respond to any messages or alerts that you receive on the monitor  15. When Showering with the MCOT Patch: components are water-resistant, not waterproof. You can shower normally; however, for optimal results, avoid spraying water directly onto the sensor.   16. While showering, it is recommended to face away from the shower head.   17. Do not swim, or take baths while wearing the MCOT Patch.

## 2025-05-29 NOTE — DISCHARGE NOTE PROVIDER - PROVIDER TOKENS
PROVIDER:[TOKEN:[[id number]]],PROVIDER:[TOKEN:[[id number]],FOLLOWUP:[1 month]],PROVIDER:[TOKEN:[[id number]],FOLLOWUP:[2 weeks],ESTABLISHEDPATIENT:[T]],PROVIDER:[TOKEN:[[id number]],FOLLOWUP:[1 month],ESTABLISHEDPATIENT:[T]] PROVIDER:[TOKEN:[[id number]],SCHEDULEDAPPT:[06/03/2025],SCHEDULEDAPPTTIME:[03:15 AM]],PROVIDER:[TOKEN:[[id number]],FOLLOWUP:[1 month]],PROVIDER:[TOKEN:[[id number]],FOLLOWUP:[2 weeks],ESTABLISHEDPATIENT:[T]],PROVIDER:[TOKEN:[[id number]],FOLLOWUP:[1 month],ESTABLISHEDPATIENT:[T]]

## 2025-05-29 NOTE — DISCHARGE NOTE PROVIDER - NSDCACTIVITY_GEN_ALL_CORE
No restrictions/Showering allowed/Stairs allowed/Walking - Indoors allowed/No heavy lifting/straining/Walking - Outdoors allowed/Follow Instructions Provided by your Surgical Team

## 2025-05-29 NOTE — DISCHARGE NOTE NURSING/CASE MANAGEMENT/SOCIAL WORK - NSDCFUADDAPPT_GEN_ALL_CORE_FT
1. Follow up with Dr. Bullock for a post op follow up visit on Tuesday 6/3/25 at 3:15pm, please call the [facility] office to confirm and/or the need to change your appointment at [phone].     2. Follow up with your Interventional Cardiologist Dr. Calvillo in 4 weeks post TAVR procedure for a post op follow up visit and a repeat echocardiogram at the time of the visit.  Please call the office to schedule an appointment at [phone].     3. Please schedule an appointment with your PCP Dr. Ramirez in 2 weeks, call the office to schedule an appointment.     4. Please schedule an appointment with your Cardiologist in 1-2 weeks, please call the office to schedule an appointment.

## 2025-05-29 NOTE — PROGRESS NOTE ADULT - SUBJECTIVE AND OBJECTIVE BOX
VITAL SIGNS    Telemetry: afib 100   Vital Signs Last 24 Hrs  T(C): 36.4 (05-28-25 @ 16:05), Max: 36.4 (05-28-25 @ 08:23)  T(F): 97.5 (05-28-25 @ 16:05), Max: 97.5 (05-28-25 @ 08:23)  HR: 98 (05-28-25 @ 16:05) (74 - 120)  BP: 134/93 (05-28-25 @ 16:05) (109/57 - 134/93)  RR: 17 (05-28-25 @ 16:05) (16 - 17)  SpO2: 94% (05-28-25 @ 16:05) (93% - 100%)            05-28 @ 07:01  -  05-28 @ 16:23  --------------------------------------------------------  IN: 50 mL / OUT: 0 mL / NET: 50 mL       Daily Height in cm: 177.8 (28 May 2025 11:51)    Daily   Admit Wt: Drug Dosing Weight  Height (cm): 177.8 (28 May 2025 11:51)  Weight (kg): 101 (28 May 2025 11:51)  BMI (kg/m2): 31.9 (28 May 2025 11:51)  BSA (m2): 2.18 (28 May 2025 11:51)      CAPILLARY BLOOD GLUCOSE      POCT Blood Glucose.: 110 mg/dL (28 May 2025 07:56)          LABS:    05-28 @ 07:01  -  05-28 @ 16:23  --------------------------------------------------------  IN: 50 mL / OUT: 0 mL / NET: 50 mL    cret        aMIOdarone    Tablet 100 milliGRAM(s) Oral daily  aspirin enteric coated 81 milliGRAM(s) Oral daily  atorvastatin 20 milliGRAM(s) Oral at bedtime  cefuroxime  IVPB 1500 milliGRAM(s) IV Intermittent once  cefuroxime  IVPB 1500 milliGRAM(s) IV Intermittent every 8 hours  finasteride 5 milliGRAM(s) Oral daily  levothyroxine 50 MICROGram(s) Oral daily  metoprolol succinate ER 75 milliGRAM(s) Oral daily  pantoprazole    Tablet 40 milliGRAM(s) Oral before breakfast      PHYSICAL EXAM    Subjective: "I feel ok."   Neurology: alert and oriented x 3, nonfocal, no gross deficits  CV : tele:  afib 100   Lungs: clear. RR easy, unlabored   Abdomen: soft, nontender, nondistended, positive bowel sounds, + bowel movement   Neg N/V/D   :  pt voiding without difficulty   Extremities:   FREGOSO; neg LE edema, neg calf tenderness.   PPP bilaterally ; bilateral groin sites cdi w/ dressing             
    VITAL SIGNS    Subjective: Denies CP, palpitation, SOB, JOSEPH, HA, dizziness, N/V/D, fever or chills.  No acute event noted overnight.     Telemetry:  Afib / Aflutter     Vital Signs Last 24 Hrs  T(C): 36.5 (05-29-25 @ 05:00), Max: 36.5 (05-28-25 @ 18:46)  T(F): 97.7 (05-29-25 @ 05:00), Max: 97.7 (05-28-25 @ 18:46)  HR: 78 (05-29-25 @ 05:00) (74 - 121)  BP: 116/75 (05-29-25 @ 05:00) (109/57 - 134/93)  RR: 18 (05-29-25 @ 05:00) (16 - 18)  SpO2: 96% (05-29-25 @ 05:00) (91% - 100%)           05-28 @ 07:01  -  05-29 @ 07:00  --------------------------------------------------------  IN: 200 mL / OUT: 650 mL / NET: -450 mL      LABS  05-29    139  |  104  |  28[H]  ----------------------------<  90  3.8   |  21[L]  |  1.53[H]    Ca    9.0      29 May 2025 06:17    TPro  6.6  /  Alb  3.9  /  TBili  1.0  /  DBili  x   /  AST  17  /  ALT  13  /  AlkPhos  69  05-29                                 12.4   7.33  )-----------( 66       ( 29 May 2025 06:18 )             37.4            Daily Height in cm: 177.8 (28 May 2025 11:51)    Daily     CAPILLARY BLOOD GLUCOSE    POCT Blood Glucose.: 110 mg/dL (28 May 2025 07:56)        PHYSICAL EXAM    Neurology: alert and oriented x 3, nonfocal, no gross deficits    CV: (+) S1 and S2, No murmurs, rubs, gallops or clicks     Lungs: CTA B/L     Abdomen: soft, nontender, nondistended, positive bowel sounds, (+) Flatus; (+) BM     :  Voiding               Extremities:  B/L LE (-) edema; negative calf tenderness; (+) 2 DP palpable; B/L groin sites C/D/I         aMIOdarone Tablet 100 milliGRAM(s) Oral daily  aspirin enteric coated 81 milliGRAM(s) Oral daily  atorvastatin 20 milliGRAM(s) Oral at bedtime  cefuroxime  IVPB 1500 milliGRAM(s) IV Intermittent once  finasteride 5 milliGRAM(s) Oral daily  levothyroxine 50 MICROGram(s) Oral daily  metoprolol succinate ER 75 milliGRAM(s) Oral daily  pantoprazole Tablet 40 milliGRAM(s) Oral before breakfast    Physical Therapy Rec:   Home  [ X ]   Home w/ PT  [  ]  Rehab  [  ]    Discussed with Cardiothoracic Team at AM rounds.

## 2025-05-29 NOTE — PROGRESS NOTE ADULT - PROBLEM SELECTOR PLAN 1
s/p 5/28 TAVR- JUAN CARLOS  postop course unremarkable- tx 2cohen fl  afib - resume eliquis in am thur; resume amio/bb   resume preop meds; bilateral groin sites stable  ekg/ echo in am   discharge home in am with mcot Continue with ASA 81 mg PO Daily.   Continue with Toprol 75 mg PO daily    Continue with Lipitor 20 mg PO HS    Increase activity as tolerated.   Encourage Chest PT / Pulmonary toileting and Incentive spirometry every 1 hour x 10 while awake.   Continue with Protonix 40 mg PO daily for PUD prophylaxis.  Daily EKG   TTE today   D/C plan home with [facility] after TTE   Plan of care discussed with attending

## 2025-05-29 NOTE — PROGRESS NOTE ADULT - PROBLEM SELECTOR PLAN 2
resume eliquis in am thur  resume bb  resume amio 100 qd Started on Eliquis 2.5 mg PO BID  Continue with Toprol 75 mg PO daily    Continue Amio 100 mg PO daily

## 2025-05-29 NOTE — DISCHARGE NOTE NURSING/CASE MANAGEMENT/SOCIAL WORK - PATIENT PORTAL LINK FT
You can access the FollowMyHealth Patient Portal offered by [facility] by registering at the following website: [email or web address]. By joining [facility]’s FollowMyHealth portal, you will also be able to view your health information using other applications (apps) compatible with our system.

## 2025-05-29 NOTE — DISCHARGE NOTE PROVIDER - CARE PROVIDERS DIRECT ADDRESSES
[email or web address]

## 2025-05-29 NOTE — PROGRESS NOTE ADULT - ASSESSMENT
85 year old male with a history of Afib on Eliquis , severe aortic stenosis  status post bioprosthetic aortic valve replacement 7/2012 , HTN, hyperlipidemia, former tobacco use, chronic renal insufficiency and GERD. as per notes - He had a recent hospitalization in 1/25  with  CHF /Afib  s/pT EE/ DCCV during the hospitalization i 1/25  and went converted to sinus rhythm, however, he subsequently converted back to atrial fibrillation . Presents to PST for scheduled TAVR  on 5/28/25.   s/p 5/28 TAVR- JUAN CARLOS  postop course unremarkable- tx 2cohen fl; afib 100; resume preop meds; bilateral groin sites stable  resume eliquis in am thur   ekg/ echo in am   discharge home in am with marc 
85 year old male with a history of Afib on Eliquis , severe aortic stenosis  status post bioprosthetic aortic valve replacement 7/2012 , HTN, hyperlipidemia, former tobacco use, chronic renal insufficiency and GERD. As per notes - He had a recent hospitalization in 1/25 with CHF /Afib s/p DARA/DCCV during the hospitalization and converted to sinus rhythm, however, he subsequently converted back to atrial fibrillation. Presents to [facility] for scheduled TAVR on 5/28/25.     s/p 5/28/25 TAVR- JUAN CARLOS 29mm Evolut Fx valve within a 25mm Thermofix. Right groin 18Fr arterial sheath - closed with Perclose x 2. Left groin 6Fr arterial sheath closed with Angioseal. Left groin 8Fr venous sheath - TVP removed in the lab, manual pressure  Postop Course unremarkable. Transferred to [address] [place] [address]; afib 100; resume preop meds including Toprol 75 mg PO daily; bilateral groin sites stable.  Plan to resume Eliquis in am Thursday.  EKG /echo in am. Discharge home in am with JOYCE.    5/29 VSS; TTE completed.  Patient medically cleared for discharge home per Dr. Bullock.  JOYCE cardiac monitor placed by [employer] at bedside.  Instructions provided using return teach-back method.

## 2025-05-29 NOTE — DISCHARGE NOTE NURSING/CASE MANAGEMENT/SOCIAL WORK - NSDCPEFALRISK_GEN_ALL_CORE
For information on Fall & Injury Prevention, visit: [email or web address] OR  [email or web address] OR  https://www.cdc.gov/steadi/patient.html

## 2025-05-29 NOTE — DISCHARGE NOTE PROVIDER - NSDCPNSUBOBJ_GEN_ALL_CORE
VITAL SIGNS    Subjective: Denies CP, palpitation, SOB, JOSEPH, HA, dizziness, N/V/D, fever or chills.  No acute event noted overnight.    Telemetry: Afib / Aflutter       Vital Signs Last 24 Hrs  T(C): 36.5 (05-29-25 @ 05:00), Max: 36.5 (05-28-25 @ 18:46)  T(F): 97.7 (05-29-25 @ 05:00), Max: 97.7 (05-28-25 @ 18:46)  HR: 78 (05-29-25 @ 05:00) (74 - 121)  BP: 116/75 (05-29-25 @ 05:00) (109/57 - 134/93)  RR: 18 (05-29-25 @ 05:00) (16 - 18)  SpO2: 96% (05-29-25 @ 05:00) (91% - 100%)           05-28 @ 07:01  -  05-29 @ 07:00  --------------------------------------------------------  IN: 200 mL / OUT: 650 mL / NET: -450 mL    LABS  05-29    139  |  104  |  28[H]  ----------------------------<  90  3.8   |  21[L]  |  1.53[H]    Ca    9.0      29 May 2025 06:17    TPro  6.6  /  Alb  3.9  /  TBili  1.0  /  DBili  x   /  AST  17  /  ALT  13  /  AlkPhos  69  05-29                                 12.4   7.33  )-----------( 66       ( 29 May 2025 06:18 )             37.4            Daily Height in cm: 177.8 (28 May 2025 11:51)    Daily     CAPILLARY BLOOD GLUCOSE    POCT Blood Glucose.: 110 mg/dL (28 May 2025 07:56)        PHYSICAL EXAM    Neurology: alert and oriented x 3, nonfocal, no gross deficits    CV: (+) S1 and S2, No murmurs, rubs, gallops or clicks     Lungs: CTA B/L     Abdomen: soft, nontender, nondistended, positive bowel sounds, (+) Flatus; (+) BM     :  Voiding               Extremities:  B/L LE (-) edema; negative calf tenderness; (+) 2 DP palpable; B/L groin sites C/D/I        Physical Therapy Rec:   Home  [ X ]   Home w/ PT  [  ]  Rehab  [  ]    Discussed with Cardiothoracic Team at AM rounds.    Spent 45 min face to face encounter with patient and discharge note.

## 2025-05-29 NOTE — DISCHARGE NOTE PROVIDER - NSDCMRMEDTOKEN_GEN_ALL_CORE_FT
amiodarone 100 mg oral tablet: 1 tab(s) orally once a day  aspirin 81 mg oral delayed release tablet: 1 tab(s) orally once a day  Eliquis 2.5 mg oral tablet: 1 tab(s) orally 2 times a day  finasteride 5 mg oral tablet: 1 tab(s) orally once a day (at bedtime)  levothyroxine 50 mcg (0.05 mg) oral tablet: 1 tab(s) orally once a day  Lipitor 20 mg oral tablet: 1 tab(s) orally once a day  metoprolol succinate 25 mg oral tablet, extended release: 3 tab(s) orally once a day MDD: 3 tabs  pantoprazole 40 mg oral delayed release tablet: 1 tab(s) orally once a day (before a meal)

## 2025-05-29 NOTE — DISCHARGE NOTE PROVIDER - NSDCFUADDAPPT_GEN_ALL_CORE_FT
1. Follow up with Dr. Bullock for a post op follow up visit on ** at **, please call the [facility] office to confirm and/or the need to change your appointment at [phone].     2. Follow up with your Interventional Cardiologist Dr. Calvillo in 4 weeks post TAVR procedure for a post op follow up visit and a repeat echocardiogram at the time of the visit.  Please call the office to schedule an appointment at [phone].     3. Please schedule an appointment with your PCP Dr. Ramirez in 2 weeks, call the office to schedule an appointment.     4. Please schedule an appointment with your Cardiologist in 1-2 weeks, please call the office to schedule an appointment.  1. Follow up with Dr. Bullock for a post op follow up visit on Tuesday 6/3/25 at 3:15pm, please call the [facility] office to confirm and/or the need to change your appointment at [phone].     2. Follow up with your Interventional Cardiologist Dr. Calvillo in 4 weeks post TAVR procedure for a post op follow up visit and a repeat echocardiogram at the time of the visit.  Please call the office to schedule an appointment at [phone].     3. Please schedule an appointment with your PCP Dr. Ramirez in 2 weeks, call the office to schedule an appointment.     4. Please schedule an appointment with your Cardiologist in 1-2 weeks, please call the office to schedule an appointment.

## 2025-05-29 NOTE — DISCHARGE NOTE PROVIDER - HOSPITAL COURSE
85 year old male with a history of Afib on Eliquis , severe aortic stenosis  status post bioprosthetic aortic valve replacement 7/2012 , HTN, hyperlipidemia, former tobacco use, chronic renal insufficiency and GERD. As per notes - He had a recent hospitalization in 1/25 with CHF /Afib s/p DARA/DCCV during the hospitalization and converted to sinus rhythm, however, he subsequently converted back to atrial fibrillation. Presents to [facility] for scheduled TAVR on 5/28/25.     s/p 5/28/25 TAVR- JUAN CARLOS 29mm Evolut Fx valve within a 25mm Thermofix. Right groin 18Fr arterial sheath - closed with Perclose x 2. Left groin 6Fr arterial sheath closed with Angioseal. Left groin 8Fr venous sheath - TVP removed in the lab, manual pressure  Postop Course unremarkable. Transferred to [address] [place] [address]; afib 100; resume preop meds including Toprol 75 mg PO daily; bilateral groin sites stable.  Plan to resume Eliquis in am Thursday.  EKG /echo in am. Discharge home in am with JOYCE.    5/29 VSS; TTE completed.  Patient medically cleared for discharge home per Dr. Bullock.  JOYCE cardiac monitor placed by [employer] at bedside.  Instructions provided using return teach-back method.   85 year old male with a history of Afib on Eliquis , severe aortic stenosis  status post bioprosthetic aortic valve replacement 7/2012 , HTN, hyperlipidemia, former tobacco use, chronic renal insufficiency and GERD. As per notes - He had a recent hospitalization in 1/25 with CHF /Afib s/p DARA/DCCV during the hospitalization and converted to sinus rhythm, however, he subsequently converted back to atrial fibrillation. Presents to [facility] for scheduled TAVR on 5/28/25.     s/p 5/28/25 TAVR- JUAN CARLOS 29mm Evolut Fx valve within a 25mm Thermofix. Right groin 18Fr arterial sheath - closed with Perclose x 2. Left groin 6Fr arterial sheath closed with Angioseal. Left groin 8Fr venous sheath - TVP removed in the lab, manual pressure  Postop Course unremarkable. Transferred to [address] [place] [address]; afib 100; resume preop meds including Toprol 75 mg PO daily; bilateral groin sites stable.  Plan to resume Eliquis in am Thursday.  EKG /echo in am. Discharge home in am with JOYCE.    5/29 VSS; TTE completed. TTE W or WO Ultrasound Enhancing Agent (05.29.25 @ 07:46) 29 mm Evolut FX+ (valve-in-valve) is present in the aortic position. Implanted 5/28/2025. The prosthetic valve is well seated with normal function. There is no intravalvular regurgitation. There is no paravalvular regurgitation. Mild to moderate mitral regurgitation. Moderate tricuspid regurgitation. No pericardial effusion seen. Patient medically cleared for discharge home per Dr. Bullock.  [facility] cardiac monitor placed by [employer] at bedside.  Instructions provided using return teach-back method.

## 2025-05-29 NOTE — DISCHARGE NOTE NURSING/CASE MANAGEMENT/SOCIAL WORK - FINANCIAL ASSISTANCE
[facility] provides services at a reduced cost to those who are determined to be eligible through [facility]’s financial assistance program. Information regarding [facility]’s financial assistance program can be found by going to [email or web address] or by calling [phone].

## 2025-05-29 NOTE — DISCHARGE NOTE NURSING/CASE MANAGEMENT/SOCIAL WORK - NURSING SECTION COMPLETE
Medical Assistant/Nurse notes reviewed and accepted.  Problem List Reviewed.  Skin system in problem list updated.    Digital photo(s) obtained with patient consent.       MOHS MICROGRAPHIC SURGERY    PROCEDURE: 1 of 2    Surgeon: Fidel Trejo MD  Scrub nurse: Paula Rodriguez MA, Rachele Kapadia MA and Raymundo Arroyo MA  Lesion: 0.8 cm.  basal cell carcinoma from right  lateral  cutaneous upper lip.  Final defect: 1.1 cm. by 1.0 cm.  Postop Diagnosis: Same  Preparation of skin: Betadine scrub and Sterile Water  Preparatory medications: none    Anesthesia: 6 cc of 1% Lidocaine with 1:100,000 epinephrine    Patient feels well today and wishes to proceed with surgery.    STAGE I, LAYER I:  The nature and purpose of the procedure, associated risks, possible consequences, complications, and alternative methods of treatment were explained to the patient in detail by Dr. Trejo. An informed operative consent was obtained. The patient was positioned, prepped, and draped in the usual sterile manner. The clinically apparent tumor was marked with Gentian violet along its outer border.  Local anesthesia was then obtained by infiltrating with the agents mentioned above.  The center of the clinically apparent tumor was lightly debulked with a curette.    STAGE I, LAYER 2:   An additional 1-2 mm rim of normal appearing tissue was marked circumferentially around the residual area(s) of skin cancer.   A 15c scalpel blade is used to make a small nick at the twelve o'clock position, perpendicular to and crossing the circumferential marking around the skin cancer. The area(s) thus outlined was/were excised deep to the superficial muscularis. Hemostasis was obtained with Surgi-stat  electrocoagulation. The specimen was oriented, subdivided into 1 section(s), chromacoded, and submitted for horizontal frozen sections. The patient tolerated the procedure well and there were no complications noted. On review of the horizontal frozen  sections of Stage 1, Layer 2, no residual tumor is identified.       The total number of microscopic sections was 1    CLOSURE OF DEFECT:  Closure options were discussed with the patient.  Type: Complex linear closure    DRESSING: The wound was dressed with a Polysporin ointment, Adaptic, gauze, Hypafix pressure type bandage. Wound care instructions given and follow-up arranged.    DIAGNOSIS: basal cell carcinoma status post Mohs' surgery.    The patient tolerated the procedures well and left the dermatology clinic in good condition.  Home care instructions given and reviewed with patient.    Clinical photographs obtained with patient's consent.          MOHS MICROGRAPHIC SURGERY REPAIR   COMPLEX LINEAR CLOSURE    PROCEDURE: 2 of 2    Surgeon: Fidel Trejo MD  Scrub nurse: Paula Rodriguez MA  Anesthesia: 5 cc of 1% Lidocaine with 1:100,000 epinephrine  Clinical Diagnosis: 1.0 cm. by 1.1 cm. surgical defect secondary to Mohs micrographically controlled excision of basal cell carcinoma from right  lateral cutaneous upper lip.  Operation: Complex linear closure of Mohs defect on cutaneous upper lip.  Final defect: NA  Postop Diagnosis: Same  Preparation: Betadine scrub and Sterile Water    The nature and purpose of the procedure, associated risks, possible consequences, complications, and alternative methods of treatment were explained to the patient in detail by Dr. Trejo. An informed operative consent was obtained. The patient was positioned, prepped, and draped in the usual sterile manner. Adequate anesthesia was then obtained by infiltrating the above anesthetics along the edges and the base of the defect. The defect size prior to closure was 1.1 cm. The edges of the defect were undermined at the muscularis  subcutaneous layer approximately 1.5 and 2 cm. in all directions. The edges could then be apposed without significant tension. Adequate hemostasis was obtained with electrocoagulation. The deep wound edges  were apposed and sutured with multiple 4-0 Polysorb buried sutures. The epidermal edges were then approximated with 9 5-0 Prolene sutures. Redundant tissue repair was performed and subsequently closed in a similar fashion. The resulting closed linear wound measured approximately 3 cm. and ran tangentially and along relaxed skin tension lines. The surgical site was lightly scrubbed with sterile saline. Polysporin ointment ointment, adaptic, and gauze pressure dressing were applied to the closed wound. The patient tolerated the procedure well without complications and was given both verbal and written explicit instructions on postoperative wound care. Follow-up for suture removal in 14 days.    Patient was prescribed: Plain Tylenol prn    The patient was discharged from the Dermatology Clinic in good condition.          Patient/Caregiver provided printed discharge information.

## 2025-05-30 ENCOUNTER — APPOINTMENT (OUTPATIENT)
Dept: CARE COORDINATION | Facility: HOME HEALTH | Age: 85
End: 2025-05-30

## 2025-05-30 VITALS
HEART RATE: 130 BPM | BODY MASS INDEX: 35.63 KG/M2 | RESPIRATION RATE: 16 BRPM | WEIGHT: 227.52 LBS | OXYGEN SATURATION: 99 % | DIASTOLIC BLOOD PRESSURE: 80 MMHG | SYSTOLIC BLOOD PRESSURE: 128 MMHG

## 2025-05-30 RX ORDER — METOPROLOL SUCCINATE 25 MG/1
25 TABLET, EXTENDED RELEASE ORAL DAILY
Refills: 0 | Status: ACTIVE | COMMUNITY

## 2025-05-30 RX ORDER — ASPIRIN 81 MG/1
81 TABLET, DELAYED RELEASE ORAL DAILY
Qty: 30 | Refills: 0 | Status: ACTIVE | COMMUNITY

## 2025-06-03 ENCOUNTER — NON-APPOINTMENT (OUTPATIENT)
Age: 85
End: 2025-06-03

## 2025-06-03 ENCOUNTER — APPOINTMENT (OUTPATIENT)
Dept: CARDIOTHORACIC SURGERY | Facility: CLINIC | Age: 85
End: 2025-06-03
Payer: MEDICARE

## 2025-06-03 VITALS
BODY MASS INDEX: 36.48 KG/M2 | HEIGHT: 66 IN | WEIGHT: 227 LBS | SYSTOLIC BLOOD PRESSURE: 121 MMHG | OXYGEN SATURATION: 97 % | RESPIRATION RATE: 16 BRPM | DIASTOLIC BLOOD PRESSURE: 73 MMHG | TEMPERATURE: 98 F

## 2025-06-03 DIAGNOSIS — Z95.2 PRESENCE OF PROSTHETIC HEART VALVE: ICD-10-CM

## 2025-06-03 PROCEDURE — 93005 ELECTROCARDIOGRAM TRACING: CPT

## 2025-06-03 PROCEDURE — 99213 OFFICE O/P EST LOW 20 MIN: CPT

## 2025-06-11 ENCOUNTER — LABORATORY RESULT (OUTPATIENT)
Age: 85
End: 2025-06-11

## 2025-06-11 ENCOUNTER — APPOINTMENT (OUTPATIENT)
Dept: INTERNAL MEDICINE | Facility: CLINIC | Age: 85
End: 2025-06-11
Payer: MEDICARE

## 2025-06-11 VITALS
DIASTOLIC BLOOD PRESSURE: 75 MMHG | SYSTOLIC BLOOD PRESSURE: 117 MMHG | TEMPERATURE: 97.2 F | OXYGEN SATURATION: 96 % | WEIGHT: 220 LBS | BODY MASS INDEX: 35.51 KG/M2 | RESPIRATION RATE: 14 BRPM | HEART RATE: 84 BPM

## 2025-06-11 PROCEDURE — G0439: CPT

## 2025-06-11 PROCEDURE — 99214 OFFICE O/P EST MOD 30 MIN: CPT | Mod: 25

## 2025-06-11 PROCEDURE — 36415 COLL VENOUS BLD VENIPUNCTURE: CPT

## 2025-06-16 NOTE — PRE-ANESTHESIA EVALUATION ADULT - NSANTHINDVINFOSD_GEN_ALL_CORE
[facility]/Population Health Chart Review & Patient Outreach Details For Medication Adherence Project    Reason for Outreach Encounter: 3rd Party payor non-compliance report ([employer], [employer], C, etc)  2.  Patient Outreach Method: Reviewed Patient Chart  3.   Medication in question: valsartan   LAST FILLED: 4/14/25 for 90 day supply  Hypertension Medications              amLODIPine (NORVASC) 5 MG tablet Take 1 tablet (5 mg total) by mouth 2 (two) times daily.    metoprolol tartrate (LOPRESSOR) 25 MG tablet Take 1 tablet (25 mg total) by mouth 2 (two) times daily.    valsartan (DIOVAN) 160 MG tablet Take 1 tablet (160 mg total) by mouth 2 (two) times daily.              4.  Reviewed and or Updates Made To: Patient Chart  5. Outreach Outcomes and/or actions taken: Patient filled medication and is on track to be adherent      
patient

## 2025-06-17 LAB
25(OH)D3 SERPL-MCNC: 36.7 NG/ML
ALBUMIN SERPL ELPH-MCNC: 4.3 G/DL
ALP BLD-CCNC: 72 U/L
ALT SERPL-CCNC: 28 U/L
ANION GAP SERPL CALC-SCNC: 13 MMOL/L
APPEARANCE: CLEAR
AST SERPL-CCNC: 25 U/L
BACTERIA: NEGATIVE /HPF
BASOPHILS # BLD AUTO: 0.03 K/UL
BASOPHILS NFR BLD AUTO: 0.5 %
BILIRUB SERPL-MCNC: 0.7 MG/DL
BILIRUBIN URINE: NEGATIVE
BLOOD URINE: NEGATIVE
BUN SERPL-MCNC: 23 MG/DL
CALCIUM SERPL-MCNC: 9.3 MG/DL
CAST: 0 /LPF
CHLORIDE SERPL-SCNC: 101 MMOL/L
CHOLEST SERPL-MCNC: 99 MG/DL
CO2 SERPL-SCNC: 25 MMOL/L
COLOR: YELLOW
CREAT SERPL-MCNC: 1.6 MG/DL
EGFRCR SERPLBLD CKD-EPI 2021: 42 ML/MIN/1.73M2
EOSINOPHIL # BLD AUTO: 0.02 K/UL
EOSINOPHIL NFR BLD AUTO: 0.3 %
EPITHELIAL CELLS: 0 /HPF
GLUCOSE QUALITATIVE U: NEGATIVE MG/DL
GLUCOSE SERPL-MCNC: 101 MG/DL
HCT VFR BLD CALC: 41.2 %
HDLC SERPL-MCNC: 39 MG/DL
HGB BLD-MCNC: 13.4 G/DL
IMM GRANULOCYTES NFR BLD AUTO: 0.8 %
KETONES URINE: NEGATIVE MG/DL
LDLC SERPL-MCNC: 45 MG/DL
LEUKOCYTE ESTERASE URINE: NEGATIVE
LYMPHOCYTES # BLD AUTO: 1.71 K/UL
LYMPHOCYTES NFR BLD AUTO: 25.8 %
MAN DIFF?: NORMAL
MCHC RBC-ENTMCNC: 32.4 PG
MCHC RBC-ENTMCNC: 32.5 G/DL
MCV RBC AUTO: 99.8 FL
MICROSCOPIC-UA: NORMAL
MONOCYTES # BLD AUTO: 1.8 K/UL
MONOCYTES NFR BLD AUTO: 27.1 %
NEUTROPHILS # BLD AUTO: 3.03 K/UL
NEUTROPHILS NFR BLD AUTO: 45.5 %
NITRITE URINE: NEGATIVE
NONHDLC SERPL-MCNC: 60 MG/DL
PH URINE: 7
PLATELET # BLD AUTO: 104 K/UL
POTASSIUM SERPL-SCNC: 4.4 MMOL/L
PROT SERPL-MCNC: 6.9 G/DL
PROTEIN URINE: NORMAL MG/DL
RBC # BLD: 4.13 M/UL
RBC # FLD: 13.1 %
RED BLOOD CELLS URINE: 2 /HPF
SODIUM SERPL-SCNC: 139 MMOL/L
SPECIFIC GRAVITY URINE: 1.02
TRIGL SERPL-MCNC: 66 MG/DL
TSH SERPL-ACNC: 8.81 UIU/ML
UROBILINOGEN URINE: 1 MG/DL
VIT B12 SERPL-MCNC: 354 PG/ML
WBC # FLD AUTO: 6.64 K/UL
WHITE BLOOD CELLS URINE: 0 /HPF

## 2025-06-22 ENCOUNTER — INPATIENT (INPATIENT)
Facility: HOSPITAL | Age: 85
LOS: 1 days | Discharge: HOME | DRG: 872 | End: 2025-06-24
Attending: INTERNAL MEDICINE | Admitting: HOSPITALIST
Payer: MEDICARE

## 2025-06-22 VITALS
WEIGHT: 199.96 LBS | OXYGEN SATURATION: 97 % | HEIGHT: 70 IN | HEART RATE: 109 BPM | RESPIRATION RATE: 20 BRPM | SYSTOLIC BLOOD PRESSURE: 109 MMHG | DIASTOLIC BLOOD PRESSURE: 72 MMHG | TEMPERATURE: 103 F

## 2025-06-22 DIAGNOSIS — Z90.49 ACQUIRED ABSENCE OF OTHER SPECIFIED PARTS OF DIGESTIVE TRACT: Chronic | ICD-10-CM

## 2025-06-22 DIAGNOSIS — Z96.652 PRESENCE OF LEFT ARTIFICIAL KNEE JOINT: Chronic | ICD-10-CM

## 2025-06-22 LAB
ALBUMIN SERPL ELPH-MCNC: 4.2 G/DL — SIGNIFICANT CHANGE UP (ref 3.3–5)
ALP SERPL-CCNC: 70 U/L — SIGNIFICANT CHANGE UP (ref 40–120)
ALT FLD-CCNC: 21 U/L — SIGNIFICANT CHANGE UP (ref 10–45)
ANION GAP SERPL CALC-SCNC: 14 MMOL/L — SIGNIFICANT CHANGE UP (ref 5–17)
ANISOCYTOSIS BLD QL: SLIGHT — SIGNIFICANT CHANGE UP
APTT BLD: 28.6 SEC — SIGNIFICANT CHANGE UP (ref 26.1–36.8)
AST SERPL-CCNC: 20 U/L — SIGNIFICANT CHANGE UP (ref 10–40)
BASOPHILS # BLD AUTO: 0 K/UL — SIGNIFICANT CHANGE UP (ref 0–0.2)
BASOPHILS # BLD MANUAL: 0 K/UL — SIGNIFICANT CHANGE UP (ref 0–0.2)
BASOPHILS NFR BLD AUTO: 0 % — SIGNIFICANT CHANGE UP (ref 0–2)
BASOPHILS NFR BLD MANUAL: 0 % — SIGNIFICANT CHANGE UP (ref 0–2)
BILIRUB SERPL-MCNC: 0.6 MG/DL — SIGNIFICANT CHANGE UP (ref 0.2–1.2)
BUN SERPL-MCNC: 26 MG/DL — HIGH (ref 7–23)
CALCIUM SERPL-MCNC: 9.1 MG/DL — SIGNIFICANT CHANGE UP (ref 8.4–10.5)
CHLORIDE SERPL-SCNC: 100 MMOL/L — SIGNIFICANT CHANGE UP (ref 96–108)
CO2 SERPL-SCNC: 21 MMOL/L — LOW (ref 22–31)
CREAT SERPL-MCNC: 1.66 MG/DL — HIGH (ref 0.5–1.3)
DACRYOCYTES BLD QL SMEAR: SLIGHT — SIGNIFICANT CHANGE UP
EGFR: 40 ML/MIN/1.73M2 — LOW
EGFR: 40 ML/MIN/1.73M2 — LOW
EOSINOPHIL # BLD AUTO: 0.01 K/UL — SIGNIFICANT CHANGE UP (ref 0–0.5)
EOSINOPHIL # BLD MANUAL: 0 K/UL — SIGNIFICANT CHANGE UP (ref 0–0.5)
EOSINOPHIL NFR BLD AUTO: 0.1 % — SIGNIFICANT CHANGE UP (ref 0–6)
EOSINOPHIL NFR BLD MANUAL: 0 % — SIGNIFICANT CHANGE UP (ref 0–6)
FLUAV AG NPH QL: SIGNIFICANT CHANGE UP
FLUBV AG NPH QL: SIGNIFICANT CHANGE UP
GAS PNL BLDV: SIGNIFICANT CHANGE UP
GLUCOSE SERPL-MCNC: 131 MG/DL — HIGH (ref 70–99)
HCT VFR BLD CALC: 40.9 % — SIGNIFICANT CHANGE UP (ref 39–50)
HGB BLD-MCNC: 13.4 G/DL — SIGNIFICANT CHANGE UP (ref 13–17)
IMM GRANULOCYTES # BLD AUTO: 0.07 K/UL — SIGNIFICANT CHANGE UP (ref 0–0.07)
IMM GRANULOCYTES NFR BLD AUTO: 0.8 % — SIGNIFICANT CHANGE UP (ref 0–0.9)
IMMATURE PLATELET FRACTION #: 7.6 K/UL — SIGNIFICANT CHANGE UP (ref 3.9–12.5)
IMMATURE PLATELET FRACTION %: 8.8 % — HIGH (ref 1.6–7.1)
INR BLD: 1.38 RATIO — HIGH (ref 0.85–1.16)
LYMPHOCYTES # BLD AUTO: 0.91 K/UL — LOW (ref 1–3.3)
LYMPHOCYTES # BLD MANUAL: 0.83 K/UL — LOW (ref 1–3.3)
LYMPHOCYTES NFR BLD AUTO: 11 % — LOW (ref 13–44)
LYMPHOCYTES NFR BLD MANUAL: 10.1 % — LOW (ref 13–44)
MACROCYTES BLD QL: SLIGHT — SIGNIFICANT CHANGE UP
MANUAL MYELOCYTE #: 0.14 K/UL — HIGH (ref 0–0)
MANUAL NEUTROPHIL BANDS #: 0.07 K/UL — SIGNIFICANT CHANGE UP (ref 0–0.84)
MCHC RBC-ENTMCNC: 32.3 PG — SIGNIFICANT CHANGE UP (ref 27–34)
MCHC RBC-ENTMCNC: 32.8 G/DL — SIGNIFICANT CHANGE UP (ref 32–36)
MCV RBC AUTO: 98.6 FL — SIGNIFICANT CHANGE UP (ref 80–100)
MONOCYTES # BLD AUTO: 2.88 K/UL — HIGH (ref 0–0.9)
MONOCYTES # BLD MANUAL: 2.15 K/UL — HIGH (ref 0–0.9)
MONOCYTES NFR BLD AUTO: 35 % — HIGH (ref 2–14)
MONOCYTES NFR BLD MANUAL: 26.1 % — HIGH (ref 2–14)
MYELOCYTES NFR BLD: 1.7 % — HIGH (ref 0–0)
NEUTROPHILS # BLD AUTO: 4.37 K/UL — SIGNIFICANT CHANGE UP (ref 1.8–7.4)
NEUTROPHILS # BLD MANUAL: 5.05 K/UL — SIGNIFICANT CHANGE UP (ref 1.8–7.4)
NEUTROPHILS NFR BLD AUTO: 53.1 % — SIGNIFICANT CHANGE UP (ref 43–77)
NEUTROPHILS NFR BLD MANUAL: 61.3 % — SIGNIFICANT CHANGE UP (ref 43–77)
NEUTS BAND # BLD: 0.8 % — SIGNIFICANT CHANGE UP (ref 0–8)
NEUTS BAND NFR BLD: 0.8 % — SIGNIFICANT CHANGE UP (ref 0–8)
NRBC # BLD AUTO: 0 K/UL — SIGNIFICANT CHANGE UP (ref 0–0)
NRBC # FLD: 0 K/UL — SIGNIFICANT CHANGE UP (ref 0–0)
NRBC BLD AUTO-RTO: 0 /100 WBCS — SIGNIFICANT CHANGE UP (ref 0–0)
OVALOCYTES BLD QL SMEAR: SLIGHT — SIGNIFICANT CHANGE UP
PLAT MORPH BLD: NORMAL — SIGNIFICANT CHANGE UP
PLATELET # BLD AUTO: 86 K/UL — LOW (ref 150–400)
PMV BLD: 12.6 FL — SIGNIFICANT CHANGE UP (ref 7–13)
POIKILOCYTOSIS BLD QL AUTO: SLIGHT — SIGNIFICANT CHANGE UP
POTASSIUM SERPL-MCNC: 4.2 MMOL/L — SIGNIFICANT CHANGE UP (ref 3.5–5.3)
POTASSIUM SERPL-SCNC: 4.2 MMOL/L — SIGNIFICANT CHANGE UP (ref 3.5–5.3)
PROT SERPL-MCNC: 7.2 G/DL — SIGNIFICANT CHANGE UP (ref 6–8.3)
PROTHROM AB SERPL-ACNC: 15.7 SEC — HIGH (ref 9.9–13.4)
RBC # BLD: 4.15 M/UL — LOW (ref 4.2–5.8)
RBC # FLD: 12.6 % — SIGNIFICANT CHANGE UP (ref 10.3–14.5)
RBC BLD AUTO: ABNORMAL
RSV RNA NPH QL NAA+NON-PROBE: SIGNIFICANT CHANGE UP
SARS-COV-2 RNA SPEC QL NAA+PROBE: SIGNIFICANT CHANGE UP
SODIUM SERPL-SCNC: 135 MMOL/L — SIGNIFICANT CHANGE UP (ref 135–145)
SOURCE RESPIRATORY: SIGNIFICANT CHANGE UP
WBC # BLD: 8.24 K/UL — SIGNIFICANT CHANGE UP (ref 3.8–10.5)
WBC # FLD AUTO: 8.24 K/UL — SIGNIFICANT CHANGE UP (ref 3.8–10.5)

## 2025-06-22 PROCEDURE — 99285 EMERGENCY DEPT VISIT HI MDM: CPT

## 2025-06-22 PROCEDURE — 93010 ELECTROCARDIOGRAM REPORT: CPT

## 2025-06-22 PROCEDURE — 71045 X-RAY EXAM CHEST 1 VIEW: CPT | Mod: 26

## 2025-06-22 RX ORDER — ACETAMINOPHEN 500 MG/5ML
1000 LIQUID (ML) ORAL ONCE
Refills: 0 | Status: DISCONTINUED | OUTPATIENT
Start: 2025-06-22 | End: 2025-06-22

## 2025-06-22 RX ORDER — PIPERACILLIN-TAZO-DEXTROSE,ISO 3.375G/5
3.38 IV SOLUTION, PIGGYBACK PREMIX FROZEN(ML) INTRAVENOUS ONCE
Refills: 0 | Status: COMPLETED | OUTPATIENT
Start: 2025-06-22 | End: 2025-06-22

## 2025-06-22 RX ADMIN — Medication 200 GRAM(S): at 23:38

## 2025-06-22 RX ADMIN — Medication 2800 MILLILITER(S): at 21:50

## 2025-06-22 NOTE — ED ADULT NURSE NOTE - OBJECTIVE STATEMENT
86 y/o M, pmh TAVR may 2025, on Eliquis, HTN, hypothyroidism, HLD, presents to the ED for fever. family at bed side states fever started today, Tmax 101. further endorses chills, NV, pt denies cp, sob, NVD, cough, dysuria, hematuria, sick contacts,  back pain. pt is A&Ox4, speech is clear, following commands, [place] speaking, family at bed side translating. pt is ind at home, no recent falls / trauma. pt on heart monitor placed may 2025. 86 y/o M, pmh TAVR may 2025, on Eliquis, HTN, hypothyroidism, HLD, presents to the ED for fever. family at bed side states fever started today, Tmax 101. further endorses chills, NV, pt denies cp, sob, NVD, cough, dysuria, hematuria, sick contacts,  back pain. pt is A&Ox4, speech is clear, following commands, [place] speaking, family at bed side translating. pt took Tylenol for fever at 8pm. pt is ind at home, no recent falls / trauma. pt on heart monitor placed may 2025.

## 2025-06-22 NOTE — ED CLERICAL - NS ED CLERK NOTE PRE-ARRIVAL INFORMATION; ADDITIONAL PRE-ARRIVAL INFORMATION
This patient is enrolled in the Follow Your Heart program and has undergone a cardiac surgery procedure within the last 30 days and has active care navigation.   This patient can be followed up by the care navigation team within 24 hours. To arrange close follow-up or to obtain additional clinical information about this patient, please call the contact number above.   Please call the cardiac surgery team once patient is registered at [phone] for consultation PRIOR to disposition decision.  The patient recently underwent a cardiac surgery procedure and the team can assist in acute medical management. negative - no change in level of consciousness

## 2025-06-22 NOTE — ED PROVIDER NOTE - CLINICAL SUMMARY MEDICAL DECISION MAKING FREE TEXT BOX
Lauren Manzano MD (Attending MD): 85 year old male with a history of Afib on Eliquis , severe aortic stenosis  status post bioprosthetic aortic valve replacement 7/2012 , HTN, hyperlipidemia, former tobacco use, chronic renal insufficiency and GERD s/p TAVR on 5/28 who presents to the ED with fevers at home. Patient seen by his cardiologist today who referred him to the ED.  Not complaining any urination,  bowel changes, chest pain, shortness of  breath per wife.  Was complaining of Lauren Manzano MD (Attending MD): 85 year old male with a history of Afib on Eliquis , severe aortic stenosis  status post bioprosthetic aortic valve replacement 7/2012 , HTN, hyperlipidemia, former tobacco use, chronic renal insufficiency and GERD s/p TAVR on 5/28 who presents to the ED with fevers at home. Patient seen by his cardiologist today who referred him to the ED.  Daughter stated he was having nausea, vomiting, and loss stools, but wife states not complaining any urination,  bowel changes, chest pain, shortness of  breath.  took tylenol pta. no sick contacts.       ros otherwise negative     General: Alert and Orientated x 3. No apparent distress.  Head: Normocephalic and atraumatic.  Eyes: PERRLA with EOMI.  Neck: Supple. Trachea midline.   Cardiac: Normal S1 and S2 w/ RRR. No murmurs appreciated. No JVD appreciated.  Pulmonary: CTA bilaterally. No increased WOB. No wheezes or crackles.  Abdominal: Soft, non-tender. (+) bowel sounds appreciated in all 4 quadrants. No hepatosplenomegaly.   Neurologic: No focal sensory or motor deficits.  Musculoskeletal: Strength appropriate in all 4 extremities for age with no limited ROM.  Skin: Color appropriate for race. Intact, warm, and well-perfused.  Psychiatric: Appropriate mood and affect. No apparent risk to self or others.     MDM: Febrile and tachycardic on arrival. Will get sepsis w/u. will hol doff abx given possibly viral. if neg, would get ct chest abd/pelvis, possible c/f post-tavr infx.

## 2025-06-22 NOTE — ED PROVIDER NOTE - PROGRESS NOTE DETAILS
Enid Hawley MD (PGY2) Patient signed out to me.  History of HTN, HLD, TAVR 1 month ago presents with fever and tachycardia.  No other source of infection.  Plan as per day team: Consult CT surgery after CT scans for concerns for endocarditis.  CT surgery consulted, low concern for endocarditis at this time however recommending broad-spectrum antibiotics Vanco/Zosyn and echo inpatient.

## 2025-06-22 NOTE — ED ADULT TRIAGE NOTE - CHIEF COMPLAINT QUOTE
Fever associated with nausea and chills beginning in afternoon. Denies any recent travel or sick contacts. Denies chest pain, dyspnea and cough. pmh: TAVR (on 5/28) Fever associated with nausea and chills beginning in afternoon. Referred to the ED by cardiologist. Denies any recent travel or sick contacts. Denies chest pain, dyspnea and cough. pmh: TAVR (on 5/28)

## 2025-06-22 NOTE — ED ADULT NURSE NOTE - NURSING MUSC STRENGTH
Message   please call patient and let him now he does not have gallstone disease but he has fatty liver on the ultrasound  Verified Results  * US ABDOMEN COMPLETE [id number] 09:18AM Joanne Huerta    Order Number: [id number]     Test Name Result Flag Reference   US ABDOMEN COMPLETE (Report)     ABDOMEN ULTRASOUND, COMPLETE     INDICATION: Pain, burning, rectal bleeding  Diarrhea  COMPARISON: None  TECHNIQUE:  Real-time ultrasound of the abdomen was performed with a curvilinear transducer with both volumetric sweeps and still imaging techniques  FINDINGS:     PANCREAS: Visualized portions of the pancreas are within normal limits  AORTA AND IVC: Visualized portions are normal for patient age  LIVER:   Size: Mild to moderate enlargement  The liver measures 18 3 cm in the midclavicular line  Contour: Surface contour is smooth  Parenchyma: There is moderate diffuse increased echogenicity with smooth echotexture and acoustic beam attenuation  Most consistent with moderate hepatic steatosis  No evidence of suspicious mass  The main portal vein is patent and hepatopetal       BILIARY:   The gallbladder is normal in caliber  No wall thickening or pericholecystic fluid  No stones or sludge identified  Sonographic Joelyn Guillory sign is negative  No intrahepatic biliary dilatation  CBD measures 5 mm  No choledocholithiasis  KIDNEY:    Right kidney measures 12 1 x 5 9 cm  Within normal limits  Left kidney measures 11 1 x 5 7 cm  Within normal limits  SPLEEN:    Measures 9 8 x 8 8 x 3 1 cm  Within normal limits  ASCITES: None  IMPRESSION:     Mild to moderate hepatomegaly and hepatic steatosis  Negative for cholelithiasis         Workstation performed: [id number]     Signed by:   Renda Severe, MD   3/2/16 hand grasp, leg strength strong and equal bilaterally

## 2025-06-22 NOTE — ED ADULT NURSE NOTE - CHIEF COMPLAINT QUOTE
Fever associated with nausea and chills beginning in afternoon. Referred to the ED by cardiologist. Denies any recent travel or sick contacts. Denies chest pain, dyspnea and cough. pmh: TAVR (on 5/28)

## 2025-06-23 ENCOUNTER — APPOINTMENT (OUTPATIENT)
Dept: CARDIOLOGY | Facility: CLINIC | Age: 85
End: 2025-06-23

## 2025-06-23 DIAGNOSIS — I35.0 NONRHEUMATIC AORTIC (VALVE) STENOSIS: ICD-10-CM

## 2025-06-23 DIAGNOSIS — R65.10 SYSTEMIC INFLAMMATORY RESPONSE SYNDROME (SIRS) OF NON-INFECTIOUS ORIGIN WITHOUT ACUTE ORGAN DYSFUNCTION: ICD-10-CM

## 2025-06-23 DIAGNOSIS — R11.2 NAUSEA WITH VOMITING, UNSPECIFIED: ICD-10-CM

## 2025-06-23 DIAGNOSIS — N18.9 CHRONIC KIDNEY DISEASE, UNSPECIFIED: ICD-10-CM

## 2025-06-23 DIAGNOSIS — E03.9 HYPOTHYROIDISM, UNSPECIFIED: ICD-10-CM

## 2025-06-23 DIAGNOSIS — I48.91 UNSPECIFIED ATRIAL FIBRILLATION: ICD-10-CM

## 2025-06-23 DIAGNOSIS — K21.9 GASTRO-ESOPHAGEAL REFLUX DISEASE WITHOUT ESOPHAGITIS: ICD-10-CM

## 2025-06-23 DIAGNOSIS — A41.9 SEPSIS, UNSPECIFIED ORGANISM: ICD-10-CM

## 2025-06-23 DIAGNOSIS — R50.9 FEVER, UNSPECIFIED: ICD-10-CM

## 2025-06-23 DIAGNOSIS — I10 ESSENTIAL (PRIMARY) HYPERTENSION: ICD-10-CM

## 2025-06-23 DIAGNOSIS — N18.30 CHRONIC KIDNEY DISEASE, STAGE 3 UNSPECIFIED: ICD-10-CM

## 2025-06-23 DIAGNOSIS — N40.0 BENIGN PROSTATIC HYPERPLASIA WITHOUT LOWER URINARY TRACT SYMPTOMS: ICD-10-CM

## 2025-06-23 DIAGNOSIS — Z29.9 ENCOUNTER FOR PROPHYLACTIC MEASURES, UNSPECIFIED: ICD-10-CM

## 2025-06-23 DIAGNOSIS — R94.31 ABNORMAL ELECTROCARDIOGRAM [ECG] [EKG]: ICD-10-CM

## 2025-06-23 DIAGNOSIS — E78.5 HYPERLIPIDEMIA, UNSPECIFIED: ICD-10-CM

## 2025-06-23 LAB
ADD ON TEST-SPECIMEN IN LAB: SIGNIFICANT CHANGE UP
APPEARANCE UR: CLEAR — SIGNIFICANT CHANGE UP
BACTERIA # UR AUTO: NEGATIVE /HPF — SIGNIFICANT CHANGE UP
BILIRUB UR-MCNC: NEGATIVE — SIGNIFICANT CHANGE UP
CAST: 0 /LPF — SIGNIFICANT CHANGE UP (ref 0–4)
COLOR SPEC: YELLOW — SIGNIFICANT CHANGE UP
DIFF PNL FLD: ABNORMAL
GLUCOSE UR QL: NEGATIVE MG/DL — SIGNIFICANT CHANGE UP
KETONES UR QL: NEGATIVE MG/DL — SIGNIFICANT CHANGE UP
LEUKOCYTE ESTERASE UR-ACNC: NEGATIVE — SIGNIFICANT CHANGE UP
MRSA PCR RESULT.: SIGNIFICANT CHANGE UP
NITRITE UR-MCNC: NEGATIVE — SIGNIFICANT CHANGE UP
PH UR: 6 — SIGNIFICANT CHANGE UP (ref 5–8)
PROT UR-MCNC: SIGNIFICANT CHANGE UP MG/DL
RAPID RVP RESULT: SIGNIFICANT CHANGE UP
RBC CASTS # UR COMP ASSIST: 5 /HPF — HIGH (ref 0–4)
S AUREUS DNA NOSE QL NAA+PROBE: SIGNIFICANT CHANGE UP
SARS-COV-2 RNA SPEC QL NAA+PROBE: SIGNIFICANT CHANGE UP
SP GR SPEC: 1.01 — SIGNIFICANT CHANGE UP (ref 1–1.03)
SQUAMOUS # UR AUTO: 0 /HPF — SIGNIFICANT CHANGE UP (ref 0–5)
TROPONIN T, HIGH SENSITIVITY RESULT: 24 NG/L — SIGNIFICANT CHANGE UP (ref 0–51)
UROBILINOGEN FLD QL: 1 MG/DL — SIGNIFICANT CHANGE UP (ref 0.2–1)
WBC UR QL: 0 /HPF — SIGNIFICANT CHANGE UP (ref 0–5)

## 2025-06-23 PROCEDURE — 74177 CT ABD & PELVIS W/CONTRAST: CPT | Mod: 26

## 2025-06-23 PROCEDURE — 71260 CT THORAX DX C+: CPT | Mod: 26

## 2025-06-23 PROCEDURE — 99223 1ST HOSP IP/OBS HIGH 75: CPT

## 2025-06-23 RX ORDER — AMIODARONE HYDROCHLORIDE 50 MG/ML
100 INJECTION, SOLUTION INTRAVENOUS DAILY
Refills: 0 | Status: DISCONTINUED | OUTPATIENT
Start: 2025-06-23 | End: 2025-06-24

## 2025-06-23 RX ORDER — METOPROLOL SUCCINATE 50 MG/1
100 TABLET, EXTENDED RELEASE ORAL DAILY
Refills: 0 | Status: DISCONTINUED | OUTPATIENT
Start: 2025-06-23 | End: 2025-06-24

## 2025-06-23 RX ORDER — APIXABAN 2.5 MG/1
1 TABLET, FILM COATED ORAL
Refills: 0 | DISCHARGE

## 2025-06-23 RX ORDER — PIPERACILLIN-TAZO-DEXTROSE,ISO 3.375G/5
3.38 IV SOLUTION, PIGGYBACK PREMIX FROZEN(ML) INTRAVENOUS EVERY 8 HOURS
Refills: 0 | Status: DISCONTINUED | OUTPATIENT
Start: 2025-06-23 | End: 2025-06-24

## 2025-06-23 RX ORDER — ATORVASTATIN CALCIUM 80 MG/1
20 TABLET, FILM COATED ORAL AT BEDTIME
Refills: 0 | Status: DISCONTINUED | OUTPATIENT
Start: 2025-06-23 | End: 2025-06-24

## 2025-06-23 RX ORDER — ASPIRIN 325 MG
81 TABLET ORAL DAILY
Refills: 0 | Status: DISCONTINUED | OUTPATIENT
Start: 2025-06-23 | End: 2025-06-24

## 2025-06-23 RX ORDER — LEVOTHYROXINE SODIUM 300 MCG
50 TABLET ORAL DAILY
Refills: 0 | Status: DISCONTINUED | OUTPATIENT
Start: 2025-06-23 | End: 2025-06-24

## 2025-06-23 RX ORDER — ONDANSETRON HCL/PF 4 MG/2 ML
4 VIAL (ML) INJECTION EVERY 8 HOURS
Refills: 0 | Status: DISCONTINUED | OUTPATIENT
Start: 2025-06-23 | End: 2025-06-24

## 2025-06-23 RX ORDER — VANCOMYCIN HCL IN 5 % DEXTROSE 1.5G/250ML
1000 PLASTIC BAG, INJECTION (ML) INTRAVENOUS ONCE
Refills: 0 | Status: COMPLETED | OUTPATIENT
Start: 2025-06-23 | End: 2025-06-23

## 2025-06-23 RX ORDER — HEPARIN SODIUM 1000 [USP'U]/ML
5000 INJECTION INTRAVENOUS; SUBCUTANEOUS EVERY 8 HOURS
Refills: 0 | Status: DISCONTINUED | OUTPATIENT
Start: 2025-06-23 | End: 2025-06-23

## 2025-06-23 RX ORDER — AMLODIPINE BESYLATE 10 MG/1
1 TABLET ORAL
Refills: 0 | DISCHARGE

## 2025-06-23 RX ORDER — APIXABAN 2.5 MG/1
2.5 TABLET, FILM COATED ORAL EVERY 12 HOURS
Refills: 0 | Status: DISCONTINUED | OUTPATIENT
Start: 2025-06-23 | End: 2025-06-24

## 2025-06-23 RX ORDER — FINASTERIDE 1 MG/1
5 TABLET, FILM COATED ORAL DAILY
Refills: 0 | Status: DISCONTINUED | OUTPATIENT
Start: 2025-06-23 | End: 2025-06-24

## 2025-06-23 RX ADMIN — ATORVASTATIN CALCIUM 20 MILLIGRAM(S): 80 TABLET, FILM COATED ORAL at 21:35

## 2025-06-23 RX ADMIN — Medication 25 GRAM(S): at 21:34

## 2025-06-23 RX ADMIN — FINASTERIDE 5 MILLIGRAM(S): 1 TABLET, FILM COATED ORAL at 13:14

## 2025-06-23 RX ADMIN — Medication 25 GRAM(S): at 05:54

## 2025-06-23 RX ADMIN — Medication 25 GRAM(S): at 14:03

## 2025-06-23 RX ADMIN — APIXABAN 2.5 MILLIGRAM(S): 2.5 TABLET, FILM COATED ORAL at 18:00

## 2025-06-23 RX ADMIN — Medication 250 MILLIGRAM(S): at 04:20

## 2025-06-23 RX ADMIN — Medication 50 MICROGRAM(S): at 13:15

## 2025-06-23 RX ADMIN — Medication 81 MILLIGRAM(S): at 13:15

## 2025-06-23 NOTE — CHART NOTE - NSCHARTNOTEFT_GEN_A_CORE
[facility]  Patient Star Magallon is an 86 y/o male PMH HTN HLD former smoker CHF AFIB  AS   AVR 7/2012 underwent - TAVR  0n 5/28/25  discharged home 5/29    Seen  in TAVR clinic 6/3 progressing well .  Presents to day to ED with c/o fevr n/v/d. Seen and examined in no acute distress comfortable on room air- viral panel negative    < from: CT Chest w/ IV Cont (06.23.25 @ 00:48) >    1.   Smooth interlobular septal thickening evident at the lung bases,  compatible with hydrostatic interstitial pulmonary edema/CHF.  2.   Minimal diffuse bronchial wall thickening may be peribronchial   cuffing  related to pulmonary edema or may signify bronchitis.    < end of copied text >    IMPRESSION:  Enlarged prostate. Prostate malignancy not excluded.    < end of copied text >                        13.4   8.24  )-----------( 86       ( 22 Jun 2025 21:53 )             40.9     06-22    135  |  100  |  26[H]  ----------------------------<  131[H]  4.2   |  21[L]  |  1.66[H]    Ca    9.1      22 Jun 2025 21:53    TPro  7.2  /  Alb  4.2  /  TBili  0.6  /  DBili  x   /  AST  20  /  ALT  21  /  AlkPhos  70  06-22         No acute cardiac surgery intervention warranted at this time.   recommend    ECHO cardiogram   blood cultures x2 empiric  antibiotics    Iveth Smallwood CTS NP [id number]

## 2025-06-23 NOTE — H&P ADULT - PROBLEM SELECTOR PLAN 4
Plan  - C/w Eliquis 2.5 mg qd  - C/w amiodarone Plan  - C/w Eliquis 2.5 mg qd  - C/w amiodarone 100mg qd - 2 episodes of vomiting yesterday   - nausea    Plan  - ondansetron 4mg q8 prn Plan  - C/w Eliquis 2.5 mg qd  - C/w amiodarone 100mg qd  - c/w Metoprolol Succinate 100qd

## 2025-06-23 NOTE — H&P ADULT - PROBLEM SELECTOR PLAN 8
- creatinine 1.66 - baseline    Plan  - trend creatinine and BUN - creatinine 1.66 - baseline    Plan  - trend creatinine and BUN  - Avoid nephrotoxic meds Plan  - C/w pantoprazole 40 mg qd

## 2025-06-23 NOTE — PATIENT PROFILE ADULT - FUNCTIONAL ASSESSMENT - BASIC MOBILITY 6.
4-calculated by average/Not able to assess (calculate score using [facility] averaging method)

## 2025-06-23 NOTE — H&P ADULT - NSHPREVIEWOFSYSTEMS_GEN_ALL_CORE
REVIEW OF SYSTEMS:    CONSTITUTIONAL:  No weakness  EYES/ENT:  No visual changes;  No vertigo or throat pain   NECK:  No pain or stiffness  RESPIRATORY:  No cough, wheezing, hemoptysis; No shortness of breath  CARDIOVASCULAR:  No chest pain or palpitations  GASTROINTESTINAL:  No abdominal or epigastric pain. No diarrhea or constipation. No melena or hematochezia.  GENITOURINARY:  No dysuria, frequency or hematuria  MUSCULOSKELETAL:  FROM all extremities, normal strength, No calf tenderness  NEUROLOGICAL:  No numbness or weakness  SKIN:  No itching, rashes

## 2025-06-23 NOTE — H&P ADULT - PROBLEM SELECTOR PLAN 6
Plan  - C/w lipator 20 mg qd Plan  - C/w Lipitor 20 mg qd Plan  - Hold amlodipine 5mg due to hypotension

## 2025-06-23 NOTE — H&P ADULT - PROBLEM SELECTOR PLAN 5
Plan  - Hold Metroprolol succ 25mg tid due to hypotension Plan  - Hold amlodipine 5mg due to hypotension - 2 episodes of vomiting yesterday   - nausea    Plan  - ondansetron 4mg q8 prn

## 2025-06-23 NOTE — H&P ADULT - PROBLEM SELECTOR PLAN 3
- S/P TAVR 5/28     Plan  - Aspirin 81 mg qd - S/P TAVR 5/28     Plan  - DARA Plan  - C/w Eliquis 2.5 mg qd  - C/w amiodarone 100mg qd  - c/w Metoprolol Succinate 100qd - S/P TAVR 5/28     Plan  - f/u CT Surg recs  - f/u CT Surg outpatient

## 2025-06-23 NOTE — PATIENT PROFILE ADULT - PUBLIC BENEFITS
Reason for Disposition  • Leg pain or muscle cramp is a chronic symptom (recurrent or ongoing AND present > 4 weeks)    Protocols used: LEG PAIN-A-AH     no

## 2025-06-23 NOTE — H&P ADULT - NSHPLABSRESULTS_GEN_ALL_CORE
13.4   8.24  )-----------( 86       ( 2025 21:53 )             40.9         135  |  100  |  26[H]  ----------------------------<  131[H]  4.2   |  21[L]  |  1.66[H]    Ca    9.1      2025 21:53    TPro  7.2  /  Alb  4.2  /  TBili  0.6  /  DBili  x   /  AST  20  /  ALT  21  /  AlkPhos  70      Urinalysis Basic - ( 2025 23:46 )    Color: Yellow / Appearance: Clear / S.015 / pH: x  Gluc: x / Ketone: x  / Bili: Negative / Urobili: 1.0 mg/dL   Blood: x / Protein: Trace mg/dL / Nitrite: Negative   Leuk Esterase: Negative / RBC: 5 /HPF / WBC 0 /HPF   Sq Epi: x / Non Sq Epi: 0 /HPF / Bacteria: Negative /HPF          CXR: no focal consolidations     CT Chest w/ contrast  IMPRESSION:  1.   Smooth interlobular septal thickening evident at the lung bases,  compatible with hydrostatic interstitial pulmonary edema/CHF.  2.   Minimal diffuse bronchial wall thickening may be peribronchial   cuffing  related to pulmonary edema or may signify bronchitis.    CT Abdomen w/ contrast   IMPRESSION:  - enlarged prostate 13.4   8.24  )-----------( 86       ( 2025 21:53 )             40.9         135  |  100  |  26[H]  ----------------------------<  131[H]  4.2   |  21[L]  |  1.66[H]    Ca    9.1      2025 21:53    TPro  7.2  /  Alb  4.2  /  TBili  0.6  /  DBili  x   /  AST  20  /  ALT  21  /  AlkPhos  70  -22    PT/INR - ( 2025 21:53 )   PT: 15.7 sec;   INR: 1.38 ratio         PTT - ( 2025 21:53 )  PTT:28.6 sec    Urinalysis Basic - ( 2025 23:46 )    Color: Yellow / Appearance: Clear / S.015 / pH: x  Gluc: x / Ketone: x  / Bili: Negative / Urobili: 1.0 mg/dL   Blood: x / Protein: Trace mg/dL / Nitrite: Negative   Leuk Esterase: Negative / RBC: 5 /HPF / WBC 0 /HPF   Sq Epi: x / Non Sq Epi: 0 /HPF / Bacteria: Negative /HPF          CXR: no focal consolidations     CT Chest w/ contrast  IMPRESSION:  1.   Smooth interlobular septal thickening evident at the lung bases,  compatible with hydrostatic interstitial pulmonary edema/CHF.  2.   Minimal diffuse bronchial wall thickening may be peribronchial   cuffing  related to pulmonary edema or may signify bronchitis.    CT Abdomen w/ contrast   IMPRESSION:  - enlarged prostate

## 2025-06-23 NOTE — H&P ADULT - PROBLEM SELECTOR PLAN 9
- Asymptomatic     Plan  - C/w Levothyroxine 50mcg qd - creatinine 1.66 - baseline    Plan  - trend creatinine and BUN  - Avoid nephrotoxic meds

## 2025-06-23 NOTE — H&P ADULT - NSHPSOCIALHISTORY_GEN_ALL_CORE
Pt lives at home with his wife and two children. He is retired and previously worked in construction. Pt is a previous smoker and denies alcohol consumption and illicit drug use.

## 2025-06-23 NOTE — H&P ADULT - ASSESSMENT
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 Daughter stated he was having nausea, vomiting, and loss stools, but wife and pt state no issues with urination or bowel changes. Pt states he had 2 episodes of vomiting yesterday, nausea, fever and chills. He denies sick contacts or recent travel.  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 Rule out infective endocarditis

## 2025-06-23 NOTE — H&P ADULT - PROBLEM SELECTOR PLAN 11
- DASH diet  - DVT proph: heparin sub q Diet: DASH  - DVT proph: heparin sub q  GI: Pantoprazole  Dispo: Pending clinical course - Asymptomatic     Plan  - C/w finasteride 5mg qd

## 2025-06-23 NOTE — PATIENT PROFILE ADULT - DO YOU NEED ADDITIONAL SERVICES TO MANAGE ANY OF THESE MEDICAL CONDITIONS AT HOME?
.States had 2 beers last night. Does not remember falling. EMS states found on ground. Easily arousable to verbal and tactile stimuli. OMER. Following commands. Ecchymosis and swelling present to right orbital. Dr. Nunez called to bedside. Denies complaints. Clothing covered in urine. No no

## 2025-06-23 NOTE — H&P ADULT - NSHPPHYSICALEXAM_GEN_ALL_CORE
T(C): 36.6 (06-23-25 @ 07:45), Max: 39.3 (06-22-25 @ 20:28)  HR: 73 (06-23-25 @ 07:45) (73 - 109)  BP: 108/69 (06-23-25 @ 07:45) (96/64 - 111/74)  RR: 18 (06-23-25 @ 07:45) (18 - 20)  SpO2: 97% (06-23-25 @ 07:45) (97% - 97%)    CONSTITUTIONAL: Well groomed, no apparent distress  EYES:  No conjunctival or scleral injection, non-icteric  NECK: Supple, symmetric and without tracheal deviation   RESP: No respiratory distress, no use of accessory muscles; CTA b/l, no WRR  CV: RRR, +S1S2, no JVD; no peripheral edema  GI: Soft, NT, ND, no rebound, no guarding; no palpable masses  MSK: No digital clubbing or cyanosis; no nail bed splinter hemorrhages appreciated   SKIN: No rashes, left arm lesion - wrapped in gauze, no hand nodes/lesions appreciated  NEURO: sensation intact in upper and lower extremities b/l to light touch   PSYCH:; A+O x 3,

## 2025-06-23 NOTE — PATIENT PROFILE ADULT - OVER THE PAST TWO WEEKS HAVE YOU FELT DOWN, DEPRESSED OR HOPELESS?
72 yo F w/ PMHx of paranoid schizophrenia w/ tardive dyskinesia presenting after a fall down 20 steps resulting in sternal fracture. History obtained from daughter Ama as patient is unable to give accurate history. Per Ama, since patient was discharged from the hospital 5 weeks ago she has had worsening dysequilibrium resulting in more frequent falls. She states that her mother's condition has worsened drastically over the last weeks and is falling around once a day w/ dizziness and loss of balance. Yesterday, patient was left alone for a few seconds while patient's daughter went to get the car and she fell down 20 steps backwards and was found at the bottom of the stairs. She was taken to [facility] where imaging showed a mild displaced sternal fracture, minimally displaced and nondisplaced fractures in the anterior L second and third ribs and patient was transferred to [facility] for trauma eval. CT Head was negative for acute infarct or bleed. In the ED, patient was seen by trauma surgery, no indication for OR at this time. At this time, patient's daughter is concerned that she can no longer take care of her mother. CXR 11/5: Clear lungs. No acute displaced rib fractures.
no

## 2025-06-23 NOTE — H&P ADULT - PROBLEM SELECTOR PLAN 2
- 2 episodes of vomiting yesterday   - nausea    Plan  - ondansetron 4mg q8 prn - S/P TAVR 5/28     Plan  - f/u CT Surg recs  - f/u CT Surg outpatient Patient w/T wave inversions on EKG in ED. No chest pain.    Plan  - Check Trops. Trend to peak if abnormal  - If patient w/new chest pain, check STAT EKG and repeat Trops

## 2025-06-23 NOTE — H&P ADULT - PROBLEM SELECTOR PLAN 10
- Asymptomatic     Plan  - C/w finasteride 5mg qd - Asymptomatic     Plan  - C/w Levothyroxine 50mcg qd

## 2025-06-23 NOTE — H&P ADULT - PROBLEM SELECTOR PLAN 1
- fever on admission 102.8F  - resp panel negative  - CXR negative      Plan  - expand resp panel  - Tylenol 1000mg q6 Patient met SIRS criteria in ED. Patient w/fevers, chills, nausea, loose stools for 1 day prior. Concern for Infective Endocarditis given recent TAVR. CT Surg evaluated in ED and recommended TTE.    Plan  - c/w Zosyn for now. If Bcx negative after 48 hrs, will d/c. If patient febrile despite Zosyn, will consider starting Vanc  - TTE. If findings suggestive of potential IE, will consider DARA  - f/u BCx results  - MRSA  - GI PCR and Stool count  - Full RVP  - Trend WBC and Fever curve  - Tylenol prn for fever

## 2025-06-23 NOTE — H&P ADULT - HISTORY OF PRESENT ILLNESS
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 Daughter stated he was having nausea, vomiting, and loss stools, but wife and pt state no issues with urination or bowel changes. Pt states he had 2 episodes of vomiting yesterday, nausea, fever and chills. He denies sick contacts or recent travel.

## 2025-06-24 ENCOUNTER — TRANSCRIPTION ENCOUNTER (OUTPATIENT)
Age: 85
End: 2025-06-24

## 2025-06-24 ENCOUNTER — RESULT REVIEW (OUTPATIENT)
Age: 85
End: 2025-06-24

## 2025-06-24 VITALS
SYSTOLIC BLOOD PRESSURE: 151 MMHG | RESPIRATION RATE: 18 BRPM | DIASTOLIC BLOOD PRESSURE: 83 MMHG | HEART RATE: 85 BPM | OXYGEN SATURATION: 97 % | TEMPERATURE: 98 F

## 2025-06-24 LAB
ALBUMIN SERPL ELPH-MCNC: 3.8 G/DL — SIGNIFICANT CHANGE UP (ref 3.3–5)
ALP SERPL-CCNC: 58 U/L — SIGNIFICANT CHANGE UP (ref 40–120)
ALT FLD-CCNC: 19 U/L — SIGNIFICANT CHANGE UP (ref 10–45)
ANION GAP SERPL CALC-SCNC: 12 MMOL/L — SIGNIFICANT CHANGE UP (ref 5–17)
AST SERPL-CCNC: 19 U/L — SIGNIFICANT CHANGE UP (ref 10–40)
BILIRUB SERPL-MCNC: 0.6 MG/DL — SIGNIFICANT CHANGE UP (ref 0.2–1.2)
BUN SERPL-MCNC: 17 MG/DL — SIGNIFICANT CHANGE UP (ref 7–23)
CALCIUM SERPL-MCNC: 8.7 MG/DL — SIGNIFICANT CHANGE UP (ref 8.4–10.5)
CHLORIDE SERPL-SCNC: 105 MMOL/L — SIGNIFICANT CHANGE UP (ref 96–108)
CO2 SERPL-SCNC: 21 MMOL/L — LOW (ref 22–31)
CREAT SERPL-MCNC: 1.55 MG/DL — HIGH (ref 0.5–1.3)
EGFR: 44 ML/MIN/1.73M2 — LOW
EGFR: 44 ML/MIN/1.73M2 — LOW
GLUCOSE SERPL-MCNC: 94 MG/DL — SIGNIFICANT CHANGE UP (ref 70–99)
HCT VFR BLD CALC: 37.9 % — LOW (ref 39–50)
HGB BLD-MCNC: 12.4 G/DL — LOW (ref 13–17)
IMMATURE PLATELET FRACTION #: 5.5 K/UL — SIGNIFICANT CHANGE UP (ref 3.9–12.5)
IMMATURE PLATELET FRACTION %: 6.9 % — SIGNIFICANT CHANGE UP (ref 1.6–7.1)
MAGNESIUM SERPL-MCNC: 2.1 MG/DL — SIGNIFICANT CHANGE UP (ref 1.6–2.6)
MCHC RBC-ENTMCNC: 32.4 PG — SIGNIFICANT CHANGE UP (ref 27–34)
MCHC RBC-ENTMCNC: 32.7 G/DL — SIGNIFICANT CHANGE UP (ref 32–36)
MCV RBC AUTO: 99 FL — SIGNIFICANT CHANGE UP (ref 80–100)
MRSA PCR RESULT.: SIGNIFICANT CHANGE UP
NRBC # BLD AUTO: 0 K/UL — SIGNIFICANT CHANGE UP (ref 0–0)
NRBC # FLD: 0 K/UL — SIGNIFICANT CHANGE UP (ref 0–0)
NRBC BLD AUTO-RTO: 0 /100 WBCS — SIGNIFICANT CHANGE UP (ref 0–0)
PHOSPHATE SERPL-MCNC: 3.2 MG/DL — SIGNIFICANT CHANGE UP (ref 2.5–4.5)
PLATELET # BLD AUTO: 80 K/UL — LOW (ref 150–400)
PMV BLD: 12.1 FL — SIGNIFICANT CHANGE UP (ref 7–13)
POTASSIUM SERPL-MCNC: 4.1 MMOL/L — SIGNIFICANT CHANGE UP (ref 3.5–5.3)
POTASSIUM SERPL-SCNC: 4.1 MMOL/L — SIGNIFICANT CHANGE UP (ref 3.5–5.3)
PROT SERPL-MCNC: 6.4 G/DL — SIGNIFICANT CHANGE UP (ref 6–8.3)
RBC # BLD: 3.83 M/UL — LOW (ref 4.2–5.8)
RBC # FLD: 12.5 % — SIGNIFICANT CHANGE UP (ref 10.3–14.5)
S AUREUS DNA NOSE QL NAA+PROBE: SIGNIFICANT CHANGE UP
SODIUM SERPL-SCNC: 138 MMOL/L — SIGNIFICANT CHANGE UP (ref 135–145)
WBC # BLD: 6.33 K/UL — SIGNIFICANT CHANGE UP (ref 3.8–10.5)
WBC # FLD AUTO: 6.33 K/UL — SIGNIFICANT CHANGE UP (ref 3.8–10.5)

## 2025-06-24 PROCEDURE — 96375 TX/PRO/DX INJ NEW DRUG ADDON: CPT

## 2025-06-24 PROCEDURE — 80053 COMPREHEN METABOLIC PANEL: CPT

## 2025-06-24 PROCEDURE — 74177 CT ABD & PELVIS W/CONTRAST: CPT

## 2025-06-24 PROCEDURE — 71260 CT THORAX DX C+: CPT

## 2025-06-24 PROCEDURE — 84295 ASSAY OF SERUM SODIUM: CPT

## 2025-06-24 PROCEDURE — 84132 ASSAY OF SERUM POTASSIUM: CPT

## 2025-06-24 PROCEDURE — 85025 COMPLETE CBC W/AUTO DIFF WBC: CPT

## 2025-06-24 PROCEDURE — 93306 TTE W/DOPPLER COMPLETE: CPT | Mod: 26

## 2025-06-24 PROCEDURE — 85730 THROMBOPLASTIN TIME PARTIAL: CPT

## 2025-06-24 PROCEDURE — 87640 STAPH A DNA AMP PROBE: CPT

## 2025-06-24 PROCEDURE — 82330 ASSAY OF CALCIUM: CPT

## 2025-06-24 PROCEDURE — 99239 HOSP IP/OBS DSCHRG MGMT >30: CPT

## 2025-06-24 PROCEDURE — 81001 URINALYSIS AUTO W/SCOPE: CPT

## 2025-06-24 PROCEDURE — 96374 THER/PROPH/DIAG INJ IV PUSH: CPT

## 2025-06-24 PROCEDURE — 85610 PROTHROMBIN TIME: CPT

## 2025-06-24 PROCEDURE — 83735 ASSAY OF MAGNESIUM: CPT

## 2025-06-24 PROCEDURE — 99285 EMERGENCY DEPT VISIT HI MDM: CPT | Mod: 25

## 2025-06-24 PROCEDURE — 85018 HEMOGLOBIN: CPT

## 2025-06-24 PROCEDURE — 93005 ELECTROCARDIOGRAM TRACING: CPT

## 2025-06-24 PROCEDURE — 84484 ASSAY OF TROPONIN QUANT: CPT

## 2025-06-24 PROCEDURE — 82803 BLOOD GASES ANY COMBINATION: CPT

## 2025-06-24 PROCEDURE — 71045 X-RAY EXAM CHEST 1 VIEW: CPT

## 2025-06-24 PROCEDURE — 97161 PT EVAL LOW COMPLEX 20 MIN: CPT

## 2025-06-24 PROCEDURE — 87641 MR-STAPH DNA AMP PROBE: CPT

## 2025-06-24 PROCEDURE — 93306 TTE W/DOPPLER COMPLETE: CPT

## 2025-06-24 PROCEDURE — 82435 ASSAY OF BLOOD CHLORIDE: CPT

## 2025-06-24 PROCEDURE — 82947 ASSAY GLUCOSE BLOOD QUANT: CPT

## 2025-06-24 PROCEDURE — 0241U: CPT

## 2025-06-24 PROCEDURE — 83605 ASSAY OF LACTIC ACID: CPT

## 2025-06-24 PROCEDURE — 85014 HEMATOCRIT: CPT

## 2025-06-24 PROCEDURE — 85027 COMPLETE CBC AUTOMATED: CPT

## 2025-06-24 PROCEDURE — 84100 ASSAY OF PHOSPHORUS: CPT

## 2025-06-24 PROCEDURE — 87040 BLOOD CULTURE FOR BACTERIA: CPT

## 2025-06-24 PROCEDURE — 0225U NFCT DS DNA&RNA 21 SARSCOV2: CPT

## 2025-06-24 RX ORDER — METOPROLOL SUCCINATE 50 MG/1
1 TABLET, EXTENDED RELEASE ORAL
Refills: 0 | DISCHARGE

## 2025-06-24 RX ORDER — METOPROLOL SUCCINATE 50 MG/1
1 TABLET, EXTENDED RELEASE ORAL
Qty: 30 | Refills: 0
Start: 2025-06-24 | End: 2025-07-23

## 2025-06-24 RX ADMIN — Medication 25 GRAM(S): at 13:27

## 2025-06-24 RX ADMIN — METOPROLOL SUCCINATE 100 MILLIGRAM(S): 50 TABLET, EXTENDED RELEASE ORAL at 06:07

## 2025-06-24 RX ADMIN — Medication 81 MILLIGRAM(S): at 11:16

## 2025-06-24 RX ADMIN — APIXABAN 2.5 MILLIGRAM(S): 2.5 TABLET, FILM COATED ORAL at 06:07

## 2025-06-24 RX ADMIN — Medication 50 MICROGRAM(S): at 06:07

## 2025-06-24 RX ADMIN — FINASTERIDE 5 MILLIGRAM(S): 1 TABLET, FILM COATED ORAL at 11:18

## 2025-06-24 RX ADMIN — Medication 25 GRAM(S): at 06:10

## 2025-06-24 RX ADMIN — AMIODARONE HYDROCHLORIDE 100 MILLIGRAM(S): 50 INJECTION, SOLUTION INTRAVENOUS at 06:07

## 2025-06-24 RX ADMIN — Medication 40 MILLIGRAM(S): at 06:08

## 2025-06-24 NOTE — ADVANCED PRACTICE NURSE CONSULT - REASON FOR CONSULT
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 Daughter stated he was having nausea, vomiting, and loss stools, but wife and pt state no issues with urination or bowel changes. Pt states he had 2 episodes of vomiting yesterday, nausea, fever and chills. He denies sick contacts or recent travel.

## 2025-06-24 NOTE — PROGRESS NOTE ADULT - ASSESSMENT
Social History:    Lives with: Sister    Substance Use :  Tobacco Usage:   former smoker, 2PPD x 40 years  Alcohol Usage: History of EtOH abuse    Health Management    For male:  Last prostate exam:          [  ] date:            (  ) findings      Immunization Hx:   (  ) flu shot                               (     ) date   (  ) pneumonia shot               (     ) date  (  ) tetanus                               (     ) date     (     ) Advanced Directives: (     ) declined   [  ] health care proxy Social History:    Lives with: Sister    Substance Use :  Tobacco Usage:   former smoker, 2PPD x 40 years  Alcohol Usage: History of EtOH abuse    Health Management    Immunization Hx:    flu shot                    2017    pneumonia shot        up to date per sister 85 year old male with a history of Afib on Eliquis , severe aortic stenosis  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 Rule out infective endocarditis, TTE normal, BC NGTD 24hrs.

## 2025-06-24 NOTE — DISCHARGE NOTE PROVIDER - NSDCFUADDAPPT_GEN_ALL_CORE_FT
APPTS ARE READY TO BE MADE: [X] YES    Best Family or Patient Contact (if needed):    Additional Information about above appointments (if needed):    1:   2:   3:     Other comments or requests:    APPTS ARE READY TO BE MADE: [X] YES    Best Family or Patient Contact (if needed):    Additional Information about above appointments (if needed):    1:   2:   3:     Other comments or requests:   Patient was outreached but did not answer. A voicemail was left for the patient to return our call. APPTS ARE READY TO BE MADE: [X] YES    Best Family or Patient Contact (if needed):    Additional Information about above appointments (if needed):    1:   2:   3:     Other comments or requests:   Patient advised they did not want to proceed with scheduling appointments with the providers on their referrals. They will coordinate care on their own.

## 2025-06-24 NOTE — DISCHARGE NOTE NURSING/CASE MANAGEMENT/SOCIAL WORK - NSDCFUADDAPPT_GEN_ALL_CORE_FT
[facility] ED  EMERGENCY DEPARTMENT ENCOUNTER        Pt Name: Nena Eduardo  MRN: [id number]  Birthdate APPTS ARE READY TO BE MADE: [X] YES    Best Family or Patient Contact (if needed):    Additional Information about above appointments (if needed):    1:   2:   3:     Other comments or requests:

## 2025-06-24 NOTE — DISCHARGE NOTE PROVIDER - HOSPITAL COURSE
HPI:  85 year old male with a history of Afib on Eliquis , severe aortic stenosis  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 Daughter stated he was having nausea, vomiting, and loss stools, but wife and pt state no issues with urination or bowel changes. Pt states he had 2 episodes of vomiting yesterday, nausea, fever and chills. He denies sick contacts or recent travel.  (23 Jun 2025 08:30)    Hospital Course:        On day of discharge, patient is clinically stable with no new exam findings or acute symptoms compared to prior. The patient was seen by the attending physician on the date of discharge and deemed stable and acceptable for discharge. The patient's chronic medical conditions were treated accordingly per the patient's home medication regimen. The patient's medication reconciliation, follow up appointments, discharge instructions, and significant lab and diagnostic studies are as noted.     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 Daughter stated he was having nausea, vomiting, and loss stools, but wife and pt state no issues with urination or bowel changes. Pt states he had 2 episodes of vomiting yesterday, nausea, fever and chills. He denies sick contacts or recent travel.  (23 Jun 2025 08:30)    Hospital Course:  An infectious workup was completed and no source of infection was definitively identified. Cardiothoracic surgery was consulted and deemed no intervention necessary at this time. The patient was started on broad spectrum IV antibiotics and clinical status improved during the admission. He remained clinically stable without escalation of care.      On day of discharge, patient is clinically stable with no new exam findings or acute symptoms compared to prior. The patient was seen by the attending physician on the date of discharge and deemed stable and acceptable for discharge. The patient's chronic medical conditions were treated accordingly per the patient's home medication regimen. The patient's medication reconciliation, follow up appointments, discharge instructions, and significant lab and diagnostic studies are as noted.     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 Daughter stated he was having nausea, vomiting, and loss stools, but wife and pt state no issues with urination or bowel changes. Pt states he had 2 episodes of vomiting yesterday, nausea, fever and chills. He denies sick contacts or recent travel.  (23 Jun 2025 08:30)    Hospital Course:  An infectious workup was completed and no source of infection was definitively identified. Cardiothoracic surgery was consulted and deemed no intervention necessary at this time. The patient was started on broad spectrum IV antibiotics and clinical status improved during the admission. He remained clinically stable without escalation of care.    On day of discharge, patient is clinically stable with no new exam findings or acute symptoms compared to prior. The patient was seen by the attending physician on the date of discharge and deemed stable and acceptable for discharge. The patient's chronic medical conditions were treated accordingly per the patient's home medication regimen. The patient's medication reconciliation, follow up appointments, discharge instructions, and significant lab and diagnostic studies are as noted.     Important Medication Changes and Reason:  None    Active or Pending Issues Requiring Follow-up:  - f/u PCP in 1-2 weeks    Advanced Directives:   [X] Full code  [ ] DNR  [ ] Hospice    Discharge Diagnoses:  #SIRS- fever, tachycardia

## 2025-06-24 NOTE — DISCHARGE NOTE PROVIDER - NSDCMRMEDTOKEN_GEN_ALL_CORE_FT
amiodarone 100 mg oral tablet: 1 tab(s) orally once a day  aspirin 81 mg oral delayed release tablet: 1 tab(s) orally once a day  Eliquis 2.5 mg oral tablet: 1 tab(s) orally 2 times a day  finasteride 5 mg oral tablet: 1 tab(s) orally once a day (at bedtime)  levothyroxine 50 mcg (0.05 mg) oral tablet: 1 tab(s) orally once a day  Lipitor 20 mg oral tablet: 1 tab(s) orally once a day  metoprolol succinate 25 mg oral tablet, extended release: 4 tab(s) orally once a day MDD: 3 tabs  pantoprazole 40 mg oral delayed release tablet: 1 tab(s) orally once a day (before a meal)   amiodarone 100 mg oral tablet: 1 tab(s) orally once a day  aspirin 81 mg oral delayed release tablet: 1 tab(s) orally once a day  Eliquis 2.5 mg oral tablet: 1 tab(s) orally 2 times a day  finasteride 5 mg oral tablet: 1 tab(s) orally once a day (at bedtime)  levothyroxine 50 mcg (0.05 mg) oral tablet: 1 tab(s) orally once a day  Lipitor 20 mg oral tablet: 1 tab(s) orally once a day  metoprolol succinate 100 mg oral capsule, extended release: 1 cap(s) orally once a day  pantoprazole 40 mg oral delayed release tablet: 1 tab(s) orally once a day (before a meal)   amiodarone 100 mg oral tablet: 1 tab(s) orally once a day  aspirin 81 mg oral delayed release tablet: 1 tab(s) orally once a day  Eliquis 2.5 mg oral tablet: 1 tab(s) orally 2 times a day  finasteride 5 mg oral tablet: 1 tab(s) orally once a day (at bedtime)  levothyroxine 50 mcg (0.05 mg) oral tablet: 1 tab(s) orally once a day  Lipitor 20 mg oral tablet: 1 tab(s) orally once a day  metoprolol succinate 100 mg oral tablet, extended release: 1 tab(s) orally once a day  pantoprazole 40 mg oral delayed release tablet: 1 tab(s) orally once a day (before a meal)

## 2025-06-24 NOTE — PROGRESS NOTE ADULT - PROBLEM SELECTOR PLAN 2
Patient w/T wave inversions on EKG in ED. No chest pain.    Plan  - Check Trops. Trend to peak if abnormal  - If patient w/new chest pain, check STAT EKG and repeat Trops Patient w/T wave inversions on EKG in ED. No chest pain.    Plan  - Trops normal  - If patient w/new chest pain, check STAT EKG and repeat Trops

## 2025-06-24 NOTE — DISCHARGE NOTE PROVIDER - NSDCCPTREATMENT_GEN_ALL_CORE_FT
PRINCIPAL PROCEDURE  Procedure: CT of chest, abdomen, and pelvis  Findings and Treatment: IMPRESSION:  1.   Smooth interlobular septal thickening evident at the lung bases,  compatible with hydrostatic interstitial pulmonary edema/CHF.  2.   Minimal diffuse bronchial wall thickening may be peribronchial   cuffing  related to pulmonary edema or may signify bronchitis.

## 2025-06-24 NOTE — PROGRESS NOTE ADULT - SUBJECTIVE AND OBJECTIVE BOX
Patient is a 85y old  Male who presents with a chief complaint of Fevers (23 Jun 2025 08:30)      SUBJECTIVE / OVERNIGHT EVENTS:    MEDICATIONS  (STANDING):  aMIOdarone    Tablet 100 milliGRAM(s) Oral daily  apixaban 2.5 milliGRAM(s) Oral every 12 hours  aspirin enteric coated 81 milliGRAM(s) Oral daily  atorvastatin 20 milliGRAM(s) Oral at bedtime  finasteride 5 milliGRAM(s) Oral daily  levothyroxine 50 MICROGram(s) Oral daily  metoprolol succinate  milliGRAM(s) Oral daily  pantoprazole    Tablet 40 milliGRAM(s) Oral before breakfast  piperacillin/tazobactam IVPB.. 3.375 Gram(s) IV Intermittent every 8 hours    MEDICATIONS  (PRN):  ondansetron    Tablet 4 milliGRAM(s) Oral every 8 hours PRN Nausea and/or Vomiting      CAPILLARY BLOOD GLUCOSE        I&O's Summary    23 Jun 2025 07:01  -  24 Jun 2025 07:00  --------------------------------------------------------  IN: 0 mL / OUT: 500 mL / NET: -500 mL        Vital Signs Last 24 Hrs  T(C): 36.2 (24 Jun 2025 04:23), Max: 36.7 (23 Jun 2025 19:30)  T(F): 97.2 (24 Jun 2025 04:23), Max: 98.1 (23 Jun 2025 19:30)  HR: 107 (24 Jun 2025 06:00) (63 - 116)  BP: 121/89 (24 Jun 2025 06:00) (100/67 - 121/89)  BP(mean): 78 (23 Jun 2025 12:05) (78 - 78)  RR: 18 (24 Jun 2025 04:23) (17 - 19)  SpO2: 93% (24 Jun 2025 04:23) (93% - 99%)    Parameters below as of 24 Jun 2025 04:23  Patient On (Oxygen Delivery Method): room air        PHYSICAL EXAM:  GENERAL: NAD, well-developed  HEAD: Atraumatic, Normocephalic  EYES: EOMI, PERRLA, conjunctiva and sclera clear  NECK: Supple, No JVD  CHEST/LUNG: Clear to auscultation bilaterally; No wheezes or crackles  HEART: Normal S1/S2; Regular rate and rhythm; No murmurs, rubs, or gallops  ABDOMEN: Soft, Nontender, Nondistended; Bowel sounds present  EXTREMITIES: 2+ Peripheral Pulses; No clubbing, cyanosis, or edema  PSYCH: A&Ox3  NEUROLOGY: no focal neurologic deficit  SKIN: No rashes or lesions    LABS:                        12.4   6.33  )-----------( 80       ( 24 Jun 2025 06:39 )             37.9      06-24    138  |  105  |  17  ----------------------------<  94  4.1   |  21[L]  |  1.55[H]    Ca    8.7      24 Jun 2025 06:39  Phos  3.2     06-24  Mg     2.1     06-24    TPro  6.4  /  Alb  3.8  /  TBili  0.6  /  DBili  x   /  AST  19  /  ALT  19  /  AlkPhos  58  06-24    PT/INR - ( 22 Jun 2025 21:53 )   PT: 15.7 sec;   INR: 1.38 ratio         PTT - ( 22 Jun 2025 21:53 )  PTT:28.6 sec      Urinalysis Basic - ( 24 Jun 2025 06:39 )    Color: x / Appearance: x / SG: x / pH: x  Gluc: 94 mg/dL / Ketone: x  / Bili: x / Urobili: x   Blood: x / Protein: x / Nitrite: x   Leuk Esterase: x / RBC: x / WBC x   Sq Epi: x / Non Sq Epi: x / Bacteria: x        RADIOLOGY & ADDITIONAL TESTS:    Imaging Personally Reviewed:    Consultant(s) Notes Reviewed:      Care Discussed with Consultants/Other Providers:   Patient is a 85y old  Male who presents with a chief complaint of Fevers (23 Jun 2025 08:30)      SUBJECTIVE / OVERNIGHT EVENTS:  No overnight events    Patient is doing well and would like to go home    MEDICATIONS  (STANDING):  aMIOdarone    Tablet 100 milliGRAM(s) Oral daily  apixaban 2.5 milliGRAM(s) Oral every 12 hours  aspirin enteric coated 81 milliGRAM(s) Oral daily  atorvastatin 20 milliGRAM(s) Oral at bedtime  finasteride 5 milliGRAM(s) Oral daily  levothyroxine 50 MICROGram(s) Oral daily  metoprolol succinate  milliGRAM(s) Oral daily  pantoprazole    Tablet 40 milliGRAM(s) Oral before breakfast  piperacillin/tazobactam IVPB.. 3.375 Gram(s) IV Intermittent every 8 hours    MEDICATIONS  (PRN):  ondansetron    Tablet 4 milliGRAM(s) Oral every 8 hours PRN Nausea and/or Vomiting      CAPILLARY BLOOD GLUCOSE        I&O's Summary    23 Jun 2025 07:01  -  24 Jun 2025 07:00  --------------------------------------------------------  IN: 0 mL / OUT: 500 mL / NET: -500 mL        Vital Signs Last 24 Hrs  T(C): 36.2 (24 Jun 2025 04:23), Max: 36.7 (23 Jun 2025 19:30)  T(F): 97.2 (24 Jun 2025 04:23), Max: 98.1 (23 Jun 2025 19:30)  HR: 107 (24 Jun 2025 06:00) (63 - 116)  BP: 121/89 (24 Jun 2025 06:00) (100/67 - 121/89)  BP(mean): 78 (23 Jun 2025 12:05) (78 - 78)  RR: 18 (24 Jun 2025 04:23) (17 - 19)  SpO2: 93% (24 Jun 2025 04:23) (93% - 99%)    Parameters below as of 24 Jun 2025 04:23  Patient On (Oxygen Delivery Method): room air        PHYSICAL EXAM:  CONSTITUTIONAL: Well groomed, no apparent distress  EYES:  No conjunctival or scleral injection, non-icteric  NECK: Supple, symmetric and without tracheal deviation   RESP: No respiratory distress, no use of accessory muscles; CTA b/l, no WRR  CV: RRR, +S1S2, no JVD; no peripheral edema  GI: Soft, NT, ND, no rebound, no guarding; no palpable masses  MSK: No digital clubbing or cyanosis; no nail bed splinter hemorrhages appreciated   SKIN: No rashes, left arm lesion - wrapped in gauze, no hand nodes/lesions appreciated  NEURO: sensation intact in upper and lower extremities b/l to light touch   PSYCH:; A+O x 3,        LABS:                        12.4   6.33  )-----------( 80       ( 24 Jun 2025 06:39 )             37.9      06-24    138  |  105  |  17  ----------------------------<  94  4.1   |  21[L]  |  1.55[H]    Ca    8.7      24 Jun 2025 06:39  Phos  3.2     06-24  Mg     2.1     06-24    TPro  6.4  /  Alb  3.8  /  TBili  0.6  /  DBili  x   /  AST  19  /  ALT  19  /  AlkPhos  58  06-24    PT/INR - ( 22 Jun 2025 21:53 )   PT: 15.7 sec;   INR: 1.38 ratio         PTT - ( 22 Jun 2025 21:53 )  PTT:28.6 sec      Urinalysis Basic - ( 24 Jun 2025 06:39 )    Color: x / Appearance: x / SG: x / pH: x  Gluc: 94 mg/dL / Ketone: x  / Bili: x / Urobili: x   Blood: x / Protein: x / Nitrite: x   Leuk Esterase: x / RBC: x / WBC x   Sq Epi: x / Non Sq Epi: x / Bacteria: x        RADIOLOGY & ADDITIONAL TESTS:    Imaging Personally Reviewed:    Consultant(s) Notes Reviewed:      Care Discussed with Consultants/Other Providers:

## 2025-06-24 NOTE — DISCHARGE NOTE NURSING/CASE MANAGEMENT/SOCIAL WORK - PATIENT PORTAL LINK FT
You can access the FollowMyHealth Patient Portal offered by [facility] by registering at the following website: [email or web address]. By joining [facility] FollowMyHealth portal, you will also be able to view your health information using other applications (apps) compatible with our system.

## 2025-06-24 NOTE — PROGRESS NOTE ADULT - PROBLEM SELECTOR PLAN 12
Diet: DASH  - DVT proph: heparin sub q  GI: Pantoprazole  Dispo: Pending clinical course Diet: DASH  - DVT proph: heparin sub q  GI: Pantoprazole  Dispo: d/c today

## 2025-06-24 NOTE — DISCHARGE NOTE PROVIDER - NSDCCPCAREPLAN_GEN_ALL_CORE_FT
PRINCIPAL DISCHARGE DIAGNOSIS  Diagnosis: Sepsis  Assessment and Plan of Treatment:      PRINCIPAL DISCHARGE DIAGNOSIS  Diagnosis: Sepsis  Assessment and Plan of Treatment: You came in with fevers with concern for sepsis. An infectious workup was performed, however no definitive source was found. CT surgery evaluated you and found no indication for intervention. Broad spectrum antibiotics were started upon admission and your clinical status improved without needing to escalate care. If you develop worsening symptoms- such as fever, shortness of breath, chest pain, or fatigue, please seek medical attention promptly.     PRINCIPAL DISCHARGE DIAGNOSIS  Diagnosis: Sepsis  Assessment and Plan of Treatment: You came in with fevers with concern for sepsis. We suspect you likely had a viral illness that has now resolved. An infectious workup was performed, however no definitive source was found. CT surgery evaluated you and found no indication for intervention and recommended a repeat echo that was negative. Broad spectrum antibiotics were started upon admission and your clinical status improved without needing to escalate care.   Please follow up with your primary care doctor in 1-2 weeks.   If you develop worsening symptoms- such as fever, shortness of breath, chest pain, or fatigue, please seek medical attention promptly.

## 2025-06-24 NOTE — ADVANCED PRACTICE NURSE CONSULT - RECOMMEDATIONS
Impression:    B/L buttocks/sacral hyperpigmentation, cannot rule out a deep tissue injury present on admission  psoriasis  left forearm skin tear     Recommendations:    1) turn and position q2 and PRN utilizing offloading assistive devices  2) routine pericare daily and PRN soiling  3) encourage optimal nutrition  4) waffle cushion or pillow on seat when oob to chair  5) B/L LE complete cair air fluidized boots or ramsey-lock pillow to offload heels/feet  6) bhavya protective barrier cream to B/L buttocks/sacrum daily and PRN soiling (prophylactic use)  7) incontinence management - consider external urinary catheter to divert urine from skin if incontinent  8) sween 24 moisturizer to dry skin daily   9) skin tears - cleanse skin and pat dry then apply cavilon over skin, cut adaptic slightly larger than size of wound and lay over wound base, then apply allevyn foam dressing, change daily and/or PRN soiling    Plan discussed with KIM Erazo      For questions/comments regarding the recommendations in this consult, please contact [facility] via [employer] Teams. Wound care will not actively follow. For new concerns, please enter new consult. Thank you!

## 2025-06-24 NOTE — DISCHARGE NOTE NURSING/CASE MANAGEMENT/SOCIAL WORK - FINANCIAL ASSISTANCE
[facility] provides services at a reduced cost to those who are determined to be eligible through [facility]’s financial assistance program. Information regarding [facility]’s financial assistance program can be found by going to [email or web address] or by calling [phone].

## 2025-06-24 NOTE — ADVANCED PRACTICE NURSE CONSULT - ASSESSMENT
Patient encountered on [address]. When wound care RN arrived on unit, patient was found sitting on the edge of a low air loss pressure redistribution support surface style bed with his wife at bedside. Patient was alert and oriented and gave consent to skin consult. Mr Magallon is able to stand independently for wound care RN to view sacral skin. An area of persistent nonblanchable hyperpigmentation over B/l buttocks/sacral skin, area measures approximately 4cm x 4mc x0cm - cannot rule out a deep tissue injury present on admission. Right buttocks with irregular shaped erythema with white film, patient reports psoriasis history, denies itchiness/burning at this time. Left forearm with category 1 skin tear, well approximated. Once consult was complete, patient and family were educated regarding the need for routine turning and positioning to prevent pressure injuries.

## 2025-06-24 NOTE — PHYSICAL THERAPY INITIAL EVALUATION ADULT - PERTINENT HX OF CURRENT PROBLEM, REHAB EVAL
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 Hosp course: XR chest (6/22) No focal consolidations. CT abdomen/pelvis (6/23) Smooth interlobular septal thickening evident at the lung bases, compatible with hydrostatic interstitial pulmonary edema/CHF. Minimal diffuse bronchial wall thickening may be peribronchial cuffing related to pulmonary edema or may signify bronchitis.

## 2025-06-24 NOTE — PROGRESS NOTE ADULT - PROBLEM SELECTOR PLAN 1
Patient met SIRS criteria in ED. Patient w/fevers, chills, nausea, loose stools for 1 day prior. Concern for Infective Endocarditis given recent TAVR. CT Surg evaluated in ED and recommended TTE.    Plan  - c/w Zosyn for now. If Bcx negative after 48 hrs, will d/c. If patient febrile despite Zosyn, will consider starting Vanc  - TTE. If findings suggestive of potential IE, will consider DARA  - f/u BCx results  - MRSA  - GI PCR and Stool count  - Full RVP  - Trend WBC and Fever curve  - Tylenol prn for fever Patient met SIRS criteria in ED. Patient w/fevers, chills, nausea, loose stools for 1 day prior. Concern for Infective Endocarditis given recent TAVR. CT Surg evaluated in ED and recommended TTE.      Plan  - c/w Zosyn for now. If Bcx negative after 48 hrs, will d/c.   - TTE. If findings suggestive of potential IE, will consider DARA  - f/u BCx results  - MRSA PCR negative  - GI PCR and Stool count  - Full RVP  - Trend WBC and Fever curve  - Tylenol prn for fever Patient met SIRS criteria in ED. Patient w/fevers, chills, nausea, loose stools for 1 day prior. Concern for Infective Endocarditis given recent TAVR. CT Surg evaluated in ED and recommended TTE. TTE wnl. D/c today       Plan  - Stop Zosyn  - BCx negative at 24hrs. f/u BCx  - MRSA PCR negative  - Full RVP  - Trend WBC and Fever curve  - Tylenol prn for fever

## 2025-06-24 NOTE — DISCHARGE NOTE PROVIDER - CARE PROVIDER_API CALL
Ismael Ramirez  Internal Medicine  [address], [address]  [place] [address]  Phone: [phone]  Fax: [phone]  Established Patient  Follow Up Time: 2 weeks

## 2025-06-24 NOTE — PHYSICAL THERAPY INITIAL EVALUATION ADULT - ADDITIONAL COMMENTS
Pt resides with his spouse in a house with +3 steps to enter, first floor setup. PTA, pt ambulated independently with no device and was independent with ADLs. Pt own RW and straight cane

## 2025-06-25 ENCOUNTER — NON-APPOINTMENT (OUTPATIENT)
Age: 85
End: 2025-06-25

## 2025-07-03 ENCOUNTER — NON-APPOINTMENT (OUTPATIENT)
Age: 85
End: 2025-07-03

## 2025-07-03 ENCOUNTER — LABORATORY RESULT (OUTPATIENT)
Age: 85
End: 2025-07-03

## 2025-07-03 ENCOUNTER — APPOINTMENT (OUTPATIENT)
Dept: CARDIOLOGY | Facility: CLINIC | Age: 85
End: 2025-07-03

## 2025-07-03 VITALS
DIASTOLIC BLOOD PRESSURE: 71 MMHG | RESPIRATION RATE: 14 BRPM | WEIGHT: 220 LBS | OXYGEN SATURATION: 96 % | BODY MASS INDEX: 35.36 KG/M2 | SYSTOLIC BLOOD PRESSURE: 104 MMHG | HEART RATE: 99 BPM | HEIGHT: 66 IN | TEMPERATURE: 97.1 F

## 2025-07-03 LAB
ALBUMIN SERPL ELPH-MCNC: 4.5 G/DL
ALP BLD-CCNC: 75 U/L
ALT SERPL-CCNC: 26 U/L
ANION GAP SERPL CALC-SCNC: 18 MMOL/L
AST SERPL-CCNC: 23 U/L
BASOPHILS # BLD AUTO: 0.02 K/UL
BASOPHILS NFR BLD AUTO: 0.3 %
BILIRUB SERPL-MCNC: 0.8 MG/DL
BUN SERPL-MCNC: 24 MG/DL
CALCIUM SERPL-MCNC: 9.3 MG/DL
CHLORIDE SERPL-SCNC: 100 MMOL/L
CO2 SERPL-SCNC: 23 MMOL/L
CREAT SERPL-MCNC: 1.62 MG/DL
EGFRCR SERPLBLD CKD-EPI 2021: 41 ML/MIN/1.73M2
EOSINOPHIL # BLD AUTO: 0.02 K/UL
EOSINOPHIL NFR BLD AUTO: 0.3 %
GLUCOSE SERPL-MCNC: 92 MG/DL
HCT VFR BLD CALC: 43.5 %
HGB BLD-MCNC: 14.4 G/DL
IMM GRANULOCYTES NFR BLD AUTO: 1 %
LYMPHOCYTES # BLD AUTO: 1.95 K/UL
LYMPHOCYTES NFR BLD AUTO: 24.8 %
MAN DIFF?: NORMAL
MCHC RBC-ENTMCNC: 33.1 G/DL
MCHC RBC-ENTMCNC: 33.3 PG
MCV RBC AUTO: 100.5 FL
MONOCYTES # BLD AUTO: 1.74 K/UL
MONOCYTES NFR BLD AUTO: 22.2 %
NEUTROPHILS # BLD AUTO: 4.04 K/UL
NEUTROPHILS NFR BLD AUTO: 51.4 %
NT-PROBNP SERPL-MCNC: 824 PG/ML
PLATELET # BLD AUTO: 107 K/UL
POTASSIUM SERPL-SCNC: 4 MMOL/L
PROT SERPL-MCNC: 7.4 G/DL
RBC # BLD: 4.33 M/UL
RBC # FLD: 12.9 %
SODIUM SERPL-SCNC: 141 MMOL/L
TSH SERPL-ACNC: 3.94 UIU/ML
WBC # FLD AUTO: 7.85 K/UL

## 2025-07-03 PROCEDURE — 93000 ELECTROCARDIOGRAM COMPLETE: CPT

## 2025-07-03 PROCEDURE — 99214 OFFICE O/P EST MOD 30 MIN: CPT | Mod: 25

## 2025-09-18 ENCOUNTER — NON-APPOINTMENT (OUTPATIENT)
Age: 85
End: 2025-09-18

## 2025-09-18 ENCOUNTER — APPOINTMENT (OUTPATIENT)
Dept: CARDIOLOGY | Facility: CLINIC | Age: 85
End: 2025-09-18
Payer: MEDICARE

## 2025-09-18 VITALS
WEIGHT: 228 LBS | BODY MASS INDEX: 36.64 KG/M2 | HEART RATE: 47 BPM | HEIGHT: 66 IN | DIASTOLIC BLOOD PRESSURE: 64 MMHG | RESPIRATION RATE: 12 BRPM | OXYGEN SATURATION: 96 % | SYSTOLIC BLOOD PRESSURE: 110 MMHG

## 2025-09-18 DIAGNOSIS — I48.91 UNSPECIFIED ATRIAL FIBRILLATION: ICD-10-CM

## 2025-09-18 PROCEDURE — 99214 OFFICE O/P EST MOD 30 MIN: CPT | Mod: 25

## 2025-09-18 PROCEDURE — 93000 ELECTROCARDIOGRAM COMPLETE: CPT

## 2025-09-18 RX ORDER — METOPROLOL SUCCINATE 25 MG/1
25 TABLET, EXTENDED RELEASE ORAL
Qty: 90 | Refills: 2 | Status: ACTIVE | COMMUNITY
Start: 2025-09-18 | End: 1900-01-01

## (undated) DEVICE — DRAPE FEMORAL ANGIOGRAPHY W TROUGH

## (undated) DEVICE — STRYKER INTERPULSE HANDPIECE W IRR SUCTION TUBE

## (undated) DEVICE — VENODYNE/SCD SLEEVE CALF MEDIUM

## (undated) DEVICE — TUBING CONTRAST INJECTION HIGH PRESSURE 1200PSI 72"

## (undated) DEVICE — MNFLD SETUP

## (undated) DEVICE — POSITIONER FOAM EGG CRATE ULNAR 2PCS (PINK)

## (undated) DEVICE — WARMING BLANKET DUO-THERM HYPER/HYPOTHERM ADULT

## (undated) DEVICE — TOURNIQUET CUFF 34" DUAL PORT W PLC

## (undated) DEVICE — SAW BLADE STRYKER STERNUM 31MM X 6.27 X .79

## (undated) DEVICE — MAKO BLADE STANDARD

## (undated) DEVICE — ULTRASOUND TRANSDUCER COVER

## (undated) DEVICE — DRAPE TOWEL BLUE 17" X 24"

## (undated) DEVICE — DRAPE 1/2 SHEET 40X57"

## (undated) DEVICE — WARMING BLANKET FULL UNDERBODY

## (undated) DEVICE — VENODYNE/SCD SLEEVE CALF LARGE

## (undated) DEVICE — DRSG TEGADERM 6 X 8"

## (undated) DEVICE — ELCTR BOVIE PENCIL HANDPIECE ROCKER SWITCH 15FT

## (undated) DEVICE — GLV 8.5 PROTEXIS (WHITE)

## (undated) DEVICE — TUBING TUR 2 PRONG

## (undated) DEVICE — BLADE SCALPEL SAFETYLOCK #11

## (undated) DEVICE — GOWN TRIMAX XXL

## (undated) DEVICE — LAP PAD 18 X 18"

## (undated) DEVICE — SUT SURGICAL STEEL 6 30" BP-1

## (undated) DEVICE — DRSG STERISTRIPS 0.5 X 4"

## (undated) DEVICE — PACK UNIVERSAL CARDIAC

## (undated) DEVICE — CONNECTOR STRAIGHT 3/8 X 3/8" W LUER LOCK

## (undated) DEVICE — DRAPE IOBAN 23" X 23"

## (undated) DEVICE — VENTING ADAPTER "Y" (RED/BLUE) 7.5"

## (undated) DEVICE — SOL INJ NS 0.9% 1000ML

## (undated) DEVICE — SUT QUILL PDO 1 45CM CTX 48MM

## (undated) DEVICE — FOLEY TRAY 16FR 5CC LF LUBRISIL ADVANCE TEMP CLOSED

## (undated) DEVICE — MAKO CHECKPOINT KIT FEMORAL / TIBIAL

## (undated) DEVICE — SUT PROLENE 4-0 36" SH

## (undated) DEVICE — MEDICATION LABELS W MARKER

## (undated) DEVICE — CONNECTOR STRAIGHT 3/8 X 1/2"

## (undated) DEVICE — MAKO BLADE NARROW

## (undated) DEVICE — SUT MONOCRYL 2-0 27" SH UNDYED

## (undated) DEVICE — BULLDOG SPRING CLIP 6MM SOFT/SOFT

## (undated) DEVICE — PACING CABLE A/V TEMP SCREW DOWN 6FT

## (undated) DEVICE — CHEST DRAIN PLEUR-EVAC WET/WET ADULT-PEDS SINGLE (QUICK)

## (undated) DEVICE — DRSG MASTISOL

## (undated) DEVICE — ELCTR BOVIE PENCIL HANDPIECE

## (undated) DEVICE — DRSG AQUACEL 3.5 X 12"

## (undated) DEVICE — GLV 6.5 PROTEXIS (WHITE)

## (undated) DEVICE — TUBING PRESSURE 100"

## (undated) DEVICE — SUT PDS II 3-0 36" SH

## (undated) DEVICE — GOWN LG

## (undated) DEVICE — WARMING BLANKET UPPER ADULT

## (undated) DEVICE — SUT SILK 0 30" TIES

## (undated) DEVICE — SUT PLEDGET PRE PUNCH 4.8 X 9.5 X 1.5 MM

## (undated) DEVICE — LOADING SYSTEM EVOLUT FX 23-29MM

## (undated) DEVICE — NDL COUNTER FOAM AND MAGNET 40-70

## (undated) DEVICE — SOL NORMOSOL-R PH7.4 1000ML

## (undated) DEVICE — PACK CARDIAC YELLOW

## (undated) DEVICE — PREP CHLORAPREP HI-LITE ORANGE 26ML

## (undated) DEVICE — DRAPE INSTRUMENT POUCH 6.75" X 11"

## (undated) DEVICE — MAKO DRAPE KIT

## (undated) DEVICE — SENSOR MYOCARDIAL TEMP 15MM

## (undated) DEVICE — GLV 7.5 PROTEXIS (WHITE)

## (undated) DEVICE — SUT VICRYL 1 27" CPX UNDYED

## (undated) DEVICE — VISITEC 4X4

## (undated) DEVICE — BLADE SCALPEL SAFETYLOCK #10

## (undated) DEVICE — HOOD FLYTE STRYKER SURGICOOL W PEELAWAY

## (undated) DEVICE — SOL IRR POUR NS 0.9% 500ML

## (undated) DEVICE — SAW BLADE STRYKER SAGITTAL DUAL CUT 64X35X.89MM

## (undated) DEVICE — Device

## (undated) DEVICE — SPONGE DISSECTOR PEANUT

## (undated) DEVICE — POSITIONER CARDIAC BUMP

## (undated) DEVICE — SYR ASEPTO

## (undated) DEVICE — SUT BLUNT SZ 5

## (undated) DEVICE — FOLEY HOLDER STATLOCK 2 WAY ADULT

## (undated) DEVICE — STAPLER SKIN VISI-STAT 35 WIDE

## (undated) DEVICE — SUT POLYSORB 2-0 30" GS-26

## (undated) DEVICE — PACING CABLE TEMP MEDTRONIC WITH PAC-LOC

## (undated) DEVICE — TUBING ATS SUCTION LINE

## (undated) DEVICE — SUT PROLENE 4-0 36" BB

## (undated) DEVICE — DRAPE SURGICAL #1010

## (undated) DEVICE — SPECIMEN CONTAINER 100ML

## (undated) DEVICE — SOL IRR POUR H2O 250ML

## (undated) DEVICE — SUT QUILL MONODERM 2-0 3/8 CIRCLE 45CM

## (undated) DEVICE — SUT PROLENE 5-0 30" RB-2

## (undated) DEVICE — DRAPE 3/4 SHEET W REINFORCEMENT 56X77"

## (undated) DEVICE — SUT PDS II 0 27" OS-6

## (undated) DEVICE — DRAPE MAYO STAND 30"

## (undated) DEVICE — TOURNIQUET SET 12FR (1 RED, 1 BLUE, 1 SNARE) 7"

## (undated) DEVICE — GLV 8 PROTEXIS (WHITE)

## (undated) DEVICE — STEALTH CLAMP INSERT FIBRA/FIBRA 60MM

## (undated) DEVICE — VESSEL LOOP MAXI-RED  0.120" X 16"

## (undated) DEVICE — SUMP INTRACARDIAC 20FR 1/4" ADULT

## (undated) DEVICE — SHIELD CATH CONTAMINATION 7.5-8.5FR TWISTLOCK ADAPT

## (undated) DEVICE — GLV 8.5 PROTEXIS (BLUE)

## (undated) DEVICE — SUT BOOT STANDARD (ASSORTED) 5 PAIR

## (undated) DEVICE — BLADE SCALPEL SAFETYLOCK #15

## (undated) DEVICE — PACK UNIVERSAL CARDIAC SUPPLEMNTAL B

## (undated) DEVICE — SUT SOFSILK 2 60" TIES

## (undated) DEVICE — SUT POLYSORB 2-0 30" GS-21 UNDYED

## (undated) DEVICE — FOLEY TRAY 16FR 5CC LTX UMETER CLOSED

## (undated) DEVICE — ELCTR BOVIE PENCIL SMOKE EVACUATION

## (undated) DEVICE — BAG DECANTER IV STERILE

## (undated) DEVICE — MAKO VIZADISC KNEE TRACKING KIT

## (undated) DEVICE — SUCTION TUBE CARDIAC SOFT TIP 6FR SHAFT 10FR TIP 6"

## (undated) DEVICE — DRAIN CHANNEL 19FR ROUND FULL FLUTED

## (undated) DEVICE — SUT PROLENE 3-0 36" SH

## (undated) DEVICE — TUBING SUCTION 20FT

## (undated) DEVICE — STEALTH CLAMP INSERT FIBRA/FIBRA 90MM

## (undated) DEVICE — SUT QUILL MONODERM 0 1/2 CIRCLE TAPR 45CM 26MM

## (undated) DEVICE — HOOD FLYTE STRYKER HELMET SHIELD

## (undated) DEVICE — SUCTION YANKAUER NO CONTROL VENT

## (undated) DEVICE — ELCTR HEX BLADE

## (undated) DEVICE — SOL INJ NS 0.9% 500ML 1-PORT

## (undated) DEVICE — DRSG OPSITE 13.75 X 4"

## (undated) DEVICE — DRSG DERMABOND 0.7ML

## (undated) DEVICE — DRAPE LIGHT HANDLE COVER (BLUE)

## (undated) DEVICE — SUT BIOSYN 4-0 18" P-12

## (undated) DEVICE — PACK MIS KNEE (1 PIECE)

## (undated) DEVICE — NDL PERC BASEPLT 18GX7CM

## (undated) DEVICE — DRSG OPSITE 2.5 X 2"

## (undated) DEVICE — GLV 7 PROTEXIS (WHITE)

## (undated) DEVICE — SUT PDO 2 1/2 CIRCLE 40MM NDL 45CM

## (undated) DEVICE — STOPCOCK 3-WAY

## (undated) DEVICE — GLV 6 PROTEXIS W HYDROGEL

## (undated) DEVICE — SUT PROLENE 5-0 36" RB-1

## (undated) DEVICE — BOWL 32OZ (BLUE)